# Patient Record
Sex: FEMALE | Race: WHITE | NOT HISPANIC OR LATINO
[De-identification: names, ages, dates, MRNs, and addresses within clinical notes are randomized per-mention and may not be internally consistent; named-entity substitution may affect disease eponyms.]

---

## 2020-10-29 ENCOUNTER — APPOINTMENT (OUTPATIENT)
Dept: GASTROENTEROLOGY | Facility: CLINIC | Age: 55
End: 2020-10-29

## 2021-03-09 ENCOUNTER — APPOINTMENT (OUTPATIENT)
Dept: GASTROENTEROLOGY | Facility: CLINIC | Age: 56
End: 2021-03-09

## 2023-02-09 ENCOUNTER — LABORATORY RESULT (OUTPATIENT)
Age: 58
End: 2023-02-09

## 2023-02-09 ENCOUNTER — APPOINTMENT (OUTPATIENT)
Dept: RHEUMATOLOGY | Facility: CLINIC | Age: 58
End: 2023-02-09
Payer: MEDICARE

## 2023-02-09 VITALS
HEIGHT: 61 IN | HEART RATE: 104 BPM | SYSTOLIC BLOOD PRESSURE: 138 MMHG | TEMPERATURE: 98 F | WEIGHT: 244 LBS | BODY MASS INDEX: 46.07 KG/M2 | OXYGEN SATURATION: 96 % | DIASTOLIC BLOOD PRESSURE: 79 MMHG

## 2023-02-09 DIAGNOSIS — Z00.00 ENCOUNTER FOR GENERAL ADULT MEDICAL EXAMINATION W/OUT ABNORMAL FINDINGS: ICD-10-CM

## 2023-02-09 PROCEDURE — 36415 COLL VENOUS BLD VENIPUNCTURE: CPT

## 2023-02-09 PROCEDURE — 99205 OFFICE O/P NEW HI 60 MIN: CPT | Mod: 25

## 2023-02-09 NOTE — ASSESSMENT
[FreeTextEntry1] : 57 year old female with longstanding hx of Afib, DM, CHF presents today for an initial evaluation for \par diffuse, generalized whole body pain\par \par ROS and PE otherwise unremarkable for underlying classic CTD/ systemic autoimmune disease\par \par - labs as below. will call pt with results. \par \par Discussed treatment plan with the patient. The patient was given the opportunity to ask questions and all questions were answered to their satisfaction.\par  \par

## 2023-02-09 NOTE — HISTORY OF PRESENT ILLNESS
[FreeTextEntry1] : 57 year old female with PMH as listed below presents today for an initial evaluation\par \par Has chronic, longstanding hx of Afib, DM, CHF. \par \par Presenting today with diffuse pain everywhere- joints, muscle. \par Has chronic pain > 10 years now that is getting progressively worse. \par She feels like "there is a needle piercing through her body". Her is pain is constant, sharp. No swelling to the joints. \par Unable to do ADLS because of pain. She lives alone. No recent falls. \par Taking Tylenol prn for pain with mild improvement in symptoms\par Unable to tolerate NSAIDS- on blood thinner, also with GERD\par \par Over the last few weeks reports to have increased pain and swelling to her legs. Does have hx of BL LE edema from CHF. \par She went to see her PCP who did labs that were unremarkable per pt and recommended rheumatology work up. \par Patient presenting today to rule out autoimmune disease?\par \par denies fever, chills, oral/nasal ulcers,chest pain, abdominal pain, new rashes, falls\par \par FH: denies FH of autoimmune disease

## 2023-02-09 NOTE — PHYSICAL EXAM
[General Appearance - Alert] : alert [General Appearance - In No Acute Distress] : in no acute distress [General Appearance - Well Nourished] : well nourished [General Appearance - Well Developed] : well developed [Sclera] : the sclera and conjunctiva were normal [Outer Ear] : the ears and nose were normal in appearance [Neck Appearance] : the appearance of the neck was normal [Musculoskeletal - Swelling] : no joint swelling seen [] : no rash [No Focal Deficits] : no focal deficits [Oriented To Time, Place, And Person] : oriented to person, place, and time [FreeTextEntry1] : +multiple tender points. + BL crepitus. + BL LE edema

## 2023-02-11 ENCOUNTER — INPATIENT (INPATIENT)
Facility: HOSPITAL | Age: 58
LOS: 5 days | Discharge: NURSING, REHAB OR LONG-TERM CARE | DRG: 603 | End: 2023-02-17
Attending: FAMILY MEDICINE | Admitting: INTERNAL MEDICINE
Payer: MEDICARE

## 2023-02-11 VITALS
SYSTOLIC BLOOD PRESSURE: 119 MMHG | RESPIRATION RATE: 18 BRPM | TEMPERATURE: 97 F | OXYGEN SATURATION: 96 % | WEIGHT: 244.05 LBS | HEIGHT: 61 IN | DIASTOLIC BLOOD PRESSURE: 82 MMHG | HEART RATE: 91 BPM

## 2023-02-11 DIAGNOSIS — I48.91 UNSPECIFIED ATRIAL FIBRILLATION: ICD-10-CM

## 2023-02-11 DIAGNOSIS — E11.621 TYPE 2 DIABETES MELLITUS WITH FOOT ULCER: ICD-10-CM

## 2023-02-11 DIAGNOSIS — I50.9 HEART FAILURE, UNSPECIFIED: ICD-10-CM

## 2023-02-11 DIAGNOSIS — L03.116 CELLULITIS OF LEFT LOWER LIMB: ICD-10-CM

## 2023-02-11 DIAGNOSIS — E66.01 MORBID (SEVERE) OBESITY DUE TO EXCESS CALORIES: ICD-10-CM

## 2023-02-11 DIAGNOSIS — Z98.890 OTHER SPECIFIED POSTPROCEDURAL STATES: Chronic | ICD-10-CM

## 2023-02-11 DIAGNOSIS — M54.9 DORSALGIA, UNSPECIFIED: ICD-10-CM

## 2023-02-11 DIAGNOSIS — Z90.89 ACQUIRED ABSENCE OF OTHER ORGANS: Chronic | ICD-10-CM

## 2023-02-11 DIAGNOSIS — Z90.49 ACQUIRED ABSENCE OF OTHER SPECIFIED PARTS OF DIGESTIVE TRACT: Chronic | ICD-10-CM

## 2023-02-11 DIAGNOSIS — Z29.9 ENCOUNTER FOR PROPHYLACTIC MEASURES, UNSPECIFIED: ICD-10-CM

## 2023-02-11 DIAGNOSIS — E11.9 TYPE 2 DIABETES MELLITUS WITHOUT COMPLICATIONS: ICD-10-CM

## 2023-02-11 DIAGNOSIS — R10.9 UNSPECIFIED ABDOMINAL PAIN: ICD-10-CM

## 2023-02-11 LAB
ALBUMIN SERPL ELPH-MCNC: 3.6 G/DL — SIGNIFICANT CHANGE UP (ref 3.3–5)
ALP SERPL-CCNC: 75 U/L — SIGNIFICANT CHANGE UP (ref 40–120)
ALT FLD-CCNC: 41 U/L — SIGNIFICANT CHANGE UP (ref 12–78)
ANION GAP SERPL CALC-SCNC: 10 MMOL/L — SIGNIFICANT CHANGE UP (ref 5–17)
ANION GAP SERPL CALC-SCNC: 8 MMOL/L — SIGNIFICANT CHANGE UP (ref 5–17)
APPEARANCE UR: CLEAR — SIGNIFICANT CHANGE UP
APTT BLD: 29.2 SEC — SIGNIFICANT CHANGE UP (ref 27.5–35.5)
APTT BLD: 31.6 SEC — SIGNIFICANT CHANGE UP (ref 27.5–35.5)
AST SERPL-CCNC: 25 U/L — SIGNIFICANT CHANGE UP (ref 15–37)
BASOPHILS # BLD AUTO: 0.03 K/UL — SIGNIFICANT CHANGE UP (ref 0–0.2)
BASOPHILS NFR BLD AUTO: 0.3 % — SIGNIFICANT CHANGE UP (ref 0–2)
BILIRUB SERPL-MCNC: 0.6 MG/DL — SIGNIFICANT CHANGE UP (ref 0.2–1.2)
BILIRUB UR-MCNC: NEGATIVE — SIGNIFICANT CHANGE UP
BUN SERPL-MCNC: 16 MG/DL — SIGNIFICANT CHANGE UP (ref 7–23)
BUN SERPL-MCNC: 20 MG/DL — SIGNIFICANT CHANGE UP (ref 7–23)
CALCIUM SERPL-MCNC: 8.6 MG/DL — SIGNIFICANT CHANGE UP (ref 8.5–10.1)
CALCIUM SERPL-MCNC: 9.6 MG/DL — SIGNIFICANT CHANGE UP (ref 8.5–10.1)
CHLORIDE SERPL-SCNC: 101 MMOL/L — SIGNIFICANT CHANGE UP (ref 96–108)
CHLORIDE SERPL-SCNC: 99 MMOL/L — SIGNIFICANT CHANGE UP (ref 96–108)
CO2 SERPL-SCNC: 27 MMOL/L — SIGNIFICANT CHANGE UP (ref 22–31)
CO2 SERPL-SCNC: 30 MMOL/L — SIGNIFICANT CHANGE UP (ref 22–31)
COLOR SPEC: SIGNIFICANT CHANGE UP
CREAT SERPL-MCNC: 0.77 MG/DL — SIGNIFICANT CHANGE UP (ref 0.5–1.3)
CREAT SERPL-MCNC: 0.78 MG/DL — SIGNIFICANT CHANGE UP (ref 0.5–1.3)
DIFF PNL FLD: NEGATIVE — SIGNIFICANT CHANGE UP
DIGOXIN SERPL-MCNC: 0.1 NG/ML — LOW (ref 0.8–2)
EGFR: 88 ML/MIN/1.73M2 — SIGNIFICANT CHANGE UP
EGFR: 89 ML/MIN/1.73M2 — SIGNIFICANT CHANGE UP
EOSINOPHIL # BLD AUTO: 0.11 K/UL — SIGNIFICANT CHANGE UP (ref 0–0.5)
EOSINOPHIL NFR BLD AUTO: 1.1 % — SIGNIFICANT CHANGE UP (ref 0–6)
FLUAV AG NPH QL: SIGNIFICANT CHANGE UP
FLUBV AG NPH QL: SIGNIFICANT CHANGE UP
GLUCOSE SERPL-MCNC: 299 MG/DL — HIGH (ref 70–99)
GLUCOSE SERPL-MCNC: 373 MG/DL — HIGH (ref 70–99)
GLUCOSE UR QL: 1000 MG/DL
HCT VFR BLD CALC: 42.8 % — SIGNIFICANT CHANGE UP (ref 34.5–45)
HGB BLD-MCNC: 14.7 G/DL — SIGNIFICANT CHANGE UP (ref 11.5–15.5)
IMM GRANULOCYTES NFR BLD AUTO: 0.5 % — SIGNIFICANT CHANGE UP (ref 0–0.9)
INR BLD: 1.06 RATIO — SIGNIFICANT CHANGE UP (ref 0.88–1.16)
INR BLD: 1.81 RATIO — HIGH (ref 0.88–1.16)
KETONES UR-MCNC: NEGATIVE — SIGNIFICANT CHANGE UP
LACTATE SERPL-SCNC: 2.9 MMOL/L — HIGH (ref 0.7–2)
LACTATE SERPL-SCNC: 3.6 MMOL/L — HIGH (ref 0.7–2)
LACTATE SERPL-SCNC: 3.9 MMOL/L — HIGH (ref 0.7–2)
LEUKOCYTE ESTERASE UR-ACNC: NEGATIVE — SIGNIFICANT CHANGE UP
LYMPHOCYTES # BLD AUTO: 1.53 K/UL — SIGNIFICANT CHANGE UP (ref 1–3.3)
LYMPHOCYTES # BLD AUTO: 15.2 % — SIGNIFICANT CHANGE UP (ref 13–44)
MAGNESIUM SERPL-MCNC: 1.6 MG/DL — SIGNIFICANT CHANGE UP (ref 1.6–2.6)
MCHC RBC-ENTMCNC: 30.6 PG — SIGNIFICANT CHANGE UP (ref 27–34)
MCHC RBC-ENTMCNC: 34.3 GM/DL — SIGNIFICANT CHANGE UP (ref 32–36)
MCV RBC AUTO: 89 FL — SIGNIFICANT CHANGE UP (ref 80–100)
MONOCYTES # BLD AUTO: 0.55 K/UL — SIGNIFICANT CHANGE UP (ref 0–0.9)
MONOCYTES NFR BLD AUTO: 5.5 % — SIGNIFICANT CHANGE UP (ref 2–14)
NEUTROPHILS # BLD AUTO: 7.8 K/UL — HIGH (ref 1.8–7.4)
NEUTROPHILS NFR BLD AUTO: 77.4 % — HIGH (ref 43–77)
NITRITE UR-MCNC: NEGATIVE — SIGNIFICANT CHANGE UP
NRBC # BLD: 0 /100 WBCS — SIGNIFICANT CHANGE UP (ref 0–0)
PH UR: 5 — SIGNIFICANT CHANGE UP (ref 5–8)
PLATELET # BLD AUTO: 230 K/UL — SIGNIFICANT CHANGE UP (ref 150–400)
POTASSIUM SERPL-MCNC: 3.6 MMOL/L — SIGNIFICANT CHANGE UP (ref 3.5–5.3)
POTASSIUM SERPL-MCNC: 3.9 MMOL/L — SIGNIFICANT CHANGE UP (ref 3.5–5.3)
POTASSIUM SERPL-SCNC: 3.6 MMOL/L — SIGNIFICANT CHANGE UP (ref 3.5–5.3)
POTASSIUM SERPL-SCNC: 3.9 MMOL/L — SIGNIFICANT CHANGE UP (ref 3.5–5.3)
PROT SERPL-MCNC: 7.7 G/DL — SIGNIFICANT CHANGE UP (ref 6–8.3)
PROT UR-MCNC: NEGATIVE — SIGNIFICANT CHANGE UP
PROTHROM AB SERPL-ACNC: 12.4 SEC — SIGNIFICANT CHANGE UP (ref 10.5–13.4)
PROTHROM AB SERPL-ACNC: 21.3 SEC — HIGH (ref 10.5–13.4)
RBC # BLD: 4.81 M/UL — SIGNIFICANT CHANGE UP (ref 3.8–5.2)
RBC # FLD: 12.8 % — SIGNIFICANT CHANGE UP (ref 10.3–14.5)
RSV RNA NPH QL NAA+NON-PROBE: SIGNIFICANT CHANGE UP
SARS-COV-2 RNA SPEC QL NAA+PROBE: SIGNIFICANT CHANGE UP
SODIUM SERPL-SCNC: 136 MMOL/L — SIGNIFICANT CHANGE UP (ref 135–145)
SODIUM SERPL-SCNC: 139 MMOL/L — SIGNIFICANT CHANGE UP (ref 135–145)
SP GR SPEC: 1.01 — SIGNIFICANT CHANGE UP (ref 1.01–1.02)
UROBILINOGEN FLD QL: NEGATIVE — SIGNIFICANT CHANGE UP
WBC # BLD: 10.07 K/UL — SIGNIFICANT CHANGE UP (ref 3.8–10.5)
WBC # FLD AUTO: 10.07 K/UL — SIGNIFICANT CHANGE UP (ref 3.8–10.5)

## 2023-02-11 PROCEDURE — 99285 EMERGENCY DEPT VISIT HI MDM: CPT

## 2023-02-11 PROCEDURE — 72100 X-RAY EXAM L-S SPINE 2/3 VWS: CPT | Mod: 26

## 2023-02-11 PROCEDURE — 99223 1ST HOSP IP/OBS HIGH 75: CPT | Mod: GC

## 2023-02-11 PROCEDURE — 93970 EXTREMITY STUDY: CPT | Mod: 26

## 2023-02-11 PROCEDURE — 75635 CT ANGIO ABDOMINAL ARTERIES: CPT | Mod: 26

## 2023-02-11 PROCEDURE — 73630 X-RAY EXAM OF FOOT: CPT | Mod: 26,50

## 2023-02-11 PROCEDURE — 71045 X-RAY EXAM CHEST 1 VIEW: CPT | Mod: 26

## 2023-02-11 PROCEDURE — 99222 1ST HOSP IP/OBS MODERATE 55: CPT

## 2023-02-11 RX ORDER — SODIUM CHLORIDE 9 MG/ML
1000 INJECTION, SOLUTION INTRAVENOUS
Refills: 0 | Status: DISCONTINUED | OUTPATIENT
Start: 2023-02-11 | End: 2023-02-17

## 2023-02-11 RX ORDER — INSULIN LISPRO 100/ML
4 VIAL (ML) SUBCUTANEOUS
Refills: 0 | Status: DISCONTINUED | OUTPATIENT
Start: 2023-02-11 | End: 2023-02-15

## 2023-02-11 RX ORDER — PIPERACILLIN AND TAZOBACTAM 4; .5 G/20ML; G/20ML
3.38 INJECTION, POWDER, LYOPHILIZED, FOR SOLUTION INTRAVENOUS EVERY 8 HOURS
Refills: 0 | Status: DISCONTINUED | OUTPATIENT
Start: 2023-02-12 | End: 2023-02-13

## 2023-02-11 RX ORDER — GLUCAGON INJECTION, SOLUTION 0.5 MG/.1ML
1 INJECTION, SOLUTION SUBCUTANEOUS ONCE
Refills: 0 | Status: DISCONTINUED | OUTPATIENT
Start: 2023-02-11 | End: 2023-02-17

## 2023-02-11 RX ORDER — HYDROMORPHONE HYDROCHLORIDE 2 MG/ML
0.5 INJECTION INTRAMUSCULAR; INTRAVENOUS; SUBCUTANEOUS ONCE
Refills: 0 | Status: DISCONTINUED | OUTPATIENT
Start: 2023-02-11 | End: 2023-02-11

## 2023-02-11 RX ORDER — INSULIN LISPRO 100/ML
23 VIAL (ML) SUBCUTANEOUS
Refills: 0 | Status: DISCONTINUED | OUTPATIENT
Start: 2023-02-11 | End: 2023-02-11

## 2023-02-11 RX ORDER — INSULIN LISPRO 100/ML
VIAL (ML) SUBCUTANEOUS
Refills: 0 | Status: DISCONTINUED | OUTPATIENT
Start: 2023-02-11 | End: 2023-02-13

## 2023-02-11 RX ORDER — HEPARIN SODIUM 5000 [USP'U]/ML
9000 INJECTION INTRAVENOUS; SUBCUTANEOUS EVERY 6 HOURS
Refills: 0 | Status: DISCONTINUED | OUTPATIENT
Start: 2023-02-12 | End: 2023-02-14

## 2023-02-11 RX ORDER — METOPROLOL TARTRATE 50 MG
100 TABLET ORAL DAILY
Refills: 0 | Status: DISCONTINUED | OUTPATIENT
Start: 2023-02-11 | End: 2023-02-17

## 2023-02-11 RX ORDER — CEFEPIME 1 G/1
2000 INJECTION, POWDER, FOR SOLUTION INTRAMUSCULAR; INTRAVENOUS EVERY 8 HOURS
Refills: 0 | Status: DISCONTINUED | OUTPATIENT
Start: 2023-02-11 | End: 2023-02-11

## 2023-02-11 RX ORDER — MORPHINE SULFATE 50 MG/1
4 CAPSULE, EXTENDED RELEASE ORAL ONCE
Refills: 0 | Status: DISCONTINUED | OUTPATIENT
Start: 2023-02-11 | End: 2023-02-11

## 2023-02-11 RX ORDER — INSULIN LISPRO 100/ML
34 VIAL (ML) SUBCUTANEOUS
Qty: 0 | Refills: 0 | DISCHARGE

## 2023-02-11 RX ORDER — TRAMADOL HYDROCHLORIDE 50 MG/1
50 TABLET ORAL EVERY 6 HOURS
Refills: 0 | Status: DISCONTINUED | OUTPATIENT
Start: 2023-02-11 | End: 2023-02-13

## 2023-02-11 RX ORDER — INSULIN GLARGINE 100 [IU]/ML
18 INJECTION, SOLUTION SUBCUTANEOUS
Qty: 0 | Refills: 0 | DISCHARGE

## 2023-02-11 RX ORDER — HEPARIN SODIUM 5000 [USP'U]/ML
INJECTION INTRAVENOUS; SUBCUTANEOUS
Qty: 25000 | Refills: 0 | Status: DISCONTINUED | OUTPATIENT
Start: 2023-02-12 | End: 2023-02-14

## 2023-02-11 RX ORDER — ACETAMINOPHEN 500 MG
650 TABLET ORAL EVERY 6 HOURS
Refills: 0 | Status: DISCONTINUED | OUTPATIENT
Start: 2023-02-11 | End: 2023-02-17

## 2023-02-11 RX ORDER — ONDANSETRON 8 MG/1
4 TABLET, FILM COATED ORAL ONCE
Refills: 0 | Status: COMPLETED | OUTPATIENT
Start: 2023-02-11 | End: 2023-02-11

## 2023-02-11 RX ORDER — DEXTROSE 50 % IN WATER 50 %
25 SYRINGE (ML) INTRAVENOUS ONCE
Refills: 0 | Status: DISCONTINUED | OUTPATIENT
Start: 2023-02-11 | End: 2023-02-17

## 2023-02-11 RX ORDER — CEFTRIAXONE 500 MG/1
1000 INJECTION, POWDER, FOR SOLUTION INTRAMUSCULAR; INTRAVENOUS ONCE
Refills: 0 | Status: COMPLETED | OUTPATIENT
Start: 2023-02-11 | End: 2023-02-11

## 2023-02-11 RX ORDER — OXYCODONE HYDROCHLORIDE 5 MG/1
5 TABLET ORAL EVERY 6 HOURS
Refills: 0 | Status: DISCONTINUED | OUTPATIENT
Start: 2023-02-11 | End: 2023-02-13

## 2023-02-11 RX ORDER — INSULIN GLARGINE 100 [IU]/ML
12 INJECTION, SOLUTION SUBCUTANEOUS AT BEDTIME
Refills: 0 | Status: DISCONTINUED | OUTPATIENT
Start: 2023-02-11 | End: 2023-02-13

## 2023-02-11 RX ORDER — VANCOMYCIN HCL 1 G
1000 VIAL (EA) INTRAVENOUS ONCE
Refills: 0 | Status: COMPLETED | OUTPATIENT
Start: 2023-02-11 | End: 2023-02-11

## 2023-02-11 RX ORDER — LIDOCAINE 4 G/100G
1 CREAM TOPICAL DAILY
Refills: 0 | Status: DISCONTINUED | OUTPATIENT
Start: 2023-02-11 | End: 2023-02-17

## 2023-02-11 RX ORDER — DEXTROSE 50 % IN WATER 50 %
12.5 SYRINGE (ML) INTRAVENOUS ONCE
Refills: 0 | Status: DISCONTINUED | OUTPATIENT
Start: 2023-02-11 | End: 2023-02-17

## 2023-02-11 RX ORDER — DEXTROSE 50 % IN WATER 50 %
15 SYRINGE (ML) INTRAVENOUS ONCE
Refills: 0 | Status: DISCONTINUED | OUTPATIENT
Start: 2023-02-11 | End: 2023-02-17

## 2023-02-11 RX ORDER — RIVAROXABAN 15 MG-20MG
20 KIT ORAL
Refills: 0 | Status: DISCONTINUED | OUTPATIENT
Start: 2023-02-11 | End: 2023-02-11

## 2023-02-11 RX ORDER — FUROSEMIDE 40 MG
40 TABLET ORAL DAILY
Refills: 0 | Status: DISCONTINUED | OUTPATIENT
Start: 2023-02-11 | End: 2023-02-17

## 2023-02-11 RX ORDER — SODIUM CHLORIDE 9 MG/ML
2000 INJECTION INTRAMUSCULAR; INTRAVENOUS; SUBCUTANEOUS ONCE
Refills: 0 | Status: COMPLETED | OUTPATIENT
Start: 2023-02-11 | End: 2023-02-11

## 2023-02-11 RX ORDER — INSULIN LISPRO 100/ML
VIAL (ML) SUBCUTANEOUS AT BEDTIME
Refills: 0 | Status: DISCONTINUED | OUTPATIENT
Start: 2023-02-11 | End: 2023-02-17

## 2023-02-11 RX ORDER — NYSTATIN CREAM 100000 [USP'U]/G
1 CREAM TOPICAL THREE TIMES A DAY
Refills: 0 | Status: DISCONTINUED | OUTPATIENT
Start: 2023-02-11 | End: 2023-02-17

## 2023-02-11 RX ORDER — ATORVASTATIN CALCIUM 80 MG/1
40 TABLET, FILM COATED ORAL AT BEDTIME
Refills: 0 | Status: DISCONTINUED | OUTPATIENT
Start: 2023-02-11 | End: 2023-02-17

## 2023-02-11 RX ORDER — HEPARIN SODIUM 5000 [USP'U]/ML
9000 INJECTION INTRAVENOUS; SUBCUTANEOUS ONCE
Refills: 0 | Status: DISCONTINUED | OUTPATIENT
Start: 2023-02-11 | End: 2023-02-11

## 2023-02-11 RX ORDER — LANOLIN ALCOHOL/MO/W.PET/CERES
3 CREAM (GRAM) TOPICAL AT BEDTIME
Refills: 0 | Status: DISCONTINUED | OUTPATIENT
Start: 2023-02-11 | End: 2023-02-17

## 2023-02-11 RX ORDER — HEPARIN SODIUM 5000 [USP'U]/ML
4500 INJECTION INTRAVENOUS; SUBCUTANEOUS EVERY 6 HOURS
Refills: 0 | Status: DISCONTINUED | OUTPATIENT
Start: 2023-02-12 | End: 2023-02-14

## 2023-02-11 RX ADMIN — Medication 3: at 16:35

## 2023-02-11 RX ADMIN — NYSTATIN CREAM 1 APPLICATION(S): 100000 CREAM TOPICAL at 21:55

## 2023-02-11 RX ADMIN — CEFTRIAXONE 1000 MILLIGRAM(S): 500 INJECTION, POWDER, FOR SOLUTION INTRAMUSCULAR; INTRAVENOUS at 13:55

## 2023-02-11 RX ADMIN — MORPHINE SULFATE 4 MILLIGRAM(S): 50 CAPSULE, EXTENDED RELEASE ORAL at 12:12

## 2023-02-11 RX ADMIN — HYDROMORPHONE HYDROCHLORIDE 0.5 MILLIGRAM(S): 2 INJECTION INTRAMUSCULAR; INTRAVENOUS; SUBCUTANEOUS at 20:22

## 2023-02-11 RX ADMIN — MORPHINE SULFATE 4 MILLIGRAM(S): 50 CAPSULE, EXTENDED RELEASE ORAL at 11:42

## 2023-02-11 RX ADMIN — ATORVASTATIN CALCIUM 40 MILLIGRAM(S): 80 TABLET, FILM COATED ORAL at 21:56

## 2023-02-11 RX ADMIN — Medication 1: at 21:55

## 2023-02-11 RX ADMIN — Medication 250 MILLIGRAM(S): at 13:56

## 2023-02-11 RX ADMIN — SODIUM CHLORIDE 2000 MILLILITER(S): 9 INJECTION INTRAMUSCULAR; INTRAVENOUS; SUBCUTANEOUS at 15:38

## 2023-02-11 RX ADMIN — CEFEPIME 100 MILLIGRAM(S): 1 INJECTION, POWDER, FOR SOLUTION INTRAMUSCULAR; INTRAVENOUS at 21:56

## 2023-02-11 RX ADMIN — OXYCODONE HYDROCHLORIDE 5 MILLIGRAM(S): 5 TABLET ORAL at 16:47

## 2023-02-11 RX ADMIN — Medication 4 UNIT(S): at 17:28

## 2023-02-11 RX ADMIN — RIVAROXABAN 20 MILLIGRAM(S): KIT at 17:33

## 2023-02-11 RX ADMIN — ONDANSETRON 4 MILLIGRAM(S): 8 TABLET, FILM COATED ORAL at 11:43

## 2023-02-11 RX ADMIN — CEFTRIAXONE 100 MILLIGRAM(S): 500 INJECTION, POWDER, FOR SOLUTION INTRAMUSCULAR; INTRAVENOUS at 13:55

## 2023-02-11 RX ADMIN — HYDROMORPHONE HYDROCHLORIDE 0.5 MILLIGRAM(S): 2 INJECTION INTRAMUSCULAR; INTRAVENOUS; SUBCUTANEOUS at 20:37

## 2023-02-11 RX ADMIN — SODIUM CHLORIDE 1000 MILLILITER(S): 9 INJECTION INTRAMUSCULAR; INTRAVENOUS; SUBCUTANEOUS at 13:56

## 2023-02-11 RX ADMIN — INSULIN GLARGINE 12 UNIT(S): 100 INJECTION, SOLUTION SUBCUTANEOUS at 21:56

## 2023-02-11 RX ADMIN — Medication 1000 MILLIGRAM(S): at 13:56

## 2023-02-11 NOTE — CONSULT NOTE ADULT - SUBJECTIVE AND OBJECTIVE BOX
HPI:  57y F PMH of Afib (on Xarelto), DM2, and CHF (unknown EF) Obesity who presented to the hospital with cc of painful left heel wound and bilateral lower extremity swelling and redness.  C/o severe pain. Has chronic back pain. In ED afebrile. WBC wnl. Noted to have unstagable infected painful left heel decub ulcer with foot cellulitis,    Infectious Disease consult was called to evaluate pt and for antibiotic management.        Past Medical & Surgical Hx:  PAST MEDICAL & SURGICAL HISTORY:  Atrial fibrillation  Type 2 diabetes mellitus  Congestive heart failure  Scalp psoriasis  S/P tonsillectomy  S/P appendectomy  H/O umbilical hernia repair        Social History--  EtOH: denies   Tobacco: denies   Drug Use: denies      FAMILY HISTORY:  No pertinent family history in first degree relatives      Allergies  No Known Allergies    Intolerances  NONE    Home Medications:  Admelog 100 units/mL injectable solution: 15 unit(s) injectable 3 times a day (11 Feb 2023 16:57)  Basaglar KwikPen 100 units/mL subcutaneous solution: 25 unit(s) subcutaneous once a day (at bedtime) (11 Feb 2023 16:57)  Lasix 40 mg oral tablet: 1 tab(s) orally once a day (11 Feb 2023 16:57)  metFORMIN 1000 mg oral tablet, extended release: 1 tab(s) orally 2 times a day (11 Feb 2023 16:57)  metoprolol succinate 100 mg oral tablet, extended release: 1 tab(s) orally once a day (11 Feb 2023 16:57)  Xarelto 20 mg oral tablet: 1 tab(s) orally once a day (in the evening) (11 Feb 2023 16:57)      Current Inpatient Medications :    ANTIBIOTICS:   cefepime   IVPB 2000 milliGRAM(s) IV Intermittent every 8 hours      OTHER RELEVANT MEDICATIONS :  acetaminophen     Tablet .. 650 milliGRAM(s) Oral every 6 hours PRN  atorvastatin 40 milliGRAM(s) Oral at bedtime  dextrose 5%. 1000 milliLiter(s) IV Continuous <Continuous>  dextrose 5%. 1000 milliLiter(s) IV Continuous <Continuous>  dextrose 50% Injectable 25 Gram(s) IV Push once  dextrose 50% Injectable 12.5 Gram(s) IV Push once  dextrose 50% Injectable 25 Gram(s) IV Push once  dextrose Oral Gel 15 Gram(s) Oral once PRN  furosemide    Tablet 40 milliGRAM(s) Oral daily  glucagon  Injectable 1 milliGRAM(s) IntraMuscular once  heparin   Injectable 9000 Unit(s) IV Push every 6 hours PRN  heparin   Injectable 4500 Unit(s) IV Push every 6 hours PRN  heparin  Infusion.  Unit(s)/Hr IV Continuous <Continuous>  insulin glargine Injectable (LANTUS) 12 Unit(s) SubCutaneous at bedtime  insulin lispro (ADMELOG) corrective regimen sliding scale   SubCutaneous three times a day before meals  insulin lispro (ADMELOG) corrective regimen sliding scale   SubCutaneous at bedtime  insulin lispro Injectable (ADMELOG) 4 Unit(s) SubCutaneous three times a day before meals  lidocaine   4% Patch 1 Patch Transdermal daily  melatonin 3 milliGRAM(s) Oral at bedtime PRN  metoprolol succinate  milliGRAM(s) Oral daily  nystatin Powder 1 Application(s) Topical three times a day  oxyCODONE    IR 5 milliGRAM(s) Oral every 6 hours PRN  traMADol 50 milliGRAM(s) Oral every 6 hours PRN        ROS:  CONSTITUTIONAL:  Negative fever or chills  EYES:  Negative  blurry vision or double vision  CARDIOVASCULAR:  Negative for chest pain or palpitations  RESPIRATORY:  Negative for cough, wheezing, or SOB   GASTROINTESTINAL:  Negative for nausea, vomiting, diarrhea, constipation, or abdominal pain  GENITOURINARY:  Negative frequency, urgency , dysuria or hematuria   NEUROLOGIC:  No headache, confusion, dizziness, lightheadedness  All other systems were reviewed and are negative        I&O's Detail    11 Feb 2023 07:01  -  11 Feb 2023 23:10  --------------------------------------------------------  IN:    Oral Fluid: 200 mL  Total IN: 200 mL    OUT:  Total OUT: 0 mL    Total NET: 200 mL          Physical Exam:  Vital Signs Last 24 Hrs  T(C): 37.4 (11 Feb 2023 20:28), Max: 37.4 (11 Feb 2023 20:28)  T(F): 99.3 (11 Feb 2023 20:28), Max: 99.3 (11 Feb 2023 20:28)  HR: 107 (11 Feb 2023 20:28) (62 - 107)  BP: 106/68 (11 Feb 2023 20:28) (100/62 - 119/82)  BP(mean): --  RR: 18 (11 Feb 2023 20:28) (16 - 18)  SpO2: 97% (11 Feb 2023 20:28) (95% - 100%)    Parameters below as of 11 Feb 2023 20:28  Patient On (Oxygen Delivery Method): room air      Height (cm): 154.9 (02-11 @ 10:40)  Weight (kg): 110.7 (02-11 @ 10:40)  BMI (kg/m2): 46.1 (02-11 @ 10:40)  BSA (m2): 2.06 (02-11 @ 10:40)    General: well developed well nourished, in no acute distress  Neck: supple, trachea midline  Lungs: clear, no wheeze/rhonchi  Cardiovascular: regular rate and rhythm, S1 S2  Abdomen: soft, nontender, ND, bowel sounds normal  Neurological:  alert and oriented x3  Skin: no rash  Extremities: Justin LE edema  painful left heel decub ulcer unstagable with footleg erythema        Labs:                         14.7   10.07 )-----------( 230      ( 11 Feb 2023 11:10 )             42.8   02-11    139  |  101  |  16  ----------------------------<  299<H>  3.6   |  30  |  0.78    Ca    8.6      11 Feb 2023 18:40  Mg     1.6     02-11    TPro  7.7  /  Alb  3.6  /  TBili  0.6  /  DBili  x   /  AST  25  /  ALT  41  /  AlkPhos  75  02-11      RECENT CULTURES:          RADIOLOGY & ADDITIONAL STUDIES:    ACC: [id number] EXAM:  US DPLX LWR EXT VEINS COMPL BI   ORDERED BY: PETER ANGEL     PROCEDURE DATE:  02/11/2023          INTERPRETATION:  CLINICAL INFORMATION: Body aches. Duplex swelling. Rule   out DVT.    COMPARISON: None available.    TECHNIQUE: Duplex sonography of the BILATERAL LOWER extremity veins with   color and spectral Doppler, with and without compression.    FINDINGS:    RIGHT:  Normal compressibility of the RIGHT common femoral, femoral and popliteal   veins.  Doppler examination shows normal spontaneous and phasic flow.  No RIGHT calf vein thrombosis is detected.    LEFT:  Normal compressibility of the LEFT common femoral, femoral and popliteal   veins.  Doppler examination shows normal spontaneous and phasic flow.  No LEFTcalf vein thrombosis is detected.    Subcutaneous edema bilaterally.    IMPRESSION:  No evidence of deep venous thrombosis in either lower extremity.    Assessment :   57y F PMH of Afib (on Xarelto), DM2, and CHF (unknown EF) Obesity admitted with unstagable infected painful left heel decub ulcer with foot/leg cellulitis,      Plan :   Change to Zosyn to include anaerobic coverage  Fu cultures  Trend temps and cbc  Will need MRI left foot r/o OM  Podiatry on case  Pain control  Pulm toileting      Continue with present regiment .  Approptiate use of antibiotics and adverse effects reviewed.        > 45 minutes spent in direct patient care reviewing  the notes, lab data/ imaging , discussion with multidisciplinary team. All questions were addressed and answered to the best of my capacity .    Thank you for allowing me to participate in the care of your patient .      Carole Mohr MD  Infectious Disease  [phone]

## 2023-02-11 NOTE — H&P ADULT - HISTORY OF PRESENT ILLNESS
**Charting in progress***  57y F PMH of Afib, DM2, and CHF (unknown EF) presenting with left heel wound and bilateral lower extremity swelling and redness. Patient relates moderate amount of back pain as well and is unable to lift her lower legs. Patient is a poor historian and is accompanied by her friend bedside who states that he takes care of the patient and has known her for the past 40 years. Patient states she has limited ambulation and is home bound due to her back pain and also due to  swelling and heaviness in the legs. Patient only ambulates to the restroom and back in the house. Patient states she noticed the wound to her left heel about a week to ago and had not noticed any bleeding until yesterday when her friend visited her who noticed blood on the floor and on the left foot.  Patient wears crocs at home and denies any trauma to the foot or the legs. Per patient's friend, patient is an uncontrolled diabetic. Denies any fever, chills, nausea, vomiting, chest pain, shortness of breath, or calf pain at this time.    ED course  Vitals: T 97.2F, HR 91, /82, RR 18, SpO2 96% on RA   Significant labs: Lactate 3.6, glucose 373, Dig level 0.1, UA with 1000 glucose  B/l LE doppler: No evidence of deep venous thrombosis in either lower extremity.  Patient received Rocephin 1g x 1, Vancomycin 1g, Morphine 4mg IVP, Zofran 4mg IVP, 2L NS bolus     57y F PMH of Afib (on Xarelto), DM2, and CHF (unknown EF) presenting with left heel wound and bilateral lower extremity swelling and redness. Patient relates moderate amount of back pain as well and is unable to lift her lower legs. Patient states that she has had chronic foot pain which acutely worsened 1 week ago without evidence of precipitating factors or trauma. Patient reports that she has had limited ambulation and has been homebound due to her back pain as well as increased swelling/heaviness of both her legs. Patient utilizes walker and ambulates to restroom; she is unable to go up stairs. Patient noticed bleeding yesterday when her friend visited her and found blood on the floor as well as her left foot. Patient normally wears Crocs at home and denies any trauma to her feet/legs. Patient states that she has been unable to fully lay down as she experiences diffuse pain all across her body including her legs and back, and has had to sleep with her head down on a desk to get some rest.    Of note, patient also reports "excruciating pain" when attempting to eat. She reports undergoing an EGD/colonoscopy back in August-September 2022 which was normal. Endorses weakness, fatigue, SOB, palpitations, abdominal pain, chronic paresthesias down all extremities, a "thick and syrupy urine", and a yellow mucus that appears at the end of her bowel movements.    ED course  Vitals: T 97.2F, HR 91, /82, RR 18, SpO2 96% on RA   Significant labs: Lactate 3.6, glucose 373, Dig level 0.1, UA with 1000 glucose  B/l LE doppler: No evidence of deep venous thrombosis in either lower extremity.  Patient received Rocephin 1g x 1, Vancomycin 1g, Morphine 4mg IVP, Zofran 4mg IVP, 2L NS bolus     57y F PMH of Afib (on Xarelto), DM2, and CHF (unknown EF) presenting with left heel wound and bilateral lower extremity swelling and redness. Patient relates moderate amount of back pain as well and is unable to lift her lower legs. Patient states that she has had chronic foot pain which acutely worsened 1 week ago without evidence of precipitating factors or trauma. Patient reports that she has had limited ambulation and has been homebound due to her back pain as well as increased swelling. Patient utilizes walker and ambulates to restroom; she is unable to go up stairs. Patient noticed bleeding yesterday when her friend visited her and found blood on the floor as well as her left foot. Patient normally wears Crocs at home and denies any trauma to her feet/legs. Patient states that she has been unable to fully lay down as she experiences diffuse pain all across her body including her legs and back, and has had to sleep with her head down on a desk to get some rest.    Of note, patient also reports "excruciating pain" when attempting to eat. She reports undergoing an EGD/colonoscopy back in August-September 2022 which was normal. Endorses weakness, fatigue, SOB, palpitations, abdominal pain, chronic paresthesias down all extremities, a "thick and syrupy urine", and a yellow mucus that appears at the end of her bowel movements.    ED course  Vitals: T 97.2F, HR 91, /82, RR 18, SpO2 96% on RA   Significant labs: Lactate 3.6, glucose 373, Dig level 0.1, UA with 1000 glucose  B/l LE doppler: No evidence of deep venous thrombosis in either lower extremity.  Patient received Rocephin 1g x 1, Vancomycin 1g, Morphine 4mg IVP, Zofran 4mg IVP, 2L NS bolus

## 2023-02-11 NOTE — H&P ADULT - PROBLEM SELECTOR PLAN 8
Continue xarelto Continue home Xarelto for DVT ppx Patient with BMI of 46  - f/u Lipid profile  - Counselling on nutrition and exercise on discharge

## 2023-02-11 NOTE — ED PROVIDER NOTE - MUSCULOSKELETAL, MLM
Strong peripheral pulses left foot heel with macerated open circular wound mild bleeding no surrounding redness warmth + ttp nvi left foot heel with macerated open circular wound mild bleeding + surrounding redness warmth to b/l legs + ttp nvi

## 2023-02-11 NOTE — H&P ADULT - PROBLEM SELECTOR PLAN 4
Insulin dependent DM2, blood glucose on admission 373  - Home regimen include: metformin 1000mg BID, basaglar 10U qhs, admelog 34U TID  - Will hold metformin while inpatient    - Start Lantus 7U qhs  - Start admelog 23U premeal   - Start Low dose insulin corrective scale  - Hypoglycemia protocol, fingerstick glucose qAc and qHs  - Consistent Carbohydrate diet  - F/u AM HbA1c Insulin dependent DM2, blood glucose on admission 373  - Home regimen include: metformin 1000mg BID, basaglar 18U qhs, admelog 34U TID  - Will hold metformin while inpatient    - Start Lantus 12U qhs  - Start Admelog 23U premeal   - Start Low dose insulin corrective scale  - Hypoglycemia protocol, fingerstick glucose qAc and qHs  - Consistent Carbohydrate diet  - F/u AM HbA1c Patient reports diffuse severe pain with meals  - Patient reports EGD/colonoscopy in August/September 2022: Normal  - f/u CT Angio abd/pelvis with IV cont - evaluate for ischemia  - GI, Dr. Marcelo consulted, f/u recs Patient reports diffuse severe pain with meals  - Patient reports EGD/colonoscopy in August/September 2022: Normal  - f/u CT Angio abd/pelvis with IV cont - evaluate for ischemia  - GI, Dr. Marcelo and vascular consulted, f/u recs  -f/u repeat lactic acid

## 2023-02-11 NOTE — H&P ADULT - ASSESSMENT
**Charting in progress**   57y F PMH of Afib, DM2, and CHF (unknown EF) presenting with left heel wound and bilateral lower extremity swelling and redness admitted for Bilateral LE cellulitis with diabetic foot ulcer.  57y F PMH of Afib, DM2, and CHF (unknown EF) presenting with left heel wound and bilateral lower extremity swelling and redness admitted for Bilateral LE cellulitis with diabetic foot ulcer.

## 2023-02-11 NOTE — CONSULT NOTE ADULT - SUBJECTIVE AND OBJECTIVE BOX
HPI:      57y year old Female seen at [facility] ED for left heel wound and bilateral lower extremity swelling and redness. Patient relates moderate amount of back pain as well and is unable to lift her lower legs. Patient is a poor historian and is accompanied by her friend bedside who states that he takes care of the patient and has known her for the past 40 years. Patient states she has limited ambulation and is home bound due to her back pain and also due to  swelling and heaviness in the legs. Patient only ambulates to the restroom and back in the house. Patient states she noticed the wound to her left heel about a week to ago and had not noticed any bleeding until yesterday when her friend visited her who noticed blood on the floor and on the left foot.  Patient wears crocs at home and denies any trauma to the foot or the legs. Per patient's friend, patient is an uncontrolled diabetic. Denies any fever, chills, nausea, vomiting, chest pain, shortness of breath, or calf pain at this time.    REVIEW OF SYSTEMS    PAST MEDICAL & SURGICAL HISTORY:      Allergies  No Known Allergies    Intolerances        MEDICATIONS  (STANDING):  cefTRIAXone   IVPB 1000 milliGRAM(s) IV Intermittent once  sodium chloride 0.9% Bolus 2000 milliLiter(s) IV Bolus once  vancomycin  IVPB. 1000 milliGRAM(s) IV Intermittent once    MEDICATIONS  (PRN):    Social History:    FAMILY HISTORY:      Vital Signs Last 24 Hrs  T(C): 36.2 (11 Feb 2023 10:40), Max: 36.2 (11 Feb 2023 10:40)  T(F): 97.2 (11 Feb 2023 10:40), Max: 97.2 (11 Feb 2023 10:40)  HR: 91 (11 Feb 2023 10:40) (91 - 91)  BP: 119/82 (11 Feb 2023 10:40) (119/82 - 119/82)  BP(mean): --  RR: 18 (11 Feb 2023 10:40) (18 - 18)  SpO2: 96% (11 Feb 2023 10:40) (96% - 96%)    Parameters below as of 11 Feb 2023 10:40  Patient On (Oxygen Delivery Method): room air        PHYSICAL EXAM:  Vascular: DP & PT non palpable bilaterally, Capillary refill delayed to the digits , Digital hair absent bilaterally. BLE non pitting edema with erythema extending from the foot to the lower legs about 4cm distal to the tibial tuberosity   Neurological: Light touch sensation diminished to the legs bilaterally  Musculoskeletal: 5/5 strength in all quadrants bilaterally, AJ & STJ ROM intact  Dermatological: 3.0cmx 4.0cm bulla noticed to the left heel with no probe to bone, (+)periwound erythema, no fluctuance, no malodor, no proximal streaking at this time. Tender to touch left heel     CBC Full  -  ( 11 Feb 2023 11:10 )  WBC Count : 10.07 K/uL  RBC Count : 4.81 M/uL  Hemoglobin : 14.7 g/dL  Hematocrit : 42.8 %  Platelet Count - Automated : 230 K/uL  Mean Cell Volume : 89.0 fl  Mean Cell Hemoglobin : 30.6 pg  Mean Cell Hemoglobin Concentration : 34.3 gm/dL  Auto Neutrophil # : 7.80 K/uL  Auto Lymphocyte # : 1.53 K/uL  Auto Monocyte # : 0.55 K/uL  Auto Eosinophil # : 0.11 K/uL  Auto Basophil # : 0.03 K/uL  Auto Neutrophil % : 77.4 %  Auto Lymphocyte % : 15.2 %  Auto Monocyte % : 5.5 %  Auto Eosinophil % : 1.1 %  Auto Basophil % : 0.3 %    02-11    136  |  99  |  20  ----------------------------<  373<H>  3.9   |  27  |  0.77    Ca    9.6      11 Feb 2023 11:10    TPro  7.7  /  Alb  3.6  /  TBili  0.6  /  DBili  x   /  AST  25  /  ALT  41  /  AlkPhos  75  02-11                          14.7   10.07 )-----------( 230      ( 11 Feb 2023 11:10 )             42.8       PT/INR - ( 11 Feb 2023 11:10 )   PT: 12.4 sec;   INR: 1.06 ratio         PTT - ( 11 Feb 2023 11:10 )  PTT:29.2 sec        Imaging: ----------  Pending

## 2023-02-11 NOTE — ED PROVIDER NOTE - WR ORDER STATUS 1
[facility], 1135 waiver authority and the [employer] and [employer] Act, this Virtual  Visit was conducted, with patient's (and/or legal guardian's) consent, to reduce the patient's risk of exposure to COVID-19 and provide necessary medical care. Services were provided through a video synchronous discussion virtually to substitute for in-person clinic visit. Patient and provider were located at their individual homes.         Jazlyn Land MD Performed

## 2023-02-11 NOTE — H&P ADULT - PROBLEM SELECTOR PLAN 5
Patient with Hx of afib  - EKG with afib at 96 bpm   - On Xarelto for AC, continue while inpatient , last dose last night  - On metoprolol succinate for rate control, continue with hold parameters while inpatient   - Took digoxin in the past. She does not take digoxin anymore Patient with Hx of afib  - EKG with afib at 96 bpm   - On Xarelto for AC, continue while inpatient , last dose last night (2/10)  - On Metoprolol succinate for rate control, continue with hold parameters   - Took digoxin in the past. She does not take digoxin anymore Insulin dependent DM2, blood glucose on admission 373  - Home regimen include: metformin 1000mg BID, basaglar 18U qhs, admelog 34U TID  - Will hold metformin while inpatient    - Start Lantus 12U qhs  - Start Admelog 23U premeal   - Start Low dose insulin corrective scale  - Hypoglycemia protocol, fingerstick glucose qAc and qHs  - Consistent Carbohydrate diet  - F/u AM HbA1c  - START Atorvastatin 40mg Insulin dependent DM2, blood glucose on admission 373  - Home regimen include: metformin 1000mg BID, basaglar 18U qhs, patient unsure of her admelog amount  - Will hold metformin while inpatient    - Start Lantus 12U qhs  - Start Admelog 4U premeal   - Start Low dose insulin corrective scale  - Hypoglycemia protocol, fingerstick glucose qAc and qHs  - Consistent Carbohydrate diet  - F/u AM HbA1c  - START Atorvastatin 40mg Insulin dependent DM2, blood glucose on admission 373  - Home regimen include: metformin 1000mg BID, basaglar 25U qhs, admelog 15u TID  - Will hold metformin while inpatient    - Start Lantus 12U qhs  - Start Admelog 4U premeal   - Start Low dose insulin corrective scale  - Hypoglycemia protocol, fingerstick glucose qAc and qHs  - Consistent Carbohydrate diet  - F/u AM HbA1c  - START Atorvastatin 40mg

## 2023-02-11 NOTE — H&P ADULT - PROBLEM SELECTOR PLAN 1
Patient presenting with b/l LE edema and erythema. VS stable and no leukocytosis   - S/p Vancomycin, Zosyn, and 2L NS in ED  - Lactate of 3.6 on admission, f/u repeat   - B/l LE doppler negative for DVT   - F/u x- ray of foot and will order MRI foot to r/o any osseous pathology vs osteomyelitis   - Continue IV antibiotics  - tylenol 650 mg PO q6h PRN pain and/or fever  - F/u Blood and urine cultures    - wound care  - Podiatry, Dr. Galarza following   - ID, Dr. Mohr consulted, f/u recs  - Vascular consulted, f/u recs Patient presenting with b/l LE edema and erythema. VS stable and no leukocytosis. S/p Vancomycin, Zosyn, and 2L NS in ED  - Lactate of 3.6 on admission, f/u repeat   - B/l LE doppler negative for DVT   - F/u x- ray of foot and will order MRI foot to r/o any osseous pathology vs osteomyelitis   - Continue IV antibiotics: Vanc and Cefepime?  - Pain regimen: Tylenol (mild), Tramadol 25mg q6h (moderate), Tramadol 50mg q6h (severe)?  - f/u blood, urine, wound cx  - Wound care  - Podiatry, Dr. Galarza following   - ID, Dr. Mohr consulted, f/u recs  - Vascular consulted, f/u recs Patient presenting with b/l LE edema and erythema. VS stable and no leukocytosis. S/p Vancomycin, Rocephin and 2L NS in ED  - Lactate of 3.6 on admission, f/u repeat   - B/l LE doppler negative for DVT   - F/u x- ray of foot and will order MRI foot to r/o any osseous pathology vs osteomyelitis   - START Cefepime 2g q8h  - Pain regimen: Tylenol (mild), Tramadol 50mg q6h (moderate), Oxycodone 5mg q6h (severe)  - f/u blood, urine, wound cx  - Wound care  - Podiatry, Dr. Galarza following   - ID, Dr. Mohr consulted, f/u recs  - Vascular, Dr. Perez consulted, f/u recs

## 2023-02-11 NOTE — H&P ADULT - PROBLEM SELECTOR PLAN 6
Unknown EF, with chronic LE swelling   - Takes lasix 40mg qd at home  - Continue inpatient  - ECHO?*** Unknown EF, with chronic LE swelling   - Continue home Lasix 40mg QD  - f/u TTE  - Strict I&Os, daily weights Patient with Hx of afib  - EKG with afib at 96 bpm   - On Xarelto for AC, continue while inpatient , last dose last night (2/10)  - On Metoprolol succinate for rate control, continue with hold parameters   - Took digoxin in the past. She does not take digoxin anymore

## 2023-02-11 NOTE — H&P ADULT - PROBLEM SELECTOR PLAN 3
Chronic   - F/u Xray of lumbar spine  - Start lidocaine patch for lumbar spine   - PRN Tylenol for pain Chronic   - F/u Xray of lumbar spine  - START Lidocaine patch for lumbar spine, apply to lower back  - Pain regimen: Tylenol (mild), Tramadol 25mg q6h (moderate), Tramadol 50mg q6h (severe)? Chronic   - F/u Xray of lumbar spine  - START Lidocaine patch for lumbar spine, apply to lower back  - Pain regimen: Tylenol (mild), Tramadol 50mg q6h (moderate), Oxycodone 5mg q6h (severe)

## 2023-02-11 NOTE — H&P ADULT - NSHPREVIEWOFSYSTEMS_GEN_ALL_CORE
REVIEW OF SYSTEMS:  CONSTITUTIONAL: + Weakness, fatigue. No fever/chills or appetite changes.  HENMT: No HA, lightheadedness/dizziness  RESPIRATORY: + Shortness of breath. No cough, wheezing, hemoptysis  CARDIOVASCULAR: + Palpitations. No chest pain  GASTROINTESTINAL: + Diffuse abdominal pain, "yellow mucus at the end of my bowel movements." No nausea or vomiting; No diarrhea or constipation.  GENITOURINARY: + "Thick, syrupy urine." No dysuria, changes in frequency, hematuria, or incontinence  NEUROLOGICAL: Baseline strength. Sensation intact bilaterally.  SKIN: + Lower abdominal pannus   MUSCULOSKELETAL: + Chronic back, B/L foot pain, B/L leg swelling. REVIEW OF SYSTEMS:  CONSTITUTIONAL: + Weakness, fatigue. No fever/chills or appetite changes.  HENMT: No HA, lightheadedness/dizziness  RESPIRATORY: + Shortness of breath. No cough, wheezing, hemoptysis  CARDIOVASCULAR: + Palpitations. No chest pain  GASTROINTESTINAL: + Diffuse abdominal pain, "yellow mucus at the end of my bowel movements." No nausea or vomiting; No diarrhea or constipation, melena, hematochezia.  GENITOURINARY: + "Thick, syrupy urine." No dysuria, changes in frequency, hematuria, or incontinence  NEUROLOGICAL: Baseline strength. Sensation intact bilaterally.  SKIN: + Lower abdominal pannus   MUSCULOSKELETAL: + Chronic back, B/L foot pain, B/L leg swelling.

## 2023-02-11 NOTE — H&P ADULT - PROBLEM SELECTOR PLAN 2
- F/u x- ray of foot and will order MRI foot to r/o any osseous pathology vs osteomyelitis   - Continue IV antibiotics  - Podiatry and vascular following - Plan as above - F/u x- ray of foot and will order MRI foot to r/o any osseous pathology vs osteomyelitis   - Continue IV antibiotics: Vanc and Cefepime?  - Podiatry and vascular following - Plan as above - F/u x- ray of foot and will order MRI foot to r/o any osseous pathology vs osteomyelitis   - START Cefepime 2g q8h  - Podiatry and vascular following - Plan as above

## 2023-02-11 NOTE — H&P ADULT - PROBLEM SELECTOR PLAN 7
Patient with BMI of 46  - Counselling on nutrition and exercise on discharge Patient with BMI of 46  - Patient reports diffuse severe pain with meals  - Patient reports EGD/colonoscopy in August/September 2022: Normal  - f/u Lipid profile  - GI consult?  - Counselling on nutrition and exercise on discharge Unknown EF, with chronic LE swelling   - Continue home Lasix 40mg QD  - f/u TTE  - Strict I&Os, daily weights

## 2023-02-11 NOTE — H&P ADULT - NSHPPHYSICALEXAM_GEN_ALL_CORE
T(C): 36.6 (02-11-23 @ 15:12), Max: 36.6 (02-11-23 @ 15:12)  HR: 62 (02-11-23 @ 15:12) (62 - 91)  BP: 100/62 (02-11-23 @ 15:12) (100/62 - 119/82)  RR: 16 (02-11-23 @ 15:12) (16 - 18)  SpO2: 100% (02-11-23 @ 15:12) (96% - 100%)    GENERAL: patient appears tearful, anxious, uncomfortable  EYES: anicteric sclerae, no exudates  ENMT: poor dentition. oropharynx clear without erythema, no exudates, moist mucous membranes  LUNGS: clear to auscultation but diminished at bases bilaterally, no rales or wheezing. distant breath sounds 2/2 body habitus  HEART: S1/S2, irregular rate and rhythm, no murmurs noted, bilateral lower extremity edema. distant heart sounds 2/2 body habitus  GASTROINTESTINAL: abdomen is soft, mild TTP, nondistended, normoactive bowel sounds, no palpable masses. Lower abdominal pannus visualized with erythema and satellite lesions in skin fold of groin  INTEGUMENT: good skin turgor, warm skin  MUSCULOSKELETAL: Warmth and redness traveling up bilateral feet to thighs. no clubbing or cyanosis, no obvious deformity. Left foot and heel with dried blood, s/p dressing currently c/d/i.  NEUROLOGIC: awake, alert, oriented x3, good muscle tone in 4 extremities, no obvious sensory deficits  HEME/LYMPH: no obvious ecchymosis or petechiae T(C): 36.6 (02-11-23 @ 15:12), Max: 36.6 (02-11-23 @ 15:12)  HR: 62 (02-11-23 @ 15:12) (62 - 91)  BP: 100/62 (02-11-23 @ 15:12) (100/62 - 119/82)  RR: 16 (02-11-23 @ 15:12) (16 - 18)  SpO2: 100% (02-11-23 @ 15:12) (96% - 100%)    GENERAL: patient appears tearful, anxious, uncomfortable  EYES: anicteric sclerae, no exudates  ENMT: poor dentition. oropharynx clear without erythema, no exudates, moist mucous membranes  LUNGS: clear to auscultation but diminished at bases bilaterally, no rales or wheezing. distant breath sounds 2/2 body habitus  HEART: S1/S2, irregular rate and rhythm, no murmurs noted, bilateral lower extremity edema. distant heart sounds 2/2 body habitus  GASTROINTESTINAL: abdomen is soft, mild TTP, nondistended, normoactive bowel sounds, no palpable masses. Lower abdominal pannus visualized with erythema and satellite lesions in skin fold of groin  INTEGUMENT: good skin turgor, warm skin  MUSCULOSKELETAL: Warmth and redness traveling up bilateral feet to thighs, tender to palpation. no clubbing or cyanosis, no obvious deformity. Left foot and heel with dried blood, s/p dressing currently c/d/i.  NEUROLOGIC: awake, alert, oriented x3, good muscle tone in 4 extremities, no obvious sensory deficits  HEME/LYMPH: no obvious ecchymosis or petechiae

## 2023-02-11 NOTE — H&P ADULT - NSICDXPASTMEDICALHX_GEN_ALL_CORE_FT
PAST MEDICAL HISTORY:  Atrial fibrillation     Congestive heart failure     Scalp psoriasis     Type 2 diabetes mellitus

## 2023-02-11 NOTE — ED PROVIDER NOTE - ATTENDING APP SHARED VISIT CONTRIBUTION OF CARE
Patient is a 57-year-old female who lives at home alone, brought in by family friend for evaluation after she was found laying on the floor.  She is not able to sleep well, laying flat in her bed she has to sleep sitting up at the edge of her bed because of pain in her legs.  She has not removed her shoe in the days.  And she has a wound on her left heel.  She cannot walk.  And the data is from the friend.  Is the patient is a poor historian.    On evaluation is a chronically ill female who lays in bed, in no respiratory distress.  HEENT reveals dry mucous membranes, poor dentition, no JVD, sclera anicteric neck is supple.  Cardiac exam is regular rate and rhythm without a gross murmur auscultated anteriorly, lung sounds are cleared by auscultation anteriorly.  Abdominal exam is soft nontender.  She is obese with a large pannus, and there is a fungal infection within the skin folds of her pannus.  Musculoskeletal exam patient has enormous pain to her both legs they are swollen, erythematous with cellulitic changes rising up beyond the knees, and a depressed wound that is draining on the left heel.  The skin is still overlying the wound so it is unstageable.  No visible eschar.  Neurologic patient is awake and alert with weak in her legs unable to sit up or lay back without assistance.  Unable to raise her legs.  She has no rest pain in her calves and ankles.    Plan of care includes pain management, rule out sepsis, rule out osteomyelitis, rule out DVT, rule out UTI, chest x-ray, laboratory studies including blood cultures COVID testing, antibiotics, admission to the hospital for the dehydration weakness sepsis osteomyelitis and definitive care.  This chart was made with dictation software and may contain typographical errors.

## 2023-02-11 NOTE — ED ADULT TRIAGE NOTE - CHIEF COMPLAINT QUOTE
Patient c/o b/l foot wounds that have worsened over the last 6 weeks. Patient states she's unable to ambulate now because of the pain. Hx diabetes

## 2023-02-11 NOTE — H&P ADULT - NSHPSOCIALHISTORY_GEN_ALL_CORE
Tobacco: Denies  EtOH: Denies   Recreational drug use: Denies  Lives with: Self at home  Ambulates: Walker  ADLs: Requires assistance with some ADLs

## 2023-02-11 NOTE — CONSULT NOTE ADULT - SUBJECTIVE AND OBJECTIVE BOX
Vascular Attending:  Dr. Perez       HPI:  57y F PMH of Afib (on Xarelto), DM2, and CHF (unknown EF) presenting with left heel wound and bilateral lower extremity swelling and redness. Patient relates moderate amount of back pain as well and is unable to lift her lower legs. Patient states that she has had chronic foot pain which acutely worsened 1 week ago without evidence of precipitating factors or trauma. Patient reports that she has had limited ambulation and has been homebound due to her back pain as well as increased swelling. Patient utilizes walker and ambulates to restroom; she is unable to go up stairs. Patient noticed bleeding yesterday when her friend visited her and found blood on the floor as well as her left foot. Patient normally wears Crocs at home and denies any trauma to her feet/legs. Patient states that she has been unable to fully lay down as she experiences diffuse pain all across her body including her legs and back, and has had to sleep with her head down on a desk to get some rest.    Of note, patient also reports "excruciating pain" when attempting to eat. She reports undergoing an EGD/colonoscopy back in August-2022 which was normal. Endorses weakness, fatigue, SOB, palpitations, abdominal pain, chronic paresthesias down all extremities, a "thick and syrupy urine", and a yellow mucus that appears at the end of her bowel movements.    Interval HPI:  Patient is a 57 year old with PMHx as listed above, presenting to [facility] ED with left heel wound and bilateral lower extremity swelling.  Patient states she noticed the wound 1 week ago.  Yesterday she noticed bleeding under foot while walking in her home which prompted her to go to ED.  Patient states she has "a problem with her feet" for over 10 years.  Patient follows with a Podiatrist on an outpatient basis.  Patient has been followed at a vein center in [place], however she doesn't remember the name of the provider.  Patient denies prior vascular surgical intervention.        ED course  Vitals: T 97.2F, HR 91, /82, RR 18, SpO2 96% on RA   Significant labs: Lactate 3.6, glucose 373, Dig level 0.1, UA with 1000 glucose  B/l LE doppler: No evidence of deep venous thrombosis in either lower extremity.  Patient received Rocephin 1g x 1, Vancomycin 1g, Morphine 4mg IVP, Zofran 4mg IVP, 2L NS bolus     (2023 15:09)      PAST MEDICAL & SURGICAL HISTORY:  Atrial fibrillation      Type 2 diabetes mellitus      Congestive heart failure      Scalp psoriasis      S/P tonsillectomy      S/P appendectomy      H/O umbilical hernia repair      REVIEW OF SYSTEMS:    CONSTITUTIONAL: No weakness, fevers or chills  EYES/ENT: No visual changes;  No vertigo or throat pain   NECK: No pain or stiffness  RESPIRATORY: No cough, wheezing, hemoptysis; No shortness of breath  CARDIOVASCULAR: No chest pain or palpitations  GASTROINTESTINAL: No abdominal or epigastric pain. No nausea, vomiting, or hematemesis; No diarrhea or constipation. No melena or hematochezia.  GENITOURINARY: No dysuria, frequency or hematuria  NEUROLOGICAL: No numbness or weakness  EXTREMITY: FOOT PAIN, LOWER EXTREMITY PAIN, SWELLING, ERYTHEMA.     All other review of systems is negative unless indicated above.      MEDICATIONS  (STANDING):  atorvastatin 40 milliGRAM(s) Oral at bedtime  cefepime   IVPB 2000 milliGRAM(s) IV Intermittent every 8 hours  dextrose 5%. 1000 milliLiter(s) (50 mL/Hr) IV Continuous <Continuous>  dextrose 5%. 1000 milliLiter(s) (100 mL/Hr) IV Continuous <Continuous>  dextrose 50% Injectable 25 Gram(s) IV Push once  dextrose 50% Injectable 12.5 Gram(s) IV Push once  dextrose 50% Injectable 25 Gram(s) IV Push once  furosemide    Tablet 40 milliGRAM(s) Oral daily  glucagon  Injectable 1 milliGRAM(s) IntraMuscular once  insulin glargine Injectable (LANTUS) 12 Unit(s) SubCutaneous at bedtime  insulin lispro (ADMELOG) corrective regimen sliding scale   SubCutaneous three times a day before meals  insulin lispro (ADMELOG) corrective regimen sliding scale   SubCutaneous at bedtime  insulin lispro Injectable (ADMELOG) 4 Unit(s) SubCutaneous three times a day before meals  lidocaine   4% Patch 1 Patch Transdermal daily  metoprolol succinate  milliGRAM(s) Oral daily  nystatin Powder 1 Application(s) Topical three times a day  rivaroxaban 20 milliGRAM(s) Oral with dinner    MEDICATIONS  (PRN):  acetaminophen     Tablet .. 650 milliGRAM(s) Oral every 6 hours PRN Temp greater or equal to 38C (100.4F), Mild Pain (1 - 3)  dextrose Oral Gel 15 Gram(s) Oral once PRN Blood Glucose LESS THAN 70 milliGRAM(s)/deciliter  melatonin 3 milliGRAM(s) Oral at bedtime PRN Insomnia  oxyCODONE    IR 5 milliGRAM(s) Oral every 6 hours PRN Severe Pain (7 - 10)  traMADol 50 milliGRAM(s) Oral every 6 hours PRN Moderate Pain (4 - 6)      Allergies    No Known Allergies    Intolerances        SOCIAL HISTORY:      Vital Signs Last 24 Hrs  T(C): 36.8 (2023 17:30), Max: 36.8 (2023 17:30)  T(F): 98.3 (2023 17:30), Max: 98.3 (2023 17:30)  HR: 82 (2023 17:30) (62 - 91)  BP: 105/68 (2023 17:30) (100/62 - 119/82)  BP(mean): --  RR: 16 (2023 17:30) (16 - 18)  SpO2: 99% (2023 17:30) (96% - 100%)    Parameters below as of 2023 17:30  Patient On (Oxygen Delivery Method): room air    PHYSICAL EXAM:  GENERAL: NAD, lying in bed comfortably  HEAD:  Atraumatic, Normocephalic  EYES: EOMI, PERRLA, conjunctiva and sclera clear  ENT: Moist mucous membranes  NECK: Supple, No JVD  CHEST/LUNG: Clear to auscultation bilaterally; No rales, rhonchi, wheezing, or rubs. Unlabored respirations  HEART: Regular rate and rhythm; No murmurs, rubs, or gallops  ABDOMEN: Bowel sounds present; Soft, Nontender, Nondistended. No hepatomegally  EXTREMITIES: Bilateral lower extremities warm, tender to touch, swelling and erythema noted.  Dried, atrophied skin, consistent with venous stasis changes.    3cm x 4cm wound noted on left heel with surrounding periwound erythema.    NERVOUS SYSTEM:  Alert & Oriented X3, speech clear. No deficits   MSK: Moves all 4 extremities, full and equal strength    Pulses:   Right:                                                                          Left:  FEM [ ]2+ [ ]1+ [ ]doppler                                             FEM [ ]2+ [ ]1+ [ ]doppler    POP [ ]2+ [ ]1+ [x]doppler                                             POP [ ]2+ [ ]1+ [x]doppler    DP [ ]2+ [ ]1+ [x]doppler                                                DP [ ]2+ [ ]1+ [x]doppler  PT[ ]2+ [ ]1+ [x]doppler                                                  PT [ ]2+ [ ]1+ [x]doppler      LABS:                        14.7   10.07 )-----------( 230      ( 2023 11:10 )             42.8         136  |  99  |  20  ----------------------------<  373<H>  3.9   |  27  |  0.77    Ca    9.6      2023 11:10    TPro  7.7  /  Alb  3.6  /  TBili  0.6  /  DBili  x   /  AST  25  /  ALT  41  /  AlkPhos  75      PT/INR - ( 2023 11:10 )   PT: 12.4 sec;   INR: 1.06 ratio         PTT - ( 2023 11:10 )  PTT:29.2 sec  Urinalysis Basic - ( 2023 11:24 )    Color: Pale Yellow / Appearance: Clear / S.010 / pH: x  Gluc: x / Ketone: Negative  / Bili: Negative / Urobili: Negative   Blood: x / Protein: Negative / Nitrite: Negative   Leuk Esterase: Negative / RBC: x / WBC x   Sq Epi: x / Non Sq Epi: x / Bacteria: x        RADIOLOGY & ADDITIONAL STUDIES      ACC: [id number] EXAM:  US DPLX LWR EXT VEINS COMPL BI   ORDERED BY: PETER ANGEL     PROCEDURE DATE:  2023      INTERPRETATION:  CLINICAL INFORMATION: Body aches. Duplex swelling. Rule   out DVT.    COMPARISON: None available.    TECHNIQUE: Duplex sonography of the BILATERAL LOWER extremity veins with   color and spectral Doppler, with and without compression.    FINDINGS:    RIGHT:  Normal compressibility of the RIGHT common femoral, femoral and popliteal   veins.  Doppler examination shows normal spontaneous and phasic flow.  No RIGHT calf vein thrombosis is detected.    LEFT:  Normal compressibility of the LEFT common femoral, femoral and popliteal   veins.  Doppler examination shows normal spontaneous and phasic flow.  No LEFTcalf vein thrombosis is detected.    Subcutaneous edema bilaterally.    IMPRESSION:  No evidence of deep venous thrombosis in either lower extremity      ISIDRO PINTO MD; Attending Radiologist  This documenthas been electronically signed. 2023  2:27PM      Impression   57 year old female p/w diabetic left foot heel ulcer and b/l lower extremity cellulitis.  Dopplerable, non-palpable pulses to B/L lower extremities noted.  Vascular surgery consulted for evaluation.      Plan:  - Recommend arterial lower extremity ultrasound to assess for PAD  - Recommend CTA Aorta with runoff to assess for ischemia   - Consider heparin gtt for possible surgical intervention  - continue care per primary team  - Discussed with Dr. Adorno

## 2023-02-11 NOTE — PATIENT PROFILE ADULT - FALL HARM RISK - HARM RISK INTERVENTIONS

## 2023-02-11 NOTE — ED PROVIDER NOTE - CARE PLAN
Principal Discharge DX:	Diabetic ulcer of left foot  Secondary Diagnosis:	Bilateral lower leg cellulitis   1

## 2023-02-11 NOTE — ED PROVIDER NOTE - CONSTITUTIONAL, MLM
Well appearing, awake, alert, oriented to person, place, time/situation and in no apparent distress obese female unkempt appearing normal... awake, alert, oriented to person, place, time/situation and in no apparent distress obese female unkempt appearing

## 2023-02-11 NOTE — H&P ADULT - ATTENDING COMMENTS
57y F PMH of Afib, DM2, and CHF (unknown EF) presenting with left heel wound and bilateral lower extremity swelling and redness admitted for Bilateral LE cellulitis with diabetic foot ulcer.     HPI as above.     Continue IV abx  -f/u repeat lactic acid  -monitor for fevers and trend WBC count  -will obtain CT abd/pel due to patients abdominal pain and eval for mesenteric ischemia  -ID, vascular, GI and podiatry consulted    remainder as above

## 2023-02-11 NOTE — ED ADULT NURSE NOTE - NSIMPLEMENTINTERV_GEN_ALL_ED
Implemented All Fall Risk Interventions:  [place] to call system. Call bell, personal items and telephone within reach. Instruct patient to call for assistance. Room bathroom lighting operational. Non-slip footwear when patient is off stretcher. Physically safe environment: no spills, clutter or unnecessary equipment. Stretcher in lowest position, wheels locked, appropriate side rails in place. Provide visual cue, wrist band, yellow gown, etc. Monitor gait and stability. Monitor for mental status changes and reorient to person, place, and time. Review medications for side effects contributing to fall risk. Reinforce activity limits and safety measures with patient and family.

## 2023-02-11 NOTE — ED PROVIDER NOTE - OBJECTIVE STATEMENT
Patient is a 57-year-old female with past medical history of A-fib on Xarelto and digoxin diabetes coming in for foot wound.  Patient states she noticed wound on left foot about a week ago and noticed worsening bleeding.  Patient denies any trauma.  Patient also complaining of diffuse body pain back pain and states having difficulty walking.  Patient denies any bowel or bladder incontinence no saddle anesthesia.  Patient brought in by friend who found patient at home and noted blood on foot.  Patient poor historian.    pcp Donna

## 2023-02-11 NOTE — ED ADULT NURSE REASSESSMENT NOTE - COMFORT CARE
repositioned/side rails up/wait time explained/warm blanket provided
meal provided/po fluids offered/repositioned/side rails up/wait time explained/warm blanket provided

## 2023-02-11 NOTE — ED PROVIDER NOTE - NS ED ATTENDING STATEMENT MOD
This was a shared visit with the JANIA. I reviewed and verified the documentation and independently performed the documented:

## 2023-02-12 LAB
A1C WITH ESTIMATED AVERAGE GLUCOSE RESULT: 12.8 % — HIGH (ref 4–5.6)
ANION GAP SERPL CALC-SCNC: 6 MMOL/L — SIGNIFICANT CHANGE UP (ref 5–17)
APTT BLD: 28.9 SEC — SIGNIFICANT CHANGE UP (ref 27.5–35.5)
BASOPHILS # BLD AUTO: 0.03 K/UL — SIGNIFICANT CHANGE UP (ref 0–0.2)
BASOPHILS NFR BLD AUTO: 0.4 % — SIGNIFICANT CHANGE UP (ref 0–2)
BUN SERPL-MCNC: 18 MG/DL — SIGNIFICANT CHANGE UP (ref 7–23)
CALCIUM SERPL-MCNC: 8.3 MG/DL — LOW (ref 8.5–10.1)
CHLORIDE SERPL-SCNC: 102 MMOL/L — SIGNIFICANT CHANGE UP (ref 96–108)
CHOLEST SERPL-MCNC: 176 MG/DL — SIGNIFICANT CHANGE UP
CO2 SERPL-SCNC: 30 MMOL/L — SIGNIFICANT CHANGE UP (ref 22–31)
CREAT SERPL-MCNC: 0.65 MG/DL — SIGNIFICANT CHANGE UP (ref 0.5–1.3)
CULTURE RESULTS: SIGNIFICANT CHANGE UP
EGFR: 102 ML/MIN/1.73M2 — SIGNIFICANT CHANGE UP
EOSINOPHIL # BLD AUTO: 0.09 K/UL — SIGNIFICANT CHANGE UP (ref 0–0.5)
EOSINOPHIL NFR BLD AUTO: 1.1 % — SIGNIFICANT CHANGE UP (ref 0–6)
ESTIMATED AVERAGE GLUCOSE: 321 MG/DL — HIGH (ref 68–114)
GLUCOSE SERPL-MCNC: 285 MG/DL — HIGH (ref 70–99)
HCT VFR BLD CALC: 36.8 % — SIGNIFICANT CHANGE UP (ref 34.5–45)
HCV AB S/CO SERPL IA: 0.06 S/CO — SIGNIFICANT CHANGE UP (ref 0–0.99)
HCV AB SERPL-IMP: SIGNIFICANT CHANGE UP
HDLC SERPL-MCNC: 36 MG/DL — LOW
HGB BLD-MCNC: 12.4 G/DL — SIGNIFICANT CHANGE UP (ref 11.5–15.5)
IMM GRANULOCYTES NFR BLD AUTO: 0.4 % — SIGNIFICANT CHANGE UP (ref 0–0.9)
LACTATE SERPL-SCNC: 2.2 MMOL/L — HIGH (ref 0.7–2)
LACTATE SERPL-SCNC: 2.4 MMOL/L — HIGH (ref 0.7–2)
LACTATE SERPL-SCNC: 2.5 MMOL/L — HIGH (ref 0.7–2)
LACTATE SERPL-SCNC: 2.8 MMOL/L — HIGH (ref 0.7–2)
LIPID PNL WITH DIRECT LDL SERPL: 99 MG/DL — SIGNIFICANT CHANGE UP
LYMPHOCYTES # BLD AUTO: 1.72 K/UL — SIGNIFICANT CHANGE UP (ref 1–3.3)
LYMPHOCYTES # BLD AUTO: 20.4 % — SIGNIFICANT CHANGE UP (ref 13–44)
MAGNESIUM SERPL-MCNC: 1.6 MG/DL — SIGNIFICANT CHANGE UP (ref 1.6–2.6)
MCHC RBC-ENTMCNC: 30.8 PG — SIGNIFICANT CHANGE UP (ref 27–34)
MCHC RBC-ENTMCNC: 33.7 GM/DL — SIGNIFICANT CHANGE UP (ref 32–36)
MCV RBC AUTO: 91.3 FL — SIGNIFICANT CHANGE UP (ref 80–100)
MONOCYTES # BLD AUTO: 0.55 K/UL — SIGNIFICANT CHANGE UP (ref 0–0.9)
MONOCYTES NFR BLD AUTO: 6.5 % — SIGNIFICANT CHANGE UP (ref 2–14)
MRSA PCR RESULT.: DETECTED
NEUTROPHILS # BLD AUTO: 6.01 K/UL — SIGNIFICANT CHANGE UP (ref 1.8–7.4)
NEUTROPHILS NFR BLD AUTO: 71.2 % — SIGNIFICANT CHANGE UP (ref 43–77)
NON HDL CHOLESTEROL: 140 MG/DL — HIGH
NRBC # BLD: 0 /100 WBCS — SIGNIFICANT CHANGE UP (ref 0–0)
PLATELET # BLD AUTO: 175 K/UL — SIGNIFICANT CHANGE UP (ref 150–400)
POTASSIUM SERPL-MCNC: 3.8 MMOL/L — SIGNIFICANT CHANGE UP (ref 3.5–5.3)
POTASSIUM SERPL-SCNC: 3.8 MMOL/L — SIGNIFICANT CHANGE UP (ref 3.5–5.3)
RBC # BLD: 4.03 M/UL — SIGNIFICANT CHANGE UP (ref 3.8–5.2)
RBC # FLD: 13 % — SIGNIFICANT CHANGE UP (ref 10.3–14.5)
S AUREUS DNA NOSE QL NAA+PROBE: DETECTED
SODIUM SERPL-SCNC: 138 MMOL/L — SIGNIFICANT CHANGE UP (ref 135–145)
SPECIMEN SOURCE: SIGNIFICANT CHANGE UP
TRIGL SERPL-MCNC: 208 MG/DL — HIGH
WBC # BLD: 8.43 K/UL — SIGNIFICANT CHANGE UP (ref 3.8–10.5)
WBC # FLD AUTO: 8.43 K/UL — SIGNIFICANT CHANGE UP (ref 3.8–10.5)

## 2023-02-12 PROCEDURE — 99233 SBSQ HOSP IP/OBS HIGH 50: CPT

## 2023-02-12 PROCEDURE — 99222 1ST HOSP IP/OBS MODERATE 55: CPT

## 2023-02-12 RX ORDER — SENNA PLUS 8.6 MG/1
2 TABLET ORAL AT BEDTIME
Refills: 0 | Status: DISCONTINUED | OUTPATIENT
Start: 2023-02-12 | End: 2023-02-17

## 2023-02-12 RX ORDER — POLYETHYLENE GLYCOL 3350 17 G/17G
17 POWDER, FOR SOLUTION ORAL DAILY
Refills: 0 | Status: DISCONTINUED | OUTPATIENT
Start: 2023-02-12 | End: 2023-02-15

## 2023-02-12 RX ORDER — SODIUM CHLORIDE 9 MG/ML
500 INJECTION INTRAMUSCULAR; INTRAVENOUS; SUBCUTANEOUS ONCE
Refills: 0 | Status: COMPLETED | OUTPATIENT
Start: 2023-02-12 | End: 2023-02-12

## 2023-02-12 RX ORDER — OXYCODONE HYDROCHLORIDE 5 MG/1
5 TABLET ORAL ONCE
Refills: 0 | Status: DISCONTINUED | OUTPATIENT
Start: 2023-02-12 | End: 2023-02-15

## 2023-02-12 RX ORDER — ASCORBIC ACID 60 MG
500 TABLET,CHEWABLE ORAL
Refills: 0 | Status: DISCONTINUED | OUTPATIENT
Start: 2023-02-12 | End: 2023-02-17

## 2023-02-12 RX ADMIN — OXYCODONE HYDROCHLORIDE 5 MILLIGRAM(S): 5 TABLET ORAL at 00:52

## 2023-02-12 RX ADMIN — INSULIN GLARGINE 12 UNIT(S): 100 INJECTION, SOLUTION SUBCUTANEOUS at 21:17

## 2023-02-12 RX ADMIN — Medication 4: at 11:50

## 2023-02-12 RX ADMIN — Medication 40 MILLIGRAM(S): at 05:31

## 2023-02-12 RX ADMIN — NYSTATIN CREAM 1 APPLICATION(S): 100000 CREAM TOPICAL at 05:32

## 2023-02-12 RX ADMIN — Medication 650 MILLIGRAM(S): at 06:31

## 2023-02-12 RX ADMIN — OXYCODONE HYDROCHLORIDE 5 MILLIGRAM(S): 5 TABLET ORAL at 12:50

## 2023-02-12 RX ADMIN — Medication 4 UNIT(S): at 07:53

## 2023-02-12 RX ADMIN — PIPERACILLIN AND TAZOBACTAM 25 GRAM(S): 4; .5 INJECTION, POWDER, LYOPHILIZED, FOR SOLUTION INTRAVENOUS at 05:31

## 2023-02-12 RX ADMIN — Medication 4 UNIT(S): at 11:50

## 2023-02-12 RX ADMIN — OXYCODONE HYDROCHLORIDE 5 MILLIGRAM(S): 5 TABLET ORAL at 01:52

## 2023-02-12 RX ADMIN — OXYCODONE HYDROCHLORIDE 5 MILLIGRAM(S): 5 TABLET ORAL at 18:44

## 2023-02-12 RX ADMIN — NYSTATIN CREAM 1 APPLICATION(S): 100000 CREAM TOPICAL at 13:38

## 2023-02-12 RX ADMIN — Medication 3 MILLIGRAM(S): at 00:53

## 2023-02-12 RX ADMIN — HEPARIN SODIUM 2000 UNIT(S)/HR: 5000 INJECTION INTRAVENOUS; SUBCUTANEOUS at 18:10

## 2023-02-12 RX ADMIN — NYSTATIN CREAM 1 APPLICATION(S): 100000 CREAM TOPICAL at 21:21

## 2023-02-12 RX ADMIN — Medication 3: at 07:53

## 2023-02-12 RX ADMIN — OXYCODONE HYDROCHLORIDE 5 MILLIGRAM(S): 5 TABLET ORAL at 17:47

## 2023-02-12 RX ADMIN — OXYCODONE HYDROCHLORIDE 5 MILLIGRAM(S): 5 TABLET ORAL at 11:50

## 2023-02-12 RX ADMIN — Medication 4 UNIT(S): at 16:50

## 2023-02-12 RX ADMIN — Medication 500 MILLIGRAM(S): at 17:48

## 2023-02-12 RX ADMIN — Medication 100 MILLIGRAM(S): at 05:31

## 2023-02-12 RX ADMIN — ATORVASTATIN CALCIUM 40 MILLIGRAM(S): 80 TABLET, FILM COATED ORAL at 21:17

## 2023-02-12 RX ADMIN — Medication 4: at 16:50

## 2023-02-12 RX ADMIN — LIDOCAINE 1 PATCH: 4 CREAM TOPICAL at 19:15

## 2023-02-12 RX ADMIN — LIDOCAINE 1 PATCH: 4 CREAM TOPICAL at 23:22

## 2023-02-12 RX ADMIN — Medication 650 MILLIGRAM(S): at 05:31

## 2023-02-12 RX ADMIN — HEPARIN SODIUM 2000 UNIT(S)/HR: 5000 INJECTION INTRAVENOUS; SUBCUTANEOUS at 19:09

## 2023-02-12 RX ADMIN — LIDOCAINE 1 PATCH: 4 CREAM TOPICAL at 11:51

## 2023-02-12 RX ADMIN — PIPERACILLIN AND TAZOBACTAM 25 GRAM(S): 4; .5 INJECTION, POWDER, LYOPHILIZED, FOR SOLUTION INTRAVENOUS at 21:20

## 2023-02-12 RX ADMIN — Medication 2: at 21:18

## 2023-02-12 RX ADMIN — SENNA PLUS 2 TABLET(S): 8.6 TABLET ORAL at 21:21

## 2023-02-12 RX ADMIN — Medication 1 TABLET(S): at 13:37

## 2023-02-12 RX ADMIN — PIPERACILLIN AND TAZOBACTAM 25 GRAM(S): 4; .5 INJECTION, POWDER, LYOPHILIZED, FOR SOLUTION INTRAVENOUS at 13:38

## 2023-02-12 RX ADMIN — SODIUM CHLORIDE 500 MILLILITER(S): 9 INJECTION INTRAMUSCULAR; INTRAVENOUS; SUBCUTANEOUS at 16:16

## 2023-02-12 NOTE — PROGRESS NOTE ADULT - SUBJECTIVE AND OBJECTIVE BOX
CHIEF COMPLAINT/INTERVAL HISTORY:  Pt. seen and evaluated for bilateral LE cellulitis and left diabetic foot ulcer.  Pt. reports having pain in the legs.  Encouraged to request for pain medications as needed.  Tolerating IV antibiotics.  tolerating heparin gtt with no gross bleeding.  Had fever 100.5 this morning.     REVIEW OF SYSTEMS:  +fever.  Pt. denies CP, SOB, or abdominal pain    Vital Signs Last 24 Hrs  T(C): 38.1 (2023 05:26), Max: 38.1 (2023 05:)  T(F): 100.5 (2023 05:), Max: 100.5 (2023 05:26)  HR: 98 (2023 05:) (62 - 107)  BP: 104/62 (2023 05:) (100/62 - 119/82)  BP(mean): --  RR: 18 (2023 05:) (16 - 18)  SpO2: 93% (:) (92% - 100%)    Parameters below as of 2023 05:26  Patient On (Oxygen Delivery Method): room air        PHYSICAL EXAM:  GENERAL: NAD  HEENT: EOMI, hearing normal, conjunctiva and sclera clear, poor dentition  Chest: diminished BS at bases, no wheezing  CV: S1S2, RRR,   GI: soft, +BS, NT/ND  Musculoskeletal: erythema, warmth, and edema of bilateral LE, dressing over left heel  Psychiatric: affect nL, mood nL  Skin: erythema and warmth of bilateral LE    LABS:                        12.4   8.43  )-----------( 175      ( 2023 05:51 )             36.8     02-12    138  |  102  |  18  ----------------------------<  285<H>  3.8   |  30  |  0.65    Ca    8.3<L>      2023 05:51  Mg     1.6         TPro  7.7  /  Alb  3.6  /  TBili  0.6  /  DBili  x   /  AST  25  /  ALT  41  /  AlkPhos  75  02-11    PT/INR - ( 2023 20:21 )   PT: 21.3 sec;   INR: 1.81 ratio         PTT - ( 2023 20:21 )  PTT:31.6 sec  Urinalysis Basic - ( 2023 11:24 )    Color: Pale Yellow / Appearance: Clear / S.010 / pH: x  Gluc: x / Ketone: Negative  / Bili: Negative / Urobili: Negative   Blood: x / Protein: Negative / Nitrite: Negative   Leuk Esterase: Negative / RBC: x / WBC x   Sq Epi: x / Non Sq Epi: x / Bacteria: x

## 2023-02-12 NOTE — CONSULT NOTE ADULT - SUBJECTIVE AND OBJECTIVE BOX
DOCUMENTATION IN PROGRESS    CHIEF COMPLAINT: Patient is a 57y old  Female who presents with a chief complaint of Type 2 diabetes mellitus with foot ulcer     (12 Feb 2023 11:40)      HPI:  57y F PMH of Afib (on Xarelto), DM2, and CHF (unknown EF) presenting with left heel wound and bilateral lower extremity swelling and redness. Patient relates moderate amount of back pain as well and is unable to lift her lower legs. Patient states that she has had chronic foot pain which acutely worsened 1 week ago without evidence of precipitating factors or trauma. Patient reports that she has had limited ambulation and has been homebound due to her back pain as well as increased swelling. Patient utilizes walker and ambulates to restroom; she is unable to go up stairs. Patient noticed bleeding yesterday when her friend visited her and found blood on the floor as well as her left foot. Patient normally wears Crocs at home and denies any trauma to her feet/legs. Patient states that she has been unable to fully lay down as she experiences diffuse pain all across her body including her legs and back, and has had to sleep with her head down on a desk to get some rest.    Of note, patient also reports "excruciating pain" when attempting to eat. She reports undergoing an EGD/colonoscopy back in August-September 2022 which was normal. Endorses weakness, fatigue, SOB, palpitations, abdominal pain, chronic paresthesias down all extremities, a "thick and syrupy urine", and a yellow mucus that appears at the end of her bowel movements.    ED course  Vitals: T 97.2F, HR 91, /82, RR 18, SpO2 96% on RA   Significant labs: Lactate 3.6, glucose 373, Dig level 0.1, UA with 1000 glucose  B/l LE doppler: No evidence of deep venous thrombosis in either lower extremity.  Patient received Rocephin 1g x 1, Vancomycin 1g, Morphine 4mg IVP, Zofran 4mg IVP, 2L NS bolus     (11 Feb 2023 15:09)      EKG:Afib 96     REVIEW OF SYSTEMS:   All other review of systems are negative unless indicated above    PAST MEDICAL & SURGICAL HISTORY:  Atrial fibrillation      Type 2 diabetes mellitus      Congestive heart failure      Scalp psoriasis      S/P tonsillectomy      S/P appendectomy      H/O umbilical hernia repair          SOCIAL HISTORY:  No tobacco, ethanol, or drug abuse.    FAMILY HISTORY:  No pertinent family history in first degree relatives      No family history of acute MI or sudden cardiac death.    MEDICATIONS  (STANDING):  ascorbic acid 500 milliGRAM(s) Oral two times a day  atorvastatin 40 milliGRAM(s) Oral at bedtime  dextrose 5%. 1000 milliLiter(s) (50 mL/Hr) IV Continuous <Continuous>  dextrose 5%. 1000 milliLiter(s) (100 mL/Hr) IV Continuous <Continuous>  dextrose 50% Injectable 25 Gram(s) IV Push once  dextrose 50% Injectable 12.5 Gram(s) IV Push once  dextrose 50% Injectable 25 Gram(s) IV Push once  furosemide    Tablet 40 milliGRAM(s) Oral daily  glucagon  Injectable 1 milliGRAM(s) IntraMuscular once  heparin  Infusion.  Unit(s)/Hr (20 mL/Hr) IV Continuous <Continuous>  insulin glargine Injectable (LANTUS) 12 Unit(s) SubCutaneous at bedtime  insulin lispro (ADMELOG) corrective regimen sliding scale   SubCutaneous three times a day before meals  insulin lispro (ADMELOG) corrective regimen sliding scale   SubCutaneous at bedtime  insulin lispro Injectable (ADMELOG) 4 Unit(s) SubCutaneous three times a day before meals  lidocaine   4% Patch 1 Patch Transdermal daily  metoprolol succinate  milliGRAM(s) Oral daily  multivitamin 1 Tablet(s) Oral daily  nystatin Powder 1 Application(s) Topical three times a day  piperacillin/tazobactam IVPB.. 3.375 Gram(s) IV Intermittent every 8 hours    MEDICATIONS  (PRN):  acetaminophen     Tablet .. 650 milliGRAM(s) Oral every 6 hours PRN Temp greater or equal to 38C (100.4F), Mild Pain (1 - 3)  dextrose Oral Gel 15 Gram(s) Oral once PRN Blood Glucose LESS THAN 70 milliGRAM(s)/deciliter  heparin   Injectable 9000 Unit(s) IV Push every 6 hours PRN For aPTT less than 40  heparin   Injectable 4500 Unit(s) IV Push every 6 hours PRN For aPTT between 40 - 57  melatonin 3 milliGRAM(s) Oral at bedtime PRN Insomnia  oxyCODONE    IR 5 milliGRAM(s) Oral every 6 hours PRN Severe Pain (7 - 10)  traMADol 50 milliGRAM(s) Oral every 6 hours PRN Moderate Pain (4 - 6)      Allergies    No Known Allergies    Intolerances        Home meds:  Home Medications:  Admelog 100 units/mL injectable solution: 15 unit(s) injectable 3 times a day (11 Feb 2023 16:57)  Basaglar KwikPen 100 units/mL subcutaneous solution: 25 unit(s) subcutaneous once a day (at bedtime) (11 Feb 2023 16:57)  Lasix 40 mg oral tablet: 1 tab(s) orally once a day (11 Feb 2023 16:57)  metFORMIN 1000 mg oral tablet, extended release: 1 tab(s) orally 2 times a day (11 Feb 2023 16:57)  metoprolol succinate 100 mg oral tablet, extended release: 1 tab(s) orally once a day (11 Feb 2023 16:57)  Xarelto 20 mg oral tablet: 1 tab(s) orally once a day (in the evening) (11 Feb 2023 16:57)        VITAL SIGNS:   Vital Signs Last 24 Hrs  T(C): 36.8 (12 Feb 2023 12:01), Max: 38.1 (12 Feb 2023 05:26)  T(F): 98.3 (12 Feb 2023 12:01), Max: 100.5 (12 Feb 2023 05:26)  HR: 96 (12 Feb 2023 12:01) (62 - 107)  BP: 130/91 (12 Feb 2023 12:01) (100/62 - 130/91)  BP(mean): --  RR: 18 (12 Feb 2023 12:01) (16 - 18)  SpO2: 94% (12 Feb 2023 12:01) (92% - 100%)    Parameters below as of 12 Feb 2023 12:01  Patient On (Oxygen Delivery Method): room air        I&O's Summary    11 Feb 2023 07:01  -  12 Feb 2023 07:00  --------------------------------------------------------  IN: 250 mL / OUT: 0 mL / NET: 250 mL        On Exam:  TELE: Not on telemetry   Constitutional: NAD, awake and alert,   HEENT: Moist Mucous Membranes, Anicteric  Pulmonary: Non-labored, breath sounds are clear bilaterally, No wheezing, rales or rhonchi  Cardiovascular: IRRR, S1 and S2, No murmurs, rubs, gallops or clicks  Gastrointestinal: Bowel Sounds present, soft, nontender.   Lymph: No peripheral edema. No lymphadenopathy.  Skin: No visible rashes or ulcers.  Psych:  Mood & affect appropriate for situation    LABS: All Labs Reviewed:                        12.4   8.43  )-----------( 175      ( 12 Feb 2023 05:51 )             36.8                         14.7   10.07 )-----------( 230      ( 11 Feb 2023 11:10 )             42.8     12 Feb 2023 05:51    138    |  102    |  18     ----------------------------<  285    3.8     |  30     |  0.65   11 Feb 2023 18:40    139    |  101    |  16     ----------------------------<  299    3.6     |  30     |  0.78   11 Feb 2023 11:10    136    |  99     |  20     ----------------------------<  373    3.9     |  27     |  0.77     Ca    8.3        12 Feb 2023 05:51  Ca    8.6        11 Feb 2023 18:40  Ca    9.6        11 Feb 2023 11:10  Mg     1.6       12 Feb 2023 05:51  Mg     1.6       11 Feb 2023 18:40    TPro  7.7    /  Alb  3.6    /  TBili  0.6    /  DBili  x      /  AST  25     /  ALT  41     /  AlkPhos  75     11 Feb 2023 11:10    PT/INR - ( 11 Feb 2023 20:21 )   PT: 21.3 sec;   INR: 1.81 ratio         PTT - ( 11 Feb 2023 20:21 )  PTT:31.6 sec      Blood Culture:         RADIOLOGY:              CHIEF COMPLAINT: Patient is a 57y old  Female who presents with a chief complaint of Type 2 diabetes mellitus with foot ulcer     (12 Feb 2023 11:40)    HPI:  57y F PMH of Afib (on Xarelto), DM2, and CHF (unknown EF) presenting with left heel wound and bilateral lower extremity swelling and redness. Patient relates moderate amount of back pain as well and is unable to lift her lower legs. Patient states that she has had chronic foot pain which acutely worsened 1 week ago without evidence of precipitating factors or trauma. Patient reports that she has had limited ambulation and has been homebound due to her back pain as well as increased swelling. Patient utilizes walker and ambulates to restroom; she is unable to go up stairs. Patient noticed bleeding yesterday when her friend visited her and found blood on the floor as well as her left foot. Patient normally wears Crocs at home and denies any trauma to her feet/legs. Patient states that she has been unable to fully lay down as she experiences diffuse pain all across her body including her legs and back, and has had to sleep with her head down on a desk to get some rest.    Of note, patient also reports "excruciating pain" when attempting to eat. She reports undergoing an EGD/colonoscopy back in August-September 2022 which was normal. Endorses weakness, fatigue, SOB, palpitations, abdominal pain, chronic paresthesias down all extremities, a "thick and syrupy urine", and a yellow mucus that appears at the end of her bowel movements.    Cardiology consulted for LE edema, Afib,  Patient is seen and examined in room 206W, she c/o excruciating pain in her b/l LE and lower back , describes it like burning sensation when swallowing small amounts of liquid. She denies chest pain, palpitations, n/ v.  She is lying almost flat in bed, on room air, crying d/t pain.    ED course  Vitals: T 97.2F, HR 91, /82, RR 18, SpO2 96% on RA   Significant labs: Lactate 3.6, glucose 373, Dig level 0.1, UA with 1000 glucose  B/l LE doppler: No evidence of deep venous thrombosis in either lower extremity.  Patient received Rocephin 1g x 1, Vancomycin 1g, Morphine 4mg IVP, Zofran 4mg IVP, 2L NS bolus     (11 Feb 2023 15:09)      EKG: Afib 96     REVIEW OF SYSTEMS:   All other review of systems are negative unless indicated above    PAST MEDICAL & SURGICAL HISTORY:  Atrial fibrillation      Type 2 diabetes mellitus      Congestive heart failure      Scalp psoriasis      S/P tonsillectomy      S/P appendectomy      H/O umbilical hernia repair          SOCIAL HISTORY:  No tobacco, ethanol, or drug abuse.    FAMILY HISTORY:  No pertinent family history in first degree relatives      No family history of acute MI or sudden cardiac death.    MEDICATIONS  (STANDING):  ascorbic acid 500 milliGRAM(s) Oral two times a day  atorvastatin 40 milliGRAM(s) Oral at bedtime  dextrose 5%. 1000 milliLiter(s) (50 mL/Hr) IV Continuous <Continuous>  dextrose 5%. 1000 milliLiter(s) (100 mL/Hr) IV Continuous <Continuous>  dextrose 50% Injectable 25 Gram(s) IV Push once  dextrose 50% Injectable 12.5 Gram(s) IV Push once  dextrose 50% Injectable 25 Gram(s) IV Push once  furosemide    Tablet 40 milliGRAM(s) Oral daily  glucagon  Injectable 1 milliGRAM(s) IntraMuscular once  heparin  Infusion.  Unit(s)/Hr (20 mL/Hr) IV Continuous <Continuous>  insulin glargine Injectable (LANTUS) 12 Unit(s) SubCutaneous at bedtime  insulin lispro (ADMELOG) corrective regimen sliding scale   SubCutaneous three times a day before meals  insulin lispro (ADMELOG) corrective regimen sliding scale   SubCutaneous at bedtime  insulin lispro Injectable (ADMELOG) 4 Unit(s) SubCutaneous three times a day before meals  lidocaine   4% Patch 1 Patch Transdermal daily  metoprolol succinate  milliGRAM(s) Oral daily  multivitamin 1 Tablet(s) Oral daily  nystatin Powder 1 Application(s) Topical three times a day  piperacillin/tazobactam IVPB.. 3.375 Gram(s) IV Intermittent every 8 hours    MEDICATIONS  (PRN):  acetaminophen     Tablet .. 650 milliGRAM(s) Oral every 6 hours PRN Temp greater or equal to 38C (100.4F), Mild Pain (1 - 3)  dextrose Oral Gel 15 Gram(s) Oral once PRN Blood Glucose LESS THAN 70 milliGRAM(s)/deciliter  heparin   Injectable 9000 Unit(s) IV Push every 6 hours PRN For aPTT less than 40  heparin   Injectable 4500 Unit(s) IV Push every 6 hours PRN For aPTT between 40 - 57  melatonin 3 milliGRAM(s) Oral at bedtime PRN Insomnia  oxyCODONE    IR 5 milliGRAM(s) Oral every 6 hours PRN Severe Pain (7 - 10)  traMADol 50 milliGRAM(s) Oral every 6 hours PRN Moderate Pain (4 - 6)      Allergies    No Known Allergies    Intolerances        Home meds:  Home Medications:  Admelog 100 units/mL injectable solution: 15 unit(s) injectable 3 times a day (11 Feb 2023 16:57)  Basaglar KwikPen 100 units/mL subcutaneous solution: 25 unit(s) subcutaneous once a day (at bedtime) (11 Feb 2023 16:57)  Lasix 40 mg oral tablet: 1 tab(s) orally once a day (11 Feb 2023 16:57)  metFORMIN 1000 mg oral tablet, extended release: 1 tab(s) orally 2 times a day (11 Feb 2023 16:57)  metoprolol succinate 100 mg oral tablet, extended release: 1 tab(s) orally once a day (11 Feb 2023 16:57)  Xarelto 20 mg oral tablet: 1 tab(s) orally once a day (in the evening) (11 Feb 2023 16:57)        VITAL SIGNS:   Vital Signs Last 24 Hrs  T(C): 36.8 (12 Feb 2023 12:01), Max: 38.1 (12 Feb 2023 05:26)  T(F): 98.3 (12 Feb 2023 12:01), Max: 100.5 (12 Feb 2023 05:26)  HR: 96 (12 Feb 2023 12:01) (62 - 107)  BP: 130/91 (12 Feb 2023 12:01) (100/62 - 130/91)  BP(mean): --  RR: 18 (12 Feb 2023 12:01) (16 - 18)  SpO2: 94% (12 Feb 2023 12:01) (92% - 100%)    Parameters below as of 12 Feb 2023 12:01  Patient On (Oxygen Delivery Method): room air        I&O's Summary    11 Feb 2023 07:01  -  12 Feb 2023 07:00  --------------------------------------------------------  IN: 250 mL / OUT: 0 mL / NET: 250 mL        On Exam:  TELE: Not on telemetry   Constitutional: NAD, awake and alert, obese  HEENT: Moist Mucous Membranes, Anicteric  Pulmonary: Non-labored, breath sounds diminished bilaterally, No wheezing, rales or rhonchi  Cardiovascular: IRRR, S1 and S2, No murmurs, rubs, gallops or clicks  Gastrointestinal: Bowel Sounds present, soft, nontender.   Lymph: +2 b/l  peripheral edema. No lymphadenopathy.  Skin: + erythema, b/l LE, warm   Psych:  Mood & affect appropriate for situation    LABS: All Labs Reviewed:                        12.4   8.43  )-----------( 175      ( 12 Feb 2023 05:51 )             36.8                         14.7   10.07 )-----------( 230      ( 11 Feb 2023 11:10 )             42.8     12 Feb 2023 05:51    138    |  102    |  18     ----------------------------<  285    3.8     |  30     |  0.65   11 Feb 2023 18:40    139    |  101    |  16     ----------------------------<  299    3.6     |  30     |  0.78   11 Feb 2023 11:10    136    |  99     |  20     ----------------------------<  373    3.9     |  27     |  0.77     Ca    8.3        12 Feb 2023 05:51  Ca    8.6        11 Feb 2023 18:40  Ca    9.6        11 Feb 2023 11:10  Mg     1.6       12 Feb 2023 05:51  Mg     1.6       11 Feb 2023 18:40    TPro  7.7    /  Alb  3.6    /  TBili  0.6    /  DBili  x      /  AST  25     /  ALT  41     /  AlkPhos  75     11 Feb 2023 11:10    PT/INR - ( 11 Feb 2023 20:21 )   PT: 21.3 sec;   INR: 1.81 ratio         PTT - ( 11 Feb 2023 20:21 )  PTT:31.6 sec      Blood Culture:         RADIOLOGY:              CHIEF COMPLAINT: Patient is a 57y old  Female who presents with a chief complaint of Type 2 diabetes mellitus with foot ulcer     (12 Feb 2023 11:40)    HPI:  57y F PMH of Afib (on Xarelto), DM2, and CHF (unknown EF) presenting with left heel wound and bilateral lower extremity swelling and redness. Patient relates moderate amount of back pain as well and is unable to lift her lower legs. Patient states that she has had chronic foot pain which acutely worsened 1 week ago without evidence of precipitating factors or trauma. Patient reports that she has had limited ambulation and has been homebound due to her back pain as well as increased swelling. Patient utilizes walker and ambulates to restroom; she is unable to go up stairs. Patient noticed bleeding yesterday when her friend visited her and found blood on the floor as well as her left foot. Patient normally wears Crocs at home and denies any trauma to her feet/legs. Patient states that she has been unable to fully lay down as she experiences diffuse pain all across her body including her legs and back, and has had to sleep with her head down on a desk to get some rest.    Of note, patient also reports "excruciating pain" when attempting to eat. She reports undergoing an EGD/colonoscopy back in August-September 2022 which was normal. Endorses weakness, fatigue, SOB, palpitations, abdominal pain, chronic paresthesias down all extremities, a "thick and syrupy urine", and a yellow mucus that appears at the end of her bowel movements.    Cardiology consulted for LE edema, Afib,  Patient is seen and examined in room 206W, she c/o excruciating pain in her b/l LE and lower back , describes it like burning sensation when swallowing small amounts of liquid. She denies chest pain, palpitations, n/ v.  She is lying almost flat in bed, on room air, crying d/t pain.    ED course  Vitals: T 97.2F, HR 91, /82, RR 18, SpO2 96% on RA   Significant labs: Lactate 3.6, glucose 373, Dig level 0.1, UA with 1000 glucose  B/l LE doppler: No evidence of deep venous thrombosis in either lower extremity.  Patient received Rocephin 1g x 1, Vancomycin 1g, Morphine 4mg IVP, Zofran 4mg IVP, 2L NS bolus          EKG: Afib 96     REVIEW OF SYSTEMS:   All other review of systems are negative unless indicated above    PAST MEDICAL & SURGICAL HISTORY:  Atrial fibrillation      Type 2 diabetes mellitus      Congestive heart failure      Scalp psoriasis      S/P tonsillectomy      S/P appendectomy      H/O umbilical hernia repair          SOCIAL HISTORY:  No tobacco, ethanol, or drug abuse.    FAMILY HISTORY:  No family history of acute MI or sudden cardiac death.    MEDICATIONS  (STANDING):  ascorbic acid 500 milliGRAM(s) Oral two times a day  atorvastatin 40 milliGRAM(s) Oral at bedtime  dextrose 5%. 1000 milliLiter(s) (50 mL/Hr) IV Continuous <Continuous>  dextrose 5%. 1000 milliLiter(s) (100 mL/Hr) IV Continuous <Continuous>  dextrose 50% Injectable 25 Gram(s) IV Push once  dextrose 50% Injectable 12.5 Gram(s) IV Push once  dextrose 50% Injectable 25 Gram(s) IV Push once  furosemide    Tablet 40 milliGRAM(s) Oral daily  glucagon  Injectable 1 milliGRAM(s) IntraMuscular once  heparin  Infusion.  Unit(s)/Hr (20 mL/Hr) IV Continuous <Continuous>  insulin glargine Injectable (LANTUS) 12 Unit(s) SubCutaneous at bedtime  insulin lispro (ADMELOG) corrective regimen sliding scale   SubCutaneous three times a day before meals  insulin lispro (ADMELOG) corrective regimen sliding scale   SubCutaneous at bedtime  insulin lispro Injectable (ADMELOG) 4 Unit(s) SubCutaneous three times a day before meals  lidocaine   4% Patch 1 Patch Transdermal daily  metoprolol succinate  milliGRAM(s) Oral daily  multivitamin 1 Tablet(s) Oral daily  nystatin Powder 1 Application(s) Topical three times a day  piperacillin/tazobactam IVPB.. 3.375 Gram(s) IV Intermittent every 8 hours    MEDICATIONS  (PRN):  acetaminophen     Tablet .. 650 milliGRAM(s) Oral every 6 hours PRN Temp greater or equal to 38C (100.4F), Mild Pain (1 - 3)  dextrose Oral Gel 15 Gram(s) Oral once PRN Blood Glucose LESS THAN 70 milliGRAM(s)/deciliter  heparin   Injectable 9000 Unit(s) IV Push every 6 hours PRN For aPTT less than 40  heparin   Injectable 4500 Unit(s) IV Push every 6 hours PRN For aPTT between 40 - 57  melatonin 3 milliGRAM(s) Oral at bedtime PRN Insomnia  oxyCODONE    IR 5 milliGRAM(s) Oral every 6 hours PRN Severe Pain (7 - 10)  traMADol 50 milliGRAM(s) Oral every 6 hours PRN Moderate Pain (4 - 6)      Allergies    No Known Allergies    Intolerances        Home meds:  Home Medications:  Admelog 100 units/mL injectable solution: 15 unit(s) injectable 3 times a day (11 Feb 2023 16:57)  Basaglar KwikPen 100 units/mL subcutaneous solution: 25 unit(s) subcutaneous once a day (at bedtime) (11 Feb 2023 16:57)  Lasix 40 mg oral tablet: 1 tab(s) orally once a day (11 Feb 2023 16:57)  metFORMIN 1000 mg oral tablet, extended release: 1 tab(s) orally 2 times a day (11 Feb 2023 16:57)  metoprolol succinate 100 mg oral tablet, extended release: 1 tab(s) orally once a day (11 Feb 2023 16:57)  Xarelto 20 mg oral tablet: 1 tab(s) orally once a day (in the evening) (11 Feb 2023 16:57)        VITAL SIGNS:   Vital Signs Last 24 Hrs  T(C): 36.8 (12 Feb 2023 12:01), Max: 38.1 (12 Feb 2023 05:26)  T(F): 98.3 (12 Feb 2023 12:01), Max: 100.5 (12 Feb 2023 05:26)  HR: 96 (12 Feb 2023 12:01) (62 - 107)  BP: 130/91 (12 Feb 2023 12:01) (100/62 - 130/91)  BP(mean): --  RR: 18 (12 Feb 2023 12:01) (16 - 18)  SpO2: 94% (12 Feb 2023 12:01) (92% - 100%)    Parameters below as of 12 Feb 2023 12:01  Patient On (Oxygen Delivery Method): room air        I&O's Summary    11 Feb 2023 07:01  -  12 Feb 2023 07:00  --------------------------------------------------------  IN: 250 mL / OUT: 0 mL / NET: 250 mL        On Exam:  TELE: Not on telemetry   Constitutional: NAD, awake and alert, obese  HEENT: Moist Mucous Membranes, Anicteric  Pulmonary: Non-labored, breath sounds diminished bilaterally, No wheezing, rales or rhonchi  Cardiovascular: IRRR, S1 and S2, No murmurs, rubs, gallops or clicks  Gastrointestinal: Bowel Sounds present, soft, nontender.   Lymph: +2 b/l  peripheral edema. No lymphadenopathy.  Skin: + erythema, b/l LE, warm   Psych:  Mood & affect appropriate for situation    LABS: All Labs Reviewed:                        12.4   8.43  )-----------( 175      ( 12 Feb 2023 05:51 )             36.8                         14.7   10.07 )-----------( 230      ( 11 Feb 2023 11:10 )             42.8     12 Feb 2023 05:51    138    |  102    |  18     ----------------------------<  285    3.8     |  30     |  0.65   11 Feb 2023 18:40    139    |  101    |  16     ----------------------------<  299    3.6     |  30     |  0.78   11 Feb 2023 11:10    136    |  99     |  20     ----------------------------<  373    3.9     |  27     |  0.77     Ca    8.3        12 Feb 2023 05:51  Ca    8.6        11 Feb 2023 18:40  Ca    9.6        11 Feb 2023 11:10  Mg     1.6       12 Feb 2023 05:51  Mg     1.6       11 Feb 2023 18:40    TPro  7.7    /  Alb  3.6    /  TBili  0.6    /  DBili  x      /  AST  25     /  ALT  41     /  AlkPhos  75     11 Feb 2023 11:10    PT/INR - ( 11 Feb 2023 20:21 )   PT: 21.3 sec;   INR: 1.81 ratio         PTT - ( 11 Feb 2023 20:21 )  PTT:31.6 sec      Blood Culture:         RADIOLOGY:

## 2023-02-12 NOTE — PROGRESS NOTE ADULT - ASSESSMENT
57y F PMH of Afib, DM2, and CHF (unknown EF) presenting with left heel wound and bilateral lower extremity swelling and redness admitted for Bilateral LE cellulitis with diabetic foot ulcer.      Problem/Plan - 1:  ·  Problem: Bilateral lower leg cellulitis.   ·  Plan: Patient presenting with b/l LE edema and erythema. VS stable and no leukocytosis. S/p Vancomycin, Rocephin and 2L NS in ED  - Lactate of 3.6 on admission, repeat lactate 2.9   - B/l LE doppler negative for DVT   - F/u x- ray of foot and will order MRI foot to r/o any osseous pathology vs osteomyelitis   - Check arterial duplex.  On heparin gtt.   - Continue Zosyn  - Pain regimen: Tylenol (mild), Tramadol 50mg q6h (moderate), Oxycodone 5mg q6h (severe)  - f/u blood, urine, wound cx  - Wound care  - Podiatry, Dr. Galarza following   - ID, Dr. Mohr consulted, f/u recs  - Vascular, Dr. Perez consulted, f/u recs.     Problem/Plan - 2:  ·  Problem: Diabetic ulcer of left foot.   ·  Plan: - F/u x- ray of foot and will order MRI foot to r/o any osseous pathology vs osteomyelitis   - Continue Zosyn  - Podiatry and vascular following - Plan as above.     Problem/Plan - 3:  ·  Problem: Back pain.   ·  Plan: Chronic   - F/u Xray of lumbar spine  - START Lidocaine patch for lumbar spine, apply to lower back  - Pain regimen: Tylenol (mild), Tramadol 50mg q6h (moderate), Oxycodone 5mg q6h (severe).  - Pt. reports having difficulty moving her legs for multiple years.       Problem/Plan - 4:  ·  Problem: Abdominal pain.   ·  Plan: Patient reports diffuse severe pain with meals  - Patient reports EGD/colonoscopy in August/September 2022: Normal  - f/u CT Angio abd/pelvis with IV cont - evaluate for ischemia  - GI, Dr. Marcelo and vascular consulted, f/u recs       Problem/Plan - 5:  ·  Problem: DM (diabetes mellitus).   ·  Plan: Insulin dependent DM2, blood glucose on admission 373  - Home regimen include: metformin 1000mg BID, basaglar 25U qhs, admelog 15u TID  - Will hold metformin while inpatient    - Start Lantus 12U qhs  - Start Admelog 4U premeal   - Start Low dose insulin corrective scale  - Hypoglycemia protocol, fingerstick glucose qAc and qHs  - Consistent Carbohydrate diet  - F/u AM HbA1c       Problem/Plan - 6:  ·  Problem: Afib.   ·  Plan: Patient with Hx of afib  - EKG with afib at 96 bpm   - On Xarelto for AC at home.  Currently on heparin gtt.    - On Metoprolol succinate for rate control, continue with hold parameters   - Took digoxin in the past. She does not take digoxin anymore.     Problem/Plan - 7:  ·  Problem: CHF, chronic.   ·  Plan: Unknown EF, with chronic LE swelling   - Continue home Lasix 40mg QD  - f/u TTE  - Strict I&Os, daily weights.     Problem/Plan - 8:  ·  Problem: Severe obesity (BMI >= 40).   ·  Plan: Patient with BMI of 46  - f/u Lipid profile  - Counselling on nutrition and exercise on discharge.     Problem/Plan - 9:  ·  Problem: Need for prophylactic measure.   ·  Plan: Continue heparin gtt.

## 2023-02-12 NOTE — CONSULT NOTE ADULT - NS ATTEND AMEND GEN_ALL_CORE FT
pt with lower ext edema that is likely related to her worsening cellulitis. can challenge w lasix IV x 1 to see if provide some sx relief. Please continue to maintain strict I/Os, monitor daily weights, Cr, and K. check echo. AF controlled. holding AC. start either hep or lovenox. Further cardiac workup will depend on clinical course.
CTA of Ao reviewed no evidence of significant PVD. No vascular surgery intervention needed at this point.

## 2023-02-12 NOTE — PROGRESS NOTE ADULT - SUBJECTIVE AND OBJECTIVE BOX
MELITON LOZANO is a 57yFemale , patient examined and chart reviewed.       INTERVAL HPI/ OVERNIGHT EVENTS:   Still with foot pain. Afebrile.    PAST MEDICAL & SURGICAL HISTORY:  Atrial fibrillation  Type 2 diabetes mellitus  Congestive heart failure  Scalp psoriasis  S/P tonsillectomy  S/P appendectomy  H/O umbilical hernia repair        For details regarding the patient's social history, family history, and other miscellaneous elements, please refer the initial infectious diseases consultation and/or the admitting history and physical examination for this admission.    ROS:  CONSTITUTIONAL:  Negative fever or chills,  EYES:  Negative  blurry vision or double vision  CARDIOVASCULAR:  Negative for chest pain or palpitations  RESPIRATORY:  Negative for cough, wheezing, or SOB   GASTROINTESTINAL:  Negative for nausea, vomiting, diarrhea, constipation, or abdominal pain  GENITOURINARY:  Negative frequency, urgency or dysuria  NEUROLOGIC:  No headache, confusion, dizziness, lightheadedness  All other systems were reviewed and are negative         Current inpatient medications :    ANTIBIOTICS/RELEVANT:  piperacillin/tazobactam IVPB.. 3.375 Gram(s) IV Intermittent every 8 hours      acetaminophen     Tablet .. 650 milliGRAM(s) Oral every 6 hours PRN  ascorbic acid 500 milliGRAM(s) Oral two times a day  atorvastatin 40 milliGRAM(s) Oral at bedtime  dextrose 5%. 1000 milliLiter(s) IV Continuous <Continuous>  dextrose 5%. 1000 milliLiter(s) IV Continuous <Continuous>  dextrose 50% Injectable 25 Gram(s) IV Push once  dextrose 50% Injectable 12.5 Gram(s) IV Push once  dextrose 50% Injectable 25 Gram(s) IV Push once  dextrose Oral Gel 15 Gram(s) Oral once PRN  furosemide    Tablet 40 milliGRAM(s) Oral daily  glucagon  Injectable 1 milliGRAM(s) IntraMuscular once  heparin   Injectable 9000 Unit(s) IV Push every 6 hours PRN  heparin   Injectable 4500 Unit(s) IV Push every 6 hours PRN  heparin  Infusion.  Unit(s)/Hr IV Continuous <Continuous>  insulin glargine Injectable (LANTUS) 12 Unit(s) SubCutaneous at bedtime  insulin lispro (ADMELOG) corrective regimen sliding scale   SubCutaneous three times a day before meals  insulin lispro (ADMELOG) corrective regimen sliding scale   SubCutaneous at bedtime  insulin lispro Injectable (ADMELOG) 4 Unit(s) SubCutaneous three times a day before meals  lidocaine   4% Patch 1 Patch Transdermal daily  melatonin 3 milliGRAM(s) Oral at bedtime PRN  metoprolol succinate  milliGRAM(s) Oral daily  multivitamin 1 Tablet(s) Oral daily  nystatin Powder 1 Application(s) Topical three times a day  oxyCODONE    IR 5 milliGRAM(s) Oral every 6 hours PRN  oxyCODONE    IR 5 milliGRAM(s) Oral once  polyethylene glycol 3350 17 Gram(s) Oral daily PRN  senna 2 Tablet(s) Oral at bedtime  traMADol 50 milliGRAM(s) Oral every 6 hours PRN      Objective:     @ 07:01  -   @ 07:00  --------------------------------------------------------  IN: 250 mL / OUT: 0 mL / NET: 250 mL      T(C): 36.9 (23 @ 20:39), Max: 38.1 (23 @ 05:26)  HR: 106 (23 @ 20:39) (96 - 106)  BP: 117/74 (23 @ 20:39) (101/67 - 130/91)  RR: 18 (23 @ 20:39) (18 - 18)  SpO2: 92% (23 @ 20:39) (92% - 94%    Physical Exam:  General: c/o pain  Neck: supple, trachea midline  Lungs: clear, no wheeze/rhonchi  Cardiovascular: regular rate and rhythm, S1 S2  Abdomen: soft, nontender,  bowel sounds normal  Neurological: alert and oriented x3  Skin: no rash  Extremities: Left heel ulcer with foot swelling and redness slight improvement today  Justin LE chronic stasis with venous insufficiency      LABS:                        12.4   8.43  )-----------( 175      ( 2023 05:51 )             36.8           138  |  102  |  18  ----------------------------<  285<H>  3.8   |  30  |  0.65    Ca    8.3<L>      2023 05:51  Mg     1.6     -    TPro  7.7  /  Alb  3.6  /  TBili  0.6  /  DBili  x   /  AST  25  /  ALT  41  /  AlkPhos  75  -      PT/INR - ( 2023 20:21 )   PT: 21.3 sec;   INR: 1.81 ratio         PTT - ( 2023 17:30 )  PTT:28.9 sec  Urinalysis Basic - ( 2023 11:24 )    Color: Pale Yellow / Appearance: Clear / S.010 / pH: x  Gluc: x / Ketone: Negative  / Bili: Negative / Urobili: Negative   Blood: x / Protein: Negative / Nitrite: Negative   Leuk Esterase: Negative / RBC: x / WBC x   Sq Epi: x / Non Sq Epi: x / Bacteria: x      MICROBIOLOGY:    Culture - Blood (collected 2023 11:25)  Source: .Blood Blood-Peripheral  Preliminary Report (2023 17:01):    No growth to date.    Culture - Urine (collected 2023 11:24)  Source: Clean Catch Clean Catch (Midstream)  Final Report (2023 14:10):    <10,000 CFU/mL Normal Urogenital Angelique    Culture - Blood (collected 2023 11:10)  Source: .Blood Blood-Peripheral  Preliminary Report (2023 17:01):    No growth to date.      RADIOLOGY & ADDITIONAL STUDIES:  ACC: [id number] EXAM:  US DPLX LWR EXT VEINS COMPL BI   ORDERED BY: PETER ANGEL     PROCEDURE DATE:  2023          INTERPRETATION:  CLINICAL INFORMATION: Body aches. Duplex swelling. Rule   out DVT.    COMPARISON: None available.    TECHNIQUE: Duplex sonography of the BILATERAL LOWER extremity veins with   color and spectral Doppler, with and without compression.    FINDINGS:    RIGHT:  Normal compressibility of the RIGHT common femoral, femoral and popliteal   veins.  Doppler examination shows normal spontaneous and phasic flow.  No RIGHT calf vein thrombosis is detected.    LEFT:  Normal compressibility of the LEFT common femoral, femoral and popliteal   veins.  Doppler examination shows normal spontaneous and phasic flow.  No LEFTcalf vein thrombosis is detected.    Subcutaneous edema bilaterally.    IMPRESSION:  No evidence of deep venous thrombosis in either lower extremity.    Assessment :   57y F PMH of Afib (on Xarelto), DM2, and CHF (unknown EF) Obesity admitted with unstagable infected painful left heel decub ulcer with foot/leg cellulitis,      Plan :   Cont Zosyn  Fu cultures  Trend temps and cbc  MRI left foot r/o OM  Podiatry on case  Pain control  Pulm toileting  Increase activity    Continue with present regiment.  Appropriate use of antibiotics and adverse effects reviewed.      I have discussed the above plan of care with patient in detail.     > 35 minutes were spent in direct patient care reviewing notes, medications ,labs data/ imaging , discussion with multidisciplinary team.    Thank you for allowing me to participate in care of your patient .    Carole Mohr MD  Infectious Disease  [phone]

## 2023-02-12 NOTE — DIETITIAN INITIAL EVALUATION ADULT - ETIOLOGY
related to probable history of excessive energy intake  related to increased nutrient needs for wound healing.

## 2023-02-12 NOTE — DIETITIAN INITIAL EVALUATION ADULT - NSFNSPHYEXAMSKINFT_GEN_A_CORE
Pressure Injury 1: Left:,heel, Suspected deep tissue injury  Pressure Injury 2: none, none  Pressure Injury 3: none, none  Pressure Injury 4: none, none  Pressure Injury 5: none, none  Pressure Injury 6: none, none  Pressure Injury 7: none, none  Pressure Injury 8: none, none  Pressure Injury 9: none, none  Pressure Injury 10: none, none  Pressure Injury 11: none, none, Pressure Injury 1: Left:,heel, Suspected deep tissue injury  Pressure Injury 2: none, none  Pressure Injury 3: none, none  Pressure Injury 4: none, none  Pressure Injury 5: none, none  Pressure Injury 6: none, none  Pressure Injury 7: none, none  Pressure Injury 8: none, none  Pressure Injury 9: none, none  Pressure Injury 10: none, none  Pressure Injury 11: none, none, Pressure Injury 1: Left:,heel, Suspected deep tissue injury  Pressure Injury 2: none, none  Pressure Injury 3: none, none  Pressure Injury 4: none, none  Pressure Injury 5: none, none  Pressure Injury 6: none, none  Pressure Injury 7: none, none  Pressure Injury 8: none, none  Pressure Injury 9: none, none  Pressure Injury 10: none, none  Pressure Injury 11: none, none

## 2023-02-12 NOTE — DIETITIAN INITIAL EVALUATION ADULT - ADD RECOMMEND
will remain available for diet education once pt is more awake  A1c is pending   monitor pt's pain when eating (as of now, as per CNA, pt tolerated breakfast this am).  will remain available for diet education once pt is more awake  A1c is pending   monitor pt's pain when eating (as of now, as per CNA, pt tolerated breakfast this am).   MVI daily and vit C 500 mg BID rx for wound healing.

## 2023-02-12 NOTE — DIETITIAN INITIAL EVALUATION ADULT - OTHER INFO
58 y/o female adm with Type 2 DM with ulcer of left heel and B/L lower leg cellulitis. Pt visited at bedside this am. Pt sleeping soundly in bed. Attempted to speak with pt. Pt answering some questions, however did not open her eyes. Spoke with CNA. Pt ate quiche, milk and half a bagel this morning. ? if pain during eating. As per H&P, EGD/colon done in Aug/Sep 2022 both normal. A1c is pending. POCT glucose levels high 200's to high 300's.

## 2023-02-12 NOTE — DIETITIAN INITIAL EVALUATION ADULT - PERTINENT LABORATORY DATA
02-12    138  |  102  |  18  ----------------------------<  285<H>  3.8   |  30  |  0.65    Ca    8.3<L>      12 Feb 2023 05:51  Mg     1.6     02-12    TPro  7.7  /  Alb  3.6  /  TBili  0.6  /  DBili  x   /  AST  25  /  ALT  41  /  AlkPhos  75  02-11  POCT Blood Glucose.: 312 mg/dL (02-12-23 @ 11:12)

## 2023-02-12 NOTE — DIETITIAN INITIAL EVALUATION ADULT - PERTINENT MEDS FT
MEDICATIONS  (STANDING):  atorvastatin 40 milliGRAM(s) Oral at bedtime  dextrose 5%. 1000 milliLiter(s) (100 mL/Hr) IV Continuous <Continuous>  dextrose 5%. 1000 milliLiter(s) (50 mL/Hr) IV Continuous <Continuous>  dextrose 50% Injectable 25 Gram(s) IV Push once  dextrose 50% Injectable 12.5 Gram(s) IV Push once  dextrose 50% Injectable 25 Gram(s) IV Push once  furosemide    Tablet 40 milliGRAM(s) Oral daily  glucagon  Injectable 1 milliGRAM(s) IntraMuscular once  heparin  Infusion.  Unit(s)/Hr (20 mL/Hr) IV Continuous <Continuous>  insulin glargine Injectable (LANTUS) 12 Unit(s) SubCutaneous at bedtime  insulin lispro (ADMELOG) corrective regimen sliding scale   SubCutaneous three times a day before meals  insulin lispro (ADMELOG) corrective regimen sliding scale   SubCutaneous at bedtime  insulin lispro Injectable (ADMELOG) 4 Unit(s) SubCutaneous three times a day before meals  lidocaine   4% Patch 1 Patch Transdermal daily  metoprolol succinate  milliGRAM(s) Oral daily  nystatin Powder 1 Application(s) Topical three times a day  piperacillin/tazobactam IVPB.. 3.375 Gram(s) IV Intermittent every 8 hours    MEDICATIONS  (PRN):  acetaminophen     Tablet .. 650 milliGRAM(s) Oral every 6 hours PRN Temp greater or equal to 38C (100.4F), Mild Pain (1 - 3)  dextrose Oral Gel 15 Gram(s) Oral once PRN Blood Glucose LESS THAN 70 milliGRAM(s)/deciliter  heparin   Injectable 9000 Unit(s) IV Push every 6 hours PRN For aPTT less than 40  heparin   Injectable 4500 Unit(s) IV Push every 6 hours PRN For aPTT between 40 - 57  melatonin 3 milliGRAM(s) Oral at bedtime PRN Insomnia  oxyCODONE    IR 5 milliGRAM(s) Oral every 6 hours PRN Severe Pain (7 - 10)  traMADol 50 milliGRAM(s) Oral every 6 hours PRN Moderate Pain (4 - 6)

## 2023-02-13 LAB
ANION GAP SERPL CALC-SCNC: 7 MMOL/L — SIGNIFICANT CHANGE UP (ref 5–17)
APTT BLD: 120.3 SEC — CRITICAL HIGH (ref 27.5–35.5)
APTT BLD: 40.1 SEC — HIGH (ref 27.5–35.5)
APTT BLD: 78.7 SEC — HIGH (ref 27.5–35.5)
APTT BLD: 82.2 SEC — HIGH (ref 27.5–35.5)
BASOPHILS # BLD AUTO: 0.05 K/UL — SIGNIFICANT CHANGE UP (ref 0–0.2)
BASOPHILS NFR BLD AUTO: 0.5 % — SIGNIFICANT CHANGE UP (ref 0–2)
BUN SERPL-MCNC: 14 MG/DL — SIGNIFICANT CHANGE UP (ref 7–23)
CALCIUM SERPL-MCNC: 8.6 MG/DL — SIGNIFICANT CHANGE UP (ref 8.5–10.1)
CHLORIDE SERPL-SCNC: 101 MMOL/L — SIGNIFICANT CHANGE UP (ref 96–108)
CO2 SERPL-SCNC: 29 MMOL/L — SIGNIFICANT CHANGE UP (ref 22–31)
CREAT SERPL-MCNC: 0.65 MG/DL — SIGNIFICANT CHANGE UP (ref 0.5–1.3)
EGFR: 102 ML/MIN/1.73M2 — SIGNIFICANT CHANGE UP
EOSINOPHIL # BLD AUTO: 0.1 K/UL — SIGNIFICANT CHANGE UP (ref 0–0.5)
EOSINOPHIL NFR BLD AUTO: 0.9 % — SIGNIFICANT CHANGE UP (ref 0–6)
GLUCOSE SERPL-MCNC: 316 MG/DL — HIGH (ref 70–99)
HCT VFR BLD CALC: 37.4 % — SIGNIFICANT CHANGE UP (ref 34.5–45)
HCT VFR BLD CALC: 37.9 % — SIGNIFICANT CHANGE UP (ref 34.5–45)
HGB BLD-MCNC: 12.5 G/DL — SIGNIFICANT CHANGE UP (ref 11.5–15.5)
HGB BLD-MCNC: 12.8 G/DL — SIGNIFICANT CHANGE UP (ref 11.5–15.5)
IMM GRANULOCYTES NFR BLD AUTO: 0.6 % — SIGNIFICANT CHANGE UP (ref 0–0.9)
LACTATE SERPL-SCNC: 1.4 MMOL/L — SIGNIFICANT CHANGE UP (ref 0.7–2)
LYMPHOCYTES # BLD AUTO: 1.53 K/UL — SIGNIFICANT CHANGE UP (ref 1–3.3)
LYMPHOCYTES # BLD AUTO: 14.3 % — SIGNIFICANT CHANGE UP (ref 13–44)
MAGNESIUM SERPL-MCNC: 1.7 MG/DL — SIGNIFICANT CHANGE UP (ref 1.6–2.6)
MCHC RBC-ENTMCNC: 30.6 PG — SIGNIFICANT CHANGE UP (ref 27–34)
MCHC RBC-ENTMCNC: 31.1 PG — SIGNIFICANT CHANGE UP (ref 27–34)
MCHC RBC-ENTMCNC: 33 GM/DL — SIGNIFICANT CHANGE UP (ref 32–36)
MCHC RBC-ENTMCNC: 34.2 GM/DL — SIGNIFICANT CHANGE UP (ref 32–36)
MCV RBC AUTO: 91 FL — SIGNIFICANT CHANGE UP (ref 80–100)
MCV RBC AUTO: 92.7 FL — SIGNIFICANT CHANGE UP (ref 80–100)
MONOCYTES # BLD AUTO: 0.71 K/UL — SIGNIFICANT CHANGE UP (ref 0–0.9)
MONOCYTES NFR BLD AUTO: 6.6 % — SIGNIFICANT CHANGE UP (ref 2–14)
NEUTROPHILS # BLD AUTO: 8.23 K/UL — HIGH (ref 1.8–7.4)
NEUTROPHILS NFR BLD AUTO: 77.1 % — HIGH (ref 43–77)
NRBC # BLD: 0 /100 WBCS — SIGNIFICANT CHANGE UP (ref 0–0)
NRBC # BLD: 0 /100 WBCS — SIGNIFICANT CHANGE UP (ref 0–0)
PLATELET # BLD AUTO: 160 K/UL — SIGNIFICANT CHANGE UP (ref 150–400)
PLATELET # BLD AUTO: 167 K/UL — SIGNIFICANT CHANGE UP (ref 150–400)
POTASSIUM SERPL-MCNC: 3.6 MMOL/L — SIGNIFICANT CHANGE UP (ref 3.5–5.3)
POTASSIUM SERPL-SCNC: 3.6 MMOL/L — SIGNIFICANT CHANGE UP (ref 3.5–5.3)
RBC # BLD: 4.09 M/UL — SIGNIFICANT CHANGE UP (ref 3.8–5.2)
RBC # BLD: 4.11 M/UL — SIGNIFICANT CHANGE UP (ref 3.8–5.2)
RBC # FLD: 12.9 % — SIGNIFICANT CHANGE UP (ref 10.3–14.5)
RBC # FLD: 13 % — SIGNIFICANT CHANGE UP (ref 10.3–14.5)
SODIUM SERPL-SCNC: 137 MMOL/L — SIGNIFICANT CHANGE UP (ref 135–145)
WBC # BLD: 10.68 K/UL — HIGH (ref 3.8–10.5)
WBC # BLD: 11.07 K/UL — HIGH (ref 3.8–10.5)
WBC # FLD AUTO: 10.68 K/UL — HIGH (ref 3.8–10.5)
WBC # FLD AUTO: 11.07 K/UL — HIGH (ref 3.8–10.5)

## 2023-02-13 PROCEDURE — 99233 SBSQ HOSP IP/OBS HIGH 50: CPT

## 2023-02-13 PROCEDURE — 99221 1ST HOSP IP/OBS SF/LOW 40: CPT

## 2023-02-13 PROCEDURE — 73718 MRI LOWER EXTREMITY W/O DYE: CPT | Mod: 26,LT

## 2023-02-13 PROCEDURE — 99232 SBSQ HOSP IP/OBS MODERATE 35: CPT

## 2023-02-13 RX ORDER — MUPIROCIN 20 MG/G
1 OINTMENT TOPICAL
Refills: 0 | Status: DISCONTINUED | OUTPATIENT
Start: 2023-02-13 | End: 2023-02-17

## 2023-02-13 RX ORDER — LINEZOLID 600 MG/300ML
600 INJECTION, SOLUTION INTRAVENOUS ONCE
Refills: 0 | Status: COMPLETED | OUTPATIENT
Start: 2023-02-13 | End: 2023-02-13

## 2023-02-13 RX ORDER — OXYCODONE HYDROCHLORIDE 5 MG/1
5 TABLET ORAL EVERY 6 HOURS
Refills: 0 | Status: DISCONTINUED | OUTPATIENT
Start: 2023-02-13 | End: 2023-02-16

## 2023-02-13 RX ORDER — OXYCODONE HYDROCHLORIDE 5 MG/1
10 TABLET ORAL EVERY 6 HOURS
Refills: 0 | Status: DISCONTINUED | OUTPATIENT
Start: 2023-02-13 | End: 2023-02-16

## 2023-02-13 RX ORDER — LINEZOLID 600 MG/300ML
600 INJECTION, SOLUTION INTRAVENOUS EVERY 12 HOURS
Refills: 0 | Status: COMPLETED | OUTPATIENT
Start: 2023-02-14 | End: 2023-02-15

## 2023-02-13 RX ORDER — INSULIN GLARGINE 100 [IU]/ML
20 INJECTION, SOLUTION SUBCUTANEOUS AT BEDTIME
Refills: 0 | Status: DISCONTINUED | OUTPATIENT
Start: 2023-02-13 | End: 2023-02-15

## 2023-02-13 RX ORDER — INSULIN LISPRO 100/ML
VIAL (ML) SUBCUTANEOUS
Refills: 0 | Status: DISCONTINUED | OUTPATIENT
Start: 2023-02-13 | End: 2023-02-17

## 2023-02-13 RX ORDER — LINEZOLID 600 MG/300ML
INJECTION, SOLUTION INTRAVENOUS
Refills: 0 | Status: COMPLETED | OUTPATIENT
Start: 2023-02-13 | End: 2023-02-15

## 2023-02-13 RX ADMIN — ATORVASTATIN CALCIUM 40 MILLIGRAM(S): 80 TABLET, FILM COATED ORAL at 21:54

## 2023-02-13 RX ADMIN — HEPARIN SODIUM 2000 UNIT(S)/HR: 5000 INJECTION INTRAVENOUS; SUBCUTANEOUS at 08:34

## 2023-02-13 RX ADMIN — HEPARIN SODIUM 4500 UNIT(S): 5000 INJECTION INTRAVENOUS; SUBCUTANEOUS at 01:01

## 2023-02-13 RX ADMIN — HEPARIN SODIUM 2000 UNIT(S)/HR: 5000 INJECTION INTRAVENOUS; SUBCUTANEOUS at 22:56

## 2023-02-13 RX ADMIN — OXYCODONE HYDROCHLORIDE 5 MILLIGRAM(S): 5 TABLET ORAL at 05:44

## 2023-02-13 RX ADMIN — Medication 500 MILLIGRAM(S): at 05:44

## 2023-02-13 RX ADMIN — HEPARIN SODIUM 2200 UNIT(S)/HR: 5000 INJECTION INTRAVENOUS; SUBCUTANEOUS at 04:54

## 2023-02-13 RX ADMIN — SENNA PLUS 2 TABLET(S): 8.6 TABLET ORAL at 21:54

## 2023-02-13 RX ADMIN — NYSTATIN CREAM 1 APPLICATION(S): 100000 CREAM TOPICAL at 05:44

## 2023-02-13 RX ADMIN — NYSTATIN CREAM 1 APPLICATION(S): 100000 CREAM TOPICAL at 21:56

## 2023-02-13 RX ADMIN — Medication 6: at 12:22

## 2023-02-13 RX ADMIN — Medication 3: at 07:20

## 2023-02-13 RX ADMIN — HEPARIN SODIUM 2000 UNIT(S)/HR: 5000 INJECTION INTRAVENOUS; SUBCUTANEOUS at 18:16

## 2023-02-13 RX ADMIN — PIPERACILLIN AND TAZOBACTAM 25 GRAM(S): 4; .5 INJECTION, POWDER, LYOPHILIZED, FOR SOLUTION INTRAVENOUS at 13:54

## 2023-02-13 RX ADMIN — HEPARIN SODIUM 2000 UNIT(S)/HR: 5000 INJECTION INTRAVENOUS; SUBCUTANEOUS at 19:31

## 2023-02-13 RX ADMIN — OXYCODONE HYDROCHLORIDE 10 MILLIGRAM(S): 5 TABLET ORAL at 12:45

## 2023-02-13 RX ADMIN — Medication 2: at 21:55

## 2023-02-13 RX ADMIN — HEPARIN SODIUM 2200 UNIT(S)/HR: 5000 INJECTION INTRAVENOUS; SUBCUTANEOUS at 07:18

## 2023-02-13 RX ADMIN — OXYCODONE HYDROCHLORIDE 10 MILLIGRAM(S): 5 TABLET ORAL at 21:54

## 2023-02-13 RX ADMIN — HEPARIN SODIUM 2000 UNIT(S)/HR: 5000 INJECTION INTRAVENOUS; SUBCUTANEOUS at 15:43

## 2023-02-13 RX ADMIN — Medication 40 MILLIGRAM(S): at 05:44

## 2023-02-13 RX ADMIN — Medication 1 TABLET(S): at 12:22

## 2023-02-13 RX ADMIN — MUPIROCIN 1 APPLICATION(S): 20 OINTMENT TOPICAL at 18:01

## 2023-02-13 RX ADMIN — LINEZOLID 300 MILLIGRAM(S): 600 INJECTION, SOLUTION INTRAVENOUS at 19:35

## 2023-02-13 RX ADMIN — Medication 4 UNIT(S): at 07:21

## 2023-02-13 RX ADMIN — HEPARIN SODIUM 2200 UNIT(S)/HR: 5000 INJECTION INTRAVENOUS; SUBCUTANEOUS at 00:57

## 2023-02-13 RX ADMIN — Medication 500 MILLIGRAM(S): at 18:00

## 2023-02-13 RX ADMIN — OXYCODONE HYDROCHLORIDE 5 MILLIGRAM(S): 5 TABLET ORAL at 06:44

## 2023-02-13 RX ADMIN — NYSTATIN CREAM 1 APPLICATION(S): 100000 CREAM TOPICAL at 13:55

## 2023-02-13 RX ADMIN — Medication 4 UNIT(S): at 12:21

## 2023-02-13 RX ADMIN — PIPERACILLIN AND TAZOBACTAM 25 GRAM(S): 4; .5 INJECTION, POWDER, LYOPHILIZED, FOR SOLUTION INTRAVENOUS at 05:45

## 2023-02-13 RX ADMIN — OXYCODONE HYDROCHLORIDE 10 MILLIGRAM(S): 5 TABLET ORAL at 22:54

## 2023-02-13 RX ADMIN — Medication 100 MILLIGRAM(S): at 05:44

## 2023-02-13 RX ADMIN — INSULIN GLARGINE 20 UNIT(S): 100 INJECTION, SOLUTION SUBCUTANEOUS at 21:56

## 2023-02-13 RX ADMIN — Medication 4 UNIT(S): at 16:57

## 2023-02-13 RX ADMIN — Medication 6: at 16:57

## 2023-02-13 RX ADMIN — OXYCODONE HYDROCHLORIDE 10 MILLIGRAM(S): 5 TABLET ORAL at 13:45

## 2023-02-13 NOTE — PROGRESS NOTE ADULT - ASSESSMENT
57y F PMH of Afib (on Xarelto), DM2, and CHF (unknown EF) presenting with left heel wound and bilateral lower extremity swelling and redness    CHF, Afib   - p/w b/l LE swelling and redness, with c/o back pain   - during exam she is crying 2/2 to excruciating back pain and B/L LE pain   - per patient she used to follow a cardiologist but have not seen one in while since her insurance change, but from what she recalls all her cardiac workup are all normal    - Has baseline LE edema which is unchanged and per pt progressively worsening, appears cellulitic in nature   - c/o SOB during ambulation PTA  - continue home dose Lasix   - CXR: no acute pathology   - please obtain TTE  - LE US neg for DVT    - monitor strict I/O's    - EKG: Afib, non ischemic. No anginal complaints   - known hx of Afib on Xarelto now on hold  - continue hep gtt for now, for possible intervention  - continue BB with hold parameters  - abx per id, podiatry following     - c/o dysphagia, though states that she tolerates taking her home meds  - she had EGD/ Colonoscopy in 10/2022, all normal  - rec GI consult    - Monitor and replete Lytes. Keep K > 4 and Mg > 2  - Will continue to follow.    Maru Barth, [facility]  Nurse Practitioner - Cardiology   Spectra #[phone]/ [phone] Tissue Cultured Epidermal Autograft Text: The defect edges were debeveled with a #15 scalpel blade.  Given the location of the defect, shape of the defect and the proximity to free margins a tissue cultured epidermal autograft was deemed most appropriate.  The graft was then trimmed to fit the size of the defect.  The graft was then placed in the primary defect and oriented appropriately.

## 2023-02-13 NOTE — CARE COORDINATION ASSESSMENT. - NSPASTMEDSURGHISTORY_GEN_ALL_CORE_FT
PAST MEDICAL & SURGICAL HISTORY:  Scalp psoriasis      Congestive heart failure      Type 2 diabetes mellitus      Atrial fibrillation      H/O umbilical hernia repair      S/P appendectomy      S/P tonsillectomy

## 2023-02-13 NOTE — PROGRESS NOTE ADULT - SUBJECTIVE AND OBJECTIVE BOX
MELITON LOZANO is a 57yFemale , patient examined and chart reviewed.       INTERVAL HPI/ OVERNIGHT EVENTS:   Afebrile.  No events.    PAST MEDICAL & SURGICAL HISTORY:  Atrial fibrillation  Type 2 diabetes mellitus  Congestive heart failure  Scalp psoriasis  S/P tonsillectomy  S/P appendectomy  H/O umbilical hernia repair        For details regarding the patient's social history, family history, and other miscellaneous elements, please refer the initial infectious diseases consultation and/or the admitting history and physical examination for this admission.    ROS:  CONSTITUTIONAL:  Negative fever or chills,  EYES:  Negative  blurry vision or double vision  CARDIOVASCULAR:  Negative for chest pain or palpitations  RESPIRATORY:  Negative for cough, wheezing, or SOB   GASTROINTESTINAL:  Negative for nausea, vomiting, diarrhea, constipation, or abdominal pain  GENITOURINARY:  Negative frequency, urgency or dysuria  NEUROLOGIC:  No headache, confusion, dizziness, lightheadedness  All other systems were reviewed and are negative         Current inpatient medications :    ANTIBIOTICS/RELEVANT:  piperacillin/tazobactam IVPB.. 3.375 Gram(s) IV Intermittent every 8 hours    MEDICATIONS  (STANDING):  ascorbic acid 500 milliGRAM(s) Oral two times a day  atorvastatin 40 milliGRAM(s) Oral at bedtime  dextrose 5%. 1000 milliLiter(s) (50 mL/Hr) IV Continuous <Continuous>  dextrose 5%. 1000 milliLiter(s) (100 mL/Hr) IV Continuous <Continuous>  dextrose 50% Injectable 25 Gram(s) IV Push once  dextrose 50% Injectable 12.5 Gram(s) IV Push once  dextrose 50% Injectable 25 Gram(s) IV Push once  furosemide    Tablet 40 milliGRAM(s) Oral daily  glucagon  Injectable 1 milliGRAM(s) IntraMuscular once  heparin  Infusion.  Unit(s)/Hr (20 mL/Hr) IV Continuous <Continuous>  insulin glargine Injectable (LANTUS) 20 Unit(s) SubCutaneous at bedtime  insulin lispro (ADMELOG) corrective regimen sliding scale   SubCutaneous at bedtime  insulin lispro (ADMELOG) corrective regimen sliding scale   SubCutaneous three times a day before meals  insulin lispro Injectable (ADMELOG) 4 Unit(s) SubCutaneous three times a day before meals  lidocaine   4% Patch 1 Patch Transdermal daily  metoprolol succinate  milliGRAM(s) Oral daily  multivitamin 1 Tablet(s) Oral daily  mupirocin 2% Nasal 1 Application(s) Both Nostrils two times a day  nystatin Powder 1 Application(s) Topical three times a day  oxyCODONE    IR 5 milliGRAM(s) Oral once  senna 2 Tablet(s) Oral at bedtime    MEDICATIONS  (PRN):  acetaminophen     Tablet .. 650 milliGRAM(s) Oral every 6 hours PRN Temp greater or equal to 38C (100.4F), Mild Pain (1 - 3)  dextrose Oral Gel 15 Gram(s) Oral once PRN Blood Glucose LESS THAN 70 milliGRAM(s)/deciliter  heparin   Injectable 9000 Unit(s) IV Push every 6 hours PRN For aPTT less than 40  heparin   Injectable 4500 Unit(s) IV Push every 6 hours PRN For aPTT between 40 - 57  melatonin 3 milliGRAM(s) Oral at bedtime PRN Insomnia  oxyCODONE    IR 5 milliGRAM(s) Oral every 6 hours PRN Moderate Pain (4 - 6)  oxyCODONE    IR 10 milliGRAM(s) Oral every 6 hours PRN Severe Pain (7 - 10)  polyethylene glycol 3350 17 Gram(s) Oral daily PRN Constipation      Objective:  Vital Signs Last 24 Hrs  T(C): 37.1 (2023 11:50), Max: 37.5 (2023 05:40)  T(F): 98.8 (2023 11:50), Max: 99.5 (2023 05:40)  HR: 93 (2023 11:50) (93 - 106)  BP: 94/66 (2023 11:50) (94/66 - 126/70)  RR: 18 (2023 11:50) (18 - 18)  SpO2: 92% (2023 11:50) (92% - 92%)    Parameters below as of 2023 11:50  Patient On (Oxygen Delivery Method): room air      Physical Exam:  General: c/o pain  Neck: supple, trachea midline  Lungs: clear, no wheeze/rhonchi  Cardiovascular: regular rate and rhythm, S1 S2  Abdomen: soft, nontender,  bowel sounds normal  Neurological: alert and oriented x3  Skin: no rash  Extremities: Left heel ulcer with foot swelling and redness slight improvement today  Justin LE chronic stasis with venous insufficiency      LABS:                        12.8   10.68 )-----------( 160      ( 2023 06:20 )             37.4   02-13    137  |  101  |  14  ----------------------------<  316<H>  3.6   |  29  |  0.65    Ca    8.6      2023 06:20  Mg     1.7     02-13      Urinalysis Basic - ( 2023 11:24 )    Color: Pale Yellow / Appearance: Clear / S.010 / pH: x  Gluc: x / Ketone: Negative  / Bili: Negative / Urobili: Negative   Blood: x / Protein: Negative / Nitrite: Negative   Leuk Esterase: Negative / RBC: x / WBC x   Sq Epi: x / Non Sq Epi: x / Bacteria: x      MICROBIOLOGY:    Culture - Other (collected 2023 17:00)  Source: Wound Wound  Preliminary Report (2023 17:00):    Numerous Staphylococcus aureus    Culture - Blood (collected 2023 11:25)  Source: .Blood Blood-Peripheral  Preliminary Report (2023 17:01):    No growth to date.    Culture - Urine (collected 2023 11:24)  Source: Clean Catch Clean Catch (Midstream)  Final Report (2023 14:10):    <10,000 CFU/mL Normal Urogenital Angelique    Culture - Blood (collected 2023 11:10)  Source: .Blood Blood-Peripheral  Preliminary Report (2023 17:01):    No growth to date.    RADIOLOGY & ADDITIONAL STUDIES:  ACC: [id number] EXAM:  US DPLX LWR EXT VEINS COMPL BI   ORDERED BY: PETER ANGEL     PROCEDURE DATE:  2023          INTERPRETATION:  CLINICAL INFORMATION: Body aches. Duplex swelling. Rule   out DVT.    COMPARISON: None available.    TECHNIQUE: Duplex sonography of the BILATERAL LOWER extremity veins with   color and spectral Doppler, with and without compression.    FINDINGS:    RIGHT:  Normal compressibility of the RIGHT common femoral, femoral and popliteal   veins.  Doppler examination shows normal spontaneous and phasic flow.  No RIGHT calf vein thrombosis is detected.    LEFT:  Normal compressibility of the LEFT common femoral, femoral and popliteal   veins.  Doppler examination shows normal spontaneous and phasic flow.  No LEFTcalf vein thrombosis is detected.    Subcutaneous edema bilaterally.    IMPRESSION:  No evidence of deep venous thrombosis in either lower extremity.      ACC: [id number] EXAM:  MR FOOT LT   ORDERED BY: ALVINO DIAMOND     PROCEDURE DATE:  2023          INTERPRETATION:  LEFT FOOT MRI    CLINICAL INFORMATION: Diabetic patient with left heel wound. Evaluate for   osteomyelitis.  TECHNIQUE: Multiplanar, multisequence MRI was obtained of the LEFT foot   focusing on the hindfoot without the use of intravenous or   intra-articular contrast.  COMPARISON: Bilateral foot radiographs 2023.    FINDINGS:    PERIPHERAL SOFT TISSUES: The patient's reported site of ulceration is not   well identified on MRI. There is diffuse nonspecific edema in the   subcutaneous fat. No focal fluid collection to suggest abscess.  BONE MARROW: No STIR signal or loss of T1 hyperintense signal to suggest   acute osteomyelitis. No fracture or osteonecrosis.    MUSCLES AND TENDONS: Tendons are intact. Mild nonspecific edema of the   intrinsic musculature of the foot.  LIGAMENTS AND CAPSULAR STRUCTURES: Partial-thickness tearing at the   attachment of the plantarfascia on the calcaneus plantar calcaneal heel   spur. Attenuated appearance of the ATFL consistent with chronic sprain.   Ankle ligaments are otherwise intact.  CARTILAGE AND SUBCHONDRAL BONE: No full-thickness chondral loss.  SYNOVIUM/JOINT FLUID: No large joint effusion.      IMPRESSION:  1.  No MR evidence of acute osteomyelitis.  2.  Plantar fasciitis with partial-thickness tearing at the attachment of   the plantar fascia on the calcaneus.        Assessment :   57y F PMH of Afib (on Xarelto), DM2, and CHF (unknown EF) Obesity admitted with unstagable infected painful left heel decub ulcer with foot/leg cellulitis,  MRI neg for OM  Wound culture with Numerous Staphylococcus aureus  MRSA nares +    Plan :   Change antibiotic to Zyvox  Fu cultures  Trend temps and cbc  Podiatry on case  Pain control  Pulm toileting  Increase activity    Continue with present regiment.  Appropriate use of antibiotics and adverse effects reviewed.    > 35 minutes were spent in direct patient care reviewing notes, medications ,labs data/ imaging , discussion with multidisciplinary team.    Thank you for allowing me to participate in care of your patient .    Carole Mohr MD  Infectious Disease  [phone]

## 2023-02-13 NOTE — CARE COORDINATION ASSESSMENT. - NSCAREPROVIDERS_GEN_ALL_CORE_FT
CARE PROVIDERS:  Administration: Vida Lindsay  Administration: Maribel Ruiz  Admitting: Garrick Spring  Attending: Garrick Spring  Consultant: Carolyn Hernandez  Consultant: Amy Galarza  Consultant: Weil, Patricia  Consultant: Jewell Taylor  Consultant: Maru Barth  Consultant: Yanni Uribe  Consultant: Perlman, Craig  Consultant: Raysa Doshi  Consultant: Carole Mohr  Consultant: Zhen Herman  Covering Team: Estefanía Chavez  ED ACP: Amilcar Muñoz  ED Attending: Rodger Dunn  ED Nurse: Tyrell Ferrera  Nurse: Zink, Corinne  Nurse: Pacheco La  Nurse: May Valencia  Ordered: ServiceAccount, SCMMLM  Ordered: ServiceAccount, SCMMLM  Ordered: ServiceAccount, SCMMLM  Ordered: Leon, Jeffara  Ordered: Doctor, Unknown  Ordered: ADM, User  Override: Orville Muñoz  Override: Yisel Fregoso  Override: Lauren Wilkerson  PCA/Nursing Assistant: Sara Zamora  Primary Team: Cj Bae  Primary Team: Sunitha Rowley  Primary Team: Wendy Muñoz  Primary Team: Vaibhav Louis  Primary Team: Garrick Spring  Registered Dietitian: Sonia Lr  Team: [facility], Team  UR// Supp. Assoc.: Mary Doty  
none

## 2023-02-13 NOTE — PROGRESS NOTE ADULT - ASSESSMENT
57y F PMH of Afib, DM2, and CHF (unknown EF) presenting with left heel wound and bilateral lower extremity swelling and redness admitted for Bilateral LE cellulitis with diabetic foot ulcer.      Problem/Plan - 1:  ·  Problem: Bilateral lower leg cellulitis.   ·  Plan: Patient presenting with b/l LE edema and erythema. VS stable and no leukocytosis. S/p Vancomycin, Rocephin and 2L NS in ED  - Lactate of 3.6 on admission, repeat lactate 2.9--->trended to normal   - B/l LE doppler negative for DVT   - F/u x- ray of bilateral feet No radiographic evidence of osteomyelitis within either foot.  Soft tissue swelling within both feet. No evidence of acute fracture.  - MRI foot to r/o any osseous pathology vs osteomyelitis   - CTA abd aorta with run off No significant arterial stenosis up to the popliteal arteries. Mild trifurcation artery disease as described above, otherwise with three-vessel contrast opacification to the ankles.  On heparin gtt.   - Continue Zosyn  - Pain regimen: Tylenol (mild), Oxycodone 5mg q6h PRN (moderate), Oxycodone 10mg PO Q6h PRN (severe)  - f/u blood cx NGTD, urine negative  - Podiatry, Dr. Galarza following   - ID, Dr. Mohr consulted, f/u recs  - Vascular, Dr. Perez consulted, f/u recs.     Problem/Plan - 2:  ·  Problem: Diabetic ulcer of left foot.   ·  Plan: - MRI foot to r/o any osseous pathology vs osteomyelitis   - Continue Zosyn  - Podiatry and vascular following - Plan as above.     Problem/Plan - 3:  ·  Problem: Back pain.   ·  Plan: Chronic   - Xray of lumbar spine No evidence of fracture  - continue Lidocaine patch for lumbar spine, apply to lower back  - Pain regimen: Tylenol and oxycodone PRN  - Pt. reports having difficulty moving her legs for multiple years.       Problem/Plan - 4:  ·  Problem: Abdominal pain.   ·  Plan: Patient reports diffuse severe pain with meals  - Patient reports EGD/colonoscopy in August/September 2022: Normal  - CT Angio abd/pelvis with IV cont No significant arterial stenosis up to the popliteal arteries. Mild trifurcation artery disease as described above, otherwise with three-vessel contrast opacification to the ankles.   - GI, Dr. Marcelo and vascular consulted, f/u recs       Problem/Plan - 5:  ·  Problem: DM (diabetes mellitus).   ·  Plan: Insulin dependent DM2, blood glucose on admission 373  - Home regimen include: metformin 1000mg BID, basaglar 25U qhs, admelog 15u TID  - Will hold metformin while inpatient    - Increase Lantus to 20U qhs  - Start Admelog 4U premeal   - Start moderate dose insulin corrective scale  - Hypoglycemia protocol, fingerstick glucose qAc and qHs  - Consistent Carbohydrate diet  - HbA1c 12.8       Problem/Plan - 6:  ·  Problem: Afib.   ·  Plan: Patient with Hx of afib  - EKG with afib at 96 bpm   - On Xarelto for AC at home.  Currently on heparin gtt.    - On Metoprolol succinate for rate control, continue with hold parameters   - Took digoxin in the past. She does not take digoxin anymore.     Problem/Plan - 7:  ·  Problem: CHF, chronic.   ·  Plan: Unknown EF, with chronic LE swelling   - Continue home Lasix 40mg QD  - f/u TTE  - Strict I&Os, daily weights.     Problem/Plan - 8:  ·  Problem: Severe obesity (BMI >= 40).   ·  Plan: Patient with BMI of 46  - f/u Lipid profile  - Counselling on nutrition and exercise on discharge.     Problem/Plan - 9:  ·  Problem: Need for prophylactic measure.   ·  Plan: Continue heparin gtt.

## 2023-02-13 NOTE — PROGRESS NOTE ADULT - ASSESSMENT
57 year old female p/w diabetic left foot heel ulcer and b/l lower extremity cellulitis.  Dopplerable, non-palpable pulses to B/L lower extremities noted.  Vascular surgery consulted for evaluation.      CTA without significant PAD  Continue medical management for cellulitis  Elevate BLE; if possible use ACE wraps for gentle compression  DM management  No vascular surgical intervention indicated  Podiatry may proceed with intervention if indicated  Local wound care as [er podiatry  Will sign off  Discussed with Dr Perez

## 2023-02-13 NOTE — PROGRESS NOTE ADULT - SUBJECTIVE AND OBJECTIVE BOX
Patient is a 57y old  Female who presents with a chief complaint of L foot wound (13 Feb 2023 08:50)  Pt c/o significant generalized "body pain" with BLE pain and abdominal pain 8/10  She is tearful and also endorses she has been nauseated and can't eat    MEDICATIONS  (STANDING):  ascorbic acid 500 milliGRAM(s) Oral two times a day  atorvastatin 40 milliGRAM(s) Oral at bedtime  dextrose 5%. 1000 milliLiter(s) (100 mL/Hr) IV Continuous <Continuous>  dextrose 5%. 1000 milliLiter(s) (50 mL/Hr) IV Continuous <Continuous>  dextrose 50% Injectable 25 Gram(s) IV Push once  dextrose 50% Injectable 12.5 Gram(s) IV Push once  dextrose 50% Injectable 25 Gram(s) IV Push once  furosemide    Tablet 40 milliGRAM(s) Oral daily  glucagon  Injectable 1 milliGRAM(s) IntraMuscular once  heparin  Infusion.  Unit(s)/Hr (20 mL/Hr) IV Continuous <Continuous>  insulin glargine Injectable (LANTUS) 20 Unit(s) SubCutaneous at bedtime  insulin lispro (ADMELOG) corrective regimen sliding scale   SubCutaneous at bedtime  insulin lispro (ADMELOG) corrective regimen sliding scale   SubCutaneous three times a day before meals  insulin lispro Injectable (ADMELOG) 4 Unit(s) SubCutaneous three times a day before meals  lidocaine   4% Patch 1 Patch Transdermal daily  metoprolol succinate  milliGRAM(s) Oral daily  multivitamin 1 Tablet(s) Oral daily  nystatin Powder 1 Application(s) Topical three times a day  oxyCODONE    IR 5 milliGRAM(s) Oral once  piperacillin/tazobactam IVPB.. 3.375 Gram(s) IV Intermittent every 8 hours  senna 2 Tablet(s) Oral at bedtime    MEDICATIONS  (PRN):  acetaminophen     Tablet .. 650 milliGRAM(s) Oral every 6 hours PRN Temp greater or equal to 38C (100.4F), Mild Pain (1 - 3)  dextrose Oral Gel 15 Gram(s) Oral once PRN Blood Glucose LESS THAN 70 milliGRAM(s)/deciliter  heparin   Injectable 9000 Unit(s) IV Push every 6 hours PRN For aPTT less than 40  heparin   Injectable 4500 Unit(s) IV Push every 6 hours PRN For aPTT between 40 - 57  melatonin 3 milliGRAM(s) Oral at bedtime PRN Insomnia  oxyCODONE    IR 10 milliGRAM(s) Oral every 6 hours PRN Severe Pain (7 - 10)  oxyCODONE    IR 5 milliGRAM(s) Oral every 6 hours PRN Moderate Pain (4 - 6)  polyethylene glycol 3350 17 Gram(s) Oral daily PRN Constipation    Allergies  No Known Allergies      Vital Signs Last 24 Hrs  T(C): 37.5 (13 Feb 2023 05:40), Max: 37.6 (12 Feb 2023 09:52)  T(F): 99.5 (13 Feb 2023 05:40), Max: 99.7 (12 Feb 2023 09:52)  HR: 102 (13 Feb 2023 05:40) (96 - 106)  BP: 126/70 (13 Feb 2023 05:40) (117/74 - 130/91)  BP(mean): --  RR: 18 (13 Feb 2023 05:40) (18 - 18)  SpO2: 92% (13 Feb 2023 05:40) (92% - 94%)    Parameters below as of 13 Feb 2023 05:40  Patient On (Oxygen Delivery Method): room air      I&O's Detail    12 Feb 2023 07:01  -  13 Feb 2023 07:00  --------------------------------------------------------  IN:    Heparin Infusion: 256 mL    Oral Fluid: 300 mL  Total IN: 556 mL    OUT:    Voided (mL): 500 mL  Total OUT: 500 mL    Total NET: 56 mL      Physical Exam:  General: Tearful  Pulmonary: Nonlabored breathing, no respiratory distress  Cardiovascular: Normal S1, S2  Abdominal: softly distended  Extremities: BLE with 2-3+ edema and erythema to knee. L heel dressing in place-dry  Pulses:   Right:                                                                          Left:  FEM [ ]2+ [ ]1+ [ ]doppler                                             FEM [ ]2+ [ ]1+ [ ]doppler    POP [ ]2+ [ ]1+ [ ]doppler                                             POP [ ]2+ [ ]1+ [ ]doppler    DP [ ]2+ [ ]1+ [x ]doppler                                                DP [ ]2+ [ ]1+ [ x]doppler  PT[ ]2+ [ ]1+ [ x]doppler                                                  PT [ ]2+ [ ]1+ [x]doppler      LABS:                        12.8   10.68 )-----------( 160      ( 13 Feb 2023 06:20 )             37.4     02-13    137  |  101  |  14  ----------------------------<  316<H>  3.6   |  29  |  0.65    Ca    8.6      13 Feb 2023 06:20  Mg     1.7     02-13    TPro  7.7  /  Alb  3.6  /  TBili  0.6  /  DBili  x   /  AST  25  /  ALT  41  /  AlkPhos  75  02-11    PT/INR - ( 11 Feb 2023 20:21 )   PT: 21.3 sec;   INR: 1.81 ratio    PTT - ( 13 Feb 2023 06:20 )  PTT:120.3 sec    CAPILLARY BLOOD GLUCOSE  POCT Blood Glucose.: 300 mg/dL (13 Feb 2023 07:15)  POCT Blood Glucose.: 331 mg/dL (12 Feb 2023 21:14)  POCT Blood Glucose.: 338 mg/dL (12 Feb 2023 16:21)  POCT Blood Glucose.: 312 mg/dL (12 Feb 2023 11:12)    Radiology and Additional Studies:  < from: CT Angio Abd Aorta w/run-off w/ IV Cont (02.11.23 @ 20:13) >  ACC: [id number] EXAM:  CT ANGIO ABD AOR W RUN(W)AW IC   ORDERED BY: CRISTINA ZENDEJAS     PROCEDURE DATE:  02/11/2023          INTERPRETATION:  CLINICAL INFORMATION: Bilateral lower extremity swelling.    COMPARISON: CT abdomen pelvis 11/19/2007    CONTRAST/COMPLICATIONS:  IV Contrast: Omnipaque 350  125 cc administered   25 cc discarded  Oral Contrast: NONE  Complications: None reported at time of study completion    CT ANGIOGRAM ABDOMEN, PELVIS, AND LOWER EXTREMITIES:    PROCEDURE:  Initially, nonenhanced CT was obtained through the calves. Then,   following the rapid administration of intravenous contrast, CT   angiography was performed through the abdomen, pelvis, and lower   extremities down to the toes.  Delayed images through the calves were   also obtained. Sagittal and coronal reformats as well as 3D   reconstructions were performed.    FINDINGS:    CENTRAL ARTERIAL SYSTEM:    Abdominal aorta, iliac arteries, and visceral arteries are patent with   mild atheromatous changes. Noaortoiliac aneurysm. Central vein patency   is not assessed on this study due to timing of contrast.    RIGHT LOWER EXTREMITY:    Right common femoral, deep femoral, superficial femoral, and popliteal   arteries are patent. Mild atheromatous changes at the right tibioperoneal   trunk and peroneal artery. Otherwise, three-vessel contrast opacification   of the right ankle. Dorsalis pedis and plantar arteries are opacified at   the right foot.    Significant right lower extremity soft tissue edema with prominent   superficial venous varicosities of the thigh and lower leg. Correlate for   venous reflux. Prominent right lower extremity soft tissue skin   thickening as well. Correlate clinically for any areas of soft tissue   ulceration and/or infection.    LEFT LOWER EXTREMITY:    Left common femoral, deep femoral, superficial femoral, and popliteal   arteries are patent. Mild atheromatous changes at the left tibioperoneal   trunk, posterior tibial, and peroneal arteries. Otherwise, three-vessel   contrast opacification to the left ankle. Dorsalis pedis and plantar   arteries are opacified at the left foot.    Significant left lower extremity soft tissue edema with prominent   superficial venous varicosities of the thigh and lower leg. Correlate for   venous reflux. Prominent left lower extremity soft tissue skin thickening   as well. Correlate clinically for any areas of soft tissue ulceration   and/or infection.    ADDITIONAL FINDINGS:    No visualized pleural effusion. Dependent lung parenchymal scarring. Left   atrium appears enlarged, though partially imaged.    Enlarged liver measuring 23 cm in craniocaudal dimension, with probable   steatosis. No biliary distention. Contracted gallbladder. Spleen is   enlarged in anterior posterior dimension, 15 cm. Pancreas and adrenals   are unremarkable. No hydronephrosis of the kidneys. Urinary bladder is   mildly distended. Left-sided uterine fibroid has decreased in size since   2007. Uterus and adnexa are otherwise suboptimal characterize on CT.    Stomach is minimally distended. No small bowel distention. Prior   appendectomy. Mild stool burden of the colon limits evaluation of the   colonic mucosa. Colonic diverticulosis, without diverticulitis. No   ascites. Small volume nodes of the abdomen/pelvis. Superficial venous   varicosities along the abdominal wall. Small to moderate complex ventral   fat-containing hernia. Mild additional scattered abdominal wall soft   tissue stranding. Multilevel degenerative changes of the bones. Lower   extremity findings as described above.    IMPRESSION:    No significant arterial stenosis up to the popliteal arteries. Mild   trifurcation artery disease as described above, otherwise with   three-vessel contrast opacification to theankles.    Significant bilateral lower extremity soft tissue edema with prominent   superficial venous varicosities of the thighs and lower legs. Correlate   for venous reflux. Prominent lower extremity soft tissue skin thickening   as well. Correlate clinically for any areas of soft tissue ulceration   and/or infection.    < end of copied text >          Atrial fibrillation    Type 2 diabetes mellitus    Congestive heart failure    Scalp psoriasis    S/P tonsillectomy    S/P appendectomy    H/O umbilical hernia repair

## 2023-02-13 NOTE — CONSULT NOTE ADULT - SUBJECTIVE AND OBJECTIVE BOX
Patient is a 57y old  Female who presents with a chief complaint of L foot wound (13 Feb 2023 09:14)      Reason For Consult: dm2 uncontrolled    HPI:  57y F PMH of Afib (on Xarelto), DM2, and CHF (unknown EF) presenting with left heel wound and bilateral lower extremity swelling and redness. Patient relates moderate amount of back pain as well and is unable to lift her lower legs. Patient states that she has had chronic foot pain which acutely worsened 1 week ago without evidence of precipitating factors or trauma. Patient reports that she has had limited ambulation and has been homebound due to her back pain as well as increased swelling. Patient utilizes walker and ambulates to restroom; she is unable to go up stairs. Patient noticed bleeding yesterday when her friend visited her and found blood on the floor as well as her left foot. Patient normally wears Crocs at home and denies any trauma to her feet/legs. Patient states that she has been unable to fully lay down as she experiences diffuse pain all across her body including her legs and back, and has had to sleep with her head down on a desk to get some rest.    Of note, patient also reports "excruciating pain" when attempting to eat. She reports undergoing an EGD/colonoscopy back in August-September 2022 which was normal. Endorses weakness, fatigue, SOB, palpitations, abdominal pain, chronic paresthesias down all extremities, a "thick and syrupy urine", and a yellow mucus that appears at the end of her bowel movements.    ED course  Vitals: T 97.2F, HR 91, /82, RR 18, SpO2 96% on RA   Significant labs: Lactate 3.6, glucose 373, Dig level 0.1, UA with 1000 glucose  B/l LE doppler: No evidence of deep venous thrombosis in either lower extremity.  Patient received Rocephin 1g x 1, Vancomycin 1g, Morphine 4mg IVP, Zofran 4mg IVP, 2L NS bolus     (11 Feb 2023 15:09)      PAST MEDICAL & SURGICAL HISTORY:  Atrial fibrillation      Type 2 diabetes mellitus      Congestive heart failure      Scalp psoriasis      S/P tonsillectomy      S/P appendectomy      H/O umbilical hernia repair          FAMILY HISTORY:  No pertinent family history in first degree relatives          Social History:    MEDICATIONS  (STANDING):  ascorbic acid 500 milliGRAM(s) Oral two times a day  atorvastatin 40 milliGRAM(s) Oral at bedtime  dextrose 5%. 1000 milliLiter(s) (100 mL/Hr) IV Continuous <Continuous>  dextrose 5%. 1000 milliLiter(s) (50 mL/Hr) IV Continuous <Continuous>  dextrose 50% Injectable 25 Gram(s) IV Push once  dextrose 50% Injectable 12.5 Gram(s) IV Push once  dextrose 50% Injectable 25 Gram(s) IV Push once  furosemide    Tablet 40 milliGRAM(s) Oral daily  glucagon  Injectable 1 milliGRAM(s) IntraMuscular once  heparin  Infusion.  Unit(s)/Hr (20 mL/Hr) IV Continuous <Continuous>  insulin glargine Injectable (LANTUS) 20 Unit(s) SubCutaneous at bedtime  insulin lispro (ADMELOG) corrective regimen sliding scale   SubCutaneous at bedtime  insulin lispro (ADMELOG) corrective regimen sliding scale   SubCutaneous three times a day before meals  insulin lispro Injectable (ADMELOG) 4 Unit(s) SubCutaneous three times a day before meals  lidocaine   4% Patch 1 Patch Transdermal daily  metoprolol succinate  milliGRAM(s) Oral daily  multivitamin 1 Tablet(s) Oral daily  nystatin Powder 1 Application(s) Topical three times a day  oxyCODONE    IR 5 milliGRAM(s) Oral once  piperacillin/tazobactam IVPB.. 3.375 Gram(s) IV Intermittent every 8 hours  senna 2 Tablet(s) Oral at bedtime    MEDICATIONS  (PRN):  acetaminophen     Tablet .. 650 milliGRAM(s) Oral every 6 hours PRN Temp greater or equal to 38C (100.4F), Mild Pain (1 - 3)  dextrose Oral Gel 15 Gram(s) Oral once PRN Blood Glucose LESS THAN 70 milliGRAM(s)/deciliter  heparin   Injectable 9000 Unit(s) IV Push every 6 hours PRN For aPTT less than 40  heparin   Injectable 4500 Unit(s) IV Push every 6 hours PRN For aPTT between 40 - 57  melatonin 3 milliGRAM(s) Oral at bedtime PRN Insomnia  oxyCODONE    IR 10 milliGRAM(s) Oral every 6 hours PRN Severe Pain (7 - 10)  oxyCODONE    IR 5 milliGRAM(s) Oral every 6 hours PRN Moderate Pain (4 - 6)  polyethylene glycol 3350 17 Gram(s) Oral daily PRN Constipation        T(C): 37.5 (02-13-23 @ 05:40), Max: 37.6 (02-12-23 @ 09:52)  HR: 102 (02-13-23 @ 05:40) (96 - 106)  BP: 126/70 (02-13-23 @ 05:40) (117/74 - 130/91)  RR: 18 (02-13-23 @ 05:40) (18 - 18)  SpO2: 92% (02-13-23 @ 05:40) (92% - 94%)  Wt(kg): --    PHYSICAL EXAM:  CHEST/LUNG: Clear to percussion bilaterally; No rales, rhonchi, wheezing, or rubs  HEART: Regular rate and rhythm; No murmurs, rubs, or gallops  ABDOMEN: Soft, Nontender, Nondistended; Bowel sounds present  EXTREMITIES:  left foot dsg intact    CAPILLARY BLOOD GLUCOSE      POCT Blood Glucose.: 300 mg/dL (13 Feb 2023 07:15)  POCT Blood Glucose.: 331 mg/dL (12 Feb 2023 21:14)  POCT Blood Glucose.: 338 mg/dL (12 Feb 2023 16:21)  POCT Blood Glucose.: 312 mg/dL (12 Feb 2023 11:12)                            12.8   10.68 )-----------( 160      ( 13 Feb 2023 06:20 )             37.4       CMP:  02-13 @ 06:20  SGPT --  Albumin --   Alk Phos --   Anion Gap 7   SGOT --   Total Bili --   BUN 14   Calcium Total 8.6   CO2 29   Chloride 101   Creatinine 0.65   eGFR if AA --   eGFR if non AA --   Glucose 316   Potassium 3.6   Protein --   Sodium 137      Thyroid Function Tests:      Diabetes Tests:       Radiology:

## 2023-02-13 NOTE — CONSULT NOTE ADULT - SUBJECTIVE AND OBJECTIVE BOX
Patient is a 57y old  Female who presents with a chief complaint of L foot wound (13 Feb 2023 10:27)    Patient found to be tearful and in pain when i reached the bedside.  Type:2 DX >10 years. known complications: DFU.  Endocrine-none, PCP unknown name- new insurance plan. appt March 8th.  Rx home: basaglar and admelog but ran out of meds due to insurance issues. unable to obtain much history- due to pain level. diabetes education provided verbally and handouts- told patient will return tomorrow to see her when more comfortable.       HPI:  57y F PMH of Afib (on Xarelto), DM2, and CHF (unknown EF) presenting with left heel wound and bilateral lower extremity swelling and redness. Patient relates moderate amount of back pain as well and is unable to lift her lower legs. Patient states that she has had chronic foot pain which acutely worsened 1 week ago without evidence of precipitating factors or trauma. Patient reports that she has had limited ambulation and has been homebound due to her back pain as well as increased swelling. Patient utilizes walker and ambulates to restroom; she is unable to go up stairs. Patient noticed bleeding yesterday when her friend visited her and found blood on the floor as well as her left foot. Patient normally wears Crocs at home and denies any trauma to her feet/legs. Patient states that she has been unable to fully lay down as she experiences diffuse pain all across her body including her legs and back, and has had to sleep with her head down on a desk to get some rest.    Of note, patient also reports "excruciating pain" when attempting to eat. She reports undergoing an EGD/colonoscopy back in August-September 2022 which was normal. Endorses weakness, fatigue, SOB, palpitations, abdominal pain, chronic paresthesias down all extremities, a "thick and syrupy urine", and a yellow mucus that appears at the end of her bowel movements.    ED course  Vitals: T 97.2F, HR 91, /82, RR 18, SpO2 96% on RA   Significant labs: Lactate 3.6, glucose 373, Dig level 0.1, UA with 1000 glucose  B/l LE doppler: No evidence of deep venous thrombosis in either lower extremity.  Patient received Rocephin 1g x 1, Vancomycin 1g, Morphine 4mg IVP, Zofran 4mg IVP, 2L NS bolus     (11 Feb 2023 15:09)      PAST MEDICAL & SURGICAL HISTORY:  Atrial fibrillation      Type 2 diabetes mellitus      Congestive heart failure      Scalp psoriasis      S/P tonsillectomy      S/P appendectomy      H/O umbilical hernia repair          Allergies    No Known Allergies    Intolerances        MEDICATIONS  (STANDING):  ascorbic acid 500 milliGRAM(s) Oral two times a day  atorvastatin 40 milliGRAM(s) Oral at bedtime  dextrose 5%. 1000 milliLiter(s) (100 mL/Hr) IV Continuous <Continuous>  dextrose 5%. 1000 milliLiter(s) (50 mL/Hr) IV Continuous <Continuous>  dextrose 50% Injectable 25 Gram(s) IV Push once  dextrose 50% Injectable 12.5 Gram(s) IV Push once  dextrose 50% Injectable 25 Gram(s) IV Push once  furosemide    Tablet 40 milliGRAM(s) Oral daily  glucagon  Injectable 1 milliGRAM(s) IntraMuscular once  heparin  Infusion.  Unit(s)/Hr (20 mL/Hr) IV Continuous <Continuous>  insulin glargine Injectable (LANTUS) 20 Unit(s) SubCutaneous at bedtime  insulin lispro (ADMELOG) corrective regimen sliding scale   SubCutaneous three times a day before meals  insulin lispro (ADMELOG) corrective regimen sliding scale   SubCutaneous at bedtime  insulin lispro Injectable (ADMELOG) 4 Unit(s) SubCutaneous three times a day before meals  lidocaine   4% Patch 1 Patch Transdermal daily  metoprolol succinate  milliGRAM(s) Oral daily  multivitamin 1 Tablet(s) Oral daily  mupirocin 2% Nasal 1 Application(s) Both Nostrils two times a day  nystatin Powder 1 Application(s) Topical three times a day  oxyCODONE    IR 5 milliGRAM(s) Oral once  piperacillin/tazobactam IVPB.. 3.375 Gram(s) IV Intermittent every 8 hours  senna 2 Tablet(s) Oral at bedtime

## 2023-02-13 NOTE — CONSULT NOTE ADULT - ASSESSMENT
Physical Exam:   Vital Signs Last 24 Hrs  T(C): 37.1 (13 Feb 2023 11:50), Max: 37.5 (13 Feb 2023 05:40)  T(F): 98.8 (13 Feb 2023 11:50), Max: 99.5 (13 Feb 2023 05:40)  HR: 93 (13 Feb 2023 11:50) (93 - 106)  BP: 94/66 (13 Feb 2023 11:50) (94/66 - 126/70)  BP(mean): --  RR: 18 (13 Feb 2023 11:50) (18 - 18)  SpO2: 92% (13 Feb 2023 11:50) (92% - 92%)    Parameters below as of 13 Feb 2023 11:50  Patient On (Oxygen Delivery Method): room air             CAPILLARY BLOOD GLUCOSE      POCT Blood Glucose.: 289 mg/dL (13 Feb 2023 12:19)  POCT Blood Glucose.: 322 mg/dL (13 Feb 2023 11:13)  POCT Blood Glucose.: 300 mg/dL (13 Feb 2023 07:15)  POCT Blood Glucose.: 331 mg/dL (12 Feb 2023 21:14)  POCT Blood Glucose.: 338 mg/dL (12 Feb 2023 16:21)      Cholesterol, Serum: 113 mg/dL (05.19.21 @ 08:36)     HDL Cholesterol, Serum: 22 mg/dL (05.19.21 @ 08:36)     LDL Cholesterol Calculated: 66 mg/dL (05.19.21 @ 08:36)     DIET: CC  >50%        
57y F PMH of Afib (on Xarelto), DM2, and CHF (unknown EF) presenting with left heel wound and bilateral lower extremity swelling and redness    CHF, Afib   - p/w b/l LE swelling and redness, with c/o back pain   - during exam she is crying 2/2 to excruciating back pain and B/L LE pain   - per patient she used to follow a cardiologist but have not seen one in while since her insurance change, but from what she recalls all her cardiac workup are all normal    - Has baseline LE edema which is unchanged and per pt progressively worsening, appears cellulitic in nature   - c/o SOB during ambulation PTA  - on Lasix at home, would challenged with Lasix 40 mg IVP today   - CXR: no acute pathology   - please obtain TTE  - f/u LE US   - monitor strict I/O's    - EKG: Afib, non ischemic. No anginal complaints   - known hx of Afib on Xarelto now on hold  - would start hep gtt for now, for possible intervention, per vascular following   - continue BB with hold parameters  - abx per id, podiatry following     - c/o dysphagia, though states that she tolerates taking her home meds  - she had EGD/ Colonoscopy in 10/2022, all normal  - rec GI consult    - Monitor and replete Lytes. Keep K > 4 and Mg > 2  - Will continue to follow.    Maru Barth, MARIONCNP  Nurse Practitioner - Cardiology   Spectra #[id number]/ [phone]

## 2023-02-13 NOTE — CARE COORDINATION ASSESSMENT. - OTHER PERTINENT REFERRAL INFORMATION
Patient admitted for BL cellulitis. Patient lives alone will have a friend for pick-up upon DC. Ambulates with RW has a RW. May need HC will continue to monitor. Case management role explained and DC planning discussed.

## 2023-02-13 NOTE — CONSULT NOTE ADULT - PROBLEM SELECTOR RECOMMENDATION 9
Patient was seen and evaluated   Applied silver alginate and sterile gauze, abd pad and kerlex  to the left heel   Pending x- rays and will order MRI to r/o any osseous pathology vs osteomyelitis   Will request vascular consult   Recommend IV antibiotics per Infectious disease   Will plan further treatment pending imaging and vascular recommendations
lantus increased 20 units qhs  cont mod dose admelog corrective scale coverage qac/qhs  admelog increased 4 units 3x/day before meals  cont cons cho diet  goal bg 100-180 in hosp setting
Type 2 A1c 12.8% adm: DFU  Recommend endocrine-Perlman on consult  FU appt: TBA  DSC recommendations: return to home regimen and glucose monitoring  diabetes education provided  Diabetes support info and cell # [phone] given   Goal 100-180 mg/dL; 140-180 mg/dL in critical care areas

## 2023-02-13 NOTE — PROGRESS NOTE ADULT - SUBJECTIVE AND OBJECTIVE BOX
[facility] -- Corbin Abel,  Angela, Tico Simmons Savella, Goodger  Office # [phone]    Follow Up:  LE edema, SOB, afib     Subjective/Observations: seen and examined, crying with c/o b/l LE pain, denies chest pain, dyspnea, palpitations or dizziness, orthopnea and PND. Tolerating room air. hep gtt infusing     REVIEW OF SYSTEMS: All other review of systems is negative unless indicated above  PAST MEDICAL & SURGICAL HISTORY:  Atrial fibrillation      Type 2 diabetes mellitus      Congestive heart failure      Scalp psoriasis      S/P tonsillectomy      S/P appendectomy      H/O umbilical hernia repair        MEDICATIONS  (STANDING):  ascorbic acid 500 milliGRAM(s) Oral two times a day  atorvastatin 40 milliGRAM(s) Oral at bedtime  dextrose 5%. 1000 milliLiter(s) (100 mL/Hr) IV Continuous <Continuous>  dextrose 5%. 1000 milliLiter(s) (50 mL/Hr) IV Continuous <Continuous>  dextrose 50% Injectable 25 Gram(s) IV Push once  dextrose 50% Injectable 12.5 Gram(s) IV Push once  dextrose 50% Injectable 25 Gram(s) IV Push once  furosemide    Tablet 40 milliGRAM(s) Oral daily  glucagon  Injectable 1 milliGRAM(s) IntraMuscular once  heparin  Infusion.  Unit(s)/Hr (20 mL/Hr) IV Continuous <Continuous>  insulin glargine Injectable (LANTUS) 20 Unit(s) SubCutaneous at bedtime  insulin lispro (ADMELOG) corrective regimen sliding scale   SubCutaneous at bedtime  insulin lispro (ADMELOG) corrective regimen sliding scale   SubCutaneous three times a day before meals  insulin lispro Injectable (ADMELOG) 4 Unit(s) SubCutaneous three times a day before meals  lidocaine   4% Patch 1 Patch Transdermal daily  metoprolol succinate  milliGRAM(s) Oral daily  multivitamin 1 Tablet(s) Oral daily  nystatin Powder 1 Application(s) Topical three times a day  oxyCODONE    IR 5 milliGRAM(s) Oral once  piperacillin/tazobactam IVPB.. 3.375 Gram(s) IV Intermittent every 8 hours  senna 2 Tablet(s) Oral at bedtime    MEDICATIONS  (PRN):  acetaminophen     Tablet .. 650 milliGRAM(s) Oral every 6 hours PRN Temp greater or equal to 38C (100.4F), Mild Pain (1 - 3)  dextrose Oral Gel 15 Gram(s) Oral once PRN Blood Glucose LESS THAN 70 milliGRAM(s)/deciliter  heparin   Injectable 9000 Unit(s) IV Push every 6 hours PRN For aPTT less than 40  heparin   Injectable 4500 Unit(s) IV Push every 6 hours PRN For aPTT between 40 - 57  melatonin 3 milliGRAM(s) Oral at bedtime PRN Insomnia  oxyCODONE    IR 10 milliGRAM(s) Oral every 6 hours PRN Severe Pain (7 - 10)  oxyCODONE    IR 5 milliGRAM(s) Oral every 6 hours PRN Moderate Pain (4 - 6)  polyethylene glycol 3350 17 Gram(s) Oral daily PRN Constipation    Allergies    No Known Allergies    Intolerances      Vital Signs Last 24 Hrs  T(C): 37.5 (13 Feb 2023 05:40), Max: 37.5 (13 Feb 2023 05:40)  T(F): 99.5 (13 Feb 2023 05:40), Max: 99.5 (13 Feb 2023 05:40)  HR: 102 (13 Feb 2023 05:40) (96 - 106)  BP: 126/70 (13 Feb 2023 05:40) (117/74 - 130/91)  BP(mean): --  RR: 18 (13 Feb 2023 05:40) (18 - 18)  SpO2: 92% (13 Feb 2023 05:40) (92% - 94%)    Parameters below as of 13 Feb 2023 05:40  Patient On (Oxygen Delivery Method): room air      I&O's Summary    12 Feb 2023 07:01  -  13 Feb 2023 07:00  --------------------------------------------------------  IN: 556 mL / OUT: 500 mL / NET: 56 mL        TELE: Not on telemetry   PHYSICAL EXAM:  Constitutional: NAD, awake and alert , obese   HEENT: Moist Mucous Membranes, Anicteric  Pulmonary: Non-labored, breath sounds are clear bilaterally, No wheezing, rales or rhonchi  Cardiovascular: IRRR,  S1 and S2, No murmurs, rubs, gallops or clicks  Gastrointestinal: Bowel Sounds present, soft, nontender.   Lymph: +2 b/l  peripheral edema. No lymphadenopathy.  Skin: + erythema, b/l LE, warm   Psych:  Mood & affect appropriate  LABS: All Labs Reviewed:                        12.8   10.68 )-----------( 160      ( 13 Feb 2023 06:20 )             37.4                         12.5   11.07 )-----------( 167      ( 13 Feb 2023 00:04 )             37.9                         12.4   8.43  )-----------( 175      ( 12 Feb 2023 05:51 )             36.8     13 Feb 2023 06:20    137    |  101    |  14     ----------------------------<  316    3.6     |  29     |  0.65   12 Feb 2023 05:51    138    |  102    |  18     ----------------------------<  285    3.8     |  30     |  0.65   11 Feb 2023 18:40    139    |  101    |  16     ----------------------------<  299    3.6     |  30     |  0.78     Ca    8.6        13 Feb 2023 06:20  Ca    8.3        12 Feb 2023 05:51  Ca    8.6        11 Feb 2023 18:40  Mg     1.7       13 Feb 2023 06:20  Mg     1.6       12 Feb 2023 05:51  Mg     1.6       11 Feb 2023 18:40    TPro  7.7    /  Alb  3.6    /  TBili  0.6    /  DBili  x      /  AST  25     /  ALT  41     /  AlkPhos  75     11 Feb 2023 11:10    PT/INR - ( 11 Feb 2023 20:21 )   PT: 21.3 sec;   INR: 1.81 ratio         PTT - ( 13 Feb 2023 06:20 )  PTT:120.3 sec      12 Lead ECG:   Ventricular Rate 96 BPM    QRS Duration 78 ms    Q-T Interval 358 ms    QTC Calculation(Bazett) 452 ms    R Axis 30 degrees    T Axis -12 degrees    Diagnosis Line Atrial fibrillation  Abnormal ECG  No previous ECGs available  Confirmed by JAS PRIETO ([id number]) on 2/11/2023 2:13:40 PM (02-11-23 @ 11:23)

## 2023-02-13 NOTE — PROGRESS NOTE ADULT - SUBJECTIVE AND OBJECTIVE BOX
CHIEF COMPLAINT/INTERVAL HISTORY:  Pt. seen and evaluated for bilateral LE cellulitis and left diabetic foot ulcer.  Pt. reports having significant pain in bilateral LE.  Tolerating IV antibiotics.  Current pain medications only eases the pain a little.      REVIEW OF SYSTEMS:  No fever, CP, SOB, or abdominal pain    Vital Signs Last 24 Hrs  T(C): 37.5 (2023 05:40), Max: 37.6 (2023 09:52)  T(F): 99.5 (2023 05:40), Max: 99.7 (2023 09:52)  HR: 102 (2023 05:40) (96 - 106)  BP: 126/70 (2023 05:40) (117/74 - 130/91)  BP(mean): --  RR: 18 (2023 05:40) (18 - 18)  SpO2: 92% (2023 05:40) (92% - 94%)    Parameters below as of 2023 05:40  Patient On (Oxygen Delivery Method): room air        PHYSICAL EXAM:  GENERAL: NAD  HEENT: EOMI, hearing normal, conjunctiva and sclera clear  Chest: diminished BS at bases, no wheezing  CV: S1S2, RRR,   GI: soft, +BS, NT/ND  Musculoskeletal: erythema, warmth, and edema of bilateral LE, dressing over left foot  Psychiatric: affect nL, mood nL  Skin: warm and dry, erythema and warmth of bilateral LE    LABS:                        12.8   10.68 )-----------( 160      ( 2023 06:20 )             37.4     02-13    137  |  101  |  14  ----------------------------<  316<H>  3.6   |  29  |  0.65    Ca    8.6      2023 06:20  Mg     1.7     02-13    TPro  7.7  /  Alb  3.6  /  TBili  0.6  /  DBili  x   /  AST  25  /  ALT  41  /  AlkPhos  75  02-11    PT/INR - ( 2023 20:21 )   PT: 21.3 sec;   INR: 1.81 ratio         PTT - ( 2023 06:20 )  PTT:120.3 sec  Urinalysis Basic - ( 2023 11:24 )    Color: Pale Yellow / Appearance: Clear / S.010 / pH: x  Gluc: x / Ketone: Negative  / Bili: Negative / Urobili: Negative   Blood: x / Protein: Negative / Nitrite: Negative   Leuk Esterase: Negative / RBC: x / WBC x   Sq Epi: x / Non Sq Epi: x / Bacteria: x

## 2023-02-14 LAB
-  AMPICILLIN/SULBACTAM: SIGNIFICANT CHANGE UP
-  CEFAZOLIN: SIGNIFICANT CHANGE UP
-  CLINDAMYCIN: SIGNIFICANT CHANGE UP
-  DAPTOMYCIN: SIGNIFICANT CHANGE UP
-  ERYTHROMYCIN: SIGNIFICANT CHANGE UP
-  GENTAMICIN: SIGNIFICANT CHANGE UP
-  LINEZOLID: SIGNIFICANT CHANGE UP
-  OXACILLIN: SIGNIFICANT CHANGE UP
-  PENICILLIN: SIGNIFICANT CHANGE UP
-  RIFAMPIN: SIGNIFICANT CHANGE UP
-  TETRACYCLINE: SIGNIFICANT CHANGE UP
-  TRIMETHOPRIM/SULFAMETHOXAZOLE: SIGNIFICANT CHANGE UP
-  VANCOMYCIN: SIGNIFICANT CHANGE UP
ANION GAP SERPL CALC-SCNC: 6 MMOL/L — SIGNIFICANT CHANGE UP (ref 5–17)
APTT BLD: 96.3 SEC — HIGH (ref 27.5–35.5)
BASOPHILS # BLD AUTO: 0.03 K/UL — SIGNIFICANT CHANGE UP (ref 0–0.2)
BASOPHILS NFR BLD AUTO: 0.3 % — SIGNIFICANT CHANGE UP (ref 0–2)
BUN SERPL-MCNC: 11 MG/DL — SIGNIFICANT CHANGE UP (ref 7–23)
CALCIUM SERPL-MCNC: 8.5 MG/DL — SIGNIFICANT CHANGE UP (ref 8.5–10.1)
CHLORIDE SERPL-SCNC: 99 MMOL/L — SIGNIFICANT CHANGE UP (ref 96–108)
CO2 SERPL-SCNC: 32 MMOL/L — HIGH (ref 22–31)
CREAT SERPL-MCNC: 0.58 MG/DL — SIGNIFICANT CHANGE UP (ref 0.5–1.3)
CULTURE RESULTS: SIGNIFICANT CHANGE UP
EGFR: 105 ML/MIN/1.73M2 — SIGNIFICANT CHANGE UP
EOSINOPHIL # BLD AUTO: 0.11 K/UL — SIGNIFICANT CHANGE UP (ref 0–0.5)
EOSINOPHIL NFR BLD AUTO: 1.3 % — SIGNIFICANT CHANGE UP (ref 0–6)
GLUCOSE SERPL-MCNC: 288 MG/DL — HIGH (ref 70–99)
HCT VFR BLD CALC: 36.8 % — SIGNIFICANT CHANGE UP (ref 34.5–45)
HGB BLD-MCNC: 12.2 G/DL — SIGNIFICANT CHANGE UP (ref 11.5–15.5)
IMM GRANULOCYTES NFR BLD AUTO: 0.6 % — SIGNIFICANT CHANGE UP (ref 0–0.9)
LYMPHOCYTES # BLD AUTO: 1.78 K/UL — SIGNIFICANT CHANGE UP (ref 1–3.3)
LYMPHOCYTES # BLD AUTO: 20.4 % — SIGNIFICANT CHANGE UP (ref 13–44)
MAGNESIUM SERPL-MCNC: 2 MG/DL — SIGNIFICANT CHANGE UP (ref 1.6–2.6)
MCHC RBC-ENTMCNC: 30.4 PG — SIGNIFICANT CHANGE UP (ref 27–34)
MCHC RBC-ENTMCNC: 33.2 GM/DL — SIGNIFICANT CHANGE UP (ref 32–36)
MCV RBC AUTO: 91.8 FL — SIGNIFICANT CHANGE UP (ref 80–100)
METHOD TYPE: SIGNIFICANT CHANGE UP
MONOCYTES # BLD AUTO: 0.59 K/UL — SIGNIFICANT CHANGE UP (ref 0–0.9)
MONOCYTES NFR BLD AUTO: 6.8 % — SIGNIFICANT CHANGE UP (ref 2–14)
NEUTROPHILS # BLD AUTO: 6.15 K/UL — SIGNIFICANT CHANGE UP (ref 1.8–7.4)
NEUTROPHILS NFR BLD AUTO: 70.6 % — SIGNIFICANT CHANGE UP (ref 43–77)
NRBC # BLD: 0 /100 WBCS — SIGNIFICANT CHANGE UP (ref 0–0)
ORGANISM # SPEC MICROSCOPIC CNT: SIGNIFICANT CHANGE UP
ORGANISM # SPEC MICROSCOPIC CNT: SIGNIFICANT CHANGE UP
PLATELET # BLD AUTO: 178 K/UL — SIGNIFICANT CHANGE UP (ref 150–400)
POTASSIUM SERPL-MCNC: 3.7 MMOL/L — SIGNIFICANT CHANGE UP (ref 3.5–5.3)
POTASSIUM SERPL-SCNC: 3.7 MMOL/L — SIGNIFICANT CHANGE UP (ref 3.5–5.3)
RBC # BLD: 4.01 M/UL — SIGNIFICANT CHANGE UP (ref 3.8–5.2)
RBC # FLD: 13.2 % — SIGNIFICANT CHANGE UP (ref 10.3–14.5)
SODIUM SERPL-SCNC: 137 MMOL/L — SIGNIFICANT CHANGE UP (ref 135–145)
SPECIMEN SOURCE: SIGNIFICANT CHANGE UP
WBC # BLD: 8.71 K/UL — SIGNIFICANT CHANGE UP (ref 3.8–10.5)
WBC # FLD AUTO: 8.71 K/UL — SIGNIFICANT CHANGE UP (ref 3.8–10.5)

## 2023-02-14 PROCEDURE — 99232 SBSQ HOSP IP/OBS MODERATE 35: CPT

## 2023-02-14 PROCEDURE — 99233 SBSQ HOSP IP/OBS HIGH 50: CPT

## 2023-02-14 RX ORDER — MORPHINE SULFATE 50 MG/1
2 CAPSULE, EXTENDED RELEASE ORAL ONCE
Refills: 0 | Status: DISCONTINUED | OUTPATIENT
Start: 2023-02-14 | End: 2023-02-14

## 2023-02-14 RX ORDER — RIVAROXABAN 15 MG-20MG
20 KIT ORAL
Refills: 0 | Status: DISCONTINUED | OUTPATIENT
Start: 2023-02-14 | End: 2023-02-17

## 2023-02-14 RX ADMIN — MUPIROCIN 1 APPLICATION(S): 20 OINTMENT TOPICAL at 18:37

## 2023-02-14 RX ADMIN — SENNA PLUS 2 TABLET(S): 8.6 TABLET ORAL at 21:40

## 2023-02-14 RX ADMIN — Medication 1: at 21:51

## 2023-02-14 RX ADMIN — Medication 4 UNIT(S): at 08:04

## 2023-02-14 RX ADMIN — Medication 40 MILLIGRAM(S): at 05:31

## 2023-02-14 RX ADMIN — MORPHINE SULFATE 2 MILLIGRAM(S): 50 CAPSULE, EXTENDED RELEASE ORAL at 08:30

## 2023-02-14 RX ADMIN — HEPARIN SODIUM 2000 UNIT(S)/HR: 5000 INJECTION INTRAVENOUS; SUBCUTANEOUS at 07:15

## 2023-02-14 RX ADMIN — Medication 10: at 11:47

## 2023-02-14 RX ADMIN — MORPHINE SULFATE 2 MILLIGRAM(S): 50 CAPSULE, EXTENDED RELEASE ORAL at 08:45

## 2023-02-14 RX ADMIN — NYSTATIN CREAM 1 APPLICATION(S): 100000 CREAM TOPICAL at 13:26

## 2023-02-14 RX ADMIN — OXYCODONE HYDROCHLORIDE 10 MILLIGRAM(S): 5 TABLET ORAL at 17:30

## 2023-02-14 RX ADMIN — Medication 100 MILLIGRAM(S): at 05:30

## 2023-02-14 RX ADMIN — Medication 8: at 08:05

## 2023-02-14 RX ADMIN — Medication 4 UNIT(S): at 11:47

## 2023-02-14 RX ADMIN — INSULIN GLARGINE 20 UNIT(S): 100 INJECTION, SOLUTION SUBCUTANEOUS at 21:51

## 2023-02-14 RX ADMIN — LINEZOLID 300 MILLIGRAM(S): 600 INJECTION, SOLUTION INTRAVENOUS at 18:37

## 2023-02-14 RX ADMIN — MUPIROCIN 1 APPLICATION(S): 20 OINTMENT TOPICAL at 05:31

## 2023-02-14 RX ADMIN — Medication 500 MILLIGRAM(S): at 18:37

## 2023-02-14 RX ADMIN — Medication 500 MILLIGRAM(S): at 05:31

## 2023-02-14 RX ADMIN — NYSTATIN CREAM 1 APPLICATION(S): 100000 CREAM TOPICAL at 21:45

## 2023-02-14 RX ADMIN — Medication 1 TABLET(S): at 11:48

## 2023-02-14 RX ADMIN — HEPARIN SODIUM 2000 UNIT(S)/HR: 5000 INJECTION INTRAVENOUS; SUBCUTANEOUS at 07:13

## 2023-02-14 RX ADMIN — OXYCODONE HYDROCHLORIDE 10 MILLIGRAM(S): 5 TABLET ORAL at 05:45

## 2023-02-14 RX ADMIN — Medication 3 MILLIGRAM(S): at 21:58

## 2023-02-14 RX ADMIN — OXYCODONE HYDROCHLORIDE 10 MILLIGRAM(S): 5 TABLET ORAL at 06:45

## 2023-02-14 RX ADMIN — ATORVASTATIN CALCIUM 40 MILLIGRAM(S): 80 TABLET, FILM COATED ORAL at 21:41

## 2023-02-14 RX ADMIN — NYSTATIN CREAM 1 APPLICATION(S): 100000 CREAM TOPICAL at 05:31

## 2023-02-14 RX ADMIN — HEPARIN SODIUM 2000 UNIT(S)/HR: 5000 INJECTION INTRAVENOUS; SUBCUTANEOUS at 07:10

## 2023-02-14 RX ADMIN — OXYCODONE HYDROCHLORIDE 10 MILLIGRAM(S): 5 TABLET ORAL at 16:30

## 2023-02-14 RX ADMIN — RIVAROXABAN 20 MILLIGRAM(S): KIT at 18:37

## 2023-02-14 RX ADMIN — Medication 6: at 17:00

## 2023-02-14 RX ADMIN — Medication 4 UNIT(S): at 16:59

## 2023-02-14 RX ADMIN — LINEZOLID 300 MILLIGRAM(S): 600 INJECTION, SOLUTION INTRAVENOUS at 05:46

## 2023-02-14 NOTE — PROGRESS NOTE ADULT - SUBJECTIVE AND OBJECTIVE BOX
MELITON LOZANO is a 57yFemale , patient examined and chart reviewed.       INTERVAL HPI/ OVERNIGHT EVENTS:   Afebrile.   No events.    PAST MEDICAL & SURGICAL HISTORY:  Atrial fibrillation  Type 2 diabetes mellitus  Congestive heart failure  Scalp psoriasis  S/P tonsillectomy  S/P appendectomy  H/O umbilical hernia repair        For details regarding the patient's social history, family history, and other miscellaneous elements, please refer the initial infectious diseases consultation and/or the admitting history and physical examination for this admission.    ROS:  CONSTITUTIONAL:  Negative fever or chills,  EYES:  Negative  blurry vision or double vision  CARDIOVASCULAR:  Negative for chest pain or palpitations  RESPIRATORY:  Negative for cough, wheezing, or SOB   GASTROINTESTINAL:  Negative for nausea, vomiting, diarrhea, constipation, or abdominal pain  GENITOURINARY:  Negative frequency, urgency or dysuria  NEUROLOGIC:  No headache, confusion, dizziness, lightheadedness  All other systems were reviewed and are negative         Current inpatient medications :    ANTIBIOTICS/RELEVANT:  linezolid  IVPB 600 milliGRAM(s) IV Intermittent every 12 hours    MEDICATIONS  (STANDING):  ascorbic acid 500 milliGRAM(s) Oral two times a day  atorvastatin 40 milliGRAM(s) Oral at bedtime  dextrose 5%. 1000 milliLiter(s) (50 mL/Hr) IV Continuous <Continuous>  dextrose 5%. 1000 milliLiter(s) (100 mL/Hr) IV Continuous <Continuous>  dextrose 50% Injectable 25 Gram(s) IV Push once  dextrose 50% Injectable 12.5 Gram(s) IV Push once  dextrose 50% Injectable 25 Gram(s) IV Push once  furosemide    Tablet 40 milliGRAM(s) Oral daily  glucagon  Injectable 1 milliGRAM(s) IntraMuscular once  insulin glargine Injectable (LANTUS) 20 Unit(s) SubCutaneous at bedtime  insulin lispro (ADMELOG) corrective regimen sliding scale   SubCutaneous at bedtime  insulin lispro (ADMELOG) corrective regimen sliding scale   SubCutaneous three times a day before meals  insulin lispro Injectable (ADMELOG) 4 Unit(s) SubCutaneous three times a day before meals  lidocaine   4% Patch 1 Patch Transdermal daily  metoprolol succinate  milliGRAM(s) Oral daily  multivitamin 1 Tablet(s) Oral daily  mupirocin 2% Nasal 1 Application(s) Both Nostrils two times a day  nystatin Powder 1 Application(s) Topical three times a day  oxyCODONE    IR 5 milliGRAM(s) Oral once  rivaroxaban 20 milliGRAM(s) Oral with dinner  senna 2 Tablet(s) Oral at bedtime    MEDICATIONS  (PRN):  acetaminophen     Tablet .. 650 milliGRAM(s) Oral every 6 hours PRN Temp greater or equal to 38C (100.4F), Mild Pain (1 - 3)  dextrose Oral Gel 15 Gram(s) Oral once PRN Blood Glucose LESS THAN 70 milliGRAM(s)/deciliter  melatonin 3 milliGRAM(s) Oral at bedtime PRN Insomnia  oxyCODONE    IR 5 milliGRAM(s) Oral every 6 hours PRN Moderate Pain (4 - 6)  oxyCODONE    IR 10 milliGRAM(s) Oral every 6 hours PRN Severe Pain (7 - 10)  polyethylene glycol 3350 17 Gram(s) Oral daily PRN Constipation        Objective:  Vital Signs Last 24 Hrs  T(C): 36.6 (2023 12:07), Max: 36.6 (2023 20:04)  T(F): 97.9 (2023 12:07), Max: 97.9 (2023 20:04)  HR: 85 (2023 12:07) (85 - 89)  BP: 110/74 (2023 12:07) (110/74 - 113/74)  RR: 18 (2023 12:07) (18 - 18)  SpO2: 94% (2023 12:07) (94% - 94%)    Parameters below as of 2023 12:07  Patient On (Oxygen Delivery Method): room air      Physical Exam:  General: c/o pain  Neck: supple, trachea midline  Lungs: clear, no wheeze/rhonchi  Cardiovascular: regular rate and rhythm, S1 S2  Abdomen: soft, nontender,  bowel sounds normal  Neurological: alert and oriented x3  Skin: no rash  Extremities: Left heel ulcer with foot swelling and redness  improving   Justin LE chronic stasis with venous insufficiency      LABS:                        12.2   8.71  )-----------( 178      ( 2023 06:02 )             36.8   02-14    137  |  99  |  11  ----------------------------<  288<H>  3.7   |  32<H>  |  0.58    Ca    8.5      2023 06:02  Mg     2.0     14        Urinalysis Basic - ( 2023 11:24 )    Color: Pale Yellow / Appearance: Clear / S.010 / pH: x  Gluc: x / Ketone: Negative  / Bili: Negative / Urobili: Negative   Blood: x / Protein: Negative / Nitrite: Negative   Leuk Esterase: Negative / RBC: x / WBC x   Sq Epi: x / Non Sq Epi: x / Bacteria: x      MICROBIOLOGY:    Culture - Other (23 @ 17:00)    -  Ampicillin/Sulbactam: R <=8/4    -  Cefazolin: R <=4    -  Clindamycin: S <=0.25    -  Daptomycin: S 0.5    -  Erythromycin: S <=0.25    -  Gentamicin: S <=1 Should not be used as monotherapy    -  Linezolid: S 2    -  Oxacillin: R >2    -  Penicillin: R >8    -  Rifampin: S <=1 Should not be used as monotherapy    -  Tetracycline: S <=1    -  Trimethoprim/Sulfamethoxazole: S <=0.5/9.5    -  Vancomycin: S 2    Specimen Source: Wound Wound    Culture Results:   Numerous Methicillin Resistant Staphylococcus aureus    Organism Identification: Methicillin resistant Staphylococcus aureus    Organism: Methicillin resistant Staphylococcus aureus    Method Type: SASHA      Culture - Blood (collected 2023 11:25)  Source: .Blood Blood-Peripheral  Preliminary Report (2023 17:01):    No growth to date.    Culture - Urine (collected 2023 11:24)  Source: Clean Catch Clean Catch (Midstream)  Final Report (2023 14:10):    <10,000 CFU/mL Normal Urogenital Angelique    Culture - Blood (collected 2023 11:10)  Source: .Blood Blood-Peripheral  Preliminary Report (2023 17:01):    No growth to date.    RADIOLOGY & ADDITIONAL STUDIES:  ACC: [id number] EXAM:  US DPLX LWR EXT VEINS COMPL BI   ORDERED BY: PETER ANGEL     PROCEDURE DATE:  2023          INTERPRETATION:  CLINICAL INFORMATION: Body aches. Duplex swelling. Rule   out DVT.    COMPARISON: None available.    TECHNIQUE: Duplex sonography of the BILATERAL LOWER extremity veins with   color and spectral Doppler, with and without compression.    FINDINGS:    RIGHT:  Normal compressibility of the RIGHT common femoral, femoral and popliteal   veins.  Doppler examination shows normal spontaneous and phasic flow.  No RIGHT calf vein thrombosis is detected.    LEFT:  Normal compressibility of the LEFT common femoral, femoral and popliteal   veins.  Doppler examination shows normal spontaneous and phasic flow.  No LEFTcalf vein thrombosis is detected.    Subcutaneous edema bilaterally.    IMPRESSION:  No evidence of deep venous thrombosis in either lower extremity.      ACC: [id number] EXAM:  MR FOOT LT   ORDERED BY: ALVINO DIAMOND     PROCEDURE DATE:  2023          INTERPRETATION:  LEFT FOOT MRI    CLINICAL INFORMATION: Diabetic patient with left heel wound. Evaluate for   osteomyelitis.  TECHNIQUE: Multiplanar, multisequence MRI was obtained of the LEFT foot   focusing on the hindfoot without the use of intravenous or   intra-articular contrast.  COMPARISON: Bilateral foot radiographs 2023.    FINDINGS:    PERIPHERAL SOFT TISSUES: The patient's reported site of ulceration is not   well identified on MRI. There is diffuse nonspecific edema in the   subcutaneous fat. No focal fluid collection to suggest abscess.  BONE MARROW: No STIR signal or loss of T1 hyperintense signal to suggest   acute osteomyelitis. No fracture or osteonecrosis.    MUSCLES AND TENDONS: Tendons are intact. Mild nonspecific edema of the   intrinsic musculature of the foot.  LIGAMENTS AND CAPSULAR STRUCTURES: Partial-thickness tearing at the   attachment of the plantarfascia on the calcaneus plantar calcaneal heel   spur. Attenuated appearance of the ATFL consistent with chronic sprain.   Ankle ligaments are otherwise intact.  CARTILAGE AND SUBCHONDRAL BONE: No full-thickness chondral loss.  SYNOVIUM/JOINT FLUID: No large joint effusion.      IMPRESSION:  1.  No MR evidence of acute osteomyelitis.  2.  Plantar fasciitis with partial-thickness tearing at the attachment of   the plantar fascia on the calcaneus.        Assessment :   57y F PMH of Afib (on Xarelto), DM2, and CHF (unknown EF) Obesity admitted with unstagable infected painful left heel decub ulcer with foot/leg cellulitis,  MRI neg for OM  Wound culture with Numerous MRSA    Plan :   Cont Zyvox  Trend temps and cbc  Podiatry on case  Pain control  Pulm toileting  Increase activity    Continue with present regiment.  Appropriate use of antibiotics and adverse effects reviewed.    > 35 minutes were spent in direct patient care reviewing notes, medications ,labs data/ imaging , discussion with multidisciplinary team.    Thank you for allowing me to participate in care of your patient .    Carole Mohr MD  Infectious Disease  [phone]

## 2023-02-14 NOTE — PROGRESS NOTE ADULT - SUBJECTIVE AND OBJECTIVE BOX
CHIEF COMPLAINT/INTERVAL HISTORY:  Pt. seen and evaluated for bilateral LE cellulitis and left diabetic foot ulcer.  Pt. reports having pain in bilateral LE and groin.  Tolerating IV antibiotics.      REVIEW OF SYSTEMS:  No fever, CP, SOB, or abdominal pain    Vital Signs Last 24 Hrs  T(C): 36.6 (14 Feb 2023 05:27), Max: 37.1 (13 Feb 2023 11:50)  T(F): 97.8 (14 Feb 2023 05:27), Max: 98.8 (13 Feb 2023 11:50)  HR: 89 (14 Feb 2023 05:27) (86 - 93)  BP: 113/74 (14 Feb 2023 05:27) (94/66 - 113/74)  BP(mean): --  RR: 18 (14 Feb 2023 05:27) (18 - 18)  SpO2: 94% (14 Feb 2023 05:27) (92% - 94%)    Parameters below as of 14 Feb 2023 05:27  Patient On (Oxygen Delivery Method): room air        PHYSICAL EXAM:  GENERAL: NAD  HEENT: EOMI, hearing normal, conjunctiva and sclera clear  Chest: diminished BS at bases, no wheezing  CV: S1S2, RRR,   GI: soft, +BS, NT/ND  Musculoskeletal: decreasing erythema and warmth of bilateral LE.  Clean dressing over left foot.   Psychiatric: affect nL, mood nL  Skin: warm and dry    LABS:                        12.2   8.71  )-----------( 178      ( 14 Feb 2023 06:02 )             36.8     02-14    137  |  99  |  11  ----------------------------<  288<H>  3.7   |  32<H>  |  0.58    Ca    8.5      14 Feb 2023 06:02  Mg     2.0     02-14      PTT - ( 14 Feb 2023 06:02 )  PTT:96.3 sec

## 2023-02-14 NOTE — PROGRESS NOTE ADULT - SUBJECTIVE AND OBJECTIVE BOX
NOTE IN PROGRESS SUBJETIVE: Patient seen for follow-up for left heel wound and bilateral lower extremity swelling and redness.  Patient continues to complain of being unable to lift her legs at this time. Denies any fever, chills, nausea, vomiting, chest pain, shortness of breath, or calf pain at this time.    Allergies    No Known Allergies    Intolerances        MEDICATIONS  (STANDING):  ascorbic acid 500 milliGRAM(s) Oral two times a day  atorvastatin 40 milliGRAM(s) Oral at bedtime  dextrose 5%. 1000 milliLiter(s) (50 mL/Hr) IV Continuous <Continuous>  dextrose 5%. 1000 milliLiter(s) (100 mL/Hr) IV Continuous <Continuous>  dextrose 50% Injectable 25 Gram(s) IV Push once  dextrose 50% Injectable 12.5 Gram(s) IV Push once  dextrose 50% Injectable 25 Gram(s) IV Push once  furosemide    Tablet 40 milliGRAM(s) Oral daily  glucagon  Injectable 1 milliGRAM(s) IntraMuscular once  insulin glargine Injectable (LANTUS) 20 Unit(s) SubCutaneous at bedtime  insulin lispro (ADMELOG) corrective regimen sliding scale   SubCutaneous three times a day before meals  insulin lispro (ADMELOG) corrective regimen sliding scale   SubCutaneous at bedtime  insulin lispro Injectable (ADMELOG) 4 Unit(s) SubCutaneous three times a day before meals  lidocaine   4% Patch 1 Patch Transdermal daily  linezolid  IVPB 600 milliGRAM(s) IV Intermittent every 12 hours  linezolid  IVPB      metoprolol succinate  milliGRAM(s) Oral daily  multivitamin 1 Tablet(s) Oral daily  mupirocin 2% Nasal 1 Application(s) Both Nostrils two times a day  nystatin Powder 1 Application(s) Topical three times a day  oxyCODONE    IR 5 milliGRAM(s) Oral once  rivaroxaban 20 milliGRAM(s) Oral with dinner  senna 2 Tablet(s) Oral at bedtime    MEDICATIONS  (PRN):  acetaminophen     Tablet .. 650 milliGRAM(s) Oral every 6 hours PRN Temp greater or equal to 38C (100.4F), Mild Pain (1 - 3)  dextrose Oral Gel 15 Gram(s) Oral once PRN Blood Glucose LESS THAN 70 milliGRAM(s)/deciliter  melatonin 3 milliGRAM(s) Oral at bedtime PRN Insomnia  oxyCODONE    IR 5 milliGRAM(s) Oral every 6 hours PRN Moderate Pain (4 - 6)  oxyCODONE    IR 10 milliGRAM(s) Oral every 6 hours PRN Severe Pain (7 - 10)  polyethylene glycol 3350 17 Gram(s) Oral daily PRN Constipation      Vital Signs Last 24 Hrs  T(C): 36.7 (14 Feb 2023 20:36), Max: 36.7 (14 Feb 2023 20:36)  T(F): 98.1 (14 Feb 2023 20:36), Max: 98.1 (14 Feb 2023 20:36)  HR: 93 (14 Feb 2023 20:36) (85 - 93)  BP: 116/77 (14 Feb 2023 20:36) (110/74 - 116/77)  BP(mean): --  RR: 18 (14 Feb 2023 20:36) (18 - 18)  SpO2: 96% (14 Feb 2023 20:36) (94% - 96%)    Parameters below as of 14 Feb 2023 20:36  Patient On (Oxygen Delivery Method): room air        PHYSICAL EXAM:  Vascular: DP & PT non palpable bilaterally, Capillary refill delayed to the digits , Digital hair absent bilaterally. BLE non pitting edema with erythema extending from the foot to the lower legs about 4cm distal to the tibial tuberosity   Neurological: Light touch sensation diminished to the legs bilaterally  Musculoskeletal: 5/5 strength in all quadrants bilaterally, AJ & STJ ROM intact  Dermatological: 3.0cmx 4.0cm bulla noticed to the left heel with no probe to bone, (+)periwound erythema, no fluctuance, no malodor, no proximal streaking at this time. Tender to touch left heel                         12.2   8.71  )-----------( 178      ( 14 Feb 2023 06:02 )             36.8       02-14    137  |  99  |  11  ----------------------------<  288<H>  3.7   |  32<H>  |  0.58    Ca    8.5      14 Feb 2023 06:02  Mg     2.0     02-14        PTT - ( 14 Feb 2023 06:02 )  PTT:96.3 sec      Culture - Other (collected 12 Feb 2023 17:00)  Source: Wound Wound  Final Report (14 Feb 2023 15:00):    Numerous Methicillin Resistant Staphylococcus aureus  Organism: Methicillin resistant Staphylococcus aureus (14 Feb 2023 15:00)  Organism: Methicillin resistant Staphylococcus aureus (14 Feb 2023 15:00)        Imaging:   LEFT FOOT MRI    CLINICAL INFORMATION: Diabetic patient with left heel wound. Evaluate for   osteomyelitis.  TECHNIQUE: Multiplanar, multisequence MRI was obtained of the LEFT foot   focusing on the hindfoot without the use of intravenous or   intra-articular contrast.  COMPARISON: Bilateral foot radiographs 2/11/2023.    FINDINGS:    PERIPHERAL SOFT TISSUES: The patient's reported site of ulceration is not   well identified on MRI. There is diffuse nonspecific edema in the   subcutaneous fat. No focal fluid collection to suggest abscess.  BONE MARROW: No STIR signal or loss of T1 hyperintense signal to suggest   acute osteomyelitis. No fracture or osteonecrosis.    MUSCLES AND TENDONS: Tendons are intact. Mild nonspecific edema of the   intrinsic musculature of the foot.  LIGAMENTS AND CAPSULAR STRUCTURES: Partial-thickness tearing at the   attachment of the plantar fascia on the calcaneus plantar calcaneal heel   spur. Attenuated appearance of the ATFL consistent with chronic sprain.   Ankle ligaments are otherwise intact.  CARTILAGE AND SUBCHONDRAL BONE: No full-thickness chondral loss.  SYNOVIUM/JOINT FLUID: No large joint effusion.      IMPRESSION:  1.  No MR evidence of acute osteomyelitis.  2.  Plantar fasciitis with partial-thickness tearing at the attachment of   the plantar fascia on the calcaneus.

## 2023-02-14 NOTE — PROGRESS NOTE ADULT - ASSESSMENT
57y F PMH of Afib (on Xarelto), DM2, and CHF (unknown EF) presenting with left heel wound and bilateral lower extremity swelling and redness    CHF, Afib   - p/w b/l LE swelling and redness, with compliant of generalized body pain  -  per patient she used to follow a cardiologist but have not seen one in while since her insurance change, but from what she recalls all her cardiac workup are all normal    - Has baseline LE edema which is unchanged and per pt progressively worsening, appears cellulitic in nature   - continue home dose Lasix   - CXR: no acute pathology   - please obtain TTE  - LE US neg for DVT    - monitor strict I/O's    - EKG: Afib, non ischemic. No anginal complaints   - known hx of Afib on Xarelto now on hold  - continue hep gtt for now, for possible intervention  - continue BB with hold parameters    - Monitor and replete lytes, keep K>4, Mg>2.  - Will continue to follow.    Vidal Zepeda MS, ANP, [facility]  Nurse Practitioner- Cardiology   Spectra #[phone]  - abx per id, podiatry following     - c/o dysphagia, though states that she tolerates taking her home meds  - she had EGD/ Colonoscopy in 10/2022, all normal  - rec GI consult      - Monitor and replete lytes, keep K>4, Mg>2.  - Will continue to follow.    Vidal Zepeda MS, ANP, [facility]  Nurse Practitioner- Cardiology   Spectra #[phone]

## 2023-02-14 NOTE — PROGRESS NOTE ADULT - SUBJECTIVE AND OBJECTIVE BOX
[facility] -- Angela Alaniz Pannella, Patel, Savella, Goodger: Office # [phone]    Follow Up:  LE edema, SOB, afib     Subjective/Observations: seen and examined, crying with c/o b/l LE pain and chronic  generalized body pain, denies chest pain, dyspnea, palpitations or dizziness, orthopnea and PND. Tolerating room air. hep gtt infusing     REVIEW OF SYSTEMS: All other review of systems are negative unless indicated above    PAST MEDICAL & SURGICAL HISTORY:  Atrial fibrillation      Type 2 diabetes mellitus      Congestive heart failure      Scalp psoriasis      S/P tonsillectomy      S/P appendectomy      H/O umbilical hernia repair          MEDICATIONS  (STANDING):  ascorbic acid 500 milliGRAM(s) Oral two times a day  atorvastatin 40 milliGRAM(s) Oral at bedtime  dextrose 5%. 1000 milliLiter(s) (50 mL/Hr) IV Continuous <Continuous>  dextrose 5%. 1000 milliLiter(s) (100 mL/Hr) IV Continuous <Continuous>  dextrose 50% Injectable 25 Gram(s) IV Push once  dextrose 50% Injectable 12.5 Gram(s) IV Push once  dextrose 50% Injectable 25 Gram(s) IV Push once  furosemide    Tablet 40 milliGRAM(s) Oral daily  glucagon  Injectable 1 milliGRAM(s) IntraMuscular once  heparin  Infusion.  Unit(s)/Hr (20 mL/Hr) IV Continuous <Continuous>  insulin glargine Injectable (LANTUS) 20 Unit(s) SubCutaneous at bedtime  insulin lispro (ADMELOG) corrective regimen sliding scale   SubCutaneous three times a day before meals  insulin lispro (ADMELOG) corrective regimen sliding scale   SubCutaneous at bedtime  insulin lispro Injectable (ADMELOG) 4 Unit(s) SubCutaneous three times a day before meals  lidocaine   4% Patch 1 Patch Transdermal daily  linezolid  IVPB      linezolid  IVPB 600 milliGRAM(s) IV Intermittent every 12 hours  metoprolol succinate  milliGRAM(s) Oral daily  multivitamin 1 Tablet(s) Oral daily  mupirocin 2% Nasal 1 Application(s) Both Nostrils two times a day  nystatin Powder 1 Application(s) Topical three times a day  oxyCODONE    IR 5 milliGRAM(s) Oral once  senna 2 Tablet(s) Oral at bedtime    MEDICATIONS  (PRN):  acetaminophen     Tablet .. 650 milliGRAM(s) Oral every 6 hours PRN Temp greater or equal to 38C (100.4F), Mild Pain (1 - 3)  dextrose Oral Gel 15 Gram(s) Oral once PRN Blood Glucose LESS THAN 70 milliGRAM(s)/deciliter  heparin   Injectable 9000 Unit(s) IV Push every 6 hours PRN For aPTT less than 40  heparin   Injectable 4500 Unit(s) IV Push every 6 hours PRN For aPTT between 40 - 57  melatonin 3 milliGRAM(s) Oral at bedtime PRN Insomnia  oxyCODONE    IR 5 milliGRAM(s) Oral every 6 hours PRN Moderate Pain (4 - 6)  oxyCODONE    IR 10 milliGRAM(s) Oral every 6 hours PRN Severe Pain (7 - 10)  polyethylene glycol 3350 17 Gram(s) Oral daily PRN Constipation    Allergies    No Known Allergies    Intolerances      Vital Signs Last 24 Hrs  T(C): 36.6 (14 Feb 2023 05:27), Max: 37.1 (13 Feb 2023 11:50)  T(F): 97.8 (14 Feb 2023 05:27), Max: 98.8 (13 Feb 2023 11:50)  HR: 89 (14 Feb 2023 05:27) (86 - 93)  BP: 113/74 (14 Feb 2023 05:27) (94/66 - 113/74)  BP(mean): --  RR: 18 (14 Feb 2023 05:27) (18 - 18)  SpO2: 94% (14 Feb 2023 05:27) (92% - 94%)    Parameters below as of 14 Feb 2023 05:27  Patient On (Oxygen Delivery Method): room air      I&O's Summary    13 Feb 2023 07:01  -  14 Feb 2023 07:00  --------------------------------------------------------  IN: 0 mL / OUT: 700 mL / NET: -700 mL          TELE:   PHYSICAL EXAM:  Constitutional: NAD, awake and alert, appear in pain   HEENT: Moist Mucous Membranes, Anicteric  Pulmonary: Non-labored, breath sounds are clear bilaterally, No wheezing, rales or rhonchi  Cardiovascular: Regular, S1 and S2, No murmurs, No rubs, gallops or clicks  Gastrointestinal:  soft, nontender, nondistended   Lymph: non pitting  peripheral edema on leg leg. No lymphadenopathy.   Skin: + Leg ankle dsg intact with erythema   Psych:  Mood & affect appropriate      LABS: All Labs Reviewed:                        12.2   8.71  )-----------( 178      ( 14 Feb 2023 06:02 )             36.8                         12.8   10.68 )-----------( 160      ( 13 Feb 2023 06:20 )             37.4                         12.5   11.07 )-----------( 167      ( 13 Feb 2023 00:04 )             37.9     14 Feb 2023 06:02    137    |  99     |  11     ----------------------------<  288    3.7     |  32     |  0.58   13 Feb 2023 06:20    137    |  101    |  14     ----------------------------<  316    3.6     |  29     |  0.65   12 Feb 2023 05:51    138    |  102    |  18     ----------------------------<  285    3.8     |  30     |  0.65     Ca    8.5        14 Feb 2023 06:02  Ca    8.6        13 Feb 2023 06:20  Ca    8.3        12 Feb 2023 05:51  Mg     2.0       14 Feb 2023 06:02  Mg     1.7       13 Feb 2023 06:20  Mg     1.6       12 Feb 2023 05:51    TPro  7.7    /  Alb  3.6    /  TBili  0.6    /  DBili  x      /  AST  25     /  ALT  41     /  AlkPhos  75     11 Feb 2023 11:10     PTT - ( 14 Feb 2023 06:02 )  PTT:96.3 secCholesterol, Serum: 176 mg/dL (02-12-23 @ 05:51)  HDL Cholesterol, Serum: 36 mg/dL (02-12-23 @ 05:51)  Triglycerides, Serum: 208 mg/dL (02-12-23 @ 05:51)  Lactate, Blood: 1.4 mmol/L (02-13-23 @ 01:45)  Lactate, Blood: 2.2 mmol/L (02-12-23 @ 22:40)  Lactate, Blood: 2.5 mmol/L (02-12-23 @ 19:13)    12 Lead ECG:   Ventricular Rate 96 BPM    QRS Duration 78 ms    Q-T Interval 358 ms    QTC Calculation(Bazett) 452 ms    R Axis 30 degrees    T Axis -12 degrees    Diagnosis Line Atrial fibrillation  Abnormal ECG  No previous ECGs available  Confirmed by JAS PRIETO ([id number]) on 2/11/2023 2:13:40 PM (02-11-23 @ 11:23)

## 2023-02-14 NOTE — PROGRESS NOTE ADULT - ASSESSMENT
57y F PMH of Afib, DM2, and CHF (unknown EF) presenting with left heel wound and bilateral lower extremity swelling and redness admitted for Bilateral LE cellulitis with diabetic foot ulcer.      Problem/Plan - 1:  ·  Problem: Bilateral lower leg cellulitis.   ·  Plan: Patient presenting with b/l LE edema and erythema. VS stable and no leukocytosis. S/p Vancomycin, Rocephin and 2L NS in ED  - Lactate of 3.6 on admission, repeat lactate 2.9--->trended to normal   - B/l LE doppler negative for DVT   - F/u x- ray of bilateral feet No radiographic evidence of osteomyelitis within either foot.  Soft tissue swelling within both feet. No evidence of acute fracture.  - MRI foot: 1.  No MR evidence of acute osteomyelitis.  2.  Plantar fasciitis with partial-thickness tearing at the attachment of   the plantar fascia on the calcaneus.  - CTA abd aorta with run off No significant arterial stenosis up to the popliteal arteries. Mild trifurcation artery disease as described above, otherwise with three-vessel contrast opacification to the ankles. No vascular surgery intervention required.   - Continue IV Zyvox  - Pain regimen: Tylenol (mild), Oxycodone 5mg q6h PRN (moderate), Oxycodone 10mg PO Q6h PRN (severe)  - blood cx NGTD, urine negative, wound cx +staph aureus  - Podiatry, Dr. Galarza following   - ID, Dr. Mohr consulted, f/u recs  - Vascular, Dr. Perez consulted, f/u recs.     Problem/Plan - 2:  ·  Problem: Diabetic ulcer of left foot.   ·  Plan: - MRI foot:  1.  No MR evidence of acute osteomyelitis.  2.  Plantar fasciitis with partial-thickness tearing at the attachment of   the plantar fascia on the calcaneus.  - Continue Zyvox  - Podiatry and vascular f/u - Plan as above.     Problem/Plan - 3:  ·  Problem: Back pain.   ·  Plan: Chronic   - Xray of lumbar spine No evidence of fracture  - continue Lidocaine patch for lumbar spine, apply to lower back  - Pain regimen: Tylenol and oxycodone PRN  - Pt. reports having difficulty moving her legs for multiple years.       Problem/Plan - 4:  ·  Problem: Abdominal pain.   ·  Plan: Patient reports diffuse severe pain with meals  - Patient reports EGD/colonoscopy in August/September 2022: Normal  - CT Angio abd/pelvis with IV cont No significant arterial stenosis up to the popliteal arteries. Mild trifurcation artery disease as described above, otherwise with three-vessel contrast opacification to the ankles.   - GI, Dr. Marcelo and vascular consulted, f/u recs       Problem/Plan - 5:  ·  Problem: DM (diabetes mellitus).   ·  Plan: Insulin dependent DM2, blood glucose on admission 373  - Home regimen include: metformin 1000mg BID, basaglar 25U qhs, admelog 15u TID  - Will hold metformin while inpatient    - Increased Lantus to 20U qhs  - Start Admelog 4U premeal   - Start moderate dose insulin corrective scale  - Hypoglycemia protocol, fingerstick glucose qAc and qHs  - Consistent Carbohydrate diet  - HbA1c 12.8  - Endocrine f/u       Problem/Plan - 6:  ·  Problem: Afib.   ·  Plan: Patient with Hx of afib  - EKG with afib at 96 bpm   - On Xarelto for AC at home.  Currently on heparin gtt.    - On Metoprolol succinate for rate control, continue with hold parameters   - Took digoxin in the past. She does not take digoxin anymore.     Problem/Plan - 7:  ·  Problem: CHF, chronic.   ·  Plan: Unknown EF, with chronic LE swelling   - Continue home Lasix 40mg QD  - f/u TTE  - Strict I&Os, daily weights.     Problem/Plan - 8:  ·  Problem: Severe obesity (BMI >= 40).   ·  Plan: Patient with BMI of 46  - LDL 99  - Counselling on nutrition and exercise on discharge.     Problem/Plan - 9:  ·  Problem: Need for prophylactic measure.   ·  Plan: Continue heparin gtt.      57y F PMH of Afib, DM2, and CHF (unknown EF) presenting with left heel wound and bilateral lower extremity swelling and redness admitted for Bilateral LE cellulitis with diabetic foot ulcer.      Problem/Plan - 1:  ·  Problem: Bilateral lower leg cellulitis.   ·  Plan: Patient presenting with b/l LE edema and erythema. VS stable and no leukocytosis. S/p Vancomycin, Rocephin and 2L NS in ED  - Lactate of 3.6 on admission, repeat lactate 2.9--->trended to normal   - B/l LE doppler negative for DVT   - F/u x- ray of bilateral feet No radiographic evidence of osteomyelitis within either foot.  Soft tissue swelling within both feet. No evidence of acute fracture.  - MRI foot: 1.  No MR evidence of acute osteomyelitis.  2.  Plantar fasciitis with partial-thickness tearing at the attachment of   the plantar fascia on the calcaneus.  - CTA abd aorta with run off No significant arterial stenosis up to the popliteal arteries. Mild trifurcation artery disease as described above, otherwise with three-vessel contrast opacification to the ankles. No vascular surgery intervention required.   - Continue IV Zyvox  - Pain regimen: Tylenol (mild), Oxycodone 5mg q6h PRN (moderate), Oxycodone 10mg PO Q6h PRN (severe)  - blood cx NGTD, urine negative, wound cx +staph aureus  - Podiatry, Dr. Galarza following   - ID, Dr. Mohr consulted, f/u recs  - Vascular, Dr. Perez consulted, f/u recs.     Problem/Plan - 2:  ·  Problem: Diabetic ulcer of left foot.   ·  Plan: - MRI foot:  1.  No MR evidence of acute osteomyelitis.  2.  Plantar fasciitis with partial-thickness tearing at the attachment of   the plantar fascia on the calcaneus.  - Continue Zyvox  - Podiatry and vascular f/u - Plan as above.     Problem/Plan - 3:  ·  Problem: Back pain.   ·  Plan: Chronic   - Xray of lumbar spine No evidence of fracture  - continue Lidocaine patch for lumbar spine, apply to lower back  - Pain regimen: Tylenol and oxycodone PRN  - Pt. reports having difficulty moving her legs for multiple years.       Problem/Plan - 4:  ·  Problem: Abdominal pain.   ·  Plan: Patient reports diffuse severe pain with meals  - Patient reports EGD/colonoscopy in August/September 2022: Normal  - CT Angio abd/pelvis with IV cont No significant arterial stenosis up to the popliteal arteries. Mild trifurcation artery disease as described above, otherwise with three-vessel contrast opacification to the ankles.   - GI, Dr. Marcelo and vascular consulted, f/u recs       Problem/Plan - 5:  ·  Problem: DM (diabetes mellitus).   ·  Plan: Insulin dependent DM2, blood glucose on admission 373  - Home regimen include: metformin 1000mg BID, basaglar 25U qhs, admelog 15u TID  - Will hold metformin while inpatient    - Increased Lantus to 20U qhs  - Start Admelog 4U premeal   - Start moderate dose insulin corrective scale  - Hypoglycemia protocol, fingerstick glucose qAc and qHs  - Consistent Carbohydrate diet  - HbA1c 12.8  - Endocrine f/u       Problem/Plan - 6:  ·  Problem: Afib.   ·  Plan: Patient with Hx of afib  - EKG with afib at 96 bpm   - On Xarelto for AC at home.  No surgical intervention planned for diabetic foot ulcer.  Will resume Xarelto.    - On Metoprolol succinate for rate control, continue with hold parameters   - Took digoxin in the past. She does not take digoxin anymore.     Problem/Plan - 7:  ·  Problem: CHF, chronic.   ·  Plan: Unknown EF, with chronic LE swelling   - Continue home Lasix 40mg QD  - f/u TTE  - Strict I&Os, daily weights.     Problem/Plan - 8:  ·  Problem: Severe obesity (BMI >= 40).   ·  Plan: Patient with BMI of 46  - LDL 99  - Counselling on nutrition and exercise on discharge.     Problem/Plan - 9:  ·  Problem: Need for prophylactic measure.   ·  Plan: resume on xarelto

## 2023-02-14 NOTE — PROGRESS NOTE ADULT - NS ATTEND AMEND GEN_ALL_CORE FT
severe pain of uncertain etiol  pain control per primary team  no acute ischemia or meaningful vol ol

## 2023-02-15 LAB
ANION GAP SERPL CALC-SCNC: 8 MMOL/L — SIGNIFICANT CHANGE UP (ref 5–17)
APTT BLD: 31.2 SEC — SIGNIFICANT CHANGE UP (ref 27.5–35.5)
BASOPHILS # BLD AUTO: 0.02 K/UL — SIGNIFICANT CHANGE UP (ref 0–0.2)
BASOPHILS NFR BLD AUTO: 0.3 % — SIGNIFICANT CHANGE UP (ref 0–2)
BUN SERPL-MCNC: 13 MG/DL — SIGNIFICANT CHANGE UP (ref 7–23)
CALCIUM SERPL-MCNC: 8.5 MG/DL — SIGNIFICANT CHANGE UP (ref 8.5–10.1)
CHLORIDE SERPL-SCNC: 101 MMOL/L — SIGNIFICANT CHANGE UP (ref 96–108)
CO2 SERPL-SCNC: 28 MMOL/L — SIGNIFICANT CHANGE UP (ref 22–31)
CREAT SERPL-MCNC: 0.52 MG/DL — SIGNIFICANT CHANGE UP (ref 0.5–1.3)
EGFR: 108 ML/MIN/1.73M2 — SIGNIFICANT CHANGE UP
EOSINOPHIL # BLD AUTO: 0.12 K/UL — SIGNIFICANT CHANGE UP (ref 0–0.5)
EOSINOPHIL NFR BLD AUTO: 1.7 % — SIGNIFICANT CHANGE UP (ref 0–6)
GLUCOSE SERPL-MCNC: 252 MG/DL — HIGH (ref 70–99)
HCT VFR BLD CALC: 36.8 % — SIGNIFICANT CHANGE UP (ref 34.5–45)
HGB BLD-MCNC: 12.3 G/DL — SIGNIFICANT CHANGE UP (ref 11.5–15.5)
IMM GRANULOCYTES NFR BLD AUTO: 0.7 % — SIGNIFICANT CHANGE UP (ref 0–0.9)
LYMPHOCYTES # BLD AUTO: 1.36 K/UL — SIGNIFICANT CHANGE UP (ref 1–3.3)
LYMPHOCYTES # BLD AUTO: 19 % — SIGNIFICANT CHANGE UP (ref 13–44)
MAGNESIUM SERPL-MCNC: 2 MG/DL — SIGNIFICANT CHANGE UP (ref 1.6–2.6)
MCHC RBC-ENTMCNC: 30.8 PG — SIGNIFICANT CHANGE UP (ref 27–34)
MCHC RBC-ENTMCNC: 33.4 GM/DL — SIGNIFICANT CHANGE UP (ref 32–36)
MCV RBC AUTO: 92 FL — SIGNIFICANT CHANGE UP (ref 80–100)
MONOCYTES # BLD AUTO: 0.43 K/UL — SIGNIFICANT CHANGE UP (ref 0–0.9)
MONOCYTES NFR BLD AUTO: 6 % — SIGNIFICANT CHANGE UP (ref 2–14)
NEUTROPHILS # BLD AUTO: 5.19 K/UL — SIGNIFICANT CHANGE UP (ref 1.8–7.4)
NEUTROPHILS NFR BLD AUTO: 72.3 % — SIGNIFICANT CHANGE UP (ref 43–77)
NRBC # BLD: 0 /100 WBCS — SIGNIFICANT CHANGE UP (ref 0–0)
PLATELET # BLD AUTO: 180 K/UL — SIGNIFICANT CHANGE UP (ref 150–400)
POTASSIUM SERPL-MCNC: 3.9 MMOL/L — SIGNIFICANT CHANGE UP (ref 3.5–5.3)
POTASSIUM SERPL-SCNC: 3.9 MMOL/L — SIGNIFICANT CHANGE UP (ref 3.5–5.3)
RBC # BLD: 4 M/UL — SIGNIFICANT CHANGE UP (ref 3.8–5.2)
RBC # FLD: 13 % — SIGNIFICANT CHANGE UP (ref 10.3–14.5)
SODIUM SERPL-SCNC: 137 MMOL/L — SIGNIFICANT CHANGE UP (ref 135–145)
WBC # BLD: 7.17 K/UL — SIGNIFICANT CHANGE UP (ref 3.8–10.5)
WBC # FLD AUTO: 7.17 K/UL — SIGNIFICANT CHANGE UP (ref 3.8–10.5)

## 2023-02-15 PROCEDURE — 72149 MRI LUMBAR SPINE W/DYE: CPT | Mod: 26

## 2023-02-15 PROCEDURE — 99233 SBSQ HOSP IP/OBS HIGH 50: CPT

## 2023-02-15 PROCEDURE — 99232 SBSQ HOSP IP/OBS MODERATE 35: CPT

## 2023-02-15 RX ORDER — METHOCARBAMOL 500 MG/1
500 TABLET, FILM COATED ORAL
Refills: 0 | Status: DISCONTINUED | OUTPATIENT
Start: 2023-02-15 | End: 2023-02-17

## 2023-02-15 RX ORDER — INSULIN GLARGINE 100 [IU]/ML
22 INJECTION, SOLUTION SUBCUTANEOUS AT BEDTIME
Refills: 0 | Status: DISCONTINUED | OUTPATIENT
Start: 2023-02-15 | End: 2023-02-15

## 2023-02-15 RX ORDER — INSULIN LISPRO 100/ML
6 VIAL (ML) SUBCUTANEOUS
Refills: 0 | Status: DISCONTINUED | OUTPATIENT
Start: 2023-02-15 | End: 2023-02-15

## 2023-02-15 RX ORDER — INSULIN LISPRO 100/ML
8 VIAL (ML) SUBCUTANEOUS
Refills: 0 | Status: DISCONTINUED | OUTPATIENT
Start: 2023-02-15 | End: 2023-02-17

## 2023-02-15 RX ORDER — POLYETHYLENE GLYCOL 3350 17 G/17G
17 POWDER, FOR SOLUTION ORAL DAILY
Refills: 0 | Status: DISCONTINUED | OUTPATIENT
Start: 2023-02-15 | End: 2023-02-17

## 2023-02-15 RX ORDER — INSULIN GLARGINE 100 [IU]/ML
24 INJECTION, SOLUTION SUBCUTANEOUS AT BEDTIME
Refills: 0 | Status: DISCONTINUED | OUTPATIENT
Start: 2023-02-15 | End: 2023-02-17

## 2023-02-15 RX ORDER — POLYETHYLENE GLYCOL 3350 17 G/17G
17 POWDER, FOR SOLUTION ORAL ONCE
Refills: 0 | Status: COMPLETED | OUTPATIENT
Start: 2023-02-15 | End: 2023-02-15

## 2023-02-15 RX ADMIN — Medication 10 MILLIGRAM(S): at 22:23

## 2023-02-15 RX ADMIN — Medication 6: at 11:43

## 2023-02-15 RX ADMIN — Medication 1 TABLET(S): at 11:43

## 2023-02-15 RX ADMIN — SENNA PLUS 2 TABLET(S): 8.6 TABLET ORAL at 22:17

## 2023-02-15 RX ADMIN — Medication 40 MILLIGRAM(S): at 05:23

## 2023-02-15 RX ADMIN — Medication 6 UNIT(S): at 17:04

## 2023-02-15 RX ADMIN — Medication 500 MILLIGRAM(S): at 18:15

## 2023-02-15 RX ADMIN — MUPIROCIN 1 APPLICATION(S): 20 OINTMENT TOPICAL at 18:15

## 2023-02-15 RX ADMIN — Medication 4: at 17:03

## 2023-02-15 RX ADMIN — OXYCODONE HYDROCHLORIDE 10 MILLIGRAM(S): 5 TABLET ORAL at 09:24

## 2023-02-15 RX ADMIN — Medication 6: at 07:53

## 2023-02-15 RX ADMIN — MUPIROCIN 1 APPLICATION(S): 20 OINTMENT TOPICAL at 05:23

## 2023-02-15 RX ADMIN — Medication 500 MILLIGRAM(S): at 05:23

## 2023-02-15 RX ADMIN — Medication 6 UNIT(S): at 11:44

## 2023-02-15 RX ADMIN — NYSTATIN CREAM 1 APPLICATION(S): 100000 CREAM TOPICAL at 05:23

## 2023-02-15 RX ADMIN — NYSTATIN CREAM 1 APPLICATION(S): 100000 CREAM TOPICAL at 12:42

## 2023-02-15 RX ADMIN — Medication 100 MILLIGRAM(S): at 05:23

## 2023-02-15 RX ADMIN — METHOCARBAMOL 500 MILLIGRAM(S): 500 TABLET, FILM COATED ORAL at 20:04

## 2023-02-15 RX ADMIN — POLYETHYLENE GLYCOL 3350 17 GRAM(S): 17 POWDER, FOR SOLUTION ORAL at 18:14

## 2023-02-15 RX ADMIN — LINEZOLID 300 MILLIGRAM(S): 600 INJECTION, SOLUTION INTRAVENOUS at 05:22

## 2023-02-15 RX ADMIN — INSULIN GLARGINE 24 UNIT(S): 100 INJECTION, SOLUTION SUBCUTANEOUS at 22:17

## 2023-02-15 RX ADMIN — LINEZOLID 300 MILLIGRAM(S): 600 INJECTION, SOLUTION INTRAVENOUS at 18:14

## 2023-02-15 RX ADMIN — OXYCODONE HYDROCHLORIDE 10 MILLIGRAM(S): 5 TABLET ORAL at 00:57

## 2023-02-15 RX ADMIN — NYSTATIN CREAM 1 APPLICATION(S): 100000 CREAM TOPICAL at 22:23

## 2023-02-15 RX ADMIN — RIVAROXABAN 20 MILLIGRAM(S): KIT at 18:15

## 2023-02-15 RX ADMIN — OXYCODONE HYDROCHLORIDE 10 MILLIGRAM(S): 5 TABLET ORAL at 01:27

## 2023-02-15 RX ADMIN — Medication 25 MILLIGRAM(S): at 22:16

## 2023-02-15 RX ADMIN — ATORVASTATIN CALCIUM 40 MILLIGRAM(S): 80 TABLET, FILM COATED ORAL at 22:23

## 2023-02-15 RX ADMIN — Medication 6 UNIT(S): at 07:59

## 2023-02-15 RX ADMIN — OXYCODONE HYDROCHLORIDE 10 MILLIGRAM(S): 5 TABLET ORAL at 10:24

## 2023-02-15 NOTE — PROGRESS NOTE ADULT - PROBLEM SELECTOR PLAN 1
increase lantus 24 units qhs  increase admelog 8 units 3x/day before meals  cont mod dose admelog corrective scale coverage qac/qhs  cont cons cho diet  goal bg 100-180 in hosp setting
Patient examined and evaluated at this time.  MRI reviewed and no surgical intervention planned from podiatry at this time.  Continue conservative treatment and offloading at this time.    [facility] [place] with Dr. Galarza, Dr. Meek, or Dr. Watson within 1 to 2 weeks upon discharge.

## 2023-02-15 NOTE — PROGRESS NOTE ADULT - SUBJECTIVE AND OBJECTIVE BOX
MELITON LOZANO is a 57yFemale , patient examined and chart reviewed.       INTERVAL HPI/ OVERNIGHT EVENTS:   Afebrile. Feeling better.  C/o back pain.  No events.    PAST MEDICAL & SURGICAL HISTORY:  Atrial fibrillation  Type 2 diabetes mellitus  Congestive heart failure  Scalp psoriasis  S/P tonsillectomy  S/P appendectomy  H/O umbilical hernia repair        For details regarding the patient's social history, family history, and other miscellaneous elements, please refer the initial infectious diseases consultation and/or the admitting history and physical examination for this admission.    ROS:  CONSTITUTIONAL:  Negative fever or chills,  EYES:  Negative  blurry vision or double vision  CARDIOVASCULAR:  Negative for chest pain or palpitations  RESPIRATORY:  Negative for cough, wheezing, or SOB   GASTROINTESTINAL:  Negative for nausea, vomiting, diarrhea, constipation, or abdominal pain  GENITOURINARY:  Negative frequency, urgency or dysuria  NEUROLOGIC:  No headache, confusion, dizziness, lightheadedness  All other systems were reviewed and are negative         Current inpatient medications :    ANTIBIOTICS/RELEVANT:  linezolid  IVPB 600 milliGRAM(s) IV Intermittent every 12 hours    MEDICATIONS  (STANDING):  ascorbic acid 500 milliGRAM(s) Oral two times a day  atorvastatin 40 milliGRAM(s) Oral at bedtime  dextrose 5%. 1000 milliLiter(s) (50 mL/Hr) IV Continuous <Continuous>  dextrose 5%. 1000 milliLiter(s) (100 mL/Hr) IV Continuous <Continuous>  dextrose 50% Injectable 25 Gram(s) IV Push once  dextrose 50% Injectable 25 Gram(s) IV Push once  dextrose 50% Injectable 12.5 Gram(s) IV Push once  furosemide    Tablet 40 milliGRAM(s) Oral daily  glucagon  Injectable 1 milliGRAM(s) IntraMuscular once  insulin glargine Injectable (LANTUS) 22 Unit(s) SubCutaneous at bedtime  insulin lispro (ADMELOG) corrective regimen sliding scale   SubCutaneous three times a day before meals  insulin lispro (ADMELOG) corrective regimen sliding scale   SubCutaneous at bedtime  insulin lispro Injectable (ADMELOG) 6 Unit(s) SubCutaneous three times a day before meals  lidocaine   4% Patch 1 Patch Transdermal daily  metoprolol succinate  milliGRAM(s) Oral daily  multivitamin 1 Tablet(s) Oral daily  mupirocin 2% Nasal 1 Application(s) Both Nostrils two times a day  nystatin Powder 1 Application(s) Topical three times a day  pregabalin 25 milliGRAM(s) Oral at bedtime  rivaroxaban 20 milliGRAM(s) Oral with dinner  senna 2 Tablet(s) Oral at bedtime    MEDICATIONS  (PRN):  acetaminophen     Tablet .. 650 milliGRAM(s) Oral every 6 hours PRN Temp greater or equal to 38C (100.4F), Mild Pain (1 - 3)  dextrose Oral Gel 15 Gram(s) Oral once PRN Blood Glucose LESS THAN 70 milliGRAM(s)/deciliter  melatonin 3 milliGRAM(s) Oral at bedtime PRN Insomnia  oxyCODONE    IR 5 milliGRAM(s) Oral every 6 hours PRN Moderate Pain (4 - 6)  oxyCODONE    IR 10 milliGRAM(s) Oral every 6 hours PRN Severe Pain (7 - 10)  polyethylene glycol 3350 17 Gram(s) Oral daily PRN Constipation      Objective:  Vital Signs Last 24 Hrs  T(C): 36.2 (15 Feb 2023 15:45), Max: 36.9 (15 Feb 2023 11:29)  T(F): 97.2 (15 Feb 2023 15:45), Max: 98.5 (15 Feb 2023 11:29)  HR: 94 (15 Feb 2023 15:45) (74 - 94)  BP: 106/72 (15 Feb 2023 15:45) (106/72 - 134/75)  RR: 18 (15 Feb 2023 15:45) (18 - 20)  SpO2: 95% (15 Feb 2023 15:45) (94% - 96%)    Parameters below as of 15 Feb 2023 15:45  Patient On (Oxygen Delivery Method): room air      Physical Exam:  General: c/o pain  Neck: supple, trachea midline  Lungs: clear, no wheeze/rhonchi  Cardiovascular: regular rate and rhythm, S1 S2  Abdomen: soft, nontender,  bowel sounds normal  Neurological: alert and oriented x3  Skin: no rash  Extremities: Left heel ulcer with foot swelling and redness improving   Justin LE chronic stasis with venous insufficiency      LABS:                        12.3   7.17  )-----------( 180      ( 15 Feb 2023 06:46 )             36.8   02-15    137  |  101  |  13  ----------------------------<  252<H>  3.9   |  28  |  0.52    Ca    8.5      15 Feb 2023 07:44  Mg     2.0     02-15      Urinalysis Basic - ( 2023 11:24 )    Color: Pale Yellow / Appearance: Clear / S.010 / pH: x  Gluc: x / Ketone: Negative  / Bili: Negative / Urobili: Negative   Blood: x / Protein: Negative / Nitrite: Negative   Leuk Esterase: Negative / RBC: x / WBC x   Sq Epi: x / Non Sq Epi: x / Bacteria: x      MICROBIOLOGY:    Culture - Other (23 @ 17:00)    -  Ampicillin/Sulbactam: R <=8/4    -  Cefazolin: R <=4    -  Clindamycin: S <=0.25    -  Daptomycin: S 0.5    -  Erythromycin: S <=0.25    -  Gentamicin: S <=1 Should not be used as monotherapy    -  Linezolid: S 2    -  Oxacillin: R >2    -  Penicillin: R >8    -  Rifampin: S <=1 Should not be used as monotherapy    -  Tetracycline: S <=1    -  Trimethoprim/Sulfamethoxazole: S <=0.5/9.5    -  Vancomycin: S 2    Specimen Source: Wound Wound    Culture Results:   Numerous Methicillin Resistant Staphylococcus aureus    Organism Identification: Methicillin resistant Staphylococcus aureus    Organism: Methicillin resistant Staphylococcus aureus    Method Type: SASHA      Culture - Blood (collected 2023 11:25)  Source: .Blood Blood-Peripheral  Preliminary Report (2023 17:01):    No growth to date.    Culture - Urine (collected 2023 11:24)  Source: Clean Catch Clean Catch (Midstream)  Final Report (2023 14:10):    <10,000 CFU/mL Normal Urogenital Angelique    Culture - Blood (collected 2023 11:10)  Source: .Blood Blood-Peripheral  Preliminary Report (2023 17:01):    No growth to date.    RADIOLOGY & ADDITIONAL STUDIES:  ACC: [id number] EXAM:  US DPLX LWR EXT VEINS COMPL BI   ORDERED BY: PETER ANGEL     PROCEDURE DATE:  2023          INTERPRETATION:  CLINICAL INFORMATION: Body aches. Duplex swelling. Rule   out DVT.    COMPARISON: None available.    TECHNIQUE: Duplex sonography of the BILATERAL LOWER extremity veins with   color and spectral Doppler, with and without compression.    FINDINGS:    RIGHT:  Normal compressibility of the RIGHT common femoral, femoral and popliteal   veins.  Doppler examination shows normal spontaneous and phasic flow.  No RIGHT calf vein thrombosis is detected.    LEFT:  Normal compressibility of the LEFT common femoral, femoral and popliteal   veins.  Doppler examination shows normal spontaneous and phasic flow.  No LEFTcalf vein thrombosis is detected.    Subcutaneous edema bilaterally.    IMPRESSION:  No evidence of deep venous thrombosis in either lower extremity.      ACC: [id number] EXAM:  MR FOOT LT   ORDERED BY: ALVINO DIAMOND     PROCEDURE DATE:  2023          INTERPRETATION:  LEFT FOOT MRI    CLINICAL INFORMATION: Diabetic patient with left heel wound. Evaluate for   osteomyelitis.  TECHNIQUE: Multiplanar, multisequence MRI was obtained of the LEFT foot   focusing on the hindfoot without the use of intravenous or   intra-articular contrast.  COMPARISON: Bilateral foot radiographs 2023.    FINDINGS:    PERIPHERAL SOFT TISSUES: The patient's reported site of ulceration is not   well identified on MRI. There is diffuse nonspecific edema in the   subcutaneous fat. No focal fluid collection to suggest abscess.  BONE MARROW: No STIR signal or loss of T1 hyperintense signal to suggest   acute osteomyelitis. No fracture or osteonecrosis.    MUSCLES AND TENDONS: Tendons are intact. Mild nonspecific edema of the   intrinsic musculature of the foot.  LIGAMENTS AND CAPSULAR STRUCTURES: Partial-thickness tearing at the   attachment of the plantarfascia on the calcaneus plantar calcaneal heel   spur. Attenuated appearance of the ATFL consistent with chronic sprain.   Ankle ligaments are otherwise intact.  CARTILAGE AND SUBCHONDRAL BONE: No full-thickness chondral loss.  SYNOVIUM/JOINT FLUID: No large joint effusion.      IMPRESSION:  1.  No MR evidence of acute osteomyelitis.  2.  Plantar fasciitis with partial-thickness tearing at the attachment of   the plantar fascia on the calcaneus.      ACC: [id number] EXAM:  MR SPINE LUMBAR IC   ORDERED BY:  MARYCRUZ CASPER     PROCEDURE DATE:  02/15/2023          INTERPRETATION:  CLINICAL INFORMATION: History of back pain with open   left foot ulcer. Rule out discitis.  ADDITIONAL CLINICAL INFORMATION:Osteomyelitis vertebra M46.26    TECHNIQUE: Multi planar pre and postcontrast T1 weighted MRI sequences of   the lumbar spine was performed. Contrast dose: 10 cc Gadavist IV contrast.    COMPARISON: Precontrast MRI lumbar spine 2/15/2023. CT abdomen and pelvis   2023.    FINDINGS:    ALIGNMENT: Lumbarization of S1 vertebral body is noted with a rudimentary   disc at S1-S2.    VERTEBRAE: The vertebral bodies are normal in height. There is no   fracture or aggressive osseous lesion. Type II Modic endplate   degenerative changes noted at L4-L5 and L5-S1. No destructive changes or   enhancement identified.    Please see preceding precontrast MRI lumbar spine 2/15/2023 for level by   level degenerative findings.    DISCS: Multilevel minimal disc dessication and loss of height. No   destructive changes or enhancement identified.    CONUS MEDULLARIS AND CAUDA EQUINA: The conus medullaris terminates at   T12-L1. No abnormal enhancement of the cauda equina.    PARAVERTEBRAL SOFT TISSUES AND VISUALIZED RETROPERITONEUM: Paraspinal   musculature is unremarkable. Included abdominal pelvic contents are   unremarkable.    IMPRESSION:    No abnormal enhancement to suggest discitis/osteomyelitis.      Assessment :   57y F PMH of Afib (on Xarelto), DM2, and CHF (unknown EF) Obesity admitted with unstagable infected painful left heel decub ulcer with foot/leg cellulitis,  MRI neg for OM  Wound culture with Numerous MRSA  Back pain- degenerative changes noted at L4-L5 and L5-S1- seen by ortho conservative management    Plan :   On Zyvox  Will change to po Doxycycline 100mg po bid x 10 days   Trend temps and cbc  Podiatry on case  Pain control  Pulm toileting  Increase activity    Continue with present regiment.  Appropriate use of antibiotics and adverse effects reviewed.    > 35 minutes were spent in direct patient care reviewing notes, medications ,labs data/ imaging , discussion with multidisciplinary team.    Thank you for allowing me to participate in care of your patient .    Carole Mohr MD  Infectious Disease  [phone]

## 2023-02-15 NOTE — SOCIAL WORK PROGRESS NOTE - NSSWPROGRESSNOTE_GEN_ALL_CORE
PT recommending ROYAL. sw met with pt at bedside. pt tearful but agreeable to ROYAL option for dc. list provided and reviewed. [facility] 1st choice, TESHA requested. list left for pt to review for additional ROYAL options. sw following.

## 2023-02-15 NOTE — SWALLOW BEDSIDE ASSESSMENT ADULT - COMMENTS
57y F PMH of Afib, DM2, and CHF (unknown EF) presenting with left heel wound and bilateral lower extremity swelling and redness admitted for Bilateral LE cellulitis with diabetic foot ulcer.     CXR: free of consolidation,effusion, or pneumothorax.    Order received and chart reviewed. Pt is received in bed, appears emotional, easily upset by situation/overall status.  Alert and follows commands. She reports she does not have trouble swallowing but leg pain worsens significantly as soon as she starts to eat/drink.

## 2023-02-15 NOTE — CONSULT NOTE ADULT - CONSULT REQUESTED DATE/TIME
15-Feb-2023 13:15
12-Feb-2023 12:27
13-Feb-2023 09:22
11-Feb-2023 18:10
11-Feb-2023 18:22
11-Feb-2023 12:49
13-Feb-2023 15:37

## 2023-02-15 NOTE — PROGRESS NOTE ADULT - ASSESSMENT
57y F PMH of Afib (on Xarelto), DM2, and CHF (unknown EF) presenting with left heel wound and bilateral lower extremity swelling and redness    CHF, Afib   - p/w b/l LE swelling and redness, with compliant of generalized body pain  -  per patient she used to follow a cardiologist but have not seen one in while since her insurance change, but from what she recalls all her cardiac workup are all normal    - Has baseline LE edema which is unchanged and per pt progressively worsening, appears cellulitic in nature  - continue home dose Lasix   - CXR: no acute pathology   - f/u  TTE  - LE US neg for DVT    - monitor strict I/O's    - EKG: Afib, non ischemic. No anginal complaints   - known hx of Afib  - BP , HR stable and controlled per flow sheet  - continue AC, BB  - Monitor and replete lytes, keep K>4, Mg>2.    - ABX per ID, podiatry following   - she remains w/ c/o dysphagia, though states that she tolerates taking her home meds, though seems to be tolerating breakfast   - she had EGD/ Colonoscopy in 10/2022, all normal    - Monitor and replete Lytes. Keep K > 4 and Mg > 2  - Will continue to follow.    Maru Barth, [facility]  Nurse Practitioner - Cardiology   Spectra #[id number]/ [phone]

## 2023-02-15 NOTE — PROGRESS NOTE ADULT - SUBJECTIVE AND OBJECTIVE BOX
[facility] -- Corbin Abel,  Angela, Tico Simmons Savella, [place]ger  Office # [phone]    Follow Up:  LE edema, SOB, afib     Subjective/Observations: seen and examined, crying with c/o b/l LE pain and chronic  generalized body pain, difficulty swallowing, denies chest pain, dyspnea, palpitations or dizziness, orthopnea and PND. Tolerating room air.     REVIEW OF SYSTEMS: All other review of systems is negative unless indicated above  PAST MEDICAL & SURGICAL HISTORY:  Atrial fibrillation      Type 2 diabetes mellitus      Congestive heart failure      Scalp psoriasis      S/P tonsillectomy      S/P appendectomy      H/O umbilical hernia repair        MEDICATIONS  (STANDING):  ascorbic acid 500 milliGRAM(s) Oral two times a day  atorvastatin 40 milliGRAM(s) Oral at bedtime  dextrose 5%. 1000 milliLiter(s) (50 mL/Hr) IV Continuous <Continuous>  dextrose 5%. 1000 milliLiter(s) (100 mL/Hr) IV Continuous <Continuous>  dextrose 50% Injectable 25 Gram(s) IV Push once  dextrose 50% Injectable 25 Gram(s) IV Push once  dextrose 50% Injectable 12.5 Gram(s) IV Push once  furosemide    Tablet 40 milliGRAM(s) Oral daily  glucagon  Injectable 1 milliGRAM(s) IntraMuscular once  insulin glargine Injectable (LANTUS) 22 Unit(s) SubCutaneous at bedtime  insulin lispro (ADMELOG) corrective regimen sliding scale   SubCutaneous three times a day before meals  insulin lispro (ADMELOG) corrective regimen sliding scale   SubCutaneous at bedtime  insulin lispro Injectable (ADMELOG) 6 Unit(s) SubCutaneous three times a day before meals  lidocaine   4% Patch 1 Patch Transdermal daily  linezolid  IVPB      linezolid  IVPB 600 milliGRAM(s) IV Intermittent every 12 hours  metoprolol succinate  milliGRAM(s) Oral daily  multivitamin 1 Tablet(s) Oral daily  mupirocin 2% Nasal 1 Application(s) Both Nostrils two times a day  nystatin Powder 1 Application(s) Topical three times a day  pregabalin 25 milliGRAM(s) Oral at bedtime  rivaroxaban 20 milliGRAM(s) Oral with dinner  senna 2 Tablet(s) Oral at bedtime    MEDICATIONS  (PRN):  acetaminophen     Tablet .. 650 milliGRAM(s) Oral every 6 hours PRN Temp greater or equal to 38C (100.4F), Mild Pain (1 - 3)  dextrose Oral Gel 15 Gram(s) Oral once PRN Blood Glucose LESS THAN 70 milliGRAM(s)/deciliter  melatonin 3 milliGRAM(s) Oral at bedtime PRN Insomnia  oxyCODONE    IR 5 milliGRAM(s) Oral every 6 hours PRN Moderate Pain (4 - 6)  oxyCODONE    IR 10 milliGRAM(s) Oral every 6 hours PRN Severe Pain (7 - 10)  polyethylene glycol 3350 17 Gram(s) Oral daily PRN Constipation    Allergies    No Known Allergies    Intolerances      Vital Signs Last 24 Hrs  T(C): 36.5 (15 Feb 2023 05:09), Max: 36.7 (14 Feb 2023 20:36)  T(F): 97.7 (15 Feb 2023 05:09), Max: 98.1 (14 Feb 2023 20:36)  HR: 74 (15 Feb 2023 05:09) (74 - 93)  BP: 134/75 (15 Feb 2023 05:09) (110/74 - 134/75)  BP(mean): --  RR: 18 (15 Feb 2023 05:09) (18 - 18)  SpO2: 96% (15 Feb 2023 05:09) (94% - 96%)    Parameters below as of 15 Feb 2023 05:09  Patient On (Oxygen Delivery Method): room air      I&O's Summary    14 Feb 2023 07:01  -  15 Feb 2023 07:00  --------------------------------------------------------  IN: 0 mL / OUT: 1200 mL / NET: -1200 mL        TELE: Not on telemetry   PHYSICAL EXAM:  Constitutional: NAD, awake and alert , obese   HEENT: Moist Mucous Membranes, Anicteric  Pulmonary: Non-labored, breath sounds are clear bilaterally, No wheezing, rales or rhonchi  Cardiovascular: IRRR,  S1 and S2, No murmurs, rubs, gallops or clicks  Gastrointestinal: Bowel Sounds present, soft, nontender.   Lymph: +2 b/l  peripheral edema. No lymphadenopathy.  Skin: + erythema, b/l LE, warm   Psych:  Mood & affect appropriate      LABS: All Labs Reviewed:                        12.3   7.17  )-----------( 180      ( 15 Feb 2023 06:46 )             36.8                         12.2   8.71  )-----------( 178      ( 14 Feb 2023 06:02 )             36.8                         12.8   10.68 )-----------( 160      ( 13 Feb 2023 06:20 )             37.4     15 Feb 2023 07:44    137    |  101    |  13     ----------------------------<  252    3.9     |  28     |  0.52   14 Feb 2023 06:02    137    |  99     |  11     ----------------------------<  288    3.7     |  32     |  0.58   13 Feb 2023 06:20    137    |  101    |  14     ----------------------------<  316    3.6     |  29     |  0.65     Ca    8.5        15 Feb 2023 07:44  Ca    8.5        14 Feb 2023 06:02  Ca    8.6        13 Feb 2023 06:20  Mg     2.0       15 Feb 2023 07:44  Mg     2.0       14 Feb 2023 06:02  Mg     1.7       13 Feb 2023 06:20      PTT - ( 15 Feb 2023 06:46 )  PTT:31.2 sec      12 Lead ECG:   Ventricular Rate 96 BPM    QRS Duration 78 ms    Q-T Interval 358 ms    QTC Calculation(Bazett) 452 ms    R Axis 30 degrees    T Axis -12 degrees    Diagnosis Line Atrial fibrillation  Abnormal ECG  No previous ECGs available  Confirmed by JAS PRIETO ([id number]) on 2/11/2023 2:13:40 PM (02-11-23 @ 11:23)

## 2023-02-15 NOTE — PHYSICAL THERAPY INITIAL EVALUATION ADULT - PERTINENT HX OF CURRENT PROBLEM, REHAB EVAL
57y F PMH of Afib, DM2, and CHF presenting with left heel wound and bilateral lower extremity swelling and redness admitted for Bilateral LE cellulitis with diabetic foot ulcer. x- ray of bilateral feet No evidence of osteomyelitis foot. Soft tissue swelling within both feet. No evidence of acute fracture. MRI foot: No evidence of acute osteomyelitis. Plantar fasciitis with partial-thickness tearing at the attachment of the plantar fascia on the calcaneus. MRI L-spine: Clumped appearance to the descending lumbosacral nerve roots, could represent the presence of arachnoiditis. L4-L5 moderate thecal sac compression and mass effect upon the descending right L5 nerve root right lateral recess.

## 2023-02-15 NOTE — SWALLOW BEDSIDE ASSESSMENT ADULT - SLP PATIENT PROFILE REVIEW
Subjective:      Chief Complaint: Discuss recent abdominal x-ray and CT scan    HPI:  Karina Kulkarni is a 77 y.o. female who presents today for the below complaint    Midthoracic pain: Patient has about 2 weeks history of midthoracic pain and chest x-ray shows compression fracture of T6 vertebra. Chest x-ray was also shows mass in the right paratracheal stripe region. Patient continues to have mild mid thoracic pain corresponding T6 region. Pain is continuous mild and aching in character. X-ray chest 12/27/2019:  FINDINGS:   There is prominence to the right paratracheal stripe region on the AP   radiograph. Sandria Passy is a questionable mass seen in this region on the lateral   radiograph I recommend CT scan of the chest be obtained to better evaluate. There DISH in appears to be a compression fracture of approximately the T6   vertebral body with loss of approximately 20% vertebral body height.       The lungs are clear there is no alveolar infiltrate effusion or pneumothorax. The cardiac silhouette is normal.  The remainder the osseous structures are   normal.     DEXA scan 9/13/2018:    FINDINGS:   LUMBAR SPINE:       The bone mineral density in the lumbar spine including the L1-L2 levels is   measured at 1.217 g/cm2, which corresponds to a T-score of 0.4 and a Z-score   of 1.6.  This is within the normal range by WHO criteria.       LEFT HIP:       The bone mineral density in the total hip is measured at 0.852 g/cm2   corresponding to a T-score of -1.2 and a Z-score of 0.2.  This is within the   osteopenia range by [facility] criteria.       The bone mineral density of the femoral neck is measured at 0.793 g/cm2   corresponding to a T-score of -1.8 and a Z-score of -0.1.  This is within the   osteopenia range by WHO criteria.          Patient Active Problem List   Diagnosis    Lumbar spinal stenosis    HTN (hypertension)    Fatty liver    Intention tremor    GERD (gastroesophageal reflux disease)    Diverticulosis of colon    Osteopenia    Hyperlipidemia    Elevated liver enzymes    Vitamin D deficiency    Controlled substance agreement signed       Past Medical History:   Diagnosis Date    DDD (degenerative disc disease), lumbar     Diverticulosis of colon     left colon    DJD (degenerative joint disease) of lumbar spine     DJD (degenerative joint disease), lumbosacral     Family history of type 2 diabetes mellitus     father    Fatty liver     Gastroesophageal reflux disease with hiatal hernia     Hypertension     Intention tremor     Left knee DJD 12-    mild per Xray    Lumbar spinal stenosis     Osteopenia     Screening colonoscopy     diverticulosis,small internal hemorrhoids, otherwise normal-10year follow up recommended by Dr Vincent Garcia History     Tobacco Use    Smoking status: Never Smoker    Smokeless tobacco: Never Used   Substance Use Topics    Alcohol use: No     Comment: avoids caffeine        Review of Systems   Constitutional: Negative for appetite change, chills, fatigue, fever and unexpected weight change. HENT: Negative for congestion, ear pain, sinus pain, sore throat and trouble swallowing. Eyes: Negative for redness and itching. Respiratory: Negative for cough, chest tightness, shortness of breath and wheezing. Cardiovascular: Negative for chest pain and palpitations. Gastrointestinal: Negative for abdominal distention, abdominal pain, constipation, diarrhea, nausea and vomiting. Endocrine: Negative for polyuria. Genitourinary: Negative for difficulty urinating, dysuria, frequency and urgency. Musculoskeletal: Positive for arthralgias and back pain. Negative for myalgias. Skin: Negative for rash. Neurological: Negative for dizziness and headaches. Psychiatric/Behavioral: Negative for behavioral problems, confusion and suicidal ideas.            Objective:      /74   Pulse 84   SpO2 98%      Physical recommended follow-up CT scan of chest.  Patient get anxiety when she goes into a donut of CT scan and requested antianxiety medication prior to the procedure. - ALPRAZolam (XANAX) 0.5 MG tablet; Take 1 tablet by mouth 3 times daily as needed for Sleep or Anxiety for up to 1 day. Dispense: 3 tablet; Refill: 0    3. Gastroesophageal reflux disease without esophagitis  -Patient is currently taking omeprazole for years. Her symptoms are well controlled on omeprazole. However, due to long-term side effect of omeprazole is osteoporosis I am discontinuing medication starting a trial dose of famotidine twice daily. - famotidine (PEPCID) 20 MG tablet; Take 1 tablet by mouth 2 times daily  Dispense: 60 tablet; Refill: 0          Note:   Patient understand the assessment and agreed to the plan. Medication side effects was discussed during the encounter. Before discharging the patient, all questions and concern were addressed. After visit summary was provided. Advice to call clinic or me for any further questions or concern.        Romie Delacruz,  yes

## 2023-02-15 NOTE — PROGRESS NOTE ADULT - SUBJECTIVE AND OBJECTIVE BOX
CAPILLARY BLOOD GLUCOSE      POCT Blood Glucose.: 207 mg/dL (15 Feb 2023 16:40)  POCT Blood Glucose.: 251 mg/dL (15 Feb 2023 11:13)  POCT Blood Glucose.: 263 mg/dL (15 Feb 2023 07:30)  POCT Blood Glucose.: 271 mg/dL (14 Feb 2023 21:46)      Vital Signs Last 24 Hrs  T(C): 36.2 (15 Feb 2023 15:45), Max: 36.9 (15 Feb 2023 11:29)  T(F): 97.2 (15 Feb 2023 15:45), Max: 98.5 (15 Feb 2023 11:29)  HR: 94 (15 Feb 2023 15:45) (74 - 94)  BP: 106/72 (15 Feb 2023 15:45) (106/72 - 134/75)  BP(mean): --  RR: 18 (15 Feb 2023 15:45) (18 - 20)  SpO2: 95% (15 Feb 2023 15:45) (94% - 96%)    Parameters below as of 15 Feb 2023 15:45  Patient On (Oxygen Delivery Method): room air      Respiratory: CTA B/L  CV: RRR, S1S2, no murmurs, rubs or gallops  Abdominal: Soft, NT, ND +BS, Last BM  Extremities: le foot dsg intact     02-15    137  |  101  |  13  ----------------------------<  252<H>  3.9   |  28  |  0.52    Ca    8.5      15 Feb 2023 07:44  Mg     2.0     02-15        atorvastatin 40 milliGRAM(s) Oral at bedtime  dextrose 50% Injectable 25 Gram(s) IV Push once  dextrose 50% Injectable 25 Gram(s) IV Push once  dextrose 50% Injectable 12.5 Gram(s) IV Push once  dextrose Oral Gel 15 Gram(s) Oral once PRN  glucagon  Injectable 1 milliGRAM(s) IntraMuscular once  insulin glargine Injectable (LANTUS) 22 Unit(s) SubCutaneous at bedtime  insulin lispro (ADMELOG) corrective regimen sliding scale   SubCutaneous three times a day before meals  insulin lispro (ADMELOG) corrective regimen sliding scale   SubCutaneous at bedtime  insulin lispro Injectable (ADMELOG) 6 Unit(s) SubCutaneous three times a day before meals

## 2023-02-15 NOTE — PROGRESS NOTE ADULT - SUBJECTIVE AND OBJECTIVE BOX
Patient is a 57y old  Female who presents with a chief complaint of L foot wound (15 Feb 2023 09:52)      Subjective:  INTERVAL HPI/OVERNIGHT EVENTS: Patient seen and examined at bedside.  patient c/o severe B/L LE and lower back  pain throbbing/burning, not be able to ambulate as before, not able to lift her legs due to severe pain lower back and B/L legs   MEDICATIONS  (STANDING):  ascorbic acid 500 milliGRAM(s) Oral two times a day  atorvastatin 40 milliGRAM(s) Oral at bedtime  dextrose 5%. 1000 milliLiter(s) (50 mL/Hr) IV Continuous <Continuous>  dextrose 5%. 1000 milliLiter(s) (100 mL/Hr) IV Continuous <Continuous>  dextrose 50% Injectable 25 Gram(s) IV Push once  dextrose 50% Injectable 25 Gram(s) IV Push once  dextrose 50% Injectable 12.5 Gram(s) IV Push once  furosemide    Tablet 40 milliGRAM(s) Oral daily  glucagon  Injectable 1 milliGRAM(s) IntraMuscular once  insulin glargine Injectable (LANTUS) 22 Unit(s) SubCutaneous at bedtime  insulin lispro (ADMELOG) corrective regimen sliding scale   SubCutaneous three times a day before meals  insulin lispro (ADMELOG) corrective regimen sliding scale   SubCutaneous at bedtime  insulin lispro Injectable (ADMELOG) 6 Unit(s) SubCutaneous three times a day before meals  lidocaine   4% Patch 1 Patch Transdermal daily  linezolid  IVPB      linezolid  IVPB 600 milliGRAM(s) IV Intermittent every 12 hours  metoprolol succinate  milliGRAM(s) Oral daily  multivitamin 1 Tablet(s) Oral daily  mupirocin 2% Nasal 1 Application(s) Both Nostrils two times a day  nystatin Powder 1 Application(s) Topical three times a day  pregabalin 25 milliGRAM(s) Oral at bedtime  rivaroxaban 20 milliGRAM(s) Oral with dinner  senna 2 Tablet(s) Oral at bedtime    MEDICATIONS  (PRN):  acetaminophen     Tablet .. 650 milliGRAM(s) Oral every 6 hours PRN Temp greater or equal to 38C (100.4F), Mild Pain (1 - 3)  dextrose Oral Gel 15 Gram(s) Oral once PRN Blood Glucose LESS THAN 70 milliGRAM(s)/deciliter  melatonin 3 milliGRAM(s) Oral at bedtime PRN Insomnia  oxyCODONE    IR 5 milliGRAM(s) Oral every 6 hours PRN Moderate Pain (4 - 6)  oxyCODONE    IR 10 milliGRAM(s) Oral every 6 hours PRN Severe Pain (7 - 10)  polyethylene glycol 3350 17 Gram(s) Oral daily PRN Constipation      Allergies    No Known Allergies    Intolerances        REVIEW OF SYSTEMS:  CONSTITUTIONAL: No fever or chills  HEENT:  No headache, no sore throat  RESPIRATORY: No cough or shortness of breath  CARDIOVASCULAR: No chest pain or palpitations  GASTROINTESTINAL: No abd pain, nausea, vomiting, or diarrhea      Objective:  Vital Signs Last 24 Hrs  T(C): 36.5 (15 Feb 2023 05:09), Max: 36.7 (14 Feb 2023 20:36)  T(F): 97.7 (15 Feb 2023 05:09), Max: 98.1 (14 Feb 2023 20:36)  HR: 74 (15 Feb 2023 05:09) (74 - 93)  BP: 134/75 (15 Feb 2023 05:09) (110/74 - 134/75)  BP(mean): --  RR: 18 (15 Feb 2023 05:09) (18 - 18)  SpO2: 96% (15 Feb 2023 05:09) (94% - 96%)    Parameters below as of 15 Feb 2023 05:09  Patient On (Oxygen Delivery Method): room air        GENERAL: NAD, lying in bed  HEAD:  Normocephalic  EYES:  conjunctiva and sclera clear  ENT: Moist mucous membranes  NECK: Supple  CHEST/LUNG: Clear to auscultation bilaterally; No rales or rhonchi; no wheezing. Unlabored respirations  HEART: Regular rate and rhythm; S1S2+  ABDOMEN: Bowel sounds present; Soft, Nontender, Nondistended.   EXTREMITIES:  Left foot in clean dressing, +varicose vein , pain upon light tough, briefly lift both legs against gravity, +straight leg test   NERVOUS SYSTEM:  Alert & Oriented X3;  No gross focal deficits   MSK: moves all extremities  SKIN: No rashes     LABS:                        12.3   7.17  )-----------( 180      ( 15 Feb 2023 06:46 )             36.8     15 Feb 2023 07:44    137    |  101    |  13     ----------------------------<  252    3.9     |  28     |  0.52     Ca    8.5        15 Feb 2023 07:44  Mg     2.0       15 Feb 2023 07:44      PTT - ( 15 Feb 2023 06:46 )  PTT:31.2 sec    CAPILLARY BLOOD GLUCOSE      POCT Blood Glucose.: 263 mg/dL (15 Feb 2023 07:30)  POCT Blood Glucose.: 271 mg/dL (14 Feb 2023 21:46)  POCT Blood Glucose.: 264 mg/dL (14 Feb 2023 16:41)  POCT Blood Glucose.: 354 mg/dL (14 Feb 2023 11:28)        Culture - Other (collected 02-12-23 @ 17:00)  Source: Wound Wound  Final Report (02-14-23 @ 15:00):    Numerous Methicillin Resistant Staphylococcus aureus  Organism: Methicillin resistant Staphylococcus aureus (02-14-23 @ 15:00)  Organism: Methicillin resistant Staphylococcus aureus (02-14-23 @ 15:00)      -  Ampicillin/Sulbactam: R <=8/4      -  Cefazolin: R <=4      -  Clindamycin: S <=0.25      -  Daptomycin: S 0.5      -  Erythromycin: S <=0.25      -  Gentamicin: S <=1 Should not be used as monotherapy      -  Linezolid: S 2      -  Oxacillin: R >2      -  Penicillin: R >8      -  Rifampin: S <=1 Should not be used as monotherapy      -  Tetracycline: S <=1      -  Trimethoprim/Sulfamethoxazole: S <=0.5/9.5      -  Vancomycin: S 2      Method Type: SASHA    Culture - Blood (collected 02-11-23 @ 11:25)  Source: .Blood Blood-Peripheral  Preliminary Report (02-12-23 @ 17:01):    No growth to date.    Culture - Urine (collected 02-11-23 @ 11:24)  Source: Clean Catch Clean Catch (Midstream)  Final Report (02-12-23 @ 14:10):    <10,000 CFU/mL Normal Urogenital Angelique    Culture - Blood (collected 02-11-23 @ 11:10)  Source: .Blood Blood-Peripheral  Preliminary Report (02-12-23 @ 17:01):    No growth to date.        RADIOLOGY & ADDITIONAL TESTS:    Personally reviewed.     Consultant(s) Notes Reviewed:  [x] YES  [ ] NO    Plan of care discussed with patient ; all questions answered

## 2023-02-15 NOTE — PROGRESS NOTE ADULT - ASSESSMENT
57y F PMH of Afib, DM2, and CHF (unknown EF) presenting with left heel wound and bilateral lower extremity swelling and redness admitted for Bilateral LE cellulitis with diabetic foot ulcer.      Problem/Plan - 1:  ·  Problem: Bilateral lower leg cellulitis.   ·  Plan: Patient presenting with b/l LE edema and erythema. VS stable and no leukocytosis. S/p Vancomycin, Rocephin and 2L NS in ED  - Lactate of 3.6 on admission, repeat lactate 2.9--->trended to normal   - B/l LE doppler negative for DVT   - F/u x- ray of bilateral feet No radiographic evidence of osteomyelitis within either foot.  Soft tissue swelling within both feet. No evidence of acute fracture.  - MRI foot: 1.  No MR evidence of acute osteomyelitis.  2.  Plantar fasciitis with partial-thickness tearing at the attachment of the plantar fascia on the calcaneus.  - Pain regimen: Tylenol (mild), Oxycodone 5mg q6h PRN (moderate), Oxycodone 10mg PO Q6h PRN (severe)  - blood cx NGTD, urine negative, wound cx +staph aureus  - Podiatry: no surgical intervention, Patient may follow-up at the [facility] with Dr. Galarza, Dr. Meek, or Dr. Watson within 1 to 2 weeks upon discharge.  - ID, Dr. Mohr consulted: - Continue IV Zyvox  - Vascular:  Dr. Perez consulted: CTA without significant PAD,Elevate BLE; if possible use ACE wraps for gentle compression, No vascular surgical intervention indicated,     Problem/Plan - 2:  ·  Problem: Diabetic ulcer of left foot.   ·  Plan: - MRI foot:  1.  No MR evidence of acute osteomyelitis.  2.  Plantar fasciitis with partial-thickness tearing at the attachment of   the plantar fascia on the calcaneus.  - Continue Zyvox  - Podiatry and vascular f/u - Plan as above.     Problem/Plan - 3:  ·  Problem: Back pain.   ·  Plan: Chronic , however, worsen as per patient with B/L Leg pain   - Xray of lumbar spine No evidence of fracture  - continue Lidocaine patch for lumbar spine, apply to lower back  - Pain regimen: Tylenol and oxycodone PRN  -add lyrica for neuropathy   MRI lumbar spine: < from: MR Lumbar Spine No Cont (02.15.23 @ 10:12) >  Clumped appearance to the descending lumbosacral nerve roots. This could   represent the presence of arachnoiditis.  L4-L5 moderate thecal sac compression and mass effect upon the descending   right L5 nerve root right lateral recess.  Other milder degenerative changes.  spine ortho consult called           Problem/Plan - 4:  ·  Problem: Abdominal pain.   ·  Plan: Patient reports diffuse severe pain with meals  - Patient reports EGD/colonoscopy in August/September 2022: Normal  - CT Angio abd/pelvis with IV cont No significant arterial stenosis up to the popliteal arteries. Mild trifurcation artery disease as described above, otherwise with three-vessel contrast opacification to the ankles.    patient reports no BM for 4 days , will add bowel regiment  S/S assess for swallow function         Problem/Plan - 5:  ·  Problem: DM (diabetes mellitus).   ·  Plan: Insulin dependent DM2, blood glucose on admission 373  - Home regimen include: metformin 1000mg BID, basaglar 25U qhs, admelog 15u TID  - Will hold metformin while inpatient    - Increased Lantus to 22U qhs  - increase Admelog 6U premeal   - Start moderate dose insulin corrective scale  - Hypoglycemia protocol, fingerstick glucose qAc and qHs  - Consistent Carbohydrate diet  - HbA1c 12.8  - Endocrine f/u       Problem/Plan - 6:  ·  Problem: Afib.   ·  Plan: Patient with Hx of afib  - EKG with afib at 96 bpm   - On Xarelto for AC at home.  No surgical intervention planned for diabetic foot ulcer.  Will resume Xarelto.    - On Metoprolol succinate for rate control, continue with hold parameters   - Took digoxin in the past. She does not take digoxin anymore.     Problem/Plan - 7:  ·  Problem: CHF, chronic.   ·  Plan: Unknown EF, with chronic LE swelling   - Continue home Lasix 40mg QD  - f/u TTE  - Strict I&Os, daily weights.     Problem/Plan - 8:  ·  Problem: Severe obesity (BMI >= 40).   ·  Plan: Patient with BMI of 46  - LDL 99  - Counselling on nutrition and exercise on discharge.     Problem/Plan - 9:  ·  Problem: Need for prophylactic measure.   ·  Plan: resume on xarelto

## 2023-02-15 NOTE — CONSULT NOTE ADULT - SUBJECTIVE AND OBJECTIVE BOX
Patient is a 57y Female who presents c/o lumbar back pain of several week duration. Pt denies any trauma or falls. Patient admitted for uncontrolled diabetes and Left foot chronic ulcer that is being managed by wound care and podiatry. Denies pain/injury elsewhere. Patient has diabetic neuropathy in BL LE with baseline sensory changes to BL legs and feet. Denies new numbness/tingling/paresthesias/weakness. Denies bowel/bladder incontinence. Denies fevers/chills. No other complaints at this time.    HEALTH ISSUES - PROBLEM Dx:  Diabetic ulcer of left foot    Bilateral lower leg cellulitis    Diabetic ulcer of left foot    Diabetic ulcer of left foot    DM (diabetes mellitus)    Afib    CHF, chronic    Need for prophylactic measure    Severe obesity (BMI &gt;= 40)    Back pain    Abdominal pain            MEDICATIONS  (STANDING):  ascorbic acid 500 milliGRAM(s) Oral two times a day  atorvastatin 40 milliGRAM(s) Oral at bedtime  dextrose 5%. 1000 milliLiter(s) IV Continuous <Continuous>  dextrose 5%. 1000 milliLiter(s) IV Continuous <Continuous>  dextrose 50% Injectable 25 Gram(s) IV Push once  dextrose 50% Injectable 25 Gram(s) IV Push once  dextrose 50% Injectable 12.5 Gram(s) IV Push once  furosemide    Tablet 40 milliGRAM(s) Oral daily  glucagon  Injectable 1 milliGRAM(s) IntraMuscular once  insulin glargine Injectable (LANTUS) 22 Unit(s) SubCutaneous at bedtime  insulin lispro (ADMELOG) corrective regimen sliding scale   SubCutaneous three times a day before meals  insulin lispro (ADMELOG) corrective regimen sliding scale   SubCutaneous at bedtime  insulin lispro Injectable (ADMELOG) 6 Unit(s) SubCutaneous three times a day before meals  lidocaine   4% Patch 1 Patch Transdermal daily  linezolid  IVPB      linezolid  IVPB 600 milliGRAM(s) IV Intermittent every 12 hours  metoprolol succinate  milliGRAM(s) Oral daily  multivitamin 1 Tablet(s) Oral daily  mupirocin 2% Nasal 1 Application(s) Both Nostrils two times a day  nystatin Powder 1 Application(s) Topical three times a day  pregabalin 25 milliGRAM(s) Oral at bedtime  rivaroxaban 20 milliGRAM(s) Oral with dinner  senna 2 Tablet(s) Oral at bedtime      Allergies    No Known Allergies    Intolerances        PAST MEDICAL & SURGICAL HISTORY:  Atrial fibrillation    Type 2 diabetes mellitus    Congestive heart failure    Scalp psoriasis    S/P tonsillectomy    S/P appendectomy    H/O umbilical hernia repair                              12.3   7.17  )-----------( 180      ( 15 Feb 2023 06:46 )             36.8       15 Feb 2023 07:44    137    |  101    |  13     ----------------------------<  252    3.9     |  28     |  0.52     Ca    8.5        15 Feb 2023 07:44  Mg     2.0       15 Feb 2023 07:44        PTT - ( 15 Feb 2023 06:46 )  PTT:31.2 sec        Vital Signs Last 24 Hrs  T(C): 36.9 (02-15-23 @ 11:29), Max: 36.9 (02-15-23 @ 11:29)  T(F): 98.5 (02-15-23 @ 11:29), Max: 98.5 (02-15-23 @ 11:29)  HR: 88 (02-15-23 @ 11:29) (74 - 93)  BP: 116/75 (02-15-23 @ 11:29) (116/75 - 134/75)  BP(mean): --  RR: 20 (02-15-23 @ 11:29) (18 - 20)  SpO2: 94% (02-15-23 @ 11:29) (94% - 96%)    Gen: NAD    Spine PE:  Skin intact  No gross deformity  No midnline TTP C/T/S spine  Mild Midline TTP L spine  No bony step offs  + paraspinal muscle ttp/hypertonicity at lumbar spine   Negative clonus  Negative babinski  Negative stone  + rectal tone, full active and passive, full perianal sensation    No saddle anesthesia             Deltoid        Bicep        Tricep          Wrist Ext    Wrist Flex    Finger Flex          Finger Abd  R            5/5          5/5           5/5              5/5                5/5	           5/5                         5/5  L             5/5           5/5          5/5              5/5                5/5                   5/5                        5/5                Hip Flex       Quad     Ankle DF        Ankle PF       Toe Ext            R            5/5              5/5          5/5                 */5                 4/5	                  L            5/5              5/5           5/5                 */5                 4/5                  * difficulty assessing S1 2/2 patient BLLE edema, and pain from chronic left foot wound     Sensory:            C5         C6         C7      C8       T1        (0=absent, 1=impaired, 2=normal, NT=not testable)  R         2            2           2        2         2  L          2            2           2        2         2               L2          L3         L4      L5       S1         (0=absent, 1=impaired, 2=normal, NT=not testable)  R         1            1            1        1        1  L          1            1           1        1         1      Imaging: MR Lspine without contrast demonstrating L4-5 Stenosis    A/P: 57y Female with L4-5 stenosis   Pain control PRN  Muscle Relaxors PRN  WBAT with assistive devices as needed  FU Labs/imaging   MR lumbar spine with contrast   DVT ppx per primary team   Medical management per primary team   No acute orthopedic intervention at this time  Not operative candidate at this time given active MRSA infection left foot and A1c 12.8, once infection cleared and A1c under control, plan to discuss possible role for surgery.   Follow up in office with Dr. Holloway in office in 3 months once A1c more controlled  D/w Dr. Holloway who agrees with above plan

## 2023-02-15 NOTE — PHYSICAL THERAPY INITIAL EVALUATION ADULT - ADDITIONAL COMMENTS
Pt lives in house w/ stairs/rail.  Pt report is primarily a household ambulator.  Pt report has friends that can occasionally assist her at home

## 2023-02-15 NOTE — CONSULT NOTE ADULT - CONSULT REASON
Lumbar Back Pain
Left heel unstagable infected decub ulcer with foot cellulitis
LE edema, afib
Left heel wound with cellulitis to the BLE
dm2 uncontrolled
left heel ulcer, bilateral leg swelling
57y A1C with Estimated Average Glucose Result: 12.8 % (02-12-23 @ 05:51)   diabetes mellitus uncontrolled type 2

## 2023-02-15 NOTE — PHYSICAL THERAPY INITIAL EVALUATION ADULT - IMPAIRMENTS CONTRIBUTING IMPAIRED BED MOBILITY, REHAB EVAL
· WBC 18 on admission; with nausea/vomiting/diarrhea but without other signs of infection  · Observe off antibiotics for now, trend WBC and fever curve impaired balance/decreased flexibility/pain/decreased strength

## 2023-02-15 NOTE — SWALLOW BEDSIDE ASSESSMENT ADULT - SWALLOW EVAL: DIAGNOSIS
Patient presents with functional oropharyngeal swallow for all consistencies presented. Patient's reduced intake/complaints of pain when eating appear unrelated to dysphagia.

## 2023-02-15 NOTE — CONSULT NOTE ADULT - REASON FOR ADMISSION
L foot wound
cellulitis and diabetic heel ulcer
L foot wound
L foot wound

## 2023-02-16 ENCOUNTER — TRANSCRIPTION ENCOUNTER (OUTPATIENT)
Age: 58
End: 2023-02-16

## 2023-02-16 LAB
ANION GAP SERPL CALC-SCNC: 7 MMOL/L — SIGNIFICANT CHANGE UP (ref 5–17)
BUN SERPL-MCNC: 10 MG/DL — SIGNIFICANT CHANGE UP (ref 7–23)
CALCIUM SERPL-MCNC: 8.6 MG/DL — SIGNIFICANT CHANGE UP (ref 8.5–10.1)
CHLORIDE SERPL-SCNC: 102 MMOL/L — SIGNIFICANT CHANGE UP (ref 96–108)
CO2 SERPL-SCNC: 28 MMOL/L — SIGNIFICANT CHANGE UP (ref 22–31)
CREAT SERPL-MCNC: 0.5 MG/DL — SIGNIFICANT CHANGE UP (ref 0.5–1.3)
CULTURE RESULTS: SIGNIFICANT CHANGE UP
CULTURE RESULTS: SIGNIFICANT CHANGE UP
EGFR: 109 ML/MIN/1.73M2 — SIGNIFICANT CHANGE UP
GLUCOSE SERPL-MCNC: 213 MG/DL — HIGH (ref 70–99)
HCT VFR BLD CALC: 36.7 % — SIGNIFICANT CHANGE UP (ref 34.5–45)
HGB BLD-MCNC: 12.2 G/DL — SIGNIFICANT CHANGE UP (ref 11.5–15.5)
MAGNESIUM SERPL-MCNC: 2.1 MG/DL — SIGNIFICANT CHANGE UP (ref 1.6–2.6)
MCHC RBC-ENTMCNC: 30.9 PG — SIGNIFICANT CHANGE UP (ref 27–34)
MCHC RBC-ENTMCNC: 33.2 GM/DL — SIGNIFICANT CHANGE UP (ref 32–36)
MCV RBC AUTO: 92.9 FL — SIGNIFICANT CHANGE UP (ref 80–100)
NRBC # BLD: 0 /100 WBCS — SIGNIFICANT CHANGE UP (ref 0–0)
PLATELET # BLD AUTO: 202 K/UL — SIGNIFICANT CHANGE UP (ref 150–400)
POTASSIUM SERPL-MCNC: 4.1 MMOL/L — SIGNIFICANT CHANGE UP (ref 3.5–5.3)
POTASSIUM SERPL-SCNC: 4.1 MMOL/L — SIGNIFICANT CHANGE UP (ref 3.5–5.3)
RBC # BLD: 3.95 M/UL — SIGNIFICANT CHANGE UP (ref 3.8–5.2)
RBC # FLD: 13 % — SIGNIFICANT CHANGE UP (ref 10.3–14.5)
SARS-COV-2 RNA SPEC QL NAA+PROBE: SIGNIFICANT CHANGE UP
SODIUM SERPL-SCNC: 137 MMOL/L — SIGNIFICANT CHANGE UP (ref 135–145)
SPECIMEN SOURCE: SIGNIFICANT CHANGE UP
SPECIMEN SOURCE: SIGNIFICANT CHANGE UP
WBC # BLD: 7.76 K/UL — SIGNIFICANT CHANGE UP (ref 3.8–10.5)
WBC # FLD AUTO: 7.76 K/UL — SIGNIFICANT CHANGE UP (ref 3.8–10.5)

## 2023-02-16 PROCEDURE — 99232 SBSQ HOSP IP/OBS MODERATE 35: CPT

## 2023-02-16 PROCEDURE — 99233 SBSQ HOSP IP/OBS HIGH 50: CPT

## 2023-02-16 RX ORDER — PANTOPRAZOLE SODIUM 20 MG/1
40 TABLET, DELAYED RELEASE ORAL ONCE
Refills: 0 | Status: COMPLETED | OUTPATIENT
Start: 2023-02-16 | End: 2023-02-16

## 2023-02-16 RX ORDER — PANTOPRAZOLE SODIUM 20 MG/1
40 TABLET, DELAYED RELEASE ORAL
Refills: 0 | Status: DISCONTINUED | OUTPATIENT
Start: 2023-02-17 | End: 2023-02-17

## 2023-02-16 RX ORDER — OXYCODONE HYDROCHLORIDE 5 MG/1
2.5 TABLET ORAL EVERY 6 HOURS
Refills: 0 | Status: DISCONTINUED | OUTPATIENT
Start: 2023-02-16 | End: 2023-02-17

## 2023-02-16 RX ORDER — ACETAMINOPHEN WITH CODEINE 300MG-30MG
0.5 TABLET ORAL EVERY 8 HOURS
Refills: 0 | Status: DISCONTINUED | OUTPATIENT
Start: 2023-02-16 | End: 2023-02-17

## 2023-02-16 RX ORDER — OXYCODONE HYDROCHLORIDE 5 MG/1
5 TABLET ORAL EVERY 6 HOURS
Refills: 0 | Status: DISCONTINUED | OUTPATIENT
Start: 2023-02-16 | End: 2023-02-17

## 2023-02-16 RX ADMIN — Medication 8 UNIT(S): at 19:07

## 2023-02-16 RX ADMIN — Medication 0.5 TABLET(S): at 22:10

## 2023-02-16 RX ADMIN — LIDOCAINE 1 PATCH: 4 CREAM TOPICAL at 15:17

## 2023-02-16 RX ADMIN — Medication 25 MILLIGRAM(S): at 21:50

## 2023-02-16 RX ADMIN — INSULIN GLARGINE 24 UNIT(S): 100 INJECTION, SOLUTION SUBCUTANEOUS at 21:51

## 2023-02-16 RX ADMIN — Medication 500 MILLIGRAM(S): at 05:45

## 2023-02-16 RX ADMIN — OXYCODONE HYDROCHLORIDE 5 MILLIGRAM(S): 5 TABLET ORAL at 10:58

## 2023-02-16 RX ADMIN — NYSTATIN CREAM 1 APPLICATION(S): 100000 CREAM TOPICAL at 21:55

## 2023-02-16 RX ADMIN — ATORVASTATIN CALCIUM 40 MILLIGRAM(S): 80 TABLET, FILM COATED ORAL at 21:51

## 2023-02-16 RX ADMIN — PANTOPRAZOLE SODIUM 40 MILLIGRAM(S): 20 TABLET, DELAYED RELEASE ORAL at 12:43

## 2023-02-16 RX ADMIN — Medication 4: at 19:07

## 2023-02-16 RX ADMIN — Medication 2: at 12:43

## 2023-02-16 RX ADMIN — NYSTATIN CREAM 1 APPLICATION(S): 100000 CREAM TOPICAL at 15:38

## 2023-02-16 RX ADMIN — Medication 500 MILLIGRAM(S): at 19:08

## 2023-02-16 RX ADMIN — MUPIROCIN 1 APPLICATION(S): 20 OINTMENT TOPICAL at 19:08

## 2023-02-16 RX ADMIN — SENNA PLUS 2 TABLET(S): 8.6 TABLET ORAL at 21:51

## 2023-02-16 RX ADMIN — Medication 100 MILLIGRAM(S): at 05:44

## 2023-02-16 RX ADMIN — Medication 4: at 08:18

## 2023-02-16 RX ADMIN — Medication 0.5 TABLET(S): at 21:51

## 2023-02-16 RX ADMIN — Medication 100 MILLIGRAM(S): at 19:07

## 2023-02-16 RX ADMIN — OXYCODONE HYDROCHLORIDE 10 MILLIGRAM(S): 5 TABLET ORAL at 02:31

## 2023-02-16 RX ADMIN — Medication 0.5 TABLET(S): at 15:24

## 2023-02-16 RX ADMIN — NYSTATIN CREAM 1 APPLICATION(S): 100000 CREAM TOPICAL at 05:45

## 2023-02-16 RX ADMIN — Medication 0.5 TABLET(S): at 16:25

## 2023-02-16 RX ADMIN — Medication 8 UNIT(S): at 08:19

## 2023-02-16 RX ADMIN — OXYCODONE HYDROCHLORIDE 5 MILLIGRAM(S): 5 TABLET ORAL at 12:00

## 2023-02-16 RX ADMIN — Medication 40 MILLIGRAM(S): at 05:45

## 2023-02-16 RX ADMIN — METHOCARBAMOL 500 MILLIGRAM(S): 500 TABLET, FILM COATED ORAL at 19:08

## 2023-02-16 RX ADMIN — Medication 8 UNIT(S): at 12:43

## 2023-02-16 RX ADMIN — Medication 1 TABLET(S): at 12:43

## 2023-02-16 RX ADMIN — MUPIROCIN 1 APPLICATION(S): 20 OINTMENT TOPICAL at 05:45

## 2023-02-16 RX ADMIN — RIVAROXABAN 20 MILLIGRAM(S): KIT at 19:08

## 2023-02-16 NOTE — PROGRESS NOTE ADULT - SUBJECTIVE AND OBJECTIVE BOX
[facility] -- Corbin Abel,  Angela, Tico Simmons Savella, Goodger  Office # [phone]    Follow Up:  LE edema, Afib     Subjective/Observations: seen and examined, asleep, easily awaken c/o b/l LE pain and chronic  generalized body pain, lying flat in bed, denies chest pain, dyspnea, palpitations or dizziness, orthopnea and PND. Tolerating room air.     REVIEW OF SYSTEMS: All other review of systems is negative unless indicated above  PAST MEDICAL & SURGICAL HISTORY:  Atrial fibrillation      Type 2 diabetes mellitus      Congestive heart failure      Scalp psoriasis      S/P tonsillectomy      S/P appendectomy      H/O umbilical hernia repair        MEDICATIONS  (STANDING):  acetaminophen  300 mG/codeine 30 mG 0.5 Tablet(s) Oral every 8 hours  ascorbic acid 500 milliGRAM(s) Oral two times a day  atorvastatin 40 milliGRAM(s) Oral at bedtime  dextrose 5%. 1000 milliLiter(s) (50 mL/Hr) IV Continuous <Continuous>  dextrose 5%. 1000 milliLiter(s) (100 mL/Hr) IV Continuous <Continuous>  dextrose 50% Injectable 25 Gram(s) IV Push once  dextrose 50% Injectable 12.5 Gram(s) IV Push once  dextrose 50% Injectable 25 Gram(s) IV Push once  doxycycline monohydrate Capsule 100 milliGRAM(s) Oral every 12 hours  furosemide    Tablet 40 milliGRAM(s) Oral daily  glucagon  Injectable 1 milliGRAM(s) IntraMuscular once  insulin glargine Injectable (LANTUS) 24 Unit(s) SubCutaneous at bedtime  insulin lispro (ADMELOG) corrective regimen sliding scale   SubCutaneous at bedtime  insulin lispro (ADMELOG) corrective regimen sliding scale   SubCutaneous three times a day before meals  insulin lispro Injectable (ADMELOG) 8 Unit(s) SubCutaneous three times a day before meals  lidocaine   4% Patch 1 Patch Transdermal daily  metoprolol succinate  milliGRAM(s) Oral daily  multivitamin 1 Tablet(s) Oral daily  mupirocin 2% Nasal 1 Application(s) Both Nostrils two times a day  nystatin Powder 1 Application(s) Topical three times a day  pantoprazole    Tablet 40 milliGRAM(s) Oral once  pantoprazole    Tablet 40 milliGRAM(s) Oral before breakfast  polyethylene glycol 3350 17 Gram(s) Oral daily  pregabalin 25 milliGRAM(s) Oral at bedtime  rivaroxaban 20 milliGRAM(s) Oral with dinner  saline laxative (FLEET) Rectal Enema 1 Enema Rectal once  senna 2 Tablet(s) Oral at bedtime    MEDICATIONS  (PRN):  acetaminophen     Tablet .. 650 milliGRAM(s) Oral every 6 hours PRN Temp greater or equal to 38C (100.4F), Mild Pain (1 - 3)  bisacodyl Suppository 10 milliGRAM(s) Rectal daily PRN Constipation  dextrose Oral Gel 15 Gram(s) Oral once PRN Blood Glucose LESS THAN 70 milliGRAM(s)/deciliter  melatonin 3 milliGRAM(s) Oral at bedtime PRN Insomnia  methocarbamol 500 milliGRAM(s) Oral two times a day PRN Muscle Spasm  oxyCODONE    IR 5 milliGRAM(s) Oral every 6 hours PRN Severe Pain (7 - 10)  oxyCODONE    IR 2.5 milliGRAM(s) Oral every 6 hours PRN Moderate Pain (4 - 6)    Allergies    No Known Allergies    Intolerances      Vital Signs Last 24 Hrs  T(C): 37.3 (16 Feb 2023 05:09), Max: 37.3 (16 Feb 2023 05:09)  T(F): 99.1 (16 Feb 2023 05:09), Max: 99.1 (16 Feb 2023 05:09)  HR: 98 (16 Feb 2023 05:09) (88 - 109)  BP: 106/71 (16 Feb 2023 05:09) (106/71 - 123/67)  BP(mean): --  RR: 18 (16 Feb 2023 05:09) (18 - 20)  SpO2: 93% (16 Feb 2023 05:09) (93% - 95%)    Parameters below as of 16 Feb 2023 05:09  Patient On (Oxygen Delivery Method): room air      I&O's Summary    15 Feb 2023 07:01  -  16 Feb 2023 07:00  --------------------------------------------------------  IN: 0 mL / OUT: 2 mL / NET: -2 mL        TELE: Not on telemetry   PHYSICAL EXAM:  Constitutional: NAD, awake and alert, obese  HEENT: Moist Mucous Membranes, Anicteric  Pulmonary: Non-labored, breath sounds are clear bilaterally, No wheezing, rales or rhonchi  Cardiovascular: IRRR, S1 and S2, No murmurs, rubs, gallops or clicks  Gastrointestinal: Bowel Sounds present, soft, nontender.   Lymph: +2 b/l  peripheral edema. No lymphadenopathy.  Skin: + erythema, b/l LE, warm   Psych:  Mood & affect appropriate  LABS: All Labs Reviewed:                        12.2   7.76  )-----------( 202      ( 16 Feb 2023 06:31 )             36.7                         12.3   7.17  )-----------( 180      ( 15 Feb 2023 06:46 )             36.8                         12.2   8.71  )-----------( 178      ( 14 Feb 2023 06:02 )             36.8     16 Feb 2023 06:31    137    |  102    |  10     ----------------------------<  213    4.1     |  28     |  0.50   15 Feb 2023 07:44    137    |  101    |  13     ----------------------------<  252    3.9     |  28     |  0.52   14 Feb 2023 06:02    137    |  99     |  11     ----------------------------<  288    3.7     |  32     |  0.58     Ca    8.6        16 Feb 2023 06:31  Ca    8.5        15 Feb 2023 07:44  Ca    8.5        14 Feb 2023 06:02  Mg     2.1       16 Feb 2023 06:31  Mg     2.0       15 Feb 2023 07:44  Mg     2.0       14 Feb 2023 06:02      PTT - ( 15 Feb 2023 06:46 )  PTT:31.2 sec      12 Lead ECG:   Ventricular Rate 96 BPM    QRS Duration 78 ms    Q-T Interval 358 ms    QTC Calculation(Bazett) 452 ms    R Axis 30 degrees    T Axis -12 degrees    Diagnosis Line Atrial fibrillation  Abnormal ECG  No previous ECGs available  Confirmed by JAS PRIETO ([id number]) on 2/11/2023 2:13:40 PM (02-11-23 @ 11:23)

## 2023-02-16 NOTE — PROGRESS NOTE ADULT - SUBJECTIVE AND OBJECTIVE BOX
Patient is a 57y old  Female who presents with a chief complaint of L foot wound (16 Feb 2023 10:18)      Subjective:  INTERVAL HPI/OVERNIGHT EVENTS: Patient seen and examined at bedside.  Patient is crying, states she has pain all over her body   MEDICATIONS  (STANDING):  acetaminophen  300 mG/codeine 30 mG 0.5 Tablet(s) Oral every 8 hours  ascorbic acid 500 milliGRAM(s) Oral two times a day  atorvastatin 40 milliGRAM(s) Oral at bedtime  dextrose 5%. 1000 milliLiter(s) (50 mL/Hr) IV Continuous <Continuous>  dextrose 5%. 1000 milliLiter(s) (100 mL/Hr) IV Continuous <Continuous>  dextrose 50% Injectable 25 Gram(s) IV Push once  dextrose 50% Injectable 12.5 Gram(s) IV Push once  dextrose 50% Injectable 25 Gram(s) IV Push once  doxycycline monohydrate Capsule 100 milliGRAM(s) Oral every 12 hours  furosemide    Tablet 40 milliGRAM(s) Oral daily  glucagon  Injectable 1 milliGRAM(s) IntraMuscular once  insulin glargine Injectable (LANTUS) 24 Unit(s) SubCutaneous at bedtime  insulin lispro (ADMELOG) corrective regimen sliding scale   SubCutaneous three times a day before meals  insulin lispro (ADMELOG) corrective regimen sliding scale   SubCutaneous at bedtime  insulin lispro Injectable (ADMELOG) 8 Unit(s) SubCutaneous three times a day before meals  lidocaine   4% Patch 1 Patch Transdermal daily  metoprolol succinate  milliGRAM(s) Oral daily  multivitamin 1 Tablet(s) Oral daily  mupirocin 2% Nasal 1 Application(s) Both Nostrils two times a day  nystatin Powder 1 Application(s) Topical three times a day  pantoprazole    Tablet 40 milliGRAM(s) Oral before breakfast  polyethylene glycol 3350 17 Gram(s) Oral daily  pregabalin 25 milliGRAM(s) Oral at bedtime  rivaroxaban 20 milliGRAM(s) Oral with dinner  saline laxative (FLEET) Rectal Enema 1 Enema Rectal once  senna 2 Tablet(s) Oral at bedtime    MEDICATIONS  (PRN):  acetaminophen     Tablet .. 650 milliGRAM(s) Oral every 6 hours PRN Temp greater or equal to 38C (100.4F), Mild Pain (1 - 3)  bisacodyl Suppository 10 milliGRAM(s) Rectal daily PRN Constipation  dextrose Oral Gel 15 Gram(s) Oral once PRN Blood Glucose LESS THAN 70 milliGRAM(s)/deciliter  melatonin 3 milliGRAM(s) Oral at bedtime PRN Insomnia  methocarbamol 500 milliGRAM(s) Oral two times a day PRN Muscle Spasm  oxyCODONE    IR 5 milliGRAM(s) Oral every 6 hours PRN Severe Pain (7 - 10)  oxyCODONE    IR 2.5 milliGRAM(s) Oral every 6 hours PRN Moderate Pain (4 - 6)      Allergies    No Known Allergies    Intolerances        REVIEW OF SYSTEMS:  CONSTITUTIONAL: No fever or chills  HEENT:  No headache, no sore throat  RESPIRATORY: No cough or shortness of breath  CARDIOVASCULAR: No chest pain or palpitations  GASTROINTESTINAL: No abd pain, nausea, vomiting, or diarrhea      Objective:  Vital Signs Last 24 Hrs  T(C): 36.9 (16 Feb 2023 12:26), Max: 37.3 (16 Feb 2023 05:09)  T(F): 98.4 (16 Feb 2023 12:26), Max: 99.1 (16 Feb 2023 05:09)  HR: 88 (16 Feb 2023 12:26) (88 - 109)  BP: 139/86 (16 Feb 2023 12:26) (106/71 - 139/86)  BP(mean): --  RR: 20 (16 Feb 2023 12:26) (18 - 20)  SpO2: 95% (16 Feb 2023 12:26) (93% - 95%)    Parameters below as of 16 Feb 2023 12:26  Patient On (Oxygen Delivery Method): room air        GENERAL: NAD, lying in bed, crying   HEAD:  Normocephalic  EYES:  conjunctiva and sclera clear  ENT: Moist mucous membranes  NECK: Supple  CHEST/LUNG: Clear to auscultation bilaterally; No rales or rhonchi; no wheezing. Unlabored respirations  HEART: Regular rate and rhythm; S1S2+  ABDOMEN: Bowel sounds present; Soft, Nontender, Nondistended.   EXTREMITIES:  + distal Peripheral Pulses; Left foot in clean dressing, +varicose vein , pain upon light tough, no apparent redness    NERVOUS SYSTEM:  Alert & Oriented X3;  No gross focal deficits   MSK: moves all extremities  SKIN: No rashes   psy: crying episodes, as per her HCP, concern for underlying psychological condition, patient denies any depression and anxiety     LABS:                        12.2   7.76  )-----------( 202      ( 16 Feb 2023 06:31 )             36.7     16 Feb 2023 06:31    137    |  102    |  10     ----------------------------<  213    4.1     |  28     |  0.50     Ca    8.6        16 Feb 2023 06:31  Mg     2.1       16 Feb 2023 06:31      PTT - ( 15 Feb 2023 06:46 )  PTT:31.2 sec    CAPILLARY BLOOD GLUCOSE      POCT Blood Glucose.: 189 mg/dL (16 Feb 2023 12:00)  POCT Blood Glucose.: 224 mg/dL (16 Feb 2023 07:38)  POCT Blood Glucose.: 236 mg/dL (15 Feb 2023 21:53)  POCT Blood Glucose.: 207 mg/dL (15 Feb 2023 16:40)        Culture - Other (collected 02-12-23 @ 17:00)  Source: Wound Wound  Final Report (02-14-23 @ 15:00):    Numerous Methicillin Resistant Staphylococcus aureus  Organism: Methicillin resistant Staphylococcus aureus (02-14-23 @ 15:00)  Organism: Methicillin resistant Staphylococcus aureus (02-14-23 @ 15:00)      -  Ampicillin/Sulbactam: R <=8/4      -  Cefazolin: R <=4      -  Clindamycin: S <=0.25      -  Daptomycin: S 0.5      -  Erythromycin: S <=0.25      -  Gentamicin: S <=1 Should not be used as monotherapy      -  Linezolid: S 2      -  Oxacillin: R >2      -  Penicillin: R >8      -  Rifampin: S <=1 Should not be used as monotherapy      -  Tetracycline: S <=1      -  Trimethoprim/Sulfamethoxazole: S <=0.5/9.5      -  Vancomycin: S 2      Method Type: SASHA    Culture - Blood (collected 02-11-23 @ 11:25)  Source: .Blood Blood-Peripheral  Preliminary Report (02-12-23 @ 17:01):    No growth to date.    Culture - Urine (collected 02-11-23 @ 11:24)  Source: Clean Catch Clean Catch (Midstream)  Final Report (02-12-23 @ 14:10):    <10,000 CFU/mL Normal Urogenital Angelique    Culture - Blood (collected 02-11-23 @ 11:10)  Source: .Blood Blood-Peripheral  Preliminary Report (02-12-23 @ 17:01):    No growth to date.        RADIOLOGY & ADDITIONAL TESTS:    Personally reviewed.     Consultant(s) Notes Reviewed:  [x] YES  [ ] NO    Plan of care discussed with patient /family; all questions answered

## 2023-02-16 NOTE — PROGRESS NOTE ADULT - TIME BILLING
direct patient care including but not limited to reviewing chart, medications ,laboratory data, imaging reports, discussion of plan of care with consultants on the case, coordination of care with multidisciplinary team involved in the case and discussion of plan with patient.  Patient and family agreeable to plan of care and verbalized understanding the anticipated hospital course and treatment plan.
direct patient care including but not limited to reviewing chart, medications ,laboratory data, imaging reports, discussion of plan of care with consultants on the case, coordination of care with multidisciplinary team involved in the case and discussion of plan with patient.  Patient and family agreeable to plan of care and verbalized understanding the anticipated hospital course and treatment plan.

## 2023-02-16 NOTE — PROGRESS NOTE ADULT - NUTRITIONAL ASSESSMENT
This patient has been assessed with a concern for Malnutrition and has been determined to have a diagnosis/diagnoses of Morbid obesity (BMI > 40).    This patient is being managed with:   Diet Consistent Carbohydrate w/Evening Snack-  Entered: Feb 11 2023  3:56PM    

## 2023-02-16 NOTE — PROGRESS NOTE ADULT - SUBJECTIVE AND OBJECTIVE BOX
MELITON LOZANO is a 57yFemale , patient examined and chart reviewed.       INTERVAL HPI/ OVERNIGHT EVENTS:   Afebrile. C/o pain.  No events.    PAST MEDICAL & SURGICAL HISTORY:  Atrial fibrillation  Type 2 diabetes mellitus  Congestive heart failure  Scalp psoriasis  S/P tonsillectomy  S/P appendectomy  H/O umbilical hernia repair        For details regarding the patient's social history, family history, and other miscellaneous elements, please refer the initial infectious diseases consultation and/or the admitting history and physical examination for this admission.    ROS:  CONSTITUTIONAL:  Negative fever or chills,  EYES:  Negative  blurry vision or double vision  CARDIOVASCULAR:  Negative for chest pain or palpitations  RESPIRATORY:  Negative for cough, wheezing, or SOB   GASTROINTESTINAL:  Negative for nausea, vomiting, diarrhea, constipation, or abdominal pain  GENITOURINARY:  Negative frequency, urgency or dysuria  NEUROLOGIC:  No headache, confusion, dizziness, lightheadedness  All other systems were reviewed and are negative         Current inpatient medications :    ANTIBIOTICS/RELEVANT:  doxycycline monohydrate Capsule 100 milliGRAM(s) Oral every 12 hours    MEDICATIONS  (STANDING):  acetaminophen  300 mG/codeine 30 mG 0.5 Tablet(s) Oral every 8 hours  ascorbic acid 500 milliGRAM(s) Oral two times a day  atorvastatin 40 milliGRAM(s) Oral at bedtime  dextrose 5%. 1000 milliLiter(s) (50 mL/Hr) IV Continuous <Continuous>  dextrose 5%. 1000 milliLiter(s) (100 mL/Hr) IV Continuous <Continuous>  dextrose 50% Injectable 25 Gram(s) IV Push once  dextrose 50% Injectable 12.5 Gram(s) IV Push once  dextrose 50% Injectable 25 Gram(s) IV Push once  furosemide    Tablet 40 milliGRAM(s) Oral daily  glucagon  Injectable 1 milliGRAM(s) IntraMuscular once  insulin glargine Injectable (LANTUS) 24 Unit(s) SubCutaneous at bedtime  insulin lispro (ADMELOG) corrective regimen sliding scale   SubCutaneous three times a day before meals  insulin lispro (ADMELOG) corrective regimen sliding scale   SubCutaneous at bedtime  insulin lispro Injectable (ADMELOG) 8 Unit(s) SubCutaneous three times a day before meals  lidocaine   4% Patch 1 Patch Transdermal daily  metoprolol succinate  milliGRAM(s) Oral daily  multivitamin 1 Tablet(s) Oral daily  mupirocin 2% Nasal 1 Application(s) Both Nostrils two times a day  nystatin Powder 1 Application(s) Topical three times a day  pantoprazole    Tablet 40 milliGRAM(s) Oral before breakfast  polyethylene glycol 3350 17 Gram(s) Oral daily  pregabalin 25 milliGRAM(s) Oral at bedtime  rivaroxaban 20 milliGRAM(s) Oral with dinner  saline laxative (FLEET) Rectal Enema 1 Enema Rectal once  senna 2 Tablet(s) Oral at bedtime    MEDICATIONS  (PRN):  acetaminophen     Tablet .. 650 milliGRAM(s) Oral every 6 hours PRN Temp greater or equal to 38C (100.4F), Mild Pain (1 - 3)  bisacodyl Suppository 10 milliGRAM(s) Rectal daily PRN Constipation  dextrose Oral Gel 15 Gram(s) Oral once PRN Blood Glucose LESS THAN 70 milliGRAM(s)/deciliter  melatonin 3 milliGRAM(s) Oral at bedtime PRN Insomnia  methocarbamol 500 milliGRAM(s) Oral two times a day PRN Muscle Spasm  oxyCODONE    IR 5 milliGRAM(s) Oral every 6 hours PRN Severe Pain (7 - 10)  oxyCODONE    IR 2.5 milliGRAM(s) Oral every 6 hours PRN Moderate Pain (4 - 6)        Objective:  Vital Signs Last 24 Hrs  T(C): 36.9 (2023 12:26), Max: 37.3 (2023 05:09)  T(F): 98.4 (2023 12:26), Max: 99.1 (2023 05:09)  HR: 88 (2023 12:) (88 - 109)  BP: 139/86 (2023 12:) (106/71 - 139/86)  RR: 20 (2023 12:) (18 - 20)  SpO2: 95% (2023 12:) (93% - 95%)    Parameters below as of 2023 12:26  Patient On (Oxygen Delivery Method): room air      Physical Exam:  General: c/o pain  Neck: supple, trachea midline  Lungs: clear, no wheeze/rhonchi  Cardiovascular: regular rate and rhythm, S1 S2  Abdomen: soft, nontender,  bowel sounds normal  Neurological: alert and oriented x3  Skin: no rash  Extremities: Left heel ulcer with foot swelling and redness improving   Justin LE chronic stasis with venous insufficiency      LABS:                        12.2   7.76  )-----------( 202      ( 2023 06:31 )             36.7   02-16    137  |  102  |  10  ----------------------------<  213<H>  4.1   |  28  |  0.50    Ca    8.6      2023 06:31  Mg     2.1     02-16        Urinalysis Basic - ( 2023 11:24 )    Color: Pale Yellow / Appearance: Clear / S.010 / pH: x  Gluc: x / Ketone: Negative  / Bili: Negative / Urobili: Negative   Blood: x / Protein: Negative / Nitrite: Negative   Leuk Esterase: Negative / RBC: x / WBC x   Sq Epi: x / Non Sq Epi: x / Bacteria: x      MICROBIOLOGY:  Culture - Other (23 @ 17:00)    -  Ampicillin/Sulbactam: R <=8/4    -  Cefazolin: R <=4    -  Clindamycin: S <=0.25    -  Daptomycin: S 0.5    -  Erythromycin: S <=0.25    -  Gentamicin: S <=1 Should not be used as monotherapy    -  Linezolid: S 2    -  Oxacillin: R >2    -  Penicillin: R >8    -  Rifampin: S <=1 Should not be used as monotherapy    -  Tetracycline: S <=1    -  Trimethoprim/Sulfamethoxazole: S <=0.5/9.5    -  Vancomycin: S 2    Specimen Source: Wound Wound    Culture Results:   Numerous Methicillin Resistant Staphylococcus aureus    Organism Identification: Methicillin resistant Staphylococcus aureus    Organism: Methicillin resistant Staphylococcus aureus    Method Type: SASHA      Culture - Blood (collected 2023 11:25)  Source: .Blood Blood-Peripheral  Preliminary Report (2023 17:01):    No growth to date.    Culture - Urine (collected 2023 11:24)  Source: Clean Catch Clean Catch (Midstream)  Final Report (2023 14:10):    <10,000 CFU/mL Normal Urogenital Angelique    Culture - Blood (collected 2023 11:10)  Source: .Blood Blood-Peripheral  Preliminary Report (2023 17:01):    No growth to date.    RADIOLOGY & ADDITIONAL STUDIES:  ACC: [id number] EXAM:  US DPLX LWR EXT VEINS COMPL BI   ORDERED BY: PETRE ANGEL     PROCEDURE DATE:  2023          INTERPRETATION:  CLINICAL INFORMATION: Body aches. Duplex swelling. Rule   out DVT.    COMPARISON: None available.    TECHNIQUE: Duplex sonography of the BILATERAL LOWER extremity veins with   color and spectral Doppler, with and without compression.    FINDINGS:    RIGHT:  Normal compressibility of the RIGHT common femoral, femoral and popliteal   veins.  Doppler examination shows normal spontaneous and phasic flow.  No RIGHT calf vein thrombosis is detected.    LEFT:  Normal compressibility of the LEFT common femoral, femoral and popliteal   veins.  Doppler examination shows normal spontaneous and phasic flow.  No LEFTcalf vein thrombosis is detected.    Subcutaneous edema bilaterally.    IMPRESSION:  No evidence of deep venous thrombosis in either lower extremity.      ACC: [id number] EXAM:  MR FOOT LT   ORDERED BY: ALVINO DIAMOND     PROCEDURE DATE:  2023          INTERPRETATION:  LEFT FOOT MRI    CLINICAL INFORMATION: Diabetic patient with left heel wound. Evaluate for   osteomyelitis.  TECHNIQUE: Multiplanar, multisequence MRI was obtained of the LEFT foot   focusing on the hindfoot without the use of intravenous or   intra-articular contrast.  COMPARISON: Bilateral foot radiographs 2023.    FINDINGS:    PERIPHERAL SOFT TISSUES: The patient's reported site of ulceration is not   well identified on MRI. There is diffuse nonspecific edema in the   subcutaneous fat. No focal fluid collection to suggest abscess.  BONE MARROW: No STIR signal or loss of T1 hyperintense signal to suggest   acute osteomyelitis. No fracture or osteonecrosis.    MUSCLES AND TENDONS: Tendons are intact. Mild nonspecific edema of the   intrinsic musculature of the foot.  LIGAMENTS AND CAPSULAR STRUCTURES: Partial-thickness tearing at the   attachment of the plantarfascia on the calcaneus plantar calcaneal heel   spur. Attenuated appearance of the ATFL consistent with chronic sprain.   Ankle ligaments are otherwise intact.  CARTILAGE AND SUBCHONDRAL BONE: No full-thickness chondral loss.  SYNOVIUM/JOINT FLUID: No large joint effusion.      IMPRESSION:  1.  No MR evidence of acute osteomyelitis.  2.  Plantar fasciitis with partial-thickness tearing at the attachment of   the plantar fascia on the calcaneus.      ACC: [id number] EXAM:  MR SPINE LUMBAR IC   ORDERED BY:  MARYCRUZ CASPER     PROCEDURE DATE:  02/15/2023          INTERPRETATION:  CLINICAL INFORMATION: History of back pain with open   left foot ulcer. Rule out discitis.  ADDITIONAL CLINICAL INFORMATION:Osteomyelitis vertebra M46.26    TECHNIQUE: Multi planar pre and postcontrast T1 weighted MRI sequences of   the lumbar spine was performed. Contrast dose: 10 cc Gadavist IV contrast.    COMPARISON: Precontrast MRI lumbar spine 2/15/2023. CT abdomen and pelvis   2023.    FINDINGS:    ALIGNMENT: Lumbarization of S1 vertebral body is noted with a rudimentary   disc at S1-S2.    VERTEBRAE: The vertebral bodies are normal in height. There is no   fracture or aggressive osseous lesion. Type II Modic endplate   degenerative changes noted at L4-L5 and L5-S1. No destructive changes or   enhancement identified.    Please see preceding precontrast MRI lumbar spine 2/15/2023 for level by   level degenerative findings.    DISCS: Multilevel minimal disc dessication and loss of height. No   destructive changes or enhancement identified.    CONUS MEDULLARIS AND CAUDA EQUINA: The conus medullaris terminates at   T12-L1. No abnormal enhancement of the cauda equina.    PARAVERTEBRAL SOFT TISSUES AND VISUALIZED RETROPERITONEUM: Paraspinal   musculature is unremarkable. Included abdominal pelvic contents are   unremarkable.    IMPRESSION:    No abnormal enhancement to suggest discitis/osteomyelitis.      Assessment :   57y F PMH of Afib (on Xarelto), DM2, and CHF (unknown EF) Obesity admitted with unstagable infected painful left heel decub ulcer with foot/leg cellulitis,  MRI neg for OM  Wound culture with Numerous MRSA  Back pain- degenerative changes noted at L4-L5 and L5-S1- seen by ortho conservative management      Plan :   Off Zyvox  Po Doxycycline 100mg po bid x 10 days   Trend temps and cbc  Podiatry on case  Pain control  Pulm toileting  Increase activity  Stable from ID standpoint  Dc planning to rehab    Continue with present regiment.  Appropriate use of antibiotics and adverse effects reviewed.    > 35 minutes were spent in direct patient care reviewing notes, medications ,labs data/ imaging , discussion with multidisciplinary team.    Thank you for allowing me to participate in care of your patient .    Carole Mohr MD  Infectious Disease  [phone]

## 2023-02-16 NOTE — PROGRESS NOTE ADULT - NS ATTEND AMEND GEN_ALL_CORE FT
cont lasix for edema. Appears compensated from HF POV. cont other meds. Further cardiac workup will depend on clinical course.

## 2023-02-16 NOTE — DISCHARGE NOTE NURSING/CASE MANAGEMENT/SOCIAL WORK - NSDCPEFALRISK_GEN_ALL_CORE
For information on Fall & Injury Prevention, visit: [email or web address] OR  [email or web address] OR  https://www.cdc.gov/steadi/patient.html

## 2023-02-16 NOTE — PROGRESS NOTE ADULT - ASSESSMENT
57y F PMH of Afib, DM2, and CHF (unknown EF) presenting with left heel wound and bilateral lower extremity swelling and redness admitted for Bilateral LE cellulitis with diabetic foot ulcer.      Problem/Plan - 1:  ·  Problem: Bilateral lower leg cellulitis.   ·  Plan: Patient presenting with b/l LE edema and erythema. VS stable and no leukocytosis. S/p Vancomycin, Rocephin and 2L NS in ED  - Lactate of 3.6 on admission, repeat lactate 2.9--->trended to normal   - B/l LE doppler negative for DVT   - F/u x- ray of bilateral feet No radiographic evidence of osteomyelitis within either foot.  Soft tissue swelling within both feet. No evidence of acute fracture.  - MRI foot: 1.  No MR evidence of acute osteomyelitis.  2.  Plantar fasciitis with partial-thickness tearing at the attachment of the plantar fascia on the calcaneus.  - Pain regimen: Tylenol (mild), Oxycodone 5mg q6h PRN (moderate), Oxycodone 10mg PO Q6h PRN (severe)  - blood cx NGTD, urine negative, wound cx +staph aureus  - Podiatry: no surgical intervention, Patient may follow-up at the [facility] with Dr. Galarza, Dr. Meek, or Dr. Watson within 1 to 2 weeks upon discharge.  - ID, Dr. Mohr consulted: - IV Zyvox->Doxy  - Vascular:  Dr. Perez consulted: CTA without significant PAD,Elevate BLE; if possible use ACE wraps for gentle compression, No vascular surgical intervention indicated,  D/C planning to [facility]      Problem/Plan - 2:  ·  Problem: Diabetic ulcer of left foot.   ·  Plan: - MRI foot:  1.  No MR evidence of acute osteomyelitis.  2.  Plantar fasciitis with partial-thickness tearing at the attachment of   the plantar fascia on the calcaneus.  - Continue doxy  - Podiatry and vascular f/u - Plan as above.     Problem/Plan - 3:  ·  Problem: Back pain.   ·  Plan: Chronic , however, worsen as per patient with B/L Leg pain   - Xray of lumbar spine No evidence of fracture  - continue Lidocaine patch for lumbar spine, apply to lower back  - Pain regimen: Tylenol and oxycodone PRN  -add lyrica for neuropathy   MRI lumbar spine: < from: MR Lumbar Spine No Cont (02.15.23 @ 10:12) >  Clumped appearance to the descending lumbosacral nerve roots. This could   represent the presence of arachnoiditis.  L4-L5 moderate thecal sac compression and mass effect upon the descending   right L5 nerve root right lateral recess.  Other milder degenerative changes.  spine ortho consult appreciated: no acute ortho intervention. out patient follow up    pain management: tylenol #3 ATC WITH Oxycodone prn           Problem/Plan - 4:  ·  Problem: Abdominal pain.   ·  Plan: Patient reports diffuse severe pain with meals  - Patient reports EGD/colonoscopy in August/September 2022: Normal  - CT Angio abd/pelvis with IV cont No significant arterial stenosis up to the popliteal arteries. Mild trifurcation artery disease as described above, otherwise with three-vessel contrast opacification to the ankles.    patient reports no BM for 4 days , will add bowel regiment  S/S assess for swallow function : regular diet   PPI added for GI prophylaxis        Problem/Plan - 5:  ·  Problem: DM (diabetes mellitus).   ·  Plan: Insulin dependent DM2, blood glucose on admission 373  - Home regimen include: metformin 1000mg BID, basaglar 25U qhs, admelog 15u TID  - Will hold metformin while inpatient    - Increased Lantus to 24U qhs  - increase Admelog 8U premeal   - Start moderate dose insulin corrective scale  - Hypoglycemia protocol, fingerstick glucose qAc and qHs  - Consistent Carbohydrate diet  - HbA1c 12.8  - Endocrine f/u       Problem/Plan - 6:  ·  Problem: Afib.   ·  Plan: Patient with Hx of afib  - EKG with afib at 96 bpm   - On Xarelto for AC at home.  No surgical intervention planned for diabetic foot ulcer.  Will resume Xarelto.    - On Metoprolol succinate for rate control, continue with hold parameters   - Took digoxin in the past. She does not take digoxin anymore.     Problem/Plan - 7:  ·  Problem: CHF, chronic.   ·  Plan: Unknown EF, with chronic LE swelling   - Continue home Lasix 40mg QD  - f/u TTE  - Strict I&Os, daily weights.     Problem/Plan - 8:  ·  Problem: Severe obesity (BMI >= 40).   ·  Plan: Patient with BMI of 46  - LDL 99  - Counselling on nutrition and exercise on discharge.     Problem/Plan - 9:  ·  Problem: Need for prophylactic measure.   ·  Plan: resume on xarelto      depressed mood: will have psy eval

## 2023-02-16 NOTE — DISCHARGE NOTE NURSING/CASE MANAGEMENT/SOCIAL WORK - PATIENT PORTAL LINK FT
You can access the FollowMyHealth Patient Portal offered by [facility] by registering at the following website: [email or web address]. By joining [employer]s FollowMyHealth portal, you will also be able to view your health information using other applications (apps) compatible with our system.

## 2023-02-16 NOTE — PROGRESS NOTE ADULT - ASSESSMENT
57y F PMH of Afib (on Xarelto), DM2, and CHF (unknown EF) presenting with left heel wound and bilateral lower extremity swelling and redness    CHF, Afib   - p/w b/l LE swelling and redness, with compliant of generalized body pain  - per patient she used to follow a cardiologist but have not seen one in while since her insurance change, but from what she recalls all her cardiac workup are all normal    - Has baseline LE edema which is unchanged and per pt progressively worsening, appears cellulitic in nature  - continue home dose Lasix   - CXR: no acute pathology   - LE US neg for DVT    - f/u  TTE  - monitor strict I/O's    - EKG: Afib. No anginal complaints   - No signs of acute ischemia     - known hx of Afib  - BP , HR stable and controlled per flow sheet  - continue AC, BB  - Monitor and replete lytes, keep K>4, Mg>2.    - ABX per ID, podiatry following   - she remains w/ c/o dysphagia, though states that she tolerates taking her home meds, though seems to be tolerating breakfast   - she had EGD/ Colonoscopy in 10/2022, all normal    - Will continue to follow.    Maru Barth, [facility]  Nurse Practitioner - Cardiology   Spectra #[id number]/ [phone]

## 2023-02-17 ENCOUNTER — TRANSCRIPTION ENCOUNTER (OUTPATIENT)
Age: 58
End: 2023-02-17

## 2023-02-17 VITALS
OXYGEN SATURATION: 92 % | DIASTOLIC BLOOD PRESSURE: 71 MMHG | HEART RATE: 82 BPM | SYSTOLIC BLOOD PRESSURE: 108 MMHG | TEMPERATURE: 99 F | RESPIRATION RATE: 18 BRPM

## 2023-02-17 LAB
ANION GAP SERPL CALC-SCNC: 7 MMOL/L — SIGNIFICANT CHANGE UP (ref 5–17)
BUN SERPL-MCNC: 11 MG/DL — SIGNIFICANT CHANGE UP (ref 7–23)
CALCIUM SERPL-MCNC: 8.8 MG/DL — SIGNIFICANT CHANGE UP (ref 8.5–10.1)
CHLORIDE SERPL-SCNC: 103 MMOL/L — SIGNIFICANT CHANGE UP (ref 96–108)
CO2 SERPL-SCNC: 28 MMOL/L — SIGNIFICANT CHANGE UP (ref 22–31)
CREAT SERPL-MCNC: 0.51 MG/DL — SIGNIFICANT CHANGE UP (ref 0.5–1.3)
EGFR: 108 ML/MIN/1.73M2 — SIGNIFICANT CHANGE UP
GLUCOSE SERPL-MCNC: 219 MG/DL — HIGH (ref 70–99)
HCT VFR BLD CALC: 38 % — SIGNIFICANT CHANGE UP (ref 34.5–45)
HGB BLD-MCNC: 12.7 G/DL — SIGNIFICANT CHANGE UP (ref 11.5–15.5)
MAGNESIUM SERPL-MCNC: 2.1 MG/DL — SIGNIFICANT CHANGE UP (ref 1.6–2.6)
MCHC RBC-ENTMCNC: 31 PG — SIGNIFICANT CHANGE UP (ref 27–34)
MCHC RBC-ENTMCNC: 33.4 GM/DL — SIGNIFICANT CHANGE UP (ref 32–36)
MCV RBC AUTO: 92.7 FL — SIGNIFICANT CHANGE UP (ref 80–100)
NRBC # BLD: 0 /100 WBCS — SIGNIFICANT CHANGE UP (ref 0–0)
PLATELET # BLD AUTO: 202 K/UL — SIGNIFICANT CHANGE UP (ref 150–400)
POTASSIUM SERPL-MCNC: 4.2 MMOL/L — SIGNIFICANT CHANGE UP (ref 3.5–5.3)
POTASSIUM SERPL-SCNC: 4.2 MMOL/L — SIGNIFICANT CHANGE UP (ref 3.5–5.3)
RBC # BLD: 4.1 M/UL — SIGNIFICANT CHANGE UP (ref 3.8–5.2)
RBC # FLD: 13.3 % — SIGNIFICANT CHANGE UP (ref 10.3–14.5)
SODIUM SERPL-SCNC: 138 MMOL/L — SIGNIFICANT CHANGE UP (ref 135–145)
WBC # BLD: 8.07 K/UL — SIGNIFICANT CHANGE UP (ref 3.8–10.5)
WBC # FLD AUTO: 8.07 K/UL — SIGNIFICANT CHANGE UP (ref 3.8–10.5)

## 2023-02-17 PROCEDURE — 85610 PROTHROMBIN TIME: CPT

## 2023-02-17 PROCEDURE — 87640 STAPH A DNA AMP PROBE: CPT

## 2023-02-17 PROCEDURE — 87637 SARSCOV2&INF A&B&RSV AMP PRB: CPT

## 2023-02-17 PROCEDURE — 99232 SBSQ HOSP IP/OBS MODERATE 35: CPT

## 2023-02-17 PROCEDURE — 75635 CT ANGIO ABDOMINAL ARTERIES: CPT

## 2023-02-17 PROCEDURE — 72100 X-RAY EXAM L-S SPINE 2/3 VWS: CPT

## 2023-02-17 PROCEDURE — 96361 HYDRATE IV INFUSION ADD-ON: CPT

## 2023-02-17 PROCEDURE — A9579: CPT

## 2023-02-17 PROCEDURE — U0005: CPT

## 2023-02-17 PROCEDURE — 87070 CULTURE OTHR SPECIMN AEROBIC: CPT

## 2023-02-17 PROCEDURE — 97162 PT EVAL MOD COMPLEX 30 MIN: CPT

## 2023-02-17 PROCEDURE — 36415 COLL VENOUS BLD VENIPUNCTURE: CPT

## 2023-02-17 PROCEDURE — 72148 MRI LUMBAR SPINE W/O DYE: CPT

## 2023-02-17 PROCEDURE — 86803 HEPATITIS C AB TEST: CPT

## 2023-02-17 PROCEDURE — 81003 URINALYSIS AUTO W/O SCOPE: CPT

## 2023-02-17 PROCEDURE — G1004: CPT

## 2023-02-17 PROCEDURE — 80053 COMPREHEN METABOLIC PANEL: CPT

## 2023-02-17 PROCEDURE — 99285 EMERGENCY DEPT VISIT HI MDM: CPT

## 2023-02-17 PROCEDURE — 87086 URINE CULTURE/COLONY COUNT: CPT

## 2023-02-17 PROCEDURE — 72149 MRI LUMBAR SPINE W/DYE: CPT

## 2023-02-17 PROCEDURE — 99239 HOSP IP/OBS DSCHRG MGMT >30: CPT

## 2023-02-17 PROCEDURE — 96375 TX/PRO/DX INJ NEW DRUG ADDON: CPT

## 2023-02-17 PROCEDURE — 85025 COMPLETE CBC W/AUTO DIFF WBC: CPT

## 2023-02-17 PROCEDURE — U0003: CPT

## 2023-02-17 PROCEDURE — 97530 THERAPEUTIC ACTIVITIES: CPT

## 2023-02-17 PROCEDURE — 83036 HEMOGLOBIN GLYCOSYLATED A1C: CPT

## 2023-02-17 PROCEDURE — 96374 THER/PROPH/DIAG INJ IV PUSH: CPT

## 2023-02-17 PROCEDURE — 73718 MRI LOWER EXTREMITY W/O DYE: CPT | Mod: MG

## 2023-02-17 PROCEDURE — 80061 LIPID PANEL: CPT

## 2023-02-17 PROCEDURE — 97110 THERAPEUTIC EXERCISES: CPT

## 2023-02-17 PROCEDURE — 85730 THROMBOPLASTIN TIME PARTIAL: CPT

## 2023-02-17 PROCEDURE — 80048 BASIC METABOLIC PNL TOTAL CA: CPT

## 2023-02-17 PROCEDURE — 83605 ASSAY OF LACTIC ACID: CPT

## 2023-02-17 PROCEDURE — 87186 SC STD MICRODIL/AGAR DIL: CPT

## 2023-02-17 PROCEDURE — 73630 X-RAY EXAM OF FOOT: CPT

## 2023-02-17 PROCEDURE — 71045 X-RAY EXAM CHEST 1 VIEW: CPT

## 2023-02-17 PROCEDURE — 87040 BLOOD CULTURE FOR BACTERIA: CPT

## 2023-02-17 PROCEDURE — 83735 ASSAY OF MAGNESIUM: CPT

## 2023-02-17 PROCEDURE — 93970 EXTREMITY STUDY: CPT

## 2023-02-17 PROCEDURE — 92610 EVALUATE SWALLOWING FUNCTION: CPT

## 2023-02-17 PROCEDURE — 85027 COMPLETE CBC AUTOMATED: CPT

## 2023-02-17 PROCEDURE — 80162 ASSAY OF DIGOXIN TOTAL: CPT

## 2023-02-17 PROCEDURE — 93005 ELECTROCARDIOGRAM TRACING: CPT

## 2023-02-17 PROCEDURE — 82962 GLUCOSE BLOOD TEST: CPT

## 2023-02-17 PROCEDURE — 87641 MR-STAPH DNA AMP PROBE: CPT

## 2023-02-17 RX ORDER — INSULIN LISPRO 100/ML
15 VIAL (ML) SUBCUTANEOUS
Qty: 0 | Refills: 0 | DISCHARGE

## 2023-02-17 RX ORDER — LIDOCAINE 4 G/100G
1 CREAM TOPICAL
Qty: 1 | Refills: 0
Start: 2023-02-17

## 2023-02-17 RX ORDER — ASCORBIC ACID 60 MG
1 TABLET,CHEWABLE ORAL
Qty: 0 | Refills: 0 | DISCHARGE
Start: 2023-02-17

## 2023-02-17 RX ORDER — METHOCARBAMOL 500 MG/1
1 TABLET, FILM COATED ORAL
Qty: 0 | Refills: 0 | DISCHARGE
Start: 2023-02-17

## 2023-02-17 RX ORDER — ATORVASTATIN CALCIUM 80 MG/1
1 TABLET, FILM COATED ORAL
Qty: 0 | Refills: 0 | DISCHARGE
Start: 2023-02-17

## 2023-02-17 RX ORDER — OXYCODONE HYDROCHLORIDE 5 MG/1
1 TABLET ORAL
Qty: 0 | Refills: 0 | DISCHARGE
Start: 2023-02-17

## 2023-02-17 RX ORDER — SENNA PLUS 8.6 MG/1
2 TABLET ORAL
Qty: 0 | Refills: 0 | DISCHARGE
Start: 2023-02-17

## 2023-02-17 RX ORDER — NYSTATIN CREAM 100000 [USP'U]/G
1 CREAM TOPICAL
Qty: 0 | Refills: 0 | DISCHARGE
Start: 2023-02-17

## 2023-02-17 RX ORDER — ACETAMINOPHEN 500 MG
2 TABLET ORAL
Qty: 0 | Refills: 0 | DISCHARGE
Start: 2023-02-17

## 2023-02-17 RX ORDER — METFORMIN HYDROCHLORIDE 850 MG/1
1 TABLET ORAL
Qty: 0 | Refills: 0 | DISCHARGE

## 2023-02-17 RX ORDER — OXYCODONE HYDROCHLORIDE 5 MG/1
2.5 TABLET ORAL
Qty: 0 | Refills: 0 | DISCHARGE
Start: 2023-02-17

## 2023-02-17 RX ORDER — POLYETHYLENE GLYCOL 3350 17 G/17G
17 POWDER, FOR SOLUTION ORAL
Qty: 0 | Refills: 0 | DISCHARGE
Start: 2023-02-17

## 2023-02-17 RX ORDER — INSULIN LISPRO 100/ML
8 VIAL (ML) SUBCUTANEOUS
Qty: 100 | Refills: 0
Start: 2023-02-17

## 2023-02-17 RX ORDER — LANOLIN ALCOHOL/MO/W.PET/CERES
1 CREAM (GRAM) TOPICAL
Qty: 0 | Refills: 0 | DISCHARGE
Start: 2023-02-17

## 2023-02-17 RX ORDER — ACETAMINOPHEN WITH CODEINE 300MG-30MG
0.5 TABLET ORAL
Qty: 0 | Refills: 0 | DISCHARGE
Start: 2023-02-17

## 2023-02-17 RX ORDER — PANTOPRAZOLE SODIUM 20 MG/1
1 TABLET, DELAYED RELEASE ORAL
Qty: 0 | Refills: 0 | DISCHARGE
Start: 2023-02-17

## 2023-02-17 RX ADMIN — MUPIROCIN 1 APPLICATION(S): 20 OINTMENT TOPICAL at 17:13

## 2023-02-17 RX ADMIN — OXYCODONE HYDROCHLORIDE 5 MILLIGRAM(S): 5 TABLET ORAL at 12:21

## 2023-02-17 RX ADMIN — Medication 0.5 TABLET(S): at 05:45

## 2023-02-17 RX ADMIN — Medication 500 MILLIGRAM(S): at 05:17

## 2023-02-17 RX ADMIN — MUPIROCIN 1 APPLICATION(S): 20 OINTMENT TOPICAL at 05:18

## 2023-02-17 RX ADMIN — Medication 0.5 TABLET(S): at 05:16

## 2023-02-17 RX ADMIN — Medication 4: at 16:50

## 2023-02-17 RX ADMIN — Medication 1 TABLET(S): at 12:14

## 2023-02-17 RX ADMIN — RIVAROXABAN 20 MILLIGRAM(S): KIT at 17:13

## 2023-02-17 RX ADMIN — Medication 4: at 12:12

## 2023-02-17 RX ADMIN — POLYETHYLENE GLYCOL 3350 17 GRAM(S): 17 POWDER, FOR SOLUTION ORAL at 12:14

## 2023-02-17 RX ADMIN — Medication 8 UNIT(S): at 07:59

## 2023-02-17 RX ADMIN — PANTOPRAZOLE SODIUM 40 MILLIGRAM(S): 20 TABLET, DELAYED RELEASE ORAL at 05:17

## 2023-02-17 RX ADMIN — Medication 8 UNIT(S): at 12:12

## 2023-02-17 RX ADMIN — Medication 0.5 TABLET(S): at 14:09

## 2023-02-17 RX ADMIN — Medication 100 MILLIGRAM(S): at 17:13

## 2023-02-17 RX ADMIN — OXYCODONE HYDROCHLORIDE 5 MILLIGRAM(S): 5 TABLET ORAL at 13:21

## 2023-02-17 RX ADMIN — Medication 500 MILLIGRAM(S): at 17:13

## 2023-02-17 RX ADMIN — Medication 100 MILLIGRAM(S): at 05:17

## 2023-02-17 RX ADMIN — Medication 8 UNIT(S): at 16:51

## 2023-02-17 RX ADMIN — Medication 4: at 07:58

## 2023-02-17 RX ADMIN — Medication 0.5 TABLET(S): at 13:59

## 2023-02-17 RX ADMIN — Medication 40 MILLIGRAM(S): at 05:17

## 2023-02-17 RX ADMIN — NYSTATIN CREAM 1 APPLICATION(S): 100000 CREAM TOPICAL at 13:59

## 2023-02-17 RX ADMIN — NYSTATIN CREAM 1 APPLICATION(S): 100000 CREAM TOPICAL at 05:16

## 2023-02-17 NOTE — DISCHARGE NOTE PROVIDER - ATTENDING DISCHARGE PHYSICAL EXAMINATION:
GENERAL: NAD, lying in bed, crying   HEAD:  Normocephalic  EYES:  conjunctiva and sclera clear  ENT: Moist mucous membranes  NECK: Supple  CHEST/LUNG: Clear to auscultation bilaterally; No rales or rhonchi; no wheezing. Unlabored respirations  HEART: Regular rate and rhythm; S1S2+  ABDOMEN: Bowel sounds present; Soft, Nontender, Nondistended.   EXTREMITIES:  + distal Peripheral Pulses; Left foot in clean dressing, +varicose vein , pain upon light tough, no apparent redness    NERVOUS SYSTEM:  Alert & Oriented X3;  No gross focal deficits   MSK: moves all extremities  SKIN: No rashes

## 2023-02-17 NOTE — PROGRESS NOTE ADULT - SUBJECTIVE AND OBJECTIVE BOX
[facility] -- Angela Alaniz Pannella, Patel, Savella, Goodger: Office # [phone]    Follow Up:  LE edema, Afib     Subjective/Observations: seen and examined, asleep, easily awaken c/o b/l LE pain and chronic back pain, lying flat in bed, denies chest pain, dyspnea, palpitations or dizziness, orthopnea and PND. Tolerating room. on contact isolation for+ MRSA    REVIEW OF SYSTEMS: All other review of systems are negative unless indicated above    PAST MEDICAL & SURGICAL HISTORY:  Atrial fibrillation      Type 2 diabetes mellitus      Congestive heart failure      Scalp psoriasis      S/P tonsillectomy      S/P appendectomy      H/O umbilical hernia repair          MEDICATIONS  (STANDING):  acetaminophen  300 mG/codeine 30 mG 0.5 Tablet(s) Oral every 8 hours  ascorbic acid 500 milliGRAM(s) Oral two times a day  atorvastatin 40 milliGRAM(s) Oral at bedtime  dextrose 5%. 1000 milliLiter(s) (50 mL/Hr) IV Continuous <Continuous>  dextrose 5%. 1000 milliLiter(s) (100 mL/Hr) IV Continuous <Continuous>  dextrose 50% Injectable 25 Gram(s) IV Push once  dextrose 50% Injectable 12.5 Gram(s) IV Push once  dextrose 50% Injectable 25 Gram(s) IV Push once  doxycycline monohydrate Capsule 100 milliGRAM(s) Oral every 12 hours  furosemide    Tablet 40 milliGRAM(s) Oral daily  glucagon  Injectable 1 milliGRAM(s) IntraMuscular once  insulin glargine Injectable (LANTUS) 24 Unit(s) SubCutaneous at bedtime  insulin lispro (ADMELOG) corrective regimen sliding scale   SubCutaneous at bedtime  insulin lispro (ADMELOG) corrective regimen sliding scale   SubCutaneous three times a day before meals  insulin lispro Injectable (ADMELOG) 8 Unit(s) SubCutaneous three times a day before meals  lidocaine   4% Patch 1 Patch Transdermal daily  metoprolol succinate  milliGRAM(s) Oral daily  multivitamin 1 Tablet(s) Oral daily  mupirocin 2% Nasal 1 Application(s) Both Nostrils two times a day  nystatin Powder 1 Application(s) Topical three times a day  pantoprazole    Tablet 40 milliGRAM(s) Oral before breakfast  polyethylene glycol 3350 17 Gram(s) Oral daily  pregabalin 25 milliGRAM(s) Oral at bedtime  rivaroxaban 20 milliGRAM(s) Oral with dinner  senna 2 Tablet(s) Oral at bedtime    MEDICATIONS  (PRN):  acetaminophen     Tablet .. 650 milliGRAM(s) Oral every 6 hours PRN Temp greater or equal to 38C (100.4F), Mild Pain (1 - 3)  bisacodyl Suppository 10 milliGRAM(s) Rectal daily PRN Constipation  dextrose Oral Gel 15 Gram(s) Oral once PRN Blood Glucose LESS THAN 70 milliGRAM(s)/deciliter  melatonin 3 milliGRAM(s) Oral at bedtime PRN Insomnia  methocarbamol 500 milliGRAM(s) Oral two times a day PRN Muscle Spasm  oxyCODONE    IR 5 milliGRAM(s) Oral every 6 hours PRN Severe Pain (7 - 10)  oxyCODONE    IR 2.5 milliGRAM(s) Oral every 6 hours PRN Moderate Pain (4 - 6)    Allergies    No Known Allergies    Intolerances      Vital Signs Last 24 Hrs  T(C): 36.8 (17 Feb 2023 05:03), Max: 36.9 (16 Feb 2023 12:26)  T(F): 98.2 (17 Feb 2023 05:03), Max: 98.5 (16 Feb 2023 20:56)  HR: 86 (17 Feb 2023 05:03) (86 - 98)  BP: 115/89 (17 Feb 2023 05:03) (115/89 - 139/86)  BP(mean): --  RR: 18 (17 Feb 2023 05:03) (18 - 20)  SpO2: 93% (17 Feb 2023 05:03) (93% - 95%)    Parameters below as of 17 Feb 2023 05:03  Patient On (Oxygen Delivery Method): room air      I&O's Summary           PHYSICAL EXAM:  Constitutional: NAD, awake and alert,   HEENT: Moist Mucous Membranes, Anicteric  Pulmonary: Non-labored, breath sounds are clear bilaterally, No wheezing, rales or rhonchi  Cardiovascular: Regular, S1 and S2, No murmurs, No rubs, gallops or clicks  Gastrointestinal:  soft, nontender, nondistended   Lymph: No peripheral edema. No lymphadenopathy.   Skin: b/l lower abdomen and groin rashes, left foot dsg intact  Psych:  Mood & affect appropriate      LABS: All Labs Reviewed:                        12.7   8.07  )-----------( 202      ( 17 Feb 2023 06:40 )             38.0                         12.2   7.76  )-----------( 202      ( 16 Feb 2023 06:31 )             36.7                         12.3   7.17  )-----------( 180      ( 15 Feb 2023 06:46 )             36.8     17 Feb 2023 06:40    138    |  103    |  11     ----------------------------<  219    4.2     |  28     |  0.51   16 Feb 2023 06:31    137    |  102    |  10     ----------------------------<  213    4.1     |  28     |  0.50   15 Feb 2023 07:44    137    |  101    |  13     ----------------------------<  252    3.9     |  28     |  0.52     Ca    8.8        17 Feb 2023 06:40  Ca    8.6        16 Feb 2023 06:31  Ca    8.5        15 Feb 2023 07:44  Mg     2.1       17 Feb 2023 06:40  Mg     2.1       16 Feb 2023 06:31  Mg     2.0       15 Feb 2023 07:44       Cholesterol, Serum: 176 mg/dL (02-12-23 @ 05:51)  HDL Cholesterol, Serum: 36 mg/dL (02-12-23 @ 05:51)  Triglycerides, Serum: 208 mg/dL (02-12-23 @ 05:51)    12 Lead ECG:   Ventricular Rate 96 BPM    QRS Duration 78 ms    Q-T Interval 358 ms    QTC Calculation(Bazett) 452 ms    R Axis 30 degrees    T Axis -12 degrees    Diagnosis Line Atrial fibrillation  Abnormal ECG  No previous ECGs available  Confirmed by JAS PRIETO ([id number]) on 2/11/2023 2:13:40 PM (02-11-23 @ 11:23)

## 2023-02-17 NOTE — DISCHARGE NOTE PROVIDER - NSDCQMAMI_CARD_ALL_CORE
HPI: Joseph June Smoking status: Never Smoker   • Smokeless tobacco: Never Used   Vaping Use   • Vaping Use: never used   Substance Use Topics   • Alcohol use: Yes     Alcohol/week: 8.0 standard drinks     Types: 8 Standard drinks or equivalent per week     Comment: one drink most days   • Drug use: No      Review of Systems:  Reports ear ringing and back pain with numbness in his left leg and weakness on the left leg.  Otherwise full review of systems is negative.    Physical Examination:  /73 (BP Location: LUE - Left upper extremity, Patient Position: Sitting, Cuff Size: Regular)   Pulse 75   Temp 97.7 °F (36.5 °C)   Ht 5' 8.75\" (1.746 m)   Wt 104.9 kg   BMI 34.41 kg/m²   BSA 2.19 m²    GENERAL APPEARANCE: In no acute distress, pleasant, alert and oriented, healthy appearing  EYES: Conjunctiva clear. No discharge.   HEENT: Head: atraumatic. TMs normal bilaterally. Nasal mucosa normal. Mouth: No lesions. Pharynx without erythema or exudate. No tonsillar hypertrophy. Mallampati Score II  NECK/THYROID: No lymphadenopathy, supple. Thyroid is not enlarged and no masses.   CARDIOVASCULAR: Regular rate and rhythm, normal S1S2, no murmurs.   RESPIRATORY: Clear to auscultation bilaterally, no wheezes, rhonchi, rales.   GASTROINTESTINAL: BS present. Soft, non-tender, non-distended. No masses or organomegaly. Diastasis recti present.  Scar to left of his umbilicus from previous back surgery  SKIN: Normal, no rash or suspicious skin lesions.   EXTREMITIES: No edema.   PERIPHERAL PULSES: dorsalis pedis/posterior tibial 2+ bilaterally.   PSYCH:  Appropriate mood and affect. Not displaying suicidal thoughts or actions.    Recent Results (from the past 168 hour(s))   CBC WITH AUTOMATED DIFFERENTIAL (PERFORMABLE ONLY)    Collection Time: 06/22/21  8:11 AM   Result Value Ref Range    WBC 5.1 4.2 - 11.0 K/mcL    RBC 4.75 4.50 - 5.90 mil/mcL    HGB 14.6 13.0 - 17.0 g/dL    HCT 43.6 39.0 - 51.0 %    MCV 91.8 78.0 - 100.0 fl    MCH 30.7  26.0 - 34.0 pg    MCHC 33.5 32.0 - 36.5 g/dL    RDW-CV 13.7 11.0 - 15.0 %    RDW-SD 45.5 39.0 - 50.0 fL     140 - 450 K/mcL       Assessment & Plan:  1. Annual physical exam    2. Preop examination    3. Failed back syndrome    4. Hyperlipidemia, unspecified hyperlipidemia type    5. Impaired fasting glucose    6. Essential hypertension    7. Screening for prostate cancer      He is of satisfactory risk profile for planned procedure.  Labs are pending.  EKG is stable from previous.  Chest x-ray pending.  MRSA swabs are pending as well.  He did previously have positive staph in his nares that required treatment with mupirocin b.i.d. for 5 days.  He will be contacted with his fasting labs when available.  His work forms can be filled out at that time as well.  His blood pressure is well controlled with his current lisinopril-hydrochlorothiazide.  Refills are provided of his allopurinol, simvastatin and lisinopril-hydrochlorothiazide.  He will then follow-up with me in 6 months for a recheck of his blood pressure.    Salome Chen PA-C    Supervising Physician:  Dr. Joselo Pack   No

## 2023-02-17 NOTE — DISCHARGE NOTE PROVIDER - PROVIDER TOKENS
PROVIDER:[TOKEN:[[id number]],FOLLOWUP:[2 weeks]],PROVIDER:[TOKEN:[[id number]],FOLLOWUP:[2 weeks]],PROVIDER:[TOKEN:[[id number]],FOLLOWUP:[Routine]],FREE:[LAST:[your primary care doctor],PHONE:[(   )    -],FAX:[(   )    -],FOLLOWUP:[1 week]]

## 2023-02-17 NOTE — PROGRESS NOTE ADULT - SUBJECTIVE AND OBJECTIVE BOX
MELITON LOZANO is a 57yFemale , patient examined and chart reviewed.       INTERVAL HPI/ OVERNIGHT EVENTS:   Afebrile.  No events.    PAST MEDICAL & SURGICAL HISTORY:  Atrial fibrillation  Type 2 diabetes mellitus  Congestive heart failure  Scalp psoriasis  S/P tonsillectomy  S/P appendectomy  H/O umbilical hernia repair        For details regarding the patient's social history, family history, and other miscellaneous elements, please refer the initial infectious diseases consultation and/or the admitting history and physical examination for this admission.    ROS:  CONSTITUTIONAL:  Negative fever or chills,  EYES:  Negative  blurry vision or double vision  CARDIOVASCULAR:  Negative for chest pain or palpitations  RESPIRATORY:  Negative for cough, wheezing, or SOB   GASTROINTESTINAL:  Negative for nausea, vomiting, diarrhea, constipation, or abdominal pain  GENITOURINARY:  Negative frequency, urgency or dysuria  NEUROLOGIC:  No headache, confusion, dizziness, lightheadedness  All other systems were reviewed and are negative         Current inpatient medications :    ANTIBIOTICS/RELEVANT:  doxycycline monohydrate Capsule 100 milliGRAM(s) Oral every 12 hours    MEDICATIONS  (STANDING):  acetaminophen  300 mG/codeine 30 mG 0.5 Tablet(s) Oral every 8 hours  ascorbic acid 500 milliGRAM(s) Oral two times a day  atorvastatin 40 milliGRAM(s) Oral at bedtime  dextrose 5%. 1000 milliLiter(s) (50 mL/Hr) IV Continuous <Continuous>  dextrose 5%. 1000 milliLiter(s) (100 mL/Hr) IV Continuous <Continuous>  dextrose 50% Injectable 25 Gram(s) IV Push once  dextrose 50% Injectable 12.5 Gram(s) IV Push once  dextrose 50% Injectable 25 Gram(s) IV Push once  furosemide    Tablet 40 milliGRAM(s) Oral daily  glucagon  Injectable 1 milliGRAM(s) IntraMuscular once  insulin glargine Injectable (LANTUS) 24 Unit(s) SubCutaneous at bedtime  insulin lispro (ADMELOG) corrective regimen sliding scale   SubCutaneous at bedtime  insulin lispro (ADMELOG) corrective regimen sliding scale   SubCutaneous three times a day before meals  insulin lispro Injectable (ADMELOG) 8 Unit(s) SubCutaneous three times a day before meals  lidocaine   4% Patch 1 Patch Transdermal daily  metoprolol succinate  milliGRAM(s) Oral daily  multivitamin 1 Tablet(s) Oral daily  mupirocin 2% Nasal 1 Application(s) Both Nostrils two times a day  nystatin Powder 1 Application(s) Topical three times a day  pantoprazole    Tablet 40 milliGRAM(s) Oral before breakfast  polyethylene glycol 3350 17 Gram(s) Oral daily  pregabalin 25 milliGRAM(s) Oral at bedtime  rivaroxaban 20 milliGRAM(s) Oral with dinner  senna 2 Tablet(s) Oral at bedtime    MEDICATIONS  (PRN):  acetaminophen     Tablet .. 650 milliGRAM(s) Oral every 6 hours PRN Temp greater or equal to 38C (100.4F), Mild Pain (1 - 3)  bisacodyl Suppository 10 milliGRAM(s) Rectal daily PRN Constipation  dextrose Oral Gel 15 Gram(s) Oral once PRN Blood Glucose LESS THAN 70 milliGRAM(s)/deciliter  melatonin 3 milliGRAM(s) Oral at bedtime PRN Insomnia  methocarbamol 500 milliGRAM(s) Oral two times a day PRN Muscle Spasm  oxyCODONE    IR 5 milliGRAM(s) Oral every 6 hours PRN Severe Pain (7 - 10)  oxyCODONE    IR 2.5 milliGRAM(s) Oral every 6 hours PRN Moderate Pain (4 - 6)          Objective:  Vital Signs Last 24 Hrs  T(C): 37.2 (2023 12:50), Max: 37.2 (2023 12:50)  T(F): 98.9 (2023 12:50), Max: 98.9 (2023 12:50)  HR: 82 (2023 12:50) (82 - 98)  BP: 108/71 (2023 12:50) (108/71 - 121/79)  RR: 18 (2023 12:50) (18 - 18)  SpO2: 92% (2023 12:50) (92% - 93%)    Parameters below as of 2023 12:50  Patient On (Oxygen Delivery Method): room air      Physical Exam:  General: c/o pain  Neck: supple, trachea midline  Lungs: clear, no wheeze/rhonchi  Cardiovascular: regular rate and rhythm, S1 S2  Abdomen: soft, nontender,  bowel sounds normal  Neurological: alert and oriented x3  Skin: no rash  Extremities: Left heel ulcer with foot swelling and redness improving   Justin LE chronic stasis with venous insufficiency      LABS:                        12.2   7.76  )-----------( 202      ( 2023 06:31 )             36.7   02-16    137  |  102  |  10  ----------------------------<  213<H>  4.1   |  28  |  0.50    Ca    8.6      2023 06:31  Mg     2.1     02-16    Urinalysis Basic - ( 2023 11:24 )    Color: Pale Yellow / Appearance: Clear / S.010 / pH: x  Gluc: x / Ketone: Negative  / Bili: Negative / Urobili: Negative   Blood: x / Protein: Negative / Nitrite: Negative   Leuk Esterase: Negative / RBC: x / WBC x   Sq Epi: x / Non Sq Epi: x / Bacteria: x      MICROBIOLOGY:  Culture - Other (23 @ 17:00)    -  Ampicillin/Sulbactam: R <=8/4    -  Cefazolin: R <=4    -  Clindamycin: S <=0.25    -  Daptomycin: S 0.5    -  Erythromycin: S <=0.25    -  Gentamicin: S <=1 Should not be used as monotherapy    -  Linezolid: S 2    -  Oxacillin: R >2    -  Penicillin: R >8    -  Rifampin: S <=1 Should not be used as monotherapy    -  Tetracycline: S <=1    -  Trimethoprim/Sulfamethoxazole: S <=0.5/9.5    -  Vancomycin: S 2    Specimen Source: Wound Wound    Culture Results:   Numerous Methicillin Resistant Staphylococcus aureus    Organism Identification: Methicillin resistant Staphylococcus aureus    Organism: Methicillin resistant Staphylococcus aureus    Method Type: SASHA      Culture - Blood (collected 2023 11:25)  Source: .Blood Blood-Peripheral  Preliminary Report (2023 17:01):    No growth to date.    Culture - Urine (collected 2023 11:24)  Source: Clean Catch Clean Catch (Midstream)  Final Report (2023 14:10):    <10,000 CFU/mL Normal Urogenital Angelique    Culture - Blood (collected 2023 11:10)  Source: .Blood Blood-Peripheral  Preliminary Report (2023 17:01):    No growth to date.    RADIOLOGY & ADDITIONAL STUDIES:  ACC: [id number] EXAM:  US DPLX LWR EXT VEINS COMPL BI   ORDERED BY: PETER ANGEL     PROCEDURE DATE:  2023          INTERPRETATION:  CLINICAL INFORMATION: Body aches. Duplex swelling. Rule   out DVT.    COMPARISON: None available.    TECHNIQUE: Duplex sonography of the BILATERAL LOWER extremity veins with   color and spectral Doppler, with and without compression.    FINDINGS:    RIGHT:  Normal compressibility of the RIGHT common femoral, femoral and popliteal   veins.  Doppler examination shows normal spontaneous and phasic flow.  No RIGHT calf vein thrombosis is detected.    LEFT:  Normal compressibility of the LEFT common femoral, femoral and popliteal   veins.  Doppler examination shows normal spontaneous and phasic flow.  No LEFTcalf vein thrombosis is detected.    Subcutaneous edema bilaterally.    IMPRESSION:  No evidence of deep venous thrombosis in either lower extremity.      ACC: [id number] EXAM:  MR FOOT LT   ORDERED BY: ALVINO DIAMOND     PROCEDURE DATE:  2023          INTERPRETATION:  LEFT FOOT MRI    CLINICAL INFORMATION: Diabetic patient with left heel wound. Evaluate for   osteomyelitis.  TECHNIQUE: Multiplanar, multisequence MRI was obtained of the LEFT foot   focusing on the hindfoot without the use of intravenous or   intra-articular contrast.  COMPARISON: Bilateral foot radiographs 2023.    FINDINGS:    PERIPHERAL SOFT TISSUES: The patient's reported site of ulceration is not   well identified on MRI. There is diffuse nonspecific edema in the   subcutaneous fat. No focal fluid collection to suggest abscess.  BONE MARROW: No STIR signal or loss of T1 hyperintense signal to suggest   acute osteomyelitis. No fracture or osteonecrosis.    MUSCLES AND TENDONS: Tendons are intact. Mild nonspecific edema of the   intrinsic musculature of the foot.  LIGAMENTS AND CAPSULAR STRUCTURES: Partial-thickness tearing at the   attachment of the plantarfascia on the calcaneus plantar calcaneal heel   spur. Attenuated appearance of the ATFL consistent with chronic sprain.   Ankle ligaments are otherwise intact.  CARTILAGE AND SUBCHONDRAL BONE: No full-thickness chondral loss.  SYNOVIUM/JOINT FLUID: No large joint effusion.      IMPRESSION:  1.  No MR evidence of acute osteomyelitis.  2.  Plantar fasciitis with partial-thickness tearing at the attachment of   the plantar fascia on the calcaneus.      ACC: [id number] EXAM:  MR SPINE LUMBAR IC   ORDERED BY:  MARYCRUZ CASPER     PROCEDURE DATE:  02/15/2023          INTERPRETATION:  CLINICAL INFORMATION: History of back pain with open   left foot ulcer. Rule out discitis.  ADDITIONAL CLINICAL INFORMATION:Osteomyelitis vertebra M46.26    TECHNIQUE: Multi planar pre and postcontrast T1 weighted MRI sequences of   the lumbar spine was performed. Contrast dose: 10 cc Gadavist IV contrast.    COMPARISON: Precontrast MRI lumbar spine 2/15/2023. CT abdomen and pelvis   2023.    FINDINGS:    ALIGNMENT: Lumbarization of S1 vertebral body is noted with a rudimentary   disc at S1-S2.    VERTEBRAE: The vertebral bodies are normal in height. There is no   fracture or aggressive osseous lesion. Type II Modic endplate   degenerative changes noted at L4-L5 and L5-S1. No destructive changes or   enhancement identified.    Please see preceding precontrast MRI lumbar spine 2/15/2023 for level by   level degenerative findings.    DISCS: Multilevel minimal disc dessication and loss of height. No   destructive changes or enhancement identified.    CONUS MEDULLARIS AND CAUDA EQUINA: The conus medullaris terminates at   T12-L1. No abnormal enhancement of the cauda equina.    PARAVERTEBRAL SOFT TISSUES AND VISUALIZED RETROPERITONEUM: Paraspinal   musculature is unremarkable. Included abdominal pelvic contents are   unremarkable.    IMPRESSION:    No abnormal enhancement to suggest discitis/osteomyelitis.      Assessment :   57y F PMH of Afib (on Xarelto), DM2, and CHF (unknown EF) Obesity admitted with unstagable infected painful left heel decub ulcer with foot/leg cellulitis,  Cellulitis improving  MRI neg for OM  Wound culture with Numerous MRSA  Back pain- degenerative changes noted at L4-L5 and L5-S1- seen by ortho conservative management      Plan :   Off Zyvox  Po Doxycycline 100mg po bid x 10 days   Trend temps and cbc  Podiatry on case  Pain control  Pulm toileting  Increase activity  Stable from ID standpoint  Dc planning to rehab today    Continue with present regiment.  Appropriate use of antibiotics and adverse effects reviewed.    > 35 minutes were spent in direct patient care reviewing notes, medications ,labs data/ imaging , discussion with multidisciplinary team.    Thank you for allowing me to participate in care of your patient .    Carole Mohr MD  Infectious Disease  [phone]

## 2023-02-17 NOTE — DISCHARGE NOTE PROVIDER - NSDCMRMEDTOKEN_GEN_ALL_CORE_FT
acetaminophen 325 mg oral tablet: 2 tab(s) orally every 6 hours, As needed, Temp greater or equal to 38C (100.4F), Mild Pain (1 - 3)  Admelog 100 units/mL injectable solution: 8 unit(s) injectable 3 times a day   ascorbic acid 500 mg oral tablet: 1 tab(s) orally 2 times a day  atorvastatin 40 mg oral tablet: 1 tab(s) orally once a day (at bedtime)  Basaglar KwikPen 100 units/mL subcutaneous solution: 25 unit(s) subcutaneous once a day (at bedtime)  bisacodyl 10 mg rectal suppository: 1 suppository(ies) rectal once a day, As needed, Constipation  codeine-acetaminophen 30 mg-300 mg oral tablet: 0.5 tab(s) orally every 8 hours  doxycycline monohydrate 50 mg oral capsule: 2 cap(s) orally every 12 hours  Lasix 40 mg oral tablet: 1 tab(s) orally once a day  lidocaine 4% topical film: Apply topically to affected area once a day   melatonin 3 mg oral tablet: 1 tab(s) orally once a day (at bedtime), As needed, Insomnia  methocarbamol 500 mg oral tablet: 1 tab(s) orally 2 times a day, As needed, Muscle Spasm  metoprolol succinate 100 mg oral tablet, extended release: 1 tab(s) orally once a day  Multiple Vitamins oral tablet: 1 tab(s) orally once a day  nystatin 100,000 units/g topical powder: 1 application topically 3 times a day  oxyCODONE: 2.5 milligram(s) orally every 6 hours, As Needed for moderate pain   oxyCODONE 5 mg oral tablet: 1 tab(s) orally every 6 hours, As needed, Severe Pain (7 - 10)  pantoprazole 40 mg oral delayed release tablet: 1 tab(s) orally once a day (before a meal)  polyethylene glycol 3350 oral powder for reconstitution: 17 gram(s) orally once a day  pregabalin 25 mg oral capsule: 1 cap(s) orally once a day (at bedtime)  senna leaf extract oral tablet: 2 tab(s) orally once a day (at bedtime)  Xarelto 20 mg oral tablet: 1 tab(s) orally once a day (in the evening)

## 2023-02-17 NOTE — DISCHARGE NOTE PROVIDER - DETAILS OF MALNUTRITION DIAGNOSIS/DIAGNOSES
This patient has been assessed with a concern for Malnutrition and was treated during this hospitalization for the following Nutrition diagnosis/diagnoses:     -  02/12/2023: Morbid obesity (BMI > 40)

## 2023-02-17 NOTE — PROGRESS NOTE ADULT - PROVIDER SPECIALTY LIST ADULT
Cardiology
Cardiology
Infectious Disease
Cardiology
Hospitalist
Infectious Disease
Podiatry
Cardiology
Cardiology
Vascular Surgery
Endocrinology
Hospitalist

## 2023-02-17 NOTE — PROGRESS NOTE ADULT - ASSESSMENT
57y F PMH of Afib (on Xarelto), DM2, and CHF (unknown EF) presenting with left heel wound and bilateral lower extremity swelling and redness    CHF, Afib   - p/w b/l LE swelling and redness, with compliant of generalized body pain  - per patient she used to follow a cardiologist but have not seen one in while since her insurance change, but from what she recalls all her cardiac workup are all normal    - Has baseline LE edema which is unchanged and per pt progressively worsening, appears cellulitic in nature  - continue home dose Lasix   - S creat stable   - CXR: no acute pathology   - LE US neg for DVT    - f/u  TTE  - monitor strict I/O's      - known hx of Afib  - EKG: Afib. No anginal complaints   - No signs of acute ischemia   - HR 80-90s   - continue Xarelto  - continue with BB   - Monitor and replete lytes, keep K>4, Mg>2.  - Will continue to follow.    Vidal Zepeda, MS, ANP, AGACNP  Nurse Practitioner- Cardiology   Spectra #[phone] 57y F PMH of Afib (on Xarelto), DM2, and CHF (unknown EF) presenting with left heel wound and bilateral lower extremity swelling and redness    CHF, Afib   - p/w b/l LE swelling and redness, with compliant of generalized body pain  - per patient she used to follow a cardiologist but have not seen one in while since her insurance change, but from what she recalls all her cardiac workup are all normal    - Has baseline LE edema which is unchanged and per pt progressively worsening, appears cellulitic in nature  - continue home dose Lasix   - S creat stable   - CXR: no acute pathology   - LE US neg for DVT    - f/u  TTE  - monitor strict I/O's      - known hx of Afib  - EKG: Afib. No anginal complaints   - No signs of acute ischemia   - HR 80-90s   - continue Xarelto  - continue with BB   - Monitor and replete lytes, keep K>4, Mg>2.  - d/c planning as per primary team    Vidal Zepeda, MS, ANP, AGACNP  Nurse Practitioner- Cardiology   Spectra #[phone]

## 2023-02-17 NOTE — PROGRESS NOTE ADULT - NS ATTEND AMEND GEN_ALL_CORE FT
cont lasix for edema. Appears compensated from HF POV. cont other meds. refusing echocardiogram. Further cardiac workup will depend on clinical course.

## 2023-02-17 NOTE — PROGRESS NOTE ADULT - REASON FOR ADMISSION
L foot wound

## 2023-02-17 NOTE — SOCIAL WORK PROGRESS NOTE - NSSWPROGRESSNOTE_GEN_ALL_CORE
Patient to be discharged at 3:00 to [facility] via Ambulance ( contact isolation   ) Ambulunze  copy of chart to follow. all in agreement

## 2023-02-17 NOTE — DISCHARGE NOTE PROVIDER - HOSPITAL COURSE
57y F PMH of Afib, DM2, and CHF (unknown EF) presenting with left heel wound and bilateral lower extremity swelling and redness admitted for Bilateral LE cellulitis with diabetic foot ulcer.      Problem/Plan - 1:  ·  Problem: Bilateral lower leg cellulitis.   ·  Plan: Patient presenting with b/l LE edema and erythema.   - B/l LE doppler negative for DVT   - F/u x- ray of bilateral feet No radiographic evidence of osteomyelitis within either foot.  Soft tissue swelling within both feet. No evidence of acute fracture.  - MRI foot: 1.  No MR evidence of acute osteomyelitis.  2.  Plantar fasciitis with partial-thickness tearing at the attachment of the plantar fascia on the calcaneus.  - blood cx NGTD, urine negative, wound cx +staph aureus  - Podiatry: no surgical intervention, Patient may follow-up at the [facility] with Dr. Galarza, Dr. Meek, or Dr. Watson within 1 to 2 weeks upon discharge.  - ID, Dr. Mohr consulted: - IV Zyvox->Doxy for total 10days  - Vascular:  Dr. Perez consulted: CTA without significant PAD,Elevate BLE; if possible use ACE wraps for gentle compression, No vascular surgical intervention indicated,  PT->[facility]      Problem/Plan - 2:  ·  Problem: Diabetic ulcer of left foot.   ·  Plan: - MRI foot:  1.  No MR evidence of acute osteomyelitis.  2.  Plantar fasciitis with partial-thickness tearing at the attachment of   the plantar fascia on the calcaneus.  - Continue doxy, wound care as instructed   - Podiatry and vascular f/u - Plan as above.     Problem/Plan - 3:  ·  Problem: Back pain.   ·  Plan: Chronic , however, worsen as per patient with B/L Leg pain   - Xray of lumbar spine No evidence of fracture  - continue Lidocaine patch for lumbar spine, apply to lower back  - Pain regimen: Tylenol and oxycodone PRN  -add lyrica for neuropathy   MRI lumbar spine: < from: MR Lumbar Spine No Cont (02.15.23 @ 10:12) >  Clumped appearance to the descending lumbosacral nerve roots. This could   represent the presence of arachnoiditis.  L4-L5 moderate thecal sac compression and mass effect upon the descending   right L5 nerve root right lateral recess.  Other milder degenerative changes.  spine ortho consult appreciated: no acute ortho intervention. out patient follow up    pain management: tylenol #3 ATC WITH Oxycodone prn           Problem/Plan - 4:  ·  Problem: Abdominal pain.   ·  Plan: Patient reports diffuse severe pain with meals  - Patient reports EGD/colonoscopy in August/September 2022: Normal  - CT Angio abd/pelvis with IV cont No significant arterial stenosis up to the popliteal arteries. Mild trifurcation artery disease as described above, otherwise with three-vessel contrast opacification to the ankles.   S/S assess for swallow function : regular diet   PPI added for GI prophylaxis        Problem/Plan - 5:  ·  Problem: DM (diabetes mellitus).   ·  Plan: Insulin dependent DM2, blood glucose on admission 373  - Home regimen include: metformin 1000mg BID, basaglar 25U qhs, admelog 15u TID  - Will hold metformin while inpatient    - Increased Lantus to 24U qhs  - increase Admelog 8U premeal   - Start moderate dose insulin corrective scale  - Hypoglycemia protocol, fingerstick glucose qAc and qHs  - Consistent Carbohydrate diet  - HbA1c 12.8  - Endocrine f/u       Problem/Plan - 6:  ·  Problem: Afib.   ·  Plan: Patient with Hx of afib  - EKG with afib at 96 bpm   - On Xarelto for AC at home    - On Metoprolol succinate for rate control, continue with hold parameters        Problem/Plan - 7:  ·  Problem: CHF, chronic.   ·  Plan: Unknown EF, with chronic LE swelling   - Continue home Lasix 40mg QD

## 2023-02-17 NOTE — DISCHARGE NOTE PROVIDER - CARE PROVIDER_API CALL
Scottie Mohr)  Infectious Disease; Internal Medicine  [address], [address]  [place] [address]  Phone: [phone]  Fax: [phone]  Follow Up Time: 2 weeks    Suleman Meek (DPM)  Foot Surgery; Podiatric Medicine; [facility]  [address] [place] [address]  Phone: [phone]  Fax: [phone]  Follow Up Time: 2 weeks    Perlman, Craig D (DO)  Internal Medicine  [address] [place] [address], [facility] [address]  [place] [address]  Phone: [phone]  Fax: [phone]  Follow Up Time: Routine    your primary care doctor,   Phone: (   )    -  Fax: (   )    -  Follow Up Time: 1 week

## 2023-03-02 PROBLEM — I50.9 HEART FAILURE, UNSPECIFIED: Chronic | Status: ACTIVE | Noted: 2023-02-11

## 2023-03-02 PROBLEM — I48.91 UNSPECIFIED ATRIAL FIBRILLATION: Chronic | Status: ACTIVE | Noted: 2023-02-11

## 2023-03-02 PROBLEM — L40.9 PSORIASIS, UNSPECIFIED: Chronic | Status: ACTIVE | Noted: 2023-02-11

## 2023-03-02 PROBLEM — E11.9 TYPE 2 DIABETES MELLITUS WITHOUT COMPLICATIONS: Chronic | Status: ACTIVE | Noted: 2023-02-11

## 2023-03-06 ENCOUNTER — NON-APPOINTMENT (OUTPATIENT)
Age: 58
End: 2023-03-06

## 2023-03-08 ENCOUNTER — APPOINTMENT (OUTPATIENT)
Dept: INTERNAL MEDICINE | Facility: CLINIC | Age: 58
End: 2023-03-08

## 2023-03-10 LAB
25(OH)D3 SERPL-MCNC: 21.9 NG/ML
ALBUMIN SERPL ELPH-MCNC: 4.4 G/DL
ALP BLD-CCNC: 66 U/L
ALT SERPL-CCNC: 31 U/L
ANA SER IF-ACNC: NEGATIVE
ANION GAP SERPL CALC-SCNC: 17 MMOL/L
AST SERPL-CCNC: 23 U/L
BASOPHILS # BLD AUTO: 0.02 K/UL
BASOPHILS NFR BLD AUTO: 0.3 %
BILIRUB SERPL-MCNC: 0.4 MG/DL
BUN SERPL-MCNC: 22 MG/DL
C3 SERPL-MCNC: 166 MG/DL
C4 SERPL-MCNC: 36 MG/DL
CALCIUM SERPL-MCNC: 9.8 MG/DL
CCP AB SER IA-ACNC: <8 UNITS
CENTROMERE IGG SER-ACNC: <0.2 CD:130001892
CHLORIDE SERPL-SCNC: 97 MMOL/L
CHROMATIN AB SERPL-ACNC: <0.2 AL
CK SERPL-CCNC: 89 U/L
CO2 SERPL-SCNC: 24 MMOL/L
CREAT SERPL-MCNC: 0.53 MG/DL
CREAT SPEC-SCNC: 63 MG/DL
CREAT/PROT UR: 0.3 RATIO
CRP SERPL-MCNC: 10 MG/L
DSDNA AB SER-ACNC: <12 IU/ML
EGFR: 107 ML/MIN/1.73M2
ENA RNP AB SER IA-ACNC: <0.2 AL
ENA RNP AB SER IA-ACNC: <0.2 AL
ENA SCL70 IGG SER IA-ACNC: <0.2 AL
ENA SM AB SER IA-ACNC: <0.2 AL
ENA SS-A AB SER IA-ACNC: <0.2 AL
ENA SS-B AB SER IA-ACNC: <0.2 AL
EOSINOPHIL # BLD AUTO: 0.11 K/UL
EOSINOPHIL NFR BLD AUTO: 1.6 %
ERYTHROCYTE [SEDIMENTATION RATE] IN BLOOD BY WESTERGREN METHOD: 19 MM/HR
GLUCOSE SERPL-MCNC: 409 MG/DL
HCT VFR BLD CALC: 43.8 %
HGB BLD-MCNC: 14.6 G/DL
HISTONE AB SER QL: 0.5 UNITS
IMM GRANULOCYTES NFR BLD AUTO: 0.4 %
LYMPHOCYTES # BLD AUTO: 1.87 K/UL
LYMPHOCYTES NFR BLD AUTO: 26.9 %
MAN DIFF?: NORMAL
MCHC RBC-ENTMCNC: 30.9 PG
MCHC RBC-ENTMCNC: 33.3 GM/DL
MCV RBC AUTO: 92.8 FL
MONOCYTES # BLD AUTO: 0.44 K/UL
MONOCYTES NFR BLD AUTO: 6.3 %
NEUTROPHILS # BLD AUTO: 4.47 K/UL
NEUTROPHILS NFR BLD AUTO: 64.5 %
PLATELET # BLD AUTO: 215 K/UL
POTASSIUM SERPL-SCNC: 4 MMOL/L
PROT SERPL-MCNC: 7.2 G/DL
PROT UR-MCNC: 19 MG/DL
RBC # BLD: 4.72 M/UL
RBC # FLD: 12.9 %
RF+CCP IGG SER-IMP: NEGATIVE
RHEUMATOID FACT SER QL: <10 IU/ML
RNA POLYMERASE III IGG: 6 UNITS
SODIUM SERPL-SCNC: 138 MMOL/L
THYROGLOB AB SERPL-ACNC: <20 IU/ML
THYROPEROXIDASE AB SERPL IA-ACNC: <10 IU/ML
TSH SERPL-ACNC: 3.62 UIU/ML
WBC # FLD AUTO: 6.94 K/UL

## 2023-03-17 ENCOUNTER — APPOINTMENT (OUTPATIENT)
Dept: INTERNAL MEDICINE | Facility: CLINIC | Age: 58
End: 2023-03-17
Payer: MEDICARE

## 2023-03-17 ENCOUNTER — NON-APPOINTMENT (OUTPATIENT)
Age: 58
End: 2023-03-17

## 2023-03-17 VITALS
BODY MASS INDEX: 46.44 KG/M2 | WEIGHT: 246 LBS | TEMPERATURE: 97.8 F | HEART RATE: 88 BPM | RESPIRATION RATE: 17 BRPM | HEIGHT: 61 IN | OXYGEN SATURATION: 95 %

## 2023-03-17 DIAGNOSIS — Z78.9 OTHER SPECIFIED HEALTH STATUS: ICD-10-CM

## 2023-03-17 DIAGNOSIS — Z29.9 ENCOUNTER FOR PROPHYLACTIC MEASURES, UNSPECIFIED: ICD-10-CM

## 2023-03-17 DIAGNOSIS — M54.9 DORSALGIA, UNSPECIFIED: ICD-10-CM

## 2023-03-17 DIAGNOSIS — Z12.31 ENCOUNTER FOR SCREENING MAMMOGRAM FOR MALIGNANT NEOPLASM OF BREAST: ICD-10-CM

## 2023-03-17 DIAGNOSIS — Z12.11 ENCOUNTER FOR SCREENING FOR MALIGNANT NEOPLASM OF COLON: ICD-10-CM

## 2023-03-17 DIAGNOSIS — Z02.82 ENCOUNTER FOR ADOPTION SERVICES: ICD-10-CM

## 2023-03-17 DIAGNOSIS — Z00.00 ENCOUNTER FOR GENERAL ADULT MEDICAL EXAMINATION W/OUT ABNORMAL FINDINGS: ICD-10-CM

## 2023-03-17 PROCEDURE — 99386 PREV VISIT NEW AGE 40-64: CPT | Mod: 25

## 2023-03-17 PROCEDURE — 93000 ELECTROCARDIOGRAM COMPLETE: CPT | Mod: 59

## 2023-03-17 PROCEDURE — 99214 OFFICE O/P EST MOD 30 MIN: CPT | Mod: 25

## 2023-03-17 PROCEDURE — G0447 BEHAVIOR COUNSEL OBESITY 15M: CPT | Mod: NC,59

## 2023-03-17 PROCEDURE — 99396 PREV VISIT EST AGE 40-64: CPT | Mod: 25

## 2023-03-17 PROCEDURE — G0444 DEPRESSION SCREEN ANNUAL: CPT | Mod: 59

## 2023-03-17 PROCEDURE — 99204 OFFICE O/P NEW MOD 45 MIN: CPT | Mod: 25

## 2023-03-17 RX ORDER — INSULIN GLARGINE 100 [IU]/ML
100 INJECTION, SOLUTION SUBCUTANEOUS AT BEDTIME
Refills: 0 | Status: ACTIVE | COMMUNITY

## 2023-03-17 RX ORDER — INSULIN LISPRO 100 U/ML
100 INJECTION, SOLUTION INTRAVENOUS; SUBCUTANEOUS
Refills: 0 | Status: ACTIVE | COMMUNITY

## 2023-03-17 NOTE — HISTORY OF PRESENT ILLNESS
[de-identified] : 57 year here for Complete Physical Exam accompanied by health care prox Mr Cisneros.\par 57y F PMH of Afib on anticoagulation and rate control medication, DM (last a1c 12.8) on insulin and CHF (unknown EF) with hx of left heel wound d/c from [facility] on 2/17/203 at outpatient [facility] with hx of bilateral lower extremity swelling  Bilateral LE\par cellulitis with diabetic foot ulcer currently at [facility]. d/c on outpatient oral doxy for 10 days.\par following podiatry and wound care.\par \par Pt is daily exercising?  No\par Vaccinations:\par covid  x4 pfizer\par flu - denies\par tdap - reports got in past 10 years \par shingles - denies \par pcv - got with old pcp Dr. Thompson\par \par Screening:\par last colonoscopy:August/September 2022: @ [facility]: EGD and Colonoscopy - negative  as per pt.\par obgyn: Needs OBGYN\par LMP: 10--15 years ago\par last pap: 4 years ago - negative no hpv or abnormal cells as per pt.\par last mammo: 4 years ago - due for mammogram\par \par \par Past Medical History: \par AFIB\par TypeIIDM\par \par Allergies: NKDA\par \par Surgical Hx:\par T&A\par appendex \par Umbilical Hernia\par ERCP.\par cataract surgery\par \par Family Hx:\par adopted \par pt denies any family hx of breast, colon, cervical, endometrial, uterine, ovarian,  lung, prostate  or testicular cancer due to being adopted\par \par Social\par ETOH - denies \par Smoker - denies \par Illicit Drug use - denies\par \par Occupation: disability \par \par Pt has no acute complaints\par \par Adelso lincoln DPM - podiatry \par Address: [place] [address]\par Phone: [phone]\par \par Wound care \par Dr. Watson \par Phone: [phone]

## 2023-03-17 NOTE — ASSESSMENT
[FreeTextEntry1] : #CPE\par f/u blood and urine\par ekg -afib rate controlled\par immunizations - deferred\par I spent 15 minutes with the patient conducting a screen using approved screening tool (PHQ9) and discussing results of said screen with patient during this encounter.\par The patient was counseled to get regular exercise and sleep, wear sunblock and wear a safety belt in the car.\par Check CBC, CMP Hgba1c , TSH and UA\par Pap Smear\par Mammo\par Colonoscopy and EGD - reports requested - f/u with gastro for chronic abdominal pain\par Ophtho\par Derm\par \par #Obesity\par Diet cannot exercise due to foot ulcer\par Setting realistic weight loss goals, such as a one- to two-pound weight loss per week.\par Eating fewer calories by cutting down on portion sizes. \par Aiming for at least five small handfuls of fruits and vegetables per day.\par Choosing foods high in fiber, such as whole grain breads, cereals, pasta, rice, fruits and vegetables. These foods will give you more chewing satisfaction, while the higher fiber content may make you feel gutierrez on fewer calories.\par Keep Food Journal\par 15 min spent on obesity discussion\par \par #B/L leg swelling\par reviewed B/l LE doppler negative for DVT during recent hospital stay\par x- ray of bilateral feet No radiographic evidence of osteomyelitis within\par either foot.  Soft tissue swelling within both feet. No evidence of acute\par fracture.\par - MRI foot:  No MR evidence of acute osteomyelitis.  \par Plantar fasciitis with partial-thickness tearing at the attachment of the plantar fascia on the\par calcaneus.\par - blood cx NGTD, urine negative, wound cx +staph aureus\par follow-up at the [facility] Dr. Watson\par pt was d/c with Doxy for total 10days\par - Vascular: No vascular surgical intervention f/u vascular\par \par #Diabetic ulcer of left foot.\par MR evidence of acute osteomyelitis.\par Plantar fasciitis with partial-thickness tearing at the attachment of the plantar fascia on the calcaneus.\par Continue doxy, wound care as instructed\par Podiatry and vascular f/u\par  \par #chronic Back pain.\par -Plan: Chronic , however, worsen as per patient with B/L Leg pain\par Xray of lumbar spine No evidence of fracture\par  Pain regimen: f/u pain management \par  lyrica for neuropathy\par MRI lumbar spine:02.15.23 reviewed\par Clumped appearance to the descending lumbosacral nerve roots. This could\par represent the presence of arachnoiditis.\par L4-L5 moderate thecal sac compression and mass effect upon the descending\par right L5 nerve root right lateral recess.\par Other milder degenerative changes.\par follow up pain management and ortho\par  \par # chronic Abdominal pain.\par Patient reports diffuse severe pain with meals\par Patient reports EGD/colonoscopy in August/September 2022: Normal\par  CT Angio abd/pelvis with IV cont No significant arterial stenosis up to the\par popliteal arteries.\par f/u gastro and d/c on PPI\par  \par # uncontrolled DM (diabetes mellitus).\par - Home regimen include: metformin 1000mg BID, basaglar 25U qhs, admelog 15u TID\par - Consistent Carbohydrate diet\par - HbA1c 12.8\par - Endocrine f/u\par \par \par pt agrees and understands plan via teach back method. all questions answered.\par \par Time dedicated to this patient visit today including preparing to see patient, obtaining and/or reviewing separately obtained history, performing a medically appropriate examination and/or evaluation, counseling and educating the patient/family, ordering medications, tests, or procedures, communicating with other provider(s), documenting clinical information in the health records, independently interpreting results and communicating results to the patient/family - 60 minutes.  Total\par

## 2023-03-17 NOTE — HEALTH RISK ASSESSMENT
[No] : No [No falls in past year] : Patient reported no falls in the past year [Not at All (0)] : 9.) Thoughts that you would be off dead or of hurting yourself in some way? Not at all [PHQ-9 Negative - No further assessment needed] : PHQ-9 Negative - No further assessment needed [HIV test declined] : HIV test declined [Hepatitis C test offered] : Hepatitis C test offered [None] : None [With Family] : lives with family [Employed] : employed [] :  [Feels Safe at Home] : Feels safe at home [Fully functional (bathing, dressing, toileting, transferring, walking, feeding)] : Fully functional (bathing, dressing, toileting, transferring, walking, feeding) [Fully functional (using the telephone, shopping, preparing meals, housekeeping, doing laundry, using] : Fully functional and needs no help or supervision to perform IADLs (using the telephone, shopping, preparing meals, housekeeping, doing laundry, using transportation, managing medications and managing finances) [Reports normal functional visual acuity (ie: able to read med bottle)] : Reports normal functional visual acuity [Smoke Detector] : smoke detector [Carbon Monoxide Detector] : carbon monoxide detector [Safety elements used in home] : safety elements used in home [Seat Belt] :  uses seat belt [Sunscreen] : uses sunscreen [Never] : Never [On disability] : on disability [With Patient/Caregiver] : , with patient/caregiver [Designated Healthcare Proxy] : Designated healthcare proxy [Name: ___] : Health Care Proxy's Name: [unfilled]  [Relationship: ___] : Relationship: [unfilled] [I will adhere to the patient's wishes.] : I will adhere to the patient's wishes. [Time Spent: ___ minutes] : Time Spent: [unfilled] minutes [de-identified] : socially  [[id number]] : 0 [Change in mental status noted] : No change in mental status noted [Language] : denies difficulty with language [Behavior] : denies difficulty with behavior [Learning/Retaining New Information] : denies difficulty learning/retaining new information [Handling Complex Tasks] : denies difficulty handling complex tasks [Reasoning] : denies difficulty with reasoning [Spatial Ability and Orientation] : denies difficulty with spatial ability and orientation [Sexually Active] : not sexually active [High Risk Behavior] : no high risk behavior [Reports changes in hearing] : Reports no changes in hearing [Reports changes in vision] : Reports no changes in vision [Reports changes in dental health] : Reports no changes in dental health [Guns at Home] : no guns at home [Travel to Developing Areas] : does not  travel to developing areas [TB Exposure] : is not being exposed to tuberculosis [Caregiver Concerns] : does not have caregiver concerns [AdvancecareDate] : 3/17/2023 [FreeTextEntry4] : [phone]\par Full Code

## 2023-03-17 NOTE — PHYSICAL EXAM
[Declined Breast Exam] : declined breast exam  [Declined Rectal Exam] : declined rectal exam [Normal Posterior Cervical Nodes] : no posterior cervical lymphadenopathy [Normal Anterior Cervical Nodes] : no anterior cervical lymphadenopathy [Normal] : affect was normal and insight and judgment were intact [No Acute Distress] : no acute distress [Soft] : abdomen soft [Non Tender] : non-tender [Non-distended] : non-distended [Normal Bowel Sounds] : normal bowel sounds [No CVA Tenderness] : no CVA  tenderness [Coordination Grossly Intact] : coordination grossly intact [No Focal Deficits] : no focal deficits [Deep Tendon Reflexes (DTR)] : deep tendon reflexes were 2+ and symmetric [de-identified] : obese female [de-identified] : RRR [de-identified] : b/l lower extremity swelling and left diabetic foot ulcer (covered dressing) [de-identified] : declined [de-identified] : b/l leg swelling [de-identified] : using walker to ambulate [de-identified] : mood stable denies si/hi

## 2023-03-17 NOTE — REVIEW OF SYSTEMS
[Negative] : Psychiatric [Fatigue] : fatigue [Abdominal Pain] : abdominal pain [Fever] : no fever [Chills] : no chills [Hot Flashes] : no hot flashes [Night Sweats] : no night sweats [Recent Change In Weight] : ~T no recent weight change [Nausea] : no nausea [Constipation] : no constipation [Diarrhea] : diarrhea [Vomiting] : no vomiting [Heartburn] : no heartburn [Melena] : no melena [FreeTextEntry7] : chronic abdominal pain [FreeTextEntry9] : chronic back pain, muscle pain, diabetic ulcer on left foot [de-identified] : using walker to ambulate [de-identified] : mood stable denies si/hi

## 2023-03-19 ENCOUNTER — NON-APPOINTMENT (OUTPATIENT)
Age: 58
End: 2023-03-19

## 2023-03-19 LAB
25(OH)D3 SERPL-MCNC: 26.6 NG/ML
ALBUMIN SERPL ELPH-MCNC: 4.2 G/DL
ALP BLD-CCNC: 80 U/L
ALT SERPL-CCNC: 15 U/L
ANION GAP SERPL CALC-SCNC: 14 MMOL/L
APPEARANCE: CLEAR
AST SERPL-CCNC: 17 U/L
BACTERIA: ABNORMAL
BASOPHILS # BLD AUTO: 0.05 K/UL
BASOPHILS NFR BLD AUTO: 0.5 %
BILIRUB SERPL-MCNC: 0.5 MG/DL
BILIRUBIN URINE: NEGATIVE
BLOOD URINE: NEGATIVE
BUN SERPL-MCNC: 16 MG/DL
CALCIUM SERPL-MCNC: 10.2 MG/DL
CHLORIDE SERPL-SCNC: 99 MMOL/L
CHOLEST SERPL-MCNC: 133 MG/DL
CO2 SERPL-SCNC: 24 MMOL/L
COLOR: YELLOW
CREAT SERPL-MCNC: 0.53 MG/DL
EGFR: 107 ML/MIN/1.73M2
EOSINOPHIL # BLD AUTO: 0.16 K/UL
EOSINOPHIL NFR BLD AUTO: 1.8 %
ESTIMATED AVERAGE GLUCOSE: 278 MG/DL
FOLATE SERPL-MCNC: 10.7 NG/ML
GLUCOSE QUALITATIVE U: ABNORMAL
GLUCOSE SERPL-MCNC: 253 MG/DL
HBA1C MFR BLD HPLC: 11.3 %
HCT VFR BLD CALC: 39.8 %
HDLC SERPL-MCNC: 37 MG/DL
HGB BLD-MCNC: 13.5 G/DL
HYALINE CASTS: 0 /LPF
IMM GRANULOCYTES NFR BLD AUTO: 0.3 %
KETONES URINE: NORMAL
LDLC SERPL CALC-MCNC: 54 MG/DL
LEUKOCYTE ESTERASE URINE: ABNORMAL
LYMPHOCYTES # BLD AUTO: 2.05 K/UL
LYMPHOCYTES NFR BLD AUTO: 22.5 %
MAN DIFF?: NORMAL
MCHC RBC-ENTMCNC: 30.9 PG
MCHC RBC-ENTMCNC: 33.9 GM/DL
MCV RBC AUTO: 91.1 FL
MICROSCOPIC-UA: NORMAL
MONOCYTES # BLD AUTO: 0.45 K/UL
MONOCYTES NFR BLD AUTO: 4.9 %
NEUTROPHILS # BLD AUTO: 6.36 K/UL
NEUTROPHILS NFR BLD AUTO: 70 %
NITRITE URINE: NEGATIVE
NONHDLC SERPL-MCNC: 96 MG/DL
PH URINE: 5.5
PLATELET # BLD AUTO: 225 K/UL
POTASSIUM SERPL-SCNC: 4.1 MMOL/L
PROT SERPL-MCNC: 7.3 G/DL
PROTEIN URINE: NORMAL
RBC # BLD: 4.37 M/UL
RBC # FLD: 12.9 %
RED BLOOD CELLS URINE: 1 /HPF
SODIUM SERPL-SCNC: 137 MMOL/L
SPECIFIC GRAVITY URINE: 1.03
SQUAMOUS EPITHELIAL CELLS: 6 /HPF
TRIGL SERPL-MCNC: 211 MG/DL
TSH SERPL-ACNC: 3.45 UIU/ML
UROBILINOGEN URINE: NORMAL
VIT B12 SERPL-MCNC: 680 PG/ML
WBC # FLD AUTO: 9.1 K/UL
WHITE BLOOD CELLS URINE: 17 /HPF

## 2023-03-21 ENCOUNTER — APPOINTMENT (OUTPATIENT)
Dept: PAIN MANAGEMENT | Facility: CLINIC | Age: 58
End: 2023-03-21
Payer: MEDICARE

## 2023-03-21 DIAGNOSIS — Z86.39 PERSONAL HISTORY OF OTHER ENDOCRINE, NUTRITIONAL AND METABOLIC DISEASE: ICD-10-CM

## 2023-03-21 DIAGNOSIS — M54.50 LOW BACK PAIN, UNSPECIFIED: ICD-10-CM

## 2023-03-21 PROCEDURE — 99203 OFFICE O/P NEW LOW 30 MIN: CPT

## 2023-03-21 NOTE — HISTORY OF PRESENT ILLNESS
[10] : 10 [Burning] : burning [Radiating] : radiating [Sharp] : sharp [Stabbing] : stabbing [Tingling] : tingling [] : yes [Constant] : constant [Leisure] : leisure [Sleep] : sleep [Nothing helps with pain getting better] : Nothing helps with pain getting better [Sitting] : sitting [Standing] : standing [Walking] : walking [Lying in bed] : lying in bed [FreeTextEntry1] : Initial HPI 03/21/2023:\par Pain and swollen left leg from the knee down described as burning/sharp pain with numbness. Was sent to pain mgt for neuropathy. \par \par MRI Lumbar Spine none \par Conservative Care: none \par Pain Medications: Oxycodone PRN; unsure if she is on gabapentin. \par Past Injections: none\par Spine surgery: none \par Blood thinners: Xarelto\par  [FreeTextEntry6] : numbness [FreeTextEntry7] : left leg to the foot

## 2023-03-21 NOTE — PHYSICAL EXAM
[de-identified] : Constitutional; Appears well, no apparent distress\par Ability to communicate: Normal \par Respiratory: non-labored breathing\par Skin: No rash noted\par Head: Normocephalic, atraumatic\par Neck: no visible thyroid enlargement\par Eyes: Extraocular movements intact\par Neurologic: Alert and oriented x3\par Psychiatric: normal mood, affect and behavior \par \par

## 2023-03-21 NOTE — DISCUSSION/SUMMARY
[de-identified] : After discussing various treatment options with the patient including but not limited to oral medications, physical therapy, exercise modalities as well as interventional spinal injections, we have decided with the following plan:\par \par - Continue home exercises, stretching, activity modification, physical therapy, and conservative care.\par - Follow-up as needed.\par - Recommend Gabapentin 300mg TID. Recommend to titrate up gabapentin slowly - first taking one pill at night for 3 days, then increasing to twice a day for another 3 days, and then increasing to 3 times a day. Pt instructed not to drive or operate heavy machinery while on medications.\par - Will provide pain list.

## 2023-03-26 ENCOUNTER — NON-APPOINTMENT (OUTPATIENT)
Age: 58
End: 2023-03-26

## 2023-03-27 ENCOUNTER — OUTPATIENT (OUTPATIENT)
Dept: OUTPATIENT SERVICES | Facility: HOSPITAL | Age: 58
LOS: 1 days | Discharge: HOME | End: 2023-03-27
Payer: MEDICARE

## 2023-03-27 ENCOUNTER — APPOINTMENT (OUTPATIENT)
Dept: WOUND CARE | Facility: HOSPITAL | Age: 58
End: 2023-03-27
Payer: MEDICARE

## 2023-03-27 ENCOUNTER — RESULT REVIEW (OUTPATIENT)
Age: 58
End: 2023-03-27

## 2023-03-27 VITALS
SYSTOLIC BLOOD PRESSURE: 107 MMHG | OXYGEN SATURATION: 97 % | DIASTOLIC BLOOD PRESSURE: 70 MMHG | TEMPERATURE: 97.9 F | HEART RATE: 74 BPM | WEIGHT: 246 LBS | BODY MASS INDEX: 46.44 KG/M2 | HEIGHT: 61 IN | RESPIRATION RATE: 18 BRPM

## 2023-03-27 DIAGNOSIS — Z86.69 PERSONAL HISTORY OF OTHER DISEASES OF THE NERVOUS SYSTEM AND SENSE ORGANS: ICD-10-CM

## 2023-03-27 DIAGNOSIS — S91.309A UNSPECIFIED OPEN WOUND, UNSPECIFIED FOOT, INITIAL ENCOUNTER: ICD-10-CM

## 2023-03-27 DIAGNOSIS — M51.26 OTHER INTERVERTEBRAL DISC DISPLACEMENT, LUMBAR REGION: ICD-10-CM

## 2023-03-27 DIAGNOSIS — Z86.718 PERSONAL HISTORY OF OTHER VENOUS THROMBOSIS AND EMBOLISM: ICD-10-CM

## 2023-03-27 DIAGNOSIS — Z98.890 OTHER SPECIFIED POSTPROCEDURAL STATES: Chronic | ICD-10-CM

## 2023-03-27 DIAGNOSIS — Z86.79 PERSONAL HISTORY OF OTHER DISEASES OF THE CIRCULATORY SYSTEM: ICD-10-CM

## 2023-03-27 DIAGNOSIS — Z90.89 ACQUIRED ABSENCE OF OTHER ORGANS: Chronic | ICD-10-CM

## 2023-03-27 DIAGNOSIS — Z90.49 ACQUIRED ABSENCE OF OTHER SPECIFIED PARTS OF DIGESTIVE TRACT: Chronic | ICD-10-CM

## 2023-03-27 PROCEDURE — 71046 X-RAY EXAM CHEST 2 VIEWS: CPT

## 2023-03-27 PROCEDURE — 87186 SC STD MICRODIL/AGAR DIL: CPT

## 2023-03-27 PROCEDURE — 71046 X-RAY EXAM CHEST 2 VIEWS: CPT | Mod: 26

## 2023-03-27 PROCEDURE — 88304 TISSUE EXAM BY PATHOLOGIST: CPT

## 2023-03-27 PROCEDURE — 87070 CULTURE OTHR SPECIMN AEROBIC: CPT

## 2023-03-27 PROCEDURE — G0463: CPT | Mod: 25

## 2023-03-27 PROCEDURE — 88304 TISSUE EXAM BY PATHOLOGIST: CPT | Mod: 26

## 2023-03-27 PROCEDURE — 99213 OFFICE O/P EST LOW 20 MIN: CPT | Mod: 25

## 2023-03-27 PROCEDURE — 87075 CULTR BACTERIA EXCEPT BLOOD: CPT

## 2023-03-27 PROCEDURE — 11043 DBRDMT MUSC&/FSCA 1ST 20/<: CPT

## 2023-03-27 RX ORDER — METOPROLOL SUCCINATE 100 MG/1
100 TABLET, EXTENDED RELEASE ORAL DAILY
Refills: 0 | Status: ACTIVE | COMMUNITY

## 2023-03-27 RX ORDER — PANTOPRAZOLE 40 MG/1
40 TABLET, DELAYED RELEASE ORAL DAILY
Refills: 0 | Status: ACTIVE | COMMUNITY

## 2023-03-27 RX ORDER — RIVAROXABAN 20 MG/1
20 TABLET, FILM COATED ORAL DAILY
Refills: 0 | Status: ACTIVE | COMMUNITY

## 2023-03-27 RX ORDER — ATORVASTATIN CALCIUM 40 MG/1
40 TABLET, FILM COATED ORAL DAILY
Refills: 0 | Status: ACTIVE | COMMUNITY

## 2023-03-28 DIAGNOSIS — I11.0 HYPERTENSIVE HEART DISEASE WITH HEART FAILURE: ICD-10-CM

## 2023-03-28 DIAGNOSIS — A49.01 METHICILLIN SUSCEPTIBLE STAPHYLOCOCCUS AUREUS INFECTION, UNSPECIFIED SITE: ICD-10-CM

## 2023-03-28 DIAGNOSIS — E11.621 TYPE 2 DIABETES MELLITUS WITH FOOT ULCER: ICD-10-CM

## 2023-03-28 DIAGNOSIS — E66.01 MORBID (SEVERE) OBESITY DUE TO EXCESS CALORIES: ICD-10-CM

## 2023-03-28 DIAGNOSIS — I50.9 HEART FAILURE, UNSPECIFIED: ICD-10-CM

## 2023-03-28 DIAGNOSIS — I48.91 UNSPECIFIED ATRIAL FIBRILLATION: ICD-10-CM

## 2023-03-28 DIAGNOSIS — E11.40 TYPE 2 DIABETES MELLITUS WITH DIABETIC NEUROPATHY, UNSPECIFIED: ICD-10-CM

## 2023-03-28 DIAGNOSIS — Z98.49 CATARACT EXTRACTION STATUS, UNSPECIFIED EYE: ICD-10-CM

## 2023-03-28 DIAGNOSIS — Z86.718 PERSONAL HISTORY OF OTHER VENOUS THROMBOSIS AND EMBOLISM: ICD-10-CM

## 2023-03-28 DIAGNOSIS — Z79.01 LONG TERM (CURRENT) USE OF ANTICOAGULANTS: ICD-10-CM

## 2023-03-28 DIAGNOSIS — L97.423 NON-PRESSURE CHRONIC ULCER OF LEFT HEEL AND MIDFOOT WITH NECROSIS OF MUSCLE: ICD-10-CM

## 2023-03-28 DIAGNOSIS — Z79.84 LONG TERM (CURRENT) USE OF ORAL HYPOGLYCEMIC DRUGS: ICD-10-CM

## 2023-03-28 DIAGNOSIS — Z90.49 ACQUIRED ABSENCE OF OTHER SPECIFIED PARTS OF DIGESTIVE TRACT: ICD-10-CM

## 2023-03-28 DIAGNOSIS — E55.9 VITAMIN D DEFICIENCY, UNSPECIFIED: ICD-10-CM

## 2023-03-28 DIAGNOSIS — Z79.899 OTHER LONG TERM (CURRENT) DRUG THERAPY: ICD-10-CM

## 2023-03-28 DIAGNOSIS — Z79.4 LONG TERM (CURRENT) USE OF INSULIN: ICD-10-CM

## 2023-03-28 DIAGNOSIS — M25.50 PAIN IN UNSPECIFIED JOINT: ICD-10-CM

## 2023-03-28 LAB
GRAM STN FLD: SIGNIFICANT CHANGE UP
SPECIMEN SOURCE: SIGNIFICANT CHANGE UP

## 2023-03-28 NOTE — PROCEDURE
[Saline] : saline [Other: ___] : [unfilled] [Fibrotic] : fibrotic [Slough] : slough [Necrotic] : necrotic [Scalpel] : scalpel [Sharp curette] : sharp curette [Skin] : skin [Fat] : fat [Fascia] : fascia [Subcutaneous tissue] : subcutaneous tissue [Sent to Lab] : which was entirely removed and was sent to the laboratory for [Culture and Sensitivity] : culture and sensitivity [Pathologic Exam] : pathologic exam [Clean] : clean [Pressure] : pressure [FreeTextEntry2] : DFU 3 posterior left heel  [FreeTextEntry1] : silver alginate  [] : No [FreeTextEntry9] : 4387 [de-identified] : DFU 3 posterior left heel  [de-identified] : Shirley [FreeTextEntry6] : DFU 3 left heel  [FreeTextEntry7] : same [de-identified] : 10 cc marcaine [de-identified] : 5cc [de-identified] : necrotic skin , subcutaneous and fat

## 2023-03-28 NOTE — PROCEDURE
[Saline] : saline [Other: ___] : [unfilled] [Fibrotic] : fibrotic [Slough] : slough [Necrotic] : necrotic [Scalpel] : scalpel [Sharp curette] : sharp curette [Skin] : skin [Fat] : fat [Fascia] : fascia [Subcutaneous tissue] : subcutaneous tissue [Sent to Lab] : which was entirely removed and was sent to the laboratory for [Culture and Sensitivity] : culture and sensitivity [Pathologic Exam] : pathologic exam [Clean] : clean [Pressure] : pressure [FreeTextEntry2] : DFU 3 posterior left heel  [FreeTextEntry1] : silver alginate  [] : No [FreeTextEntry9] : 6810 [de-identified] : DFU 3 posterior left heel  [de-identified] : Shirley [FreeTextEntry6] : DFU 3 left heel  [FreeTextEntry7] : same [de-identified] : 10 cc marcaine [de-identified] : 5cc [de-identified] : necrotic skin , subcutaneous and fat

## 2023-03-29 ENCOUNTER — APPOINTMENT (OUTPATIENT)
Dept: WOUND CARE | Facility: HOSPITAL | Age: 58
End: 2023-03-29
Payer: MEDICARE

## 2023-03-29 ENCOUNTER — OUTPATIENT (OUTPATIENT)
Dept: OUTPATIENT SERVICES | Facility: HOSPITAL | Age: 58
LOS: 1 days | Discharge: HOME | End: 2023-03-29
Payer: MEDICARE

## 2023-03-29 VITALS
OXYGEN SATURATION: 98 % | DIASTOLIC BLOOD PRESSURE: 77 MMHG | RESPIRATION RATE: 18 BRPM | BODY MASS INDEX: 46.44 KG/M2 | SYSTOLIC BLOOD PRESSURE: 115 MMHG | TEMPERATURE: 97.5 F | HEART RATE: 88 BPM | HEIGHT: 61 IN | WEIGHT: 246 LBS

## 2023-03-29 DIAGNOSIS — S91.309A UNSPECIFIED OPEN WOUND, UNSPECIFIED FOOT, INITIAL ENCOUNTER: ICD-10-CM

## 2023-03-29 DIAGNOSIS — Z90.89 ACQUIRED ABSENCE OF OTHER ORGANS: Chronic | ICD-10-CM

## 2023-03-29 LAB
-  AMPICILLIN/SULBACTAM: SIGNIFICANT CHANGE UP
-  CEFAZOLIN: SIGNIFICANT CHANGE UP
-  CLINDAMYCIN: SIGNIFICANT CHANGE UP
-  ERYTHROMYCIN: SIGNIFICANT CHANGE UP
-  GENTAMICIN: SIGNIFICANT CHANGE UP
-  OXACILLIN: SIGNIFICANT CHANGE UP
-  PENICILLIN: SIGNIFICANT CHANGE UP
-  RIFAMPIN: SIGNIFICANT CHANGE UP
-  TETRACYCLINE: SIGNIFICANT CHANGE UP
-  TRIMETHOPRIM/SULFAMETHOXAZOLE: SIGNIFICANT CHANGE UP
-  VANCOMYCIN: SIGNIFICANT CHANGE UP
METHOD TYPE: SIGNIFICANT CHANGE UP
SURGICAL PATHOLOGY STUDY: SIGNIFICANT CHANGE UP

## 2023-03-29 PROCEDURE — ZZZZZ: CPT

## 2023-03-29 PROCEDURE — G0463: CPT

## 2023-03-31 ENCOUNTER — OUTPATIENT (OUTPATIENT)
Dept: OUTPATIENT SERVICES | Facility: HOSPITAL | Age: 58
LOS: 1 days | Discharge: HOME | End: 2023-03-31
Payer: MEDICARE

## 2023-03-31 ENCOUNTER — APPOINTMENT (OUTPATIENT)
Dept: WOUND CARE | Facility: HOSPITAL | Age: 58
End: 2023-03-31
Payer: MEDICARE

## 2023-03-31 VITALS
WEIGHT: 246 LBS | HEART RATE: 94 BPM | DIASTOLIC BLOOD PRESSURE: 87 MMHG | TEMPERATURE: 98 F | SYSTOLIC BLOOD PRESSURE: 145 MMHG | OXYGEN SATURATION: 96 % | HEIGHT: 61 IN | RESPIRATION RATE: 18 BRPM | BODY MASS INDEX: 46.44 KG/M2

## 2023-03-31 DIAGNOSIS — Z90.49 ACQUIRED ABSENCE OF OTHER SPECIFIED PARTS OF DIGESTIVE TRACT: Chronic | ICD-10-CM

## 2023-03-31 DIAGNOSIS — Z90.89 ACQUIRED ABSENCE OF OTHER ORGANS: Chronic | ICD-10-CM

## 2023-03-31 DIAGNOSIS — S91.309D UNSPECIFIED OPEN WOUND, UNSPECIFIED FOOT, SUBSEQUENT ENCOUNTER: ICD-10-CM

## 2023-03-31 DIAGNOSIS — Z98.890 OTHER SPECIFIED POSTPROCEDURAL STATES: Chronic | ICD-10-CM

## 2023-03-31 PROCEDURE — 99213 OFFICE O/P EST LOW 20 MIN: CPT

## 2023-03-31 PROCEDURE — G0463: CPT

## 2023-03-31 NOTE — PHYSICAL EXAM
[0] : left 0 [1+] : left 1+ [Ankle Swelling (On Exam)] : present [Ankle Swelling On The Left] : moderate [Ankle Swelling Bilaterally] : bilaterally  [Skin Ulcer] : ulcer [Alert] : alert [Oriented to Person] : oriented to person [Oriented to Place] : oriented to place [Calm] : calm [Varicose Veins Of Lower Extremities] : not present [] : not present [Purpura] : no purpura  [Petechiae] : no petechiae [Skin Induration] : no induration [de-identified] : calm [de-identified] : HTN, HLD , A- fib , hx CHF [de-identified] : DFU 3 posterior left heel  [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  [FreeTextEntry1] : Left Heel [FreeTextEntry2] : 3.5 [FreeTextEntry3] : 3.9 [FreeTextEntry4] : 0.2 [de-identified] : Small Serosanguineous [de-identified] : Intact/ callus [de-identified] : Bupivacaine 0.5% injection [FreeTextEntry5] : Bedside Sharps Debridement performed by Dr Watson-  Post Debridement measurements L 3.6 x W 4.0 x D 0.3cm [de-identified] : Silver Alginate/ Dry Dressing & Kerlix [de-identified] : Mechanically cleansed with Sterile Gauze & Normal saline\par  [de-identified] : CIRCULATION\par Dorsalis Pedis: R palpable  L palpable\par Posterior Tibialis: R Doppler L Doppler\par Extremity Color: Pigmented\par Extremity Temperature: Warm\par Capillary Refill: < 3 seconds bilaterally\par Vascular studies & Vascular Consult not ordered by Dr Watson- Vascular studies & Vascular Consult performed during recent hospitalization\par \par \par  [de-identified] : Mild [de-identified] : 100% [de-identified] : None [de-identified] : No

## 2023-03-31 NOTE — HISTORY OF PRESENT ILLNESS
[FreeTextEntry1] : The wound is located on Left Heel- pt state she wound developed about 2 months ago- pt states she has back problems & "threw back out"  & was much less moblie & was mostly sitting in bed & would use her Heel to push herself up in bed & developed wound- pt developed Cellulitis & was hospitalized [facility] 02/11/23-02/17/23 & then transferred to Rehab ([place]) & is presently still in Rehab & was instructed to follow up to [facility] . DFU 3 posterior left heel , ssff after debridement , no soi

## 2023-03-31 NOTE — ASSESSMENT
[Verbal] : Verbal [Demo] : Demo [Patient] : Patient [Other: ___] : [unfilled] [Good - alert, interested, motivated] : Good - alert, interested, motivated [Verbalizes knowledge/Understanding] : Verbalizes knowledge/understanding [Dressing changes] : dressing changes [Foot Care] : foot care [Skin Care] : skin care [Pressure relief] : pressure relief [Signs and symptoms of infection] : sign and symptoms of infection [How and When to Call] : how and when to call [Labs and Tests] : labs and tests [Hyperbaric Therapy] : hyperbaric therapy [Off-loading] : off-loading [Home Health] : home health [Patient responsibility to plan of care] : patient responsibility to plan of care [Glycemic Control] : glycemic control [] : Yes [Stable] : stable [Home] : Home [Walker] : Walker [FreeTextEntry2] : Alteration in skin integrity- promote optimal skin integrity\par  [FreeTextEntry4] : Dr Watson/ Photo taken\par Bedside Sharps Debridement performed by Dr Watson today\par Specimens sent for C&S & Pathology\par HBOT ordered by Dr Watson - HBO preauth sheet submitted ([id number])\par HBO orientation provided & HBO orientation video viewed/ all consents signed\par Pt had bloodwork while hospitalized- no additional bloodwork ordered by Dr Watson today\par CXR ordered by Dr Watson- pt to [facility] today for CXR\par Pt states she is being discharged from Rehab to home on Friday, 03/31/23- Dr Watson aware\par F/U to [facility] in 1 week pending HBO authorization\par \par

## 2023-03-31 NOTE — REVIEW OF SYSTEMS
[Joint Stiffness] : joint stiffness [Skin Wound] : skin wound [Negative] : Heme/Lymph [FreeTextEntry1] : 1500 [Fever] : no fever [Chills] : no chills [Eye Pain] : no eye pain [Loss Of Hearing] : no hearing loss [Shortness Of Breath] : no shortness of breath [Wheezing] : no wheezing [Abdominal Pain] : no abdominal pain [Anxiety] : no anxiety [FreeTextEntry5] : HTN, HLD , A- fib , CHF [FreeTextEntry7] : Morbid obesity  [de-identified] : DFU 3 posterior left heel , ssff  [de-identified] : IDDM with neuropathy  [de-identified] : JADEN

## 2023-03-31 NOTE — HISTORY OF PRESENT ILLNESS
[FreeTextEntry1] : The wound is located on Left Heel- pt state she wound developed about 2 months ago- pt states she has back problems & "threw back out"  & was much less moblie & was mostly sitting in bed & would use her Heel to push herself up in bed & developed wound- pt developed Cellulitis & was hospitalized [facility] 02/11/23-02/17/23 & then transferred to Rehab ([place]) & is presently still in Rehab & was instructed to follow up to [facility] . DFU 3 posterior left heel , ssff after debridement , no soi

## 2023-03-31 NOTE — PLAN
[FreeTextEntry1] : continue off loading and wound care , patient would benefit from HBOT for diabetic foot ulcer ( DFU 3 ) , PTR 1 week , refer to debridement report Spent 30 minutes for patient care and medical decision making.\par

## 2023-03-31 NOTE — VITALS
[de-identified] : Pt denies c/o any pains or discomforts at present- pt reports tenderness to touch Left Heel wound- Dr Watson aware

## 2023-03-31 NOTE — REASON FOR VISIT
[Consultation] : a consultation visit [Friend] : friend [FreeTextEntry4] : DFU 3 posterior left heel , necrotic  [FreeTextEntry3] : Spent 30 minutes for patient care and medical decision making.\par  [FreeTextEntry1] : New Evaluation

## 2023-03-31 NOTE — REVIEW OF SYSTEMS
[Joint Stiffness] : joint stiffness [Skin Wound] : skin wound [Negative] : Heme/Lymph [FreeTextEntry1] : 1500 [Fever] : no fever [Chills] : no chills [Eye Pain] : no eye pain [Loss Of Hearing] : no hearing loss [Shortness Of Breath] : no shortness of breath [Wheezing] : no wheezing [Abdominal Pain] : no abdominal pain [Anxiety] : no anxiety [FreeTextEntry5] : HTN, HLD , A- fib , CHF [FreeTextEntry7] : Morbid obesity  [de-identified] : DFU 3 posterior left heel , ssff  [de-identified] : IDDM with neuropathy  [de-identified] : JADEN

## 2023-03-31 NOTE — VITALS
[de-identified] : Pt denies c/o any pains or discomforts at present- pt reports tenderness to touch Left Heel wound- Dr Watson aware

## 2023-03-31 NOTE — PHYSICAL EXAM
[0] : left 0 [1+] : left 1+ [Ankle Swelling (On Exam)] : present [Ankle Swelling Bilaterally] : bilaterally  [Ankle Swelling On The Left] : moderate [Skin Ulcer] : ulcer [Alert] : alert [Oriented to Person] : oriented to person [Oriented to Place] : oriented to place [Calm] : calm [Varicose Veins Of Lower Extremities] : not present [] : not present [Purpura] : no purpura  [Petechiae] : no petechiae [Skin Induration] : no induration [de-identified] : calm [de-identified] : HTN, HLD , A- fib , hx CHF [de-identified] : DFU 3 posterior left heel  [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  [FreeTextEntry1] : Left Heel [FreeTextEntry2] : 3.5 [FreeTextEntry3] : 3.9 [FreeTextEntry4] : 0.2 [de-identified] : Small Serosanguineous [de-identified] : Intact/ callus [de-identified] : Bupivacaine 0.5% injection [FreeTextEntry5] : Bedside Sharps Debridement performed by Dr Watson-  Post Debridement measurements L 3.6 x W 4.0 x D 0.3cm [de-identified] : Silver Alginate/ Dry Dressing & Kerlix [de-identified] : Mechanically cleansed with Sterile Gauze & Normal saline\par  [de-identified] : CIRCULATION\par Dorsalis Pedis: R palpable  L palpable\par Posterior Tibialis: R Doppler L Doppler\par Extremity Color: Pigmented\par Extremity Temperature: Warm\par Capillary Refill: < 3 seconds bilaterally\par Vascular studies & Vascular Consult not ordered by Dr Watson- Vascular studies & Vascular Consult performed during recent hospitalization\par \par \par  [de-identified] : Mild [de-identified] : 100% [de-identified] : None [de-identified] : No

## 2023-03-31 NOTE — ASSESSMENT
[Verbal] : Verbal [Demo] : Demo [Patient] : Patient [Other: ___] : [unfilled] [Good - alert, interested, motivated] : Good - alert, interested, motivated [Verbalizes knowledge/Understanding] : Verbalizes knowledge/understanding [Dressing changes] : dressing changes [Foot Care] : foot care [Skin Care] : skin care [Pressure relief] : pressure relief [Signs and symptoms of infection] : sign and symptoms of infection [How and When to Call] : how and when to call [Labs and Tests] : labs and tests [Hyperbaric Therapy] : hyperbaric therapy [Off-loading] : off-loading [Home Health] : home health [Patient responsibility to plan of care] : patient responsibility to plan of care [Glycemic Control] : glycemic control [] : Yes [Stable] : stable [Home] : Home [Walker] : Walker [FreeTextEntry2] : Alteration in skin integrity- promote optimal skin integrity\par  [FreeTextEntry4] : Dr Watson/ Photo taken\par Bedside Sharps Debridement performed by Dr Watson today\par Specimens sent for C&S & Pathology\par HBOT ordered by Dr Watson - HBO preauth sheet submitted ([id number])\par HBO orientation provided & HBO orientation video viewed/ all consents signed\par Pt had bloodwork while hospitalized- no additional bloodwork ordered by Dr Watson today\par CXR ordered by Dr Watson- pt to [facility] today for CXR\par Pt states she is being discharged from Rehab to home on Friday, 03/31/23- Dr Watson aware\par F/U to [facility] in 1 week pending HBO authorization\par \par

## 2023-04-01 DIAGNOSIS — L97.423 NON-PRESSURE CHRONIC ULCER OF LEFT HEEL AND MIDFOOT WITH NECROSIS OF MUSCLE: ICD-10-CM

## 2023-04-01 DIAGNOSIS — E11.621 TYPE 2 DIABETES MELLITUS WITH FOOT ULCER: ICD-10-CM

## 2023-04-01 LAB
CULTURE RESULTS: SIGNIFICANT CHANGE UP
ORGANISM # SPEC MICROSCOPIC CNT: SIGNIFICANT CHANGE UP
ORGANISM # SPEC MICROSCOPIC CNT: SIGNIFICANT CHANGE UP
SPECIMEN SOURCE: SIGNIFICANT CHANGE UP

## 2023-04-03 ENCOUNTER — OUTPATIENT (OUTPATIENT)
Dept: OUTPATIENT SERVICES | Facility: HOSPITAL | Age: 58
LOS: 1 days | Discharge: HOME | End: 2023-04-03
Payer: MEDICARE

## 2023-04-03 ENCOUNTER — APPOINTMENT (OUTPATIENT)
Dept: WOUND CARE | Facility: HOSPITAL | Age: 58
End: 2023-04-03
Payer: MEDICARE

## 2023-04-03 DIAGNOSIS — I11.0 HYPERTENSIVE HEART DISEASE WITH HEART FAILURE: ICD-10-CM

## 2023-04-03 DIAGNOSIS — Z79.899 OTHER LONG TERM (CURRENT) DRUG THERAPY: ICD-10-CM

## 2023-04-03 DIAGNOSIS — Z79.01 LONG TERM (CURRENT) USE OF ANTICOAGULANTS: ICD-10-CM

## 2023-04-03 DIAGNOSIS — M25.50 PAIN IN UNSPECIFIED JOINT: ICD-10-CM

## 2023-04-03 DIAGNOSIS — E11.621 TYPE 2 DIABETES MELLITUS WITH FOOT ULCER: ICD-10-CM

## 2023-04-03 DIAGNOSIS — Z98.890 OTHER SPECIFIED POSTPROCEDURAL STATES: Chronic | ICD-10-CM

## 2023-04-03 DIAGNOSIS — Z79.4 LONG TERM (CURRENT) USE OF INSULIN: ICD-10-CM

## 2023-04-03 DIAGNOSIS — I50.9 HEART FAILURE, UNSPECIFIED: ICD-10-CM

## 2023-04-03 DIAGNOSIS — Z90.49 ACQUIRED ABSENCE OF OTHER SPECIFIED PARTS OF DIGESTIVE TRACT: ICD-10-CM

## 2023-04-03 DIAGNOSIS — I48.91 UNSPECIFIED ATRIAL FIBRILLATION: ICD-10-CM

## 2023-04-03 DIAGNOSIS — Z79.84 LONG TERM (CURRENT) USE OF ORAL HYPOGLYCEMIC DRUGS: ICD-10-CM

## 2023-04-03 DIAGNOSIS — S91.309A UNSPECIFIED OPEN WOUND, UNSPECIFIED FOOT, INITIAL ENCOUNTER: ICD-10-CM

## 2023-04-03 DIAGNOSIS — L97.423 NON-PRESSURE CHRONIC ULCER OF LEFT HEEL AND MIDFOOT WITH NECROSIS OF MUSCLE: ICD-10-CM

## 2023-04-03 DIAGNOSIS — Z86.718 PERSONAL HISTORY OF OTHER VENOUS THROMBOSIS AND EMBOLISM: ICD-10-CM

## 2023-04-03 DIAGNOSIS — Z90.89 ACQUIRED ABSENCE OF OTHER ORGANS: Chronic | ICD-10-CM

## 2023-04-03 DIAGNOSIS — Z98.49 CATARACT EXTRACTION STATUS, UNSPECIFIED EYE: ICD-10-CM

## 2023-04-03 DIAGNOSIS — E11.40 TYPE 2 DIABETES MELLITUS WITH DIABETIC NEUROPATHY, UNSPECIFIED: ICD-10-CM

## 2023-04-03 DIAGNOSIS — Z90.49 ACQUIRED ABSENCE OF OTHER SPECIFIED PARTS OF DIGESTIVE TRACT: Chronic | ICD-10-CM

## 2023-04-03 PROCEDURE — G0463: CPT

## 2023-04-03 PROCEDURE — 99213 OFFICE O/P EST LOW 20 MIN: CPT

## 2023-04-03 NOTE — PLAN
[FreeTextEntry1] : Patient sent for vascular studies and repeat MRI , patient to start HBOT asap . Continue wound care and off loading  Spent 20 minutes for patient care and medical decision making.\par Patient was told if there are signs of infection ( fever, chills, nausea, vomiting ) or changes in the condition of the wound ( pain , cellulitis , purulent drainage or odor ) they should proceed to the ER as soon as possible .\par

## 2023-04-03 NOTE — ASSESSMENT
[Verbal] : Verbal [Written] : Written [Demo] : Demo [Patient] : Patient [Good - alert, interested, motivated] : Good - alert, interested, motivated [Verbalizes knowledge/Understanding] : Verbalizes knowledge/understanding [Foot Care] : foot care [Skin Care] : skin care [Pressure relief] : pressure relief [Signs and symptoms of infection] : sign and symptoms of infection [Nutrition] : nutrition [How and When to Call] : how and when to call [Pain Management] : pain management [Glycemic Control] : glycemic control [Stable] : stable [Home] : Home [Ambulatory] : Ambulatory [] : No [FreeTextEntry2] : 1. Maintain skin integrity.\par 2. Promote proper nutrition.\par 3. Maintain glycemic control.\par 4. Prevent infection. [FreeTextEntry3] : Wound appears the same as last visit.  [FreeTextEntry4] : Pt. will follow up in wound care office in 1 week. Waiting to hear when hyperbaric treatment will start. Dr. Watson ordered MRI and vascular studies. Requistions submitted  for approval. Will continue with dressing changes 3 x weekly. No signs or symptoms of infection noted.  Cyclophosphamide Counseling:  I discussed with the patient the risks of cyclophosphamide including but not limited to hair loss, hormonal abnormalities, decreased fertility, abdominal pain, diarrhea, nausea and vomiting, bone marrow suppression and infection. The patient understands that monitoring is required while taking this medication.

## 2023-04-03 NOTE — REVIEW OF SYSTEMS
[Fever] : no fever [Chills] : no chills [Eye Pain] : no eye pain [Loss Of Hearing] : no hearing loss [Shortness Of Breath] : no shortness of breath [Wheezing] : no wheezing [Abdominal Pain] : no abdominal pain [Joint Stiffness] : joint stiffness [Skin Wound] : skin wound [Anxiety] : no anxiety [Negative] : Heme/Lymph [FreeTextEntry7] : Morbid obesity  [FreeTextEntry5] : HTN, HLD , A- fib , CHF [de-identified] : DFU 3 posterior left heel , ssff  [de-identified] : IDDM with neuropathy  [de-identified] : JADEN

## 2023-04-03 NOTE — PHYSICAL EXAM
[4 x 4] : 4 x 4  [0] : left 0 [1+] : left 1+ [Ankle Swelling (On Exam)] : present [Ankle Swelling Bilaterally] : bilaterally  [Ankle Swelling On The Left] : moderate [Varicose Veins Of Lower Extremities] : not present [] : not present [Purpura] : no purpura  [Petechiae] : no petechiae [Skin Ulcer] : ulcer [Skin Induration] : no induration [Alert] : alert [Oriented to Person] : oriented to person [Oriented to Place] : oriented to place [Calm] : calm [de-identified] : calm [de-identified] : HTN, HLD , A- fib , hx CHF [de-identified] : DFU 3 posterior left heel  [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  [FreeTextEntry1] : Left heel [FreeTextEntry2] : 2.8 [FreeTextEntry3] : 3.40.2 [de-identified] : serosanguinous [de-identified] : callous [de-identified] : 1-25% [de-identified] : Silver alginate [de-identified] : Mechanically cleansed with NS and dressed with abdominal pad, kerlix, and stockinette.  [TWNoteComboBox4] : Moderate [TWNoteComboBox5] : No [TWNoteComboBox6] : Diabetic [de-identified] : No [de-identified] : Normal [de-identified] : None [de-identified] : None [de-identified] : >75% [de-identified] : Yes [de-identified] : 3x Weekly

## 2023-04-03 NOTE — VITALS
[Pain related to present condition?] : The patient's  pain is related to present condition. [] : No [de-identified] : 8/10 [FreeTextEntry3] : Left heel [FreeTextEntry4] : Oxycodone(sometimes gets relief, othertimes not)

## 2023-04-04 ENCOUNTER — NON-APPOINTMENT (OUTPATIENT)
Age: 58
End: 2023-04-04

## 2023-04-05 ENCOUNTER — APPOINTMENT (OUTPATIENT)
Dept: WOUND CARE | Facility: HOSPITAL | Age: 58
End: 2023-04-05
Payer: MEDICARE

## 2023-04-05 ENCOUNTER — NON-APPOINTMENT (OUTPATIENT)
Age: 58
End: 2023-04-05

## 2023-04-05 ENCOUNTER — OUTPATIENT (OUTPATIENT)
Dept: OUTPATIENT SERVICES | Facility: HOSPITAL | Age: 58
LOS: 1 days | Discharge: HOME | End: 2023-04-05
Payer: MEDICARE

## 2023-04-05 ENCOUNTER — APPOINTMENT (OUTPATIENT)
Dept: HYPERBARIC MEDICINE | Facility: HOSPITAL | Age: 58
End: 2023-04-05

## 2023-04-05 VITALS
RESPIRATION RATE: 16 BRPM | TEMPERATURE: 97.6 F | DIASTOLIC BLOOD PRESSURE: 83 MMHG | OXYGEN SATURATION: 97 % | SYSTOLIC BLOOD PRESSURE: 129 MMHG | HEART RATE: 81 BPM

## 2023-04-05 VITALS
HEART RATE: 73 BPM | RESPIRATION RATE: 16 BRPM | DIASTOLIC BLOOD PRESSURE: 79 MMHG | SYSTOLIC BLOOD PRESSURE: 121 MMHG | OXYGEN SATURATION: 96 % | TEMPERATURE: 98.1 F

## 2023-04-05 DIAGNOSIS — Z90.89 ACQUIRED ABSENCE OF OTHER ORGANS: Chronic | ICD-10-CM

## 2023-04-05 DIAGNOSIS — S91.309D UNSPECIFIED OPEN WOUND, UNSPECIFIED FOOT, SUBSEQUENT ENCOUNTER: ICD-10-CM

## 2023-04-05 DIAGNOSIS — Z90.49 ACQUIRED ABSENCE OF OTHER SPECIFIED PARTS OF DIGESTIVE TRACT: Chronic | ICD-10-CM

## 2023-04-05 DIAGNOSIS — Z98.890 OTHER SPECIFIED POSTPROCEDURAL STATES: Chronic | ICD-10-CM

## 2023-04-05 PROCEDURE — G0277: CPT

## 2023-04-05 PROCEDURE — 99183 HYPERBARIC OXYGEN THERAPY: CPT

## 2023-04-05 PROCEDURE — 82962 GLUCOSE BLOOD TEST: CPT

## 2023-04-06 ENCOUNTER — APPOINTMENT (OUTPATIENT)
Dept: HYPERBARIC MEDICINE | Facility: HOSPITAL | Age: 58
End: 2023-04-06

## 2023-04-07 ENCOUNTER — RESULT REVIEW (OUTPATIENT)
Age: 58
End: 2023-04-07

## 2023-04-07 ENCOUNTER — APPOINTMENT (OUTPATIENT)
Dept: HYPERBARIC MEDICINE | Facility: HOSPITAL | Age: 58
End: 2023-04-07
Payer: MEDICARE

## 2023-04-07 ENCOUNTER — OUTPATIENT (OUTPATIENT)
Dept: OUTPATIENT SERVICES | Facility: HOSPITAL | Age: 58
LOS: 1 days | Discharge: HOME | End: 2023-04-07
Payer: MEDICARE

## 2023-04-07 ENCOUNTER — APPOINTMENT (OUTPATIENT)
Dept: WOUND CARE | Facility: HOSPITAL | Age: 58
End: 2023-04-07
Payer: MEDICARE

## 2023-04-07 VITALS
TEMPERATURE: 98.8 F | HEART RATE: 85 BPM | SYSTOLIC BLOOD PRESSURE: 127 MMHG | RESPIRATION RATE: 20 BRPM | DIASTOLIC BLOOD PRESSURE: 58 MMHG | OXYGEN SATURATION: 97 %

## 2023-04-07 VITALS
DIASTOLIC BLOOD PRESSURE: 78 MMHG | HEART RATE: 79 BPM | OXYGEN SATURATION: 97 % | SYSTOLIC BLOOD PRESSURE: 133 MMHG | RESPIRATION RATE: 20 BRPM | TEMPERATURE: 97.7 F

## 2023-04-07 DIAGNOSIS — E11.621 TYPE 2 DIABETES MELLITUS WITH FOOT ULCER: ICD-10-CM

## 2023-04-07 DIAGNOSIS — L97.423 NON-PRESSURE CHRONIC ULCER OF LEFT HEEL AND MIDFOOT WITH NECROSIS OF MUSCLE: ICD-10-CM

## 2023-04-07 DIAGNOSIS — Z98.890 OTHER SPECIFIED POSTPROCEDURAL STATES: Chronic | ICD-10-CM

## 2023-04-07 DIAGNOSIS — Z79.4 LONG TERM (CURRENT) USE OF INSULIN: ICD-10-CM

## 2023-04-07 DIAGNOSIS — S91.309D UNSPECIFIED OPEN WOUND, UNSPECIFIED FOOT, SUBSEQUENT ENCOUNTER: ICD-10-CM

## 2023-04-07 DIAGNOSIS — E11.40 TYPE 2 DIABETES MELLITUS WITH DIABETIC NEUROPATHY, UNSPECIFIED: ICD-10-CM

## 2023-04-07 PROCEDURE — 73720 MRI LWR EXTREMITY W/O&W/DYE: CPT | Mod: 26,LT

## 2023-04-07 PROCEDURE — 82962 GLUCOSE BLOOD TEST: CPT

## 2023-04-07 PROCEDURE — 99183 HYPERBARIC OXYGEN THERAPY: CPT

## 2023-04-07 PROCEDURE — G0277: CPT

## 2023-04-07 PROCEDURE — 73720 MRI LWR EXTREMITY W/O&W/DYE: CPT

## 2023-04-07 PROCEDURE — 93923 UPR/LXTR ART STDY 3+ LVLS: CPT

## 2023-04-07 PROCEDURE — A9579: CPT

## 2023-04-07 PROCEDURE — 93923 UPR/LXTR ART STDY 3+ LVLS: CPT | Mod: 26

## 2023-04-07 NOTE — PROCEDURE
[Outpatient] : Outpatient [Ambulatory] : Patient is ambulatory. [Wheelchair] : wheelchair [THIS CHAMBER HAS BEEN CLEANED / DISINFECTED] : This chamber has been cleaned / disinfected according to local and hospital policy and procedure prior to this treatment. [Patient demonstrated and verbalized proper technique for using air break mask] : Patient demonstrated and verbalized proper technique for using air break mask [Patient educated on the risks of SMOKING prior to HBOT with understanding] : Patient educated on the risks of SMOKING prior to HBOT with understanding [Patient educated on the risks of CONSUMING ALCOHOL prior to HBOT with understanding] : Patient educated on the risks of CONSUMING ALCOHOL prior to HBOT with understanding [100% Cotton] : 100% cotton [Empty all pockets] : empty all pockets [No hair oils, wigs, hairpieces, pins] : no hair oils, wigs, hairpieces, pins  [Pre tx medications] : pre tx medications  [No make-up, creams] : no make-up, creams  [No jewelry] : no jewelry  [No matches, cigarettes, lighters] : no matches, cigarettes, lighters  [Hearing aid removed] : hearing aid removed [Dentures removed] : dentures removed [Ground bracelet on pt's wrist] : ground bracelet on pt's wrist  [Contacts removed] : contacts removed  [Remove nail polish] : remove nail polish  [No reading material] : no reading material  [Bra, undergarments removed] : bra, undergarments removed  [No contraindicated dressings] : no contraindicated dressings [Ground Wire - VISUAL Verification - Intact/Free of Obstruction] : Ground Wire - VISUAL Verification - Intact/Free of Obstruction  [Ground Continuity - Verified < 1 ohm w/ Wrist Strap Vanita] : Ground Continuity - Verified < 1 ohm w/ Wrist Strap Vanita [Number: ___] : Number: [unfilled] [Diagnosis: ___] : Diagnosis: [unfilled] [____] : Post-Dive: Time - [unfilled] [___] : Post-Dive: Value - [unfilled] mg/dL [Clear all fields] : clear all fields [] : No [FreeTextEntry3] : 90 [FreeTextEntry5] : 1015 [FreeTextEntry7] : 1029 [[facility]] : 0082 [de-identified] : 1203 [de-identified] : 108

## 2023-04-07 NOTE — ADDENDUM
[FreeTextEntry1] : PT A&OX3 AND AMBULATING UNASSISTED INTO HYPERBARIC SUITE \par PT ORDER VERIFIED PRIOR TO TREATMENT\par PT CONTRAINDICATION LIST AND PRE CHECK LIST VERIFIED AND SIGNED BY TECH PRIOR TO TREATMENT\par PT VITALS WERE WITHIN PARAMETERS FOR HBOT\par PT WOUND DRESSING IS DRY AND INTACT\par PT DESCENDED TO 2.0 MARISA AT 1.75 PSI/MIN WITHOUT INCIDENT IN CHAMBER #3-6599\par PT RESTING AT TXP DEPTH WITH OBSERVED CHEST RISE BY HT SEATED CHAMBERSIDE\par PT ASCENDED FROM TX DEPTH OF 2.0 MARISA @ 1.75 PSI/MIN IN CHAMBER #3\par PT TOLERATED TREATMENT\par WOUND CARE AND DRESSING CHANGE WAS DONE AFTER TREATMENT\par PT EXITED HYPERBARIC SUITE SAFELY ACCOMPANIED BY HT\par \par

## 2023-04-08 ENCOUNTER — OUTPATIENT (OUTPATIENT)
Dept: OUTPATIENT SERVICES | Facility: HOSPITAL | Age: 58
LOS: 1 days | Discharge: HOME | End: 2023-04-08
Payer: MEDICARE

## 2023-04-08 ENCOUNTER — APPOINTMENT (OUTPATIENT)
Dept: HYPERBARIC MEDICINE | Facility: HOSPITAL | Age: 58
End: 2023-04-08
Payer: MEDICARE

## 2023-04-08 VITALS
SYSTOLIC BLOOD PRESSURE: 150 MMHG | OXYGEN SATURATION: 98 % | RESPIRATION RATE: 20 BRPM | DIASTOLIC BLOOD PRESSURE: 103 MMHG | TEMPERATURE: 98.4 F | HEART RATE: 87 BPM

## 2023-04-08 VITALS — HEART RATE: 89 BPM | DIASTOLIC BLOOD PRESSURE: 87 MMHG | SYSTOLIC BLOOD PRESSURE: 146 MMHG

## 2023-04-08 VITALS
RESPIRATION RATE: 20 BRPM | HEART RATE: 79 BPM | TEMPERATURE: 98.2 F | SYSTOLIC BLOOD PRESSURE: 117 MMHG | DIASTOLIC BLOOD PRESSURE: 72 MMHG | OXYGEN SATURATION: 99 %

## 2023-04-08 DIAGNOSIS — S91.309D UNSPECIFIED OPEN WOUND, UNSPECIFIED FOOT, SUBSEQUENT ENCOUNTER: ICD-10-CM

## 2023-04-08 DIAGNOSIS — Z90.89 ACQUIRED ABSENCE OF OTHER ORGANS: Chronic | ICD-10-CM

## 2023-04-08 DIAGNOSIS — Z90.49 ACQUIRED ABSENCE OF OTHER SPECIFIED PARTS OF DIGESTIVE TRACT: Chronic | ICD-10-CM

## 2023-04-08 DIAGNOSIS — Z98.890 OTHER SPECIFIED POSTPROCEDURAL STATES: Chronic | ICD-10-CM

## 2023-04-08 PROCEDURE — G0277: CPT

## 2023-04-08 PROCEDURE — 99183 HYPERBARIC OXYGEN THERAPY: CPT

## 2023-04-08 PROCEDURE — 82962 GLUCOSE BLOOD TEST: CPT

## 2023-04-09 DIAGNOSIS — E11.621 TYPE 2 DIABETES MELLITUS WITH FOOT ULCER: ICD-10-CM

## 2023-04-09 DIAGNOSIS — L97.423 NON-PRESSURE CHRONIC ULCER OF LEFT HEEL AND MIDFOOT WITH NECROSIS OF MUSCLE: ICD-10-CM

## 2023-04-09 DIAGNOSIS — Z79.4 LONG TERM (CURRENT) USE OF INSULIN: ICD-10-CM

## 2023-04-10 ENCOUNTER — APPOINTMENT (OUTPATIENT)
Dept: WOUND CARE | Facility: HOSPITAL | Age: 58
End: 2023-04-10
Payer: MEDICARE

## 2023-04-10 ENCOUNTER — OUTPATIENT (OUTPATIENT)
Dept: OUTPATIENT SERVICES | Facility: HOSPITAL | Age: 58
LOS: 1 days | Discharge: ANOTHER HOSPITAL | End: 2023-04-10
Payer: MEDICARE

## 2023-04-10 ENCOUNTER — APPOINTMENT (OUTPATIENT)
Dept: HYPERBARIC MEDICINE | Facility: HOSPITAL | Age: 58
End: 2023-04-10
Payer: MEDICARE

## 2023-04-10 ENCOUNTER — EMERGENCY (EMERGENCY)
Facility: HOSPITAL | Age: 58
LOS: 1 days | Discharge: HOME | End: 2023-04-10
Attending: EMERGENCY MEDICINE | Admitting: EMERGENCY MEDICINE
Payer: MEDICARE

## 2023-04-10 VITALS
RESPIRATION RATE: 18 BRPM | HEIGHT: 61 IN | TEMPERATURE: 98 F | DIASTOLIC BLOOD PRESSURE: 92 MMHG | HEART RATE: 90 BPM | SYSTOLIC BLOOD PRESSURE: 142 MMHG | OXYGEN SATURATION: 97 %

## 2023-04-10 VITALS
OXYGEN SATURATION: 97 % | RESPIRATION RATE: 18 BRPM | SYSTOLIC BLOOD PRESSURE: 139 MMHG | HEART RATE: 88 BPM | TEMPERATURE: 98 F | DIASTOLIC BLOOD PRESSURE: 90 MMHG

## 2023-04-10 DIAGNOSIS — Z90.49 ACQUIRED ABSENCE OF OTHER SPECIFIED PARTS OF DIGESTIVE TRACT: Chronic | ICD-10-CM

## 2023-04-10 DIAGNOSIS — M25.50 PAIN IN UNSPECIFIED JOINT: ICD-10-CM

## 2023-04-10 DIAGNOSIS — Z79.4 LONG TERM (CURRENT) USE OF INSULIN: ICD-10-CM

## 2023-04-10 DIAGNOSIS — I50.9 HEART FAILURE, UNSPECIFIED: ICD-10-CM

## 2023-04-10 DIAGNOSIS — Z79.01 LONG TERM (CURRENT) USE OF ANTICOAGULANTS: ICD-10-CM

## 2023-04-10 DIAGNOSIS — S91.309D UNSPECIFIED OPEN WOUND, UNSPECIFIED FOOT, SUBSEQUENT ENCOUNTER: ICD-10-CM

## 2023-04-10 DIAGNOSIS — Z98.49 CATARACT EXTRACTION STATUS, UNSPECIFIED EYE: ICD-10-CM

## 2023-04-10 DIAGNOSIS — L97.423 NON-PRESSURE CHRONIC ULCER OF LEFT HEEL AND MIDFOOT WITH NECROSIS OF MUSCLE: ICD-10-CM

## 2023-04-10 DIAGNOSIS — E11.621 TYPE 2 DIABETES MELLITUS WITH FOOT ULCER: ICD-10-CM

## 2023-04-10 DIAGNOSIS — Z90.49 ACQUIRED ABSENCE OF OTHER SPECIFIED PARTS OF DIGESTIVE TRACT: ICD-10-CM

## 2023-04-10 DIAGNOSIS — E11.40 TYPE 2 DIABETES MELLITUS WITH DIABETIC NEUROPATHY, UNSPECIFIED: ICD-10-CM

## 2023-04-10 DIAGNOSIS — I48.91 UNSPECIFIED ATRIAL FIBRILLATION: ICD-10-CM

## 2023-04-10 DIAGNOSIS — Z79.84 LONG TERM (CURRENT) USE OF ORAL HYPOGLYCEMIC DRUGS: ICD-10-CM

## 2023-04-10 DIAGNOSIS — I11.0 HYPERTENSIVE HEART DISEASE WITH HEART FAILURE: ICD-10-CM

## 2023-04-10 DIAGNOSIS — Z86.718 PERSONAL HISTORY OF OTHER VENOUS THROMBOSIS AND EMBOLISM: ICD-10-CM

## 2023-04-10 DIAGNOSIS — L84 CORNS AND CALLOSITIES: ICD-10-CM

## 2023-04-10 DIAGNOSIS — Z90.89 ACQUIRED ABSENCE OF OTHER ORGANS: Chronic | ICD-10-CM

## 2023-04-10 DIAGNOSIS — Z79.899 OTHER LONG TERM (CURRENT) DRUG THERAPY: ICD-10-CM

## 2023-04-10 DIAGNOSIS — E66.01 MORBID (SEVERE) OBESITY DUE TO EXCESS CALORIES: ICD-10-CM

## 2023-04-10 PROCEDURE — 99183 HYPERBARIC OXYGEN THERAPY: CPT

## 2023-04-10 PROCEDURE — G0463: CPT

## 2023-04-10 PROCEDURE — 99283 EMERGENCY DEPT VISIT LOW MDM: CPT

## 2023-04-10 PROCEDURE — 82962 GLUCOSE BLOOD TEST: CPT

## 2023-04-10 PROCEDURE — 99284 EMERGENCY DEPT VISIT MOD MDM: CPT

## 2023-04-10 RX ORDER — NYSTATIN CREAM 100000 [USP'U]/G
1 CREAM TOPICAL
Qty: 1 | Refills: 0
Start: 2023-04-10 | End: 2023-04-23

## 2023-04-10 RX ORDER — NYSTATIN 500MM UNIT
4 POWDER (EA) MISCELLANEOUS
Qty: 224 | Refills: 0
Start: 2023-04-10 | End: 2023-04-23

## 2023-04-10 NOTE — PHYSICAL EXAM
[4 x 4] : 4 x 4  [0] : left 0 [1+] : left 1+ [Ankle Swelling (On Exam)] : present [Ankle Swelling Bilaterally] : bilaterally  [Ankle Swelling On The Left] : moderate [Skin Ulcer] : ulcer [Alert] : alert [Oriented to Person] : oriented to person [Oriented to Place] : oriented to place [Calm] : calm [Varicose Veins Of Lower Extremities] : not present [] : not present [Purpura] : no purpura  [Petechiae] : no petechiae [Skin Induration] : no induration [de-identified] : HTN, HLD , A- fib , hx CHF [de-identified] : calm [de-identified] : 4/5 muscle strength for all muscles and tendons crossing the foot and ankle joints, ankle joint and STJ ROM intact b/l. No pain with calf compression b/l. [de-identified] : DFU 3 posterior left heel  [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  [FreeTextEntry1] : Left Heel [FreeTextEntry2] : 2.8 [FreeTextEntry3] : 3.4 [de-identified] : moderate serosanguineous [FreeTextEntry4] : 0.2 [de-identified] : intact, callus [de-identified] : 1- 25% [de-identified] : Silver Alginate [de-identified] : Mechanically cleansed with 0.9% normal saline and sterile gauze applied.\par dry dressing [TWNoteComboBox5] : No [TWNoteComboBox6] : Diabetic [de-identified] : No [de-identified] : other [de-identified] : None [de-identified] : None [de-identified] : >75% [de-identified] : Yes [de-identified] : 3x Weekly [de-identified] : Primary Dressing

## 2023-04-10 NOTE — ED PROVIDER NOTE - PATIENT PORTAL LINK FT
You can access the FollowMyHealth Patient Portal offered by [facility] by registering at the following website: [email or web address]. By joining [employer]’s FollowMyHealth portal, you will also be able to view your health information using other applications (apps) compatible with our system.

## 2023-04-10 NOTE — ASSESSMENT
[Verbal] : Verbal [Spouse] : Spouse [Good - alert, interested, motivated] : Good - alert, interested, motivated [Demonstrates independently] : demonstrates independently [Dressing changes] : dressing changes [Foot Care] : foot care [Skin Care] : skin care [Pressure relief] : pressure relief

## 2023-04-10 NOTE — ED ADULT TRIAGE NOTE - CHIEF COMPLAINT QUOTE
Sent from wound care center for generalized pain, swollen eyes, facial rash and bleeding from abdominal folds.  No rash noted to face or swelling to eye in triage.

## 2023-04-10 NOTE — ED PROVIDER NOTE - CARE PROVIDER_API CALL
Perlman, Craig D ()  Internal Medicine  [address] [place] [address], [address]  [place] [address]  Phone: [phone]  Fax: [phone]  Follow Up Time:

## 2023-04-10 NOTE — ADDENDUM
[FreeTextEntry1] : PT A&OX3 AND AMBULATING WITH WALKER INTO HYPERBARIC SUITE \par PT ORDER VERIFIED PRIOR TO TREATMENT\par PT CONTRAINDICATION LIST AND PRE CHECK LIST VERIFIED AND SIGNED BY TECH PRIOR TO TREATMENT\par PT USED B/L LEG WEDGE FOR HEEL OFFLOADING WITH EXPRESSED COMFORT\par PT VITALS WERE WITHIN PARAMETERS FOR HBOT\par PT WOUND DRESSING IS DRY AND INTACT\par PT DESCENDED TO 2.0 MARISA AT 1.75 PSI/MIN WITHOUT INCIDENT IN CHAMBER #1-883\par PT RESTING AT TXP DEPTH WITH OBSERVED CHEST RISE BY HT SEATED CHAMBERSIDE\par PT ASCENDED FROM TX DEPTH OF 2.0 MARISA @ 1.75 PSI/MIN IN CHAMBER #1\par PT TOLERATED TREATMENT\par WOUND CARE AND DRESSING CHANGE WAS DONE AFTER TREATMENT\par PT EXITED HYPERBARIC SUITE SAFELY ACCOMPANIED BY HT\par \par \par  No

## 2023-04-10 NOTE — REVIEW OF SYSTEMS
[Joint Stiffness] : joint stiffness [Skin Wound] : skin wound [Negative] : Heme/Lymph [Fever] : no fever [Chills] : no chills [Eye Pain] : no eye pain [Loss Of Hearing] : no hearing loss [Shortness Of Breath] : no shortness of breath [Wheezing] : no wheezing [Abdominal Pain] : no abdominal pain [Anxiety] : no anxiety [FreeTextEntry5] : HTN, HLD , A- fib , CHF [FreeTextEntry7] : Morbid obesity  [de-identified] : DFU 3 posterior left heel  [de-identified] : IDDM with neuropathy  [de-identified] : JADEN

## 2023-04-10 NOTE — PLAN
[FreeTextEntry1] : .Patient seen and evaluated. Discussed etiology and treatment plan. Patient verbalized understanding. \par Patient will benefit from HBOT , authorization placed. Patient to continue local wound care and offloading. \par Spent 20 minutes for patient care and medical decision making.\par

## 2023-04-10 NOTE — ED PROVIDER NOTE - NSFOLLOWUPINSTRUCTIONS_ED_ALL_ED_FT
Follow up with endocrinologist  return to er for any worsening symptoms  apply over the counter allergic eye drops    Skin Yeast Infection  Outline of a male's lower body with a close-up view of a skin yeast infection.   A skin yeast infection is a condition in which there is an overgrowth of yeast (Candida) that normally lives on the skin. This condition usually occurs in areas of the skin that are constantly warm and moist, such as the skin under the breasts or armpits, or in the groin and other body folds.    What are the causes?  This condition is caused by a change in the normal balance of the yeast that live on the skin.    What increases the risk?  You are more likely to develop this condition if you:  Are obese.  Are pregnant.  Are 65 years of age or older.  Wear tight clothing.  Have any of the following conditions:  Diabetes.  Malnutrition.  A weak body defense system (immune system).  Take medicines such as:  Birth control pills.  Antibiotics.  Steroid medicines.  What are the signs or symptoms?  The most common symptom of this condition is itchiness in the affected area. Other symptoms include:  A red, swollen area of the skin.  Bumps on the skin.  How is this diagnosed?  This condition is diagnosed with a medical history and physical exam. Your health care provider may check for yeast by taking scrapings of the skin to be viewed under a microscope.    How is this treated?  This condition is treated with medicine. Medicines may be prescribed or available over the counter. The medicines may be:  Taken by mouth (orally).  Applied as a cream or powder to your skin.  Follow these instructions at home:  A tube of ointment.  Take or apply over-the-counter and prescription medicines only as told by your health care provider.  Maintain a healthy weight. If you need help losing weight, talk with your health care provider.  Keep your skin clean and dry.  Wear loose-fitting clothing.  If you have diabetes, keep your blood sugar under control.  Keep all follow-up visits. This is important.  Contact a health care provider if:  Your symptoms go away and then come back.  Your symptoms do not get better with treatment.  Your symptoms get worse.  Your rash spreads.  You have a fever or chills.  You have new symptoms.  You have new warmth or redness of your skin.  Your rash is painful or bleeding.  Summary  A skin yeast infection is a condition in which there is an overgrowth of yeast (Candida) that normally lives on the skin.  Take or apply over-the-counter and prescription medicines only as told by your health care provider.  Keep your skin clean and dry.  Contact a health care provider if your symptoms do not get better with treatment.  This information is not intended to replace advice given to you by your health care provider. Make sure you discuss any questions you have with your health care provider.

## 2023-04-10 NOTE — ED PROVIDER NOTE - DIFFERENTIAL DIAGNOSIS
DDX included but not limited to cellulitis, allergic reaction, candidal infection Differential Diagnosis

## 2023-04-10 NOTE — HISTORY OF PRESENT ILLNESS
[FreeTextEntry1] : Patient is a 57 year old female presenting for follow up of left heel wound down to fascia. Patient relates mild tenderness and pain to the left heel. Patient is agreeable to start HBOT.

## 2023-04-10 NOTE — ED PROVIDER NOTE - OBJECTIVE STATEMENT
Patient is a 57-year-old female with past medical history of A-fib diabetes CHF chronic wounds and cellulitis which she goes to wound clinic for hyperbaric treatment coming in for itchy swollen eyes for the last 2 days.  Patient states she also has rash in abdominal folds and was advised to come to emergency room by wound clinic.  Patient states she has had this for a while intermittently has been applying powder and cream which helps but symptoms return.  Patient states she is also having generalized body pain which she was prescribed oxycodone twice a day but is not helping.  Patient states she did not take any today.

## 2023-04-10 NOTE — PROCEDURE
[] : No [FreeTextEntry3] : 90 [FreeTextEntry5] : 7340 [FreeTextEntry7] : 1121 [FreeTextEntry9] : 7168 [de-identified] : 0467 [de-identified] : 108

## 2023-04-10 NOTE — ED PROVIDER NOTE - MUSCULOSKELETAL, MLM
Spine appears normal, range of motion is not limited, no muscle or joint tenderness lower extremity chronic skin changes and left foot in dressing from wound clinic

## 2023-04-10 NOTE — ED PROVIDER NOTE - ATTENDING APP SHARED VISIT CONTRIBUTION OF CARE
56yo female with chronic fungal infection, also diabetic with uncontrolled sugars  exam: morbidly obese, diffuse abd wall candida with excoriations  plan: advised to follow up with endo for better sugar control, d/c with nystatin oral and powder  agree with assesment and plan of PA

## 2023-04-11 ENCOUNTER — NON-APPOINTMENT (OUTPATIENT)
Age: 58
End: 2023-04-11

## 2023-04-11 ENCOUNTER — APPOINTMENT (OUTPATIENT)
Dept: INTERNAL MEDICINE | Facility: CLINIC | Age: 58
End: 2023-04-11
Payer: MEDICARE

## 2023-04-11 ENCOUNTER — APPOINTMENT (OUTPATIENT)
Dept: HYPERBARIC MEDICINE | Facility: HOSPITAL | Age: 58
End: 2023-04-11

## 2023-04-11 ENCOUNTER — APPOINTMENT (OUTPATIENT)
Dept: CARDIOLOGY | Facility: CLINIC | Age: 58
End: 2023-04-11
Payer: MEDICARE

## 2023-04-11 VITALS
HEART RATE: 115 BPM | SYSTOLIC BLOOD PRESSURE: 127 MMHG | HEIGHT: 61 IN | BODY MASS INDEX: 43.99 KG/M2 | OXYGEN SATURATION: 95 % | DIASTOLIC BLOOD PRESSURE: 64 MMHG | TEMPERATURE: 97.8 F | WEIGHT: 233 LBS

## 2023-04-11 DIAGNOSIS — Z86.19 PERSONAL HISTORY OF OTHER INFECTIOUS AND PARASITIC DISEASES: ICD-10-CM

## 2023-04-11 DIAGNOSIS — M25.50 PAIN IN UNSPECIFIED JOINT: ICD-10-CM

## 2023-04-11 DIAGNOSIS — M79.10 MYALGIA, UNSPECIFIED SITE: ICD-10-CM

## 2023-04-11 DIAGNOSIS — B35.3 TINEA PEDIS: ICD-10-CM

## 2023-04-11 DIAGNOSIS — E11.8 TYPE 2 DIABETES MELLITUS WITH UNSPECIFIED COMPLICATIONS: ICD-10-CM

## 2023-04-11 PROCEDURE — 99214 OFFICE O/P EST MOD 30 MIN: CPT | Mod: 25

## 2023-04-11 PROCEDURE — 99214 OFFICE O/P EST MOD 30 MIN: CPT

## 2023-04-11 PROCEDURE — 93000 ELECTROCARDIOGRAM COMPLETE: CPT

## 2023-04-11 RX ORDER — DIGOXIN 125 UG/1
125 TABLET ORAL DAILY
Refills: 0 | Status: DISCONTINUED | COMMUNITY
End: 2023-04-11

## 2023-04-11 NOTE — HISTORY OF PRESENT ILLNESS
[de-identified] : 57 year F accompanied by health care prox Mr Cisneros.\par 57y F PMH of Afib on anticoagulation and rate control medication,  uncontrolled DM (last a1c 11.3) on insulin and CHF (unknown EF) with hx of left heel wound folowing wound and podiatry  hx of bilateral lower extremity swelling Bilateral LE here ofr f/u s/p ed visit for candida skin irritation d/c on  nystatin oral and power. Pt saw cardiology today concern for possible diastolic dysfunction, increased lasix to 80mg x 7 days and reasume 40mg po daily  and recently added lyrica 75mg for neuropathy. \par Pt stopped lyrica previously for pain and was given rx of oxycodone - reports no relief.\par

## 2023-04-11 NOTE — PHYSICAL EXAM
[Normal] : affect was normal and insight and judgment were intact [de-identified] : face mask  [de-identified] : NATALIE [de-identified] : RRR [de-identified] : hx of b/l leg edema,  [de-identified] : dressing on left foot, tinea pedis

## 2023-04-11 NOTE — HEALTH RISK ASSESSMENT
[No] : No [No falls in past year] : Patient reported no falls in the past year [0] : 2) Feeling down, depressed, or hopeless: Not at all (0) [PHQ-2 Negative - No further assessment needed] : PHQ-2 Negative - No further assessment needed [Never] : Never [PRK3Gvoms] : 0

## 2023-04-11 NOTE — REVIEW OF SYSTEMS
[Skin Rash] : skin rash [Negative] : Psychiatric [Itching] : no itching [Mole Changes] : no mole changes [Nail Changes] : no nail changes [Hair Changes] : no hair changes [FreeTextEntry9] : chronic neuropathy pain [de-identified] : + tinea pedis

## 2023-04-11 NOTE — ASSESSMENT
[FreeTextEntry1] : #AFIB\par cont metoprolol and Xarelto\par following carrdio\par #CHF\par f/u cardio\par lasix increased as per cardiology recommendations\par #UNCONTROLLED DM\par - f/u endo continue current regmin.\par Continue monitoring FSG and or CGM. Encouraged to count carbohydrates, exercise daily and examine feet daily. If FSG/CGM are consistently greater than 300 or less than 70, patient should call MD. The patient was instructed to eat regularly without skipping meals.  \par See ophtho/podiatry\par Fasting blood glucose < 130\par Postprandial blood glucose < 160\par \par #DIABETIC ULCER OF L FOOT\par control sugars\par f/u podiatry and wound care\par \par #TINEA PEDIS/ATHLETES FOOT\par - on clotrimazole\par \par #MYALGIA/POLYARTHRALGIA\par #DIABETIC NEUROPATHY\par - restarted on lyrica\par - f/u rheum\par - f/u endo\par - better sugar control\par - discussed will not give controlled substances due to being on lyrica\par - pt was given oxycodone before and no relief.\par - recommend f/u pain management\par \par pt and proxy agrees and understands plan via teach back method. all questions answered.\par \par Time dedicated to this patient visit today including preparing to see patient, obtaining and/or reviewing separately obtained history, performing a medically appropriate examination and/or evaluation, counseling and educating the patient/family, ordering medications, tests, or procedures, communicating with other provider(s), documenting clinical information in the health records, independently interpreting results and communicating results to the patient/family - 35 minutes.  Total\par

## 2023-04-11 NOTE — PHYSICAL EXAM
[Well Developed] : well developed [Well Nourished] : well nourished [Obese] : obese [Normal Conjunctiva] : normal conjunctiva [Elevated JVD ____cm] : elevated JVD ~Vcm [Normal S1, S2] : normal S1, S2 [No Murmur] : no murmur [No Rub] : no rub [No Gallop] : no gallop [Normal] : clear lung fields, good air entry, no respiratory distress [Soft] : abdomen soft [Non Tender] : non-tender [Abnormal Gait] : abnormal gait [Edema ___] : edema [unfilled] [Venous stasis] : venous stasis [Rash] : rash [No Focal Deficits] : no focal deficits [Alert and Oriented] : alert and oriented [Normal memory] : normal memory [de-identified] : +tinea pedis

## 2023-04-11 NOTE — REVIEW OF SYSTEMS
[Feeling Fatigued] : feeling fatigued [Lower Ext Edema] : lower extremity edema [Orthopnea] : orthopnea [Change in Appetite] : change in appetite [Joint Pain] : joint pain [Muscle Cramps] : muscle cramps [Myalgia] : myalgia [Rash] : rash [Change In Color Of Skin] : change in skin color [Skin Lesions] : skin lesion(s): [Tingling (Paresthesia)] : tingling [Under Stress] : under stress [Negative] : Heme/Lymph [Fever] : no fever [Chills] : no chills [Chest Discomfort] : no chest discomfort [PND] : no PND

## 2023-04-11 NOTE — DISCUSSION/SUMMARY
[FreeTextEntry1] : JVP mildly elevated; hx of HFpEF, TTE with some suggestion of diastolic dysfunction, progressive LE edema\par -increase furosemide to 80mg x 7 days then resume 40mg PO daily\par -will consider spironolactone\par -not a good candidate for SGLT2i due to increased risk of infection\par -eventual ARB\par \par LE pain - mix of neuropathy and swelling and tinea pedis\par -resume lyrica 75mg daily\par -clotrimazole\par -treat swelling\par -glycemic control\par \par Afib - cont metoprolol and Xarelto\par mild HR elevation in setting of pain [EKG obtained to assist in diagnosis and management of assessed problem(s)] : EKG obtained to assist in diagnosis and management of assessed problem(s)

## 2023-04-11 NOTE — HISTORY OF PRESENT ILLNESS
[FreeTextEntry1] : 57F DM, Afib on Xarelto, HFpEF, nonhealing L heel wound \par \par presents for cardiac evaluation\par noted to have increase in LE edema\par \par MARICEL/PVR overall preserved, though calcified arteries were noted\par glucose not well controlled\par \par reports severe leg pains, feels like burning/beestings/razor blades\par \par 3/17/23: \par Chol 133//HDL 37/LDL 54 \par \par

## 2023-04-12 ENCOUNTER — APPOINTMENT (OUTPATIENT)
Dept: HYPERBARIC MEDICINE | Facility: HOSPITAL | Age: 58
End: 2023-04-12
Payer: MEDICARE

## 2023-04-12 ENCOUNTER — APPOINTMENT (OUTPATIENT)
Dept: WOUND CARE | Facility: HOSPITAL | Age: 58
End: 2023-04-12
Payer: MEDICARE

## 2023-04-12 ENCOUNTER — OUTPATIENT (OUTPATIENT)
Dept: OUTPATIENT SERVICES | Facility: HOSPITAL | Age: 58
LOS: 1 days | Discharge: HOME | End: 2023-04-12
Payer: MEDICARE

## 2023-04-12 VITALS
HEART RATE: 94 BPM | RESPIRATION RATE: 20 BRPM | WEIGHT: 233 LBS | TEMPERATURE: 98.1 F | SYSTOLIC BLOOD PRESSURE: 116 MMHG | DIASTOLIC BLOOD PRESSURE: 63 MMHG | BODY MASS INDEX: 43.99 KG/M2 | OXYGEN SATURATION: 95 % | HEIGHT: 61 IN

## 2023-04-12 DIAGNOSIS — Z98.890 OTHER SPECIFIED POSTPROCEDURAL STATES: Chronic | ICD-10-CM

## 2023-04-12 DIAGNOSIS — Z90.89 ACQUIRED ABSENCE OF OTHER ORGANS: Chronic | ICD-10-CM

## 2023-04-12 DIAGNOSIS — S91.309D UNSPECIFIED OPEN WOUND, UNSPECIFIED FOOT, SUBSEQUENT ENCOUNTER: ICD-10-CM

## 2023-04-12 DIAGNOSIS — Z90.49 ACQUIRED ABSENCE OF OTHER SPECIFIED PARTS OF DIGESTIVE TRACT: Chronic | ICD-10-CM

## 2023-04-12 PROCEDURE — G0463: CPT

## 2023-04-12 PROCEDURE — 99213 OFFICE O/P EST LOW 20 MIN: CPT

## 2023-04-12 NOTE — PLAN
[FreeTextEntry1] : continue wound care and off loading , PTR 1 week Spent 20 minutes for patient care and medical decision making.\par

## 2023-04-12 NOTE — REVIEW OF SYSTEMS
[Fever] : no fever [Chills] : no chills [Eye Pain] : no eye pain [Loss Of Hearing] : no hearing loss [Shortness Of Breath] : no shortness of breath [Wheezing] : no wheezing [Abdominal Pain] : no abdominal pain [Joint Stiffness] : joint stiffness [Skin Wound] : skin wound [Anxiety] : no anxiety [Negative] : Heme/Lymph [FreeTextEntry5] : HTN, HLD , A- fib , CHF [FreeTextEntry7] : Morbid obesity  [de-identified] : DFU 3 posterior left heel , ssff , no soi  [de-identified] : IDDM with neuropathy  [de-identified] : JADEN

## 2023-04-12 NOTE — PHYSICAL EXAM
[4 x 4] : 4 x 4  [Abdominal Pad] : Abdominal Pad [0] : left 0 [1+] : left 1+ [Ankle Swelling (On Exam)] : present [Ankle Swelling Bilaterally] : bilaterally  [Ankle Swelling On The Left] : moderate [Varicose Veins Of Lower Extremities] : not present [] : not present [Purpura] : no purpura  [Petechiae] : no petechiae [Skin Ulcer] : ulcer [Skin Induration] : no induration [Alert] : alert [Oriented to Person] : oriented to person [Oriented to Place] : oriented to place [Calm] : calm [de-identified] : calm [de-identified] : HTN, HLD , A- fib , hx CHF [de-identified] : morbid obesity  [de-identified] : DFU 3 posterior left heel , clean , no soi  [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  [FreeTextEntry1] : left heel  [FreeTextEntry2] : 1.5 [FreeTextEntry3] : 1.4 [FreeTextEntry4] : 0.1 [de-identified] : Serous/sanguinous [de-identified] : callus  [de-identified] : 10% [de-identified] : Silver alginate [de-identified] : Mechanically cleansed with sterile gauze and normal saline.\par Kerlix  [TWNoteComboBox4] : Small [de-identified] : other [de-identified] : None [de-identified] : None [de-identified] : >75% [de-identified] : Yes [de-identified] : 3x Weekly [de-identified] : Primary Dressing

## 2023-04-12 NOTE — VITALS
[Pain related to present condition?] : The patient's  pain is related to present condition. [Burning] : burning [Occasional] : occasional [] : No [de-identified] : 6/10 [FreeTextEntry3] : left heel and legs  [FreeTextEntry1] : Lyrica  [FreeTextEntry2] : Legs in elevated position.   [FreeTextEntry4] : Told patient to have legs in dependent possition during pain and to continue taking Lyrica as prescribed. Information to pain management provided for patient.

## 2023-04-12 NOTE — ASSESSMENT
[Verbal] : Verbal [Written] : Written [Demo] : Demo [Patient] : Patient [Good - alert, interested, motivated] : Good - alert, interested, motivated [Needs reinforcement] : needs reinforcement [Dressing changes] : dressing changes [Skin Care] : skin care [Signs and symptoms of infection] : sign and symptoms of infection [Nutrition] : nutrition [Arterial Disease] : arterial disease [How and When to Call] : how and when to call [Labs and Tests] : labs and tests [Pain Management] : pain management [Patient responsibility to plan of care] : patient responsibility to plan of care [Glycemic Control] : glycemic control [] : Yes [Stable] : stable [Home] : Home [Walker] : Walker [Not Applicable - Long Term Care/Home Health Agency] : Long Term Care/Home Health Agency: Not Applicable [FreeTextEntry2] : Weight loss\par Infection prevention\par Localized wound care \par Goal remaining pain free regarding wounds\par Acceptable pain tolerance levels deemed to be 3/10.\par  [FreeTextEntry4] : Information for [facility] department of neurosurgery and neurology provided for patient for pain management. \par Vascular consult submitted \par MRI results pending. A call was made to department to expedite results. \par  patients PCP patients HBOT to be on hold (PER PATIENT)\par dressing changes 3 times a week \par x - rays reviewed by DPM\par Assessment in 1 week

## 2023-04-13 ENCOUNTER — APPOINTMENT (OUTPATIENT)
Dept: HYPERBARIC MEDICINE | Facility: HOSPITAL | Age: 58
End: 2023-04-13

## 2023-04-13 DIAGNOSIS — I11.0 HYPERTENSIVE HEART DISEASE WITH HEART FAILURE: ICD-10-CM

## 2023-04-13 DIAGNOSIS — Z79.899 OTHER LONG TERM (CURRENT) DRUG THERAPY: ICD-10-CM

## 2023-04-13 DIAGNOSIS — Z79.4 LONG TERM (CURRENT) USE OF INSULIN: ICD-10-CM

## 2023-04-13 DIAGNOSIS — E11.621 TYPE 2 DIABETES MELLITUS WITH FOOT ULCER: ICD-10-CM

## 2023-04-13 DIAGNOSIS — L97.423 NON-PRESSURE CHRONIC ULCER OF LEFT HEEL AND MIDFOOT WITH NECROSIS OF MUSCLE: ICD-10-CM

## 2023-04-13 DIAGNOSIS — L84 CORNS AND CALLOSITIES: ICD-10-CM

## 2023-04-13 DIAGNOSIS — E11.40 TYPE 2 DIABETES MELLITUS WITH DIABETIC NEUROPATHY, UNSPECIFIED: ICD-10-CM

## 2023-04-13 DIAGNOSIS — Z90.49 ACQUIRED ABSENCE OF OTHER SPECIFIED PARTS OF DIGESTIVE TRACT: ICD-10-CM

## 2023-04-13 DIAGNOSIS — Z79.84 LONG TERM (CURRENT) USE OF ORAL HYPOGLYCEMIC DRUGS: ICD-10-CM

## 2023-04-13 DIAGNOSIS — Z86.718 PERSONAL HISTORY OF OTHER VENOUS THROMBOSIS AND EMBOLISM: ICD-10-CM

## 2023-04-13 DIAGNOSIS — Z98.49 CATARACT EXTRACTION STATUS, UNSPECIFIED EYE: ICD-10-CM

## 2023-04-13 DIAGNOSIS — I50.9 HEART FAILURE, UNSPECIFIED: ICD-10-CM

## 2023-04-13 DIAGNOSIS — I48.91 UNSPECIFIED ATRIAL FIBRILLATION: ICD-10-CM

## 2023-04-13 DIAGNOSIS — Z79.01 LONG TERM (CURRENT) USE OF ANTICOAGULANTS: ICD-10-CM

## 2023-04-13 DIAGNOSIS — M25.50 PAIN IN UNSPECIFIED JOINT: ICD-10-CM

## 2023-04-14 ENCOUNTER — APPOINTMENT (OUTPATIENT)
Dept: WOUND CARE | Facility: HOSPITAL | Age: 58
End: 2023-04-14

## 2023-04-14 ENCOUNTER — APPOINTMENT (OUTPATIENT)
Dept: WOUND CARE | Facility: HOSPITAL | Age: 58
End: 2023-04-14
Payer: MEDICARE

## 2023-04-14 ENCOUNTER — APPOINTMENT (OUTPATIENT)
Dept: HYPERBARIC MEDICINE | Facility: HOSPITAL | Age: 58
End: 2023-04-14

## 2023-04-14 VITALS
OXYGEN SATURATION: 95 % | SYSTOLIC BLOOD PRESSURE: 131 MMHG | HEART RATE: 95 BPM | DIASTOLIC BLOOD PRESSURE: 71 MMHG | HEIGHT: 61 IN | BODY MASS INDEX: 43.99 KG/M2 | TEMPERATURE: 97.8 F | RESPIRATION RATE: 20 BRPM | WEIGHT: 233 LBS

## 2023-04-14 PROCEDURE — ZZZZZ: CPT

## 2023-04-16 NOTE — ADDENDUM
[FreeTextEntry1] : Patients Order Verified Prior to Treatment. \par Patients Legs Elevated via Double Leg Wedge to Offload Patients Wound.\par \par Patient descended to 2.0 MARISA @ 1.75 PSI/MIN without incident in chamber #3.\par Patient resting comfortably @ depth, with equal chest rise observed throughout treatment.\par \par Patients Care Transferred and SBAR discussed (Situation Background Assessment Recommendation).\par \par With 56 minutes into tx, pt advised CHT that she was in severe pain within her legs and wanted to come out of the chamber immediately. MD and Nurse were advised. Pt was ascended from chamber at 1.75 psi/min from 2.0 MARISA. Pt was evaluated post TX by MD. \par \par Pt was cleared to leave WC center with assistance from her . \par Pts post B/P  was high upon exiting of the chamber MD advised. \par

## 2023-04-16 NOTE — PROCEDURE
[Outpatient] : Outpatient [Ambulatory] : Patient is ambulatory. [THIS CHAMBER HAS BEEN CLEANED / DISINFECTED] : This chamber has been cleaned / disinfected according to local and hospital policy and procedure prior to this treatment. [Patient demonstrated and verbalized proper technique for using air break mask] : Patient demonstrated and verbalized proper technique for using air break mask [Patient educated on the risks of SMOKING prior to HBOT with understanding] : Patient educated on the risks of SMOKING prior to HBOT with understanding [Patient educated on the risks of CONSUMING ALCOHOL prior to HBOT with understanding] : Patient educated on the risks of CONSUMING ALCOHOL prior to HBOT with understanding [100% Cotton] : 100% cotton [Empty all pockets] : empty all pockets [No hair oils, wigs, hairpieces, pins] : no hair oils, wigs, hairpieces, pins  [Pre tx medications] : pre tx medications  [No make-up, creams] : no make-up, creams  [No jewelry] : no jewelry  [No matches, cigarettes, lighters] : no matches, cigarettes, lighters  [Hearing aid removed] : hearing aid removed [Dentures removed] : dentures removed [Ground bracelet on pt's wrist] : ground bracelet on pt's wrist  [Contacts removed] : contacts removed  [Remove nail polish] : remove nail polish  [No reading material] : no reading material  [Bra, undergarments removed] : bra, undergarments removed  [No contraindicated dressings] : no contraindicated dressings [Ground Wire - VISUAL Verification - Intact/Free of Obstruction] : Ground Wire - VISUAL Verification - Intact/Free of Obstruction  [Ground Continuity - Verified < 1 ohm w/ Wrist Strap Vanita] : Ground Continuity - Verified < 1 ohm w/ Wrist Strap Vanita [Diagnosis: ___] : Diagnosis: [unfilled] [____] : Post-Dive: Time - [unfilled] [___] : Post-Dive: Value - [unfilled] mg/dL [Clear all fields] : clear all fields [Walker] : walker [Deferred Treatment] : Deferred Treatment [Number: ___] : Number: [unfilled] [] : No [de-identified] : Pain in both legs during tx.  [FreeTextEntry3] : 53 MIN [FreeTextEntry5] : 5772 [FreeTextEntry7] : 1924 [FreeTextEntry9] : 10:50 [de-identified] : 10:59 [de-identified] : 71 MIN

## 2023-04-16 NOTE — ASSESSMENT
[No change from previous assessment] : No change from previous assessment [Patient prepared for dive] : Patient prepared for dive [Patient descended without problem for 9 minutes] : Patient descended without problem for 9 minutes [No dizziness or thirst] :  No dizziness or thirst [No ear problems] : No ear problems [Vital signs stable] : Vital signs stable [Tolerating dive well] : Tolerating dive well [Respiratory Rate Stable] : Respiratory Rate Stable [No Chest Pain, shortness of breath] : No Chest Pain, shortness of breath [No chest pain, shortness of breath, or ear pain] :  No chest pain, shortness of breath, or ear pain  [Tolerated Ascent well] : Tolerated Ascent well [Vital Signs stable] : Vital Signs stable [A physician was present throughout the entire HBOT] : A physician was present throughout the entire HBOT [No] : No [Continue Treatment Plan] : Continue treatment plan [Clinically Stable] : Clinically stable [9] : 9 out of 10 [FreeTextEntry2] : Patient complained of severe leg pain. Patient was nearly out of the oxycodone prescribed from the rehab and was advised to call the [facility] pain management for pain management maintenance medication.

## 2023-04-17 ENCOUNTER — APPOINTMENT (OUTPATIENT)
Dept: HYPERBARIC MEDICINE | Facility: HOSPITAL | Age: 58
End: 2023-04-17
Payer: MEDICARE

## 2023-04-17 ENCOUNTER — OUTPATIENT (OUTPATIENT)
Dept: OUTPATIENT SERVICES | Facility: HOSPITAL | Age: 58
LOS: 1 days | Discharge: HOME | End: 2023-04-17
Payer: MEDICARE

## 2023-04-17 ENCOUNTER — APPOINTMENT (OUTPATIENT)
Dept: WOUND CARE | Facility: HOSPITAL | Age: 58
End: 2023-04-17
Payer: MEDICARE

## 2023-04-17 VITALS
BODY MASS INDEX: 43.99 KG/M2 | OXYGEN SATURATION: 95 % | HEIGHT: 61 IN | WEIGHT: 233 LBS | RESPIRATION RATE: 20 BRPM | HEART RATE: 94 BPM | DIASTOLIC BLOOD PRESSURE: 45 MMHG | TEMPERATURE: 97.7 F | SYSTOLIC BLOOD PRESSURE: 129 MMHG

## 2023-04-17 DIAGNOSIS — Z90.89 ACQUIRED ABSENCE OF OTHER ORGANS: Chronic | ICD-10-CM

## 2023-04-17 DIAGNOSIS — S91.309D UNSPECIFIED OPEN WOUND, UNSPECIFIED FOOT, SUBSEQUENT ENCOUNTER: ICD-10-CM

## 2023-04-17 DIAGNOSIS — Z90.49 ACQUIRED ABSENCE OF OTHER SPECIFIED PARTS OF DIGESTIVE TRACT: Chronic | ICD-10-CM

## 2023-04-17 DIAGNOSIS — Z98.890 OTHER SPECIFIED POSTPROCEDURAL STATES: Chronic | ICD-10-CM

## 2023-04-17 PROCEDURE — G0463: CPT

## 2023-04-17 PROCEDURE — 99213 OFFICE O/P EST LOW 20 MIN: CPT

## 2023-04-17 NOTE — HISTORY OF PRESENT ILLNESS
[FreeTextEntry1] : 56 yo F with L heel pressure ulcer. Developed during hospitalization now recently discharged from Rehab. She has uncontrolled DM and has severe neuropathy affecting her legs. She also states she has increasing discharge from her nose and eyes as well as burning feeling around her mouth. Last A1C was 11.

## 2023-04-17 NOTE — ASSESSMENT
[FreeTextEntry1] : No evidence of PVD. Uncontrolled DM likely to be causing severe neuropathy. No clear etiology for facial symptoms. Potential sinusitis was discussed.

## 2023-04-17 NOTE — PHYSICAL EXAM
[2+] : left 2+ [Ankle Swelling (On Exam)] : present [Ankle Swelling Bilaterally] : severe [Varicose Veins Of Lower Extremities] : not present [] : not present [FreeTextEntry1] : palpable [de-identified] : L heel flat pink wound 3 cm ,no erythema

## 2023-04-18 ENCOUNTER — APPOINTMENT (OUTPATIENT)
Dept: HYPERBARIC MEDICINE | Facility: HOSPITAL | Age: 58
End: 2023-04-18

## 2023-04-18 DIAGNOSIS — Z86.718 PERSONAL HISTORY OF OTHER VENOUS THROMBOSIS AND EMBOLISM: ICD-10-CM

## 2023-04-18 DIAGNOSIS — L84 CORNS AND CALLOSITIES: ICD-10-CM

## 2023-04-18 DIAGNOSIS — E11.621 TYPE 2 DIABETES MELLITUS WITH FOOT ULCER: ICD-10-CM

## 2023-04-18 DIAGNOSIS — E11.40 TYPE 2 DIABETES MELLITUS WITH DIABETIC NEUROPATHY, UNSPECIFIED: ICD-10-CM

## 2023-04-18 DIAGNOSIS — I10 ESSENTIAL (PRIMARY) HYPERTENSION: ICD-10-CM

## 2023-04-18 DIAGNOSIS — Z98.890 OTHER SPECIFIED POSTPROCEDURAL STATES: ICD-10-CM

## 2023-04-18 DIAGNOSIS — L97.423 NON-PRESSURE CHRONIC ULCER OF LEFT HEEL AND MIDFOOT WITH NECROSIS OF MUSCLE: ICD-10-CM

## 2023-04-18 DIAGNOSIS — Z90.49 ACQUIRED ABSENCE OF OTHER SPECIFIED PARTS OF DIGESTIVE TRACT: ICD-10-CM

## 2023-04-18 DIAGNOSIS — Z79.4 LONG TERM (CURRENT) USE OF INSULIN: ICD-10-CM

## 2023-04-18 DIAGNOSIS — Z79.899 OTHER LONG TERM (CURRENT) DRUG THERAPY: ICD-10-CM

## 2023-04-18 DIAGNOSIS — Z98.49 CATARACT EXTRACTION STATUS, UNSPECIFIED EYE: ICD-10-CM

## 2023-04-18 DIAGNOSIS — Z79.84 LONG TERM (CURRENT) USE OF ORAL HYPOGLYCEMIC DRUGS: ICD-10-CM

## 2023-04-18 DIAGNOSIS — M51.26 OTHER INTERVERTEBRAL DISC DISPLACEMENT, LUMBAR REGION: ICD-10-CM

## 2023-04-19 ENCOUNTER — NON-APPOINTMENT (OUTPATIENT)
Age: 58
End: 2023-04-19

## 2023-04-19 ENCOUNTER — APPOINTMENT (OUTPATIENT)
Dept: OTOLARYNGOLOGY | Facility: CLINIC | Age: 58
End: 2023-04-19
Payer: MEDICARE

## 2023-04-19 ENCOUNTER — APPOINTMENT (OUTPATIENT)
Dept: HYPERBARIC MEDICINE | Facility: HOSPITAL | Age: 58
End: 2023-04-19
Payer: MEDICARE

## 2023-04-19 ENCOUNTER — OUTPATIENT (OUTPATIENT)
Dept: OUTPATIENT SERVICES | Facility: HOSPITAL | Age: 58
LOS: 1 days | Discharge: HOME | End: 2023-04-19
Payer: MEDICARE

## 2023-04-19 VITALS
WEIGHT: 233 LBS | BODY MASS INDEX: 43.99 KG/M2 | DIASTOLIC BLOOD PRESSURE: 75 MMHG | HEART RATE: 98 BPM | SYSTOLIC BLOOD PRESSURE: 118 MMHG | TEMPERATURE: 98.2 F | HEIGHT: 61 IN

## 2023-04-19 DIAGNOSIS — J34.89 OTHER SPECIFIED DISORDERS OF NOSE AND NASAL SINUSES: ICD-10-CM

## 2023-04-19 DIAGNOSIS — R09.81 NASAL CONGESTION: ICD-10-CM

## 2023-04-19 DIAGNOSIS — S91.309D UNSPECIFIED OPEN WOUND, UNSPECIFIED FOOT, SUBSEQUENT ENCOUNTER: ICD-10-CM

## 2023-04-19 DIAGNOSIS — Z98.890 OTHER SPECIFIED POSTPROCEDURAL STATES: Chronic | ICD-10-CM

## 2023-04-19 DIAGNOSIS — H90.6 MIXED CONDUCTIVE AND SENSORINEURAL HEARING LOSS, BILATERAL: ICD-10-CM

## 2023-04-19 DIAGNOSIS — Z90.49 ACQUIRED ABSENCE OF OTHER SPECIFIED PARTS OF DIGESTIVE TRACT: Chronic | ICD-10-CM

## 2023-04-19 DIAGNOSIS — H93.293 OTHER ABNORMAL AUDITORY PERCEPTIONS, BILATERAL: ICD-10-CM

## 2023-04-19 DIAGNOSIS — Z90.89 ACQUIRED ABSENCE OF OTHER ORGANS: Chronic | ICD-10-CM

## 2023-04-19 PROCEDURE — 99204 OFFICE O/P NEW MOD 45 MIN: CPT | Mod: 25

## 2023-04-19 PROCEDURE — XXXXX: CPT | Mod: 1L

## 2023-04-19 PROCEDURE — G0463: CPT

## 2023-04-19 PROCEDURE — 31231 NASAL ENDOSCOPY DX: CPT

## 2023-04-19 PROCEDURE — 92557 COMPREHENSIVE HEARING TEST: CPT

## 2023-04-19 PROCEDURE — 92567 TYMPANOMETRY: CPT

## 2023-04-19 RX ORDER — TRAMADOL HYDROCHLORIDE 50 MG/1
50 TABLET, COATED ORAL
Qty: 10 | Refills: 0 | Status: COMPLETED | COMMUNITY
Start: 2023-04-19 | End: 2023-04-24

## 2023-04-19 NOTE — REASON FOR VISIT
[Initial Evaluation] : an initial evaluation for [FreeTextEntry2] : clogged ears and bilateral nostril sores

## 2023-04-19 NOTE — DATA REVIEWED
[de-identified] : An audiogram was ordered and performed including pure tones, tympanometry and speech testing for the patients complaint of hearing loss\par I have independently reviewed the patient's audiogram from today and my findings include \par AU Mild to moderate -8k hz. AD Tymp A. AS Tymp C

## 2023-04-19 NOTE — HISTORY OF PRESENT ILLNESS
[de-identified] : 57 year old female presents for initial evaluation for clogged ears and bilateral nostril nose L>R. \par Hx of known hearing loss in the Right ear since she was a child\par Reports Right ear feels more clogged than Left\par Does hyperbaric treatments for foot\par Denies otorrhea, recent fevers or ear infections, dizziness, vertigo, headaches related to hearing. \par \par Reports bilateral nostril sores R>L, sinus pain due to dryness and nasal congestion\par States yellow thick-sticky nasal discharge.\par Denies nasal congestion, post nasal drip, nasal discharge, epistaxis, recent sinus infections. \par \par Reports the roof of her mouth keeps "burning and peeling." States lips are also very dry and cracked. \par Patient denies dysphagia, odynophagia, dyspnea, dysphonia.

## 2023-04-19 NOTE — PHYSICAL EXAM
[Nasal Endoscopy Performed] : nasal endoscopy was performed, see procedure section for findings [] : septum deviated bilaterally [Normal] : mucosa is normal [Midline] : trachea located in midline position [de-identified] : Superior tinea flaccida retraction. visible floor of retraction

## 2023-04-19 NOTE — ASSESSMENT
[FreeTextEntry1] : 57 year F present with bilateral mixed hearing loss, dry nares, and nasal congestion. Patient states had work up with rheumatology was negative\par \par Physical exam shows bilateral  ears normal EAC/TM\par \par Personally reviewed Audiogram shows AU Mild to moderate -8k hz. AD Tymp A. AS Tymp C\par \par Nasal endoscopy shows S-Shaped DNS, Moderate Inferior Turbinates Hypertrophy, No polyps, purulence or masses, Posterior Nasal pharynx Cobblestone/Clear Drainage, Moderate mucus production coating nasal cavity\par \par Nasal Congestion\par -Discussed the importance of rinsing the sinus before using nasal spray so that excess mucus lining the nasal cavity can be wash away so medication can penetration the nose.\par -Send to daily Sinus Rinse\par -If normal saline sinus rinse + nasal spray is not effective can discuss medicated nasal rinses and imaging for additional evaluation\par \par Dry Nare\par - Discussed need to increased moisture therapy with saline spray and gel, along with the application technique and what to do if epistaxis. \par - Start Nasal Saline Spray/Gel ( (e.g. Ocean Spray Nasal Saline, AYR Nasal Gel) to both anterior nares. Spray/Gel should be use at least 3-4X daily. You can't overuse saltwater spray.\par - Most nosebleeds start from the front of the nose on the septum (the part of the nose that divides it into a right and left side).  The skin on the septum can dry out and crack, exposing blood vessels which then bleed. \par -Ordered Bacitracin ointment to be place in the bilateral nares for 1 month\par \par Mixed Hearing Loss\par -Discussed Benefit of Hearings Aids and their value of helping keep brain stimulated by helping hear conversation which keeps a person active and socially connected. Stressed also the association with a lower risk of incident dementia and slower cognitive decline.\par -Discussed a conductive hearing loss happens when sounds cannot get through the outer and middle ear. It may be hard to hear soft sounds. Louder sounds may be muffled.\par -Patient states hearing is functional not interested in any hearing aids at this time.\par -Trial of Sinus Rinse\par \par -Return to clinic in 2 months or sooner if new/worsen symptoms present\par \par

## 2023-04-20 ENCOUNTER — APPOINTMENT (OUTPATIENT)
Dept: HYPERBARIC MEDICINE | Facility: HOSPITAL | Age: 58
End: 2023-04-20

## 2023-04-20 ENCOUNTER — NON-APPOINTMENT (OUTPATIENT)
Age: 58
End: 2023-04-20

## 2023-04-20 DIAGNOSIS — E11.621 TYPE 2 DIABETES MELLITUS WITH FOOT ULCER: ICD-10-CM

## 2023-04-20 DIAGNOSIS — L97.423 NON-PRESSURE CHRONIC ULCER OF LEFT HEEL AND MIDFOOT WITH NECROSIS OF MUSCLE: ICD-10-CM

## 2023-04-21 ENCOUNTER — APPOINTMENT (OUTPATIENT)
Dept: HYPERBARIC MEDICINE | Facility: HOSPITAL | Age: 58
End: 2023-04-21

## 2023-04-24 ENCOUNTER — APPOINTMENT (OUTPATIENT)
Dept: WOUND CARE | Facility: HOSPITAL | Age: 58
End: 2023-04-24

## 2023-04-24 ENCOUNTER — APPOINTMENT (OUTPATIENT)
Dept: HYPERBARIC MEDICINE | Facility: HOSPITAL | Age: 58
End: 2023-04-24

## 2023-04-25 ENCOUNTER — APPOINTMENT (OUTPATIENT)
Dept: HYPERBARIC MEDICINE | Facility: HOSPITAL | Age: 58
End: 2023-04-25

## 2023-04-25 ENCOUNTER — APPOINTMENT (OUTPATIENT)
Dept: INTERNAL MEDICINE | Facility: CLINIC | Age: 58
End: 2023-04-25

## 2023-04-26 ENCOUNTER — APPOINTMENT (OUTPATIENT)
Dept: HYPERBARIC MEDICINE | Facility: HOSPITAL | Age: 58
End: 2023-04-26

## 2023-04-27 ENCOUNTER — APPOINTMENT (OUTPATIENT)
Dept: HYPERBARIC MEDICINE | Facility: HOSPITAL | Age: 58
End: 2023-04-27

## 2023-04-27 ENCOUNTER — OUTPATIENT (OUTPATIENT)
Dept: OUTPATIENT SERVICES | Facility: HOSPITAL | Age: 58
LOS: 1 days | Discharge: HOME | End: 2023-04-27
Payer: MEDICARE

## 2023-04-27 ENCOUNTER — APPOINTMENT (OUTPATIENT)
Dept: WOUND CARE | Facility: HOSPITAL | Age: 58
End: 2023-04-27
Payer: MEDICARE

## 2023-04-27 ENCOUNTER — APPOINTMENT (OUTPATIENT)
Dept: WOUND CARE | Facility: HOSPITAL | Age: 58
End: 2023-04-27

## 2023-04-27 VITALS
TEMPERATURE: 98.6 F | HEART RATE: 100 BPM | OXYGEN SATURATION: 95 % | DIASTOLIC BLOOD PRESSURE: 85 MMHG | RESPIRATION RATE: 20 BRPM | SYSTOLIC BLOOD PRESSURE: 120 MMHG

## 2023-04-27 DIAGNOSIS — Z98.890 OTHER SPECIFIED POSTPROCEDURAL STATES: Chronic | ICD-10-CM

## 2023-04-27 DIAGNOSIS — E11.621 TYPE 2 DIABETES MELLITUS WITH FOOT ULCER: ICD-10-CM

## 2023-04-27 DIAGNOSIS — Z90.89 ACQUIRED ABSENCE OF OTHER ORGANS: Chronic | ICD-10-CM

## 2023-04-27 PROCEDURE — G0463: CPT

## 2023-04-27 PROCEDURE — 99213 OFFICE O/P EST LOW 20 MIN: CPT

## 2023-04-28 ENCOUNTER — APPOINTMENT (OUTPATIENT)
Dept: HYPERBARIC MEDICINE | Facility: HOSPITAL | Age: 58
End: 2023-04-28

## 2023-04-30 DIAGNOSIS — I48.91 UNSPECIFIED ATRIAL FIBRILLATION: ICD-10-CM

## 2023-04-30 DIAGNOSIS — Z86.718 PERSONAL HISTORY OF OTHER VENOUS THROMBOSIS AND EMBOLISM: ICD-10-CM

## 2023-04-30 DIAGNOSIS — L97.423 NON-PRESSURE CHRONIC ULCER OF LEFT HEEL AND MIDFOOT WITH NECROSIS OF MUSCLE: ICD-10-CM

## 2023-04-30 DIAGNOSIS — M25.50 PAIN IN UNSPECIFIED JOINT: ICD-10-CM

## 2023-04-30 DIAGNOSIS — Z79.4 LONG TERM (CURRENT) USE OF INSULIN: ICD-10-CM

## 2023-04-30 DIAGNOSIS — E11.621 TYPE 2 DIABETES MELLITUS WITH FOOT ULCER: ICD-10-CM

## 2023-04-30 DIAGNOSIS — E11.40 TYPE 2 DIABETES MELLITUS WITH DIABETIC NEUROPATHY, UNSPECIFIED: ICD-10-CM

## 2023-04-30 DIAGNOSIS — I11.0 HYPERTENSIVE HEART DISEASE WITH HEART FAILURE: ICD-10-CM

## 2023-04-30 DIAGNOSIS — Z90.49 ACQUIRED ABSENCE OF OTHER SPECIFIED PARTS OF DIGESTIVE TRACT: ICD-10-CM

## 2023-04-30 DIAGNOSIS — Z79.84 LONG TERM (CURRENT) USE OF ORAL HYPOGLYCEMIC DRUGS: ICD-10-CM

## 2023-04-30 DIAGNOSIS — Z79.899 OTHER LONG TERM (CURRENT) DRUG THERAPY: ICD-10-CM

## 2023-04-30 DIAGNOSIS — I50.9 HEART FAILURE, UNSPECIFIED: ICD-10-CM

## 2023-04-30 DIAGNOSIS — Z98.49 CATARACT EXTRACTION STATUS, UNSPECIFIED EYE: ICD-10-CM

## 2023-04-30 DIAGNOSIS — Z79.01 LONG TERM (CURRENT) USE OF ANTICOAGULANTS: ICD-10-CM

## 2023-04-30 NOTE — REVIEW OF SYSTEMS
[Fever] : no fever [Chills] : no chills [Eye Pain] : no eye pain [Loss Of Hearing] : no hearing loss [Shortness Of Breath] : no shortness of breath [Wheezing] : no wheezing [Abdominal Pain] : no abdominal pain [Joint Stiffness] : joint stiffness [Skin Wound] : skin wound [Anxiety] : no anxiety [Negative] : Heme/Lymph [FreeTextEntry5] : HTN, HLD , A- fib , CHF [FreeTextEntry7] : Morbid obesity  [de-identified] : DFU 3 posterior left heel  down to fat  [de-identified] : IDDM with neuropathy  [de-identified] : JADEN

## 2023-04-30 NOTE — ASSESSMENT
[Verbal] : Verbal [Good - alert, interested, motivated] : Good - alert, interested, motivated [Verbalizes knowledge/Understanding] : Verbalizes knowledge/understanding [Dressing changes] : dressing changes [Skin Care] : skin care [Signs and symptoms of infection] : sign and symptoms of infection [Nutrition] : nutrition [How and When to Call] : how and when to call [Pain Management] : pain management [Hyperbaric Therapy] : hyperbaric therapy [Off-loading] : off-loading [] : Yes [Stable] : stable [Home] : Home [Walker] : Walker [Not Applicable - Long Term Care/Home Health Agency] : Long Term Care/Home Health Agency: Not Applicable [FreeTextEntry2] : Infection prevention\par Localized wound care \par Goal remaining pain free regarding wounds\par Offloading\par Hyperbaric oxygen therapy [FreeTextEntry4] : Patient again told to use Physician Access line to find pain management doctor\par Benefits of Hyperbaric discussed with patient.\par F/U on Monday 05/01/23

## 2023-04-30 NOTE — PLAN
[FreeTextEntry1] : .Patient seen and evaluated. Discussed etiology and treatment plan. Patient verbalized understanding.\par PAtient has been inconsistent with HBOT. Discussed at length with patient the importance of HBOT. Also advised patient to call the physician access line for pain management. Patient to maintain strict glycemic control. Patient  Spent 20 minutes for patient care and medical decision making. Patient is high risk for limb loss. RTC on  Monday to resume HBOT. \par

## 2023-04-30 NOTE — VITALS
[Pain related to present condition?] : The patient's  pain is related to present condition. [Sharp] : sharp [Throbbing] : throbbing [Shooting] : shooting [Occasional] : occasional [de-identified] : 7/10 [] : No [FreeTextEntry3] : LEFT HEEL AND LOWER LEGS [FreeTextEntry1] : TRAMADOL [FreeTextEntry2] : PAIN COMES AND GOES RANDOMLY [FreeTextEntry4] : TRAMADOL  E SCRIBE

## 2023-04-30 NOTE — HISTORY OF PRESENT ILLNESS
[FreeTextEntry1] : Patient is a 56 yo Female with left heel pressure ulcer down to fat. Developed during hospitalization now recently discharged from Rehab. She has uncontrolled DM and has severe neuropathy affecting her legs. She also states she has increasing discharge from her nose and eyes as well as burning feeling around her mouth. Last A1C was 11. Patient was seen by her dermatologist. Patient has not been consistent with HBOT and also has not scheduled appointment for pain management. \par

## 2023-04-30 NOTE — PHYSICAL EXAM
[4 x 4] : 4 x 4  [Abdominal Pad] : Abdominal Pad [0] : left 0 [1+] : left 1+ [Ankle Swelling (On Exam)] : present [Ankle Swelling Bilaterally] : bilaterally  [Ankle Swelling On The Left] : moderate [Varicose Veins Of Lower Extremities] : not present [] : not present [Purpura] : no purpura  [Petechiae] : no petechiae [Skin Ulcer] : ulcer [Skin Induration] : no induration [Alert] : alert [Oriented to Person] : oriented to person [Oriented to Place] : oriented to place [Calm] : calm [de-identified] : HTN, HLD , A- fib , hx CHF [de-identified] : calm [de-identified] : morbid obesity  [de-identified] : Edema to bilateral lower extremity  [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  [de-identified] : DFU 3 posterior left heel down to fat with no signs of infection  [FreeTextEntry1] : LEFT HEEL [FreeTextEntry2] : 1.1 [FreeTextEntry3] : 1.4 [FreeTextEntry4] : 0.1 [de-identified] : SILVER ALGINATE [de-identified] : Serous/sanguinous [de-identified] : Mechanically cleansed with sterile gauze and normal saline.\par KERLIX [TWNoteComboBox4] : Small [TWNoteComboBox5] : No [TWNoteComboBox6] : Pressure [de-identified] : No [de-identified] : Normal [de-identified] : None [de-identified] : None [de-identified] : No [de-identified] : 100% [de-identified] : 3x Weekly [de-identified] : Primary Dressing

## 2023-05-02 ENCOUNTER — OUTPATIENT (OUTPATIENT)
Dept: OUTPATIENT SERVICES | Facility: HOSPITAL | Age: 58
LOS: 1 days | Discharge: HOME | End: 2023-05-02
Payer: MEDICARE

## 2023-05-02 ENCOUNTER — APPOINTMENT (OUTPATIENT)
Dept: WOUND CARE | Facility: HOSPITAL | Age: 58
End: 2023-05-02

## 2023-05-02 ENCOUNTER — APPOINTMENT (OUTPATIENT)
Dept: WOUND CARE | Facility: HOSPITAL | Age: 58
End: 2023-05-02
Payer: MEDICARE

## 2023-05-02 VITALS
SYSTOLIC BLOOD PRESSURE: 118 MMHG | HEART RATE: 78 BPM | TEMPERATURE: 97.7 F | BODY MASS INDEX: 43.99 KG/M2 | HEIGHT: 61 IN | WEIGHT: 233 LBS | OXYGEN SATURATION: 96 % | RESPIRATION RATE: 18 BRPM | DIASTOLIC BLOOD PRESSURE: 67 MMHG

## 2023-05-02 DIAGNOSIS — E11.621 TYPE 2 DIABETES MELLITUS WITH FOOT ULCER: ICD-10-CM

## 2023-05-02 DIAGNOSIS — Z90.49 ACQUIRED ABSENCE OF OTHER SPECIFIED PARTS OF DIGESTIVE TRACT: Chronic | ICD-10-CM

## 2023-05-02 DIAGNOSIS — Z90.89 ACQUIRED ABSENCE OF OTHER ORGANS: Chronic | ICD-10-CM

## 2023-05-02 DIAGNOSIS — Z98.890 OTHER SPECIFIED POSTPROCEDURAL STATES: Chronic | ICD-10-CM

## 2023-05-02 PROCEDURE — ZZZZZ: CPT

## 2023-05-02 PROCEDURE — G0463: CPT

## 2023-05-04 ENCOUNTER — OUTPATIENT (OUTPATIENT)
Dept: OUTPATIENT SERVICES | Facility: HOSPITAL | Age: 58
LOS: 1 days | Discharge: HOME | End: 2023-05-04
Payer: MEDICARE

## 2023-05-04 ENCOUNTER — APPOINTMENT (OUTPATIENT)
Dept: WOUND CARE | Facility: HOSPITAL | Age: 58
End: 2023-05-04
Payer: MEDICARE

## 2023-05-04 VITALS
WEIGHT: 233 LBS | HEIGHT: 61 IN | OXYGEN SATURATION: 97 % | SYSTOLIC BLOOD PRESSURE: 138 MMHG | HEART RATE: 98 BPM | TEMPERATURE: 97.8 F | RESPIRATION RATE: 18 BRPM | DIASTOLIC BLOOD PRESSURE: 85 MMHG | BODY MASS INDEX: 43.99 KG/M2

## 2023-05-04 DIAGNOSIS — Z90.49 ACQUIRED ABSENCE OF OTHER SPECIFIED PARTS OF DIGESTIVE TRACT: Chronic | ICD-10-CM

## 2023-05-04 DIAGNOSIS — Z98.890 OTHER SPECIFIED POSTPROCEDURAL STATES: Chronic | ICD-10-CM

## 2023-05-04 DIAGNOSIS — E11.621 TYPE 2 DIABETES MELLITUS WITH FOOT ULCER: ICD-10-CM

## 2023-05-04 DIAGNOSIS — Z90.89 ACQUIRED ABSENCE OF OTHER ORGANS: Chronic | ICD-10-CM

## 2023-05-04 DIAGNOSIS — L97.423 NON-PRESSURE CHRONIC ULCER OF LEFT HEEL AND MIDFOOT WITH NECROSIS OF MUSCLE: ICD-10-CM

## 2023-05-04 PROCEDURE — 99213 OFFICE O/P EST LOW 20 MIN: CPT

## 2023-05-04 PROCEDURE — G0463: CPT

## 2023-05-05 NOTE — HISTORY OF PRESENT ILLNESS
[FreeTextEntry1] : Patient is a 58 yo Female with left heel pressure ulcer down to fat. Developed during hospitalization now recently discharged from Rehab. She has uncontrolled DM and has severe neuropathy affecting her legs. She also states she has increasing discharge from her nose and eyes as well as burning feeling around her mouth. Last A1C was 11. Patient was seen by her dermatologist. Patient has not been consistent with HBOT and also has not scheduled appointment for pain management. \par \par 5/4/23 patient seen for follow-up of left heel pressure ulceration down to skin, subcutaneous tissue, fat.  Patient relates that the heel ulcer feels better but is still painful at this time.

## 2023-05-05 NOTE — PHYSICAL EXAM
[4 x 4] : 4 x 4  [Abdominal Pad] : Abdominal Pad [0] : left 0 [1+] : left 1+ [Ankle Swelling (On Exam)] : present [Ankle Swelling Bilaterally] : bilaterally  [Ankle Swelling On The Left] : moderate [Varicose Veins Of Lower Extremities] : not present [] : not present [Purpura] : no purpura  [Petechiae] : no petechiae [Skin Ulcer] : ulcer [Skin Induration] : no induration [Alert] : alert [Oriented to Person] : oriented to person [Oriented to Place] : oriented to place [Calm] : calm [de-identified] : calm [de-identified] : HTN, HLD , A- fib , hx CHF [de-identified] : morbid obesity  [de-identified] : Edema to bilateral lower extremity  [de-identified] : DFU 3 posterior left heel down to fat with no signs of infection  [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  [FreeTextEntry1] : Heel [FreeTextEntry2] : 1.0 [FreeTextEntry3] : 1.2 [FreeTextEntry4] : 0.1 [de-identified] : serosanguineous [de-identified] : none [de-identified] : 100% [de-identified] : Silver Alginate [de-identified] : Mechanically cleansed with 0.9% normal saline and sterile gauze [TWNoteComboBox1] : Left [TWNoteComboBox4] : Small [TWNoteComboBox5] : No [TWNoteComboBox6] : Pressure [de-identified] : No [de-identified] : Normal [de-identified] : None [de-identified] : 3x Weekly [de-identified] : Primary Dressing

## 2023-05-05 NOTE — REVIEW OF SYSTEMS
[Fever] : no fever [Chills] : no chills [Eye Pain] : no eye pain [Loss Of Hearing] : no hearing loss [Shortness Of Breath] : no shortness of breath [Wheezing] : no wheezing [Abdominal Pain] : no abdominal pain [Joint Stiffness] : joint stiffness [Skin Wound] : skin wound [Anxiety] : no anxiety [Negative] : Heme/Lymph [FreeTextEntry5] : HTN, HLD , A- fib , CHF [FreeTextEntry7] : Morbid obesity  [de-identified] : DFU 3 posterior left heel  down to fat  [de-identified] : IDDM with neuropathy  [de-identified] : JADEN

## 2023-05-05 NOTE — ASSESSMENT
[Verbal] : Verbal [Demo] : Demo [Patient] : Patient [Good - alert, interested, motivated] : Good - alert, interested, motivated [Verbalizes knowledge/Understanding] : Verbalizes knowledge/understanding [Dressing changes] : dressing changes [Foot Care] : foot care [Skin Care] : skin care [Signs and symptoms of infection] : sign and symptoms of infection [Nutrition] : nutrition [How and When to Call] : how and when to call [Off-loading] : off-loading [Stable] : stable [Walker] : Walker [Home] : Home [] : No [FreeTextEntry2] : Infection prevention\par Nutrition and wound healing\par Dressing changes ( per MD order) [FreeTextEntry4] : Follow up 1 week

## 2023-05-06 ENCOUNTER — APPOINTMENT (OUTPATIENT)
Dept: WOUND CARE | Facility: HOSPITAL | Age: 58
End: 2023-05-06

## 2023-05-08 DIAGNOSIS — Z79.82 LONG TERM (CURRENT) USE OF ASPIRIN: ICD-10-CM

## 2023-05-08 DIAGNOSIS — Z90.49 ACQUIRED ABSENCE OF OTHER SPECIFIED PARTS OF DIGESTIVE TRACT: ICD-10-CM

## 2023-05-08 DIAGNOSIS — Z79.01 LONG TERM (CURRENT) USE OF ANTICOAGULANTS: ICD-10-CM

## 2023-05-08 DIAGNOSIS — Z98.49 CATARACT EXTRACTION STATUS, UNSPECIFIED EYE: ICD-10-CM

## 2023-05-08 DIAGNOSIS — Z86.718 PERSONAL HISTORY OF OTHER VENOUS THROMBOSIS AND EMBOLISM: ICD-10-CM

## 2023-05-08 DIAGNOSIS — L84 CORNS AND CALLOSITIES: ICD-10-CM

## 2023-05-08 DIAGNOSIS — I50.9 HEART FAILURE, UNSPECIFIED: ICD-10-CM

## 2023-05-08 DIAGNOSIS — Z79.84 LONG TERM (CURRENT) USE OF ORAL HYPOGLYCEMIC DRUGS: ICD-10-CM

## 2023-05-08 DIAGNOSIS — I48.91 UNSPECIFIED ATRIAL FIBRILLATION: ICD-10-CM

## 2023-05-08 DIAGNOSIS — M25.50 PAIN IN UNSPECIFIED JOINT: ICD-10-CM

## 2023-05-08 DIAGNOSIS — Z79.899 OTHER LONG TERM (CURRENT) DRUG THERAPY: ICD-10-CM

## 2023-05-08 DIAGNOSIS — L97.423 NON-PRESSURE CHRONIC ULCER OF LEFT HEEL AND MIDFOOT WITH NECROSIS OF MUSCLE: ICD-10-CM

## 2023-05-08 DIAGNOSIS — I11.0 HYPERTENSIVE HEART DISEASE WITH HEART FAILURE: ICD-10-CM

## 2023-05-08 DIAGNOSIS — E66.01 MORBID (SEVERE) OBESITY DUE TO EXCESS CALORIES: ICD-10-CM

## 2023-05-08 DIAGNOSIS — Z79.4 LONG TERM (CURRENT) USE OF INSULIN: ICD-10-CM

## 2023-05-08 DIAGNOSIS — E11.40 TYPE 2 DIABETES MELLITUS WITH DIABETIC NEUROPATHY, UNSPECIFIED: ICD-10-CM

## 2023-05-08 DIAGNOSIS — E11.621 TYPE 2 DIABETES MELLITUS WITH FOOT ULCER: ICD-10-CM

## 2023-05-18 ENCOUNTER — OUTPATIENT (OUTPATIENT)
Dept: OUTPATIENT SERVICES | Facility: HOSPITAL | Age: 58
LOS: 1 days | Discharge: HOME | End: 2023-05-18
Payer: MEDICARE

## 2023-05-18 ENCOUNTER — APPOINTMENT (OUTPATIENT)
Dept: WOUND CARE | Facility: HOSPITAL | Age: 58
End: 2023-05-18
Payer: MEDICARE

## 2023-05-18 VITALS
WEIGHT: 233 LBS | BODY MASS INDEX: 43.99 KG/M2 | HEIGHT: 61 IN | HEART RATE: 83 BPM | TEMPERATURE: 97.7 F | OXYGEN SATURATION: 98 % | DIASTOLIC BLOOD PRESSURE: 76 MMHG | RESPIRATION RATE: 18 BRPM | SYSTOLIC BLOOD PRESSURE: 124 MMHG

## 2023-05-18 DIAGNOSIS — Z90.89 ACQUIRED ABSENCE OF OTHER ORGANS: Chronic | ICD-10-CM

## 2023-05-18 DIAGNOSIS — Z98.890 OTHER SPECIFIED POSTPROCEDURAL STATES: Chronic | ICD-10-CM

## 2023-05-18 DIAGNOSIS — Z90.49 ACQUIRED ABSENCE OF OTHER SPECIFIED PARTS OF DIGESTIVE TRACT: Chronic | ICD-10-CM

## 2023-05-18 DIAGNOSIS — E11.621 TYPE 2 DIABETES MELLITUS WITH FOOT ULCER: ICD-10-CM

## 2023-05-18 PROCEDURE — G0463: CPT

## 2023-05-18 PROCEDURE — 99213 OFFICE O/P EST LOW 20 MIN: CPT

## 2023-05-24 ENCOUNTER — OUTPATIENT (OUTPATIENT)
Dept: OUTPATIENT SERVICES | Facility: HOSPITAL | Age: 58
LOS: 1 days | Discharge: HOME | End: 2023-05-24
Payer: MEDICARE

## 2023-05-24 ENCOUNTER — APPOINTMENT (OUTPATIENT)
Dept: WOUND CARE | Facility: HOSPITAL | Age: 58
End: 2023-05-24
Payer: MEDICARE

## 2023-05-24 VITALS
HEART RATE: 92 BPM | SYSTOLIC BLOOD PRESSURE: 145 MMHG | RESPIRATION RATE: 18 BRPM | TEMPERATURE: 97.5 F | OXYGEN SATURATION: 95 % | DIASTOLIC BLOOD PRESSURE: 84 MMHG

## 2023-05-24 DIAGNOSIS — E11.621 TYPE 2 DIABETES MELLITUS WITH FOOT ULCER: ICD-10-CM

## 2023-05-24 DIAGNOSIS — Z90.49 ACQUIRED ABSENCE OF OTHER SPECIFIED PARTS OF DIGESTIVE TRACT: Chronic | ICD-10-CM

## 2023-05-24 DIAGNOSIS — Z98.890 OTHER SPECIFIED POSTPROCEDURAL STATES: Chronic | ICD-10-CM

## 2023-05-24 DIAGNOSIS — Z90.89 ACQUIRED ABSENCE OF OTHER ORGANS: Chronic | ICD-10-CM

## 2023-05-24 PROCEDURE — G0463: CPT

## 2023-05-24 PROCEDURE — 99213 OFFICE O/P EST LOW 20 MIN: CPT

## 2023-05-30 DIAGNOSIS — E66.01 MORBID (SEVERE) OBESITY DUE TO EXCESS CALORIES: ICD-10-CM

## 2023-05-30 DIAGNOSIS — Z79.4 LONG TERM (CURRENT) USE OF INSULIN: ICD-10-CM

## 2023-05-30 DIAGNOSIS — Z79.899 OTHER LONG TERM (CURRENT) DRUG THERAPY: ICD-10-CM

## 2023-05-30 DIAGNOSIS — I11.0 HYPERTENSIVE HEART DISEASE WITH HEART FAILURE: ICD-10-CM

## 2023-05-30 DIAGNOSIS — Z79.01 LONG TERM (CURRENT) USE OF ANTICOAGULANTS: ICD-10-CM

## 2023-05-30 DIAGNOSIS — Z79.84 LONG TERM (CURRENT) USE OF ORAL HYPOGLYCEMIC DRUGS: ICD-10-CM

## 2023-05-30 DIAGNOSIS — Z98.49 CATARACT EXTRACTION STATUS, UNSPECIFIED EYE: ICD-10-CM

## 2023-05-30 DIAGNOSIS — I50.9 HEART FAILURE, UNSPECIFIED: ICD-10-CM

## 2023-05-30 DIAGNOSIS — Z90.49 ACQUIRED ABSENCE OF OTHER SPECIFIED PARTS OF DIGESTIVE TRACT: ICD-10-CM

## 2023-05-30 DIAGNOSIS — L97.423 NON-PRESSURE CHRONIC ULCER OF LEFT HEEL AND MIDFOOT WITH NECROSIS OF MUSCLE: ICD-10-CM

## 2023-05-30 DIAGNOSIS — M25.50 PAIN IN UNSPECIFIED JOINT: ICD-10-CM

## 2023-05-30 DIAGNOSIS — Z86.718 PERSONAL HISTORY OF OTHER VENOUS THROMBOSIS AND EMBOLISM: ICD-10-CM

## 2023-05-30 DIAGNOSIS — E11.40 TYPE 2 DIABETES MELLITUS WITH DIABETIC NEUROPATHY, UNSPECIFIED: ICD-10-CM

## 2023-05-30 DIAGNOSIS — L84 CORNS AND CALLOSITIES: ICD-10-CM

## 2023-05-30 DIAGNOSIS — I48.91 UNSPECIFIED ATRIAL FIBRILLATION: ICD-10-CM

## 2023-05-30 DIAGNOSIS — E11.621 TYPE 2 DIABETES MELLITUS WITH FOOT ULCER: ICD-10-CM

## 2023-05-30 NOTE — ASSESSMENT
[Verbal] : Verbal [Written] : Written [Demo] : Demo [Patient] : Patient [Fair - mild discomfort, physical impairment, low acceptance] : Fair - mild discomfort, physical impairment, low acceptance [Needs reinforcement] : needs reinforcement [Dressing changes] : dressing changes [Skin Care] : skin care [Signs and symptoms of infection] : sign and symptoms of infection [Nutrition] : nutrition [How and When to Call] : how and when to call [Pain Management] : pain management [Off-loading] : off-loading [Patient responsibility to plan of care] : patient responsibility to plan of care [Glycemic Control] : glycemic control [] : Yes [Stable] : stable [Home] : Home [Walker] : Walker [Faxed - Long Term Care/Home Health Agency] : Long Term Care/Home Health Agency: Faxed [FreeTextEntry2] : Infection prevention\par Localized wound care \par Goal remaining pain free regarding wounds\par Glycemic control \par Offloading  [FreeTextEntry4] : Follow up in 1 week  [FreeTextEntry1] : [facility]

## 2023-05-30 NOTE — HISTORY OF PRESENT ILLNESS
[FreeTextEntry1] : Pressure ulcer posterior left heel associated with diabetic , no soi , smaller and granular , DFU 3 involving skin , subcutaneous and fat tissue

## 2023-05-30 NOTE — HISTORY OF PRESENT ILLNESS
[FreeTextEntry1] : Patient is 58 year old female presenting for pressure ulcer to the left heel. Patient states she is unable to tolerate HBOT and also relates has pain to her lower legs but has not reached out for pain management as advised. Patient denies any pain to the left heel.

## 2023-05-30 NOTE — PLAN
[FreeTextEntry1] : continue wound care and off loading , patient progressing toward closure , happy with the progress  PTR 1 week  Spent 20 minutes for patient care and medical decision making.  Patient was told if there are signs of infection ( fever, chills, nausea, vomiting ) or changes in the condition of the wound ( pain , cellulitis , purulent drainage or odor ) they should proceed to the ER as soon as possible\par \par

## 2023-05-30 NOTE — PHYSICAL EXAM
[4 x 4] : 4 x 4  [Abdominal Pad] : Abdominal Pad [0] : left 0 [1+] : left 1+ [Ankle Swelling (On Exam)] : present [Ankle Swelling Bilaterally] : bilaterally  [Ankle Swelling On The Left] : moderate [Varicose Veins Of Lower Extremities] : not present [] : not present [Purpura] : no purpura  [Petechiae] : no petechiae [Skin Ulcer] : ulcer [Skin Induration] : no induration [Alert] : alert [Oriented to Person] : oriented to person [Oriented to Place] : oriented to place [Calm] : calm [de-identified] : calm [de-identified] : HTN, HLD , A- fib , hx CHF [de-identified] : morbid obesity  [de-identified] : Edema to bilateral lower extremity  [de-identified] : DFU 3 posterior left heel down to fat with no signs of infection  - healing well  [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  [FreeTextEntry1] : left heel  [FreeTextEntry2] : 0.3 [FreeTextEntry3] : 0.3 [FreeTextEntry4] : 0.1 [de-identified] : Serous/sanguinous [de-identified] : callus  [de-identified] : Silver alginate [de-identified] : Mechanically cleansed with sterile gauze and normal saline.\par Kerlix \par \par * callus shaved by DPM  [TWNoteComboBox4] : Small [de-identified] : other [de-identified] : None [de-identified] : None [de-identified] : 100% [de-identified] : No [de-identified] : 3x Weekly [de-identified] : Primary Dressing

## 2023-05-30 NOTE — PLAN
[FreeTextEntry1] : Patient seen and evaluated. Discussed etiology and treatment plan. Patient verbalized understanding. \par Wound showing progress of healing. Also advised patient to call the physician access line for pain management. Patient to maintain strict glycemic control. Patient to continue with local wound care and offloading.  Spent 20 minutes for patient care and medical decision making.

## 2023-05-30 NOTE — REVIEW OF SYSTEMS
[Fever] : no fever [Chills] : no chills [Eye Pain] : no eye pain [Loss Of Hearing] : no hearing loss [Shortness Of Breath] : no shortness of breath [Wheezing] : no wheezing [Abdominal Pain] : no abdominal pain [Joint Stiffness] : joint stiffness [Skin Wound] : skin wound [Anxiety] : no anxiety [Negative] : Heme/Lymph [FreeTextEntry5] : HTN, HLD , A- fib , CHF [FreeTextEntry7] : Morbid obesity  [de-identified] : DFU 3 posterior left heel  down to fat - healing well [de-identified] : IDDM with neuropathy  [de-identified] : JADEN

## 2023-05-30 NOTE — REVIEW OF SYSTEMS
[Joint Stiffness] : joint stiffness [Skin Wound] : skin wound [Negative] : Heme/Lymph [Fever] : no fever [Chills] : no chills [Eye Pain] : no eye pain [Loss Of Hearing] : no hearing loss [Shortness Of Breath] : no shortness of breath [Wheezing] : no wheezing [Abdominal Pain] : no abdominal pain [Anxiety] : no anxiety [FreeTextEntry5] : HTN, HLD , A- fib , CHF [FreeTextEntry7] : Morbid obesity  [de-identified] : DFU 3 posterior left heel  down to fat , smaller and clean with granular tissue  [de-identified] : IDDM with neuropathy  [de-identified] : JADEN

## 2023-05-30 NOTE — ASSESSMENT
[Verbal] : Verbal [Written] : Written [Demo] : Demo [Patient] : Patient [Fair - mild discomfort, physical impairment, low acceptance] : Fair - mild discomfort, physical impairment, low acceptance [Needs reinforcement] : needs reinforcement [Dressing changes] : dressing changes [Skin Care] : skin care [Signs and symptoms of infection] : sign and symptoms of infection [Nutrition] : nutrition [How and When to Call] : how and when to call [Pain Management] : pain management [Off-loading] : off-loading [Patient responsibility to plan of care] : patient responsibility to plan of care [Glycemic Control] : glycemic control [Stable] : stable [Home] : Home [Walker] : Walker [Faxed - Long Term Care/Home Health Agency] : Long Term Care/Home Health Agency: Faxed [] : No [FreeTextEntry2] : Infection prevention\par Localized wound care \par Goal remaining pain free regarding wounds\par Glycemic control \par Offloading  [FreeTextEntry4] : Pt to seek outside Infectious Disease MD for chronic body pain & weakness.\par Discussed importance of glycemic control. Pt states her sugars are "bad" (> 350 mg/dL). Pt seen by Endocrinologist yesterday, who Pt states made no change in insulin. \par F/U 2 Weeks [FreeTextEntry1] : [facility]

## 2023-05-30 NOTE — PHYSICAL EXAM
[4 x 4] : 4 x 4  [Abdominal Pad] : Abdominal Pad [0] : left 0 [1+] : left 1+ [Ankle Swelling (On Exam)] : present [Ankle Swelling Bilaterally] : bilaterally  [Ankle Swelling On The Left] : moderate [Skin Ulcer] : ulcer [Alert] : alert [Oriented to Person] : oriented to person [Oriented to Place] : oriented to place [Calm] : calm [Varicose Veins Of Lower Extremities] : not present [] : not present [Purpura] : no purpura  [Petechiae] : no petechiae [Skin Induration] : no induration [de-identified] : calm [de-identified] : HTN, HLD , A- fib , hx CHF [de-identified] : morbid obesity  [de-identified] : Edema to bilateral lower extremity  [de-identified] : DFU 3 posterior left heel down to fat with no signs of infection , smaller and clean  [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  [FreeTextEntry1] : Left Heel [FreeTextEntry2] : 0.2 [FreeTextEntry3] : 0.2 [FreeTextEntry4] : 0.1 [de-identified] : serosanguineous [de-identified] : callus  [de-identified] : Silver alginate [de-identified] : Mechanically cleansed with sterile gauze and normal saline.\par Kerlix \par \par * callus shaved by DPM  [TWNoteComboBox4] : Small [de-identified] : other [de-identified] : None [de-identified] : None [de-identified] : 100% [de-identified] : No [de-identified] : 3x Weekly [de-identified] : Primary Dressing

## 2023-06-06 ENCOUNTER — NON-APPOINTMENT (OUTPATIENT)
Age: 58
End: 2023-06-06

## 2023-06-07 ENCOUNTER — APPOINTMENT (OUTPATIENT)
Dept: WOUND CARE | Facility: HOSPITAL | Age: 58
End: 2023-06-07
Payer: MEDICARE

## 2023-06-07 ENCOUNTER — OUTPATIENT (OUTPATIENT)
Dept: OUTPATIENT SERVICES | Facility: HOSPITAL | Age: 58
LOS: 1 days | Discharge: HOME | End: 2023-06-07
Payer: MEDICARE

## 2023-06-07 VITALS
RESPIRATION RATE: 20 BRPM | OXYGEN SATURATION: 97 % | TEMPERATURE: 98 F | HEIGHT: 61 IN | HEART RATE: 98 BPM | WEIGHT: 233 LBS | BODY MASS INDEX: 43.99 KG/M2

## 2023-06-07 VITALS — DIASTOLIC BLOOD PRESSURE: 69 MMHG | SYSTOLIC BLOOD PRESSURE: 143 MMHG

## 2023-06-07 DIAGNOSIS — E11.621 TYPE 2 DIABETES MELLITUS WITH FOOT ULCER: ICD-10-CM

## 2023-06-07 DIAGNOSIS — Z98.890 OTHER SPECIFIED POSTPROCEDURAL STATES: Chronic | ICD-10-CM

## 2023-06-07 DIAGNOSIS — Z90.89 ACQUIRED ABSENCE OF OTHER ORGANS: Chronic | ICD-10-CM

## 2023-06-07 DIAGNOSIS — Z90.49 ACQUIRED ABSENCE OF OTHER SPECIFIED PARTS OF DIGESTIVE TRACT: Chronic | ICD-10-CM

## 2023-06-07 PROCEDURE — G0463: CPT

## 2023-06-07 PROCEDURE — 99213 OFFICE O/P EST LOW 20 MIN: CPT

## 2023-06-07 RX ORDER — TRAMADOL HYDROCHLORIDE 50 MG/1
50 TABLET, COATED ORAL
Qty: 20 | Refills: 0 | Status: COMPLETED | COMMUNITY
Start: 2023-06-07 | End: 2023-06-17

## 2023-06-08 ENCOUNTER — APPOINTMENT (OUTPATIENT)
Dept: ENDOCRINOLOGY | Facility: CLINIC | Age: 58
End: 2023-06-08

## 2023-06-13 ENCOUNTER — APPOINTMENT (OUTPATIENT)
Dept: INTERNAL MEDICINE | Facility: CLINIC | Age: 58
End: 2023-06-13

## 2023-06-14 ENCOUNTER — APPOINTMENT (OUTPATIENT)
Dept: WOUND CARE | Facility: HOSPITAL | Age: 58
End: 2023-06-14
Payer: MEDICARE

## 2023-06-14 ENCOUNTER — OUTPATIENT (OUTPATIENT)
Dept: OUTPATIENT SERVICES | Facility: HOSPITAL | Age: 58
LOS: 1 days | Discharge: HOME | End: 2023-06-14
Payer: MEDICARE

## 2023-06-14 VITALS
OXYGEN SATURATION: 98 % | TEMPERATURE: 98 F | BODY MASS INDEX: 43.99 KG/M2 | SYSTOLIC BLOOD PRESSURE: 122 MMHG | RESPIRATION RATE: 18 BRPM | HEIGHT: 61 IN | HEART RATE: 104 BPM | DIASTOLIC BLOOD PRESSURE: 68 MMHG | WEIGHT: 233 LBS

## 2023-06-14 DIAGNOSIS — Z98.890 OTHER SPECIFIED POSTPROCEDURAL STATES: Chronic | ICD-10-CM

## 2023-06-14 DIAGNOSIS — E11.621 TYPE 2 DIABETES MELLITUS WITH FOOT ULCER: ICD-10-CM

## 2023-06-14 DIAGNOSIS — Z90.89 ACQUIRED ABSENCE OF OTHER ORGANS: Chronic | ICD-10-CM

## 2023-06-14 PROCEDURE — G0463: CPT

## 2023-06-14 PROCEDURE — 99213 OFFICE O/P EST LOW 20 MIN: CPT

## 2023-06-20 DIAGNOSIS — L97.423 NON-PRESSURE CHRONIC ULCER OF LEFT HEEL AND MIDFOOT WITH NECROSIS OF MUSCLE: ICD-10-CM

## 2023-06-20 DIAGNOSIS — Z86.718 PERSONAL HISTORY OF OTHER VENOUS THROMBOSIS AND EMBOLISM: ICD-10-CM

## 2023-06-20 DIAGNOSIS — M25.50 PAIN IN UNSPECIFIED JOINT: ICD-10-CM

## 2023-06-20 DIAGNOSIS — E11.40 TYPE 2 DIABETES MELLITUS WITH DIABETIC NEUROPATHY, UNSPECIFIED: ICD-10-CM

## 2023-06-20 DIAGNOSIS — Z79.84 LONG TERM (CURRENT) USE OF ORAL HYPOGLYCEMIC DRUGS: ICD-10-CM

## 2023-06-20 DIAGNOSIS — Z98.49 CATARACT EXTRACTION STATUS, UNSPECIFIED EYE: ICD-10-CM

## 2023-06-20 DIAGNOSIS — L97.422 NON-PRESSURE CHRONIC ULCER OF LEFT HEEL AND MIDFOOT WITH FAT LAYER EXPOSED: ICD-10-CM

## 2023-06-20 DIAGNOSIS — I11.0 HYPERTENSIVE HEART DISEASE WITH HEART FAILURE: ICD-10-CM

## 2023-06-20 DIAGNOSIS — Z90.49 ACQUIRED ABSENCE OF OTHER SPECIFIED PARTS OF DIGESTIVE TRACT: ICD-10-CM

## 2023-06-20 DIAGNOSIS — Z79.01 LONG TERM (CURRENT) USE OF ANTICOAGULANTS: ICD-10-CM

## 2023-06-20 DIAGNOSIS — L84 CORNS AND CALLOSITIES: ICD-10-CM

## 2023-06-20 DIAGNOSIS — I48.91 UNSPECIFIED ATRIAL FIBRILLATION: ICD-10-CM

## 2023-06-20 DIAGNOSIS — Z79.4 LONG TERM (CURRENT) USE OF INSULIN: ICD-10-CM

## 2023-06-20 DIAGNOSIS — E11.621 TYPE 2 DIABETES MELLITUS WITH FOOT ULCER: ICD-10-CM

## 2023-06-20 DIAGNOSIS — E66.01 MORBID (SEVERE) OBESITY DUE TO EXCESS CALORIES: ICD-10-CM

## 2023-06-20 DIAGNOSIS — I50.9 HEART FAILURE, UNSPECIFIED: ICD-10-CM

## 2023-06-20 DIAGNOSIS — Z79.899 OTHER LONG TERM (CURRENT) DRUG THERAPY: ICD-10-CM

## 2023-06-20 NOTE — PLAN
[FreeTextEntry1] : .Patient seen and evaluated. Discussed etiology and treatment plan. Patient verbalized understanding.\par PAtient to continue wound care and off loading, advised patient to keep offloaded when sitting and elevating legs. . Advised patient to follow up with pain management, PTR 1 week   Patient was told if there are signs of infection ( fever, chills, nausea, vomiting ) or changes in the condition of the wound ( pain , cellulitis , purulent drainage or odor ) they should proceed to the ER as soon as possible\par Spent 20 minutes for patient care and medical decision making. \par

## 2023-06-20 NOTE — ASSESSMENT
[Verbal] : Verbal [Written] : Written [Demo] : Demo [Patient] : Patient [Good - alert, interested, motivated] : Good - alert, interested, motivated [Verbalizes knowledge/Understanding] : Verbalizes knowledge/understanding [Dressing changes] : dressing changes [Foot Care] : foot care [Skin Care] : skin care [Pressure relief] : pressure relief [Signs and symptoms of infection] : sign and symptoms of infection [Nutrition] : nutrition [How and When to Call] : how and when to call [Pain Management] : pain management [Off-loading] : off-loading [Home Health] : home health [Patient responsibility to plan of care] : patient responsibility to plan of care [Glycemic Control] : glycemic control [Stable] : stable [Home] : Home [Walker] : Walker [] : No [FreeTextEntry2] : infection prevention\par promote skin integrity\par elevation and low sodium diet for edema control\par encourage glycemic control\par pressure reduction/offloading\par pain management consult\par routine foot care [FreeTextEntry4] : Pt reports picking up Tramadol and took the medication as prescribed\par F/U 1 Week

## 2023-06-20 NOTE — PLAN
[FreeTextEntry1] : .Patient seen and evaluated. Discussed etiology and treatment plan. Patient verbalized understanding.\par PAtient to continue wound care and off loading . Advised patient to follow up with pain management, PTR 1 week   Patient was told if there are signs of infection ( fever, chills, nausea, vomiting ) or changes in the condition of the wound ( pain , cellulitis , purulent drainage or odor ) they should proceed to the ER as soon as possible\par Spent 20 minutes for patient care and medical decision making. \par

## 2023-06-20 NOTE — HISTORY OF PRESENT ILLNESS
[FreeTextEntry1] : Patient is 58 year old female presenting for left heel ulcer down to fat - had healed and again has hematogenous blister in the same area of the previously healed wound. Patient has chronic lower extremity pain and has still not contacted pain management as advised.

## 2023-06-20 NOTE — REVIEW OF SYSTEMS
[Fever] : no fever [Chills] : no chills [Eye Pain] : no eye pain [Loss Of Hearing] : no hearing loss [Shortness Of Breath] : no shortness of breath [Wheezing] : no wheezing [Abdominal Pain] : no abdominal pain [Joint Stiffness] : joint stiffness [Skin Wound] : skin wound [Anxiety] : no anxiety [Negative] : Heme/Lymph [FreeTextEntry5] : HTN, HLD , A- fib , CHF [FreeTextEntry7] : Morbid obesity  [de-identified] : DFU 3 posterior left heel  down to fat , smaller and clean with granular tissue  [de-identified] : IDDM with neuropathy  [de-identified] : JADEN

## 2023-06-20 NOTE — ASSESSMENT
[Verbal] : Verbal [Written] : Written [Demo] : Demo [Patient] : Patient [Good - alert, interested, motivated] : Good - alert, interested, motivated [Verbalizes knowledge/Understanding] : Verbalizes knowledge/understanding [Dressing changes] : dressing changes [Foot Care] : foot care [Skin Care] : skin care [Pressure relief] : pressure relief [Signs and symptoms of infection] : sign and symptoms of infection [Nutrition] : nutrition [How and When to Call] : how and when to call [Pain Management] : pain management [Off-loading] : off-loading [Home Health] : home health [Patient responsibility to plan of care] : patient responsibility to plan of care [Glycemic Control] : glycemic control [Stable] : stable [Home] : Home [Walker] : Walker [Faxed - Long Term Care/Home Health Agency] : Long Term Care/Home Health Agency: Faxed [] : No [FreeTextEntry2] : infection prevention\par promote skin integrity\par elevation and low sodium diet for edema control\par encourage glycemic control\par pressure reduction/offloading\par pain management consult\par routine foot care [FreeTextEntry3] : blood blister developed  [FreeTextEntry4] : F/U 1 week for assessment\par Pt advised to follow up with pain management regarding chronic pain, pt verbalized understanding\par Rx for Tramadol sent to pt pharmacy\par  [FreeTextEntry1] : [facility]- pt provided with copy of instructions.

## 2023-06-20 NOTE — PHYSICAL EXAM
[4 x 4] : 4 x 4  [0] : left 0 [1+] : left 1+ [Ankle Swelling (On Exam)] : present [Ankle Swelling Bilaterally] : bilaterally  [Ankle Swelling On The Left] : moderate [Varicose Veins Of Lower Extremities] : not present [] : not present [Purpura] : no purpura  [Petechiae] : no petechiae [Skin Ulcer] : ulcer [Skin Induration] : no induration [Alert] : alert [Oriented to Person] : oriented to person [Oriented to Place] : oriented to place [Calm] : calm [de-identified] : calm [de-identified] : HTN, HLD , A- fib , hx CHF [de-identified] : morbid obesity  [de-identified] : Edema to bilateral lower extremity  [de-identified] : DFU 3 posterior left heel down to fat after derooffment of the blister with no signs of infection , smaller and clean  [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  [FreeTextEntry1] : left heel- blood blister [FreeTextEntry2] : 0.7 [FreeTextEntry3] : 0.7 [FreeTextEntry4] : 0.1 [de-identified] : sanguineous [de-identified] : dry skin [de-identified] : silver alginate [de-identified] : Mechanically cleansed with Sterile gauze and 0.9% Normal Saline\par \par blister drained by DPM\par \par AgNo3 utilized for cauterization\par \par  [TWNoteComboBox4] : Small [TWNoteComboBox5] : No [de-identified] : No [de-identified] : other [de-identified] : None [de-identified] : None [de-identified] : 100% [de-identified] : No [de-identified] : 3x Weekly [de-identified] : Primary Dressing

## 2023-06-20 NOTE — PHYSICAL EXAM
[4 x 4] : 4 x 4  [0] : left 0 [1+] : left 1+ [Ankle Swelling (On Exam)] : present [Ankle Swelling Bilaterally] : bilaterally  [Ankle Swelling On The Left] : moderate [Varicose Veins Of Lower Extremities] : not present [] : not present [Purpura] : no purpura  [Petechiae] : no petechiae [Skin Ulcer] : ulcer [Skin Induration] : no induration [Alert] : alert [Oriented to Person] : oriented to person [Oriented to Place] : oriented to place [Calm] : calm [de-identified] : calm [de-identified] : HTN, HLD , A- fib , hx CHF [de-identified] : morbid obesity  [de-identified] : Edema to bilateral lower extremity  [de-identified] : DFU 3 posterior left heel down to fat with no signs of infection, granular wound base  [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  [FreeTextEntry1] : Left Heel [FreeTextEntry2] : 0.9 [FreeTextEntry3] : 0.7 [FreeTextEntry4] : 0.1 [de-identified] : serosanguineous [de-identified] : dry skin [de-identified] : silver alginate [de-identified] : \par  [TWNoteComboBox4] : Small [TWNoteComboBox5] : No [de-identified] : No [de-identified] : other [de-identified] : None [de-identified] : None [de-identified] : 100% [de-identified] : No [de-identified] : 3x Weekly [de-identified] : Primary Dressing

## 2023-06-20 NOTE — REVIEW OF SYSTEMS
[Fever] : no fever [Chills] : no chills [Eye Pain] : no eye pain [Loss Of Hearing] : no hearing loss [Shortness Of Breath] : no shortness of breath [Wheezing] : no wheezing [Abdominal Pain] : no abdominal pain [Joint Stiffness] : joint stiffness [Skin Wound] : skin wound [Anxiety] : no anxiety [Negative] : Heme/Lymph [FreeTextEntry5] : HTN, HLD , A- fib , CHF [FreeTextEntry7] : Morbid obesity  [de-identified] : DFU 3 posterior left heel  down to fat , smaller and clean with granular tissue  [de-identified] : IDDM with neuropathy  [de-identified] : JADEN

## 2023-06-20 NOTE — HISTORY OF PRESENT ILLNESS
[FreeTextEntry1] : Patient is 58 year old female presenting for left heel ulcer down to fat . Patient has chronic lower extremity pain and has still not contacted pain management as advised. PAtient is unable to tolerate HBOT. Patient states has been keeping her legs elevated.

## 2023-06-21 ENCOUNTER — APPOINTMENT (OUTPATIENT)
Dept: OTOLARYNGOLOGY | Facility: CLINIC | Age: 58
End: 2023-06-21

## 2023-06-23 ENCOUNTER — APPOINTMENT (OUTPATIENT)
Dept: WOUND CARE | Facility: HOSPITAL | Age: 58
End: 2023-06-23
Payer: MEDICARE

## 2023-06-23 ENCOUNTER — OUTPATIENT (OUTPATIENT)
Dept: OUTPATIENT SERVICES | Facility: HOSPITAL | Age: 58
LOS: 1 days | Discharge: HOME | End: 2023-06-23
Payer: MEDICARE

## 2023-06-23 ENCOUNTER — NON-APPOINTMENT (OUTPATIENT)
Age: 58
End: 2023-06-23

## 2023-06-23 VITALS
SYSTOLIC BLOOD PRESSURE: 122 MMHG | OXYGEN SATURATION: 93 % | HEART RATE: 92 BPM | TEMPERATURE: 98.2 F | RESPIRATION RATE: 18 BRPM | DIASTOLIC BLOOD PRESSURE: 84 MMHG

## 2023-06-23 DIAGNOSIS — L97.423 NON-PRESSURE CHRONIC ULCER OF LEFT HEEL AND MIDFOOT WITH NECROSIS OF MUSCLE: ICD-10-CM

## 2023-06-23 DIAGNOSIS — Z90.49 ACQUIRED ABSENCE OF OTHER SPECIFIED PARTS OF DIGESTIVE TRACT: Chronic | ICD-10-CM

## 2023-06-23 DIAGNOSIS — Z98.890 OTHER SPECIFIED POSTPROCEDURAL STATES: Chronic | ICD-10-CM

## 2023-06-23 DIAGNOSIS — Z90.89 ACQUIRED ABSENCE OF OTHER ORGANS: Chronic | ICD-10-CM

## 2023-06-23 DIAGNOSIS — E11.621 TYPE 2 DIABETES MELLITUS WITH FOOT ULCER: ICD-10-CM

## 2023-06-23 PROCEDURE — G0463: CPT

## 2023-06-23 PROCEDURE — 99213 OFFICE O/P EST LOW 20 MIN: CPT

## 2023-06-24 DIAGNOSIS — Z90.49 ACQUIRED ABSENCE OF OTHER SPECIFIED PARTS OF DIGESTIVE TRACT: ICD-10-CM

## 2023-06-24 DIAGNOSIS — Z98.49 CATARACT EXTRACTION STATUS, UNSPECIFIED EYE: ICD-10-CM

## 2023-06-24 DIAGNOSIS — E11.40 TYPE 2 DIABETES MELLITUS WITH DIABETIC NEUROPATHY, UNSPECIFIED: ICD-10-CM

## 2023-06-24 DIAGNOSIS — E11.621 TYPE 2 DIABETES MELLITUS WITH FOOT ULCER: ICD-10-CM

## 2023-06-24 DIAGNOSIS — Z79.899 OTHER LONG TERM (CURRENT) DRUG THERAPY: ICD-10-CM

## 2023-06-24 DIAGNOSIS — L84 CORNS AND CALLOSITIES: ICD-10-CM

## 2023-06-24 DIAGNOSIS — I11.0 HYPERTENSIVE HEART DISEASE WITH HEART FAILURE: ICD-10-CM

## 2023-06-24 DIAGNOSIS — Z79.4 LONG TERM (CURRENT) USE OF INSULIN: ICD-10-CM

## 2023-06-24 DIAGNOSIS — Z79.01 LONG TERM (CURRENT) USE OF ANTICOAGULANTS: ICD-10-CM

## 2023-06-24 DIAGNOSIS — I48.91 UNSPECIFIED ATRIAL FIBRILLATION: ICD-10-CM

## 2023-06-24 DIAGNOSIS — Z86.718 PERSONAL HISTORY OF OTHER VENOUS THROMBOSIS AND EMBOLISM: ICD-10-CM

## 2023-06-24 DIAGNOSIS — Z79.84 LONG TERM (CURRENT) USE OF ORAL HYPOGLYCEMIC DRUGS: ICD-10-CM

## 2023-06-24 DIAGNOSIS — L97.422 NON-PRESSURE CHRONIC ULCER OF LEFT HEEL AND MIDFOOT WITH FAT LAYER EXPOSED: ICD-10-CM

## 2023-06-24 DIAGNOSIS — I50.9 HEART FAILURE, UNSPECIFIED: ICD-10-CM

## 2023-06-24 DIAGNOSIS — M25.50 PAIN IN UNSPECIFIED JOINT: ICD-10-CM

## 2023-06-26 NOTE — PLAN
[FreeTextEntry1] : .Patient seen and evaluated. Discussed etiology and treatment plan. Patient verbalized understanding.\par PAtient to continue wound care and off loading, advised patient to continue to keep offloaded when sitting and elevating legs. . Advised patient to follow up with pain management, PTR 1 week   Patient was told if there are signs of infection ( fever, chills, nausea, vomiting ) or changes in the condition of the wound ( pain , cellulitis , purulent drainage or odor ) they should proceed to the ER as soon as possible\par Spent 20 minutes for patient care and medical decision making. \par

## 2023-06-26 NOTE — PHYSICAL EXAM
[4 x 4] : 4 x 4  [Abdominal Pad] : Abdominal Pad [0] : left 0 [1+] : left 1+ [Ankle Swelling (On Exam)] : present [Ankle Swelling Bilaterally] : bilaterally  [Ankle Swelling On The Left] : moderate [Skin Ulcer] : ulcer [Alert] : alert [Oriented to Person] : oriented to person [Oriented to Place] : oriented to place [Calm] : calm [Varicose Veins Of Lower Extremities] : not present [] : not present [Purpura] : no purpura  [Petechiae] : no petechiae [Skin Induration] : no induration [de-identified] : calm [de-identified] : HTN, HLD , A- fib , hx CHF [de-identified] : morbid obesity  [de-identified] : Edema to bilateral lower extremity  [de-identified] : DFU 3 posterior left heel down to fat with no signs of infection, granular wound base  [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  [FreeTextEntry1] : left heel [FreeTextEntry2] : 0.3 [FreeTextEntry3] : 0.3 [FreeTextEntry4] : 0.1 [de-identified] : serosanguineous  [de-identified] : silver alginate [de-identified] : Mechanically cleansed with Sterile gauze and 0.9% Normal Saline\par  [TWNoteComboBox4] : Small [TWNoteComboBox5] : No [de-identified] : No [de-identified] : Normal [de-identified] : None [de-identified] : None [de-identified] : 100% [de-identified] : No [de-identified] : 3x Weekly [de-identified] : Primary Dressing

## 2023-06-26 NOTE — ASSESSMENT
[Dressing changes] : dressing changes [Foot Care] : foot care [Skin Care] : skin care [Pressure relief] : pressure relief [Signs and symptoms of infection] : sign and symptoms of infection [Nutrition] : nutrition [How and When to Call] : how and when to call [Off-loading] : off-loading [Patient responsibility to plan of care] : patient responsibility to plan of care [Glycemic Control] : glycemic control [] : Yes [Stable] : stable [Home] : Home [Walker] : Walker [Faxed - Long Term Care/Home Health Agency] : Long Term Care/Home Health Agency: Faxed [Verbal] : Verbal [Written] : Written [Demo] : Demo [Patient] : Patient [Fair - mild discomfort, physical impairment, low acceptance] : Fair - mild discomfort, physical impairment, low acceptance [Needs reinforcement] : needs reinforcement [FreeTextEntry2] : infection prevention\par promote skin integrity\par encourage glycemic control\par nutrition and wound healing\par pressure reduction/offloading [FreeTextEntry4] : F/U 1 week\par Pt advised to follow up with endocrinologist as pt verbalized inability to control blood sugar adequately, DPM aware.  [FreeTextEntry1] : [facility]

## 2023-06-26 NOTE — REVIEW OF SYSTEMS
[Joint Stiffness] : joint stiffness [Skin Wound] : skin wound [Negative] : Heme/Lymph [Fever] : no fever [Chills] : no chills [Eye Pain] : no eye pain [Loss Of Hearing] : no hearing loss [Shortness Of Breath] : no shortness of breath [Wheezing] : no wheezing [Abdominal Pain] : no abdominal pain [Anxiety] : no anxiety [FreeTextEntry5] : HTN, HLD , A- fib , CHF [FreeTextEntry7] : Morbid obesity  [de-identified] : DFU 3 posterior left heel  down to fat , smaller and clean with granular tissue  [de-identified] : IDDM with neuropathy  [de-identified] : JADEN

## 2023-06-26 NOTE — HISTORY OF PRESENT ILLNESS
[FreeTextEntry1] : Patient is 58 year old female presenting for left heel ulcer down to fat . Patient has chronic lower extremity pain that has decreased from before . PAtient is unable to tolerate HBOT. Patient states has been keeping her legs elevated. and states has been trying to keep the left heel offloaded. Denies any other complaints at this time. \par  \par

## 2023-06-28 ENCOUNTER — APPOINTMENT (OUTPATIENT)
Dept: WOUND CARE | Facility: HOSPITAL | Age: 58
End: 2023-06-28
Payer: MEDICARE

## 2023-06-28 ENCOUNTER — OUTPATIENT (OUTPATIENT)
Dept: OUTPATIENT SERVICES | Facility: HOSPITAL | Age: 58
LOS: 1 days | Discharge: HOME | End: 2023-06-28
Payer: MEDICARE

## 2023-06-28 VITALS
TEMPERATURE: 99 F | HEIGHT: 61 IN | RESPIRATION RATE: 18 BRPM | HEART RATE: 100 BPM | OXYGEN SATURATION: 94 % | WEIGHT: 233 LBS | SYSTOLIC BLOOD PRESSURE: 138 MMHG | BODY MASS INDEX: 43.99 KG/M2 | DIASTOLIC BLOOD PRESSURE: 72 MMHG

## 2023-06-28 DIAGNOSIS — Z90.89 ACQUIRED ABSENCE OF OTHER ORGANS: Chronic | ICD-10-CM

## 2023-06-28 DIAGNOSIS — E11.621 TYPE 2 DIABETES MELLITUS WITH FOOT ULCER: ICD-10-CM

## 2023-06-28 DIAGNOSIS — Z98.890 OTHER SPECIFIED POSTPROCEDURAL STATES: Chronic | ICD-10-CM

## 2023-06-28 DIAGNOSIS — Z90.49 ACQUIRED ABSENCE OF OTHER SPECIFIED PARTS OF DIGESTIVE TRACT: Chronic | ICD-10-CM

## 2023-06-28 PROCEDURE — 99213 OFFICE O/P EST LOW 20 MIN: CPT

## 2023-06-28 PROCEDURE — G0463: CPT

## 2023-07-05 ENCOUNTER — OUTPATIENT (OUTPATIENT)
Dept: OUTPATIENT SERVICES | Facility: HOSPITAL | Age: 58
LOS: 1 days | Discharge: HOME | End: 2023-07-05
Payer: MEDICARE

## 2023-07-05 ENCOUNTER — APPOINTMENT (OUTPATIENT)
Dept: WOUND CARE | Facility: HOSPITAL | Age: 58
End: 2023-07-05
Payer: MEDICARE

## 2023-07-05 VITALS
SYSTOLIC BLOOD PRESSURE: 134 MMHG | RESPIRATION RATE: 18 BRPM | BODY MASS INDEX: 43.99 KG/M2 | DIASTOLIC BLOOD PRESSURE: 64 MMHG | TEMPERATURE: 97.9 F | HEIGHT: 61 IN | HEART RATE: 110 BPM | OXYGEN SATURATION: 96 % | WEIGHT: 233 LBS

## 2023-07-05 DIAGNOSIS — Z90.49 ACQUIRED ABSENCE OF OTHER SPECIFIED PARTS OF DIGESTIVE TRACT: Chronic | ICD-10-CM

## 2023-07-05 DIAGNOSIS — Z98.890 OTHER SPECIFIED POSTPROCEDURAL STATES: Chronic | ICD-10-CM

## 2023-07-05 DIAGNOSIS — E11.621 TYPE 2 DIABETES MELLITUS WITH FOOT ULCER: ICD-10-CM

## 2023-07-05 DIAGNOSIS — Z90.89 ACQUIRED ABSENCE OF OTHER ORGANS: Chronic | ICD-10-CM

## 2023-07-05 PROCEDURE — G0463: CPT

## 2023-07-05 PROCEDURE — 99213 OFFICE O/P EST LOW 20 MIN: CPT

## 2023-07-10 DIAGNOSIS — E11.40 TYPE 2 DIABETES MELLITUS WITH DIABETIC NEUROPATHY, UNSPECIFIED: ICD-10-CM

## 2023-07-10 DIAGNOSIS — Z79.899 OTHER LONG TERM (CURRENT) DRUG THERAPY: ICD-10-CM

## 2023-07-10 DIAGNOSIS — L97.422 NON-PRESSURE CHRONIC ULCER OF LEFT HEEL AND MIDFOOT WITH FAT LAYER EXPOSED: ICD-10-CM

## 2023-07-10 DIAGNOSIS — Z79.01 LONG TERM (CURRENT) USE OF ANTICOAGULANTS: ICD-10-CM

## 2023-07-10 DIAGNOSIS — I11.0 HYPERTENSIVE HEART DISEASE WITH HEART FAILURE: ICD-10-CM

## 2023-07-10 DIAGNOSIS — E66.01 MORBID (SEVERE) OBESITY DUE TO EXCESS CALORIES: ICD-10-CM

## 2023-07-10 DIAGNOSIS — E11.621 TYPE 2 DIABETES MELLITUS WITH FOOT ULCER: ICD-10-CM

## 2023-07-10 DIAGNOSIS — Z79.84 LONG TERM (CURRENT) USE OF ORAL HYPOGLYCEMIC DRUGS: ICD-10-CM

## 2023-07-10 DIAGNOSIS — I48.91 UNSPECIFIED ATRIAL FIBRILLATION: ICD-10-CM

## 2023-07-10 DIAGNOSIS — L84 CORNS AND CALLOSITIES: ICD-10-CM

## 2023-07-10 DIAGNOSIS — I50.9 HEART FAILURE, UNSPECIFIED: ICD-10-CM

## 2023-07-10 DIAGNOSIS — Z86.718 PERSONAL HISTORY OF OTHER VENOUS THROMBOSIS AND EMBOLISM: ICD-10-CM

## 2023-07-10 DIAGNOSIS — Z90.49 ACQUIRED ABSENCE OF OTHER SPECIFIED PARTS OF DIGESTIVE TRACT: ICD-10-CM

## 2023-07-10 DIAGNOSIS — Z98.49 CATARACT EXTRACTION STATUS, UNSPECIFIED EYE: ICD-10-CM

## 2023-07-10 DIAGNOSIS — Z79.4 LONG TERM (CURRENT) USE OF INSULIN: ICD-10-CM

## 2023-07-10 DIAGNOSIS — M25.50 PAIN IN UNSPECIFIED JOINT: ICD-10-CM

## 2023-07-10 NOTE — PHYSICAL EXAM
[0] : left 0 [1+] : left 1+ [Ankle Swelling (On Exam)] : present [Ankle Swelling Bilaterally] : bilaterally  [Ankle Swelling On The Left] : moderate [Varicose Veins Of Lower Extremities] : not present [] : not present [Purpura] : no purpura  [Petechiae] : no petechiae [Skin Ulcer] : ulcer [Skin Induration] : no induration [Alert] : alert [Oriented to Person] : oriented to person [Oriented to Place] : oriented to place [Calm] : calm [de-identified] : calm [de-identified] : HTN, HLD , A- fib , hx CHF [de-identified] : morbid obesity  [de-identified] : Edema to bilateral lower extremity  [de-identified] : DFU 3 posterior left heel down to fat with no signs of infection, granular wound base  [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  [FreeTextEntry1] : Heel - Scab [FreeTextEntry2] : 0.9 [FreeTextEntry3] : 0.6 [FreeTextEntry4] : 0.1 [de-identified] : / Pressure. [de-identified] : Intact [de-identified] : none [TWNoteComboBox1] : Left [TWNoteComboBox4] : None [TWNoteComboBox5] : No [TWNoteComboBox6] : Diabetic [de-identified] : No [de-identified] : None [de-identified] : 3x Weekly

## 2023-07-10 NOTE — ASSESSMENT
[Verbal] : Verbal [Written] : Written [Patient] : Patient [Good - alert, interested, motivated] : Good - alert, interested, motivated [Verbalizes knowledge/Understanding] : Verbalizes knowledge/understanding [Dressing changes] : dressing changes [Foot Care] : foot care [Skin Care] : skin care [Pressure relief] : pressure relief [Signs and symptoms of infection] : sign and symptoms of infection [Nutrition] : nutrition [How and When to Call] : how and when to call [Pain Management] : pain management [Off-loading] : off-loading [Patient responsibility to plan of care] : patient responsibility to plan of care [Glycemic Control] : glycemic control [] : Yes [Stable] : stable [Home] : Home [Walker] : Walker [Faxed - Long Term Care/Home Health Agency] : Long Term Care/Home Health Agency: Faxed [FreeTextEntry2] : Infection prevention \par Wound care (dressing changes)\par Maintain optimal skin integrity to high pressure areas\par Compression therapy\par Nutrition and wound healing\par Elevation and low sodium compliance.\par Pressure relief/ Pressure redistribution\par Offloading the stress on skin structures and decreasing potential pathologic biomechanical influences.\par Weight reduction strategies.  [FreeTextEntry4] : OK'd to shower and get the foot wet per DPM.\par Pt registered with [facility].\par Pt to f/u in 2 weeks. [FreeTextEntry1] : [facility]

## 2023-07-10 NOTE — REVIEW OF SYSTEMS
[Fever] : no fever [Chills] : no chills [Eye Pain] : no eye pain [Loss Of Hearing] : no hearing loss [Shortness Of Breath] : no shortness of breath [Wheezing] : no wheezing [Abdominal Pain] : no abdominal pain [Joint Stiffness] : joint stiffness [Skin Wound] : skin wound [Anxiety] : no anxiety [Negative] : Heme/Lymph [FreeTextEntry5] : HTN, HLD , A- fib , CHF [FreeTextEntry7] : Morbid obesity  [de-identified] : DFU 3 posterior left heel  down to fat , smaller and clean with granular tissue  [de-identified] : IDDM with neuropathy  [de-identified] : JADEN

## 2023-07-11 DIAGNOSIS — Z79.899 OTHER LONG TERM (CURRENT) DRUG THERAPY: ICD-10-CM

## 2023-07-11 DIAGNOSIS — E11.621 TYPE 2 DIABETES MELLITUS WITH FOOT ULCER: ICD-10-CM

## 2023-07-11 DIAGNOSIS — Z79.01 LONG TERM (CURRENT) USE OF ANTICOAGULANTS: ICD-10-CM

## 2023-07-11 DIAGNOSIS — L97.422 NON-PRESSURE CHRONIC ULCER OF LEFT HEEL AND MIDFOOT WITH FAT LAYER EXPOSED: ICD-10-CM

## 2023-07-11 DIAGNOSIS — Z79.84 LONG TERM (CURRENT) USE OF ORAL HYPOGLYCEMIC DRUGS: ICD-10-CM

## 2023-07-11 DIAGNOSIS — I48.91 UNSPECIFIED ATRIAL FIBRILLATION: ICD-10-CM

## 2023-07-11 DIAGNOSIS — I11.0 HYPERTENSIVE HEART DISEASE WITH HEART FAILURE: ICD-10-CM

## 2023-07-11 DIAGNOSIS — M25.50 PAIN IN UNSPECIFIED JOINT: ICD-10-CM

## 2023-07-11 DIAGNOSIS — E11.40 TYPE 2 DIABETES MELLITUS WITH DIABETIC NEUROPATHY, UNSPECIFIED: ICD-10-CM

## 2023-07-11 DIAGNOSIS — Z79.4 LONG TERM (CURRENT) USE OF INSULIN: ICD-10-CM

## 2023-07-11 DIAGNOSIS — Z98.49 CATARACT EXTRACTION STATUS, UNSPECIFIED EYE: ICD-10-CM

## 2023-07-11 DIAGNOSIS — Z90.49 ACQUIRED ABSENCE OF OTHER SPECIFIED PARTS OF DIGESTIVE TRACT: ICD-10-CM

## 2023-07-11 DIAGNOSIS — L84 CORNS AND CALLOSITIES: ICD-10-CM

## 2023-07-11 DIAGNOSIS — I50.9 HEART FAILURE, UNSPECIFIED: ICD-10-CM

## 2023-07-11 DIAGNOSIS — Z86.718 PERSONAL HISTORY OF OTHER VENOUS THROMBOSIS AND EMBOLISM: ICD-10-CM

## 2023-07-11 NOTE — ASSESSMENT
[] : No [FreeTextEntry2] : Infection prevention\par Localized wound care\par Promote optimal skin integrity\par Collagen matrix\par Adherence to diabetic diet\par Glycemic control \par Monitor feet daily\par Maintain acceptable level of pain with use of pharmacological and nonpharmacological interventions\par  [FreeTextEntry4] : F/U for an assessment in one week  [FreeTextEntry1] : [facility]

## 2023-07-11 NOTE — REVIEW OF SYSTEMS
[Fever] : no fever [Chills] : no chills [Eye Pain] : no eye pain [Loss Of Hearing] : no hearing loss [Shortness Of Breath] : no shortness of breath [Wheezing] : no wheezing [Abdominal Pain] : no abdominal pain [Joint Stiffness] : joint stiffness [Skin Wound] : skin wound [Anxiety] : no anxiety [Negative] : Heme/Lymph [FreeTextEntry5] : HTN, HLD , A- fib , CHF [FreeTextEntry7] : Morbid obesity  [de-identified] : DFU 3 posterior left heel  down to fat , smaller and clean with granular tissue  [de-identified] : JADEN [de-identified] : IDDM with neuropathy

## 2023-07-11 NOTE — PLAN
[FreeTextEntry1] : continue wound care and off loading Spent 20 minutes for patient care and medical decision making.  Patient was told if there are signs of infection ( fever, chills, nausea, vomiting ) or changes in the condition of the wound ( pain , cellulitis , purulent drainage or odor ) they should proceed to the ER as soon as possible\par \par

## 2023-07-19 ENCOUNTER — OUTPATIENT (OUTPATIENT)
Dept: OUTPATIENT SERVICES | Facility: HOSPITAL | Age: 58
LOS: 1 days | Discharge: HOME | End: 2023-07-19
Payer: MEDICARE

## 2023-07-19 ENCOUNTER — APPOINTMENT (OUTPATIENT)
Dept: WOUND CARE | Facility: HOSPITAL | Age: 58
End: 2023-07-19
Payer: MEDICARE

## 2023-07-19 VITALS
HEIGHT: 61 IN | HEART RATE: 100 BPM | OXYGEN SATURATION: 95 % | WEIGHT: 233 LBS | SYSTOLIC BLOOD PRESSURE: 108 MMHG | TEMPERATURE: 98.1 F | BODY MASS INDEX: 43.99 KG/M2 | DIASTOLIC BLOOD PRESSURE: 74 MMHG | RESPIRATION RATE: 18 BRPM

## 2023-07-19 DIAGNOSIS — Z98.890 OTHER SPECIFIED POSTPROCEDURAL STATES: Chronic | ICD-10-CM

## 2023-07-19 DIAGNOSIS — Z90.49 ACQUIRED ABSENCE OF OTHER SPECIFIED PARTS OF DIGESTIVE TRACT: Chronic | ICD-10-CM

## 2023-07-19 DIAGNOSIS — E11.621 TYPE 2 DIABETES MELLITUS WITH FOOT ULCER: ICD-10-CM

## 2023-07-19 DIAGNOSIS — Z90.89 ACQUIRED ABSENCE OF OTHER ORGANS: Chronic | ICD-10-CM

## 2023-07-19 PROCEDURE — G0463: CPT

## 2023-07-19 PROCEDURE — 99213 OFFICE O/P EST LOW 20 MIN: CPT

## 2023-07-19 NOTE — REVIEW OF SYSTEMS
[Joint Stiffness] : joint stiffness [Skin Wound] : skin wound [Negative] : Heme/Lymph [Fever] : no fever [Chills] : no chills [Eye Pain] : no eye pain [Loss Of Hearing] : no hearing loss [Shortness Of Breath] : no shortness of breath [Wheezing] : no wheezing [Abdominal Pain] : no abdominal pain [Anxiety] : no anxiety [FreeTextEntry5] : HTN, HLD , A- fib , CHF [FreeTextEntry7] : Morbid obesity  [de-identified] : DFU 3 posterior left heel  down to fat , smaller and clean with granular tissue  [de-identified] : IDDM with neuropathy  [de-identified] : JADEN

## 2023-07-19 NOTE — PLAN
[FreeTextEntry1] : Continue observation , off loading and protective dressing   PTR 1 month  Spent 20 minutes for patient care and medical decision making.\par

## 2023-07-19 NOTE — ASSESSMENT
[Verbal] : Verbal [Patient] : Patient [Good - alert, interested, motivated] : Good - alert, interested, motivated [Verbalizes knowledge/Understanding] : Verbalizes knowledge/understanding [Foot Care] : foot care [Pressure relief] : pressure relief [Signs and symptoms of infection] : sign and symptoms of infection [Nutrition] : nutrition [How and When to Call] : how and when to call [Pain Management] : pain management [Patient responsibility to plan of care] : patient responsibility to plan of care [Glycemic Control] : glycemic control [Stable] : stable [Not Applicable - Long Term Care/Home Health Agency] : Long Term Care/Home Health Agency: Not Applicable [] : No [FreeTextEntry2] : infection prevention\par nutrition and wound healing\par weight reduction strategies\par maintain optimal skin integrity [FreeTextEntry4] : pt to follow up clinic in a month

## 2023-07-19 NOTE — PHYSICAL EXAM
[0] : left 0 [1+] : left 1+ [Ankle Swelling (On Exam)] : present [Ankle Swelling Bilaterally] : bilaterally  [Ankle Swelling On The Left] : moderate [Skin Ulcer] : ulcer [Alert] : alert [Oriented to Person] : oriented to person [Oriented to Place] : oriented to place [Calm] : calm [Varicose Veins Of Lower Extremities] : not present [] : not present [Purpura] : no purpura  [Petechiae] : no petechiae [Skin Induration] : no induration [de-identified] : calm [de-identified] : HTN, HLD , A- fib , hx CHF [de-identified] : morbid obesity  [de-identified] : Edema to bilateral lower extremity  [de-identified] : DFU 3 posterior left heel down to fat with no signs of infection, healed   [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  [FreeTextEntry1] : Heel sCAB - Closed [de-identified] : None [TWNoteComboBox1] : Left [TWNoteComboBox6] : Diabetic [de-identified] : No [de-identified] : Normal [de-identified] : None

## 2023-07-19 NOTE — VITALS
[Pain related to present condition?] : The patient's  pain is not related to present condition. [] : No [de-identified] : 0/10

## 2023-07-20 DIAGNOSIS — E11.621 TYPE 2 DIABETES MELLITUS WITH FOOT ULCER: ICD-10-CM

## 2023-07-20 DIAGNOSIS — E66.01 MORBID (SEVERE) OBESITY DUE TO EXCESS CALORIES: ICD-10-CM

## 2023-07-20 DIAGNOSIS — Z79.01 LONG TERM (CURRENT) USE OF ANTICOAGULANTS: ICD-10-CM

## 2023-07-20 DIAGNOSIS — L84 CORNS AND CALLOSITIES: ICD-10-CM

## 2023-07-20 DIAGNOSIS — M25.50 PAIN IN UNSPECIFIED JOINT: ICD-10-CM

## 2023-07-20 DIAGNOSIS — I50.9 HEART FAILURE, UNSPECIFIED: ICD-10-CM

## 2023-07-20 DIAGNOSIS — I48.91 UNSPECIFIED ATRIAL FIBRILLATION: ICD-10-CM

## 2023-07-20 DIAGNOSIS — Z86.718 PERSONAL HISTORY OF OTHER VENOUS THROMBOSIS AND EMBOLISM: ICD-10-CM

## 2023-07-20 DIAGNOSIS — Z79.899 OTHER LONG TERM (CURRENT) DRUG THERAPY: ICD-10-CM

## 2023-07-20 DIAGNOSIS — Z79.84 LONG TERM (CURRENT) USE OF ORAL HYPOGLYCEMIC DRUGS: ICD-10-CM

## 2023-07-20 DIAGNOSIS — Z98.49 CATARACT EXTRACTION STATUS, UNSPECIFIED EYE: ICD-10-CM

## 2023-07-20 DIAGNOSIS — E11.40 TYPE 2 DIABETES MELLITUS WITH DIABETIC NEUROPATHY, UNSPECIFIED: ICD-10-CM

## 2023-07-20 DIAGNOSIS — Z90.49 ACQUIRED ABSENCE OF OTHER SPECIFIED PARTS OF DIGESTIVE TRACT: ICD-10-CM

## 2023-07-20 DIAGNOSIS — Z79.4 LONG TERM (CURRENT) USE OF INSULIN: ICD-10-CM

## 2023-07-20 DIAGNOSIS — L97.422 NON-PRESSURE CHRONIC ULCER OF LEFT HEEL AND MIDFOOT WITH FAT LAYER EXPOSED: ICD-10-CM

## 2023-07-20 DIAGNOSIS — I11.0 HYPERTENSIVE HEART DISEASE WITH HEART FAILURE: ICD-10-CM

## 2023-08-11 ENCOUNTER — APPOINTMENT (OUTPATIENT)
Dept: INTERNAL MEDICINE | Facility: CLINIC | Age: 58
End: 2023-08-11

## 2023-08-22 ENCOUNTER — APPOINTMENT (OUTPATIENT)
Dept: WOUND CARE | Facility: HOSPITAL | Age: 58
End: 2023-08-22
Payer: MEDICARE

## 2023-08-22 ENCOUNTER — OUTPATIENT (OUTPATIENT)
Dept: OUTPATIENT SERVICES | Facility: HOSPITAL | Age: 58
LOS: 1 days | Discharge: HOME | End: 2023-08-22
Payer: MEDICARE

## 2023-08-22 VITALS
WEIGHT: 233 LBS | HEIGHT: 61 IN | DIASTOLIC BLOOD PRESSURE: 62 MMHG | SYSTOLIC BLOOD PRESSURE: 111 MMHG | TEMPERATURE: 98.3 F | RESPIRATION RATE: 18 BRPM | HEART RATE: 88 BPM | BODY MASS INDEX: 43.99 KG/M2 | OXYGEN SATURATION: 94 %

## 2023-08-22 DIAGNOSIS — Z98.890 OTHER SPECIFIED POSTPROCEDURAL STATES: Chronic | ICD-10-CM

## 2023-08-22 DIAGNOSIS — Z90.89 ACQUIRED ABSENCE OF OTHER ORGANS: Chronic | ICD-10-CM

## 2023-08-22 DIAGNOSIS — Z90.49 ACQUIRED ABSENCE OF OTHER SPECIFIED PARTS OF DIGESTIVE TRACT: Chronic | ICD-10-CM

## 2023-08-22 DIAGNOSIS — E11.621 TYPE 2 DIABETES MELLITUS WITH FOOT ULCER: ICD-10-CM

## 2023-08-22 PROCEDURE — G0463: CPT

## 2023-08-22 PROCEDURE — 99213 OFFICE O/P EST LOW 20 MIN: CPT

## 2023-08-22 NOTE — PHYSICAL EXAM
[0] : left 0 [1+] : left 1+ [Ankle Swelling (On Exam)] : present [Ankle Swelling Bilaterally] : bilaterally  [Ankle Swelling On The Left] : moderate [Varicose Veins Of Lower Extremities] : not present [] : not present [Purpura] : no purpura  [Petechiae] : no petechiae [Skin Ulcer] : ulcer [Skin Induration] : no induration [Alert] : alert [Oriented to Person] : oriented to person [Oriented to Place] : oriented to place [Calm] : calm [de-identified] : calm [de-identified] : HTN, HLD , A- fib , hx CHF [de-identified] : morbid obesity  [de-identified] : Edema to bilateral lower extremity  [de-identified] : DFU 3 posterior left heel down to fat with no signs of infection, healed   [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  [FreeTextEntry1] : Left Heel - CLOSED

## 2023-08-22 NOTE — REVIEW OF SYSTEMS
[Fever] : no fever [Chills] : no chills [Eye Pain] : no eye pain [Loss Of Hearing] : no hearing loss [Shortness Of Breath] : no shortness of breath [Wheezing] : no wheezing [Abdominal Pain] : no abdominal pain [Joint Stiffness] : joint stiffness [Skin Wound] : skin wound [Anxiety] : no anxiety [Negative] : Heme/Lymph [FreeTextEntry5] : HTN, HLD , A- fib , CHF [FreeTextEntry7] : Morbid obesity  [de-identified] : DFU 3 posterior left heel  down to fat , smaller and clean with granular tissue  [de-identified] : IDDM with neuropathy  [de-identified] : JADEN

## 2023-08-22 NOTE — PLAN
[FreeTextEntry1] : Patient wound is closed ,  Patient happy with the results , patient discharged with full instructions and patient to follow up with all outside physicians

## 2023-08-22 NOTE — ASSESSMENT
[Verbal] : Verbal [Demo] : Demo [Patient] : Patient [Good - alert, interested, motivated] : Good - alert, interested, motivated [Verbalizes knowledge/Understanding] : Verbalizes knowledge/understanding [Signs and symptoms of infection] : sign and symptoms of infection [How and When to Call] : how and when to call [Patient responsibility to plan of care] : patient responsibility to plan of care [Glycemic Control] : glycemic control [] : Yes [Stable] : stable [Home] : Home [Walker] : Walker [FreeTextEntry2] : Maintain Skin Integrity Glycemic Control  Foot Care [FreeTextEntry3] : all wounds closed.  [FreeTextEntry4] : DPM encouraged patient to apply bagbalm to the heels to prevent further callus formation. Patient states she will try to apply to her heels.  DPM cleared patient for discharge. Discharge instructions given.

## 2023-08-24 DIAGNOSIS — L84 CORNS AND CALLOSITIES: ICD-10-CM

## 2023-08-24 DIAGNOSIS — Z86.718 PERSONAL HISTORY OF OTHER VENOUS THROMBOSIS AND EMBOLISM: ICD-10-CM

## 2023-08-24 DIAGNOSIS — Z79.84 LONG TERM (CURRENT) USE OF ORAL HYPOGLYCEMIC DRUGS: ICD-10-CM

## 2023-08-24 DIAGNOSIS — Z79.01 LONG TERM (CURRENT) USE OF ANTICOAGULANTS: ICD-10-CM

## 2023-08-24 DIAGNOSIS — I11.0 HYPERTENSIVE HEART DISEASE WITH HEART FAILURE: ICD-10-CM

## 2023-08-24 DIAGNOSIS — Z79.4 LONG TERM (CURRENT) USE OF INSULIN: ICD-10-CM

## 2023-08-24 DIAGNOSIS — I50.9 HEART FAILURE, UNSPECIFIED: ICD-10-CM

## 2023-08-24 DIAGNOSIS — L97.422 NON-PRESSURE CHRONIC ULCER OF LEFT HEEL AND MIDFOOT WITH FAT LAYER EXPOSED: ICD-10-CM

## 2023-08-24 DIAGNOSIS — M25.50 PAIN IN UNSPECIFIED JOINT: ICD-10-CM

## 2023-08-24 DIAGNOSIS — Z90.49 ACQUIRED ABSENCE OF OTHER SPECIFIED PARTS OF DIGESTIVE TRACT: ICD-10-CM

## 2023-08-24 DIAGNOSIS — E66.01 MORBID (SEVERE) OBESITY DUE TO EXCESS CALORIES: ICD-10-CM

## 2023-08-24 DIAGNOSIS — Z79.899 OTHER LONG TERM (CURRENT) DRUG THERAPY: ICD-10-CM

## 2023-08-24 DIAGNOSIS — Z98.49 CATARACT EXTRACTION STATUS, UNSPECIFIED EYE: ICD-10-CM

## 2023-08-24 DIAGNOSIS — E11.40 TYPE 2 DIABETES MELLITUS WITH DIABETIC NEUROPATHY, UNSPECIFIED: ICD-10-CM

## 2023-08-24 DIAGNOSIS — I48.91 UNSPECIFIED ATRIAL FIBRILLATION: ICD-10-CM

## 2023-08-24 DIAGNOSIS — E11.621 TYPE 2 DIABETES MELLITUS WITH FOOT ULCER: ICD-10-CM

## 2023-09-29 NOTE — ED ADULT TRIAGE NOTE - HAVE YOU RECEIVED AT LEAST TWO PFIZER AND/OR MODERNA VACCINATIONS (IN ANY COMBINATION) AND/OR ONE JOHNSON & JOHNSON VACCINATION?
Detail Level: Generalized Detail Level: Zone Patient Specific Counseling (Will Not Stick From Patient To Patient): Discussed sauna may cause rosacea flare. Yes

## 2023-10-09 ENCOUNTER — INPATIENT (INPATIENT)
Facility: HOSPITAL | Age: 58
LOS: 9 days | Discharge: HOME | DRG: 637 | End: 2023-10-19
Attending: FAMILY MEDICINE | Admitting: INTERNAL MEDICINE
Payer: MEDICARE

## 2023-10-09 VITALS
TEMPERATURE: 98 F | SYSTOLIC BLOOD PRESSURE: 130 MMHG | RESPIRATION RATE: 18 BRPM | HEART RATE: 119 BPM | OXYGEN SATURATION: 95 % | DIASTOLIC BLOOD PRESSURE: 67 MMHG | WEIGHT: 250 LBS | HEIGHT: 60 IN

## 2023-10-09 DIAGNOSIS — I50.9 HEART FAILURE, UNSPECIFIED: ICD-10-CM

## 2023-10-09 DIAGNOSIS — Z91.89 OTHER SPECIFIED PERSONAL RISK FACTORS, NOT ELSEWHERE CLASSIFIED: ICD-10-CM

## 2023-10-09 DIAGNOSIS — B99.9 UNSPECIFIED INFECTIOUS DISEASE: ICD-10-CM

## 2023-10-09 DIAGNOSIS — L03.116 CELLULITIS OF LEFT LOWER LIMB: ICD-10-CM

## 2023-10-09 DIAGNOSIS — L03.119 CELLULITIS OF UNSPECIFIED PART OF LIMB: ICD-10-CM

## 2023-10-09 DIAGNOSIS — Z90.89 ACQUIRED ABSENCE OF OTHER ORGANS: Chronic | ICD-10-CM

## 2023-10-09 DIAGNOSIS — Z29.9 ENCOUNTER FOR PROPHYLACTIC MEASURES, UNSPECIFIED: ICD-10-CM

## 2023-10-09 DIAGNOSIS — S81.801A UNSPECIFIED OPEN WOUND, RIGHT LOWER LEG, INITIAL ENCOUNTER: ICD-10-CM

## 2023-10-09 DIAGNOSIS — R10.9 UNSPECIFIED ABDOMINAL PAIN: ICD-10-CM

## 2023-10-09 DIAGNOSIS — E11.9 TYPE 2 DIABETES MELLITUS WITHOUT COMPLICATIONS: ICD-10-CM

## 2023-10-09 DIAGNOSIS — Z90.49 ACQUIRED ABSENCE OF OTHER SPECIFIED PARTS OF DIGESTIVE TRACT: Chronic | ICD-10-CM

## 2023-10-09 DIAGNOSIS — Z98.890 OTHER SPECIFIED POSTPROCEDURAL STATES: Chronic | ICD-10-CM

## 2023-10-09 DIAGNOSIS — I48.91 UNSPECIFIED ATRIAL FIBRILLATION: ICD-10-CM

## 2023-10-09 LAB
ALBUMIN SERPL ELPH-MCNC: 3.1 G/DL — LOW (ref 3.3–5)
ALP SERPL-CCNC: 86 U/L — SIGNIFICANT CHANGE UP (ref 40–120)
ALT FLD-CCNC: 30 U/L — SIGNIFICANT CHANGE UP (ref 12–78)
ANION GAP SERPL CALC-SCNC: 11 MMOL/L — SIGNIFICANT CHANGE UP (ref 5–17)
APPEARANCE UR: CLEAR — SIGNIFICANT CHANGE UP
APTT BLD: 30.4 SEC — SIGNIFICANT CHANGE UP (ref 24.5–35.6)
AST SERPL-CCNC: 19 U/L — SIGNIFICANT CHANGE UP (ref 15–37)
BACTERIA # UR AUTO: ABNORMAL /HPF
BASOPHILS # BLD AUTO: 0.03 K/UL — SIGNIFICANT CHANGE UP (ref 0–0.2)
BASOPHILS NFR BLD AUTO: 0.5 % — SIGNIFICANT CHANGE UP (ref 0–2)
BILIRUB SERPL-MCNC: 1 MG/DL — SIGNIFICANT CHANGE UP (ref 0.2–1.2)
BILIRUB UR-MCNC: NEGATIVE — SIGNIFICANT CHANGE UP
BUN SERPL-MCNC: 11 MG/DL — SIGNIFICANT CHANGE UP (ref 7–23)
CALCIUM SERPL-MCNC: 8.7 MG/DL — SIGNIFICANT CHANGE UP (ref 8.5–10.1)
CHLORIDE SERPL-SCNC: 98 MMOL/L — SIGNIFICANT CHANGE UP (ref 96–108)
CO2 SERPL-SCNC: 27 MMOL/L — SIGNIFICANT CHANGE UP (ref 22–31)
COLOR SPEC: YELLOW — SIGNIFICANT CHANGE UP
CREAT SERPL-MCNC: 0.71 MG/DL — SIGNIFICANT CHANGE UP (ref 0.5–1.3)
CRP SERPL-MCNC: 91 MG/L — HIGH
DIFF PNL FLD: NEGATIVE — SIGNIFICANT CHANGE UP
EGFR: 98 ML/MIN/1.73M2 — SIGNIFICANT CHANGE UP
EOSINOPHIL # BLD AUTO: 0.06 K/UL — SIGNIFICANT CHANGE UP (ref 0–0.5)
EOSINOPHIL NFR BLD AUTO: 0.9 % — SIGNIFICANT CHANGE UP (ref 0–6)
EPI CELLS # UR: PRESENT
ERYTHROCYTE [SEDIMENTATION RATE] IN BLOOD: 29 MM/HR — HIGH (ref 0–20)
GLUCOSE SERPL-MCNC: 369 MG/DL — HIGH (ref 70–99)
GLUCOSE UR QL: >=1000 MG/DL
HCT VFR BLD CALC: 39.5 % — SIGNIFICANT CHANGE UP (ref 34.5–45)
HGB BLD-MCNC: 13.8 G/DL — SIGNIFICANT CHANGE UP (ref 11.5–15.5)
IMM GRANULOCYTES NFR BLD AUTO: 0.5 % — SIGNIFICANT CHANGE UP (ref 0–0.9)
INR BLD: 1.42 RATIO — HIGH (ref 0.85–1.18)
KETONES UR-MCNC: 15 MG/DL
LACTATE SERPL-SCNC: 1.2 MMOL/L — SIGNIFICANT CHANGE UP (ref 0.7–2)
LEUKOCYTE ESTERASE UR-ACNC: ABNORMAL
LYMPHOCYTES # BLD AUTO: 1.08 K/UL — SIGNIFICANT CHANGE UP (ref 1–3.3)
LYMPHOCYTES # BLD AUTO: 16.2 % — SIGNIFICANT CHANGE UP (ref 13–44)
MCHC RBC-ENTMCNC: 32.5 PG — SIGNIFICANT CHANGE UP (ref 27–34)
MCHC RBC-ENTMCNC: 34.9 GM/DL — SIGNIFICANT CHANGE UP (ref 32–36)
MCV RBC AUTO: 92.9 FL — SIGNIFICANT CHANGE UP (ref 80–100)
MONOCYTES # BLD AUTO: 0.47 K/UL — SIGNIFICANT CHANGE UP (ref 0–0.9)
MONOCYTES NFR BLD AUTO: 7.1 % — SIGNIFICANT CHANGE UP (ref 2–14)
NEUTROPHILS # BLD AUTO: 4.98 K/UL — SIGNIFICANT CHANGE UP (ref 1.8–7.4)
NEUTROPHILS NFR BLD AUTO: 74.8 % — SIGNIFICANT CHANGE UP (ref 43–77)
NITRITE UR-MCNC: NEGATIVE — SIGNIFICANT CHANGE UP
NRBC # BLD: 0 /100 WBCS — SIGNIFICANT CHANGE UP (ref 0–0)
NT-PROBNP SERPL-SCNC: 276 PG/ML — HIGH (ref 0–125)
PH UR: 5.5 — SIGNIFICANT CHANGE UP (ref 5–8)
PLATELET # BLD AUTO: 203 K/UL — SIGNIFICANT CHANGE UP (ref 150–400)
POTASSIUM SERPL-MCNC: 3.5 MMOL/L — SIGNIFICANT CHANGE UP (ref 3.5–5.3)
POTASSIUM SERPL-SCNC: 3.5 MMOL/L — SIGNIFICANT CHANGE UP (ref 3.5–5.3)
PROT SERPL-MCNC: 7.4 G/DL — SIGNIFICANT CHANGE UP (ref 6–8.3)
PROT UR-MCNC: NEGATIVE MG/DL — SIGNIFICANT CHANGE UP
PROTHROM AB SERPL-ACNC: 16.5 SEC — HIGH (ref 9.5–13)
RBC # BLD: 4.25 M/UL — SIGNIFICANT CHANGE UP (ref 3.8–5.2)
RBC # FLD: 12.9 % — SIGNIFICANT CHANGE UP (ref 10.3–14.5)
RBC CASTS # UR COMP ASSIST: 0 /HPF — SIGNIFICANT CHANGE UP (ref 0–4)
SARS-COV-2 RNA SPEC QL NAA+PROBE: SIGNIFICANT CHANGE UP
SODIUM SERPL-SCNC: 136 MMOL/L — SIGNIFICANT CHANGE UP (ref 135–145)
SP GR SPEC: 1.04 — HIGH (ref 1–1.03)
UROBILINOGEN FLD QL: 0.2 MG/DL — SIGNIFICANT CHANGE UP (ref 0.2–1)
WBC # BLD: 6.65 K/UL — SIGNIFICANT CHANGE UP (ref 3.8–10.5)
WBC # FLD AUTO: 6.65 K/UL — SIGNIFICANT CHANGE UP (ref 3.8–10.5)
WBC UR QL: 30 /HPF — HIGH (ref 0–5)

## 2023-10-09 PROCEDURE — 73630 X-RAY EXAM OF FOOT: CPT | Mod: 26,50

## 2023-10-09 PROCEDURE — 99223 1ST HOSP IP/OBS HIGH 75: CPT

## 2023-10-09 PROCEDURE — 74174 CTA ABD&PLVS W/CONTRAST: CPT | Mod: 26

## 2023-10-09 PROCEDURE — 73590 X-RAY EXAM OF LOWER LEG: CPT | Mod: 26,50

## 2023-10-09 PROCEDURE — 99223 1ST HOSP IP/OBS HIGH 75: CPT | Mod: GC

## 2023-10-09 PROCEDURE — 99285 EMERGENCY DEPT VISIT HI MDM: CPT

## 2023-10-09 PROCEDURE — 99221 1ST HOSP IP/OBS SF/LOW 40: CPT

## 2023-10-09 PROCEDURE — 71045 X-RAY EXAM CHEST 1 VIEW: CPT | Mod: 26

## 2023-10-09 RX ORDER — NALOXONE HYDROCHLORIDE 4 MG/.1ML
0.4 SPRAY NASAL ONCE
Refills: 0 | Status: DISCONTINUED | OUTPATIENT
Start: 2023-10-09 | End: 2023-10-19

## 2023-10-09 RX ORDER — DEXTROSE 50 % IN WATER 50 %
25 SYRINGE (ML) INTRAVENOUS ONCE
Refills: 0 | Status: DISCONTINUED | OUTPATIENT
Start: 2023-10-09 | End: 2023-10-19

## 2023-10-09 RX ORDER — OXYCODONE HYDROCHLORIDE 5 MG/1
5 TABLET ORAL EVERY 4 HOURS
Refills: 0 | Status: DISCONTINUED | OUTPATIENT
Start: 2023-10-09 | End: 2023-10-16

## 2023-10-09 RX ORDER — VANCOMYCIN HCL 1 G
1000 VIAL (EA) INTRAVENOUS ONCE
Refills: 0 | Status: COMPLETED | OUTPATIENT
Start: 2023-10-09 | End: 2023-10-09

## 2023-10-09 RX ORDER — GABAPENTIN 400 MG/1
300 CAPSULE ORAL EVERY 8 HOURS
Refills: 0 | Status: DISCONTINUED | OUTPATIENT
Start: 2023-10-09 | End: 2023-10-19

## 2023-10-09 RX ORDER — SODIUM CHLORIDE 9 MG/ML
1000 INJECTION INTRAMUSCULAR; INTRAVENOUS; SUBCUTANEOUS ONCE
Refills: 0 | Status: COMPLETED | OUTPATIENT
Start: 2023-10-09 | End: 2023-10-09

## 2023-10-09 RX ORDER — INSULIN GLARGINE 100 [IU]/ML
25 INJECTION, SOLUTION SUBCUTANEOUS
Qty: 0 | Refills: 0 | DISCHARGE

## 2023-10-09 RX ORDER — LANOLIN ALCOHOL/MO/W.PET/CERES
3 CREAM (GRAM) TOPICAL AT BEDTIME
Refills: 0 | Status: DISCONTINUED | OUTPATIENT
Start: 2023-10-09 | End: 2023-10-19

## 2023-10-09 RX ORDER — OXYCODONE HYDROCHLORIDE 5 MG/1
2.5 TABLET ORAL EVERY 4 HOURS
Refills: 0 | Status: DISCONTINUED | OUTPATIENT
Start: 2023-10-09 | End: 2023-10-16

## 2023-10-09 RX ORDER — INSULIN GLARGINE 100 [IU]/ML
18 INJECTION, SOLUTION SUBCUTANEOUS AT BEDTIME
Refills: 0 | Status: DISCONTINUED | OUTPATIENT
Start: 2023-10-09 | End: 2023-10-10

## 2023-10-09 RX ORDER — FUROSEMIDE 40 MG
40 TABLET ORAL DAILY
Refills: 0 | Status: DISCONTINUED | OUTPATIENT
Start: 2023-10-09 | End: 2023-10-12

## 2023-10-09 RX ORDER — DEXTROSE 50 % IN WATER 50 %
15 SYRINGE (ML) INTRAVENOUS ONCE
Refills: 0 | Status: DISCONTINUED | OUTPATIENT
Start: 2023-10-09 | End: 2023-10-19

## 2023-10-09 RX ORDER — POTASSIUM CHLORIDE 20 MEQ
40 PACKET (EA) ORAL ONCE
Refills: 0 | Status: DISCONTINUED | OUTPATIENT
Start: 2023-10-09 | End: 2023-10-09

## 2023-10-09 RX ORDER — INSULIN LISPRO 100/ML
6 VIAL (ML) SUBCUTANEOUS
Refills: 0 | Status: DISCONTINUED | OUTPATIENT
Start: 2023-10-09 | End: 2023-10-10

## 2023-10-09 RX ORDER — POTASSIUM CHLORIDE 20 MEQ
40 PACKET (EA) ORAL ONCE
Refills: 0 | Status: COMPLETED | OUTPATIENT
Start: 2023-10-09 | End: 2023-10-09

## 2023-10-09 RX ORDER — VANCOMYCIN HCL 1 G
1250 VIAL (EA) INTRAVENOUS EVERY 12 HOURS
Refills: 0 | Status: DISCONTINUED | OUTPATIENT
Start: 2023-10-10 | End: 2023-10-14

## 2023-10-09 RX ORDER — SODIUM CHLORIDE 9 MG/ML
1000 INJECTION, SOLUTION INTRAVENOUS
Refills: 0 | Status: DISCONTINUED | OUTPATIENT
Start: 2023-10-09 | End: 2023-10-19

## 2023-10-09 RX ORDER — METOPROLOL TARTRATE 50 MG
100 TABLET ORAL DAILY
Refills: 0 | Status: DISCONTINUED | OUTPATIENT
Start: 2023-10-09 | End: 2023-10-19

## 2023-10-09 RX ORDER — NYSTATIN CREAM 100000 [USP'U]/G
1 CREAM TOPICAL THREE TIMES A DAY
Refills: 0 | Status: DISCONTINUED | OUTPATIENT
Start: 2023-10-09 | End: 2023-10-19

## 2023-10-09 RX ORDER — INSULIN LISPRO 100/ML
VIAL (ML) SUBCUTANEOUS
Refills: 0 | Status: DISCONTINUED | OUTPATIENT
Start: 2023-10-09 | End: 2023-10-19

## 2023-10-09 RX ORDER — GLUCAGON INJECTION, SOLUTION 0.5 MG/.1ML
1 INJECTION, SOLUTION SUBCUTANEOUS ONCE
Refills: 0 | Status: DISCONTINUED | OUTPATIENT
Start: 2023-10-09 | End: 2023-10-19

## 2023-10-09 RX ORDER — ERGOCALCIFEROL 1.25 MG/1
50000 CAPSULE ORAL DAILY
Refills: 0 | Status: DISCONTINUED | OUTPATIENT
Start: 2023-10-09 | End: 2023-10-09

## 2023-10-09 RX ORDER — DEXTROSE 50 % IN WATER 50 %
12.5 SYRINGE (ML) INTRAVENOUS ONCE
Refills: 0 | Status: DISCONTINUED | OUTPATIENT
Start: 2023-10-09 | End: 2023-10-19

## 2023-10-09 RX ORDER — PIPERACILLIN AND TAZOBACTAM 4; .5 G/20ML; G/20ML
3.38 INJECTION, POWDER, LYOPHILIZED, FOR SOLUTION INTRAVENOUS ONCE
Refills: 0 | Status: COMPLETED | OUTPATIENT
Start: 2023-10-09 | End: 2023-10-09

## 2023-10-09 RX ORDER — RIVAROXABAN 15 MG-20MG
20 KIT ORAL
Refills: 0 | Status: DISCONTINUED | OUTPATIENT
Start: 2023-10-09 | End: 2023-10-19

## 2023-10-09 RX ORDER — SENNA PLUS 8.6 MG/1
2 TABLET ORAL AT BEDTIME
Refills: 0 | Status: DISCONTINUED | OUTPATIENT
Start: 2023-10-09 | End: 2023-10-19

## 2023-10-09 RX ORDER — FUROSEMIDE 40 MG
40 TABLET ORAL
Refills: 0 | Status: DISCONTINUED | OUTPATIENT
Start: 2023-10-09 | End: 2023-10-09

## 2023-10-09 RX ORDER — ACETAMINOPHEN 500 MG
650 TABLET ORAL EVERY 6 HOURS
Refills: 0 | Status: DISCONTINUED | OUTPATIENT
Start: 2023-10-09 | End: 2023-10-19

## 2023-10-09 RX ORDER — FUROSEMIDE 40 MG
1 TABLET ORAL
Qty: 0 | Refills: 0 | DISCHARGE

## 2023-10-09 RX ORDER — INSULIN GLARGINE 100 [IU]/ML
12 INJECTION, SOLUTION SUBCUTANEOUS AT BEDTIME
Refills: 0 | Status: DISCONTINUED | OUTPATIENT
Start: 2023-10-09 | End: 2023-10-09

## 2023-10-09 RX ORDER — POLYETHYLENE GLYCOL 3350 17 G/17G
17 POWDER, FOR SOLUTION ORAL DAILY
Refills: 0 | Status: DISCONTINUED | OUTPATIENT
Start: 2023-10-09 | End: 2023-10-19

## 2023-10-09 RX ORDER — MORPHINE SULFATE 50 MG/1
4 CAPSULE, EXTENDED RELEASE ORAL ONCE
Refills: 0 | Status: DISCONTINUED | OUTPATIENT
Start: 2023-10-09 | End: 2023-10-09

## 2023-10-09 RX ORDER — ONDANSETRON 8 MG/1
4 TABLET, FILM COATED ORAL EVERY 8 HOURS
Refills: 0 | Status: DISCONTINUED | OUTPATIENT
Start: 2023-10-09 | End: 2023-10-19

## 2023-10-09 RX ORDER — ONDANSETRON 8 MG/1
4 TABLET, FILM COATED ORAL ONCE
Refills: 0 | Status: COMPLETED | OUTPATIENT
Start: 2023-10-09 | End: 2023-10-09

## 2023-10-09 RX ORDER — INSULIN LISPRO 100/ML
VIAL (ML) SUBCUTANEOUS AT BEDTIME
Refills: 0 | Status: DISCONTINUED | OUTPATIENT
Start: 2023-10-09 | End: 2023-10-19

## 2023-10-09 RX ORDER — CEFEPIME 1 G/1
2000 INJECTION, POWDER, FOR SOLUTION INTRAMUSCULAR; INTRAVENOUS EVERY 8 HOURS
Refills: 0 | Status: DISCONTINUED | OUTPATIENT
Start: 2023-10-09 | End: 2023-10-10

## 2023-10-09 RX ADMIN — Medication 2: at 21:55

## 2023-10-09 RX ADMIN — CEFEPIME 100 MILLIGRAM(S): 1 INJECTION, POWDER, FOR SOLUTION INTRAMUSCULAR; INTRAVENOUS at 21:55

## 2023-10-09 RX ADMIN — SODIUM CHLORIDE 1000 MILLILITER(S): 9 INJECTION INTRAMUSCULAR; INTRAVENOUS; SUBCUTANEOUS at 11:49

## 2023-10-09 RX ADMIN — GABAPENTIN 300 MILLIGRAM(S): 400 CAPSULE ORAL at 21:57

## 2023-10-09 RX ADMIN — Medication 6 UNIT(S): at 17:27

## 2023-10-09 RX ADMIN — MORPHINE SULFATE 4 MILLIGRAM(S): 50 CAPSULE, EXTENDED RELEASE ORAL at 16:20

## 2023-10-09 RX ADMIN — MORPHINE SULFATE 4 MILLIGRAM(S): 50 CAPSULE, EXTENDED RELEASE ORAL at 12:30

## 2023-10-09 RX ADMIN — RIVAROXABAN 20 MILLIGRAM(S): KIT at 19:44

## 2023-10-09 RX ADMIN — NYSTATIN CREAM 1 APPLICATION(S): 100000 CREAM TOPICAL at 21:56

## 2023-10-09 RX ADMIN — NYSTATIN CREAM 1 APPLICATION(S): 100000 CREAM TOPICAL at 21:57

## 2023-10-09 RX ADMIN — INSULIN GLARGINE 18 UNIT(S): 100 INJECTION, SOLUTION SUBCUTANEOUS at 21:56

## 2023-10-09 RX ADMIN — Medication 8: at 17:27

## 2023-10-09 RX ADMIN — PIPERACILLIN AND TAZOBACTAM 200 GRAM(S): 4; .5 INJECTION, POWDER, LYOPHILIZED, FOR SOLUTION INTRAVENOUS at 11:50

## 2023-10-09 RX ADMIN — Medication 250 MILLIGRAM(S): at 13:43

## 2023-10-09 RX ADMIN — OXYCODONE HYDROCHLORIDE 5 MILLIGRAM(S): 5 TABLET ORAL at 17:54

## 2023-10-09 RX ADMIN — MORPHINE SULFATE 4 MILLIGRAM(S): 50 CAPSULE, EXTENDED RELEASE ORAL at 15:22

## 2023-10-09 RX ADMIN — Medication 40 MILLIEQUIVALENT(S): at 15:22

## 2023-10-09 RX ADMIN — ONDANSETRON 4 MILLIGRAM(S): 8 TABLET, FILM COATED ORAL at 11:50

## 2023-10-09 RX ADMIN — SENNA PLUS 2 TABLET(S): 8.6 TABLET ORAL at 21:57

## 2023-10-09 RX ADMIN — OXYCODONE HYDROCHLORIDE 5 MILLIGRAM(S): 5 TABLET ORAL at 18:30

## 2023-10-09 RX ADMIN — MORPHINE SULFATE 4 MILLIGRAM(S): 50 CAPSULE, EXTENDED RELEASE ORAL at 11:50

## 2023-10-09 NOTE — CONSULT NOTE ADULT - SUBJECTIVE AND OBJECTIVE BOX
[facility]  Jarett Stein PA-C  [address] [place] [address] [id number] [phone]      Chief Complaint:  Patient is a 58y old  Female who presents with a chief complaint of Right Left Wound (09 Oct 2023 15:12)      59 yo F with PMHx of T2DM, Afib on Xarelto, and CHF with unknown EF presents to the ED with 2 week history of worsening lower extremity edema, erythema, and pain. Pt states that the symptoms began two weeks ago with new symptom onset of right LE pus drainage from the anterior shin. 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 During the discussion, pt becomes distressed and begins to cry, stating that when she eats she has excruciating burning pain in her legs with associated discharge from beneath her abdominal panus. Pt has undergone GI workup in past without significant results. Pt states that she did not come to the ER earlier as her care giver was on vacation. Pt endorses headaches, confusion, chest pain, and shortness of breath when she eats as it exacerbates her pain in her legs. Endorses changes in urine, with notable sweet smelling, cloudy, sometimes frothy urine. Endorses chronic weakness, numbness, and tingling in bilateral lower extremities.     Allergies:  No Known Allergies      Medications:  acetaminophen     Tablet .. 650 milliGRAM(s) Oral every 6 hours PRN  aluminum hydroxide/magnesium hydroxide/simethicone Suspension 30 milliLiter(s) Oral every 4 hours PRN  bisacodyl 5 milliGRAM(s) Oral daily PRN  cefepime   IVPB 2000 milliGRAM(s) IV Intermittent every 8 hours  dextrose 5%. 1000 milliLiter(s) IV Continuous <Continuous>  dextrose 5%. 1000 milliLiter(s) IV Continuous <Continuous>  dextrose 50% Injectable 25 Gram(s) IV Push once  dextrose 50% Injectable 25 Gram(s) IV Push once  dextrose 50% Injectable 12.5 Gram(s) IV Push once  dextrose Oral Gel 15 Gram(s) Oral once PRN  furosemide   Injectable 40 milliGRAM(s) IV Push daily  gabapentin 300 milliGRAM(s) Oral every 8 hours  glucagon  Injectable 1 milliGRAM(s) IntraMuscular once  insulin glargine Injectable (LANTUS) 18 Unit(s) SubCutaneous at bedtime  insulin lispro (ADMELOG) corrective regimen sliding scale   SubCutaneous three times a day before meals  insulin lispro (ADMELOG) corrective regimen sliding scale   SubCutaneous at bedtime  insulin lispro Injectable (ADMELOG) 6 Unit(s) SubCutaneous three times a day before meals  melatonin 3 milliGRAM(s) Oral at bedtime PRN  metoprolol succinate  milliGRAM(s) Oral daily  naloxone Injectable 0.4 milliGRAM(s) IV Push once  nystatin Powder 1 Application(s) Topical three times a day  ondansetron Injectable 4 milliGRAM(s) IV Push every 8 hours PRN  oxyCODONE    IR 2.5 milliGRAM(s) Oral every 4 hours PRN  oxyCODONE    IR 5 milliGRAM(s) Oral every 4 hours PRN  polyethylene glycol 3350 17 Gram(s) Oral daily  rivaroxaban 20 milliGRAM(s) Oral with dinner  senna 2 Tablet(s) Oral at bedtime  vancomycin  IVPB 1250 milliGRAM(s) IV Intermittent every 12 hours      PMHX/PSHX:  Atrial fibrillation    Type 2 diabetes mellitus    Congestive heart failure    Scalp psoriasis    S/P tonsillectomy    S/P appendectomy    H/O umbilical hernia repair        Family history:  No pertinent family history in first degree relatives    No pertinent family history in first degree relatives        Social History:     ROS:     General:  no fevers, chills, night sweats, fatigue,   Eyes:  Good vision, no reported pain  ENT:  No sore throat, pain, runny nose, dysphagia  CV:  No pain, palpitations, hypo/hypertension  Resp:  No dyspnea, cough, tachypnea, wheezing  GI:  No pain, No nausea, No vomiting, No diarrhea, No constipation, No weight loss, No fever, No pruritis, No rectal bleeding, No tarry stools, No dysphagia,  :  No pain, bleeding, incontinence, nocturia  Muscle:  No pain, weakness  Neuro:  No weakness, tingling, memory problems  Psych:  No fatigue, insomnia, mood problems, depression  Endocrine:  No polyuria, polydipsia, cold/heat intolerance  Heme:  No petechiae, ecchymosis, easy bruisability  Skin:  No rash, tattoos, scars, edema      PHYSICAL EXAM:   Vital Signs:  Vital Signs Last 24 Hrs  T(C): 36.7 (09 Oct 2023 14:06), Max: 36.8 (09 Oct 2023 10:40)  T(F): 98.1 (09 Oct 2023 14:06), Max: 98.3 (09 Oct 2023 10:40)  HR: 103 (09 Oct 2023 14:06) (103 - 119)  BP: 102/67 (09 Oct 2023 14:06) (102/67 - 130/67)  BP(mean): --  RR: 22 (09 Oct 2023 14:06) (18 - 22)  SpO2: 96% (09 Oct 2023 14:06) (95% - 96%)    Parameters below as of 09 Oct 2023 14:06  Patient On (Oxygen Delivery Method): room air      Daily Height in cm: 152.4 (09 Oct 2023 10:40)    Daily     GENERAL:  Appears stated age,   HEENT:  NC/AT,    CHEST:  Full & symmetric excursion,   HEART:  Regular rhythm  ABDOMEN:  Soft, non-tender, non-distended,   EXTEREMITIES:  no cyanosis,clubbing or edema  SKIN:  No rash  NEURO:  Alert,    LABS:                        13.8   6.65  )-----------( 203      ( 09 Oct 2023 11:55 )             39.5     10-    136  |  98  |  11  ----------------------------<  369<H>  3.5   |  27  |  0.71    Ca    8.7      09 Oct 2023 11:55    TPro  7.4  /  Alb  3.1<L>  /  TBili  1.0  /  DBili  x   /  AST  19  /  ALT  30  /  AlkPhos  86  10-09    LIVER FUNCTIONS - ( 09 Oct 2023 11:55 )  Alb: 3.1 g/dL / Pro: 7.4 g/dL / ALK PHOS: 86 U/L / ALT: 30 U/L / AST: 19 U/L / GGT: x           PT/INR - ( 09 Oct 2023 11:55 )   PT: 16.5 sec;   INR: 1.42 ratio         PTT - ( 09 Oct 2023 11:55 )  PTT:30.4 sec  Urinalysis Basic - ( 09 Oct 2023 15:27 )    Color: Yellow / Appearance: Clear / S.036 / pH: x  Gluc: x / Ketone: 15 mg/dL  / Bili: Negative / Urobili: 0.2 mg/dL   Blood: x / Protein: Negative mg/dL / Nitrite: Negative   Leuk Esterase: Small / RBC: 0 /HPF / WBC 30 /HPF   Sq Epi: x / Non Sq Epi: x / Bacteria: Moderate /HPF          Imaging:

## 2023-10-09 NOTE — H&P ADULT - NSHPPHYSICALEXAM_GEN_ALL_CORE
T(C): 36.7 (10-09-23 @ 14:06), Max: 36.8 (10-09-23 @ 10:40)  HR: 103 (10-09-23 @ 14:06) (103 - 119)  BP: 102/67 (10-09-23 @ 14:06) (102/67 - 130/67)  RR: 22 (10-09-23 @ 14:06) (18 - 22)  SpO2: 96% (10-09-23 @ 14:06) (95% - 96%)    GENERAL: patient appears ill, obese, uncomfortable, intermittently crying   EYES: sclera clear, no exudates  ENMT: oropharynx clear without erythema, no exudates, moist mucous membranes, poor dentition   LUNGS: decreased respiratory effort, shallow breathing, assessment limited by body habitus and poor patient participation, no wheezing or rhonchi appreciated  HEART: afib, regular rate, bilateral lower extremity edema  GASTROINTESTINAL: abdomen is soft, nontender, nondistended, normoactive bowel sounds, extensive intertriginous rash under abdominal panus with gold/brown drainage collecting on gauze  INTEGUMENT: bilateral lower extremity erythema, edema, pain 10+/10 bilaterally, right leg wound with pustular discharge, skin breakdown, chronic skin changes on bilateral lower extremities, onychomycosis on every toe   MUSCULOSKELETAL: no clubbing or cyanosis, no obvious deformity  NEUROLOGIC: awake, alert, oriented x3

## 2023-10-09 NOTE — H&P ADULT - HISTORY OF PRESENT ILLNESS
59 yo F with PMHx of T2DM, Afib on Xeralto, and CHF with unknown EF presents to the ED with 2 week history of worsening lower extremity edema, erythema, and pain. Pt states that the symptoms began two weeks ago with new symptom onset of right LE pus drainage from the anterior shin. 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 During the discussion, pt becomes distressed and begins to cry, stating that when she eats she has excruciating burning pain in her legs with associated discharge from beneath her abdominal panus. Pt has undergone GI workup in past without significant results. Pt states that she did not come to the ER earlier as her care giver was on vacation. Pt endorses headaches, confusion, chest pain, and shortness of breath when she eats as it exacerbates her pain in her legs. Endorses changes in urine, with notable sweet smelling, cloudy, sometimes frothy urine. Endorses chronic weakness, numbness, and tingling in bilateral lower extremities.     ED Course:   Vitals: BP: 130/67, HR: 119, Temp: 98.3, RR: 18, SpO2: 95% on RA   Labs:  ESR 29, WBC 6.65, PT 16.5, INR 1.42, Serum Glucose 369,   EKG: Atrial Fibrillation 118 bpm with nonspecific ST and T wave abnormalities   Received in the ED: Morphine 4mg IVP x1, Zofran 4 mg IVP x1, Zosyn IVPB, Vancomycin IVPB, 1L NS bolus    57 yo F with PMHx of T2DM, Afib on Xeralto, and CHF with unknown EF presents to the ED with 2 week history of worsening lower extremity edema, erythema, and pain. Pt states that the symptoms began two weeks ago with new symptom onset of right LE pus drainage from the anterior shin. 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 During the discussion, pt becomes distressed and begins to cry, stating that when she eats she has excruciating burning pain in her legs with associated discharge from beneath her abdominal panus. Pt has undergone GI workup in past without significant results. Pt states that she did not come to the ER earlier as her care giver was on vacation. Pt endorses headaches, confusion, chest pain, and shortness of breath when she eats as it exacerbates her pain in her legs. Endorses changes in urine, with notable sweet smelling, cloudy, sometimes frothy urine. Endorses chronic weakness, numbness, and tingling in bilateral lower extremities.     ED Course:   Vitals: BP: 130/67, HR: 119, Temp: 98.3, RR: 18, SpO2: 95% on RA   Labs:  ESR 29, WBC 6.65, PT 16.5, INR 1.42, Serum Glucose 369,   CXR: Clear lung fields as per personal read, official report pending   XR b/L lower extremities and R foot: generalized soft issue edema without evidence of bony involvement as per personal read, official report pending   EKG: Atrial Fibrillation 118 bpm with nonspecific ST and T wave abnormalities   Received in the ED: Morphine 4mg IVP x1, Zofran 4 mg IVP x1, Zosyn IVPB, Vancomycin IVPB, 1L NS bolus    59 yo F with PMHx of T2DM, Afib on Xarelto, and CHF with unknown EF presents to the ED with 2 week history of worsening lower extremity edema, erythema, and pain. Pt states that the symptoms began two weeks ago with new symptom onset of right LE pus drainage from the anterior shin. 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 During the discussion, pt becomes distressed and begins to cry, stating that when she eats she has excruciating burning pain in her legs with associated discharge from beneath her abdominal panus. Pt has undergone GI workup in past without significant results. Pt states that she did not come to the ER earlier as her care giver was on vacation. Pt endorses headaches, confusion, chest pain, and shortness of breath when she eats as it exacerbates her pain in her legs. Endorses changes in urine, with notable sweet smelling, cloudy, sometimes frothy urine. Endorses chronic weakness, numbness, and tingling in bilateral lower extremities.     ED Course:   Vitals: BP: 130/67, HR: 119, Temp: 98.3, RR: 18, SpO2: 95% on RA   Labs:  ESR 29, WBC 6.65, PT 16.5, INR 1.42, Serum Glucose 369,   CXR: Clear lung fields as per personal read, official report pending   XR b/L lower extremities and R foot: generalized soft issue edema without evidence of bony involvement as per personal read, official report pending   EKG: Atrial Fibrillation 118 bpm with nonspecific ST and T wave abnormalities   Received in the ED: Morphine 4mg IVP x1, Zofran 4 mg IVP x1, Zosyn IVPB, Vancomycin IVPB, 1L NS bolus

## 2023-10-09 NOTE — H&P ADULT - PROBLEM SELECTOR PLAN 3
Patient reports diffuse severe pain in her abdomen and legs with PO intake   - Pt received a inpatient and outpatient workup including on prior admissions   - EGD/colonoscopy 2022, negative   - f/u CT Angio abd/pelvis with IV cont - evaluate for ischemia  - GI, Dr. Marcelo and vascular consulted, f/u recs  -f/u repeat lactic acid. Patient reports diffuse severe pain in her abdomen and legs with PO intake   - Pt received a inpatient and outpatient workup including on prior admissions   - EGD/colonoscopy 2022, negative   - CTA Abdomen to evaluate for mesenteric ischemia, follow up results   - Consider GI and Vascular pending results Patient reports diffuse severe pain in her abdomen and legs with PO intake   - Pt received a inpatient and outpatient workup including on prior admissions   - EGD/colonoscopy 2022, negative   - CTA Abdomen to evaluate for mesenteric ischemia, follow up results   - GI consulted Dr. Matthew

## 2023-10-09 NOTE — CONSULT NOTE ADULT - SUBJECTIVE AND OBJECTIVE BOX
CHARTING IN PROGRESS      [facility]         Corbin Abel, Maxine, Angela, Iris, Leeroy Doshi        [phone] (office)    Reason for Consult: CHF      HPI:  59 yo F with PMHx of T2DM, Afib on Xarelto, CHF (EF 60-65% in 2/2023), chronic b/l LE wounds admitted for cellulitis. Denies chest pain, shortness of breath, palpitations, orthopnea. Of note, pt was hospitalized at [facility] [place] in 2/2023 for b/l LE cellulitis. Pt established care with Cardio Dr. Ganesh Baldwin in 4/2023.       PAST MEDICAL & SURGICAL HISTORY:  Atrial fibrillation  Type 2 diabetes mellitus  Congestive heart failure  Scalp psoriasis  S/P tonsillectomy  S/P appendectomy  H/O umbilical hernia repair      SOCIAL HISTORY: No active tobacco, alcohol or illicit drug use    FAMILY HISTORY:  No pertinent family history in first degree relatives        Home Medications:  Basaglar KwikPen 100 units/mL subcutaneous solution: 18 unit(s) subcutaneous once a day (at bedtime) (09 Oct 2023 14:04)  ergocalciferol 1.25 mg (50,000 intl units) oral tablet: 1 tab(s) orally once a day (09 Oct 2023 14:04)  Lasix 40 mg oral tablet: 1 tab(s) orally 2 times a day (09 Oct 2023 14:04)  MetFORMIN (Eqv-Fortamet) 1000 mg oral tablet, extended release: 1 tab(s) orally 2 times a day (09 Oct 2023 14:04)  metoprolol succinate 100 mg oral tablet, extended release: 1 tab(s) orally once a day (09 Oct 2023 14:00)  Xarelto 20 mg oral tablet: 1 tab(s) orally once a day (in the evening) (09 Oct 2023 14:00)      MEDICATIONS  (STANDING):  cefepime   IVPB 2000 milliGRAM(s) IV Intermittent every 8 hours  dextrose 5%. 1000 milliLiter(s) (50 mL/Hr) IV Continuous <Continuous>  dextrose 5%. 1000 milliLiter(s) (100 mL/Hr) IV Continuous <Continuous>  dextrose 50% Injectable 25 Gram(s) IV Push once  dextrose 50% Injectable 25 Gram(s) IV Push once  dextrose 50% Injectable 12.5 Gram(s) IV Push once  ergocalciferol 50915 Unit(s) Oral daily  furosemide   Injectable 40 milliGRAM(s) IV Push daily  gabapentin 300 milliGRAM(s) Oral every 8 hours  glucagon  Injectable 1 milliGRAM(s) IntraMuscular once  insulin glargine Injectable (LANTUS) 18 Unit(s) SubCutaneous at bedtime  insulin lispro (ADMELOG) corrective regimen sliding scale   SubCutaneous three times a day before meals  insulin lispro (ADMELOG) corrective regimen sliding scale   SubCutaneous at bedtime  insulin lispro Injectable (ADMELOG) 6 Unit(s) SubCutaneous three times a day before meals  metoprolol succinate  milliGRAM(s) Oral daily  morphine  - Injectable 4 milliGRAM(s) IV Push once  naloxone Injectable 0.4 milliGRAM(s) IV Push once  nystatin Powder 1 Application(s) Topical three times a day  polyethylene glycol 3350 17 Gram(s) Oral daily  potassium chloride   Powder 40 milliEquivalent(s) Oral once  rivaroxaban 20 milliGRAM(s) Oral with dinner  senna 2 Tablet(s) Oral at bedtime  vancomycin  IVPB 1250 milliGRAM(s) IV Intermittent every 12 hours    MEDICATIONS  (PRN):  acetaminophen     Tablet .. 650 milliGRAM(s) Oral every 6 hours PRN Temp greater or equal to 38C (100.4F), Mild Pain (1 - 3)  aluminum hydroxide/magnesium hydroxide/simethicone Suspension 30 milliLiter(s) Oral every 4 hours PRN Dyspepsia  bisacodyl 5 milliGRAM(s) Oral daily PRN Constipation  dextrose Oral Gel 15 Gram(s) Oral once PRN Blood Glucose LESS THAN 70 milliGRAM(s)/deciliter  melatonin 3 milliGRAM(s) Oral at bedtime PRN Insomnia  ondansetron Injectable 4 milliGRAM(s) IV Push every 8 hours PRN Nausea and/or Vomiting  oxyCODONE    IR 2.5 milliGRAM(s) Oral every 4 hours PRN Moderate Pain (4 - 6)  oxyCODONE    IR 5 milliGRAM(s) Oral every 4 hours PRN Severe Pain (7 - 10)      Allergies  No Known Allergies    REVIEW OF SYSTEMS: Negative except as per HPI.    VITAL SIGNS:   Vital Signs Last 24 Hrs  T(C): 36.7 (09 Oct 2023 14:06), Max: 36.8 (09 Oct 2023 10:40)  T(F): 98.1 (09 Oct 2023 14:06), Max: 98.3 (09 Oct 2023 10:40)  HR: 103 (09 Oct 2023 14:06) (103 - 119)  BP: 102/67 (09 Oct 2023 14:06) (102/67 - 130/67)  BP(mean): --  RR: 22 (09 Oct 2023 14:06) (18 - 22)  SpO2: 96% (09 Oct 2023 14:06) (95% - 96%)    Parameters below as of 09 Oct 2023 14:06  Patient On (Oxygen Delivery Method): room air    I&O's Summary    PHYSICAL EXAM:  Constitutional: NAD, well-developed  HEENT NC/AT, moist mucous membranes  Pulmonary: Non-labored, breath sounds are clear bilaterally, no wheezing, rales or rhonchi  Cardiovascular: +S1, S2, RRR, no murmur  Gastrointestinal: Soft, nontender, nondistended, normoactive bowel sounds  Extremities: No peripheral edema   Neurological: Alert, strength and sensitivity are grossly intact  Skin: No obvious lesions/rashes  Psych: Mood & affect appropriate    LABS: All Labs Reviewed:                        13.8   6.65  )-----------( 203      ( 09 Oct 2023 11:55 )             39.5     09 Oct 2023 11:55    136    |  98     |  11     ----------------------------<  369    3.5     |  27     |  0.71     Ca    8.7        09 Oct 2023 11:55    TPro  7.4    /  Alb  3.1    /  TBili  1.0    /  DBili  x      /  AST  19     /  ALT  30     /  AlkPhos  86     09 Oct 2023 11:55    PT/INR - ( 09 Oct 2023 11:55 )   PT: 16.5 sec;   INR: 1.42 ratio         PTT - ( 09 Oct 2023 11:55 )  PTT:30.4 sec      Blood Culture:         EKG:    RADIOLOGY:    CXR:   [facility]         Corbin Abel, Maxine, Angela, Iris, Tico, Leeroy        [phone] (office)    Reason for Consult: CHF    HPI:  57 yo F with PMHx of T2DM, Afib on Xarelto, CHF (EF 60-65% in 2/2023), neuropathy, chronic b/l LE wounds admitted for cellulitis. Pt states that she has had b/l LE pain x 1 week. The pain is rated 10/10 and is constant. She is unsure whether her legs are more swollen than usual. She denies chest pain, shortness of breath, palpitations, orthopnea. Of note, pt was hospitalized at [facility] in 2/2023 for b/l LE cellulitis. Pt established care with Cardio Dr. Ganesh Baldwin in 4/2023.       PAST MEDICAL & SURGICAL HISTORY:  Atrial fibrillation  Type 2 diabetes mellitus  Congestive heart failure  Scalp psoriasis  S/P tonsillectomy  S/P appendectomy  H/O umbilical hernia repair      SOCIAL HISTORY: No active tobacco, alcohol or illicit drug use    FAMILY HISTORY:  No pertinent family history in first degree relatives        Home Medications:  Basaglar KwikPen 100 units/mL subcutaneous solution: 18 unit(s) subcutaneous once a day (at bedtime) (09 Oct 2023 14:04)  ergocalciferol 1.25 mg (50,000 intl units) oral tablet: 1 tab(s) orally once a day (09 Oct 2023 14:04)  Lasix 40 mg oral tablet: 1 tab(s) orally 2 times a day (09 Oct 2023 14:04)  MetFORMIN (Eqv-Fortamet) 1000 mg oral tablet, extended release: 1 tab(s) orally 2 times a day (09 Oct 2023 14:04)  metoprolol succinate 100 mg oral tablet, extended release: 1 tab(s) orally once a day (09 Oct 2023 14:00)  Xarelto 20 mg oral tablet: 1 tab(s) orally once a day (in the evening) (09 Oct 2023 14:00)      MEDICATIONS  (STANDING):  cefepime   IVPB 2000 milliGRAM(s) IV Intermittent every 8 hours  dextrose 5%. 1000 milliLiter(s) (50 mL/Hr) IV Continuous <Continuous>  dextrose 5%. 1000 milliLiter(s) (100 mL/Hr) IV Continuous <Continuous>  dextrose 50% Injectable 25 Gram(s) IV Push once  dextrose 50% Injectable 25 Gram(s) IV Push once  dextrose 50% Injectable 12.5 Gram(s) IV Push once  ergocalciferol 72797 Unit(s) Oral daily  furosemide   Injectable 40 milliGRAM(s) IV Push daily  gabapentin 300 milliGRAM(s) Oral every 8 hours  glucagon  Injectable 1 milliGRAM(s) IntraMuscular once  insulin glargine Injectable (LANTUS) 18 Unit(s) SubCutaneous at bedtime  insulin lispro (ADMELOG) corrective regimen sliding scale   SubCutaneous three times a day before meals  insulin lispro (ADMELOG) corrective regimen sliding scale   SubCutaneous at bedtime  insulin lispro Injectable (ADMELOG) 6 Unit(s) SubCutaneous three times a day before meals  metoprolol succinate  milliGRAM(s) Oral daily  morphine  - Injectable 4 milliGRAM(s) IV Push once  naloxone Injectable 0.4 milliGRAM(s) IV Push once  nystatin Powder 1 Application(s) Topical three times a day  polyethylene glycol 3350 17 Gram(s) Oral daily  potassium chloride   Powder 40 milliEquivalent(s) Oral once  rivaroxaban 20 milliGRAM(s) Oral with dinner  senna 2 Tablet(s) Oral at bedtime  vancomycin  IVPB 1250 milliGRAM(s) IV Intermittent every 12 hours    MEDICATIONS  (PRN):  acetaminophen     Tablet .. 650 milliGRAM(s) Oral every 6 hours PRN Temp greater or equal to 38C (100.4F), Mild Pain (1 - 3)  aluminum hydroxide/magnesium hydroxide/simethicone Suspension 30 milliLiter(s) Oral every 4 hours PRN Dyspepsia  bisacodyl 5 milliGRAM(s) Oral daily PRN Constipation  dextrose Oral Gel 15 Gram(s) Oral once PRN Blood Glucose LESS THAN 70 milliGRAM(s)/deciliter  melatonin 3 milliGRAM(s) Oral at bedtime PRN Insomnia  ondansetron Injectable 4 milliGRAM(s) IV Push every 8 hours PRN Nausea and/or Vomiting  oxyCODONE    IR 2.5 milliGRAM(s) Oral every 4 hours PRN Moderate Pain (4 - 6)  oxyCODONE    IR 5 milliGRAM(s) Oral every 4 hours PRN Severe Pain (7 - 10)      Allergies  No Known Allergies    REVIEW OF SYSTEMS: Negative except as per HPI.    VITAL SIGNS:   Vital Signs Last 24 Hrs  T(C): 36.7 (09 Oct 2023 14:06), Max: 36.8 (09 Oct 2023 10:40)  T(F): 98.1 (09 Oct 2023 14:06), Max: 98.3 (09 Oct 2023 10:40)  HR: 103 (09 Oct 2023 14:06) (103 - 119)  BP: 102/67 (09 Oct 2023 14:06) (102/67 - 130/67)  BP(mean): --  RR: 22 (09 Oct 2023 14:06) (18 - 22)  SpO2: 96% (09 Oct 2023 14:06) (95% - 96%)    Parameters below as of 09 Oct 2023 14:06  Patient On (Oxygen Delivery Method): room air    I&O's Summary    PHYSICAL EXAM:  Constitutional: tearful, well-developed  HEENT: NC/AT, moist mucous membranes  Pulmonary: exam limited 2/2 body habitus, poor inspiratory effort, no wheezes/rales/rhonchi  Cardiovascular: +S1, S2, RRR, no murmur  Gastrointestinal: Soft, nontender, nondistended  Extremities: b/l LE edema and erythema, RLE weeping wound, tenderness to palpation of LE b/l   Neurological: AAOx3, appropriately responsive to questions and commands    LABS: All Labs Reviewed:                        13.8   6.65  )-----------( 203      ( 09 Oct 2023 11:55 )             39.5     09 Oct 2023 11:55    136    |  98     |  11     ----------------------------<  369    3.5     |  27     |  0.71     Ca    8.7        09 Oct 2023 11:55    TPro  7.4    /  Alb  3.1    /  TBili  1.0    /  DBili  x      /  AST  19     /  ALT  30     /  AlkPhos  86     09 Oct 2023 11:55    PT/INR - ( 09 Oct 2023 11:55 )   PT: 16.5 sec;   INR: 1.42 ratio         PTT - ( 09 Oct 2023 11:55 )  PTT:30.4 sec      Blood Culture:      [facility]         Corbin Abel, Maxine, Angela, Iris, Tico, Leeroy        [phone] (office)    Reason for Consult: CHF    HPI:  57 yo F with PMHx of T2DM, Afib on Xarelto, CHF (EF 60-65% in 2/2023), neuropathy, chronic b/l LE wounds admitted for RLE cellulitis. Pt states that she has had b/l LE pain x 1 week. The pain is rated 10/10 and is constant. She is unsure whether her legs are more swollen than usual. She denies chest pain, shortness of breath, palpitations, orthopnea. Of note, pt was hospitalized at [facility] in 2/2023 for b/l LE cellulitis. Pt established care with Cardio Dr. Ganesh Baldwin in 4/2023 after which her insurance changed and she is no longer able to follow.       PAST MEDICAL & SURGICAL HISTORY:  Atrial fibrillation  Type 2 diabetes mellitus  Congestive heart failure  Scalp psoriasis  S/P tonsillectomy  S/P appendectomy  H/O umbilical hernia repair      SOCIAL HISTORY: No active tobacco, alcohol or illicit drug use    FAMILY HISTORY:  No pertinent family history in first degree relatives        Home Medications:  Basaglar KwikPen 100 units/mL subcutaneous solution: 18 unit(s) subcutaneous once a day (at bedtime) (09 Oct 2023 14:04)  ergocalciferol 1.25 mg (50,000 intl units) oral tablet: 1 tab(s) orally once a day (09 Oct 2023 14:04)  Lasix 40 mg oral tablet: 1 tab(s) orally 2 times a day (09 Oct 2023 14:04)  MetFORMIN (Eqv-Fortamet) 1000 mg oral tablet, extended release: 1 tab(s) orally 2 times a day (09 Oct 2023 14:04)  metoprolol succinate 100 mg oral tablet, extended release: 1 tab(s) orally once a day (09 Oct 2023 14:00)  Xarelto 20 mg oral tablet: 1 tab(s) orally once a day (in the evening) (09 Oct 2023 14:00)      MEDICATIONS  (STANDING):  cefepime   IVPB 2000 milliGRAM(s) IV Intermittent every 8 hours  dextrose 5%. 1000 milliLiter(s) (50 mL/Hr) IV Continuous <Continuous>  dextrose 5%. 1000 milliLiter(s) (100 mL/Hr) IV Continuous <Continuous>  dextrose 50% Injectable 25 Gram(s) IV Push once  dextrose 50% Injectable 25 Gram(s) IV Push once  dextrose 50% Injectable 12.5 Gram(s) IV Push once  ergocalciferol 74936 Unit(s) Oral daily  furosemide   Injectable 40 milliGRAM(s) IV Push daily  gabapentin 300 milliGRAM(s) Oral every 8 hours  glucagon  Injectable 1 milliGRAM(s) IntraMuscular once  insulin glargine Injectable (LANTUS) 18 Unit(s) SubCutaneous at bedtime  insulin lispro (ADMELOG) corrective regimen sliding scale   SubCutaneous three times a day before meals  insulin lispro (ADMELOG) corrective regimen sliding scale   SubCutaneous at bedtime  insulin lispro Injectable (ADMELOG) 6 Unit(s) SubCutaneous three times a day before meals  metoprolol succinate  milliGRAM(s) Oral daily  morphine  - Injectable 4 milliGRAM(s) IV Push once  naloxone Injectable 0.4 milliGRAM(s) IV Push once  nystatin Powder 1 Application(s) Topical three times a day  polyethylene glycol 3350 17 Gram(s) Oral daily  potassium chloride   Powder 40 milliEquivalent(s) Oral once  rivaroxaban 20 milliGRAM(s) Oral with dinner  senna 2 Tablet(s) Oral at bedtime  vancomycin  IVPB 1250 milliGRAM(s) IV Intermittent every 12 hours    MEDICATIONS  (PRN):  acetaminophen     Tablet .. 650 milliGRAM(s) Oral every 6 hours PRN Temp greater or equal to 38C (100.4F), Mild Pain (1 - 3)  aluminum hydroxide/magnesium hydroxide/simethicone Suspension 30 milliLiter(s) Oral every 4 hours PRN Dyspepsia  bisacodyl 5 milliGRAM(s) Oral daily PRN Constipation  dextrose Oral Gel 15 Gram(s) Oral once PRN Blood Glucose LESS THAN 70 milliGRAM(s)/deciliter  melatonin 3 milliGRAM(s) Oral at bedtime PRN Insomnia  ondansetron Injectable 4 milliGRAM(s) IV Push every 8 hours PRN Nausea and/or Vomiting  oxyCODONE    IR 2.5 milliGRAM(s) Oral every 4 hours PRN Moderate Pain (4 - 6)  oxyCODONE    IR 5 milliGRAM(s) Oral every 4 hours PRN Severe Pain (7 - 10)      Allergies  No Known Allergies    REVIEW OF SYSTEMS: Negative except as per HPI.    VITAL SIGNS:   Vital Signs Last 24 Hrs  T(C): 36.7 (09 Oct 2023 14:06), Max: 36.8 (09 Oct 2023 10:40)  T(F): 98.1 (09 Oct 2023 14:06), Max: 98.3 (09 Oct 2023 10:40)  HR: 103 (09 Oct 2023 14:06) (103 - 119)  BP: 102/67 (09 Oct 2023 14:06) (102/67 - 130/67)  BP(mean): --  RR: 22 (09 Oct 2023 14:06) (18 - 22)  SpO2: 96% (09 Oct 2023 14:06) (95% - 96%)    Parameters below as of 09 Oct 2023 14:06  Patient On (Oxygen Delivery Method): room air    I&O's Summary    PHYSICAL EXAM:  Constitutional: tearful, well-developed  HEENT: NC/AT, moist mucous membranes  Pulmonary: exam limited 2/2 body habitus, poor inspiratory effort, no wheezes/rales/rhonchi  Cardiovascular: +S1, S2, RRR, no murmur  Gastrointestinal: Soft, nontender, nondistended  Extremities: b/l LE edema and erythema, RLE weeping wound, tenderness to palpation of LE b/l   Neurological: AAOx3, appropriately responsive to questions and commands    LABS: All Labs Reviewed:                        13.8   6.65  )-----------( 203      ( 09 Oct 2023 11:55 )             39.5     09 Oct 2023 11:55    136    |  98     |  11     ----------------------------<  369    3.5     |  27     |  0.71     Ca    8.7        09 Oct 2023 11:55    TPro  7.4    /  Alb  3.1    /  TBili  1.0    /  DBili  x      /  AST  19     /  ALT  30     /  AlkPhos  86     09 Oct 2023 11:55    PT/INR - ( 09 Oct 2023 11:55 )   PT: 16.5 sec;   INR: 1.42 ratio         PTT - ( 09 Oct 2023 11:55 )  PTT:30.4 sec      Blood Culture:

## 2023-10-09 NOTE — H&P ADULT - ASSESSMENT
59 yo F with PMHx of T2DM, Afib on Xeralto, and CHF with unknown EF presents to the ED with 2 week history of worsening lower extremity edema, erythema, and pain, admitted for wound care and management    59 yo F with PMHx of T2DM, Afib on Xarelto, and CHF with unknown EF presents to the ED with 2 week history of worsening lower extremity edema, erythema, and pain, admitted for wound care and management

## 2023-10-09 NOTE — ED ADULT NURSE NOTE - OBJECTIVE STATEMENT
Patient is 57yo F presents with c/o right lower leg wound, getting worse. Patient with chronic wound to B/L legs, R > L, started becoming painful x1 week, patient reports chills, has not checked temperature. Patient with yellow drainage coming from right lower leg wound, foul smelling. B/L lower legs with redness, swelling, hot to touch, right leg with yellow crust and drainage, redness noted up right leg and into groin area.

## 2023-10-09 NOTE — CONSULT NOTE ADULT - ASSESSMENT
Full consult to follow    Dr. Mohr to resume care 10/10    Infectious Diseases will continue to follow. Please call with any questions.   Viviana Spring M.D.  [facility] Division of Infectious Diseases [phone]  For after 5 P.M. and weekends, please call [phone]   59 yo F with PMHx of T2DM, Afib on Xarelto, and CHF with unknown EF presents to the ED with 2 week history of worsening lower extremity edema, erythema, and pain, admitted for wound care and management     RLE   - Worsening Right LE draining lesion on anterior shin and generalized worsening bilateral lower extremity edema and erythema   - XR b/L lower extremities and R foot performed, generalized soft issue edema without evidence of bony involvement   Recommendations:   - Continue Vancomycin 1250mg q12hr  - Continue Cefepime 2g q8hr   - f/u pending cx data  - f/u DVT study  - trend temps/WBC  Additional care per primary team    Dr. Mohr to resume care 10/10  Infectious Diseases will continue to follow. Please call with any questions.   Viviana Spring M.D.  [facility]ious [facility] [phone]  For after 5 P.M. and weekends, please call [phone]

## 2023-10-09 NOTE — ED PROVIDER NOTE - OBJECTIVE STATEMENT
58-year-old female with history of A-fib on Xarelto, HLD, CHF, DM 2, on insulin, presents to the emergency department complaining of wounds to bilateral legs right worse than left, states that her legs have gotten more swollen and red and now become painful for the past week.  Patient states that she feels nauseous is not sure if she is having fevers.

## 2023-10-09 NOTE — PATIENT PROFILE ADULT - FALL HARM RISK - FACTORS
Frequent toileting needed/Impaired gait/IV and/or equipment tethered to patient/Other medical problems/Weakness

## 2023-10-09 NOTE — CONSULT NOTE ADULT - ATTENDING COMMENTS
58 year old female with T2DM, Afib on Xarelto, CHF (EF 60-65% in 2/2023), neuropathy, chronic b/l LE wounds admitted for cellulitis and LE pain.    - does have sig peripheral edema, though doubt of primary cardiac origin  - recent echo in 2/23 with normal LV function, diastolic dysfunction  - add on BNP  - agree with Lasix 40 iv daily for now  - trend creatinine and electrolytes. Keep K>4, mg>2  - in rate controlled af, and would cont ac and bb  - will follow with you

## 2023-10-09 NOTE — ED ADULT NURSE NOTE - NSFALLFACTORS_ED_ALL_ED
[facility]    PATIENT'S NAME: Beatriz Massey   ATTENDING PHYSICIAN: Juan Barrett M.D. OPERATING PHYSICIAN: Juan Barrett M.D. PATIENT ACCOUNT#:   [de-identified]    LOCATION:  [place] [address]  MEDICAL RECORD #:   [id number]       YOB: 2004  ADMISSION DATE:       08/09/2023      OPERATION DATE:  08/09/2023    OPERATIVE REPORT      PREOPERATIVE DIAGNOSIS:  Chronic nasal obstruction, nasal septal deviation, and turbinate hypertrophy. POSTOPERATIVE DIAGNOSIS:    PROCEDURE:  Bilateral inferior turbinate reduction. ANESTHETIC:  General.    COMPLICATIONS:  None. DISPOSITION:  To recovery room, in stable condition. ESTIMATED BLOOD LOSS:  Approximately 5 mL. INDICATIONS:  This is a 69-year-old who has failed medical therapy. Due to persistent nasal obstruction, recommended to undergo the above operation. Understanding the risks and benefits including but not limited to bleeding, infection, possibility for persistent breathing issues, need for additional surgery in the future, need for extended septoplasty, patient signed the consent form. OPERATIVE TECHNIQUE:  Patient brought to the operating room, placed in a supine position, given a general anesthetic via mask inhalation. Patient intubated with an LMA. Topical Afrin was placed intranasally. The inferior turbinates underwent outfracture. The small septal spur was taken down with a Winston elevator on the right side posteriorly. This was very minimal and was not considered septoplasty due to its minimal nature. The inferior turbinates underwent submucosal reduction in the anterior, posterior, and middle portion of the inferior turbinates. This was done bilaterally with the Coblation unit. There was no specimen sent. The patient was awoken from anesthetic after adequate hemostasis and brought to the recovery room in stable condition.     Dictated By Juan Barrett M.D.  d: 08/09/2023 11:31:04  t: 08/09/2023 14:31:04  [facility] [id number]  [facility]/ Impaired gait

## 2023-10-09 NOTE — CONSULT NOTE ADULT - SUBJECTIVE AND OBJECTIVE BOX
[facility], Division of Infectious Diseases  CLIVE Zavala S. Shah, Y. Patel, G. [facility]  [phone]    MELITON LOZANO  58y, Female  [id number]    HPI--  HPI:  59 yo F with PMHx of T2DM, Afib on Xeralto, and CHF with unknown EF presents to the ED with 2 week history of worsening lower extremity edema, erythema, and pain. Pt states that the symptoms began two weeks ago with new symptom onset of right LE pus drainage from the anterior shin. 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 During the discussion, pt becomes distressed and begins to cry, stating that when she eats she has excruciating burning pain in her legs with associated discharge from beneath her abdominal panus. Pt has undergone GI workup in past without significant results. Pt states that she did not come to the ER earlier as her care giver was on vacation. Pt endorses headaches, confusion, chest pain, and shortness of breath when she eats as it exacerbates her pain in her legs. Endorses changes in urine, with notable sweet smelling, cloudy, sometimes frothy urine. Endorses chronic weakness, numbness, and tingling in bilateral lower extremities.     ED Course:   Vitals: BP: 130/67, HR: 119, Temp: 98.3, RR: 18, SpO2: 95% on RA   Labs:  ESR 29, WBC 6.65, PT 16.5, INR 1.42, Serum Glucose 369,   CXR: Clear lung fields as per personal read, official report pending   XR b/L lower extremities and R foot: generalized soft issue edema without evidence of bony involvement as per personal read, official report pending   EKG: Atrial Fibrillation 118 bpm with nonspecific ST and T wave abnormalities   Received in the ED: Morphine 4mg IVP x1, Zofran 4 mg IVP x1, Zosyn IVPB, Vancomycin IVPB, 1L NS bolus    (09 Oct 2023 13:42)        Active Medications--  acetaminophen     Tablet .. 650 milliGRAM(s) Oral every 6 hours PRN  aluminum hydroxide/magnesium hydroxide/simethicone Suspension 30 milliLiter(s) Oral every 4 hours PRN  bisacodyl 5 milliGRAM(s) Oral daily PRN  cefepime   IVPB 2000 milliGRAM(s) IV Intermittent every 8 hours  dextrose 5%. 1000 milliLiter(s) IV Continuous <Continuous>  dextrose 5%. 1000 milliLiter(s) IV Continuous <Continuous>  dextrose 50% Injectable 25 Gram(s) IV Push once  dextrose 50% Injectable 12.5 Gram(s) IV Push once  dextrose 50% Injectable 25 Gram(s) IV Push once  dextrose Oral Gel 15 Gram(s) Oral once PRN  ergocalciferol 79436 Unit(s) Oral daily  furosemide   Injectable 40 milliGRAM(s) IV Push two times a day  gabapentin 300 milliGRAM(s) Oral every 8 hours  glucagon  Injectable 1 milliGRAM(s) IntraMuscular once  insulin glargine Injectable (LANTUS) 12 Unit(s) SubCutaneous at bedtime  insulin lispro (ADMELOG) corrective regimen sliding scale   SubCutaneous three times a day before meals  insulin lispro (ADMELOG) corrective regimen sliding scale   SubCutaneous at bedtime  insulin lispro Injectable (ADMELOG) 6 Unit(s) SubCutaneous three times a day before meals  melatonin 3 milliGRAM(s) Oral at bedtime PRN  metoprolol succinate  milliGRAM(s) Oral daily  morphine  - Injectable 4 milliGRAM(s) IV Push once  naloxone Injectable 0.4 milliGRAM(s) IV Push once  nystatin Powder 1 Application(s) Topical three times a day  ondansetron Injectable 4 milliGRAM(s) IV Push every 8 hours PRN  oxyCODONE    IR 2.5 milliGRAM(s) Oral every 4 hours PRN  oxyCODONE    IR 5 milliGRAM(s) Oral every 4 hours PRN  polyethylene glycol 3350 17 Gram(s) Oral daily  rivaroxaban 20 milliGRAM(s) Oral with dinner  senna 2 Tablet(s) Oral at bedtime  vancomycin  IVPB 1250 milliGRAM(s) IV Intermittent every 12 hours    Antimicrobials:   cefepime   IVPB 2000 milliGRAM(s) IV Intermittent every 8 hours  vancomycin  IVPB 1250 milliGRAM(s) IV Intermittent every 12 hours    Immunologic:     ROS:  CONSTITUTIONAL: No fevers or chills. No weakness or headache. No weight changes.  EYES/ENT: No visual or hearing changes. No sore throat or throat pain .  NECK: No pain or stiffness  RESPIRATORY: No cough, wheezing, or hemoptysis. No shortness of breath  CARDIOVASCULAR: No chest pain or palpitations  GASTROINTESTINAL: No abdominal pain. No nausea or vomiting. No diarrhea or constipation.  GENITOURINARY: No dysuria, frequency or hematuria  NEUROLOGICAL: No numbness or weakness  SKIN: No itching or rashes  PSYCHIATRIC: Pleasant. Appropriate affect    Allergies: No Known Allergies    PMH -- Atrial fibrillation    Type 2 diabetes mellitus    Congestive heart failure    Scalp psoriasis      PSH -- S/P tonsillectomy    S/P appendectomy    H/O umbilical hernia repair      FH -- No pertinent family history in first degree relatives      Social History --  EtOH: denies   Tobacco: denies   Drug Use: denies     Travel/Environmental/Occupational History:    Physical Exam--  Vital Signs Last 24 Hrs  T(F): 98.1 (09 Oct 2023 14:06), Max: 98.3 (09 Oct 2023 10:40)  HR: 103 (09 Oct 2023 14:06) (103 - 119)  BP: 102/67 (09 Oct 2023 14:06) (102/67 - 130/67)  RR: 22 (09 Oct 2023 14:06) (18 - 22)  SpO2: 96% (09 Oct 2023 14:06) (95% - 96%)  General: nontoxic-appearing, no acute distress  HEENT: NC/AT, EOMI, anicteric  Lungs: Clear bilaterally without rales, wheezing or rhonchi  Heart: Regular rate and rhythm. No murmur, rub or gallop.  Abdomen: Soft. Nondistended. Nontender.   Extremities: No cyanosis or clubbing. No edema.   Skin: Warm. Dry. Good turgor.     Laboratory & Imaging Data:  CBC:                       13.8   6.65  )-----------( 203      ( 09 Oct 2023 11:55 )             39.5     CMP: 10-09    136  |  98  |  11  ----------------------------<  369<H>  3.5   |  27  |  0.71    Ca    8.7      09 Oct 2023 11:55    TPro  7.4  /  Alb  3.1<L>  /  TBili  1.0  /  DBili  x   /  AST  19  /  ALT  30  /  AlkPhos  86  10-09    LIVER FUNCTIONS - ( 09 Oct 2023 11:55 )  Alb: 3.1 g/dL / Pro: 7.4 g/dL / ALK PHOS: 86 U/L / ALT: 30 U/L / AST: 19 U/L / GGT: x           Urinalysis Basic - ( 09 Oct 2023 11:55 )    Color: x / Appearance: x / SG: x / pH: x  Gluc: 369 mg/dL / Ketone: x  / Bili: x / Urobili: x   Blood: x / Protein: x / Nitrite: x   Leuk Esterase: x / RBC: x / WBC x   Sq Epi: x / Non Sq Epi: x / Bacteria: x        Microbiology: reviewed        Radiology: reviewed     [facility], Division of Infectious Diseases  CLIVE Zavala S. Shah, Y. Patel, G. [facility]  [phone]    MELITON LOZANO  58y, Female  [id number]    HPI--  HPI:  59 yo F with PMHx of T2DM, Afib on Xeralto, and CHF with unknown EF presents to the ED with 2 week history of worsening lower extremity edema, erythema, and pain. Pt states that the symptoms began two weeks ago with new symptom onset of right LE pus drainage from the anterior shin. 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 During the discussion, pt becomes distressed and begins to cry, stating that when she eats she has excruciating burning pain in her legs with associated discharge from beneath her abdominal panus. Pt has undergone GI workup in past without significant results. Pt states that she did not come to the ER earlier as her care giver was on vacation. Pt endorses headaches, confusion, chest pain, and shortness of breath when she eats as it exacerbates her pain in her legs. Endorses changes in urine, with notable sweet smelling, cloudy, sometimes frothy urine. Endorses chronic weakness, numbness, and tingling in bilateral lower extremities.     ED Course:   Vitals: BP: 130/67, HR: 119, Temp: 98.3, RR: 18, SpO2: 95% on RA   Labs:  ESR 29, WBC 6.65, PT 16.5, INR 1.42, Serum Glucose 369,   CXR: Clear lung fields as per personal read, official report pending   XR b/L lower extremities and R foot: generalized soft issue edema without evidence of bony involvement as per personal read, official report pending   EKG: Atrial Fibrillation 118 bpm with nonspecific ST and T wave abnormalities   Received in the ED: Morphine 4mg IVP x1, Zofran 4 mg IVP x1, Zosyn IVPB, Vancomycin IVPB, 1L NS bolus    (09 Oct 2023 13:42)      Active Medications--  acetaminophen     Tablet .. 650 milliGRAM(s) Oral every 6 hours PRN  aluminum hydroxide/magnesium hydroxide/simethicone Suspension 30 milliLiter(s) Oral every 4 hours PRN  bisacodyl 5 milliGRAM(s) Oral daily PRN  cefepime   IVPB 2000 milliGRAM(s) IV Intermittent every 8 hours  dextrose 5%. 1000 milliLiter(s) IV Continuous <Continuous>  dextrose 5%. 1000 milliLiter(s) IV Continuous <Continuous>  dextrose 50% Injectable 25 Gram(s) IV Push once  dextrose 50% Injectable 12.5 Gram(s) IV Push once  dextrose 50% Injectable 25 Gram(s) IV Push once  dextrose Oral Gel 15 Gram(s) Oral once PRN  ergocalciferol 22225 Unit(s) Oral daily  furosemide   Injectable 40 milliGRAM(s) IV Push two times a day  gabapentin 300 milliGRAM(s) Oral every 8 hours  glucagon  Injectable 1 milliGRAM(s) IntraMuscular once  insulin glargine Injectable (LANTUS) 12 Unit(s) SubCutaneous at bedtime  insulin lispro (ADMELOG) corrective regimen sliding scale   SubCutaneous three times a day before meals  insulin lispro (ADMELOG) corrective regimen sliding scale   SubCutaneous at bedtime  insulin lispro Injectable (ADMELOG) 6 Unit(s) SubCutaneous three times a day before meals  melatonin 3 milliGRAM(s) Oral at bedtime PRN  metoprolol succinate  milliGRAM(s) Oral daily  morphine  - Injectable 4 milliGRAM(s) IV Push once  naloxone Injectable 0.4 milliGRAM(s) IV Push once  nystatin Powder 1 Application(s) Topical three times a day  ondansetron Injectable 4 milliGRAM(s) IV Push every 8 hours PRN  oxyCODONE    IR 2.5 milliGRAM(s) Oral every 4 hours PRN  oxyCODONE    IR 5 milliGRAM(s) Oral every 4 hours PRN  polyethylene glycol 3350 17 Gram(s) Oral daily  rivaroxaban 20 milliGRAM(s) Oral with dinner  senna 2 Tablet(s) Oral at bedtime  vancomycin  IVPB 1250 milliGRAM(s) IV Intermittent every 12 hours    Antimicrobials:   cefepime   IVPB 2000 milliGRAM(s) IV Intermittent every 8 hours  vancomycin  IVPB 1250 milliGRAM(s) IV Intermittent every 12 hours    Immunologic:     ROS:  10 pt ROS obtained as above    Allergies: No Known Allergies    PMH -- Atrial fibrillation    Type 2 diabetes mellitus    Congestive heart failure    Scalp psoriasis      PSH -- S/P tonsillectomy    S/P appendectomy    H/O umbilical hernia repair      FH -- No pertinent family history in first degree relatives      Social History --  EtOH: denies   Tobacco: denies   Drug Use: denies     Travel/Environmental/Occupational History:    Physical Exam--  Vital Signs Last 24 Hrs  T(F): 98.1 (09 Oct 2023 14:06), Max: 98.3 (09 Oct 2023 10:40)  HR: 103 (09 Oct 2023 14:06) (103 - 119)  BP: 102/67 (09 Oct 2023 14:06) (102/67 - 130/67)  RR: 22 (09 Oct 2023 14:06) (18 - 22)  SpO2: 96% (09 Oct 2023 14:06) (95% - 96%)  General: nontoxic-appearing, no acute distress  HEENT: NC/AT, EOMI, anicteric  Lungs: Clear bilaterally without rales, wheezing or rhonchi  Heart: Regular rate and rhythm. No murmur, rub or gallop.  Abdomen: Soft. Nondistended. Nontender.   Extremities: b/l le edema    Skin: Warm. Dry. Good turgor.     Laboratory & Imaging Data:  CBC:                       13.8   6.65  )-----------( 203      ( 09 Oct 2023 11:55 )             39.5     CMP: 10-09    136  |  98  |  11  ----------------------------<  369<H>  3.5   |  27  |  0.71    Ca    8.7      09 Oct 2023 11:55    TPro  7.4  /  Alb  3.1<L>  /  TBili  1.0  /  DBili  x   /  AST  19  /  ALT  30  /  AlkPhos  86  10-09    LIVER FUNCTIONS - ( 09 Oct 2023 11:55 )  Alb: 3.1 g/dL / Pro: 7.4 g/dL / ALK PHOS: 86 U/L / ALT: 30 U/L / AST: 19 U/L / GGT: x           Urinalysis Basic - ( 09 Oct 2023 11:55 )    Color: x / Appearance: x / SG: x / pH: x  Gluc: 369 mg/dL / Ketone: x  / Bili: x / Urobili: x   Blood: x / Protein: x / Nitrite: x   Leuk Esterase: x / RBC: x / WBC x   Sq Epi: x / Non Sq Epi: x / Bacteria: x        Microbiology: reviewed        Radiology: reviewed

## 2023-10-09 NOTE — H&P ADULT - NSHPSOCIALHISTORY_GEN_ALL_CORE
Lives: with HCP and their partner   ADLs: needs assistance, walks with walker   Alcohol Use: Denies  Tobacco Use: Denies   Recreational Drug Use: Denies

## 2023-10-09 NOTE — H&P ADULT - PROBLEM SELECTOR PLAN 2
- Continue home medications of Metoprolol and Xarelto   - Pt states that she feels her palpitations when she eats and pain is exacerbated  - EKG: Atrial Fibrillation 118 bpm with nonspecific ST and T wave abnormalities - Continue home medications of Metoprolol and Xarelto   - Pt states that she feels her palpitations when she eats and pain is exacerbated  - EKG: Atrial Fibrillation 118 bpm with nonspecific ST and T wave abnormalities  -Dr Umaña consulted - Continue home medications of Metoprolol and Xarelto   - Pt states that she feels her palpitations when she eats and pain is exacerbated  - EKG: Atrial Fibrillation 118 bpm with nonspecific ST and T wave abnormalities  - Cardiology consulted, Dr Umaña, follow up recommendations

## 2023-10-09 NOTE — ED ADULT NURSE NOTE - NSFALLRISKINTERV_ED_ALL_ED
Assistance OOB with selected safe patient handling equipment if applicable/Assistance with ambulation/Communicate fall risk and risk factors to all staff, patient, and family/Monitor gait and stability/Provide visual cue: yellow wristband, yellow gown, etc/Reinforce activity limits and safety measures with patient and family/Call bell, personal items and telephone in reach/Instruct patient to call for assistance before getting out of bed/chair/stretcher/Non-slip footwear applied when patient is off stretcher/[place] to call system/Physically safe environment - no spills, clutter or unnecessary equipment/Purposeful Proactive Rounding/Room/bathroom lighting operational, light cord in reach

## 2023-10-09 NOTE — ED PROVIDER NOTE - CLINICAL SUMMARY MEDICAL DECISION MAKING FREE TEXT BOX
58-year-old female diabetes, bilateral lower extremity cellulitis, start IV antibiotics, send CBC, CMP, ESR, CRP, blood cultures, obtain x-rays, admit.

## 2023-10-09 NOTE — CONSULT NOTE ADULT - ASSESSMENT
abdominal pain    suspect chronic mesenteric ischemia  await CT  seems to have had extensive GI workup  she does not know who her GI is; she is a poor historian  do not anticipate repeating her endoscopic eval

## 2023-10-09 NOTE — CONSULT NOTE ADULT - SUBJECTIVE AND OBJECTIVE BOX
Chief Complaint: Recurrent cellulitis of lower extremities    HPI: Few days history of swelling and redness of lower extremities.     PAST MEDICAL & SURGICAL HISTORY:  Atrial fibrillation      Type 2 diabetes mellitus      Congestive heart failure      Scalp psoriasis      S/P tonsillectomy      S/P appendectomy      H/O umbilical hernia repair          Allergies    No Known Allergies    Intolerances        MEDICATIONS  (STANDING):  cefepime   IVPB 2000 milliGRAM(s) IV Intermittent every 8 hours  dextrose 5%. 1000 milliLiter(s) (50 mL/Hr) IV Continuous <Continuous>  dextrose 5%. 1000 milliLiter(s) (100 mL/Hr) IV Continuous <Continuous>  dextrose 50% Injectable 25 Gram(s) IV Push once  dextrose 50% Injectable 25 Gram(s) IV Push once  dextrose 50% Injectable 12.5 Gram(s) IV Push once  furosemide   Injectable 40 milliGRAM(s) IV Push daily  gabapentin 300 milliGRAM(s) Oral every 8 hours  glucagon  Injectable 1 milliGRAM(s) IntraMuscular once  insulin glargine Injectable (LANTUS) 18 Unit(s) SubCutaneous at bedtime  insulin lispro (ADMELOG) corrective regimen sliding scale   SubCutaneous three times a day before meals  insulin lispro (ADMELOG) corrective regimen sliding scale   SubCutaneous at bedtime  insulin lispro Injectable (ADMELOG) 6 Unit(s) SubCutaneous three times a day before meals  metoprolol succinate  milliGRAM(s) Oral daily  naloxone Injectable 0.4 milliGRAM(s) IV Push once  nystatin Powder 1 Application(s) Topical three times a day  polyethylene glycol 3350 17 Gram(s) Oral daily  rivaroxaban 20 milliGRAM(s) Oral with dinner  senna 2 Tablet(s) Oral at bedtime  vancomycin  IVPB 1250 milliGRAM(s) IV Intermittent every 12 hours    MEDICATIONS  (PRN):  acetaminophen     Tablet .. 650 milliGRAM(s) Oral every 6 hours PRN Temp greater or equal to 38C (100.4F), Mild Pain (1 - 3)  aluminum hydroxide/magnesium hydroxide/simethicone Suspension 30 milliLiter(s) Oral every 4 hours PRN Dyspepsia  bisacodyl 5 milliGRAM(s) Oral daily PRN Constipation  dextrose Oral Gel 15 Gram(s) Oral once PRN Blood Glucose LESS THAN 70 milliGRAM(s)/deciliter  melatonin 3 milliGRAM(s) Oral at bedtime PRN Insomnia  ondansetron Injectable 4 milliGRAM(s) IV Push every 8 hours PRN Nausea and/or Vomiting  oxyCODONE    IR 2.5 milliGRAM(s) Oral every 4 hours PRN Moderate Pain (4 - 6)  oxyCODONE    IR 5 milliGRAM(s) Oral every 4 hours PRN Severe Pain (7 - 10)      FAMILY HISTORY:  No pertinent family history in first degree relatives            ROS:  CONSTITUTIONAL: No fever, weight loss, or fatigue  EYES: No eye pain, visual disturbances, or discharge  ENMT:  No difficulty hearing, tinnitus, vertigo; No sinus or throat pain  NECK: No pain or stiffness  BREASTS: No pain, masses, or nipple discharge  RESPIRATORY: No cough, wheezing, chills or hemoptysis; No shortness of breath  CARDIOVASCULAR: No chest pain, palpitations, dizziness, or leg swelling  GASTROINTESTINAL: No abdominal or epigastric pain. No nausea, vomiting, or hematemesis; No diarrhea or constipation. No melena or hematochezia.  GENITOURINARY: No dysuria, frequency, hematuria, or incontinence  NEUROLOGICAL: No headaches, memory loss, loss of strength, numbness, or tremors  SKIN: No itching, burning, rashes, or lesions   LYMPH NODES: No enlarged glands  ENDOCRINE: No heat or cold intolerance; No hair loss  MUSCULOSKELETAL: No joint pain or swelling; No muscle, back, or extremity pain  PSYCHIATRIC: No depression, anxiety, mood swings, or difficulty sleeping  HEME/LYMPH: No easy bruising, or bleeding gums  ALLERGY AND IMMUNOLOGIC: No hives or eczema    PHYSICAL EXAM-    Height (cm): 152.4 (10-09 @ 10:40)  Weight (kg): 113.4 (10-09 @ 10:40)  BMI (kg/m2): 48.8 (10-09 @ 10:40)  Vital Signs Last 24 Hrs  T(C): 36.6 (09 Oct 2023 16:23), Max: 36.8 (09 Oct 2023 10:40)  T(F): 97.8 (09 Oct 2023 16:23), Max: 98.3 (09 Oct 2023 10:40)  HR: 100 (09 Oct 2023 16:23) (100 - 119)  BP: 116/76 (09 Oct 2023 16:23) (102/67 - 130/67)  BP(mean): --  RR: 18 (09 Oct 2023 16:23) (18 - 22)  SpO2: 95% (09 Oct 2023 16:23) (95% - 96%)    Parameters below as of 09 Oct 2023 16:23  Patient On (Oxygen Delivery Method): room air        Constitutional: well developed, well nourished, no apparent distress, alert, oriented x 3.  Neck: Supple   Pulmonary: no respiratory distress, normal respiratory rhythm and effort, lungs are clear to auscultation/percussion. No CVA tenderness.  Cardiovascular: heart rate normal, normal sinus rhythm; no murmurs, gallops, rubs, heaves or thrills   Abdomen: soft, non-tender, +BS, no guarding/rebound/rigidity.  Vascular: Lower extremities are well perfused.   Skin: bilateral legs with chronic lymphedema, skin thickening, red and hot consistent with cellulitis,                             13.8   6.65  )-----------( 203      ( 09 Oct 2023 11:55 )             39.5     10-09    136  |  98  |  11  ----------------------------<  369<H>  3.5   |  27  |  0.71    Ca    8.7      09 Oct 2023 11:55    TPro  7.4  /  Alb  3.1<L>  /  TBili  1.0  /  DBili  x   /  AST  19  /  ALT  30  /  AlkPhos  86  10-09      Radiology:     Chief Complaint: Recurrent cellulitis of lower extremities    HPI: Few days history of swelling and redness of lower extremities.     PAST MEDICAL & SURGICAL HISTORY:  Atrial fibrillation      Type 2 diabetes mellitus      Congestive heart failure      Scalp psoriasis      S/P tonsillectomy      S/P appendectomy      H/O umbilical hernia repair          Allergies    No Known Allergies    Intolerances        MEDICATIONS  (STANDING):  cefepime   IVPB 2000 milliGRAM(s) IV Intermittent every 8 hours  dextrose 5%. 1000 milliLiter(s) (50 mL/Hr) IV Continuous <Continuous>  dextrose 5%. 1000 milliLiter(s) (100 mL/Hr) IV Continuous <Continuous>  dextrose 50% Injectable 25 Gram(s) IV Push once  dextrose 50% Injectable 25 Gram(s) IV Push once  dextrose 50% Injectable 12.5 Gram(s) IV Push once  furosemide   Injectable 40 milliGRAM(s) IV Push daily  gabapentin 300 milliGRAM(s) Oral every 8 hours  glucagon  Injectable 1 milliGRAM(s) IntraMuscular once  insulin glargine Injectable (LANTUS) 18 Unit(s) SubCutaneous at bedtime  insulin lispro (ADMELOG) corrective regimen sliding scale   SubCutaneous three times a day before meals  insulin lispro (ADMELOG) corrective regimen sliding scale   SubCutaneous at bedtime  insulin lispro Injectable (ADMELOG) 6 Unit(s) SubCutaneous three times a day before meals  metoprolol succinate  milliGRAM(s) Oral daily  naloxone Injectable 0.4 milliGRAM(s) IV Push once  nystatin Powder 1 Application(s) Topical three times a day  polyethylene glycol 3350 17 Gram(s) Oral daily  rivaroxaban 20 milliGRAM(s) Oral with dinner  senna 2 Tablet(s) Oral at bedtime  vancomycin  IVPB 1250 milliGRAM(s) IV Intermittent every 12 hours    MEDICATIONS  (PRN):  acetaminophen     Tablet .. 650 milliGRAM(s) Oral every 6 hours PRN Temp greater or equal to 38C (100.4F), Mild Pain (1 - 3)  aluminum hydroxide/magnesium hydroxide/simethicone Suspension 30 milliLiter(s) Oral every 4 hours PRN Dyspepsia  bisacodyl 5 milliGRAM(s) Oral daily PRN Constipation  dextrose Oral Gel 15 Gram(s) Oral once PRN Blood Glucose LESS THAN 70 milliGRAM(s)/deciliter  melatonin 3 milliGRAM(s) Oral at bedtime PRN Insomnia  ondansetron Injectable 4 milliGRAM(s) IV Push every 8 hours PRN Nausea and/or Vomiting  oxyCODONE    IR 2.5 milliGRAM(s) Oral every 4 hours PRN Moderate Pain (4 - 6)  oxyCODONE    IR 5 milliGRAM(s) Oral every 4 hours PRN Severe Pain (7 - 10)      FAMILY HISTORY:  No pertinent family history in first degree relatives            ROS:  CONSTITUTIONAL: No fever, weight loss, or fatigue  EYES: No eye pain, visual disturbances, or discharge  ENMT:  No difficulty hearing, tinnitus, vertigo; No sinus or throat pain  NECK: No pain or stiffness  BREASTS: No pain, masses, or nipple discharge  RESPIRATORY: No cough, wheezing, chills or hemoptysis; No shortness of breath  CARDIOVASCULAR: No chest pain, palpitations, dizziness, or leg swelling  GASTROINTESTINAL: No abdominal or epigastric pain. No nausea, vomiting, or hematemesis; No diarrhea or constipation. No melena or hematochezia.  GENITOURINARY: No dysuria, frequency, hematuria, or incontinence  NEUROLOGICAL: No headaches, memory loss, loss of strength, numbness, or tremors  SKIN: No itching, burning, rashes, or lesions   LYMPH NODES: No enlarged glands  ENDOCRINE: No heat or cold intolerance; No hair loss  MUSCULOSKELETAL: No joint pain or swelling; No muscle, back, or extremity pain  PSYCHIATRIC: No depression, anxiety, mood swings, or difficulty sleeping  HEME/LYMPH: No easy bruising, or bleeding gums  ALLERGY AND IMMUNOLOGIC: No hives or eczema    PHYSICAL EXAM-    Height (cm): 152.4 (10-09 @ 10:40)  Weight (kg): 113.4 (10-09 @ 10:40)  BMI (kg/m2): 48.8 (10-09 @ 10:40)  Vital Signs Last 24 Hrs  T(C): 36.6 (09 Oct 2023 16:23), Max: 36.8 (09 Oct 2023 10:40)  T(F): 97.8 (09 Oct 2023 16:23), Max: 98.3 (09 Oct 2023 10:40)  HR: 100 (09 Oct 2023 16:23) (100 - 119)  BP: 116/76 (09 Oct 2023 16:23) (102/67 - 130/67)  BP(mean): --  RR: 18 (09 Oct 2023 16:23) (18 - 22)  SpO2: 95% (09 Oct 2023 16:23) (95% - 96%)    Parameters below as of 09 Oct 2023 16:23  Patient On (Oxygen Delivery Method): room air        Constitutional: well developed, well nourished, no apparent distress, alert, oriented x 3.  Neck: Supple   Pulmonary: no respiratory distress, normal respiratory rhythm and effort, lungs are clear to auscultation/percussion. No CVA tenderness.  Cardiovascular: heart rate normal, normal sinus rhythm; no murmurs, gallops, rubs, heaves or thrills   Abdomen: soft, non-tender, +BS, no guarding/rebound/rigidity.  Vascular: Lower extremities are well perfused.   Skin: bilateral legs with chronic lymphedema, skin thickening, red and hot consistent with cellulitis, multiple small chronic ulcers right anterior leg.                             13.8   6.65  )-----------( 203      ( 09 Oct 2023 11:55 )             39.5     10-09    136  |  98  |  11  ----------------------------<  369<H>  3.5   |  27  |  0.71    Ca    8.7      09 Oct 2023 11:55    TPro  7.4  /  Alb  3.1<L>  /  TBili  1.0  /  DBili  x   /  AST  19  /  ALT  30  /  AlkPhos  86  10-09      Radiology:

## 2023-10-09 NOTE — PATIENT PROFILE ADULT - NSPROHMDIABETMGMTSTRAT_GEN_A_NUR
"I don't check the blood sugar because it doesn't change anything"/blood glucose testing/medication therapy

## 2023-10-09 NOTE — H&P ADULT - ATTENDING COMMENTS
59 yo F with PMHx of T2DM, Afib on Xarelto, and CHF with unknown EF presents to the ED with 2 week history of worsening lower extremity edema, erythema, and pain, admitted for wound care and management     HPI as above.     T(C): 36.7 (10-09-23 @ 14:06), Max: 36.8 (10-09-23 @ 10:40)  HR: 103 (10-09-23 @ 14:06) (103 - 119)  BP: 102/67 (10-09-23 @ 14:06) (102/67 - 130/67)  RR: 22 (10-09-23 @ 14:06) (18 - 22)  SpO2: 96% (10-09-23 @ 14:06) (95% - 96%)  Wt(kg): --    Physical Exam:   GENERAL: well-groomed, , NAD  HEENT: head NC/AT;  conjunctiva & sclera clear; hearing grossly intact, moist mucous membranes  NECK: supple, no JVD  RESPIRATORY: CTA B/L, no wheezing, rales, rhonchi or rubs  CARDIOVASCULAR: S1&S2, RRR  ABDOMEN: soft, non-tender, non-distended, + Bowel sounds x4 quadrants, no guarding, rebound or rigidity  MUSCULOSKELETAL:  b/l LE edema and erythema  LYMPH: no cervical lymphadenopathy  VASCULAR: Radial pulses 2+ bilaterally, no varicose veins   NEUROLOGIC: AA&O X3, CN2-12 intact w/ no focal deficits, no sensory loss, MAEx4  Psych: Normal mood and affect, normal behavior    Plan:  LE cellulitis: continue IV abx  -f/u culture results  -trend WBC count and monitor for fevers  -podiatry consulted Dr Meek    DM2 with hyperglycemia: insulin as ordered    Abdominal pain: check CTA of abdomen    CHF: continue IV lasix  -monitor electrolytes  -check TTE  -cardio consulted  DVT ppx: on Xarelto 59 yo F with PMHx of T2DM, Afib on Xarelto, and CHF with unknown EF presents to the ED with 2 week history of worsening lower extremity edema, erythema, and pain, admitted for wound care and management     HPI as above.     T(C): 36.7 (10-09-23 @ 14:06), Max: 36.8 (10-09-23 @ 10:40)  HR: 103 (10-09-23 @ 14:06) (103 - 119)  BP: 102/67 (10-09-23 @ 14:06) (102/67 - 130/67)  RR: 22 (10-09-23 @ 14:06) (18 - 22)  SpO2: 96% (10-09-23 @ 14:06) (95% - 96%)  Wt(kg): --    Physical Exam:   GENERAL: well-groomed, , NAD  HEENT: head NC/AT;  conjunctiva & sclera clear; hearing grossly intact, moist mucous membranes  NECK: supple, no JVD  RESPIRATORY: CTA B/L, no wheezing, rales, rhonchi or rubs  CARDIOVASCULAR: S1&S2, RRR  ABDOMEN: soft, non-tender, non-distended, + Bowel sounds x4 quadrants, no guarding, rebound or rigidity  MUSCULOSKELETAL:  b/l LE edema and erythema  LYMPH: no cervical lymphadenopathy  VASCULAR: Radial pulses 2+ bilaterally, no varicose veins   NEUROLOGIC: AA&O X3, CN2-12 intact w/ no focal deficits, no sensory loss, MAEx4  Psych: Normal mood and affect, normal behavior    Plan:  LE cellulitis: continue IV abx  -f/u culture results  -trend WBC count and monitor for fevers  -podiatry consulted Dr Meek    DM2 with hyperglycemia: insulin as ordered  -consult Dr Perlman    Abdominal pain: check CTA of abdomen    CHF: continue IV lasix  -monitor electrolytes  -check TTE  -cardio consulted    DVT ppx: on Xarelto

## 2023-10-09 NOTE — CONSULT NOTE ADULT - PROBLEM SELECTOR RECOMMENDATION 9
IV antibiotic, legs elevation, no dressing needed at this time. IV antibiotic, legs elevation, Xeroform, dry dressing to right leg.

## 2023-10-09 NOTE — CONSULT NOTE ADULT - ASSESSMENT
57 yo F with PMHx of T2DM, Afib on Xarelto, CHF (EF 60-65% in 2/2023), neuropathy, chronic b/l LE wounds admitted for cellulitis    CHF  - Chronic b/l LE edema with cellulitic changes, otherwise no meaningful evidence of volume overload  - EF 60-65% in 2/2023  - Hold home Lasix 40mg PO qd  - Continue IV Lasix 40mg qd  - Strict I/Os, daily weights    Afib  - Continue Xarelto and Metoprolol   - Monitor and replete lytes, keep K>4, Mg>2      - Other cardiovascular workup will depend on clinical course.  - All other workup per primary team.  - Will continue to follow.             57 yo F with PMHx of T2DM, Afib on Xarelto, CHF (EF 60-65% in 2/2023), neuropathy, chronic b/l LE wounds admitted for cellulitis    CHF  - Chronic b/l LE edema with cellulitic changes, otherwise no meaningful evidence of volume overload  - EF 60-65% in 2/2023  - IV Lasix 40mg qd  - Strict I/Os, daily weights    Afib  - Continue Xarelto and Metoprolol   - Monitor and replete lytes, keep K>4, Mg>2    - Other cardiovascular workup will depend on clinical course.  - All other workup per primary team.  - Will continue to follow.

## 2023-10-09 NOTE — ED PROVIDER NOTE - PHYSICAL EXAMINATION
Bilateral lower extremity erythema edema, right lower extremity with positive drainage from anterior shin, neurovascular compromise, warm and tender to palpation

## 2023-10-09 NOTE — H&P ADULT - PROBLEM SELECTOR PLAN 6
- Reporting changes with urination consistent with both diabetes and Nephrotic disease   - UA ordered, follow up results  - Urine culture ordered, follow up results   - Continue with Vanc/Cefepime

## 2023-10-09 NOTE — H&P ADULT - PROBLEM SELECTOR PLAN 5
- Unknown EF, no Echo found in system of [facility]   - Pt on Lasix 20mg BID at home   - Continue with Lasix 40 mg IVP BID given LE edema   - Continue to monitor - Unknown EF, no Echo found in system of [facility]   - Pt on Lasix 20mg BID at home   - Continue with Lasix 40mg IVP daily  - Continue to monitor  -Dr Umaña consulted

## 2023-10-09 NOTE — H&P ADULT - PROBLEM SELECTOR PLAN 4
Chronic, History of DM2  - Hold home medications  - 18 units Basaglar qhs --> continue with 12 units at night    - Moderate dose insulin corrective scale  - Hypoglycemia protocol, Accuchecks AC&HS  - Diet regular food with DASH/TLC  - F/u HbA1c AM Chronic, History of DM2  - Hold home medications  - Serum glucose 369 on admission, possibly elevated due to infection   - 18 units Basaglar qhs --> continue with 12 units at night    - Premeal Admelog 8 units --> continue with 6 units TID   - Moderate dose insulin corrective scale  - Hypoglycemia protocol, Accuchecks AC&HS  - Diet regular food with DASH/TLC  - F/u HbA1c AM Chronic, History of DM2  - Hold home medications  - Serum glucose 369 on admission, possibly elevated due to infection   - 18 units Basaglar qhs --> continue with 18 units lantus at night    - Premeal Admelog 8 units --> continue with 6 units TID   - Moderate dose insulin corrective scale  - Hypoglycemia protocol, Accuchecks AC&HS  - Diet regular food with DASH/TLC  - F/u HbA1c AM

## 2023-10-09 NOTE — H&P ADULT - PROBLEM SELECTOR PLAN 1
- Worsening Right LE draining lesion on anterior shin and generalized worsening bilateral lower extremity edema and erythema   - Afebrile, wbc wnl, Lactate 1.2, ESR 29 (elevated)   - S/p Morphine 4mg IVP x1, Zofran 4 mg IVP x1, Zosyn IVPB, Vancomycin IVPB, 1L NS bolus in ED   - Continue Vancomycin Cefepime   - Blood and Wound Cultures sent, follow up results   - Doppler studies ordered to R/O DVT given sedentary lifestyle  - Pain management: Tylenol 650 mg mild, Tramadol 50 mg moderate, Oxycodone 5 mg severe   - Lasix 40 mg IVP BID   - Vascular studies on prior admission showing significant venous reflux, without evidence of extensive stenosis or vascular disease   - Infectious Disease consulted, Dr. Mohr, follow up recommendations   - Podiatry consulted, Dr. Meek, follow up recommendations - Worsening Right LE draining lesion on anterior shin and generalized worsening bilateral lower extremity edema and erythema   - XR b/L lower extremities and R foot performed, generalized soft issue edema without evidence of bony involvement as per personal read, official report pending   - Afebrile, wbc wnl, Lactate 1.2, ESR 29 (elevated)   - S/p Morphine 4mg IVP x1, Zofran 4 mg IVP x1, Zosyn IVPB, Vancomycin IVPB, 1L NS bolus in ED   - Continue Vancomycin Cefepime   - Blood and Wound Cultures sent, follow up results   - Doppler studies ordered to R/O DVT given sedentary lifestyle  - Pain management: Tylenol 650 mg mild, Tramadol 50 mg moderate, Oxycodone 5 mg severe   - Lasix 40 mg IVP BID   - Vascular studies on prior admission showing significant venous reflux, without evidence of extensive stenosis or vascular disease   - Infectious Disease consulted, Dr. Mohr, follow up recommendations   - Podiatry consulted, Dr. Meek, follow up recommendations - Worsening Right LE draining lesion on anterior shin and generalized worsening bilateral lower extremity edema and erythema   - XR b/L lower extremities and R foot performed, generalized soft issue edema without evidence of bony involvement as per personal read, official report pending   - Afebrile, wbc wnl, Lactate 1.2, ESR 29 (elevated)   - S/p Morphine 4mg IVP x1, Zofran 4 mg IVP x1, Zosyn IVPB, Vancomycin IVPB, 1L NS bolus in ED   - Continue with Vancomycin 1250mg q12 hr and Cefepime 2 g q8 hr   - Blood and Wound Cultures sent, follow up results   - Doppler studies ordered to R/O DVT given sedentary lifestyle  - Pain management: Tylenol 650 mg mild, Oxycodone 2.5 mg moderate, Oxycodone 5 mg severe   - Lasix 40 mg IVP BID   - Vascular studies on prior admission showing significant venous reflux, without evidence of extensive stenosis or vascular disease   - Infectious Disease consulted, Dr. Mohr, follow up recommendations   - Podiatry consulted, Dr. Meek, follow up recommendations - Worsening Right LE draining lesion on anterior shin and generalized worsening bilateral lower extremity edema and erythema   - XR b/L lower extremities and R foot performed, generalized soft issue edema without evidence of bony involvement as per personal read, official report pending   - Afebrile, wbc wnl, Lactate 1.2, ESR 29 (elevated)   - S/p Morphine 4mg IVP x1, Zofran 4 mg IVP x1, Zosyn IVPB, Vancomycin IVPB, 1L NS bolus in ED   - Continue with Vancomycin 1250mg q12 hr and Cefepime 2 g q8 hr   - Blood and Wound Cultures sent, follow up results   - Doppler studies ordered to R/O DVT given sedentary lifestyle  - Pain management: Tylenol 650 mg mild, Oxycodone 2.5 mg moderate, Oxycodone 5 mg severe   - Lasix 40 mg IVP daily  - Vascular studies on prior admission showing significant venous reflux, without evidence of extensive stenosis or vascular disease   - Infectious Disease consulted, Dr. Mohr, follow up recommendations   - Podiatry consulted, Dr. Meek, follow up recommendations - Worsening Right LE draining lesion on anterior shin and generalized worsening bilateral lower extremity edema and erythema   - XR b/L lower extremities and R foot performed, generalized soft issue edema without evidence of bony involvement as per personal read, official report pending   - Afebrile, wbc wnl, Lactate 1.2, ESR 29 (elevated)   - S/p Morphine 4mg IVP x1, Zofran 4 mg IVP x1, Zosyn IVPB, Vancomycin IVPB, 1L NS bolus in ED   - Continue with Vancomycin 1250mg q12 hr and Cefepime 2 g q8 hr   - Blood and Wound Cultures sent, follow up results   - Doppler studies ordered to R/O DVT given sedentary lifestyle  - Pain management: Tylenol 650 mg mild, Oxycodone 2.5 mg moderate, Oxycodone 5 mg severe   - Lasix 40 mg IVP daily  - Vascular studies on prior admission showing significant venous reflux, without evidence of extensive stenosis or vascular disease   - Infectious Disease consulted, Dr. Viviana Spring, follow up recommendations   - Podiatry consulted, Dr. Meek, follow up recommendations

## 2023-10-10 LAB
A1C WITH ESTIMATED AVERAGE GLUCOSE RESULT: 13.8 % — HIGH (ref 4–5.6)
ALBUMIN SERPL ELPH-MCNC: 2.7 G/DL — LOW (ref 3.3–5)
ALP SERPL-CCNC: 74 U/L — SIGNIFICANT CHANGE UP (ref 40–120)
ALT FLD-CCNC: 23 U/L — SIGNIFICANT CHANGE UP (ref 12–78)
ANION GAP SERPL CALC-SCNC: 7 MMOL/L — SIGNIFICANT CHANGE UP (ref 5–17)
AST SERPL-CCNC: 18 U/L — SIGNIFICANT CHANGE UP (ref 15–37)
BASOPHILS # BLD AUTO: 0.03 K/UL — SIGNIFICANT CHANGE UP (ref 0–0.2)
BASOPHILS NFR BLD AUTO: 0.4 % — SIGNIFICANT CHANGE UP (ref 0–2)
BILIRUB SERPL-MCNC: 0.8 MG/DL — SIGNIFICANT CHANGE UP (ref 0.2–1.2)
BUN SERPL-MCNC: 11 MG/DL — SIGNIFICANT CHANGE UP (ref 7–23)
CALCIUM SERPL-MCNC: 8.7 MG/DL — SIGNIFICANT CHANGE UP (ref 8.5–10.1)
CHLORIDE SERPL-SCNC: 104 MMOL/L — SIGNIFICANT CHANGE UP (ref 96–108)
CO2 SERPL-SCNC: 27 MMOL/L — SIGNIFICANT CHANGE UP (ref 22–31)
CREAT SERPL-MCNC: 0.65 MG/DL — SIGNIFICANT CHANGE UP (ref 0.5–1.3)
EGFR: 101 ML/MIN/1.73M2 — SIGNIFICANT CHANGE UP
EOSINOPHIL # BLD AUTO: 0.12 K/UL — SIGNIFICANT CHANGE UP (ref 0–0.5)
EOSINOPHIL NFR BLD AUTO: 1.6 % — SIGNIFICANT CHANGE UP (ref 0–6)
ESTIMATED AVERAGE GLUCOSE: 349 MG/DL — HIGH (ref 68–114)
GLUCOSE SERPL-MCNC: 331 MG/DL — HIGH (ref 70–99)
HCT VFR BLD CALC: 37.9 % — SIGNIFICANT CHANGE UP (ref 34.5–45)
HGB BLD-MCNC: 12.8 G/DL — SIGNIFICANT CHANGE UP (ref 11.5–15.5)
IMM GRANULOCYTES NFR BLD AUTO: 0.3 % — SIGNIFICANT CHANGE UP (ref 0–0.9)
LYMPHOCYTES # BLD AUTO: 1.29 K/UL — SIGNIFICANT CHANGE UP (ref 1–3.3)
LYMPHOCYTES # BLD AUTO: 17.2 % — SIGNIFICANT CHANGE UP (ref 13–44)
MCHC RBC-ENTMCNC: 32 PG — SIGNIFICANT CHANGE UP (ref 27–34)
MCHC RBC-ENTMCNC: 33.8 GM/DL — SIGNIFICANT CHANGE UP (ref 32–36)
MCV RBC AUTO: 94.8 FL — SIGNIFICANT CHANGE UP (ref 80–100)
MONOCYTES # BLD AUTO: 0.67 K/UL — SIGNIFICANT CHANGE UP (ref 0–0.9)
MONOCYTES NFR BLD AUTO: 9 % — SIGNIFICANT CHANGE UP (ref 2–14)
NEUTROPHILS # BLD AUTO: 5.35 K/UL — SIGNIFICANT CHANGE UP (ref 1.8–7.4)
NEUTROPHILS NFR BLD AUTO: 71.5 % — SIGNIFICANT CHANGE UP (ref 43–77)
NRBC # BLD: 0 /100 WBCS — SIGNIFICANT CHANGE UP (ref 0–0)
PLATELET # BLD AUTO: 187 K/UL — SIGNIFICANT CHANGE UP (ref 150–400)
POTASSIUM SERPL-MCNC: 3.8 MMOL/L — SIGNIFICANT CHANGE UP (ref 3.5–5.3)
POTASSIUM SERPL-SCNC: 3.8 MMOL/L — SIGNIFICANT CHANGE UP (ref 3.5–5.3)
PROT SERPL-MCNC: 6.7 G/DL — SIGNIFICANT CHANGE UP (ref 6–8.3)
RBC # BLD: 4 M/UL — SIGNIFICANT CHANGE UP (ref 3.8–5.2)
RBC # FLD: 13 % — SIGNIFICANT CHANGE UP (ref 10.3–14.5)
SODIUM SERPL-SCNC: 138 MMOL/L — SIGNIFICANT CHANGE UP (ref 135–145)
WBC # BLD: 7.48 K/UL — SIGNIFICANT CHANGE UP (ref 3.8–10.5)
WBC # FLD AUTO: 7.48 K/UL — SIGNIFICANT CHANGE UP (ref 3.8–10.5)

## 2023-10-10 PROCEDURE — 99233 SBSQ HOSP IP/OBS HIGH 50: CPT

## 2023-10-10 PROCEDURE — 99221 1ST HOSP IP/OBS SF/LOW 40: CPT

## 2023-10-10 PROCEDURE — 93306 TTE W/DOPPLER COMPLETE: CPT | Mod: 26

## 2023-10-10 PROCEDURE — 99232 SBSQ HOSP IP/OBS MODERATE 35: CPT

## 2023-10-10 RX ORDER — INSULIN GLARGINE 100 [IU]/ML
15 INJECTION, SOLUTION SUBCUTANEOUS EVERY MORNING
Refills: 0 | Status: DISCONTINUED | OUTPATIENT
Start: 2023-10-11 | End: 2023-10-13

## 2023-10-10 RX ORDER — INSULIN LISPRO 100/ML
10 VIAL (ML) SUBCUTANEOUS
Refills: 0 | Status: DISCONTINUED | OUTPATIENT
Start: 2023-10-10 | End: 2023-10-15

## 2023-10-10 RX ORDER — INSULIN GLARGINE 100 [IU]/ML
25 INJECTION, SOLUTION SUBCUTANEOUS AT BEDTIME
Refills: 0 | Status: DISCONTINUED | OUTPATIENT
Start: 2023-10-10 | End: 2023-10-13

## 2023-10-10 RX ADMIN — POLYETHYLENE GLYCOL 3350 17 GRAM(S): 17 POWDER, FOR SOLUTION ORAL at 11:45

## 2023-10-10 RX ADMIN — NYSTATIN CREAM 1 APPLICATION(S): 100000 CREAM TOPICAL at 05:11

## 2023-10-10 RX ADMIN — RIVAROXABAN 20 MILLIGRAM(S): KIT at 18:00

## 2023-10-10 RX ADMIN — OXYCODONE HYDROCHLORIDE 5 MILLIGRAM(S): 5 TABLET ORAL at 08:35

## 2023-10-10 RX ADMIN — CEFEPIME 100 MILLIGRAM(S): 1 INJECTION, POWDER, FOR SOLUTION INTRAMUSCULAR; INTRAVENOUS at 14:57

## 2023-10-10 RX ADMIN — Medication 40 MILLIGRAM(S): at 05:10

## 2023-10-10 RX ADMIN — NYSTATIN CREAM 1 APPLICATION(S): 100000 CREAM TOPICAL at 14:57

## 2023-10-10 RX ADMIN — CEFEPIME 100 MILLIGRAM(S): 1 INJECTION, POWDER, FOR SOLUTION INTRAMUSCULAR; INTRAVENOUS at 05:10

## 2023-10-10 RX ADMIN — OXYCODONE HYDROCHLORIDE 5 MILLIGRAM(S): 5 TABLET ORAL at 09:15

## 2023-10-10 RX ADMIN — GABAPENTIN 300 MILLIGRAM(S): 400 CAPSULE ORAL at 14:57

## 2023-10-10 RX ADMIN — Medication 166.67 MILLIGRAM(S): at 01:08

## 2023-10-10 RX ADMIN — Medication 6 UNIT(S): at 12:41

## 2023-10-10 RX ADMIN — Medication 100 MILLIGRAM(S): at 05:11

## 2023-10-10 RX ADMIN — Medication 166.67 MILLIGRAM(S): at 13:00

## 2023-10-10 RX ADMIN — GABAPENTIN 300 MILLIGRAM(S): 400 CAPSULE ORAL at 05:10

## 2023-10-10 RX ADMIN — NYSTATIN CREAM 1 APPLICATION(S): 100000 CREAM TOPICAL at 14:58

## 2023-10-10 RX ADMIN — Medication 8: at 17:19

## 2023-10-10 RX ADMIN — Medication 8: at 08:26

## 2023-10-10 RX ADMIN — Medication 6 UNIT(S): at 08:25

## 2023-10-10 RX ADMIN — Medication 8: at 12:42

## 2023-10-10 RX ADMIN — Medication 10 UNIT(S): at 17:20

## 2023-10-10 NOTE — PROGRESS NOTE ADULT - ASSESSMENT
59 yo F with PMHx of T2DM, Afib on Xarelto, CHF (EF 60-65% in 2/2023), neuropathy, chronic b/l LE wounds admitted for cellulitis    CHF  - Chronic b/l LE edema with cellulitic changes, otherwise no meaningful evidence of volume overload  - EF 60-65% in 2/2023  - IV Lasix 40mg qd  - check echo   - Strict I/Os, daily weights    Afib  - Continue Xarelto and Metoprolol   - Monitor and replete lytes, keep K>4, Mg>2    - cont abx per primary  - need better DM control.   - Other cardiovascular workup will depend on clinical course.  - All other workup per primary team.  - Will continue to follow.

## 2023-10-10 NOTE — PROGRESS NOTE ADULT - SUBJECTIVE AND OBJECTIVE BOX
[facility]  Jarett Stein PA-C  [address] [place] [address]  [phone]      INTERVAL HPI/OVERNIGHT EVENTS:  Pt s/e  C/o bilateral LE pain and lower abdominal pain  Otherwise no new GI complaints    MEDICATIONS  (STANDING):  cefepime   IVPB 2000 milliGRAM(s) IV Intermittent every 8 hours  dextrose 5%. 1000 milliLiter(s) (100 mL/Hr) IV Continuous <Continuous>  dextrose 5%. 1000 milliLiter(s) (50 mL/Hr) IV Continuous <Continuous>  dextrose 50% Injectable 25 Gram(s) IV Push once  dextrose 50% Injectable 25 Gram(s) IV Push once  dextrose 50% Injectable 12.5 Gram(s) IV Push once  furosemide   Injectable 40 milliGRAM(s) IV Push daily  gabapentin 300 milliGRAM(s) Oral every 8 hours  glucagon  Injectable 1 milliGRAM(s) IntraMuscular once  insulin glargine Injectable (LANTUS) 18 Unit(s) SubCutaneous at bedtime  insulin lispro (ADMELOG) corrective regimen sliding scale   SubCutaneous three times a day before meals  insulin lispro (ADMELOG) corrective regimen sliding scale   SubCutaneous at bedtime  insulin lispro Injectable (ADMELOG) 6 Unit(s) SubCutaneous three times a day before meals  metoprolol succinate  milliGRAM(s) Oral daily  naloxone Injectable 0.4 milliGRAM(s) IV Push once  nystatin Powder 1 Application(s) Topical three times a day  nystatin Powder 1 Application(s) Topical three times a day  polyethylene glycol 3350 17 Gram(s) Oral daily  rivaroxaban 20 milliGRAM(s) Oral with dinner  senna 2 Tablet(s) Oral at bedtime  vancomycin  IVPB 1250 milliGRAM(s) IV Intermittent every 12 hours    MEDICATIONS  (PRN):  acetaminophen     Tablet .. 650 milliGRAM(s) Oral every 6 hours PRN Temp greater or equal to 38C (100.4F), Mild Pain (1 - 3)  aluminum hydroxide/magnesium hydroxide/simethicone Suspension 30 milliLiter(s) Oral every 4 hours PRN Dyspepsia  bisacodyl 5 milliGRAM(s) Oral daily PRN Constipation  dextrose Oral Gel 15 Gram(s) Oral once PRN Blood Glucose LESS THAN 70 milliGRAM(s)/deciliter  melatonin 3 milliGRAM(s) Oral at bedtime PRN Insomnia  ondansetron Injectable 4 milliGRAM(s) IV Push every 8 hours PRN Nausea and/or Vomiting  oxyCODONE    IR 2.5 milliGRAM(s) Oral every 4 hours PRN Moderate Pain (4 - 6)  oxyCODONE    IR 5 milliGRAM(s) Oral every 4 hours PRN Severe Pain (7 - 10)      Allergies    No Known Allergies    PHYSICAL EXAM:   Vital Signs:  Vital Signs Last 24 Hrs  T(C): 37.2 (10 Oct 2023 04:55), Max: 37.2 (10 Oct 2023 04:55)  T(F): 98.9 (10 Oct 2023 04:55), Max: 98.9 (10 Oct 2023 04:55)  HR: 103 (10 Oct 2023 04:55) (98 - 103)  BP: 125/75 (10 Oct 2023 04:55) (102/67 - 125/75)  BP(mean): --  RR: 18 (10 Oct 2023 04:55) (18 - 22)  SpO2: 92% (10 Oct 2023 04:55) (92% - 96%)    Parameters below as of 10 Oct 2023 04:55  Patient On (Oxygen Delivery Method): room air      Daily     Daily Weight in k (10 Oct 2023 04:55)    GENERAL:  Appears stated age  HEENT:  NC/AT  CHEST:  Full & symmetric excursion  HEART:  Regular rhythm  ABDOMEN:  Soft, non-tender, non-distended  EXTEREMITIES:  no cyanosis  SKIN:  No rash  NEURO:  Alert      LABS:                        12.8   7.48  )-----------( 187      ( 10 Oct 2023 05:45 )             37.9     10-10    138  |  104  |  11  ----------------------------<  331<H>  3.8   |  27  |  0.65    Ca    8.7      10 Oct 2023 05:45    TPro  6.7  /  Alb  2.7<L>  /  TBili  0.8  /  DBili  x   /  AST  18  /  ALT  23  /  AlkPhos  74  10-10    PT/INR - ( 09 Oct 2023 11:55 )   PT: 16.5 sec;   INR: 1.42 ratio         PTT - ( 09 Oct 2023 11:55 )  PTT:30.4 sec  Urinalysis Basic - ( 10 Oct 2023 05:45 )    Color: x / Appearance: x / SG: x / pH: x  Gluc: 331 mg/dL / Ketone: x  / Bili: x / Urobili: x   Blood: x / Protein: x / Nitrite: x   Leuk Esterase: x / RBC: x / WBC x   Sq Epi: x / Non Sq Epi: x / Bacteria: x

## 2023-10-10 NOTE — PHYSICAL THERAPY INITIAL EVALUATION ADULT - ADDITIONAL COMMENTS
58F [place] with friend. Reports [address] home; bedroom and bathroom are on the main floor.  Patient reports independence with ADLs and uses a walker when ambulating within the community.

## 2023-10-10 NOTE — CARE COORDINATION ASSESSMENT. - OTHER PERTINENT REFERRAL INFORMATION
Spoke with patient at bedside.   Role of case management explained w verbalized understanding. Patient states she lives in a PH on 1 level. She was negotiating ambulation well w RW until recently when increase of pain in LE started. She states she lives w a friend Rafaela who is her HCP. She had VN service in the past for wound care but does not recall which agency. PTE is recommending [facility], patient is resistant . CM to continue to follow w social work for DC plan pending hosp course.

## 2023-10-10 NOTE — PHYSICAL THERAPY INITIAL EVALUATION ADULT - LEVEL OF INDEPENDENCE: SUPINE/SIT, REHAB EVAL
Patient arrived to er from [facility]. Patient tested positive for covid around 2 weeks ago and since has had sob.    supervision

## 2023-10-10 NOTE — PHYSICAL THERAPY INITIAL EVALUATION ADULT - ADL SKILLS, REHAB EVAL
What Type Of Note Output Would You Prefer (Optional)?: Standard Output independent How Severe Is Your Skin Lesion?: mild Has Your Skin Lesion Been Treated?: been treated Is This A New Presentation, Or A Follow-Up?: Skin Lesion

## 2023-10-10 NOTE — PROGRESS NOTE ADULT - ASSESSMENT
abdominal pain    CT noted; mesenteric vessels patent  had extensive GI workup  she does not know who her GI is; she is a poor historian  do not anticipate repeating her endoscopic eval    I reviewed the overnight course of events on the unit, re-confirming the patient history. I discussed the care with the patient and their family  Differential diagnosis and plan of care discussed with patient after the evaluation  40 minutes spent on total encounter of which more than fifty percent of the encounter was spent counseling and/or coordinating care by the attending physician.  Advanced care planning was discussed with patient and family.  Advanced care planning forms were reviewed and discussed.  Risks, benefits and alternatives of gastroenterologic procedures were discussed in detail and all questions were answered. verbal instruction/written material

## 2023-10-10 NOTE — PROGRESS NOTE ADULT - SUBJECTIVE AND OBJECTIVE BOX
Patient is a 58y old  Female who presents with a chief complaint of Right Left Wound (10 Oct 2023 11:26)      INTERVAL HPI/OVERNIGHT EVENTS: Patient seen and examined at bedside. No overnight events.    MEDICATIONS  (STANDING):  cefepime   IVPB 2000 milliGRAM(s) IV Intermittent every 8 hours  dextrose 5%. 1000 milliLiter(s) (100 mL/Hr) IV Continuous <Continuous>  dextrose 5%. 1000 milliLiter(s) (50 mL/Hr) IV Continuous <Continuous>  dextrose 50% Injectable 25 Gram(s) IV Push once  dextrose 50% Injectable 25 Gram(s) IV Push once  dextrose 50% Injectable 12.5 Gram(s) IV Push once  furosemide   Injectable 40 milliGRAM(s) IV Push daily  gabapentin 300 milliGRAM(s) Oral every 8 hours  glucagon  Injectable 1 milliGRAM(s) IntraMuscular once  insulin glargine Injectable (LANTUS) 18 Unit(s) SubCutaneous at bedtime  insulin lispro (ADMELOG) corrective regimen sliding scale   SubCutaneous three times a day before meals  insulin lispro (ADMELOG) corrective regimen sliding scale   SubCutaneous at bedtime  insulin lispro Injectable (ADMELOG) 6 Unit(s) SubCutaneous three times a day before meals  metoprolol succinate  milliGRAM(s) Oral daily  naloxone Injectable 0.4 milliGRAM(s) IV Push once  nystatin Powder 1 Application(s) Topical three times a day  nystatin Powder 1 Application(s) Topical three times a day  polyethylene glycol 3350 17 Gram(s) Oral daily  rivaroxaban 20 milliGRAM(s) Oral with dinner  senna 2 Tablet(s) Oral at bedtime  vancomycin  IVPB 1250 milliGRAM(s) IV Intermittent every 12 hours    MEDICATIONS  (PRN):  acetaminophen     Tablet .. 650 milliGRAM(s) Oral every 6 hours PRN Temp greater or equal to 38C (100.4F), Mild Pain (1 - 3)  aluminum hydroxide/magnesium hydroxide/simethicone Suspension 30 milliLiter(s) Oral every 4 hours PRN Dyspepsia  bisacodyl 5 milliGRAM(s) Oral daily PRN Constipation  dextrose Oral Gel 15 Gram(s) Oral once PRN Blood Glucose LESS THAN 70 milliGRAM(s)/deciliter  melatonin 3 milliGRAM(s) Oral at bedtime PRN Insomnia  ondansetron Injectable 4 milliGRAM(s) IV Push every 8 hours PRN Nausea and/or Vomiting  oxyCODONE    IR 2.5 milliGRAM(s) Oral every 4 hours PRN Moderate Pain (4 - 6)  oxyCODONE    IR 5 milliGRAM(s) Oral every 4 hours PRN Severe Pain (7 - 10)      Allergies    No Known Allergies    Intolerances      Vital Signs Last 24 Hrs  T(C): 36.9 (10 Oct 2023 11:38), Max: 37.2 (10 Oct 2023 04:55)  T(F): 98.5 (10 Oct 2023 11:38), Max: 98.9 (10 Oct 2023 04:55)  HR: 101 (10 Oct 2023 11:38) (98 - 103)  BP: 104/67 (10 Oct 2023 11:38) (104/67 - 125/75)  BP(mean): --  RR: 18 (10 Oct 2023 11:38) (18 - 18)  SpO2: 93% (10 Oct 2023 11:38) (92% - 95%)    Parameters below as of 10 Oct 2023 11:38  Patient On (Oxygen Delivery Method): room air      I&O's Summary    09 Oct 2023 07:01  -  10 Oct 2023 07:00  --------------------------------------------------------  IN: 0 mL / OUT: 350 mL / NET: -350 mL    10 Oct 2023 07:01  -  10 Oct 2023 15:20  --------------------------------------------------------  IN: 0 mL / OUT: 1700 mL / NET: -1700 mL      BMI (kg/m2): 48.8 (10-09-23 @ 10:40)    PHYSICAL EXAM:  GENERAL: NAD  HEENT:  AT/NC, anicteric, moist mucous membranes, EOMI, PERRL, no lid-lag, conjunctiva and sclera clear  CHEST/LUNG:  CTA b/l, no rales, wheezes, or rhonchi,  normal respiratory effort, no intercostal retractions  HEART:  RRR, S1, S2, no murmurs; 1+ pitting edema  ABDOMEN:  BS+, soft, nontender, nondistended  MSK/EXTREMITIES: faint peripheral pulses, venous stasis changes   NERVOUS SYSTEM: answers questions and follows commands appropriately, A&Ox3 grossly moves all extremities   PSYCH: Appropriate affect, Alert & Awake; fair judgement      LABS: Personally reviewed  CBC                        12.8   7.48  )-----------( 187      ( 10 Oct 2023 05:45 )             37.9     CMP  10-10    138  |  104  |  11  ----------------------------<  331  3.8   |  27  |  0.65    Ca    8.7      10 Oct 2023 05:45    TPro  6.7  /  Alb  2.7  /  TBili  0.8  /  DBili  x   /  AST  18  /  ALT  23  /  AlkPhos  74  10-10          PT/INR - ( 09 Oct 2023 11:55 )   PT: 16.5 sec;   INR: 1.42 ratio         PTT - ( 09 Oct 2023 11:55 )  PTT:30.4 sec  Lactate, Blood: 1.2 mmol/L (10-09 @ 11:55)                        POCT Blood Glucose.: 303 mg/dL (10 Oct 2023 11:55)  POCT Blood Glucose.: 340 mg/dL (10 Oct 2023 08:01)  POCT Blood Glucose.: 285 mg/dL (09 Oct 2023 21:43)  POCT Blood Glucose.: 323 mg/dL (09 Oct 2023 17:15)      Urinalysis Basic - ( 10 Oct 2023 05:45 )    Color: x / Appearance: x / SG: x / pH: x  Gluc: 331 mg/dL / Ketone: x  / Bili: x / Urobili: x   Blood: x / Protein: x / Nitrite: x   Leuk Esterase: x / RBC: x / WBC x   Sq Epi: x / Non Sq Epi: x / Bacteria: x        COVID-19 PCR: NotDetec (10-09-23 @ 11:55)          RADIOLOGY & ADDITIONAL TESTS: Personally reviewed.     Consultant(s) Notes Reviewed:  [x] YES  [ ] NO   Discussed with SW/JOSE, RN

## 2023-10-10 NOTE — PROGRESS NOTE ADULT - SUBJECTIVE AND OBJECTIVE BOX
[facility] -- Corbin Abel, Angela Goodman, Tico Simmons Savella  Office # [phone]      Follow Up:  edema    Subjective/Observations: Patient seen and examined. Events noted. Resting comfortably in bed. No complaints of chest pain, dyspnea, or palpitations reported.  c/o pain in her legs.       REVIEW OF SYSTEMS: All other review of systems is negative unless indicated above    PAST MEDICAL & SURGICAL HISTORY:  Atrial fibrillation      Type 2 diabetes mellitus      Congestive heart failure      Scalp psoriasis      S/P tonsillectomy      S/P appendectomy      H/O umbilical hernia repair          MEDICATIONS  (STANDING):  cefepime   IVPB 2000 milliGRAM(s) IV Intermittent every 8 hours  dextrose 5%. 1000 milliLiter(s) (50 mL/Hr) IV Continuous <Continuous>  dextrose 5%. 1000 milliLiter(s) (100 mL/Hr) IV Continuous <Continuous>  dextrose 50% Injectable 25 Gram(s) IV Push once  dextrose 50% Injectable 25 Gram(s) IV Push once  dextrose 50% Injectable 12.5 Gram(s) IV Push once  furosemide   Injectable 40 milliGRAM(s) IV Push daily  gabapentin 300 milliGRAM(s) Oral every 8 hours  glucagon  Injectable 1 milliGRAM(s) IntraMuscular once  insulin glargine Injectable (LANTUS) 18 Unit(s) SubCutaneous at bedtime  insulin lispro (ADMELOG) corrective regimen sliding scale   SubCutaneous three times a day before meals  insulin lispro (ADMELOG) corrective regimen sliding scale   SubCutaneous at bedtime  insulin lispro Injectable (ADMELOG) 6 Unit(s) SubCutaneous three times a day before meals  metoprolol succinate  milliGRAM(s) Oral daily  naloxone Injectable 0.4 milliGRAM(s) IV Push once  nystatin Powder 1 Application(s) Topical three times a day  nystatin Powder 1 Application(s) Topical three times a day  polyethylene glycol 3350 17 Gram(s) Oral daily  rivaroxaban 20 milliGRAM(s) Oral with dinner  senna 2 Tablet(s) Oral at bedtime  vancomycin  IVPB 1250 milliGRAM(s) IV Intermittent every 12 hours    MEDICATIONS  (PRN):  acetaminophen     Tablet .. 650 milliGRAM(s) Oral every 6 hours PRN Temp greater or equal to 38C (100.4F), Mild Pain (1 - 3)  aluminum hydroxide/magnesium hydroxide/simethicone Suspension 30 milliLiter(s) Oral every 4 hours PRN Dyspepsia  bisacodyl 5 milliGRAM(s) Oral daily PRN Constipation  dextrose Oral Gel 15 Gram(s) Oral once PRN Blood Glucose LESS THAN 70 milliGRAM(s)/deciliter  melatonin 3 milliGRAM(s) Oral at bedtime PRN Insomnia  ondansetron Injectable 4 milliGRAM(s) IV Push every 8 hours PRN Nausea and/or Vomiting  oxyCODONE    IR 2.5 milliGRAM(s) Oral every 4 hours PRN Moderate Pain (4 - 6)  oxyCODONE    IR 5 milliGRAM(s) Oral every 4 hours PRN Severe Pain (7 - 10)      Allergies    No Known Allergies    Intolerances            Vital Signs Last 24 Hrs  T(C): 37.2 (10 Oct 2023 04:55), Max: 37.2 (10 Oct 2023 04:55)  T(F): 98.9 (10 Oct 2023 04:55), Max: 98.9 (10 Oct 2023 04:55)  HR: 103 (10 Oct 2023 04:55) (98 - 119)  BP: 125/75 (10 Oct 2023 04:55) (102/67 - 130/67)  BP(mean): --  RR: 18 (10 Oct 2023 04:55) (18 - 22)  SpO2: 92% (10 Oct 2023 04:55) (92% - 96%)    Parameters below as of 10 Oct 2023 04:55  Patient On (Oxygen Delivery Method): room air        I&O's Summary    09 Oct 2023 07:01  -  10 Oct 2023 07:00  --------------------------------------------------------  IN: 0 mL / OUT: 350 mL / NET: -350 mL    10 Oct 2023 07:01  -  10 Oct 2023 09:59  --------------------------------------------------------  IN: 0 mL / OUT: 1700 mL / NET: -1700 mL      Weight (kg): 113.4 (10-09 @ 10:40)    PHYSICAL EXAM:    Constitutional: appears in pain, awake   HEENT: Moist Mucous Membranes, Anicteric  Pulmonary: Decreased breath sounds b/l. No rales, crackles or wheeze appreciated.   Cardiovascular: Regular, S1 and S2, No murmurs, rubs, gallops or clicks  Gastrointestinal: Bowel Sounds present, soft, nontender.   Lymph: ++ peripheral edema. No lymphadenopathy.  Skin: No visible rashes or ulcers.  Psych:  Mood & affect appropriate for situation    LABS: All Labs Reviewed:                        12.8   7.48  )-----------( 187      ( 10 Oct 2023 05:45 )             37.9                         13.8   6.65  )-----------( 203      ( 09 Oct 2023 11:55 )             39.5     10 Oct 2023 05:45    138    |  104    |  11     ----------------------------<  331    3.8     |  27     |  0.65   09 Oct 2023 11:55    136    |  98     |  11     ----------------------------<  369    3.5     |  27     |  0.71     Ca    8.7        10 Oct 2023 05:45  Ca    8.7        09 Oct 2023 11:55    TPro  6.7    /  Alb  2.7    /  TBili  0.8    /  DBili  x      /  AST  18     /  ALT  23     /  AlkPhos  74     10 Oct 2023 05:45  TPro  7.4    /  Alb  3.1    /  TBili  1.0    /  DBili  x      /  AST  19     /  ALT  30     /  AlkPhos  86     09 Oct 2023 11:55    PT/INR - ( 09 Oct 2023 11:55 )   PT: 16.5 sec;   INR: 1.42 ratio         PTT - ( 09 Oct 2023 11:55 )  PTT:30.4 sec    - Troponin:

## 2023-10-10 NOTE — CARE COORDINATION ASSESSMENT. - NSCAREPROVIDERS_GEN_ALL_CORE_FT
CARE PROVIDERS:  Accepting Physician: Garrick Spring  Administration: Anirudh Alvarado  Admitting: Garrick Spring  Attending: Garrick Spring  Cardiology Technician: Brittny Zurita  Case Management: Sagrario Chester  Consultant: Mukund Rodríguez  Consultant: Tyler Matthew  Consultant: Raysa Doshi  Consultant: Carole Mohr  Consultant: Kory Umaña  Consultant: Weil, Patricia  Consultant: Mandy Vilalvicencio  Consultant: Amy Galarza  Consultant: Viviana Spring  Consultant: Gia Stein  Covering Team: Silvana Garcia  ED Attending: Antony Trevino  ED Nurse: Duane Sanchez  Nurse: Ruby Barajas  Nurse: Chelita Huitron  Nurse: Kady Abdullahi  Ordered: Doctor, Unknown  Ordered: ADM, User  Ordered: ServiceAccount, SCMMLM  Ordered: ServiceAccount, SCMMLM  Ordered: ServiceAccount, SCMMLM  Override: Mohan, Omaira  PCA/Nursing Assistant: Antoinette Lee  PCA/Nursing Assistant: Patricia Rocha  Physical Therapy: Rick Farias  Physical Therapy: Peggy Sy  Primary Team: Nicole Neal  Primary Team: Rodger Gonsales  Primary Team: Evelin Merino  Primary Team: Donald Bender  Primary Team: Betsy Bonilla  Registered Dietitian: Vandana Monsivais  Respiratory Therapy: Tanya Singh  Respiratory Therapy: Azeem Guadalupe

## 2023-10-10 NOTE — PROGRESS NOTE ADULT - PROBLEM SELECTOR PLAN 5
- Pt on Lasix 20mg BID at home   - Continue with Lasix 40mg IVP daily  - Continue to monitor  -Dr Umaña consulted recommendations appreciated  - EF 60-65% in 2/2023

## 2023-10-10 NOTE — PROGRESS NOTE ADULT - PROBLEM SELECTOR PLAN 3
Patient reports diffuse severe pain in her abdomen and legs with PO intake   - Pt received a inpatient and outpatient workup including on prior admissions   - EGD/colonoscopy 2022, negative   - CTA Abdomen to evaluate for mesenteric ischemia, follow up results   - GI consulted Dr. Matthew

## 2023-10-10 NOTE — PROGRESS NOTE ADULT - PROBLEM SELECTOR PLAN 1
Worsening Right LE draining lesion on anterior shin and generalized worsening bilateral lower extremity edema and erythema   - XR b/L lower extremities and R foot performed, generalized soft issue edema without evidence of bony involvement  - Afebrile, wbc wnl, Lactate 1.2, ESR 29 (elevated)   - S/p Morphine 4mg IVP x1, Zofran 4 mg IVP x1, Zosyn IVPB, Vancomycin IVPB, 1L NS bolus in ED   - Continue with Vancomycin 1250mg q12 hr and Cefepime 2 g q8 hr   - Blood and Wound Cultures sent, follow up results   - Doppler studies ordered to R/O DVT given sedentary lifestyle  - Pain management: Tylenol 650 mg mild, Oxycodone 2.5 mg moderate, Oxycodone 5 mg severe   - Lasix 40 mg IVP daily  - Vascular studies on prior admission showing significant venous reflux, without evidence of extensive stenosis or vascular disease   - Infectious Disease consulted, Dr. Vivinaa Spring, follow up recommendations   - Podiatry consulted, Dr. Meek, follow up recommendations

## 2023-10-10 NOTE — PROGRESS NOTE ADULT - SUBJECTIVE AND OBJECTIVE BOX
MELITON LOZANO is a 58yFemale , patient examined and chart reviewed.    INTERVAL HPI/ OVERNIGHT EVENTS:   In chair. C/o tanner leg pain- burning sensation.    PAST MEDICAL & SURGICAL HISTORY:  Atrial fibrillation  Type 2 diabetes mellitus  Congestive heart failure  Scalp psoriasis  S/P tonsillectomy  S/P appendectomy  H/O umbilical hernia repair      For details regarding the patient's social history, family history, and other miscellaneous elements, please refer the initial infectious diseases consultation and/or the admitting history and physical examination for this admission.      ROS:  CONSTITUTIONAL:  Negative fever or chills  EYES:  Negative  blurry vision or double vision  CARDIOVASCULAR:  Negative for chest pain or palpitations  RESPIRATORY:  Negative for cough, wheezing, or SOB   GASTROINTESTINAL:  Negative for nausea, vomiting, diarrhea, constipation, or abdominal pain  GENITOURINARY:  Negative frequency, urgency or dysuria  NEUROLOGIC:  No headache, confusion, dizziness, lightheadedness  All other systems were reviewed and are negative     No Known Allergies      Current inpatient medications :    ANTIBIOTICS/RELEVANT:  cefepime   IVPB 2000 milliGRAM(s) IV Intermittent every 8 hours  naloxone Injectable 0.4 milliGRAM(s) IV Push once  vancomycin  IVPB 1250 milliGRAM(s) IV Intermittent every 12 hours      acetaminophen     Tablet .. 650 milliGRAM(s) Oral every 6 hours PRN  aluminum hydroxide/magnesium hydroxide/simethicone Suspension 30 milliLiter(s) Oral every 4 hours PRN  bisacodyl 5 milliGRAM(s) Oral daily PRN  dextrose 5%. 1000 milliLiter(s) IV Continuous <Continuous>  dextrose 5%. 1000 milliLiter(s) IV Continuous <Continuous>  dextrose 50% Injectable 25 Gram(s) IV Push once  dextrose 50% Injectable 25 Gram(s) IV Push once  dextrose 50% Injectable 12.5 Gram(s) IV Push once  dextrose Oral Gel 15 Gram(s) Oral once PRN  furosemide   Injectable 40 milliGRAM(s) IV Push daily  gabapentin 300 milliGRAM(s) Oral every 8 hours  glucagon  Injectable 1 milliGRAM(s) IntraMuscular once  insulin glargine Injectable (LANTUS) 25 Unit(s) SubCutaneous at bedtime  insulin lispro (ADMELOG) corrective regimen sliding scale   SubCutaneous three times a day before meals  insulin lispro (ADMELOG) corrective regimen sliding scale   SubCutaneous at bedtime  insulin lispro Injectable (ADMELOG) 10 Unit(s) SubCutaneous three times a day before meals  melatonin 3 milliGRAM(s) Oral at bedtime PRN  metoprolol succinate  milliGRAM(s) Oral daily  nystatin Powder 1 Application(s) Topical three times a day  nystatin Powder 1 Application(s) Topical three times a day  ondansetron Injectable 4 milliGRAM(s) IV Push every 8 hours PRN  oxyCODONE    IR 2.5 milliGRAM(s) Oral every 4 hours PRN  oxyCODONE    IR 5 milliGRAM(s) Oral every 4 hours PRN  polyethylene glycol 3350 17 Gram(s) Oral daily  rivaroxaban 20 milliGRAM(s) Oral with dinner  senna 2 Tablet(s) Oral at bedtime      Objective:    10-09 @ 07:01  -  10-10 @ 07:00  --------------------------------------------------------  IN: 0 mL / OUT: 350 mL / NET: -350 mL    10-10 @ 07:01  -  10-10 @ 15:39  --------------------------------------------------------  IN: 0 mL / OUT: 1700 mL / NET: -1700 mL      T(C): 36.9 (10-10-23 @ 11:38), Max: 37.2 (10-10-23 @ 04:55)  HR: 101 (10-10-23 @ 11:38) (98 - 103)  BP: 104/67 (10-10-23 @ 11:38) (104/67 - 125/75)  RR: 18 (10-10-23 @ 11:38) (18 - 18)  SpO2: 93% (10-10-23 @ 11:38) (92% - 95%)        Physical Exam:  General:  no acute distress  Neck: supple, trachea midline  Lungs: clear, no wheeze/rhonchi  Cardiovascular: regular rate and rhythm, S1 S2  Abdomen: soft, nontender,  bowel sounds normal  Neurological: alert and oriented x3  Skin: no rash  Extremities: Tanner LE edema erythema Right > Left        LABS:                          12.8   7.48  )-----------( 187      ( 10 Oct 2023 05:45 )             37.9       10-10    138  |  104  |  11  ----------------------------<  331<H>  3.8   |  27  |  0.65    Ca    8.7      10 Oct 2023 05:45    TPro  6.7  /  Alb  2.7<L>  /  TBili  0.8  /  DBili  x   /  AST  18  /  ALT  23  /  AlkPhos  74  10-10      PT/INR - ( 09 Oct 2023 11:55 )   PT: 16.5 sec;   INR: 1.42 ratio         PTT - ( 09 Oct 2023 11:55 )  PTT:30.4 sec    MICROBIOLOGY:          RADIOLOGY & ADDITIONAL STUDIES:          Assessment :  59 yo F with PMHx of T2DM, Afib on Xarelto, and CHF with unknown EF admitted with worsening Right LE draining lesion on anterior shin and generalized worsening bilateral lower extremity edema and erythema   Hx MRSA  Leg pain burning sensation prob sec DM peripheral neuropathy    Plan :   Continue Vancomycin 1250mg q12hr  Dc Cefepime   Fu cultures  Trend temps and cbc  Wound care per wound care service  Elevate leg    Continue with present regiment.  Appropriate use of antibiotics and adverse effects reviewed.    > 35 minutes were spent in direct patient care reviewing notes, medications ,labs data/ imaging , discussion with multidisciplinary team.    Thank you for allowing me to participate in care of your patient .    Carole Mohr MD  Infectious Disease  [phone]

## 2023-10-10 NOTE — CONSULT NOTE ADULT - ASSESSMENT
Physical Exam:   Vital Signs Last 24 Hrs  T(C): 36.9 (10 Oct 2023 11:38), Max: 37.2 (10 Oct 2023 04:55)  T(F): 98.5 (10 Oct 2023 11:38), Max: 98.9 (10 Oct 2023 04:55)  HR: 101 (10 Oct 2023 11:38) (98 - 103)  BP: 104/67 (10 Oct 2023 11:38) (104/67 - 125/75)  BP(mean): --  RR: 18 (10 Oct 2023 11:38) (18 - 18)  SpO2: 93% (10 Oct 2023 11:38) (92% - 95%)    Parameters below as of 10 Oct 2023 11:38  Patient On (Oxygen Delivery Method): room air      CAPILLARY BLOOD GLUCOSE      POCT Blood Glucose.: 303 mg/dL (10 Oct 2023 11:55)  POCT Blood Glucose.: 340 mg/dL (10 Oct 2023 08:01)  POCT Blood Glucose.: 285 mg/dL (09 Oct 2023 21:43)  POCT Blood Glucose.: 323 mg/dL (09 Oct 2023 17:15)      Cholesterol, Serum: 113 mg/dL (05.19.21 @ 08:36)     HDL Cholesterol, Serum: 22 mg/dL (05.19.21 @ 08:36)     LDL Cholesterol Calculated: 66 mg/dL (05.19.21 @ 08:36)     DIET: CC  >50%

## 2023-10-10 NOTE — CONSULT NOTE ADULT - SUBJECTIVE AND OBJECTIVE BOX
Patient is a 58y old  Female who presents with a chief complaint of Right Left Wound (10 Oct 2023 15:39)    Type 2 DM DX >10 years ago, B/L neuropathy, Diabetic foot ulcers, CHF, Afib. reports managed by Endo Dr. Chawla last seen in April need to confirm, current A1c 13.8%. states Rx home basaglar 18 units and admelog 8 units premeal, metformin 1000mg BID. pt verbalizes proper pen administration and injection sites. pt states she has not been checking her BG, was using CGM previously. discussed importance of BG monitoring and trending, treating appropriately with insulin and titrating dose, goals 100-180mg/dL. discussed CC diet, carb identification and portion control, prioritizing lean protein and nonstarchy vegetables. discussed severe neuropathy as possible side effect from persistent hyperglycemia, need to increase insulin regimen for improved glycemic control. pt requested referral to julieth cramer, will consult Dr. Craig Perlman and review list of [facility] endos. will send script for testing supplies, pt states she has enough insulin @ home, discussed increasing dosages. verbal education and handouts provided  diabetes education provided- A1c measure and BG targets  fasting, <180 2 hours postmeal. medication MOA and considerations/side effects, inhospital BGM frequency and insulin administration, s/s of hyperglycemia/hypoglycemia and management, glycemic control and preventing complications, consistent carb diet, balanced plate method, consistent meal planning. sick day management, provider f/u  Hx ASCVD, CKD, HF, afib    HPI:  57 yo F with PMHx of T2DM, Afib on Xarelto, and CHF with unknown EF presents to the ED with 2 week history of worsening lower extremity edema, erythema, and pain. Pt states that the symptoms began two weeks ago with new symptom onset of right LE pus drainage from the anterior shin. 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 During the discussion, pt becomes distressed and begins to cry, stating that when she eats she has excruciating burning pain in her legs with associated discharge from beneath her abdominal panus. Pt has undergone GI workup in past without significant results. Pt states that she did not come to the ER earlier as her care giver was on vacation. Pt endorses headaches, confusion, chest pain, and shortness of breath when she eats as it exacerbates her pain in her legs. Endorses changes in urine, with notable sweet smelling, cloudy, sometimes frothy urine. Endorses chronic weakness, numbness, and tingling in bilateral lower extremities.     ED Course:   Vitals: BP: 130/67, HR: 119, Temp: 98.3, RR: 18, SpO2: 95% on RA   Labs:  ESR 29, WBC 6.65, PT 16.5, INR 1.42, Serum Glucose 369,   CXR: Clear lung fields as per personal read, official report pending   XR b/L lower extremities and R foot: generalized soft issue edema without evidence of bony involvement as per personal read, official report pending   EKG: Atrial Fibrillation 118 bpm with nonspecific ST and T wave abnormalities   Received in the ED: Morphine 4mg IVP x1, Zofran 4 mg IVP x1, Zosyn IVPB, Vancomycin IVPB, 1L NS bolus    (09 Oct 2023 13:42)      PAST MEDICAL & SURGICAL HISTORY:  Atrial fibrillation      Type 2 diabetes mellitus      Congestive heart failure      Scalp psoriasis      S/P tonsillectomy      S/P appendectomy      H/O umbilical hernia repair    Allergies    No Known Allergies    Intolerances        MEDICATIONS  (STANDING):  cefepime   IVPB 2000 milliGRAM(s) IV Intermittent every 8 hours  dextrose 5%. 1000 milliLiter(s) (50 mL/Hr) IV Continuous <Continuous>  dextrose 5%. 1000 milliLiter(s) (100 mL/Hr) IV Continuous <Continuous>  dextrose 50% Injectable 25 Gram(s) IV Push once  dextrose 50% Injectable 25 Gram(s) IV Push once  dextrose 50% Injectable 12.5 Gram(s) IV Push once  furosemide   Injectable 40 milliGRAM(s) IV Push daily  gabapentin 300 milliGRAM(s) Oral every 8 hours  glucagon  Injectable 1 milliGRAM(s) IntraMuscular once  insulin glargine Injectable (LANTUS) 25 Unit(s) SubCutaneous at bedtime  insulin lispro (ADMELOG) corrective regimen sliding scale   SubCutaneous three times a day before meals  insulin lispro (ADMELOG) corrective regimen sliding scale   SubCutaneous at bedtime  insulin lispro Injectable (ADMELOG) 10 Unit(s) SubCutaneous three times a day before meals  metoprolol succinate  milliGRAM(s) Oral daily  naloxone Injectable 0.4 milliGRAM(s) IV Push once  nystatin Powder 1 Application(s) Topical three times a day  nystatin Powder 1 Application(s) Topical three times a day  polyethylene glycol 3350 17 Gram(s) Oral daily  rivaroxaban 20 milliGRAM(s) Oral with dinner  senna 2 Tablet(s) Oral at bedtime  vancomycin  IVPB 1250 milliGRAM(s) IV Intermittent every 12 hours

## 2023-10-10 NOTE — PROGRESS NOTE ADULT - ASSESSMENT
59 yo F with PMHx of T2DM, Afib on Xarelto, and CHF with unknown EF presents to the ED with 2 week history of worsening lower extremity edema, erythema, and pain, admitted for wound care and management

## 2023-10-10 NOTE — CONSULT NOTE ADULT - PROBLEM SELECTOR RECOMMENDATION 9
Type 2 A1c 13.9% adm R leg wound  add 15 units Lantus in AM, C/W 25 units Lantus @ HS  c/w admelog 10 units premeal and mod ISS  CC diet and accucheck ACHS  Recommend endocrine-Perlman onconsult  FU appt: TBA, will assist in getting endo appt.  DSC recommendations: return to home increased regimen and glucose monitoring, lifestyle and diet modifications  diabetes education provided as documented above  Diabetes support info and cell # [phone] given   Goal 100-180 mg/dL; 140-180 mg/dL in critical care areas

## 2023-10-11 LAB
ANION GAP SERPL CALC-SCNC: 5 MMOL/L — SIGNIFICANT CHANGE UP (ref 5–17)
BUN SERPL-MCNC: 14 MG/DL — SIGNIFICANT CHANGE UP (ref 7–23)
CALCIUM SERPL-MCNC: 8.9 MG/DL — SIGNIFICANT CHANGE UP (ref 8.5–10.1)
CHLORIDE SERPL-SCNC: 102 MMOL/L — SIGNIFICANT CHANGE UP (ref 96–108)
CO2 SERPL-SCNC: 29 MMOL/L — SIGNIFICANT CHANGE UP (ref 22–31)
CREAT SERPL-MCNC: 0.6 MG/DL — SIGNIFICANT CHANGE UP (ref 0.5–1.3)
EGFR: 103 ML/MIN/1.73M2 — SIGNIFICANT CHANGE UP
GLUCOSE SERPL-MCNC: 310 MG/DL — HIGH (ref 70–99)
HCT VFR BLD CALC: 38.9 % — SIGNIFICANT CHANGE UP (ref 34.5–45)
HGB BLD-MCNC: 12.8 G/DL — SIGNIFICANT CHANGE UP (ref 11.5–15.5)
MCHC RBC-ENTMCNC: 31.9 PG — SIGNIFICANT CHANGE UP (ref 27–34)
MCHC RBC-ENTMCNC: 32.9 GM/DL — SIGNIFICANT CHANGE UP (ref 32–36)
MCV RBC AUTO: 97 FL — SIGNIFICANT CHANGE UP (ref 80–100)
NRBC # BLD: 0 /100 WBCS — SIGNIFICANT CHANGE UP (ref 0–0)
PLATELET # BLD AUTO: 179 K/UL — SIGNIFICANT CHANGE UP (ref 150–400)
POTASSIUM SERPL-MCNC: 3.7 MMOL/L — SIGNIFICANT CHANGE UP (ref 3.5–5.3)
POTASSIUM SERPL-SCNC: 3.7 MMOL/L — SIGNIFICANT CHANGE UP (ref 3.5–5.3)
RBC # BLD: 4.01 M/UL — SIGNIFICANT CHANGE UP (ref 3.8–5.2)
RBC # FLD: 13 % — SIGNIFICANT CHANGE UP (ref 10.3–14.5)
SODIUM SERPL-SCNC: 136 MMOL/L — SIGNIFICANT CHANGE UP (ref 135–145)
VANCOMYCIN TROUGH SERPL-MCNC: 14.1 UG/ML — SIGNIFICANT CHANGE UP (ref 10–20)
WBC # BLD: 6.56 K/UL — SIGNIFICANT CHANGE UP (ref 3.8–10.5)
WBC # FLD AUTO: 6.56 K/UL — SIGNIFICANT CHANGE UP (ref 3.8–10.5)

## 2023-10-11 PROCEDURE — 99232 SBSQ HOSP IP/OBS MODERATE 35: CPT

## 2023-10-11 PROCEDURE — 99233 SBSQ HOSP IP/OBS HIGH 50: CPT

## 2023-10-11 RX ADMIN — RIVAROXABAN 20 MILLIGRAM(S): KIT at 17:36

## 2023-10-11 RX ADMIN — OXYCODONE HYDROCHLORIDE 5 MILLIGRAM(S): 5 TABLET ORAL at 07:56

## 2023-10-11 RX ADMIN — GABAPENTIN 300 MILLIGRAM(S): 400 CAPSULE ORAL at 22:13

## 2023-10-11 RX ADMIN — NYSTATIN CREAM 1 APPLICATION(S): 100000 CREAM TOPICAL at 13:29

## 2023-10-11 RX ADMIN — NYSTATIN CREAM 1 APPLICATION(S): 100000 CREAM TOPICAL at 06:44

## 2023-10-11 RX ADMIN — INSULIN GLARGINE 25 UNIT(S): 100 INJECTION, SOLUTION SUBCUTANEOUS at 22:16

## 2023-10-11 RX ADMIN — OXYCODONE HYDROCHLORIDE 5 MILLIGRAM(S): 5 TABLET ORAL at 00:45

## 2023-10-11 RX ADMIN — OXYCODONE HYDROCHLORIDE 5 MILLIGRAM(S): 5 TABLET ORAL at 18:45

## 2023-10-11 RX ADMIN — INSULIN GLARGINE 15 UNIT(S): 100 INJECTION, SOLUTION SUBCUTANEOUS at 07:57

## 2023-10-11 RX ADMIN — GABAPENTIN 300 MILLIGRAM(S): 400 CAPSULE ORAL at 13:29

## 2023-10-11 RX ADMIN — Medication 100 MILLIGRAM(S): at 06:44

## 2023-10-11 RX ADMIN — OXYCODONE HYDROCHLORIDE 5 MILLIGRAM(S): 5 TABLET ORAL at 17:51

## 2023-10-11 RX ADMIN — OXYCODONE HYDROCHLORIDE 5 MILLIGRAM(S): 5 TABLET ORAL at 23:29

## 2023-10-11 RX ADMIN — OXYCODONE HYDROCHLORIDE 5 MILLIGRAM(S): 5 TABLET ORAL at 08:50

## 2023-10-11 RX ADMIN — Medication 166.67 MILLIGRAM(S): at 01:12

## 2023-10-11 RX ADMIN — GABAPENTIN 300 MILLIGRAM(S): 400 CAPSULE ORAL at 06:44

## 2023-10-11 RX ADMIN — Medication 10 UNIT(S): at 17:28

## 2023-10-11 RX ADMIN — NYSTATIN CREAM 1 APPLICATION(S): 100000 CREAM TOPICAL at 22:13

## 2023-10-11 RX ADMIN — Medication 10 UNIT(S): at 08:01

## 2023-10-11 RX ADMIN — Medication 166.67 MILLIGRAM(S): at 13:30

## 2023-10-11 RX ADMIN — NYSTATIN CREAM 1 APPLICATION(S): 100000 CREAM TOPICAL at 22:15

## 2023-10-11 RX ADMIN — Medication 10 UNIT(S): at 12:14

## 2023-10-11 RX ADMIN — Medication 40 MILLIGRAM(S): at 06:44

## 2023-10-11 RX ADMIN — SENNA PLUS 2 TABLET(S): 8.6 TABLET ORAL at 22:13

## 2023-10-11 RX ADMIN — Medication 6: at 08:00

## 2023-10-11 RX ADMIN — NYSTATIN CREAM 1 APPLICATION(S): 100000 CREAM TOPICAL at 13:31

## 2023-10-11 RX ADMIN — Medication 6: at 17:27

## 2023-10-11 RX ADMIN — Medication 8: at 12:14

## 2023-10-11 RX ADMIN — OXYCODONE HYDROCHLORIDE 5 MILLIGRAM(S): 5 TABLET ORAL at 22:59

## 2023-10-11 NOTE — PROGRESS NOTE ADULT - PROBLEM SELECTOR PLAN 1
Worsening Right LE draining lesion on anterior shin and generalized worsening bilateral lower extremity edema and erythema   - XR b/L lower extremities and R foot performed, generalized soft issue edema without evidence of bony involvement  - Continue with Vancomycin 1250mg q12 hr and Cefepime 2 g q8 hr   -Monitor renal function, Vanco trough.   - Blood Cultures NGTD  -F/u wound cultures   - Pain management: Tylenol 650 mg mild, Oxycodone 2.5 mg moderate, Oxycodone 5 mg severe   - Lasix 40 mg IVP daily  - Vascular studies on prior admission showed significant venous reflux, without evidence of extensive stenosis or vascular disease   - Infectious Disease consulted, Dr. Viviana Spring, following   - Podiatry consulted, Dr. Meek  -Local wound care per Podiatry   -Acute pain. Oxycodone IR ordered for as needed use. Bowel regimen to prevent constipation on opiates

## 2023-10-11 NOTE — PROGRESS NOTE ADULT - SUBJECTIVE AND OBJECTIVE BOX
Patient is a 58y old  Female who presents with a chief complaint of Right Left Wound (11 Oct 2023 08:46)       INTERVAL HPI/OVERNIGHT EVENTS: patient seen and examined at bedside. C/o pain. Denies chest pain, palpitation, sob     MEDICATIONS  (STANDING):  dextrose 5%. 1000 milliLiter(s) (100 mL/Hr) IV Continuous <Continuous>  dextrose 5%. 1000 milliLiter(s) (50 mL/Hr) IV Continuous <Continuous>  dextrose 50% Injectable 25 Gram(s) IV Push once  dextrose 50% Injectable 25 Gram(s) IV Push once  dextrose 50% Injectable 12.5 Gram(s) IV Push once  furosemide   Injectable 40 milliGRAM(s) IV Push daily  gabapentin 300 milliGRAM(s) Oral every 8 hours  glucagon  Injectable 1 milliGRAM(s) IntraMuscular once  insulin glargine Injectable (LANTUS) 25 Unit(s) SubCutaneous at bedtime  insulin lispro (ADMELOG) corrective regimen sliding scale   SubCutaneous three times a day before meals  insulin lispro (ADMELOG) corrective regimen sliding scale   SubCutaneous at bedtime  insulin lispro Injectable (ADMELOG) 10 Unit(s) SubCutaneous three times a day before meals  metoprolol succinate  milliGRAM(s) Oral daily  naloxone Injectable 0.4 milliGRAM(s) IV Push once  nystatin Powder 1 Application(s) Topical three times a day  nystatin Powder 1 Application(s) Topical three times a day  polyethylene glycol 3350 17 Gram(s) Oral daily  rivaroxaban 20 milliGRAM(s) Oral with dinner  senna 2 Tablet(s) Oral at bedtime  vancomycin  IVPB 1250 milliGRAM(s) IV Intermittent every 12 hours    MEDICATIONS  (PRN):  acetaminophen     Tablet .. 650 milliGRAM(s) Oral every 6 hours PRN Temp greater or equal to 38C (100.4F), Mild Pain (1 - 3)  aluminum hydroxide/magnesium hydroxide/simethicone Suspension 30 milliLiter(s) Oral every 4 hours PRN Dyspepsia  bisacodyl 5 milliGRAM(s) Oral daily PRN Constipation  dextrose Oral Gel 15 Gram(s) Oral once PRN Blood Glucose LESS THAN 70 milliGRAM(s)/deciliter  melatonin 3 milliGRAM(s) Oral at bedtime PRN Insomnia  ondansetron Injectable 4 milliGRAM(s) IV Push every 8 hours PRN Nausea and/or Vomiting  oxyCODONE    IR 2.5 milliGRAM(s) Oral every 4 hours PRN Moderate Pain (4 - 6)  oxyCODONE    IR 5 milliGRAM(s) Oral every 4 hours PRN Severe Pain (7 - 10)      Allergies    No Known Allergies    Intolerances        REVIEW OF SYSTEMS:  Per HPI, all other ROS noted Negative   Vital Signs Last 24 Hrs  T(C): 36.7 (11 Oct 2023 04:47), Max: 37.8 (10 Oct 2023 22:33)  T(F): 98 (11 Oct 2023 04:47), Max: 100.1 (10 Oct 2023 22:33)  HR: 93 (11 Oct 2023 04:47) (93 - 101)  BP: 122/72 (11 Oct 2023 04:47) (104/67 - 122/72)  BP(mean): --  RR: 18 (11 Oct 2023 04:47) (18 - 18)  SpO2: 94% (11 Oct 2023 04:47) (92% - 94%)    Parameters below as of 11 Oct 2023 04:47  Patient On (Oxygen Delivery Method): room air        PHYSICAL EXAM:  GENERAL: NAD, awake, alert   HEENT:  AT/NC, anicteric, moist mucous membranes, EOMI, PERRL, no lid-lag, conjunctiva and sclera clear  CHEST/LUNG:  CTA b/l, no rales, wheezes, or rhonchi,  normal respiratory effort, no intercostal retractions  HEART:  RRR, S1, S2, no murmurs; 1+ pitting edema  ABDOMEN:  BS+, soft, nontender, nondistended  MSK/EXTREMITIES: faint peripheral pulses, venous stasis changes   NERVOUS SYSTEM: answers questions and follows commands appropriately, A&Ox3 grossly moves all extremities   PSYCH: Appropriate affect, Alert & Awake; fair judgement    LABS:                        12.8   6.56  )-----------( 179      ( 11 Oct 2023 06:40 )             38.9     11 Oct 2023 06:40    136    |  102    |  14     ----------------------------<  310    3.7     |  29     |  0.60     Ca    8.9        11 Oct 2023 06:40      PT/INR - ( 09 Oct 2023 11:55 )   PT: 16.5 sec;   INR: 1.42 ratio         PTT - ( 09 Oct 2023 11:55 )  PTT:30.4 sec  CAPILLARY BLOOD GLUCOSE      POCT Blood Glucose.: 295 mg/dL (11 Oct 2023 07:28)  POCT Blood Glucose.: 301 mg/dL (10 Oct 2023 23:31)  POCT Blood Glucose.: 298 mg/dL (10 Oct 2023 23:19)  POCT Blood Glucose.: 314 mg/dL (10 Oct 2023 21:29)  POCT Blood Glucose.: 337 mg/dL (10 Oct 2023 17:03)  POCT Blood Glucose.: 303 mg/dL (10 Oct 2023 11:55)    BLOOD CULTURE  10-09 @ 15:27   50,000 - 99,000 CFU/mL Gram Negative Rods  --  --  10-09 @ 11:55   No growth at 24 hours  --  --  10-09 @ 11:20   No growth at 24 hours  --  --    RADIOLOGY & ADDITIONAL TESTS:    Imaging Personally Reviewed:  [ ] YES     Consultant(s) Notes Reviewed:      Care Discussed with Consultants/Other Providers:

## 2023-10-11 NOTE — DIETITIAN INITIAL EVALUATION ADULT - PROBLEM SELECTOR PLAN 2
- Continue home medications of Metoprolol and Xarelto   - Pt states that she feels her palpitations when she eats and pain is exacerbated  - EKG: Atrial Fibrillation 118 bpm with nonspecific ST and T wave abnormalities  - Cardiology consulted, Dr Umaña, follow up recommendations

## 2023-10-11 NOTE — PROGRESS NOTE ADULT - ASSESSMENT
57 yo F with PMHx of T2DM, Afib on Xarelto, and CHF with unknown EF presents to the ED with 2 week history of worsening lower extremity edema, erythema, and pain, admitted for wound care and management

## 2023-10-11 NOTE — CONSULT NOTE ADULT - PROBLEM SELECTOR RECOMMENDATION 9
cont lantus 25 units qhs  cont admelog 10 units 3x/day before meals  cont admelog corrective scale coverage qac/qhs  cont cons cho diet  goal bg 100-180 in hosp setting

## 2023-10-11 NOTE — CONSULT NOTE ADULT - SUBJECTIVE AND OBJECTIVE BOX
Patient is a 58y old  Female who presents with a chief complaint of Right Left Wound (11 Oct 2023 08:46)      Reason For Consult: dm2 uncontrolled    HPI:  57 yo F with PMHx of T2DM, Afib on Xarelto, and CHF with unknown EF presents to the ED with 2 week history of worsening lower extremity edema, erythema, and pain. Pt states that the symptoms began two weeks ago with new symptom onset of right LE pus drainage from the anterior shin. 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 During the discussion, pt becomes distressed and begins to cry, stating that when she eats she has excruciating burning pain in her legs with associated discharge from beneath her abdominal panus. Pt has undergone GI workup in past without significant results. Pt states that she did not come to the ER earlier as her care giver was on vacation. Pt endorses headaches, confusion, chest pain, and shortness of breath when she eats as it exacerbates her pain in her legs. Endorses changes in urine, with notable sweet smelling, cloudy, sometimes frothy urine. Endorses chronic weakness, numbness, and tingling in bilateral lower extremities.     ED Course:   Vitals: BP: 130/67, HR: 119, Temp: 98.3, RR: 18, SpO2: 95% on RA   Labs:  ESR 29, WBC 6.65, PT 16.5, INR 1.42, Serum Glucose 369,   CXR: Clear lung fields as per personal read, official report pending   XR b/L lower extremities and R foot: generalized soft issue edema without evidence of bony involvement as per personal read, official report pending   EKG: Atrial Fibrillation 118 bpm with nonspecific ST and T wave abnormalities   Received in the ED: Morphine 4mg IVP x1, Zofran 4 mg IVP x1, Zosyn IVPB, Vancomycin IVPB, 1L NS bolus    (09 Oct 2023 13:42)      PAST MEDICAL & SURGICAL HISTORY:  Atrial fibrillation      Type 2 diabetes mellitus      Congestive heart failure      Scalp psoriasis      S/P tonsillectomy      S/P appendectomy      H/O umbilical hernia repair          FAMILY HISTORY:  No pertinent family history in first degree relatives          Social History:    MEDICATIONS  (STANDING):  dextrose 5%. 1000 milliLiter(s) (100 mL/Hr) IV Continuous <Continuous>  dextrose 5%. 1000 milliLiter(s) (50 mL/Hr) IV Continuous <Continuous>  dextrose 50% Injectable 25 Gram(s) IV Push once  dextrose 50% Injectable 25 Gram(s) IV Push once  dextrose 50% Injectable 12.5 Gram(s) IV Push once  furosemide   Injectable 40 milliGRAM(s) IV Push daily  gabapentin 300 milliGRAM(s) Oral every 8 hours  glucagon  Injectable 1 milliGRAM(s) IntraMuscular once  insulin glargine Injectable (LANTUS) 25 Unit(s) SubCutaneous at bedtime  insulin lispro (ADMELOG) corrective regimen sliding scale   SubCutaneous three times a day before meals  insulin lispro (ADMELOG) corrective regimen sliding scale   SubCutaneous at bedtime  insulin lispro Injectable (ADMELOG) 10 Unit(s) SubCutaneous three times a day before meals  metoprolol succinate  milliGRAM(s) Oral daily  naloxone Injectable 0.4 milliGRAM(s) IV Push once  nystatin Powder 1 Application(s) Topical three times a day  nystatin Powder 1 Application(s) Topical three times a day  polyethylene glycol 3350 17 Gram(s) Oral daily  rivaroxaban 20 milliGRAM(s) Oral with dinner  senna 2 Tablet(s) Oral at bedtime  vancomycin  IVPB 1250 milliGRAM(s) IV Intermittent every 12 hours    MEDICATIONS  (PRN):  acetaminophen     Tablet .. 650 milliGRAM(s) Oral every 6 hours PRN Temp greater or equal to 38C (100.4F), Mild Pain (1 - 3)  aluminum hydroxide/magnesium hydroxide/simethicone Suspension 30 milliLiter(s) Oral every 4 hours PRN Dyspepsia  bisacodyl 5 milliGRAM(s) Oral daily PRN Constipation  dextrose Oral Gel 15 Gram(s) Oral once PRN Blood Glucose LESS THAN 70 milliGRAM(s)/deciliter  melatonin 3 milliGRAM(s) Oral at bedtime PRN Insomnia  ondansetron Injectable 4 milliGRAM(s) IV Push every 8 hours PRN Nausea and/or Vomiting  oxyCODONE    IR 2.5 milliGRAM(s) Oral every 4 hours PRN Moderate Pain (4 - 6)  oxyCODONE    IR 5 milliGRAM(s) Oral every 4 hours PRN Severe Pain (7 - 10)        T(C): 36.7 (10-11-23 @ 04:47), Max: 37.8 (10-10-23 @ 22:33)  HR: 93 (10-11-23 @ 04:47) (93 - 101)  BP: 122/72 (10-11-23 @ 04:47) (104/67 - 122/72)  RR: 18 (10-11-23 @ 04:47) (18 - 18)  SpO2: 94% (10-11-23 @ 04:47) (92% - 94%)  Wt(kg): --    PHYSICAL EXAM:  GENERAL: NAD, well-groomed, well-developed  HEAD:  Atraumatic, Normocephalic  NECK: Supple, No JVD, Normal thyroid  CHEST/LUNG: Clear to percussion bilaterally; No rales, rhonchi, wheezing, or rubs  HEART: Regular rate and rhythm; No murmurs, rubs, or gallops  ABDOMEN: Soft, Nontender, Nondistended; Bowel sounds present  EXTREMITIES:  2+ Peripheral Pulses, No clubbing, cyanosis, or edema  SKIN: No rashes or lesions    CAPILLARY BLOOD GLUCOSE      POCT Blood Glucose.: 295 mg/dL (11 Oct 2023 07:28)  POCT Blood Glucose.: 301 mg/dL (10 Oct 2023 23:31)  POCT Blood Glucose.: 298 mg/dL (10 Oct 2023 23:19)  POCT Blood Glucose.: 314 mg/dL (10 Oct 2023 21:29)  POCT Blood Glucose.: 337 mg/dL (10 Oct 2023 17:03)  POCT Blood Glucose.: 303 mg/dL (10 Oct 2023 11:55)                            12.8   6.56  )-----------( 179      ( 11 Oct 2023 06:40 )             38.9       CMP:  10-11 @ 06:40  SGPT --  Albumin --   Alk Phos --   Anion Gap 5   SGOT --   Total Bili --   BUN 14   Calcium Total 8.9   CO2 29   Chloride 102   Creatinine 0.60   eGFR if AA --   eGFR if non AA --   Glucose 310   Potassium 3.7   Protein --   Sodium 136      Thyroid Function Tests:      Diabetes Tests:       Radiology:

## 2023-10-11 NOTE — DIETITIAN INITIAL EVALUATION ADULT - PROBLEM SELECTOR PLAN 1
- Worsening Right LE draining lesion on anterior shin and generalized worsening bilateral lower extremity edema and erythema   - XR b/L lower extremities and R foot performed, generalized soft issue edema without evidence of bony involvement as per personal read, official report pending   - Afebrile, wbc wnl, Lactate 1.2, ESR 29 (elevated)   - S/p Morphine 4mg IVP x1, Zofran 4 mg IVP x1, Zosyn IVPB, Vancomycin IVPB, 1L NS bolus in ED   - Continue with Vancomycin 1250mg q12 hr and Cefepime 2 g q8 hr   - Blood and Wound Cultures sent, follow up results   - Doppler studies ordered to R/O DVT given sedentary lifestyle  - Pain management: Tylenol 650 mg mild, Oxycodone 2.5 mg moderate, Oxycodone 5 mg severe   - Lasix 40 mg IVP daily  - Vascular studies on prior admission showing significant venous reflux, without evidence of extensive stenosis or vascular disease   - Infectious Disease consulted, Dr. Viviana Spring, follow up recommendations   - Podiatry consulted, Dr. Meek, follow up recommendations

## 2023-10-11 NOTE — DIETITIAN INITIAL EVALUATION ADULT - PROBLEM SELECTOR PLAN 4
Chronic, History of DM2  - Hold home medications  - Serum glucose 369 on admission, possibly elevated due to infection   - 18 units Basaglar qhs --> continue with 18 units lantus at night    - Premeal Admelog 8 units --> continue with 6 units TID   - Moderate dose insulin corrective scale  - Hypoglycemia protocol, Accuchecks AC&HS  - Diet regular food with DASH/TLC  - F/u HbA1c AM

## 2023-10-11 NOTE — PROGRESS NOTE ADULT - ASSESSMENT
abdominal pain    CT noted; mesenteric vessels patent  had extensive GI workup  she does not know who her GI is; she is a poor historian  do not anticipate repeating her endoscopic eval    I reviewed the overnight course of events on the unit, re-confirming the patient history. I discussed the care with the patient and their family  Differential diagnosis and plan of care discussed with patient after the evaluation  40 minutes spent on total encounter of which more than fifty percent of the encounter was spent counseling and/or coordinating care by the attending physician.  Advanced care planning was discussed with patient and family.  Advanced care planning forms were reviewed and discussed.  Risks, benefits and alternatives of gastroenterologic procedures were discussed in detail and all questions were answered.

## 2023-10-11 NOTE — PROGRESS NOTE ADULT - SUBJECTIVE AND OBJECTIVE BOX
[facility]  Jarett Stein PA-C  [address] [place] [address]  [phone]      INTERVAL HPI/OVERNIGHT EVENTS:  Pt s/e  C/o bilateral leg pain  Intermittent abdominal pain    MEDICATIONS  (STANDING):  dextrose 5%. 1000 milliLiter(s) (50 mL/Hr) IV Continuous <Continuous>  dextrose 5%. 1000 milliLiter(s) (100 mL/Hr) IV Continuous <Continuous>  dextrose 50% Injectable 25 Gram(s) IV Push once  dextrose 50% Injectable 25 Gram(s) IV Push once  dextrose 50% Injectable 12.5 Gram(s) IV Push once  furosemide   Injectable 40 milliGRAM(s) IV Push daily  gabapentin 300 milliGRAM(s) Oral every 8 hours  glucagon  Injectable 1 milliGRAM(s) IntraMuscular once  insulin glargine Injectable (LANTUS) 25 Unit(s) SubCutaneous at bedtime  insulin glargine Injectable (LANTUS) 15 Unit(s) SubCutaneous every morning  insulin lispro (ADMELOG) corrective regimen sliding scale   SubCutaneous three times a day before meals  insulin lispro (ADMELOG) corrective regimen sliding scale   SubCutaneous at bedtime  insulin lispro Injectable (ADMELOG) 10 Unit(s) SubCutaneous three times a day before meals  metoprolol succinate  milliGRAM(s) Oral daily  naloxone Injectable 0.4 milliGRAM(s) IV Push once  nystatin Powder 1 Application(s) Topical three times a day  nystatin Powder 1 Application(s) Topical three times a day  polyethylene glycol 3350 17 Gram(s) Oral daily  rivaroxaban 20 milliGRAM(s) Oral with dinner  senna 2 Tablet(s) Oral at bedtime  vancomycin  IVPB 1250 milliGRAM(s) IV Intermittent every 12 hours    MEDICATIONS  (PRN):  acetaminophen     Tablet .. 650 milliGRAM(s) Oral every 6 hours PRN Temp greater or equal to 38C (100.4F), Mild Pain (1 - 3)  aluminum hydroxide/magnesium hydroxide/simethicone Suspension 30 milliLiter(s) Oral every 4 hours PRN Dyspepsia  bisacodyl 5 milliGRAM(s) Oral daily PRN Constipation  dextrose Oral Gel 15 Gram(s) Oral once PRN Blood Glucose LESS THAN 70 milliGRAM(s)/deciliter  melatonin 3 milliGRAM(s) Oral at bedtime PRN Insomnia  ondansetron Injectable 4 milliGRAM(s) IV Push every 8 hours PRN Nausea and/or Vomiting  oxyCODONE    IR 2.5 milliGRAM(s) Oral every 4 hours PRN Moderate Pain (4 - 6)  oxyCODONE    IR 5 milliGRAM(s) Oral every 4 hours PRN Severe Pain (7 - 10)      Allergies    No Known Allergies    PHYSICAL EXAM:   Vital Signs:  Vital Signs Last 24 Hrs  T(C): 36.7 (11 Oct 2023 04:47), Max: 37.8 (10 Oct 2023 22:33)  T(F): 98 (11 Oct 2023 04:47), Max: 100.1 (10 Oct 2023 22:33)  HR: 93 (11 Oct 2023 04:47) (93 - 99)  BP: 122/72 (11 Oct 2023 04:47) (114/74 - 122/72)  BP(mean): --  RR: 18 (11 Oct 2023 04:47) (18 - 18)  SpO2: 94% (11 Oct 2023 04:47) (92% - 94%)    Parameters below as of 11 Oct 2023 04:47  Patient On (Oxygen Delivery Method): room air      Daily Height in cm: 152.4 (10 Oct 2023 16:45)    Daily Weight in k.5 (11 Oct 2023 04:47)    GENERAL:  Appears stated age  HEENT:  NC/AT  CHEST:  Full & symmetric excursion  HEART:  Regular rhythm  ABDOMEN:  Soft, non-tender, non-distended  EXTEREMITIES:  no cyanosis  SKIN:  No rash  NEURO:  Alert      LABS:                        12.8   6.56  )-----------( 179      ( 11 Oct 2023 06:40 )             38.9     10-11    136  |  102  |  14  ----------------------------<  310<H>  3.7   |  29  |  0.60    Ca    8.9      11 Oct 2023 06:40    TPro  6.7  /  Alb  2.7<L>  /  TBili  0.8  /  DBili  x   /  AST  18  /  ALT  23  /  AlkPhos  74  10-10      Urinalysis Basic - ( 11 Oct 2023 06:40 )    Color: x / Appearance: x / SG: x / pH: x  Gluc: 310 mg/dL / Ketone: x  / Bili: x / Urobili: x   Blood: x / Protein: x / Nitrite: x   Leuk Esterase: x / RBC: x / WBC x   Sq Epi: x / Non Sq Epi: x / Bacteria: x

## 2023-10-11 NOTE — DIETITIAN INITIAL EVALUATION ADULT - PROBLEM SELECTOR PLAN 5
- Unknown EF, no Echo found in system of [facility]   - Pt on Lasix 20mg BID at home   - Continue with Lasix 40mg IVP daily  - Continue to monitor  -Dr Umaña consulted

## 2023-10-11 NOTE — DIETITIAN INITIAL EVALUATION ADULT - PERSON TAUGHT/METHOD
written consistent cho heart healthy diet information left at bedside/written material/patient instructed

## 2023-10-11 NOTE — CONSULT NOTE ADULT - SUBJECTIVE AND OBJECTIVE BOX
Patient is a 58y old  Female who presents with a chief complaint of Right Left Wound (10 Oct 2023 16:45)      Reason For Consult: dm2 uncontrolled    HPI:  59 yo F with PMHx of T2DM, Afib on Xarelto, and CHF with unknown EF presents to the ED with 2 week history of worsening lower extremity edema, erythema, and pain. Pt states that the symptoms began two weeks ago with new symptom onset of right LE pus drainage from the anterior shin. 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 During the discussion, pt becomes distressed and begins to cry, stating that when she eats she has excruciating burning pain in her legs with associated discharge from beneath her abdominal panus. Pt has undergone GI workup in past without significant results. Pt states that she did not come to the ER earlier as her care giver was on vacation. Pt endorses headaches, confusion, chest pain, and shortness of breath when she eats as it exacerbates her pain in her legs. Endorses changes in urine, with notable sweet smelling, cloudy, sometimes frothy urine. Endorses chronic weakness, numbness, and tingling in bilateral lower extremities.     ED Course:   Vitals: BP: 130/67, HR: 119, Temp: 98.3, RR: 18, SpO2: 95% on RA   Labs:  ESR 29, WBC 6.65, PT 16.5, INR 1.42, Serum Glucose 369,   CXR: Clear lung fields as per personal read, official report pending   XR b/L lower extremities and R foot: generalized soft issue edema without evidence of bony involvement as per personal read, official report pending   EKG: Atrial Fibrillation 118 bpm with nonspecific ST and T wave abnormalities   Received in the ED: Morphine 4mg IVP x1, Zofran 4 mg IVP x1, Zosyn IVPB, Vancomycin IVPB, 1L NS bolus    (09 Oct 2023 13:42)      PAST MEDICAL & SURGICAL HISTORY:  Atrial fibrillation      Type 2 diabetes mellitus      Congestive heart failure      Scalp psoriasis      S/P tonsillectomy      S/P appendectomy      H/O umbilical hernia repair          FAMILY HISTORY:  No pertinent family history in first degree relatives          Social History:    MEDICATIONS  (STANDING):  dextrose 5%. 1000 milliLiter(s) (50 mL/Hr) IV Continuous <Continuous>  dextrose 5%. 1000 milliLiter(s) (100 mL/Hr) IV Continuous <Continuous>  dextrose 50% Injectable 25 Gram(s) IV Push once  dextrose 50% Injectable 25 Gram(s) IV Push once  dextrose 50% Injectable 12.5 Gram(s) IV Push once  furosemide   Injectable 40 milliGRAM(s) IV Push daily  gabapentin 300 milliGRAM(s) Oral every 8 hours  glucagon  Injectable 1 milliGRAM(s) IntraMuscular once  insulin glargine Injectable (LANTUS) 25 Unit(s) SubCutaneous at bedtime  insulin lispro (ADMELOG) corrective regimen sliding scale   SubCutaneous three times a day before meals  insulin lispro (ADMELOG) corrective regimen sliding scale   SubCutaneous at bedtime  insulin lispro Injectable (ADMELOG) 10 Unit(s) SubCutaneous three times a day before meals  metoprolol succinate  milliGRAM(s) Oral daily  naloxone Injectable 0.4 milliGRAM(s) IV Push once  nystatin Powder 1 Application(s) Topical three times a day  nystatin Powder 1 Application(s) Topical three times a day  polyethylene glycol 3350 17 Gram(s) Oral daily  rivaroxaban 20 milliGRAM(s) Oral with dinner  senna 2 Tablet(s) Oral at bedtime  vancomycin  IVPB 1250 milliGRAM(s) IV Intermittent every 12 hours    MEDICATIONS  (PRN):  acetaminophen     Tablet .. 650 milliGRAM(s) Oral every 6 hours PRN Temp greater or equal to 38C (100.4F), Mild Pain (1 - 3)  aluminum hydroxide/magnesium hydroxide/simethicone Suspension 30 milliLiter(s) Oral every 4 hours PRN Dyspepsia  bisacodyl 5 milliGRAM(s) Oral daily PRN Constipation  dextrose Oral Gel 15 Gram(s) Oral once PRN Blood Glucose LESS THAN 70 milliGRAM(s)/deciliter  melatonin 3 milliGRAM(s) Oral at bedtime PRN Insomnia  ondansetron Injectable 4 milliGRAM(s) IV Push every 8 hours PRN Nausea and/or Vomiting  oxyCODONE    IR 2.5 milliGRAM(s) Oral every 4 hours PRN Moderate Pain (4 - 6)  oxyCODONE    IR 5 milliGRAM(s) Oral every 4 hours PRN Severe Pain (7 - 10)        T(C): 36.7 (10-11-23 @ 04:47), Max: 37.8 (10-10-23 @ 22:33)  HR: 93 (10-11-23 @ 04:47) (93 - 101)  BP: 122/72 (10-11-23 @ 04:47) (104/67 - 122/72)  RR: 18 (10-11-23 @ 04:47) (18 - 18)  SpO2: 94% (10-11-23 @ 04:47) (92% - 94%)  Wt(kg): --    PHYSICAL EXAM:  GENERAL: NAD, well-groomed, well-developed  HEAD:  Atraumatic, Normocephalic  NECK: Supple, No JVD, Normal thyroid  CHEST/LUNG: Clear to percussion bilaterally; No rales, rhonchi, wheezing, or rubs  HEART: Regular rate and rhythm; No murmurs, rubs, or gallops  ABDOMEN: Soft, Nontender, Nondistended; Bowel sounds present  EXTREMITIES:le dsg  CAPILLARY BLOOD GLUCOSE      POCT Blood Glucose.: 295 mg/dL (11 Oct 2023 07:28)  POCT Blood Glucose.: 301 mg/dL (10 Oct 2023 23:31)  POCT Blood Glucose.: 298 mg/dL (10 Oct 2023 23:19)  POCT Blood Glucose.: 314 mg/dL (10 Oct 2023 21:29)  POCT Blood Glucose.: 337 mg/dL (10 Oct 2023 17:03)  POCT Blood Glucose.: 303 mg/dL (10 Oct 2023 11:55)                            12.8   6.56  )-----------( 179      ( 11 Oct 2023 06:40 )             38.9       CMP:  10-11 @ 06:40  SGPT --  Albumin --   Alk Phos --   Anion Gap 5   SGOT --   Total Bili --   BUN 14   Calcium Total 8.9   CO2 29   Chloride 102   Creatinine 0.60   eGFR if AA --   eGFR if non AA --   Glucose 310   Potassium 3.7   Protein --   Sodium 136      Thyroid Function Tests:      Diabetes Tests:       Radiology:

## 2023-10-11 NOTE — CONSULT NOTE ADULT - PROBLEM SELECTOR RECOMMENDATION 9
mdii intensified  cont lantus 15 qam/ 25 qhs  cont admelog 10 units 3x/day before meals  cont cons cho diet  goal bg 100-180 in hosp setting

## 2023-10-11 NOTE — DIETITIAN INITIAL EVALUATION ADULT - PERTINENT LABORATORY DATA
10-11    136  |  102  |  14  ----------------------------<  310<H>  3.7   |  29  |  0.60    Ca    8.9      11 Oct 2023 06:40    TPro  6.7  /  Alb  2.7<L>  /  TBili  0.8  /  DBili  x   /  AST  18  /  ALT  23  /  AlkPhos  74  10-10  POCT Blood Glucose.: 295 mg/dL (10-11-23 @ 07:28)  A1C with Estimated Average Glucose Result: 13.8 % (10-10-23 @ 05:45)  A1C with Estimated Average Glucose Result: 12.8 % (02-12-23 @ 05:51)

## 2023-10-11 NOTE — DIETITIAN INITIAL EVALUATION ADULT - OTHER INFO
Reason for Admission: Right Left Wound  History of Present Illness:   59 yo F with PMHx of T2DM, Afib on Xarelto, and CHF with unknown EF presents to the ED with 2 week history of worsening lower extremity edema, erythema, and pain. Pt states that the symptoms began two weeks ago with new symptom onset of right LE pus drainage from the anterior shin. 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 During the discussion, pt becomes distressed and begins to cry, stating that when she eats she has excruciating burning pain in her legs with associated discharge from beneath her abdominal panus. Pt has undergone GI workup in past without significant results. Pt states that she did not come to the ER earlier as her care giver was on vacation. Pt endorses headaches, confusion, chest pain, and shortness of breath when she eats as it exacerbates her pain in her legs.  weight 240# admit  + 3 left and right leg edema  patient on meformin and basaglar kwik pen PTA A1c 13.8% left copy of consistent cho heart healthy diet at bedside  RD attempted to wake patient this AM without success

## 2023-10-11 NOTE — PROGRESS NOTE ADULT - ASSESSMENT
59 yo F with PMH of T2DM, Afib on Xarelto, CHF (EF 60-65% in 2/2023), neuropathy, chronic b/l LE wounds admitted for cellulitis    CHF, A fib  - Chronic b/l LE edema with cellulitic changes, otherwise no meaningful evidence of volume overload  - EF 60-65% in 2/2023  - Continue IV Lasix 40mg qd  - check echo   - Strict I/Os, daily weights    - Rate controlled per flow sheets 90-100s   - Continue Xarelto and Metoprolol     - Continue antibiotics for cellulitis     - Monitor and replete lytes, keep K>4, Mg>2.  - Will continue to follow.    Mandy Villavicencio, MS FNP, AGACNP  Nurse Practitioner- Cardiology   Please call on TEAMS

## 2023-10-11 NOTE — PROGRESS NOTE ADULT - SUBJECTIVE AND OBJECTIVE BOX
MELITON LOZANO is a 58yFemale , patient examined and chart reviewed.    INTERVAL HPI/ OVERNIGHT EVENTS:   No events. Tmax 100.1    PAST MEDICAL & SURGICAL HISTORY:  Atrial fibrillation  Type 2 diabetes mellitus  Congestive heart failure  Scalp psoriasis  S/P tonsillectomy  S/P appendectomy  H/O umbilical hernia repair      For details regarding the patient's social history, family history, and other miscellaneous elements, please refer the initial infectious diseases consultation and/or the admitting history and physical examination for this admission.      ROS: Poor historian + leg pain    No Known Allergies      Current inpatient medications :    ANTIBIOTICS/RELEVANT:  vancomycin  IVPB 1250 milliGRAM(s) IV Intermittent every 12 hours    MEDICATIONS  (STANDING):  dextrose 5%. 1000 milliLiter(s) (50 mL/Hr) IV Continuous <Continuous>  dextrose 5%. 1000 milliLiter(s) (100 mL/Hr) IV Continuous <Continuous>  dextrose 50% Injectable 25 Gram(s) IV Push once  dextrose 50% Injectable 25 Gram(s) IV Push once  dextrose 50% Injectable 12.5 Gram(s) IV Push once  furosemide   Injectable 40 milliGRAM(s) IV Push daily  gabapentin 300 milliGRAM(s) Oral every 8 hours  glucagon  Injectable 1 milliGRAM(s) IntraMuscular once  insulin glargine Injectable (LANTUS) 25 Unit(s) SubCutaneous at bedtime  insulin glargine Injectable (LANTUS) 15 Unit(s) SubCutaneous every morning  insulin lispro (ADMELOG) corrective regimen sliding scale   SubCutaneous three times a day before meals  insulin lispro (ADMELOG) corrective regimen sliding scale   SubCutaneous at bedtime  insulin lispro Injectable (ADMELOG) 10 Unit(s) SubCutaneous three times a day before meals  metoprolol succinate  milliGRAM(s) Oral daily  naloxone Injectable 0.4 milliGRAM(s) IV Push once  nystatin Powder 1 Application(s) Topical three times a day  nystatin Powder 1 Application(s) Topical three times a day  polyethylene glycol 3350 17 Gram(s) Oral daily  rivaroxaban 20 milliGRAM(s) Oral with dinner  senna 2 Tablet(s) Oral at bedtime      MEDICATIONS  (PRN):  acetaminophen     Tablet .. 650 milliGRAM(s) Oral every 6 hours PRN Temp greater or equal to 38C (100.4F), Mild Pain (1 - 3)  aluminum hydroxide/magnesium hydroxide/simethicone Suspension 30 milliLiter(s) Oral every 4 hours PRN Dyspepsia  bisacodyl 5 milliGRAM(s) Oral daily PRN Constipation  dextrose Oral Gel 15 Gram(s) Oral once PRN Blood Glucose LESS THAN 70 milliGRAM(s)/deciliter  melatonin 3 milliGRAM(s) Oral at bedtime PRN Insomnia  ondansetron Injectable 4 milliGRAM(s) IV Push every 8 hours PRN Nausea and/or Vomiting  oxyCODONE    IR 2.5 milliGRAM(s) Oral every 4 hours PRN Moderate Pain (4 - 6)  oxyCODONE    IR 5 milliGRAM(s) Oral every 4 hours PRN Severe Pain (7 - 10)      Objective:  Vital Signs Last 24 Hrs  T(C): 37.8 (11 Oct 2023 13:24), Max: 37.8 (10 Oct 2023 22:33)  T(F): 100.1 (11 Oct 2023 13:24), Max: 100.1 (10 Oct 2023 22:33)  HR: 94 (11 Oct 2023 13:24) (93 - 99)  BP: 121/81 (11 Oct 2023 13:24) (114/74 - 122/72)  RR: 18 (11 Oct 2023 13:24) (18 - 18)  SpO2: 94% (11 Oct 2023 13:24) (92% - 94%)    Parameters below as of 11 Oct 2023 13:24  Patient On (Oxygen Delivery Method): room air          Physical Exam:  General:  no acute distress  Neck: supple, trachea midline  Lungs: clear, no wheeze/rhonchi  Cardiovascular: regular rate and rhythm, S1 S2  Abdomen: soft, nontender,  bowel sounds normal  Neurological: alert and oriented x3  Skin: no rash  Extremities: Tanner LE edema erythema Right > Left  Chronic stasis + tender to touch        LABS:                        12.8   6.56  )-----------( 179      ( 11 Oct 2023 06:40 )             38.9   10-11    136  |  102  |  14  ----------------------------<  310<H>  3.7   |  29  |  0.60    Ca    8.9      11 Oct 2023 06:40    TPro  6.7  /  Alb  2.7<L>  /  TBili  0.8  /  DBili  x   /  AST  18  /  ALT  23  /  AlkPhos  74  10-10      MICROBIOLOGY:    Culture - Urine (collected 09 Oct 2023 15:27)  Source: Clean Catch Clean Catch (Midstream)  Preliminary Report (11 Oct 2023 07:46):    50,000 - 99,000 CFU/mL Gram Negative Rods    Culture - Blood (collected 09 Oct 2023 11:55)  Source: .Blood Blood-Peripheral  Preliminary Report (10 Oct 2023 19:02):    No growth at 24 hours    Culture - Blood (collected 09 Oct 2023 11:20)  Source: .Blood Blood-Peripheral  Preliminary Report (10 Oct 2023 19:02):    No growth at 24 hours    RADIOLOGY & ADDITIONAL STUDIES:          Assessment :  59 yo F with PMHx of T2DM, Afib on Xarelto, and CHF with unknown EF admitted with worsening Right LE draining lesion on anterior shin and generalized worsening bilateral lower extremity edema and erythema   Hx MRSA  Leg pain burning sensation prob sec DM peripheral neuropathy  Ua and Ucx noted - 50,000 - 99,000 CFU/mL Gram Negative Rods - likely asymptomatic bacteruria      Plan :   Continue Vancomycin 1250mg q12h  Fu cultures  Trend temps and cbc  Wound care per wound care service  Elevate leg    Continue with present regiment.  Appropriate use of antibiotics and adverse effects reviewed.    > 35 minutes were spent in direct patient care reviewing notes, medications ,labs data/ imaging , discussion with multidisciplinary team.    Thank you for allowing me to participate in care of your patient .    Carole Mohr MD  Infectious Disease  [phone]

## 2023-10-11 NOTE — DIETITIAN INITIAL EVALUATION ADULT - PERTINENT MEDS FT
MEDICATIONS  (STANDING):  dextrose 5%. 1000 milliLiter(s) (100 mL/Hr) IV Continuous <Continuous>  dextrose 5%. 1000 milliLiter(s) (50 mL/Hr) IV Continuous <Continuous>  dextrose 50% Injectable 25 Gram(s) IV Push once  dextrose 50% Injectable 25 Gram(s) IV Push once  dextrose 50% Injectable 12.5 Gram(s) IV Push once  furosemide   Injectable 40 milliGRAM(s) IV Push daily  gabapentin 300 milliGRAM(s) Oral every 8 hours  glucagon  Injectable 1 milliGRAM(s) IntraMuscular once  insulin glargine Injectable (LANTUS) 25 Unit(s) SubCutaneous at bedtime  insulin lispro (ADMELOG) corrective regimen sliding scale   SubCutaneous three times a day before meals  insulin lispro (ADMELOG) corrective regimen sliding scale   SubCutaneous at bedtime  insulin lispro Injectable (ADMELOG) 10 Unit(s) SubCutaneous three times a day before meals  metoprolol succinate  milliGRAM(s) Oral daily  naloxone Injectable 0.4 milliGRAM(s) IV Push once  nystatin Powder 1 Application(s) Topical three times a day  nystatin Powder 1 Application(s) Topical three times a day  polyethylene glycol 3350 17 Gram(s) Oral daily  rivaroxaban 20 milliGRAM(s) Oral with dinner  senna 2 Tablet(s) Oral at bedtime  vancomycin  IVPB 1250 milliGRAM(s) IV Intermittent every 12 hours    MEDICATIONS  (PRN):  acetaminophen     Tablet .. 650 milliGRAM(s) Oral every 6 hours PRN Temp greater or equal to 38C (100.4F), Mild Pain (1 - 3)  aluminum hydroxide/magnesium hydroxide/simethicone Suspension 30 milliLiter(s) Oral every 4 hours PRN Dyspepsia  bisacodyl 5 milliGRAM(s) Oral daily PRN Constipation  dextrose Oral Gel 15 Gram(s) Oral once PRN Blood Glucose LESS THAN 70 milliGRAM(s)/deciliter  melatonin 3 milliGRAM(s) Oral at bedtime PRN Insomnia  ondansetron Injectable 4 milliGRAM(s) IV Push every 8 hours PRN Nausea and/or Vomiting  oxyCODONE    IR 2.5 milliGRAM(s) Oral every 4 hours PRN Moderate Pain (4 - 6)  oxyCODONE    IR 5 milliGRAM(s) Oral every 4 hours PRN Severe Pain (7 - 10)

## 2023-10-11 NOTE — DIETITIAN INITIAL EVALUATION ADULT - ORAL INTAKE PTA/DIET HISTORY
patient seen sleeping soundly visited 2X this AM. per CNA patient ate yogurt for breakfast.   ? last BM   no food allergies   this admit taking up to 100% of meals per flow sheet  await visit with patient when awake for nutrition history

## 2023-10-11 NOTE — PROGRESS NOTE ADULT - SUBJECTIVE AND OBJECTIVE BOX
[facility] -- Angela Alaniz, Tico Simmons Savella, Goodger, Cohen: Office # [phone]    Follow Up:  edema    Subjective/Observations: Patient seen and examined. Events noted. Resting comfortably in bed. No complaints of chest pain, dyspnea, or palpitations reported.  Tolerating room air. c/o pain in her legs. + LE edema with cellulitis.      REVIEW OF SYSTEMS: All other review of systems are negative unless indicated above    PAST MEDICAL & SURGICAL HISTORY:  Atrial fibrillation      Type 2 diabetes mellitus      Congestive heart failure      Scalp psoriasis      S/P tonsillectomy      S/P appendectomy      H/O umbilical hernia repair    MEDICATIONS  (STANDING):  dextrose 5%. 1000 milliLiter(s) (50 mL/Hr) IV Continuous <Continuous>  dextrose 5%. 1000 milliLiter(s) (100 mL/Hr) IV Continuous <Continuous>  dextrose 50% Injectable 25 Gram(s) IV Push once  dextrose 50% Injectable 12.5 Gram(s) IV Push once  dextrose 50% Injectable 25 Gram(s) IV Push once  furosemide   Injectable 40 milliGRAM(s) IV Push daily  gabapentin 300 milliGRAM(s) Oral every 8 hours  glucagon  Injectable 1 milliGRAM(s) IntraMuscular once  insulin glargine Injectable (LANTUS) 25 Unit(s) SubCutaneous at bedtime  insulin lispro (ADMELOG) corrective regimen sliding scale   SubCutaneous three times a day before meals  insulin lispro (ADMELOG) corrective regimen sliding scale   SubCutaneous at bedtime  insulin lispro Injectable (ADMELOG) 10 Unit(s) SubCutaneous three times a day before meals  metoprolol succinate  milliGRAM(s) Oral daily  naloxone Injectable 0.4 milliGRAM(s) IV Push once  nystatin Powder 1 Application(s) Topical three times a day  nystatin Powder 1 Application(s) Topical three times a day  polyethylene glycol 3350 17 Gram(s) Oral daily  rivaroxaban 20 milliGRAM(s) Oral with dinner  senna 2 Tablet(s) Oral at bedtime  vancomycin  IVPB 1250 milliGRAM(s) IV Intermittent every 12 hours    MEDICATIONS  (PRN):  acetaminophen     Tablet .. 650 milliGRAM(s) Oral every 6 hours PRN Temp greater or equal to 38C (100.4F), Mild Pain (1 - 3)  aluminum hydroxide/magnesium hydroxide/simethicone Suspension 30 milliLiter(s) Oral every 4 hours PRN Dyspepsia  bisacodyl 5 milliGRAM(s) Oral daily PRN Constipation  dextrose Oral Gel 15 Gram(s) Oral once PRN Blood Glucose LESS THAN 70 milliGRAM(s)/deciliter  melatonin 3 milliGRAM(s) Oral at bedtime PRN Insomnia  ondansetron Injectable 4 milliGRAM(s) IV Push every 8 hours PRN Nausea and/or Vomiting  oxyCODONE    IR 2.5 milliGRAM(s) Oral every 4 hours PRN Moderate Pain (4 - 6)  oxyCODONE    IR 5 milliGRAM(s) Oral every 4 hours PRN Severe Pain (7 - 10)    Allergies  No Known Allergies    Vital Signs Last 24 Hrs  T(C): 36.7 (11 Oct 2023 04:47), Max: 37.8 (10 Oct 2023 22:33)  T(F): 98 (11 Oct 2023 04:47), Max: 100.1 (10 Oct 2023 22:33)  HR: 93 (11 Oct 2023 04:47) (93 - 101)  BP: 122/72 (11 Oct 2023 04:47) (104/67 - 122/72)  BP(mean): --  RR: 18 (11 Oct 2023 04:47) (18 - 18)  SpO2: 94% (11 Oct 2023 04:47) (92% - 94%)    Parameters below as of 11 Oct 2023 04:47  Patient On (Oxygen Delivery Method): room air      I&O's Summary    10 Oct 2023 07:01  -  11 Oct 2023 07:00  --------------------------------------------------------  IN: 1660 mL / OUT: 2200 mL / NET: -540 mL      Weight (kg): 113.4 (10-10 @ 16:45)    TELE: Not on telemetry   PHYSICAL EXAM:  Constitutional: NAD, awake and alert  HEENT: Moist Mucous Membranes, Anicteric  Pulmonary: Non-labored, breath sounds are clear bilaterally, Diminished at bases No wheezing, rales or rhonchi  Cardiovascular: Regular, S1 and S2, No murmurs, No rubs, gallops or clicks  Gastrointestinal:  soft, nontender, nondistended   Lymph: +2-3 peripheral edema. No lymphadenopathy.   Skin: No visible rashes or ulcers.  Psych:  Mood & affect appropriate      LABS: All Labs Reviewed:                        12.8   6.56  )-----------( 179      ( 11 Oct 2023 06:40 )             38.9                         12.8   7.48  )-----------( 187      ( 10 Oct 2023 05:45 )             37.9                         13.8   6.65  )-----------( 203      ( 09 Oct 2023 11:55 )             39.5     11 Oct 2023 06:40    136    |  102    |  14     ----------------------------<  310    3.7     |  29     |  0.60   10 Oct 2023 05:45    138    |  104    |  11     ----------------------------<  331    3.8     |  27     |  0.65   09 Oct 2023 11:55    136    |  98     |  11     ----------------------------<  369    3.5     |  27     |  0.71     Ca    8.9        11 Oct 2023 06:40  Ca    8.7        10 Oct 2023 05:45  Ca    8.7        09 Oct 2023 11:55    TPro  6.7    /  Alb  2.7    /  TBili  0.8    /  DBili  x      /  AST  18     /  ALT  23     /  AlkPhos  74     10 Oct 2023 05:45  TPro  7.4    /  Alb  3.1    /  TBili  1.0    /  DBili  x      /  AST  19     /  ALT  30     /  AlkPhos  86     09 Oct 2023 11:55   LIVER FUNCTIONS - ( 10 Oct 2023 05:45 )  Alb: 2.7 g/dL / Pro: 6.7 g/dL / ALK PHOS: 74 U/L / ALT: 23 U/L / AST: 18 U/L / GGT: x           PT/INR - ( 09 Oct 2023 11:55 )   PT: 16.5 sec;   INR: 1.42 ratio         PTT - ( 09 Oct 2023 11:55 )  PTT:30.4 secLactate, Blood: 1.2 mmol/L (10-09-23 @ 11:55)    12 Lead ECG:   Ventricular Rate 118 BPM    Atrial Rate 141 BPM    QRS Duration 78 ms    Q-T Interval 336 ms    QTC Calculation(Bazett) 470 ms    R Axis 7 degrees    T Axis -77 degrees    Diagnosis Line Atrial fibrillation  Nonspecific ST and T wave abnormality  Abnormal ECG  Confirmed by fernando Umaña ([id number]) on 10/9/2023 12:12:07 PM (10-09-23 @ 11:25)      TRANSTHORACIC ECHOCARDIOGRAM REPORT  ________________________________________________________________________________                                      _______       Pt. Name:       MELITON LOZANO Study Date:    10/10/2023  MRN:            [id number]          YOB: 1965  Accession #:    [id number]         Age:           58 years  Account#:       [id number]        Gender:        F  Heart Rate:                       Height:        59.84 in (152.00 cm)  Rhythm:      Weight:        249.12 lb (113.00 kg)  Blood Pressure: 125/75 mmHg       BSA/BMI:       2.05 m² / 48.91 kg/m²  ________________________________________________________________________________________  Referring Physician:    [id number] Garrick Spring  Interpreting Physician: Fahad Simmons  Primary Sonographer:    Brittny Zurita RDCS    CPT:               ECHO TTE WO CON COMP W DOPP - 28804.m  Indication(s):     Heart failure, unspecified - I50.9  Procedure:         Transthoracic echocardiogram with 2-D, M-mode and complete                     spectral and color flow Doppler.  Ordering Location: [facility]    _______________________________________________________________________________________     CONCLUSIONS:      1. Left ventricular systolic function is normal with an ejection fraction of 66 % by White's method of disks.   2. There is normal LV mass and concentric remodeling.   3. Normal left ventricular diastolic function.   4. Normal right ventricular cavity size, wall thickness and systolic function.   5. The left atrium is normal in size.   6. Trace mitral regurgitation.   7. Structurally normal mitral valve with normal leaflet excursion.   8. There is calcification of the mitral valve annulus.    ________________________________________________________________________________________  FINDINGS:     Left Ventricle:  Left ventricular systolic function is normal with a calculated ejection fraction of 66 % by the White's biplane method of disks. There is normal left ventricular diastolic function. Mild to moderate left ventricular hypertrophy. There is normal LV mass and concentric remodeling.     Right Ventricle:  The right ventricular cavity is normal in size, normal wall thickness and normal systolic function.     Left Atrium:  The left atrium is normal in size.     Right Atrium:  The right atrium is normal in size with an indexed volume of 13.23 ml/m².     Aortic Valve:  The aortic valve is tricuspid with normal leaflet excursion. There is no aortic valve stenosis. There is no evidence of aortic regurgitation.     Mitral Valve:  Structurally normal mitral valve with normal leaflet excursion. There is diffuse mitral valve thickening. There is calcification of the mitral valve annulus. There is no mitral valve stenosis. There is trace mitral regurgitation.     Tricuspid Valve:  Structurally normal tricuspid valve with normal leaflet excursion. There is trace tricuspid regurgitation. Estimated pulmonary artery systolic pressure is 28 mmHg.     Pulmonic Valve:  Structurally normal pulmonic valve with normal leaflet excursion. There is trace pulmonic regurgitation.     Aorta:  The aortic root at the sinuses of Valsalva is normal in size, measuring 3.20 cm (indexed 1.57 cm/m²).     Pericardium:  No pericardial effusion seen.     Systemic Veins:  The inferior vena cava is normal in size measuring 1.10 cm in diameter, (normal <2.1cm) with normal inspiratory collapse (normal >50%) consistent with normal right atrial pressure (~3, range 0-5mmHg).  ____________________________________________________________________  Quantitative Data:  Left Ventricle Measurements: (Indexed to BSA)     IVSd (2D):   1.3 cm  LVPWd (2D):  1.0 cm  LVIDd (2D):  4.2 cm  LVIDs (2D):  2.5 cm  LV Mass:     167 g  81.9 g/m²  LV Vol d, MOD A2C: 90.1 ml 44.04 ml/m²  LV Vol d, MOD A4C: 81.2 ml 39.68 ml/m²  LV Vol d, MOD BP:  87.1 ml 42.59 ml/m²  LV Vol s, MOD A2C: 29.7 ml 14.53 ml/m²  LV Vol s, MOD A4C: 29.3 ml 14.33 ml/m²  LV Vol s, MOD BP:  29.5 ml 14.41 ml/m²  LVOT SV MOD BP:    57.6 ml  LV EF% MOD BP:     66 %     MV E Vmax:    1.19 m/s  e' lateral:   11.10 cm/s  e' medial:    10.10 cm/s  E/e' lateral: 10.76  E/e' medial:  11.90  E/e' Average: 11.26  MV DT:        136 msec    Aorta Measurements: (normal range) (Indexed to BSA)     Sinuses of Valsalva: 3.20 cm (2.7 - 3.3 cm)       Left Atrium Measurements: (Indexed to BSA)  LA Diam 2D: 2.98 cm    Right Atrial Measurements:     RA Vol:       27.05 ml  RA Vol Index: 13.23 ml/m²    Mitral Valve Measurements:     MV E Vmax: 1.2 m/s       Tricuspid Valve Measurements:     TR Vmax:          2.5 m/s  TR Peak Gradient: 24.9 mmHg  RA Pressure:      3 mmHg  PASP:             28 mmHg    ________________________________________________________________________________________  Electronically signed on 10/10/2023 at 9:18:26 AM by Fahad Simmons         *** Final ***

## 2023-10-12 LAB
-  AMIKACIN: SIGNIFICANT CHANGE UP
-  AMOXICILLIN/CLAVULANIC ACID: SIGNIFICANT CHANGE UP
-  AMPICILLIN/SULBACTAM: SIGNIFICANT CHANGE UP
-  AMPICILLIN: SIGNIFICANT CHANGE UP
-  AZTREONAM: SIGNIFICANT CHANGE UP
-  CEFAZOLIN: SIGNIFICANT CHANGE UP
-  CEFEPIME: SIGNIFICANT CHANGE UP
-  CEFOXITIN: SIGNIFICANT CHANGE UP
-  CEFTRIAXONE: SIGNIFICANT CHANGE UP
-  CIPROFLOXACIN: SIGNIFICANT CHANGE UP
-  ERTAPENEM: SIGNIFICANT CHANGE UP
-  GENTAMICIN: SIGNIFICANT CHANGE UP
-  IMIPENEM: SIGNIFICANT CHANGE UP
-  LEVOFLOXACIN: SIGNIFICANT CHANGE UP
-  MEROPENEM: SIGNIFICANT CHANGE UP
-  NITROFURANTOIN: SIGNIFICANT CHANGE UP
-  PIPERACILLIN/TAZOBACTAM: SIGNIFICANT CHANGE UP
-  TOBRAMYCIN: SIGNIFICANT CHANGE UP
-  TRIMETHOPRIM/SULFAMETHOXAZOLE: SIGNIFICANT CHANGE UP
ANION GAP SERPL CALC-SCNC: 5 MMOL/L — SIGNIFICANT CHANGE UP (ref 5–17)
BUN SERPL-MCNC: 15 MG/DL — SIGNIFICANT CHANGE UP (ref 7–23)
CALCIUM SERPL-MCNC: 9.1 MG/DL — SIGNIFICANT CHANGE UP (ref 8.5–10.1)
CHLORIDE SERPL-SCNC: 102 MMOL/L — SIGNIFICANT CHANGE UP (ref 96–108)
CO2 SERPL-SCNC: 30 MMOL/L — SIGNIFICANT CHANGE UP (ref 22–31)
CREAT SERPL-MCNC: 0.64 MG/DL — SIGNIFICANT CHANGE UP (ref 0.5–1.3)
CULTURE RESULTS: SIGNIFICANT CHANGE UP
EGFR: 102 ML/MIN/1.73M2 — SIGNIFICANT CHANGE UP
GLUCOSE SERPL-MCNC: 247 MG/DL — HIGH (ref 70–99)
HCT VFR BLD CALC: 39.9 % — SIGNIFICANT CHANGE UP (ref 34.5–45)
HGB BLD-MCNC: 13.2 G/DL — SIGNIFICANT CHANGE UP (ref 11.5–15.5)
MCHC RBC-ENTMCNC: 31.8 PG — SIGNIFICANT CHANGE UP (ref 27–34)
MCHC RBC-ENTMCNC: 33.1 GM/DL — SIGNIFICANT CHANGE UP (ref 32–36)
MCV RBC AUTO: 96.1 FL — SIGNIFICANT CHANGE UP (ref 80–100)
METHOD TYPE: SIGNIFICANT CHANGE UP
NRBC # BLD: 0 /100 WBCS — SIGNIFICANT CHANGE UP (ref 0–0)
ORGANISM # SPEC MICROSCOPIC CNT: SIGNIFICANT CHANGE UP
ORGANISM # SPEC MICROSCOPIC CNT: SIGNIFICANT CHANGE UP
PLATELET # BLD AUTO: 205 K/UL — SIGNIFICANT CHANGE UP (ref 150–400)
POTASSIUM SERPL-MCNC: 4 MMOL/L — SIGNIFICANT CHANGE UP (ref 3.5–5.3)
POTASSIUM SERPL-SCNC: 4 MMOL/L — SIGNIFICANT CHANGE UP (ref 3.5–5.3)
RBC # BLD: 4.15 M/UL — SIGNIFICANT CHANGE UP (ref 3.8–5.2)
RBC # FLD: 13.1 % — SIGNIFICANT CHANGE UP (ref 10.3–14.5)
SODIUM SERPL-SCNC: 137 MMOL/L — SIGNIFICANT CHANGE UP (ref 135–145)
SPECIMEN SOURCE: SIGNIFICANT CHANGE UP
WBC # BLD: 6.54 K/UL — SIGNIFICANT CHANGE UP (ref 3.8–10.5)
WBC # FLD AUTO: 6.54 K/UL — SIGNIFICANT CHANGE UP (ref 3.8–10.5)

## 2023-10-12 PROCEDURE — 99233 SBSQ HOSP IP/OBS HIGH 50: CPT

## 2023-10-12 RX ORDER — ACETAMINOPHEN 500 MG
1000 TABLET ORAL ONCE
Refills: 0 | Status: COMPLETED | OUTPATIENT
Start: 2023-10-12 | End: 2023-10-12

## 2023-10-12 RX ADMIN — INSULIN GLARGINE 15 UNIT(S): 100 INJECTION, SOLUTION SUBCUTANEOUS at 08:10

## 2023-10-12 RX ADMIN — INSULIN GLARGINE 25 UNIT(S): 100 INJECTION, SOLUTION SUBCUTANEOUS at 21:29

## 2023-10-12 RX ADMIN — GABAPENTIN 300 MILLIGRAM(S): 400 CAPSULE ORAL at 21:08

## 2023-10-12 RX ADMIN — Medication 400 MILLIGRAM(S): at 20:37

## 2023-10-12 RX ADMIN — Medication 4: at 18:06

## 2023-10-12 RX ADMIN — Medication 10 UNIT(S): at 08:09

## 2023-10-12 RX ADMIN — SENNA PLUS 2 TABLET(S): 8.6 TABLET ORAL at 21:08

## 2023-10-12 RX ADMIN — NYSTATIN CREAM 1 APPLICATION(S): 100000 CREAM TOPICAL at 21:09

## 2023-10-12 RX ADMIN — Medication 40 MILLIGRAM(S): at 08:11

## 2023-10-12 RX ADMIN — NYSTATIN CREAM 1 APPLICATION(S): 100000 CREAM TOPICAL at 05:39

## 2023-10-12 RX ADMIN — Medication 8: at 11:55

## 2023-10-12 RX ADMIN — OXYCODONE HYDROCHLORIDE 5 MILLIGRAM(S): 5 TABLET ORAL at 18:09

## 2023-10-12 RX ADMIN — RIVAROXABAN 20 MILLIGRAM(S): KIT at 18:07

## 2023-10-12 RX ADMIN — OXYCODONE HYDROCHLORIDE 5 MILLIGRAM(S): 5 TABLET ORAL at 18:40

## 2023-10-12 RX ADMIN — OXYCODONE HYDROCHLORIDE 5 MILLIGRAM(S): 5 TABLET ORAL at 14:34

## 2023-10-12 RX ADMIN — OXYCODONE HYDROCHLORIDE 5 MILLIGRAM(S): 5 TABLET ORAL at 05:44

## 2023-10-12 RX ADMIN — Medication 10 MILLIGRAM(S): at 18:26

## 2023-10-12 RX ADMIN — GABAPENTIN 300 MILLIGRAM(S): 400 CAPSULE ORAL at 05:39

## 2023-10-12 RX ADMIN — Medication 6: at 08:09

## 2023-10-12 RX ADMIN — Medication 10 UNIT(S): at 11:56

## 2023-10-12 RX ADMIN — OXYCODONE HYDROCHLORIDE 5 MILLIGRAM(S): 5 TABLET ORAL at 06:14

## 2023-10-12 RX ADMIN — Medication 1000 MILLIGRAM(S): at 21:19

## 2023-10-12 RX ADMIN — OXYCODONE HYDROCHLORIDE 5 MILLIGRAM(S): 5 TABLET ORAL at 14:02

## 2023-10-12 RX ADMIN — Medication 10 UNIT(S): at 18:07

## 2023-10-12 RX ADMIN — Medication 166.67 MILLIGRAM(S): at 00:51

## 2023-10-12 NOTE — PROGRESS NOTE ADULT - SUBJECTIVE AND OBJECTIVE BOX
MELITON LOZANO is a 58yFemale , patient examined and chart reviewed.    INTERVAL HPI/ OVERNIGHT EVENTS:   No events. Afebrile.    PAST MEDICAL & SURGICAL HISTORY:  Atrial fibrillation  Type 2 diabetes mellitus  Congestive heart failure  Scalp psoriasis  S/P tonsillectomy  S/P appendectomy  H/O umbilical hernia repair      For details regarding the patient's social history, family history, and other miscellaneous elements, please refer the initial infectious diseases consultation and/or the admitting history and physical examination for this admission.      ROS: Poor historian + leg pain    No Known Allergies      Current inpatient medications :    ANTIBIOTICS/RELEVANT:  vancomycin  IVPB 1250 milliGRAM(s) IV Intermittent every 12 hours    MEDICATIONS  (STANDING):  cefepime   IVPB 1000 milliGRAM(s) IV Intermittent every 12 hours  dextrose 5%. 1000 milliLiter(s) (50 mL/Hr) IV Continuous <Continuous>  dextrose 5%. 1000 milliLiter(s) (100 mL/Hr) IV Continuous <Continuous>  dextrose 50% Injectable 12.5 Gram(s) IV Push once  dextrose 50% Injectable 25 Gram(s) IV Push once  dextrose 50% Injectable 25 Gram(s) IV Push once  gabapentin 300 milliGRAM(s) Oral every 8 hours  glucagon  Injectable 1 milliGRAM(s) IntraMuscular once  hydrocortisone 1% Cream 1 Application(s) Topical two times a day  insulin glargine Injectable (LANTUS) 25 Unit(s) SubCutaneous at bedtime  insulin glargine Injectable (LANTUS) 15 Unit(s) SubCutaneous every morning  insulin lispro (ADMELOG) corrective regimen sliding scale   SubCutaneous three times a day before meals  insulin lispro (ADMELOG) corrective regimen sliding scale   SubCutaneous at bedtime  insulin lispro Injectable (ADMELOG) 10 Unit(s) SubCutaneous three times a day before meals  metoprolol succinate  milliGRAM(s) Oral daily  naloxone Injectable 0.4 milliGRAM(s) IV Push once  nystatin Powder 1 Application(s) Topical three times a day  nystatin Powder 1 Application(s) Topical three times a day  polyethylene glycol 3350 17 Gram(s) Oral daily  rivaroxaban 20 milliGRAM(s) Oral with dinner  senna 2 Tablet(s) Oral at bedtime  torsemide 20 milliGRAM(s) Oral daily    MEDICATIONS  (PRN):  acetaminophen     Tablet .. 650 milliGRAM(s) Oral every 6 hours PRN Temp greater or equal to 38C (100.4F), Mild Pain (1 - 3)  aluminum hydroxide/magnesium hydroxide/simethicone Suspension 30 milliLiter(s) Oral every 4 hours PRN Dyspepsia  bisacodyl 5 milliGRAM(s) Oral daily PRN Constipation  bisacodyl Suppository 10 milliGRAM(s) Rectal daily PRN Constipation  dextrose Oral Gel 15 Gram(s) Oral once PRN Blood Glucose LESS THAN 70 milliGRAM(s)/deciliter  melatonin 3 milliGRAM(s) Oral at bedtime PRN Insomnia  ondansetron Injectable 4 milliGRAM(s) IV Push every 8 hours PRN Nausea and/or Vomiting  oxyCODONE    IR 5 milliGRAM(s) Oral every 4 hours PRN Severe Pain (7 - 10)  oxyCODONE    IR 2.5 milliGRAM(s) Oral every 4 hours PRN Moderate Pain (4 - 6)      Objective:  Vital Signs Last 24 Hrs  T(C): 36.7 (12 Oct 2023 19:54), Max: 36.7 (12 Oct 2023 19:54)  T(F): 98.1 (12 Oct 2023 19:54), Max: 98.1 (12 Oct 2023 19:54)  HR: 108 (12 Oct 2023 19:54) (82 - 108)  BP: 123/84 (12 Oct 2023 19:54) (102/72 - 123/84)  RR: 18 (12 Oct 2023 19:54) (18 - 18)  SpO2: 95% (12 Oct 2023 19:54) (94% - 96%)    Parameters below as of 12 Oct 2023 19:54  Patient On (Oxygen Delivery Method): room air      Physical Exam:  General:  no acute distress  Neck: supple, trachea midline  Lungs: clear, no wheeze/rhonchi  Cardiovascular: regular rate and rhythm, S1 S2  Abdomen: soft, nontender,  bowel sounds normal  Neurological: alert and oriented x3  Skin: no rash  Extremities: Tanner LE edema erythema Right > Left  Chronic stasis + tender to touch        LABS:                        13.2   6.54  )-----------( 205      ( 12 Oct 2023 06:44 )             39.9   10-12    137  |  102  |  15  ----------------------------<  247<H>  4.0   |  30  |  0.64    Ca    9.1      12 Oct 2023 06:44    MICROBIOLOGY:  Culture - Urine (10.09.23 @ 15:27)    -  Amikacin: S <=16   -  Amoxicillin/Clavulanic Acid: S <=8/4   -  Ampicillin: R 16 These ampicillin results predict results for amoxicillin   -  Ampicillin/Sulbactam: S <=4/2   -  Aztreonam: S <=4   -  Cefazolin: S <=2   -  Cefepime: S <=2   -  Cefoxitin: I 16   -  Ceftriaxone: S <=1   -  Ciprofloxacin: S <=0.25   -  Ertapenem: S <=0.5   -  Gentamicin: S <=2   -  Imipenem: S <=1   -  Levofloxacin: S <=0.5   -  Meropenem: S <=1   -  Nitrofurantoin: S <=32 Should not be used to treat pyelonephritis   -  Piperacillin/Tazobactam: S <=8   -  Tobramycin: S <=2   -  Trimethoprim/Sulfamethoxazole: S <=0.5/9.5   Specimen Source: Clean Catch Clean Catch (Midstream)   Culture Results:   50,000 - 99,000 CFU/mL Klebsiella variicola   Organism Identification: Klebsiella variicola   Organism: Klebsiella variicola   Method Type: SASHA    Culture - Blood (collected 09 Oct 2023 11:55)  Source: .Blood Blood-Peripheral  Preliminary Report (10 Oct 2023 19:02):    No growth at 24 hours    Culture - Blood (collected 09 Oct 2023 11:20)  Source: .Blood Blood-Peripheral  Preliminary Report (10 Oct 2023 19:02):    No growth at 24 hours    RADIOLOGY & ADDITIONAL STUDIES:          Assessment :  57 yo F with PMHx of T2DM, Afib on Xarelto, and CHF with unknown EF admitted with worsening Right LE draining lesion on anterior shin and generalized worsening bilateral lower extremity edema and erythema   Hx MRSA  Leg pain burning sensation prob sec DM peripheral neuropathy  Ua and Ucx noted - 50,000 - 99,000 CFU/mL Gram Negative Rods - likely asymptomatic bacteruria      Plan :   On Vancomycin 1250mg q12h  Can change to po Doxycycline 100mg bid x 7 days for dc  Trend temps and cbc  Wound care per wound care service  Elevate leg  Stable from ID standpoint  Dc planning     Continue with present regiment.  Appropriate use of antibiotics and adverse effects reviewed.    > 35 minutes were spent in direct patient care reviewing notes, medications ,labs data/ imaging , discussion with multidisciplinary team.    Thank you for allowing me to participate in care of your patient .    Carole Mohr MD  Infectious Disease  [phone]

## 2023-10-12 NOTE — PROGRESS NOTE ADULT - ASSESSMENT
59 yo F with PMH of T2DM, Afib on Xarelto, CHF (EF 60-65% in 2/2023), neuropathy, chronic b/l LE wounds admitted for cellulitis    Edema, Afib  - Chronic b/l LE edema with cellulitic changes, otherwise, no meaningful evidence of volume overload  - No O2 requirement.  Pro-BNP unremarkable and CxR not consistent with CHF  - TTE showed normal LVF, EF 66% with no significant valvular dissease  - D/C IV Lasix and start Torsemide 20 mg daily, 10/13    - Rate controlled per flow sheets 90-100s   - Continue Xarelto and Metoprolol     - Abx for cellulitis per ID  - Monitor and replete lytes, keep K>4, Mg>2.  - Will continue to follow.    Nina Salcedo DNP, NP-C, AGACNP-C  Cardiology   Call TEAMS        57 yo F with PMH of T2DM, Afib on Xarelto, CHF (EF 60-65% in 2/2023), neuropathy, chronic b/l LE wounds admitted for cellulitis    Edema, Afib  - Chronic b/l LE edema with cellulitic changes, otherwise, no meaningful evidence of volume overload  - No O2 requirement.  Pro-BNP unremarkable and CxR not consistent with CHF  - TTE showed normal LVF, EF 66% with no significant valvular disease  - D/C IV Lasix and start Torsemide 20 mg daily, 10/13    - Rate controlled per flow sheets 90-100s   - Continue Xarelto and Metoprolol     - Abx for cellulitis per ID  - Monitor and replete lytes, keep K>4, Mg>2.  - Will continue to follow.    Nina Salcedo DNP, NP-C, AGACNP-C  Cardiology   Call TEAMS

## 2023-10-12 NOTE — PROGRESS NOTE ADULT - PROBLEM SELECTOR PLAN 1
Worsening Right LE draining lesion on anterior shin and generalized worsening bilateral lower extremity edema and erythema   - XR b/L lower extremities and R foot performed, generalized soft issue edema without evidence of bony involvement  - Continue with Vancomycin 1250mg q12 hr and Cefepime 2 g q8 hr per ID   -Monitor renal function, Vanco trough.   - Blood Cultures NGTD  - Pain management: Tylenol 650 mg mild, Oxycodone 2.5 mg moderate, Oxycodone 5 mg severe   - Lasix 40 mg IVP daily per cardio.   - Vascular studies on prior admission showed significant venous reflux, without evidence of extensive stenosis or vascular disease   - Infectious Disease consulted, Dr. Viviana Spring, following   - Podiatry consulted, Dr. Meek  -Local wound care per Podiatry   -Acute pain. Oxycodone IR ordered for as needed use. Bowel regimen to prevent constipation on opiates

## 2023-10-12 NOTE — CASE MANAGEMENT PROGRESS NOTE - NSCMPROGRESSNOTE_GEN_ALL_CORE
Following w social work for safe DC plan.Home vs recommended [facility]. Acute- remains on [facility]

## 2023-10-12 NOTE — PROGRESS NOTE ADULT - PROBLEM SELECTOR PLAN 3
Patient reported  diffuse severe pain in abdomen initially that improved now but she states that whenever she drinks wanter or eats her legs hurt   - Pt received a inpatient and outpatient workup including on prior admissions   - EGD/colonoscopy 2022, negative   - CTA Abdomen to evaluate for mesenteric ischemia, follow up results   - GI following   -Bowel regimen

## 2023-10-12 NOTE — PROGRESS NOTE ADULT - SUBJECTIVE AND OBJECTIVE BOX
[facility] -- Angela Alaniz, Tico Simmons Savella, , Angel Luis Whitaker  Office # [phone]    Follow Up:  Edema, Hx Afib    Subjective/Observations: Comfortable on RA.  No respiratory or cardiac discomfort offered    REVIEW OF SYSTEMS: All other review of systems is negative unless indicated above  PAST MEDICAL & SURGICAL HISTORY:  Atrial fibrillation  Type 2 diabetes mellitus  Congestive heart failure  Scalp psoriasis  S/P tonsillectomy  S/P appendectomy  H/O umbilical hernia repair    MEDICATIONS  (STANDING):  dextrose 5%. 1000 milliLiter(s) (100 mL/Hr) IV Continuous <Continuous>  dextrose 5%. 1000 milliLiter(s) (50 mL/Hr) IV Continuous <Continuous>  dextrose 50% Injectable 12.5 Gram(s) IV Push once  dextrose 50% Injectable 25 Gram(s) IV Push once  dextrose 50% Injectable 25 Gram(s) IV Push once  furosemide   Injectable 40 milliGRAM(s) IV Push daily  gabapentin 300 milliGRAM(s) Oral every 8 hours  glucagon  Injectable 1 milliGRAM(s) IntraMuscular once  insulin glargine Injectable (LANTUS) 15 Unit(s) SubCutaneous every morning  insulin glargine Injectable (LANTUS) 25 Unit(s) SubCutaneous at bedtime  insulin lispro (ADMELOG) corrective regimen sliding scale   SubCutaneous three times a day before meals  insulin lispro (ADMELOG) corrective regimen sliding scale   SubCutaneous at bedtime  insulin lispro Injectable (ADMELOG) 10 Unit(s) SubCutaneous three times a day before meals  metoprolol succinate  milliGRAM(s) Oral daily  naloxone Injectable 0.4 milliGRAM(s) IV Push once  nystatin Powder 1 Application(s) Topical three times a day  nystatin Powder 1 Application(s) Topical three times a day  polyethylene glycol 3350 17 Gram(s) Oral daily  rivaroxaban 20 milliGRAM(s) Oral with dinner  senna 2 Tablet(s) Oral at bedtime  vancomycin  IVPB 1250 milliGRAM(s) IV Intermittent every 12 hours    MEDICATIONS  (PRN):  acetaminophen     Tablet .. 650 milliGRAM(s) Oral every 6 hours PRN Temp greater or equal to 38C (100.4F), Mild Pain (1 - 3)  aluminum hydroxide/magnesium hydroxide/simethicone Suspension 30 milliLiter(s) Oral every 4 hours PRN Dyspepsia  bisacodyl 5 milliGRAM(s) Oral daily PRN Constipation  dextrose Oral Gel 15 Gram(s) Oral once PRN Blood Glucose LESS THAN 70 milliGRAM(s)/deciliter  melatonin 3 milliGRAM(s) Oral at bedtime PRN Insomnia  ondansetron Injectable 4 milliGRAM(s) IV Push every 8 hours PRN Nausea and/or Vomiting  oxyCODONE    IR 2.5 milliGRAM(s) Oral every 4 hours PRN Moderate Pain (4 - 6)  oxyCODONE    IR 5 milliGRAM(s) Oral every 4 hours PRN Severe Pain (7 - 10)    Allergies    No Known Allergies    Intolerances    Vital Signs Last 24 Hrs  T(C): 36.6 (12 Oct 2023 05:03), Max: 37.8 (11 Oct 2023 13:24)  T(F): 97.9 (12 Oct 2023 05:03), Max: 100.1 (11 Oct 2023 13:24)  HR: 82 (12 Oct 2023 05:03) (82 - 94)  BP: 102/72 (12 Oct 2023 05:03) (102/72 - 121/81)  BP(mean): --  RR: 18 (12 Oct 2023 05:03) (18 - 18)  SpO2: 96% (12 Oct 2023 05:03) (94% - 96%)    Parameters below as of 12 Oct 2023 05:03  Patient On (Oxygen Delivery Method): room air    I&O's Summary    11 Oct 2023 07:01  -  12 Oct 2023 07:00  --------------------------------------------------------  IN: 1780 mL / OUT: 750 mL / NET: 1030 mL    PHYSICAL EXAM:  TELE: Not on tele  Constitutional: NAD, awake and alert, obese  HEENT: Moist Mucous Membranes, Anicteric  Pulmonary: Non-labored, breath sounds are clear bilaterally, No wheezing, rales or rhonchi  Cardiovascular: IRRR, S1 and S2, No murmurs, rubs, gallops or clicks  Gastrointestinal: Bowel Sounds present, soft, nontender.   Lymph: No peripheral edema. No lymphadenopathy.  Skin: No visible rashes or ulcers.  +chronic skin changes on BLE  Psych:  Mood & affect: Flat  LABS: All Labs Reviewed:                        13.2   6.54  )-----------( 205      ( 12 Oct 2023 06:44 )             39.9                         12.8   6.56  )-----------( 179      ( 11 Oct 2023 06:40 )             38.9                         12.8   7.48  )-----------( 187      ( 10 Oct 2023 05:45 )             37.9     12 Oct 2023 06:44    137    |  102    |  15     ----------------------------<  247    4.0     |  30     |  0.64   11 Oct 2023 06:40    136    |  102    |  14     ----------------------------<  310    3.7     |  29     |  0.60   10 Oct 2023 05:45    138    |  104    |  11     ----------------------------<  331    3.8     |  27     |  0.65     Ca    9.1        12 Oct 2023 06:44  Ca    8.9        11 Oct 2023 06:40  Ca    8.7        10 Oct 2023 05:45    TPro  6.7    /  Alb  2.7    /  TBili  0.8    /  DBili  x      /  AST  18     /  ALT  23     /  AlkPhos  74     10 Oct 2023 05:45  TPro  7.4    /  Alb  3.1    /  TBili  1.0    /  DBili  x      /  AST  19     /  ALT  30     /  AlkPhos  86     09 Oct 2023 11:55    12 Lead ECG:   Ventricular Rate 118 BPM    Atrial Rate 141 BPM    QRS Duration 78 ms    Q-T Interval 336 ms    QTC Calculation(Bazett) 470 ms    R Axis 7 degrees    T Axis -77 degrees    Diagnosis Line Atrial fibrillation  Nonspecific ST and T wave abnormality  Abnormal ECG  Confirmed by fernando Umaña ([id number]) on 10/9/2023 12:12:07 PM (10-09-23 @ 11:25)    TRANSTHORACIC ECHOCARDIOGRAM REPORT  ________________________________________________________________________________                                      _______  Pt. Name:       MELITON LOZANO Study Date:    10/10/2023  MRN:            [id number]          YOB: 1965  Accession #:    [id number]         Age:           58 years  Account#:       [id number]        Gender:        F  Heart Rate:                       Height:        59.84 in (152.00 cm)  Rhythm:      Weight:        249.12 lb (113.00 kg)  Blood Pressure: 125/75 mmHg       BSA/BMI:       2.05 m² / 48.91 kg/m²  ________________________________________________________________________________________  Referring Physician:    [id number] Garrick Spring  Interpreting Physician: Fahad Simmons  Primary Sonographer:    Brittny Zurita RDCS    CPT:               ECHO TTE WO CON COMP W DOPP - 08855.m  Indication(s):     Heart failure, unspecified - I50.9  Procedure:         Transthoracic echocardiogram with 2-D, M-mode and complete                     spectral and color flow Doppler.  Ordering Location: [facility]  _______________________________________________________________________________________     CONCLUSIONS:      1. Left ventricular systolic function is normal with an ejection fraction of 66 % by White's method of disks.   2. There is normal LV mass and concentric remodeling.   3. Normal left ventricular diastolic function.   4. Normal right ventricular cavity size, wall thickness and systolic function.   5. The left atrium is normal in size.   6. Trace mitral regurgitation.   7. Structurally normal mitral valve with normal leaflet excursion.   8. There is calcification of the mitral valve annulus.  ________________________________________________________________________________________  FINDINGS:     Left Ventricle:  Left ventricular systolic function is normal with a calculated ejection fraction of 66 % by the White's biplane method of disks. There is normal left ventricular diastolic function. Mild to moderate left ventricular hypertrophy. There is normal LV mass and concentric remodeling.     Right Ventricle:  The right ventricular cavity is normal in size, normal wall thickness and normal systolic function.     Left Atrium:  The left atrium is normal in size.     Right Atrium:  The right atrium is normal in size with an indexed volume of 13.23 ml/m².     Aortic Valve:  The aortic valve is tricuspid with normal leaflet excursion. There is no aortic valve stenosis. There is no evidence of aortic regurgitation.     Mitral Valve:  Structurally normal mitral valve with normal leaflet excursion. There is diffuse mitral valve thickening. There is calcification of the mitral valve annulus. There is no mitral valve stenosis. There is trace mitral regurgitation.     Tricuspid Valve:  Structurally normal tricuspid valve with normal leaflet excursion. There is trace tricuspid regurgitation. Estimated pulmonary artery systolic pressure is 28 mmHg.     Pulmonic Valve:  Structurally normal pulmonic valve with normal leaflet excursion. There is trace pulmonic regurgitation.     Aorta:  The aortic root at the sinuses of Valsalva is normal in size, measuring 3.20 cm (indexed 1.57 cm/m²).     Pericardium:  No pericardial effusion seen.     Systemic Veins:  The inferior vena cava is normal in size measuring 1.10 cm in diameter, (normal <2.1cm) with normal inspiratory collapse (normal >50%) consistent with normal right atrial pressure (~3, range 0-5mmHg).  ____________________________________________________________________  Quantitative Data:  Left Ventricle Measurements: (Indexed to BSA)     IVSd (2D):   1.3 cm  LVPWd (2D):  1.0 cm  LVIDd (2D):  4.2 cm  LVIDs (2D):  2.5 cm  LV Mass:     167 g  81.9 g/m²  LV Vol d, MOD A2C: 90.1 ml 44.04 ml/m²  LV Vol d, MOD A4C: 81.2 ml 39.68 ml/m²  LV Vol d, MOD BP:  87.1 ml 42.59 ml/m²  LV Vol s, MOD A2C: 29.7 ml 14.53 ml/m²  LV Vol s, MOD A4C: 29.3 ml 14.33 ml/m²  LV Vol s, MOD BP:  29.5 ml 14.41 ml/m²  LVOT SV MOD BP:    57.6 ml  LV EF% MOD BP:     66 %     MV E Vmax:    1.19 m/s  e' lateral:   11.10 cm/s  e' medial:    10.10 cm/s  E/e' lateral: 10.76  E/e' medial:  11.90  E/e' Average: 11.26  MV DT:        136 msec    Aorta Measurements: (normal range) (Indexed to BSA)     Sinuses of Valsalva: 3.20 cm (2.7 - 3.3 cm)    Left Atrium Measurements: (Indexed to BSA)  LA Diam 2D: 2.98 cm    Right Atrial Measurements:     RA Vol:       27.05 ml  RA Vol Index: 13.23 ml/m²    Mitral Valve Measurements:     MV E Vmax: 1.2 m/s    Tricuspid Valve Measurements:     TR Vmax:          2.5 m/s  TR Peak Gradient: 24.9 mmHg  RA Pressure:      3 mmHg  PASP:             28 mmHg  _______________________________________________________________________________________  Electronically signed on 10/10/2023 at 9:18:26 AM by Fahad Simmons    *** Final ***

## 2023-10-12 NOTE — PROGRESS NOTE ADULT - ASSESSMENT
59 yo F with PMHx of T2DM, Afib on Xarelto, and CHF with unknown EF presents to the ED with 2 week history of worsening lower extremity edema, erythema, and pain, admitted for cellulitis and wound care

## 2023-10-12 NOTE — PROGRESS NOTE ADULT - SUBJECTIVE AND OBJECTIVE BOX
Patient is a 58y old  Female who presents with a chief complaint of Right Left Wound (11 Oct 2023 13:58)       INTERVAL HPI/OVERNIGHT EVENTS: Patient seen and examined at bedside. No new events/ complaints noted. Denies chest pain, palpitation, sob.     MEDICATIONS  (STANDING):  dextrose 5%. 1000 milliLiter(s) (100 mL/Hr) IV Continuous <Continuous>  dextrose 5%. 1000 milliLiter(s) (50 mL/Hr) IV Continuous <Continuous>  dextrose 50% Injectable 25 Gram(s) IV Push once  dextrose 50% Injectable 25 Gram(s) IV Push once  dextrose 50% Injectable 12.5 Gram(s) IV Push once  furosemide   Injectable 40 milliGRAM(s) IV Push daily  gabapentin 300 milliGRAM(s) Oral every 8 hours  glucagon  Injectable 1 milliGRAM(s) IntraMuscular once  insulin glargine Injectable (LANTUS) 15 Unit(s) SubCutaneous every morning  insulin glargine Injectable (LANTUS) 25 Unit(s) SubCutaneous at bedtime  insulin lispro (ADMELOG) corrective regimen sliding scale   SubCutaneous three times a day before meals  insulin lispro (ADMELOG) corrective regimen sliding scale   SubCutaneous at bedtime  insulin lispro Injectable (ADMELOG) 10 Unit(s) SubCutaneous three times a day before meals  metoprolol succinate  milliGRAM(s) Oral daily  naloxone Injectable 0.4 milliGRAM(s) IV Push once  nystatin Powder 1 Application(s) Topical three times a day  nystatin Powder 1 Application(s) Topical three times a day  polyethylene glycol 3350 17 Gram(s) Oral daily  rivaroxaban 20 milliGRAM(s) Oral with dinner  senna 2 Tablet(s) Oral at bedtime  vancomycin  IVPB 1250 milliGRAM(s) IV Intermittent every 12 hours    MEDICATIONS  (PRN):  acetaminophen     Tablet .. 650 milliGRAM(s) Oral every 6 hours PRN Temp greater or equal to 38C (100.4F), Mild Pain (1 - 3)  aluminum hydroxide/magnesium hydroxide/simethicone Suspension 30 milliLiter(s) Oral every 4 hours PRN Dyspepsia  bisacodyl 5 milliGRAM(s) Oral daily PRN Constipation  dextrose Oral Gel 15 Gram(s) Oral once PRN Blood Glucose LESS THAN 70 milliGRAM(s)/deciliter  melatonin 3 milliGRAM(s) Oral at bedtime PRN Insomnia  ondansetron Injectable 4 milliGRAM(s) IV Push every 8 hours PRN Nausea and/or Vomiting  oxyCODONE    IR 2.5 milliGRAM(s) Oral every 4 hours PRN Moderate Pain (4 - 6)  oxyCODONE    IR 5 milliGRAM(s) Oral every 4 hours PRN Severe Pain (7 - 10)      Allergies    No Known Allergies    Intolerances        REVIEW OF SYSTEMS:  Per HPI, all other ROS noted Negative   Vital Signs Last 24 Hrs  T(C): 36.6 (12 Oct 2023 05:03), Max: 37.8 (11 Oct 2023 13:24)  T(F): 97.9 (12 Oct 2023 05:03), Max: 100.1 (11 Oct 2023 13:24)  HR: 82 (12 Oct 2023 05:03) (82 - 94)  BP: 102/72 (12 Oct 2023 05:03) (102/72 - 121/81)  BP(mean): --  RR: 18 (12 Oct 2023 05:03) (18 - 18)  SpO2: 96% (12 Oct 2023 05:03) (94% - 96%)    Parameters below as of 12 Oct 2023 05:03  Patient On (Oxygen Delivery Method): room air        PHYSICAL EXAM:  GENERAL: NAD, awake, alert   HEENT:  AT/NC, anicteric, moist mucous membranes, EOMI, PERRL, no lid-lag, conjunctiva and sclera clear  CHEST/LUNG:  CTA b/l, no rales, wheezes, or rhonchi,  normal respiratory effort, no intercostal retractions  HEART:  RRR, S1, S2, no murmurs; 1+ pitting edema  ABDOMEN:  BS+, soft, nontender, nondistended  MSK/EXTREMITIES: + chronic venous stasis changes   NERVOUS SYSTEM: answers questions and follows commands appropriately, A&Ox3 grossly moves all extremities   PSYCH: Appropriate affect, Alert & Awake; fair judgement    LABS:                        13.2   6.54  )-----------( 205      ( 12 Oct 2023 06:44 )             39.9     12 Oct 2023 06:44    137    |  102    |  15     ----------------------------<  247    4.0     |  30     |  0.64     Ca    9.1        12 Oct 2023 06:44        CAPILLARY BLOOD GLUCOSE      POCT Blood Glucose.: 259 mg/dL (12 Oct 2023 07:49)  POCT Blood Glucose.: 236 mg/dL (11 Oct 2023 22:13)  POCT Blood Glucose.: 261 mg/dL (11 Oct 2023 16:59)  POCT Blood Glucose.: 318 mg/dL (11 Oct 2023 11:36)    BLOOD CULTURE  10-09 @ 15:27   50,000 - 99,000 CFU/mL Klebsiella variicola  --  --  10-09 @ 11:55   No growth at 48 Hours  --  --  10-09 @ 11:20   No growth at 48 Hours  --  --    RADIOLOGY & ADDITIONAL TESTS:    Imaging Personally Reviewed:  [ ] YES     Consultant(s) Notes Reviewed:      Care Discussed with Consultants/Other Providers:

## 2023-10-12 NOTE — PROGRESS NOTE ADULT - SUBJECTIVE AND OBJECTIVE BOX
[facility]  Jarett Stein PA-C  [address] [place] [address]  [phone]      INTERVAL HPI/OVERNIGHT EVENTS:  Pt s/e  No new GI events    MEDICATIONS  (STANDING):  dextrose 5%. 1000 milliLiter(s) (50 mL/Hr) IV Continuous <Continuous>  dextrose 5%. 1000 milliLiter(s) (100 mL/Hr) IV Continuous <Continuous>  dextrose 50% Injectable 25 Gram(s) IV Push once  dextrose 50% Injectable 12.5 Gram(s) IV Push once  dextrose 50% Injectable 25 Gram(s) IV Push once  furosemide   Injectable 40 milliGRAM(s) IV Push daily  gabapentin 300 milliGRAM(s) Oral every 8 hours  glucagon  Injectable 1 milliGRAM(s) IntraMuscular once  insulin glargine Injectable (LANTUS) 25 Unit(s) SubCutaneous at bedtime  insulin glargine Injectable (LANTUS) 15 Unit(s) SubCutaneous every morning  insulin lispro (ADMELOG) corrective regimen sliding scale   SubCutaneous three times a day before meals  insulin lispro (ADMELOG) corrective regimen sliding scale   SubCutaneous at bedtime  insulin lispro Injectable (ADMELOG) 10 Unit(s) SubCutaneous three times a day before meals  metoprolol succinate  milliGRAM(s) Oral daily  naloxone Injectable 0.4 milliGRAM(s) IV Push once  nystatin Powder 1 Application(s) Topical three times a day  nystatin Powder 1 Application(s) Topical three times a day  polyethylene glycol 3350 17 Gram(s) Oral daily  rivaroxaban 20 milliGRAM(s) Oral with dinner  senna 2 Tablet(s) Oral at bedtime  vancomycin  IVPB 1250 milliGRAM(s) IV Intermittent every 12 hours    MEDICATIONS  (PRN):  acetaminophen     Tablet .. 650 milliGRAM(s) Oral every 6 hours PRN Temp greater or equal to 38C (100.4F), Mild Pain (1 - 3)  aluminum hydroxide/magnesium hydroxide/simethicone Suspension 30 milliLiter(s) Oral every 4 hours PRN Dyspepsia  bisacodyl 5 milliGRAM(s) Oral daily PRN Constipation  dextrose Oral Gel 15 Gram(s) Oral once PRN Blood Glucose LESS THAN 70 milliGRAM(s)/deciliter  melatonin 3 milliGRAM(s) Oral at bedtime PRN Insomnia  ondansetron Injectable 4 milliGRAM(s) IV Push every 8 hours PRN Nausea and/or Vomiting  oxyCODONE    IR 2.5 milliGRAM(s) Oral every 4 hours PRN Moderate Pain (4 - 6)  oxyCODONE    IR 5 milliGRAM(s) Oral every 4 hours PRN Severe Pain (7 - 10)      Allergies    No Known Allergies    PHYSICAL EXAM:   Vital Signs:  Vital Signs Last 24 Hrs  T(C): 36.6 (12 Oct 2023 05:03), Max: 37.8 (11 Oct 2023 13:24)  T(F): 97.9 (12 Oct 2023 05:03), Max: 100.1 (11 Oct 2023 13:24)  HR: 82 (12 Oct 2023 05:03) (82 - 94)  BP: 102/72 (12 Oct 2023 05:03) (102/72 - 121/81)  BP(mean): --  RR: 18 (12 Oct 2023 05:03) (18 - 18)  SpO2: 96% (12 Oct 2023 05:03) (94% - 96%)    Parameters below as of 12 Oct 2023 05:03  Patient On (Oxygen Delivery Method): room air      Daily     Daily Weight in k (12 Oct 2023 05:03)    GENERAL:  Appears stated age  HEENT:  NC/AT  CHEST:  Full & symmetric excursion  HEART:  Regular rhythm  ABDOMEN:  Soft, non-tender, non-distended  EXTEREMITIES:  no cyanosis  SKIN:  No rash  NEURO:  Alert      LABS:                        13.2   6.54  )-----------( 205      ( 12 Oct 2023 06:44 )             39.9     10-12    137  |  102  |  15  ----------------------------<  247<H>  4.0   |  30  |  0.64    Ca    9.1      12 Oct 2023 06:44        Urinalysis Basic - ( 12 Oct 2023 06:44 )    Color: x / Appearance: x / SG: x / pH: x  Gluc: 247 mg/dL / Ketone: x  / Bili: x / Urobili: x   Blood: x / Protein: x / Nitrite: x   Leuk Esterase: x / RBC: x / WBC x   Sq Epi: x / Non Sq Epi: x / Bacteria: x

## 2023-10-12 NOTE — CHART NOTE - NSCHARTNOTEFT_GEN_A_CORE
Called by RN as patient c/o diffuse abdominal pain, screaming in pain. Patient seen and examined at bedside. Patient reports diffuse "burning" abdominal pain that has been constant all day. Admits to constipation (last BM 1 week ago and was not normal per patient). Reports no improvement with bowel regimen thus far. The patient denies nausea, vomiting. Also admits to burning between her legs - states these symptoms are not new. Patient states that pain regimen as ordered (tylenol, oxycodone) intermittently helps alleviate her pain, but not always. Physical exam notable for initially comfortable appearing female (upon my arrival to the room), followed by patient becoming tearful and crying in pain. Abdominal exam notable for hypoactive bowel sounds (? limited by body habitus), soft abdomen, nondistended, nontender, + fungal rash at abdominal crease. Lower extremity swelling. Unclear etiology of abdominal pain -had CT earlier that showed patency of mesenteric vessels. Could be component of severe constipation. Will give ofirmev 1g x1 for pain control at this time. Enema already given to patient this evening. RN to call with changes/concerns. Called by RN as patient c/o diffuse abdominal pain, screaming in pain. Patient seen and examined at bedside. Patient reports diffuse "burning" abdominal pain that has been constant all day. Admits to constipation (last BM 1 week ago and was not normal per patient). Reports no improvement with bowel regimen thus far. The patient denies nausea, vomiting. Also admits to burning between her legs - states these symptoms are not new. Patient states that pain regimen as ordered (tylenol, oxycodone) intermittently helps alleviate her pain, but not always. Physical exam notable for initially comfortable appearing female (upon my arrival to the room), followed by patient becoming tearful and crying in pain. Abdominal exam notable for hypoactive bowel sounds (? limited by body habitus), soft abdomen, nondistended, nontender, + fungal rash at abdominal crease. Lower extremity swelling. Unclear etiology of abdominal pain -had CT earlier that showed patency of mesenteric vessels. Could be component of severe constipation. Will give ofirmev 1g x1 for pain control at this time. Bowel regimen ordered standing already. Bisacodyl Enema already given to patient this evening without relief. Will trial SMOG enema for constipation. RN to call with changes/concerns.

## 2023-10-13 LAB
ALBUMIN SERPL ELPH-MCNC: 2.4 G/DL — LOW (ref 3.3–5)
ALP SERPL-CCNC: 74 U/L — SIGNIFICANT CHANGE UP (ref 40–120)
ALT FLD-CCNC: 22 U/L — SIGNIFICANT CHANGE UP (ref 12–78)
ANION GAP SERPL CALC-SCNC: 7 MMOL/L — SIGNIFICANT CHANGE UP (ref 5–17)
APPEARANCE UR: ABNORMAL
AST SERPL-CCNC: 18 U/L — SIGNIFICANT CHANGE UP (ref 15–37)
BASOPHILS # BLD AUTO: 0.03 K/UL — SIGNIFICANT CHANGE UP (ref 0–0.2)
BASOPHILS NFR BLD AUTO: 0.5 % — SIGNIFICANT CHANGE UP (ref 0–2)
BILIRUB SERPL-MCNC: 0.6 MG/DL — SIGNIFICANT CHANGE UP (ref 0.2–1.2)
BILIRUB UR-MCNC: NEGATIVE — SIGNIFICANT CHANGE UP
BUN SERPL-MCNC: 14 MG/DL — SIGNIFICANT CHANGE UP (ref 7–23)
CALCIUM SERPL-MCNC: 9.1 MG/DL — SIGNIFICANT CHANGE UP (ref 8.5–10.1)
CHLORIDE SERPL-SCNC: 102 MMOL/L — SIGNIFICANT CHANGE UP (ref 96–108)
CO2 SERPL-SCNC: 29 MMOL/L — SIGNIFICANT CHANGE UP (ref 22–31)
COLOR SPEC: YELLOW — SIGNIFICANT CHANGE UP
CREAT SERPL-MCNC: 0.66 MG/DL — SIGNIFICANT CHANGE UP (ref 0.5–1.3)
DIFF PNL FLD: ABNORMAL
EGFR: 101 ML/MIN/1.73M2 — SIGNIFICANT CHANGE UP
EOSINOPHIL # BLD AUTO: 0.1 K/UL — SIGNIFICANT CHANGE UP (ref 0–0.5)
EOSINOPHIL NFR BLD AUTO: 1.6 % — SIGNIFICANT CHANGE UP (ref 0–6)
GLUCOSE SERPL-MCNC: 254 MG/DL — HIGH (ref 70–99)
GLUCOSE UR QL: 500 MG/DL
HCT VFR BLD CALC: 38.8 % — SIGNIFICANT CHANGE UP (ref 34.5–45)
HGB BLD-MCNC: 13.1 G/DL — SIGNIFICANT CHANGE UP (ref 11.5–15.5)
IMM GRANULOCYTES NFR BLD AUTO: 0.6 % — SIGNIFICANT CHANGE UP (ref 0–0.9)
KETONES UR-MCNC: NEGATIVE — SIGNIFICANT CHANGE UP
LEUKOCYTE ESTERASE UR-ACNC: ABNORMAL
LYMPHOCYTES # BLD AUTO: 1.23 K/UL — SIGNIFICANT CHANGE UP (ref 1–3.3)
LYMPHOCYTES # BLD AUTO: 19.1 % — SIGNIFICANT CHANGE UP (ref 13–44)
MCHC RBC-ENTMCNC: 32 PG — SIGNIFICANT CHANGE UP (ref 27–34)
MCHC RBC-ENTMCNC: 33.8 GM/DL — SIGNIFICANT CHANGE UP (ref 32–36)
MCV RBC AUTO: 94.9 FL — SIGNIFICANT CHANGE UP (ref 80–100)
MONOCYTES # BLD AUTO: 0.45 K/UL — SIGNIFICANT CHANGE UP (ref 0–0.9)
MONOCYTES NFR BLD AUTO: 7 % — SIGNIFICANT CHANGE UP (ref 2–14)
NEUTROPHILS # BLD AUTO: 4.6 K/UL — SIGNIFICANT CHANGE UP (ref 1.8–7.4)
NEUTROPHILS NFR BLD AUTO: 71.2 % — SIGNIFICANT CHANGE UP (ref 43–77)
NITRITE UR-MCNC: NEGATIVE — SIGNIFICANT CHANGE UP
NRBC # BLD: 0 /100 WBCS — SIGNIFICANT CHANGE UP (ref 0–0)
PH UR: 5.5 — SIGNIFICANT CHANGE UP (ref 5–8)
PLATELET # BLD AUTO: 223 K/UL — SIGNIFICANT CHANGE UP (ref 150–400)
POTASSIUM SERPL-MCNC: 4.1 MMOL/L — SIGNIFICANT CHANGE UP (ref 3.5–5.3)
POTASSIUM SERPL-SCNC: 4.1 MMOL/L — SIGNIFICANT CHANGE UP (ref 3.5–5.3)
PROT SERPL-MCNC: 6.5 G/DL — SIGNIFICANT CHANGE UP (ref 6–8.3)
PROT UR-MCNC: SIGNIFICANT CHANGE UP
RBC # BLD: 4.09 M/UL — SIGNIFICANT CHANGE UP (ref 3.8–5.2)
RBC # FLD: 13 % — SIGNIFICANT CHANGE UP (ref 10.3–14.5)
SODIUM SERPL-SCNC: 138 MMOL/L — SIGNIFICANT CHANGE UP (ref 135–145)
SP GR SPEC: 1.02 — SIGNIFICANT CHANGE UP (ref 1–1.03)
UROBILINOGEN FLD QL: 1 — SIGNIFICANT CHANGE UP (ref 0.2–1)
VANCOMYCIN TROUGH SERPL-MCNC: 14.4 UG/ML — SIGNIFICANT CHANGE UP (ref 10–20)
WBC # BLD: 6.45 K/UL — SIGNIFICANT CHANGE UP (ref 3.8–10.5)
WBC # FLD AUTO: 6.45 K/UL — SIGNIFICANT CHANGE UP (ref 3.8–10.5)

## 2023-10-13 PROCEDURE — 99233 SBSQ HOSP IP/OBS HIGH 50: CPT

## 2023-10-13 PROCEDURE — 99221 1ST HOSP IP/OBS SF/LOW 40: CPT

## 2023-10-13 PROCEDURE — 99232 SBSQ HOSP IP/OBS MODERATE 35: CPT

## 2023-10-13 RX ORDER — INSULIN GLARGINE 100 [IU]/ML
30 INJECTION, SOLUTION SUBCUTANEOUS EVERY MORNING
Refills: 0 | Status: DISCONTINUED | OUTPATIENT
Start: 2023-10-13 | End: 2023-10-19

## 2023-10-13 RX ORDER — KETOROLAC TROMETHAMINE 30 MG/ML
30 SYRINGE (ML) INJECTION ONCE
Refills: 0 | Status: DISCONTINUED | OUTPATIENT
Start: 2023-10-13 | End: 2023-10-13

## 2023-10-13 RX ORDER — INSULIN GLARGINE 100 [IU]/ML
30 INJECTION, SOLUTION SUBCUTANEOUS AT BEDTIME
Refills: 0 | Status: DISCONTINUED | OUTPATIENT
Start: 2023-10-13 | End: 2023-10-19

## 2023-10-13 RX ORDER — HYDROCORTISONE 1 %
1 OINTMENT (GRAM) TOPICAL
Refills: 0 | Status: DISCONTINUED | OUTPATIENT
Start: 2023-10-13 | End: 2023-10-19

## 2023-10-13 RX ORDER — CEFTRIAXONE 500 MG/1
1000 INJECTION, POWDER, FOR SOLUTION INTRAMUSCULAR; INTRAVENOUS EVERY 24 HOURS
Refills: 0 | Status: COMPLETED | OUTPATIENT
Start: 2023-10-13 | End: 2023-10-15

## 2023-10-13 RX ORDER — CEFEPIME 1 G/1
1000 INJECTION, POWDER, FOR SOLUTION INTRAMUSCULAR; INTRAVENOUS EVERY 12 HOURS
Refills: 0 | Status: DISCONTINUED | OUTPATIENT
Start: 2023-10-13 | End: 2023-10-13

## 2023-10-13 RX ADMIN — NYSTATIN CREAM 1 APPLICATION(S): 100000 CREAM TOPICAL at 21:39

## 2023-10-13 RX ADMIN — OXYCODONE HYDROCHLORIDE 2.5 MILLIGRAM(S): 5 TABLET ORAL at 22:30

## 2023-10-13 RX ADMIN — OXYCODONE HYDROCHLORIDE 5 MILLIGRAM(S): 5 TABLET ORAL at 10:34

## 2023-10-13 RX ADMIN — OXYCODONE HYDROCHLORIDE 2.5 MILLIGRAM(S): 5 TABLET ORAL at 21:36

## 2023-10-13 RX ADMIN — OXYCODONE HYDROCHLORIDE 5 MILLIGRAM(S): 5 TABLET ORAL at 01:00

## 2023-10-13 RX ADMIN — OXYCODONE HYDROCHLORIDE 5 MILLIGRAM(S): 5 TABLET ORAL at 14:47

## 2023-10-13 RX ADMIN — Medication 100 MILLIGRAM(S): at 05:28

## 2023-10-13 RX ADMIN — Medication 6: at 07:56

## 2023-10-13 RX ADMIN — POLYETHYLENE GLYCOL 3350 17 GRAM(S): 17 POWDER, FOR SOLUTION ORAL at 10:36

## 2023-10-13 RX ADMIN — Medication 166.67 MILLIGRAM(S): at 01:58

## 2023-10-13 RX ADMIN — NYSTATIN CREAM 1 APPLICATION(S): 100000 CREAM TOPICAL at 05:29

## 2023-10-13 RX ADMIN — Medication 3 MILLIGRAM(S): at 21:38

## 2023-10-13 RX ADMIN — CEFTRIAXONE 100 MILLIGRAM(S): 500 INJECTION, POWDER, FOR SOLUTION INTRAMUSCULAR; INTRAVENOUS at 14:47

## 2023-10-13 RX ADMIN — RIVAROXABAN 20 MILLIGRAM(S): KIT at 17:03

## 2023-10-13 RX ADMIN — Medication 1 APPLICATION(S): at 05:29

## 2023-10-13 RX ADMIN — Medication 10 UNIT(S): at 12:36

## 2023-10-13 RX ADMIN — INSULIN GLARGINE 30 UNIT(S): 100 INJECTION, SOLUTION SUBCUTANEOUS at 08:02

## 2023-10-13 RX ADMIN — Medication 10 UNIT(S): at 17:03

## 2023-10-13 RX ADMIN — Medication 166.67 MILLIGRAM(S): at 13:11

## 2023-10-13 RX ADMIN — Medication 4: at 17:03

## 2023-10-13 RX ADMIN — Medication 30 MILLIGRAM(S): at 06:04

## 2023-10-13 RX ADMIN — Medication 30 MILLIGRAM(S): at 05:29

## 2023-10-13 RX ADMIN — CEFEPIME 100 MILLIGRAM(S): 1 INJECTION, POWDER, FOR SOLUTION INTRAMUSCULAR; INTRAVENOUS at 02:13

## 2023-10-13 RX ADMIN — GABAPENTIN 300 MILLIGRAM(S): 400 CAPSULE ORAL at 14:47

## 2023-10-13 RX ADMIN — NYSTATIN CREAM 1 APPLICATION(S): 100000 CREAM TOPICAL at 14:47

## 2023-10-13 RX ADMIN — INSULIN GLARGINE 30 UNIT(S): 100 INJECTION, SOLUTION SUBCUTANEOUS at 21:44

## 2023-10-13 RX ADMIN — OXYCODONE HYDROCHLORIDE 5 MILLIGRAM(S): 5 TABLET ORAL at 00:10

## 2023-10-13 RX ADMIN — Medication 2: at 12:35

## 2023-10-13 RX ADMIN — Medication 20 MILLIGRAM(S): at 05:28

## 2023-10-13 RX ADMIN — GABAPENTIN 300 MILLIGRAM(S): 400 CAPSULE ORAL at 05:28

## 2023-10-13 RX ADMIN — NYSTATIN CREAM 1 APPLICATION(S): 100000 CREAM TOPICAL at 14:48

## 2023-10-13 RX ADMIN — Medication 1 APPLICATION(S): at 17:03

## 2023-10-13 RX ADMIN — Medication 10 UNIT(S): at 07:56

## 2023-10-13 RX ADMIN — GABAPENTIN 300 MILLIGRAM(S): 400 CAPSULE ORAL at 21:39

## 2023-10-13 NOTE — BH CONSULTATION LIAISON ASSESSMENT NOTE - HPI (INCLUDE ILLNESS QUALITY, SEVERITY, DURATION, TIMING, CONTEXT, MODIFYING FACTORS, ASSOCIATED SIGNS AND SYMPTOMS)
Patient seen, evaluated and chart reviewed. Patient is a 57 y/o WF with PMHx of T2DM, Afib on Xarelto, and CHF with unknown EF presents to the ED with 2 week history of worsening lower extremity edema, erythema, and pain. Pt states that the symptoms began two weeks ago with new symptom onset of right LE pus drainage from the anterior shin. 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 During the discussion, pt becomes distressed and begins to cry, stating that when she eats she has excruciating burning pain in her legs with associated discharge from beneath her abdominal panus. Pt has undergone GI workup in past without significant results. Pt states that she did not come to the ER earlier as her care giver was on vacation. Pt endorses headaches, confusion, chest pain, and shortness of breath when she eats as it exacerbates her pain in her legs. Endorses changes in urine, with notable sweet smelling, cloudy, sometimes frothy urine. Endorses chronic weakness, numbness, and tingling in bilateral lower extremities. Patient at this time continues to complain of chronic leg pain, and refuses a psychopharmacological trial.

## 2023-10-13 NOTE — PROGRESS NOTE ADULT - ASSESSMENT
59 yo F with PMH of T2DM, Afib on Xarelto, CHF (EF 60-65% in 2/2023), neuropathy, chronic b/l LE wounds admitted for cellulitis    Edema, Afib  - Chronic b/l LE edema with cellulitic changes, otherwise, no meaningful evidence of volume overload  - No O2 requirement.  Pro-BNP unremarkable and CxR not consistent with CHF  - TTE showed normal LVF, EF 66% with no significant valvular disease  - s/p IV Lasix.  Now on Torsemide 20 mg daily    - Rate mostly rate-controlled per flow sheets at 80-100s   - Continue Xarelto and Metoprolol     - Abx for cellulitis per ID  - Monitor and replete lytes, keep K>4, Mg>2.  - Will continue to follow.    Nina Salcedo DNP, NP-C, AGACNP-C  Cardiology   Call TEAMS

## 2023-10-13 NOTE — BH CONSULTATION LIAISON ASSESSMENT NOTE - CURRENT MEDICATION
MEDICATIONS  (STANDING):  cefepime   IVPB 1000 milliGRAM(s) IV Intermittent every 12 hours  dextrose 5%. 1000 milliLiter(s) (100 mL/Hr) IV Continuous <Continuous>  dextrose 5%. 1000 milliLiter(s) (50 mL/Hr) IV Continuous <Continuous>  dextrose 50% Injectable 25 Gram(s) IV Push once  dextrose 50% Injectable 12.5 Gram(s) IV Push once  dextrose 50% Injectable 25 Gram(s) IV Push once  gabapentin 300 milliGRAM(s) Oral every 8 hours  glucagon  Injectable 1 milliGRAM(s) IntraMuscular once  hydrocortisone 1% Cream 1 Application(s) Topical two times a day  insulin glargine Injectable (LANTUS) 30 Unit(s) SubCutaneous every morning  insulin glargine Injectable (LANTUS) 30 Unit(s) SubCutaneous at bedtime  insulin lispro (ADMELOG) corrective regimen sliding scale   SubCutaneous three times a day before meals  insulin lispro (ADMELOG) corrective regimen sliding scale   SubCutaneous at bedtime  insulin lispro Injectable (ADMELOG) 10 Unit(s) SubCutaneous three times a day before meals  metoprolol succinate  milliGRAM(s) Oral daily  naloxone Injectable 0.4 milliGRAM(s) IV Push once  nystatin Powder 1 Application(s) Topical three times a day  nystatin Powder 1 Application(s) Topical three times a day  polyethylene glycol 3350 17 Gram(s) Oral daily  rivaroxaban 20 milliGRAM(s) Oral with dinner  senna 2 Tablet(s) Oral at bedtime  torsemide 20 milliGRAM(s) Oral daily  vancomycin  IVPB 1250 milliGRAM(s) IV Intermittent every 12 hours    MEDICATIONS  (PRN):  acetaminophen     Tablet .. 650 milliGRAM(s) Oral every 6 hours PRN Temp greater or equal to 38C (100.4F), Mild Pain (1 - 3)  aluminum hydroxide/magnesium hydroxide/simethicone Suspension 30 milliLiter(s) Oral every 4 hours PRN Dyspepsia  bisacodyl 5 milliGRAM(s) Oral daily PRN Constipation  bisacodyl Suppository 10 milliGRAM(s) Rectal daily PRN Constipation  dextrose Oral Gel 15 Gram(s) Oral once PRN Blood Glucose LESS THAN 70 milliGRAM(s)/deciliter  melatonin 3 milliGRAM(s) Oral at bedtime PRN Insomnia  ondansetron Injectable 4 milliGRAM(s) IV Push every 8 hours PRN Nausea and/or Vomiting  oxyCODONE    IR 2.5 milliGRAM(s) Oral every 4 hours PRN Moderate Pain (4 - 6)  oxyCODONE    IR 5 milliGRAM(s) Oral every 4 hours PRN Severe Pain (7 - 10)

## 2023-10-13 NOTE — CASE MANAGEMENT PROGRESS NOTE - NSCMPROGRESSNOTE_GEN_ALL_CORE
CM consult noted for stairs, PT recommends ROYAL, CM to follow for needs.  CM consult noted for stairs and DM supplies, PT recommends ROYAL, CM to follow for needs, pt stated she will be seeing a new primary doctor after DC. DM RN tasked

## 2023-10-13 NOTE — PROGRESS NOTE ADULT - SUBJECTIVE AND OBJECTIVE BOX
Patient is a 58y old  Female who presents with a chief complaint of Right Left Wound (13 Oct 2023 07:43)      INTERVAL HPI/OVERNIGHT EVENTS: Patient seen and examined at bedside. C/o pain all over. No chest pain, sob. No constipation or diarrhea     MEDICATIONS  (STANDING):  cefepime   IVPB 1000 milliGRAM(s) IV Intermittent every 12 hours  dextrose 5%. 1000 milliLiter(s) (50 mL/Hr) IV Continuous <Continuous>  dextrose 5%. 1000 milliLiter(s) (100 mL/Hr) IV Continuous <Continuous>  dextrose 50% Injectable 25 Gram(s) IV Push once  dextrose 50% Injectable 25 Gram(s) IV Push once  dextrose 50% Injectable 12.5 Gram(s) IV Push once  gabapentin 300 milliGRAM(s) Oral every 8 hours  glucagon  Injectable 1 milliGRAM(s) IntraMuscular once  hydrocortisone 1% Cream 1 Application(s) Topical two times a day  insulin glargine Injectable (LANTUS) 30 Unit(s) SubCutaneous every morning  insulin glargine Injectable (LANTUS) 30 Unit(s) SubCutaneous at bedtime  insulin lispro (ADMELOG) corrective regimen sliding scale   SubCutaneous three times a day before meals  insulin lispro (ADMELOG) corrective regimen sliding scale   SubCutaneous at bedtime  insulin lispro Injectable (ADMELOG) 10 Unit(s) SubCutaneous three times a day before meals  metoprolol succinate  milliGRAM(s) Oral daily  naloxone Injectable 0.4 milliGRAM(s) IV Push once  nystatin Powder 1 Application(s) Topical three times a day  nystatin Powder 1 Application(s) Topical three times a day  polyethylene glycol 3350 17 Gram(s) Oral daily  rivaroxaban 20 milliGRAM(s) Oral with dinner  senna 2 Tablet(s) Oral at bedtime  torsemide 20 milliGRAM(s) Oral daily  vancomycin  IVPB 1250 milliGRAM(s) IV Intermittent every 12 hours    MEDICATIONS  (PRN):  acetaminophen     Tablet .. 650 milliGRAM(s) Oral every 6 hours PRN Temp greater or equal to 38C (100.4F), Mild Pain (1 - 3)  aluminum hydroxide/magnesium hydroxide/simethicone Suspension 30 milliLiter(s) Oral every 4 hours PRN Dyspepsia  bisacodyl 5 milliGRAM(s) Oral daily PRN Constipation  bisacodyl Suppository 10 milliGRAM(s) Rectal daily PRN Constipation  dextrose Oral Gel 15 Gram(s) Oral once PRN Blood Glucose LESS THAN 70 milliGRAM(s)/deciliter  melatonin 3 milliGRAM(s) Oral at bedtime PRN Insomnia  ondansetron Injectable 4 milliGRAM(s) IV Push every 8 hours PRN Nausea and/or Vomiting  oxyCODONE    IR 2.5 milliGRAM(s) Oral every 4 hours PRN Moderate Pain (4 - 6)  oxyCODONE    IR 5 milliGRAM(s) Oral every 4 hours PRN Severe Pain (7 - 10)      Allergies    No Known Allergies    Intolerances        REVIEW OF SYSTEMS:  Per HPI. All other ROS noted Negative   Vital Signs Last 24 Hrs  T(C): 36.8 (13 Oct 2023 05:24), Max: 36.8 (13 Oct 2023 05:24)  T(F): 98.2 (13 Oct 2023 05:24), Max: 98.2 (13 Oct 2023 05:24)  HR: 95 (13 Oct 2023 05:24) (87 - 108)  BP: 112/73 (13 Oct 2023 05:24) (111/74 - 123/84)  BP(mean): --  RR: 18 (13 Oct 2023 05:24) (18 - 18)  SpO2: 94% (13 Oct 2023 05:24) (94% - 95%)    Parameters below as of 13 Oct 2023 05:24  Patient On (Oxygen Delivery Method): room air        PHYSICAL EXAM:  GENERAL: NAD, awake, alert   HEENT:  AT/NC, anicteric, moist mucous membranes, EOMI, PERRL, no lid-lag, conjunctiva and sclera clear  CHEST/LUNG:  CTA b/l, no rales, wheezes, or rhonchi,  normal respiratory effort, no intercostal retractions  HEART:  RRR, S1, S2, no murmurs; 1+ pitting edema  ABDOMEN:  BS+, soft, nontender, nondistended  MSK/EXTREMITIES: + chronic venous stasis changes bilat LE   NERVOUS SYSTEM: answers questions and follows commands appropriately, A&Ox3 grossly moves all extremities   PSYCH: Appropriate affect, Alert & Awake; fair judgement  Skin: Warm, dry. chronic  venous stasis changes bilat LE     LABS:      Ca    9.1        12 Oct 2023 06:44        CAPILLARY BLOOD GLUCOSE      POCT Blood Glucose.: 254 mg/dL (13 Oct 2023 07:50)  POCT Blood Glucose.: 206 mg/dL (12 Oct 2023 21:27)  POCT Blood Glucose.: 248 mg/dL (12 Oct 2023 18:03)  POCT Blood Glucose.: 301 mg/dL (12 Oct 2023 11:43)    BLOOD CULTURE  10-09 @ 15:27   50,000 - 99,000 CFU/mL Klebsiella variicola  --  Klebsiella variicola  10-09 @ 11:55   No growth at 72 Hours  --  --  10-09 @ 11:20   No growth at 72 Hours  --  --    RADIOLOGY & ADDITIONAL TESTS:    Imaging Personally Reviewed:  [ ] YES     Consultant(s) Notes Reviewed:      Care Discussed with Consultants/Other Providers:

## 2023-10-13 NOTE — BH CONSULTATION LIAISON ASSESSMENT NOTE - NSBHCHARTREVIEWLAB_PSY_A_CORE FT
13.1   6.45  )-----------( 223      ( 13 Oct 2023 08:50 )             38.8   10-13    138  |  102  |  14  ----------------------------<  254<H>  4.1   |  29  |  0.66    Ca    9.1      13 Oct 2023 08:50    TPro  6.5  /  Alb  2.4<L>  /  TBili  0.6  /  DBili  x   /  AST  18  /  ALT  22  /  AlkPhos  74  10-13

## 2023-10-13 NOTE — PROGRESS NOTE ADULT - ASSESSMENT
57 yo F with PMHx of T2DM, Afib on Xarelto, and CHF with unknown EF presents to the ED with 2 week history of worsening lower extremity edema, erythema, and pain, admitted for cellulitis and wound care

## 2023-10-13 NOTE — PROGRESS NOTE ADULT - SUBJECTIVE AND OBJECTIVE BOX
CAPILLARY BLOOD GLUCOSE      POCT Blood Glucose.: 206 mg/dL (12 Oct 2023 21:27)  POCT Blood Glucose.: 248 mg/dL (12 Oct 2023 18:03)  POCT Blood Glucose.: 301 mg/dL (12 Oct 2023 11:43)  POCT Blood Glucose.: 259 mg/dL (12 Oct 2023 07:49)      Vital Signs Last 24 Hrs  T(C): 36.8 (13 Oct 2023 05:24), Max: 36.8 (13 Oct 2023 05:24)  T(F): 98.2 (13 Oct 2023 05:24), Max: 98.2 (13 Oct 2023 05:24)  HR: 95 (13 Oct 2023 05:24) (87 - 108)  BP: 112/73 (13 Oct 2023 05:24) (111/74 - 123/84)  BP(mean): --  RR: 18 (13 Oct 2023 05:24) (18 - 18)  SpO2: 94% (13 Oct 2023 05:24) (94% - 95%)    Parameters below as of 13 Oct 2023 05:24  Patient On (Oxygen Delivery Method): room air        General: WN/WD NAD  Respiratory: CTA B/L  CV: RRR, S1S2, no murmurs, rubs or gallops  Abdominal: Soft, NT, ND +BS, Last BM  Extremities: No edema, + peripheral pulses     10-12    137  |  102  |  15  ----------------------------<  247<H>  4.0   |  30  |  0.64    Ca    9.1      12 Oct 2023 06:44        dextrose 50% Injectable 25 Gram(s) IV Push once  dextrose 50% Injectable 25 Gram(s) IV Push once  dextrose 50% Injectable 12.5 Gram(s) IV Push once  dextrose Oral Gel 15 Gram(s) Oral once PRN  glucagon  Injectable 1 milliGRAM(s) IntraMuscular once  insulin glargine Injectable (LANTUS) 30 Unit(s) SubCutaneous every morning  insulin glargine Injectable (LANTUS) 30 Unit(s) SubCutaneous at bedtime  insulin lispro (ADMELOG) corrective regimen sliding scale   SubCutaneous three times a day before meals  insulin lispro (ADMELOG) corrective regimen sliding scale   SubCutaneous at bedtime  insulin lispro Injectable (ADMELOG) 10 Unit(s) SubCutaneous three times a day before meals

## 2023-10-13 NOTE — PROGRESS NOTE ADULT - SUBJECTIVE AND OBJECTIVE BOX
MELITON LOZANO is a 58yFemale , patient examined and chart reviewed.    INTERVAL HPI/ OVERNIGHT EVENTS:   Events noted. C/o Abd pain overnight. Better today.  C/o pain all over with dysuria.   Started on Cefepime overnight.    PAST MEDICAL & SURGICAL HISTORY:  Atrial fibrillation  Type 2 diabetes mellitus  Congestive heart failure  Scalp psoriasis  S/P tonsillectomy  S/P appendectomy  H/O umbilical hernia repair      For details regarding the patient's social history, family history, and other miscellaneous elements, please refer the initial infectious diseases consultation and/or the admitting history and physical examination for this admission.      ROS: Poor historian + leg pain    No Known Allergies      Current inpatient medications :    ANTIBIOTICS/RELEVANT:  vancomycin  IVPB 1250 milliGRAM(s) IV Intermittent every 12 hours  cefepime   IVPB 1000 milliGRAM(s) IV Intermittent every 12 hours    MEDICATIONS  (STANDING):  dextrose 5%. 1000 milliLiter(s) (50 mL/Hr) IV Continuous <Continuous>  dextrose 5%. 1000 milliLiter(s) (100 mL/Hr) IV Continuous <Continuous>  dextrose 50% Injectable 25 Gram(s) IV Push once  dextrose 50% Injectable 25 Gram(s) IV Push once  dextrose 50% Injectable 12.5 Gram(s) IV Push once  gabapentin 300 milliGRAM(s) Oral every 8 hours  glucagon  Injectable 1 milliGRAM(s) IntraMuscular once  hydrocortisone 1% Cream 1 Application(s) Topical two times a day  insulin glargine Injectable (LANTUS) 30 Unit(s) SubCutaneous every morning  insulin glargine Injectable (LANTUS) 30 Unit(s) SubCutaneous at bedtime  insulin lispro (ADMELOG) corrective regimen sliding scale   SubCutaneous at bedtime  insulin lispro (ADMELOG) corrective regimen sliding scale   SubCutaneous three times a day before meals  insulin lispro Injectable (ADMELOG) 10 Unit(s) SubCutaneous three times a day before meals  metoprolol succinate  milliGRAM(s) Oral daily  naloxone Injectable 0.4 milliGRAM(s) IV Push once  nystatin Powder 1 Application(s) Topical three times a day  nystatin Powder 1 Application(s) Topical three times a day  polyethylene glycol 3350 17 Gram(s) Oral daily  rivaroxaban 20 milliGRAM(s) Oral with dinner  senna 2 Tablet(s) Oral at bedtime  torsemide 20 milliGRAM(s) Oral daily    MEDICATIONS  (PRN):  acetaminophen     Tablet .. 650 milliGRAM(s) Oral every 6 hours PRN Temp greater or equal to 38C (100.4F), Mild Pain (1 - 3)  aluminum hydroxide/magnesium hydroxide/simethicone Suspension 30 milliLiter(s) Oral every 4 hours PRN Dyspepsia  bisacodyl 5 milliGRAM(s) Oral daily PRN Constipation  bisacodyl Suppository 10 milliGRAM(s) Rectal daily PRN Constipation  dextrose Oral Gel 15 Gram(s) Oral once PRN Blood Glucose LESS THAN 70 milliGRAM(s)/deciliter  melatonin 3 milliGRAM(s) Oral at bedtime PRN Insomnia  ondansetron Injectable 4 milliGRAM(s) IV Push every 8 hours PRN Nausea and/or Vomiting  oxyCODONE    IR 2.5 milliGRAM(s) Oral every 4 hours PRN Moderate Pain (4 - 6)  oxyCODONE    IR 5 milliGRAM(s) Oral every 4 hours PRN Severe Pain (7 - 10)        Objective:  Vital Signs Last 24 Hrs  T(C): 37 (13 Oct 2023 13:17), Max: 37 (13 Oct 2023 13:17)  T(F): 98.6 (13 Oct 2023 13:17), Max: 98.6 (13 Oct 2023 13:17)  HR: 64 (13 Oct 2023 13:17) (64 - 108)  BP: 116/75 (13 Oct 2023 13:17) (112/73 - 123/84)  RR: 18 (13 Oct 2023 13:17) (18 - 18)  SpO2: 96% (13 Oct 2023 13:17) (94% - 96%)    Parameters below as of 13 Oct 2023 13:17  Patient On (Oxygen Delivery Method): room air      Physical Exam:  General:  no acute distress  Neck: supple, trachea midline  Lungs: clear, no wheeze/rhonchi  Cardiovascular: regular rate and rhythm, S1 S2  Abdomen: soft, nontender,  bowel sounds normal  Neurological: alert and oriented x3  Skin: no rash  Extremities: Tanner LE edema erythema Right > Left improving  Chronic stasis + tender to touch        LABS:                        13.1   6.45  )-----------( 223      ( 13 Oct 2023 08:50 )             38.8   10-13    138  |  102  |  14  ----------------------------<  254<H>  4.1   |  29  |  0.66    Ca    9.1      13 Oct 2023 08:50    TPro  6.5  /  Alb  2.4<L>  /  TBili  0.6  /  DBili  x   /  AST  18  /  ALT  22  /  AlkPhos  74  10-13      MICROBIOLOGY:  Culture - Urine (10.09.23 @ 15:27)    -  Amikacin: S <=16   -  Amoxicillin/Clavulanic Acid: S <=8/4   -  Ampicillin: R 16 These ampicillin results predict results for amoxicillin   -  Ampicillin/Sulbactam: S <=4/2   -  Aztreonam: S <=4   -  Cefazolin: S <=2   -  Cefepime: S <=2   -  Cefoxitin: I 16   -  Ceftriaxone: S <=1   -  Ciprofloxacin: S <=0.25   -  Ertapenem: S <=0.5   -  Gentamicin: S <=2   -  Imipenem: S <=1   -  Levofloxacin: S <=0.5   -  Meropenem: S <=1   -  Nitrofurantoin: S <=32 Should not be used to treat pyelonephritis   -  Piperacillin/Tazobactam: S <=8   -  Tobramycin: S <=2   -  Trimethoprim/Sulfamethoxazole: S <=0.5/9.5   Specimen Source: Clean Catch Clean Catch (Midstream)   Culture Results:   50,000 - 99,000 CFU/mL Klebsiella variicola   Organism Identification: Klebsiella variicola   Organism: Klebsiella variicola   Method Type: SASHA    Culture - Blood (collected 09 Oct 2023 11:55)  Source: .Blood Blood-Peripheral  Preliminary Report (10 Oct 2023 19:02):    No growth at 24 hours    Culture - Blood (collected 09 Oct 2023 11:20)  Source: .Blood Blood-Peripheral  Preliminary Report (10 Oct 2023 19:02):    No growth at 24 hours    RADIOLOGY & ADDITIONAL STUDIES:          Assessment :  57 yo F with PMHx of T2DM, Afib on Xarelto, and CHF with unknown EF admitted with worsening Right LE draining lesion on anterior shin and generalized worsening bilateral lower extremity edema and erythema sec cellulitis  Hx MRSA  Leg pain burning sensation prob sec DM peripheral neuropathy  Ua and Ucx noted - 50,000 - 99,000 CFU/mL Klebsiella variicola - pt c/o dysuria - UTI   Abd pain resolved    Plan :   On Vancomycin 1250mg q12h till 10/19 can change to po Doxycyline 100mg po bid to finish course when ready for dc  Change Cefepime to Rocephin x 3 days  Trend temps and cbc  Wound care per wound care service  Elevate leg  Asp precautions  Pulm toileting  Stable from ID standpoint    D/w Dr uRiz    Continue with present regiment.  Appropriate use of antibiotics and adverse effects reviewed.    > 35 minutes were spent in direct patient care reviewing notes, medications ,labs data/ imaging , discussion with multidisciplinary team.    Thank you for allowing me to participate in care of your patient .    Carole Mohr MD  Infectious Disease  [phone]

## 2023-10-13 NOTE — PROGRESS NOTE ADULT - SUBJECTIVE AND OBJECTIVE BOX
[facility] -- Angela Alaniz, Tico Simmons Savella, , Angel Luis Whitaker  Office # [phone]    Follow Up:   Edema, Hx Afib    Subjective/Observations: Sound asleep, comfortable on RA.  Not in any from of distress/discomfort.  Awakened with no complaints    REVIEW OF SYSTEMS: All other review of systems is negative unless indicated above  PAST MEDICAL & SURGICAL HISTORY:  Atrial fibrillation  Type 2 diabetes mellitus  Congestive heart failure  Scalp psoriasis  S/P tonsillectomy  S/P appendectomy  H/O umbilical hernia repair    MEDICATIONS  (STANDING):  cefepime   IVPB 1000 milliGRAM(s) IV Intermittent every 12 hours  dextrose 5%. 1000 milliLiter(s) (50 mL/Hr) IV Continuous <Continuous>  dextrose 5%. 1000 milliLiter(s) (100 mL/Hr) IV Continuous <Continuous>  dextrose 50% Injectable 25 Gram(s) IV Push once  dextrose 50% Injectable 25 Gram(s) IV Push once  dextrose 50% Injectable 12.5 Gram(s) IV Push once  gabapentin 300 milliGRAM(s) Oral every 8 hours  glucagon  Injectable 1 milliGRAM(s) IntraMuscular once  hydrocortisone 1% Cream 1 Application(s) Topical two times a day  insulin glargine Injectable (LANTUS) 30 Unit(s) SubCutaneous at bedtime  insulin glargine Injectable (LANTUS) 30 Unit(s) SubCutaneous every morning  insulin lispro (ADMELOG) corrective regimen sliding scale   SubCutaneous three times a day before meals  insulin lispro (ADMELOG) corrective regimen sliding scale   SubCutaneous at bedtime  insulin lispro Injectable (ADMELOG) 10 Unit(s) SubCutaneous three times a day before meals  metoprolol succinate  milliGRAM(s) Oral daily  naloxone Injectable 0.4 milliGRAM(s) IV Push once  nystatin Powder 1 Application(s) Topical three times a day  nystatin Powder 1 Application(s) Topical three times a day  polyethylene glycol 3350 17 Gram(s) Oral daily  rivaroxaban 20 milliGRAM(s) Oral with dinner  senna 2 Tablet(s) Oral at bedtime  torsemide 20 milliGRAM(s) Oral daily  vancomycin  IVPB 1250 milliGRAM(s) IV Intermittent every 12 hours    MEDICATIONS  (PRN):  acetaminophen     Tablet .. 650 milliGRAM(s) Oral every 6 hours PRN Temp greater or equal to 38C (100.4F), Mild Pain (1 - 3)  aluminum hydroxide/magnesium hydroxide/simethicone Suspension 30 milliLiter(s) Oral every 4 hours PRN Dyspepsia  bisacodyl 5 milliGRAM(s) Oral daily PRN Constipation  bisacodyl Suppository 10 milliGRAM(s) Rectal daily PRN Constipation  dextrose Oral Gel 15 Gram(s) Oral once PRN Blood Glucose LESS THAN 70 milliGRAM(s)/deciliter  melatonin 3 milliGRAM(s) Oral at bedtime PRN Insomnia  ondansetron Injectable 4 milliGRAM(s) IV Push every 8 hours PRN Nausea and/or Vomiting  oxyCODONE    IR 5 milliGRAM(s) Oral every 4 hours PRN Severe Pain (7 - 10)  oxyCODONE    IR 2.5 milliGRAM(s) Oral every 4 hours PRN Moderate Pain (4 - 6)    Allergies    No Known Allergies    Intolerances    Vital Signs Last 24 Hrs  T(C): 36.8 (13 Oct 2023 05:24), Max: 36.8 (13 Oct 2023 05:24)  T(F): 98.2 (13 Oct 2023 05:24), Max: 98.2 (13 Oct 2023 05:24)  HR: 95 (13 Oct 2023 05:24) (87 - 108)  BP: 112/73 (13 Oct 2023 05:24) (111/74 - 123/84)  BP(mean): --  RR: 18 (13 Oct 2023 05:24) (18 - 18)  SpO2: 94% (13 Oct 2023 05:24) (94% - 95%)    Parameters below as of 13 Oct 2023 05:24  Patient On (Oxygen Delivery Method): room air    I&O's Summary    12 Oct 2023 07:01  -  13 Oct 2023 07:00  --------------------------------------------------------  IN: 120 mL / OUT: 0 mL / NET: 120 mL    PHYSICAL EXAM:  TELE: Not on tele  Constitutional: NAD, awake and alert, obese  HEENT: Moist Mucous Membranes, Anicteric  Pulmonary: Non-labored, breath sounds are clear bilaterally, No wheezing, rales or rhonchi  Cardiovascular: IRRR, S1 and S2, No murmurs, rubs, gallops or clicks  Gastrointestinal: Bowel Sounds present, soft, nontender.   Lymph: No peripheral edema. No lymphadenopathy.  Skin: No visible rashes or ulcers.  +chronic skin changes on BLE  Psych:  Mood & affect: Flat      LABS: All Labs Reviewed:                        13.1   6.45  )-----------( 223      ( 13 Oct 2023 08:50 )             38.8                         13.2   6.54  )-----------( 205      ( 12 Oct 2023 06:44 )             39.9                         12.8   6.56  )-----------( 179      ( 11 Oct 2023 06:40 )             38.9     13 Oct 2023 08:50    138    |  102    |  14     ----------------------------<  254    4.1     |  29     |  0.66   12 Oct 2023 06:44    137    |  102    |  15     ----------------------------<  247    4.0     |  30     |  0.64   11 Oct 2023 06:40    136    |  102    |  14     ----------------------------<  310    3.7     |  29     |  0.60     Ca    9.1        13 Oct 2023 08:50  Ca    9.1        12 Oct 2023 06:44  Ca    8.9        11 Oct 2023 06:40    TPro  6.5    /  Alb  2.4    /  TBili  0.6    /  DBili  x      /  AST  18     /  ALT  22     /  AlkPhos  74     13 Oct 2023 08:50          12 Lead ECG:   Ventricular Rate 118 BPM    Atrial Rate 141 BPM    QRS Duration 78 ms    Q-T Interval 336 ms    QTC Calculation(Bazett) 470 ms    R Axis 7 degrees    T Axis -77 degrees    Diagnosis Line Atrial fibrillation  Nonspecific ST and T wave abnormality  Abnormal ECG  Confirmed by fernando Umaña ([id number]) on 10/9/2023 12:12:07 PM (10-09-23 @ 11:25)      TRANSTHORACIC ECHOCARDIOGRAM REPORT  ________________________________________________________________________________                                      _______       Pt. Name:       MELITON LOZANO Study Date:    10/10/2023  MRN:            [id number]          YOB: 1965  Accession #:    [id number]         Age:           58 years  Account#:       [id number]        Gender:        F  Heart Rate:                       Height:        59.84 in (152.00 cm)  Rhythm:      Weight:        249.12 lb (113.00 kg)  Blood Pressure: 125/75 mmHg       BSA/BMI:       2.05 m² / 48.91 kg/m²  ________________________________________________________________________________________  Referring Physician:    [id number] Garrick Spring  Interpreting Physician: Fahad Simmons  Primary Sonographer:    Brittny Zurita RDCS    CPT:               ECHO TTE WO CON COMP W DOPP - 50460.m  Indication(s):     Heart failure, unspecified - I50.9  Procedure:         Transthoracic echocardiogram with 2-D, M-mode and complete                     spectral and color flow Doppler.  Ordering Location: [facility]    _______________________________________________________________________________________     CONCLUSIONS:      1. Left ventricular systolic function is normal with an ejection fraction of 66 % by White's method of disks.   2. There is normal LV mass and concentric remodeling.   3. Normal left ventricular diastolic function.   4. Normal right ventricular cavity size, wall thickness and systolic function.   5. The left atrium is normal in size.   6. Trace mitral regurgitation.   7. Structurally normal mitral valve with normal leaflet excursion.   8. There is calcification of the mitral valve annulus.  ________________________________________________________________________________________  FINDINGS:     Left Ventricle:  Left ventricular systolic function is normal with a calculated ejection fraction of 66 % by the White's biplane method of disks. There is normal left ventricular diastolic function. Mild to moderate left ventricular hypertrophy. There is normal LV mass and concentric remodeling.     Right Ventricle:  The right ventricular cavity is normal in size, normal wall thickness and normal systolic function.     Left Atrium:  The left atrium is normal in size.     Right Atrium:  The right atrium is normal in size with an indexed volume of 13.23 ml/m².     Aortic Valve:  The aortic valve is tricuspid with normal leaflet excursion. There is no aortic valve stenosis. There is no evidence of aortic regurgitation.     Mitral Valve:  Structurally normal mitral valve with normal leaflet excursion. There is diffuse mitral valve thickening. There is calcification of the mitral valve annulus. There is no mitral valve stenosis. There is trace mitral regurgitation.     Tricuspid Valve:  Structurally normal tricuspid valve with normal leaflet excursion. There is trace tricuspid regurgitation. Estimated pulmonary artery systolic pressure is 28 mmHg.     Pulmonic Valve:  Structurally normal pulmonic valve with normal leaflet excursion. There is trace pulmonic regurgitation.     Aorta:  The aortic root at the sinuses of Valsalva is normal in size, measuring 3.20 cm (indexed 1.57 cm/m²).     Pericardium:  No pericardial effusion seen.     Systemic Veins:  The inferior vena cava is normal in size measuring 1.10 cm in diameter, (normal <2.1cm) with normal inspiratory collapse (normal >50%) consistent with normal right atrial pressure (~3, range 0-5mmHg).  ____________________________________________________________________  Quantitative Data:  Left Ventricle Measurements: (Indexed to BSA)     IVSd (2D):   1.3 cm  LVPWd (2D):  1.0 cm  LVIDd (2D):  4.2 cm  LVIDs (2D):  2.5 cm  LV Mass:     167 g  81.9 g/m²  LV Vol d, MOD A2C: 90.1 ml 44.04 ml/m²  LV Vol d, MOD A4C: 81.2 ml 39.68 ml/m²  LV Vol d, MOD BP:  87.1 ml 42.59 ml/m²  LV Vol s, MOD A2C: 29.7 ml 14.53 ml/m²  LV Vol s, MOD A4C: 29.3 ml 14.33 ml/m²  LV Vol s, MOD BP:  29.5 ml 14.41 ml/m²  LVOT SV MOD BP:    57.6 ml  LV EF% MOD BP:     66 %     MV E Vmax:    1.19 m/s  e' lateral:   11.10 cm/s  e' medial:    10.10 cm/s  E/e' lateral: 10.76  E/e' medial:  11.90  E/e' Average: 11.26  MV DT:        136 msec    Aorta Measurements: (normal range) (Indexed to BSA)     Sinuses of Valsalva: 3.20 cm (2.7 - 3.3 cm)    Left Atrium Measurements: (Indexed to BSA)  LA Diam 2D: 2.98 cm    Right Atrial Measurements:     RA Vol:       27.05 ml  RA Vol Index: 13.23 ml/m²    Mitral Valve Measurements:     MV E Vmax: 1.2 m/s    Tricuspid Valve Measurements:     TR Vmax:          2.5 m/s  TR Peak Gradient: 24.9 mmHg  RA Pressure:      3 mmHg  PASP:             28 mmHg    ________________________________________________________________________________________  Electronically signed on 10/10/2023 at 9:18:26 AM by Fahad Simmons         *** Final ***

## 2023-10-13 NOTE — SOCIAL WORK PROGRESS NOTE - NSSWPROGRESSNOTE_GEN_ALL_CORE
Sw spoke abdias, friend whom is her hcp. Per PT, pt would benefit from [facility]. Pt instructed sw to speak w hcp. Sw spoke w hcp whom is in agreement w [facility]. sw to leave dc packet for hcp to review for choices. sw to request maritza. Hcp in agreement that pt need increased asst at [facility]. Plan now remains for cynthia, sw awaiting choices.

## 2023-10-13 NOTE — PROGRESS NOTE ADULT - PROBLEM SELECTOR PLAN 1
Worsening Right LE draining lesion on anterior shin and generalized worsening bilateral lower extremity edema and erythema   - XR b/L lower extremities and R foot performed, generalized soft issue edema without evidence of bony involvement  - Continue with Vancomycin 1250mg q12 hr and Cefepime 2 g q8 hr per ID   -Monitor renal function, Vanco trough. Cefepime was added overnight by for symptomatic UTI, patient c/o dysuria. ID to f/u   - Blood Cultures NGTD  - Pain management: Tylenol 650 mg mild, Oxycodone 2.5 mg moderate, Oxycodone 5 mg severe   - Lasix 40 mg IVP daily per cardio.   - Vascular studies on prior admission showed significant venous reflux, without evidence of extensive stenosis or vascular disease   - Infectious Disease consulted, Dr. Viviana Spring, following   - Podiatry consulted, Dr. Meek  -Local wound care per Podiatry   -Acute pain. Oxycodone IR ordered for as needed use. Bowel regimen to prevent constipation on opiates

## 2023-10-13 NOTE — BH CONSULTATION LIAISON ASSESSMENT NOTE - NSBHCHARTREVIEWVS_PSY_A_CORE FT
Vital Signs Last 24 Hrs  T(C): 37 (13 Oct 2023 13:17), Max: 37 (13 Oct 2023 13:17)  T(F): 98.6 (13 Oct 2023 13:17), Max: 98.6 (13 Oct 2023 13:17)  HR: 64 (13 Oct 2023 13:17) (64 - 108)  BP: 116/75 (13 Oct 2023 13:17) (112/73 - 123/84)  BP(mean): --  RR: 18 (13 Oct 2023 13:17) (18 - 18)  SpO2: 96% (13 Oct 2023 13:17) (94% - 96%)    Parameters below as of 13 Oct 2023 13:17  Patient On (Oxygen Delivery Method): room air

## 2023-10-13 NOTE — PROGRESS NOTE ADULT - PROBLEM SELECTOR PLAN 1
lantus increased 30 units 2x/day  cont admelog 10 units 3x/day before meals  cont admelog corrective scale coverage qac/qhs  cont cons cho diet  goal bg 100-180 in hosp setting

## 2023-10-13 NOTE — PROGRESS NOTE ADULT - SUBJECTIVE AND OBJECTIVE BOX
[facility]  Jarett Stein PA-C  [address] [place] [address]  [phone]      INTERVAL HPI/OVERNIGHT EVENTS:  Pt s/e  C/o diffuse pain all over    MEDICATIONS  (STANDING):  cefepime   IVPB 1000 milliGRAM(s) IV Intermittent every 12 hours  dextrose 5%. 1000 milliLiter(s) (50 mL/Hr) IV Continuous <Continuous>  dextrose 5%. 1000 milliLiter(s) (100 mL/Hr) IV Continuous <Continuous>  dextrose 50% Injectable 25 Gram(s) IV Push once  dextrose 50% Injectable 25 Gram(s) IV Push once  dextrose 50% Injectable 12.5 Gram(s) IV Push once  gabapentin 300 milliGRAM(s) Oral every 8 hours  glucagon  Injectable 1 milliGRAM(s) IntraMuscular once  hydrocortisone 1% Cream 1 Application(s) Topical two times a day  insulin glargine Injectable (LANTUS) 30 Unit(s) SubCutaneous every morning  insulin glargine Injectable (LANTUS) 30 Unit(s) SubCutaneous at bedtime  insulin lispro (ADMELOG) corrective regimen sliding scale   SubCutaneous three times a day before meals  insulin lispro (ADMELOG) corrective regimen sliding scale   SubCutaneous at bedtime  insulin lispro Injectable (ADMELOG) 10 Unit(s) SubCutaneous three times a day before meals  metoprolol succinate  milliGRAM(s) Oral daily  naloxone Injectable 0.4 milliGRAM(s) IV Push once  nystatin Powder 1 Application(s) Topical three times a day  nystatin Powder 1 Application(s) Topical three times a day  polyethylene glycol 3350 17 Gram(s) Oral daily  rivaroxaban 20 milliGRAM(s) Oral with dinner  senna 2 Tablet(s) Oral at bedtime  torsemide 20 milliGRAM(s) Oral daily  vancomycin  IVPB 1250 milliGRAM(s) IV Intermittent every 12 hours    MEDICATIONS  (PRN):  acetaminophen     Tablet .. 650 milliGRAM(s) Oral every 6 hours PRN Temp greater or equal to 38C (100.4F), Mild Pain (1 - 3)  aluminum hydroxide/magnesium hydroxide/simethicone Suspension 30 milliLiter(s) Oral every 4 hours PRN Dyspepsia  bisacodyl 5 milliGRAM(s) Oral daily PRN Constipation  bisacodyl Suppository 10 milliGRAM(s) Rectal daily PRN Constipation  dextrose Oral Gel 15 Gram(s) Oral once PRN Blood Glucose LESS THAN 70 milliGRAM(s)/deciliter  melatonin 3 milliGRAM(s) Oral at bedtime PRN Insomnia  ondansetron Injectable 4 milliGRAM(s) IV Push every 8 hours PRN Nausea and/or Vomiting  oxyCODONE    IR 2.5 milliGRAM(s) Oral every 4 hours PRN Moderate Pain (4 - 6)  oxyCODONE    IR 5 milliGRAM(s) Oral every 4 hours PRN Severe Pain (7 - 10)      Allergies    No Known Allergies      PHYSICAL EXAM:   Vital Signs:  Vital Signs Last 24 Hrs  T(C): 36.8 (13 Oct 2023 05:24), Max: 36.8 (13 Oct 2023 05:24)  T(F): 98.2 (13 Oct 2023 05:24), Max: 98.2 (13 Oct 2023 05:24)  HR: 95 (13 Oct 2023 05:24) (87 - 108)  BP: 112/73 (13 Oct 2023 05:24) (111/74 - 123/84)  BP(mean): --  RR: 18 (13 Oct 2023 05:24) (18 - 18)  SpO2: 94% (13 Oct 2023 05:24) (94% - 95%)    Parameters below as of 13 Oct 2023 05:24  Patient On (Oxygen Delivery Method): room air      Daily     Daily Weight in k.5 (13 Oct 2023 06:14)    GENERAL:  Appears stated age  HEENT:  NC/AT  CHEST:  Full & symmetric excursion  HEART:  Regular rhythm  ABDOMEN:  Soft, non-tender, non-distended  EXTEREMITIES:  no cyanosis  SKIN:  No rash  NEURO:  Alert      LABS:                        13.1   6.45  )-----------( 223      ( 13 Oct 2023 08:50 )             38.8     10-13    138  |  102  |  14  ----------------------------<  254<H>  4.1   |  29  |  0.66    Ca    9.1      13 Oct 2023 08:50    TPro  6.5  /  Alb  2.4<L>  /  TBili  0.6  /  DBili  x   /  AST  18  /  ALT  22  /  AlkPhos  74  10-13      Urinalysis Basic - ( 13 Oct 2023 08:50 )    Color: x / Appearance: x / SG: x / pH: x  Gluc: 254 mg/dL / Ketone: x  / Bili: x / Urobili: x   Blood: x / Protein: x / Nitrite: x   Leuk Esterase: x / RBC: x / WBC x   Sq Epi: x / Non Sq Epi: x / Bacteria: x

## 2023-10-14 LAB
ALBUMIN SERPL ELPH-MCNC: 2.5 G/DL — LOW (ref 3.3–5)
ALP SERPL-CCNC: 77 U/L — SIGNIFICANT CHANGE UP (ref 40–120)
ALT FLD-CCNC: 22 U/L — SIGNIFICANT CHANGE UP (ref 12–78)
ANION GAP SERPL CALC-SCNC: 9 MMOL/L — SIGNIFICANT CHANGE UP (ref 5–17)
AST SERPL-CCNC: 18 U/L — SIGNIFICANT CHANGE UP (ref 15–37)
BASOPHILS # BLD AUTO: 0.02 K/UL — SIGNIFICANT CHANGE UP (ref 0–0.2)
BASOPHILS NFR BLD AUTO: 0.3 % — SIGNIFICANT CHANGE UP (ref 0–2)
BILIRUB SERPL-MCNC: 0.6 MG/DL — SIGNIFICANT CHANGE UP (ref 0.2–1.2)
BUN SERPL-MCNC: 17 MG/DL — SIGNIFICANT CHANGE UP (ref 7–23)
CALCIUM SERPL-MCNC: 8.9 MG/DL — SIGNIFICANT CHANGE UP (ref 8.5–10.1)
CHLORIDE SERPL-SCNC: 101 MMOL/L — SIGNIFICANT CHANGE UP (ref 96–108)
CO2 SERPL-SCNC: 28 MMOL/L — SIGNIFICANT CHANGE UP (ref 22–31)
CREAT SERPL-MCNC: 0.76 MG/DL — SIGNIFICANT CHANGE UP (ref 0.5–1.3)
CULTURE RESULTS: SIGNIFICANT CHANGE UP
CULTURE RESULTS: SIGNIFICANT CHANGE UP
EGFR: 90 ML/MIN/1.73M2 — SIGNIFICANT CHANGE UP
EOSINOPHIL # BLD AUTO: 0.13 K/UL — SIGNIFICANT CHANGE UP (ref 0–0.5)
EOSINOPHIL NFR BLD AUTO: 2.2 % — SIGNIFICANT CHANGE UP (ref 0–6)
GLUCOSE SERPL-MCNC: 282 MG/DL — HIGH (ref 70–99)
HCT VFR BLD CALC: 39.2 % — SIGNIFICANT CHANGE UP (ref 34.5–45)
HGB BLD-MCNC: 13.2 G/DL — SIGNIFICANT CHANGE UP (ref 11.5–15.5)
IMM GRANULOCYTES NFR BLD AUTO: 0.7 % — SIGNIFICANT CHANGE UP (ref 0–0.9)
LYMPHOCYTES # BLD AUTO: 1.16 K/UL — SIGNIFICANT CHANGE UP (ref 1–3.3)
LYMPHOCYTES # BLD AUTO: 19.6 % — SIGNIFICANT CHANGE UP (ref 13–44)
MCHC RBC-ENTMCNC: 32 PG — SIGNIFICANT CHANGE UP (ref 27–34)
MCHC RBC-ENTMCNC: 33.7 GM/DL — SIGNIFICANT CHANGE UP (ref 32–36)
MCV RBC AUTO: 94.9 FL — SIGNIFICANT CHANGE UP (ref 80–100)
MONOCYTES # BLD AUTO: 0.52 K/UL — SIGNIFICANT CHANGE UP (ref 0–0.9)
MONOCYTES NFR BLD AUTO: 8.8 % — SIGNIFICANT CHANGE UP (ref 2–14)
NEUTROPHILS # BLD AUTO: 4.05 K/UL — SIGNIFICANT CHANGE UP (ref 1.8–7.4)
NEUTROPHILS NFR BLD AUTO: 68.4 % — SIGNIFICANT CHANGE UP (ref 43–77)
NRBC # BLD: 0 /100 WBCS — SIGNIFICANT CHANGE UP (ref 0–0)
PLATELET # BLD AUTO: 212 K/UL — SIGNIFICANT CHANGE UP (ref 150–400)
POTASSIUM SERPL-MCNC: 3.6 MMOL/L — SIGNIFICANT CHANGE UP (ref 3.5–5.3)
POTASSIUM SERPL-SCNC: 3.6 MMOL/L — SIGNIFICANT CHANGE UP (ref 3.5–5.3)
PROT SERPL-MCNC: 6.6 G/DL — SIGNIFICANT CHANGE UP (ref 6–8.3)
RBC # BLD: 4.13 M/UL — SIGNIFICANT CHANGE UP (ref 3.8–5.2)
RBC # FLD: 12.8 % — SIGNIFICANT CHANGE UP (ref 10.3–14.5)
SODIUM SERPL-SCNC: 138 MMOL/L — SIGNIFICANT CHANGE UP (ref 135–145)
SPECIMEN SOURCE: SIGNIFICANT CHANGE UP
SPECIMEN SOURCE: SIGNIFICANT CHANGE UP
VANCOMYCIN TROUGH SERPL-MCNC: 17.1 UG/ML — SIGNIFICANT CHANGE UP (ref 10–20)
WBC # BLD: 5.92 K/UL — SIGNIFICANT CHANGE UP (ref 3.8–10.5)
WBC # FLD AUTO: 5.92 K/UL — SIGNIFICANT CHANGE UP (ref 3.8–10.5)

## 2023-10-14 PROCEDURE — 99232 SBSQ HOSP IP/OBS MODERATE 35: CPT

## 2023-10-14 PROCEDURE — 99233 SBSQ HOSP IP/OBS HIGH 50: CPT

## 2023-10-14 RX ORDER — VANCOMYCIN HCL 1 G
750 VIAL (EA) INTRAVENOUS EVERY 12 HOURS
Refills: 0 | Status: COMPLETED | OUTPATIENT
Start: 2023-10-15 | End: 2023-10-16

## 2023-10-14 RX ADMIN — NYSTATIN CREAM 1 APPLICATION(S): 100000 CREAM TOPICAL at 21:36

## 2023-10-14 RX ADMIN — NYSTATIN CREAM 1 APPLICATION(S): 100000 CREAM TOPICAL at 06:01

## 2023-10-14 RX ADMIN — OXYCODONE HYDROCHLORIDE 5 MILLIGRAM(S): 5 TABLET ORAL at 12:26

## 2023-10-14 RX ADMIN — Medication 100 MILLIGRAM(S): at 06:01

## 2023-10-14 RX ADMIN — NYSTATIN CREAM 1 APPLICATION(S): 100000 CREAM TOPICAL at 13:32

## 2023-10-14 RX ADMIN — POLYETHYLENE GLYCOL 3350 17 GRAM(S): 17 POWDER, FOR SOLUTION ORAL at 12:29

## 2023-10-14 RX ADMIN — GABAPENTIN 300 MILLIGRAM(S): 400 CAPSULE ORAL at 13:29

## 2023-10-14 RX ADMIN — OXYCODONE HYDROCHLORIDE 5 MILLIGRAM(S): 5 TABLET ORAL at 21:30

## 2023-10-14 RX ADMIN — Medication 10 UNIT(S): at 08:02

## 2023-10-14 RX ADMIN — INSULIN GLARGINE 30 UNIT(S): 100 INJECTION, SOLUTION SUBCUTANEOUS at 08:01

## 2023-10-14 RX ADMIN — Medication 166.67 MILLIGRAM(S): at 00:51

## 2023-10-14 RX ADMIN — SENNA PLUS 2 TABLET(S): 8.6 TABLET ORAL at 21:14

## 2023-10-14 RX ADMIN — OXYCODONE HYDROCHLORIDE 5 MILLIGRAM(S): 5 TABLET ORAL at 06:00

## 2023-10-14 RX ADMIN — OXYCODONE HYDROCHLORIDE 5 MILLIGRAM(S): 5 TABLET ORAL at 06:53

## 2023-10-14 RX ADMIN — CEFTRIAXONE 100 MILLIGRAM(S): 500 INJECTION, POWDER, FOR SOLUTION INTRAMUSCULAR; INTRAVENOUS at 13:32

## 2023-10-14 RX ADMIN — Medication 1 APPLICATION(S): at 17:07

## 2023-10-14 RX ADMIN — INSULIN GLARGINE 30 UNIT(S): 100 INJECTION, SOLUTION SUBCUTANEOUS at 21:34

## 2023-10-14 RX ADMIN — RIVAROXABAN 20 MILLIGRAM(S): KIT at 17:06

## 2023-10-14 RX ADMIN — Medication 1 APPLICATION(S): at 06:03

## 2023-10-14 RX ADMIN — OXYCODONE HYDROCHLORIDE 5 MILLIGRAM(S): 5 TABLET ORAL at 13:20

## 2023-10-14 RX ADMIN — GABAPENTIN 300 MILLIGRAM(S): 400 CAPSULE ORAL at 06:00

## 2023-10-14 RX ADMIN — Medication 10 UNIT(S): at 17:07

## 2023-10-14 RX ADMIN — GABAPENTIN 300 MILLIGRAM(S): 400 CAPSULE ORAL at 21:13

## 2023-10-14 RX ADMIN — Medication 20 MILLIGRAM(S): at 06:00

## 2023-10-14 RX ADMIN — Medication 6: at 12:30

## 2023-10-14 RX ADMIN — Medication 6: at 08:01

## 2023-10-14 RX ADMIN — OXYCODONE HYDROCHLORIDE 5 MILLIGRAM(S): 5 TABLET ORAL at 21:13

## 2023-10-14 RX ADMIN — Medication 4: at 17:06

## 2023-10-14 RX ADMIN — Medication 10 UNIT(S): at 12:30

## 2023-10-14 RX ADMIN — Medication 166.67 MILLIGRAM(S): at 13:57

## 2023-10-14 NOTE — PROGRESS NOTE ADULT - ASSESSMENT
59 yo F with PMHx of T2DM, Afib on Xarelto, and CHF with unknown EF admitted with worsening Right LE draining lesion on anterior shin and generalized worsening bilateral lower extremity edema and erythema sec cellulitis  Hx MRSA  Leg pain burning sensation prob sec DM peripheral neuropathy  Ua and Ucx noted - 50,000 - 99,000 CFU/mL Klebsiella variicola - pt c/o dysuria - UTI   Abd pain resolved  Vancomycin level noted    Plan :   On Vancomycin 1250mg q12h till 10/19    - can change to po Doxycyline 100mg po bid to finish course when ready for dc  S/p Cefepime > continue Rocephin x 3 days- end 10/15  Trend temps and cbc  Wound care per wound care service  Elevate leg  Asp precautions  Pulm toileting  Stable from ID standpoint    Covering for Dr. Onur Doshi M.D.  [facility], Division of Infectious Diseases  [phone]  After 5pm on weekdays and all day on weekends - please call [phone] 59 yo F with PMHx of T2DM, Afib on Xarelto, and CHF with unknown EF admitted with worsening Right LE draining lesion on anterior shin and generalized worsening bilateral lower extremity edema and erythema sec cellulitis  Hx MRSA  Leg pain burning sensation prob sec DM peripheral neuropathy  Ua and Ucx noted - 50,000 - 99,000 CFU/mL Klebsiella variicola - pt c/o dysuria - UTI   Abd pain resolved  Vancomycin level noted -supratherapeutic AUC    Plan :   On Vancomycin -- dec to 750mg q12h till 10/19 - D/w pharmacist    - can change to po Doxycyline 100mg po bid to finish course when ready for dc  S/p Cefepime > continue Rocephin x 3 days- end 10/15  Trend temps and cbc  Wound care per wound care service  Elevate leg  Asp precautions  Pulm toileting  Stable from ID standpoint    Covering for Dr. Onur Doshi M.D.  [facility], Division of Infectious Diseases  [phone]  After 5pm on weekdays and all day on weekends - please call [phone]

## 2023-10-14 NOTE — PROGRESS NOTE ADULT - SUBJECTIVE AND OBJECTIVE BOX
[facility] DIVISION OF INFECTIOUS DISEASES  CLIVE Zavala Y. Patel, S. Shah, G. Seng  [phone]  ([phone] - weekdays after 5pm and weekends)    Name: MELITON LOZANO  Age/Gender: 58y Female  MRN: [id number]    Interval History:  Patient seen and examined this morning.   States she has pain and burning all over, no change  No other complaints noted.  Notes reviewed  No concerning overnight events  Afebrile   Allergies: No Known Allergies      Objective:  Vitals:   T(F): 97.7 (10-14-23 @ 13:26), Max: 98.6 (10-13-23 @ 21:06)  HR: 80 (10-14-23 @ 13:26) (78 - 86)  BP: 151/101 (10-14-23 @ 13:26) (97/64 - 151/101)  RR: 19 (10-14-23 @ 13:26) (18 - 19)  SpO2: 92% (10-14-23 @ 13:26) (92% - 96%)  Physical Examination:  General: no acute distress  HEENT: NC/AT, anicteric, neck supple  Respiratory: no acc muscle use, breathing comfortably  Cardiovascular: S1 and S2 present  Gastrointestinal: normal appearing, nondistended  Extremities: b/l LE edema with erythema R>L  Skin: chronic stasis changes     Laboratory Studies:  CBC:                       13.2   5.92  )-----------( 212      ( 14 Oct 2023 09:20 )             39.2     WBC Trend:  5.92 10-14-23 @ 09:20  6.45 10-13-23 @ 08:50  6.54 10-12-23 @ 06:44  6.56 10-11-23 @ 06:40  7.48 10-10-23 @ 05:45  6.65 10-09-23 @ 11:55    CMP: 10-14    138  |  101  |  17  ----------------------------<  282<H>  3.6   |  28  |  0.76    Ca    8.9      14 Oct 2023 09:20    TPro  6.6  /  Alb  2.5<L>  /  TBili  0.6  /  DBili  x   /  AST  18  /  ALT  22  /  AlkPhos  77  10-14    Creatinine: 0.76 mg/dL (10-14-23 @ 09:20)  Creatinine: 0.66 mg/dL (10-13-23 @ 08:50)  Creatinine: 0.64 mg/dL (10-12-23 @ 06:44)  Creatinine: 0.60 mg/dL (10-11-23 @ 06:40)  Creatinine: 0.65 mg/dL (10-10-23 @ 05:45)  Creatinine: 0.71 mg/dL (10-09-23 @ 11:55)      LIVER FUNCTIONS - ( 14 Oct 2023 09:20 )  Alb: 2.5 g/dL / Pro: 6.6 g/dL / ALK PHOS: 77 U/L / ALT: 22 U/L / AST: 18 U/L / GGT: x           Vancomycin Level, Trough: 17.1 ug/mL (10-14-23 @ 12:40)  Vancomycin Level, Trough: 14.4 ug/mL (10-13-23 @ 00:09)  Vancomycin Level, Trough: 14.1 ug/mL (10-11-23 @ 13:28)    Microbiology: reviewed   Culture - Urine (collected 10-09-23 @ 15:27)  Source: Clean Catch Clean Catch (Midstream)  Final Report (10-12-23 @ 20:54):    50,000 - 99,000 CFU/mL Klebsiella variicola  Organism: Klebsiella variicola (10-12-23 @ 20:54)  Organism: Klebsiella variicola (10-12-23 @ 20:54)      Method Type: SASHA      -  Amikacin: S <=16      -  Amoxicillin/Clavulanic Acid: S <=8/4      -  Ampicillin: R 16 These ampicillin results predict results for amoxicillin      -  Ampicillin/Sulbactam: S <=4/2      -  Aztreonam: S <=4      -  Cefazolin: S <=2      -  Cefepime: S <=2      -  Cefoxitin: I 16      -  Ceftriaxone: S <=1      -  Ciprofloxacin: S <=0.25      -  Ertapenem: S <=0.5      -  Gentamicin: S <=2      -  Imipenem: S <=1      -  Levofloxacin: S <=0.5      -  Meropenem: S <=1      -  Nitrofurantoin: S <=32 Should not be used to treat pyelonephritis      -  Piperacillin/Tazobactam: S <=8      -  Tobramycin: S <=2      -  Trimethoprim/Sulfamethoxazole: S <=0.5/9.5    Culture - Blood (collected 10-09-23 @ 11:55)  Source: .Blood Blood-Peripheral  Preliminary Report (10-13-23 @ 19:01):    No growth at 4 days    Culture - Blood (collected 10-09-23 @ 11:20)  Source: .Blood Blood-Peripheral  Preliminary Report (10-13-23 @ 19:01):    No growth at 4 days    COVID-19 PCR: NotDetec (09 Oct 2023 11:55)    Radiology: reviewed     Medications:  acetaminophen     Tablet .. 650 milliGRAM(s) Oral every 6 hours PRN  aluminum hydroxide/magnesium hydroxide/simethicone Suspension 30 milliLiter(s) Oral every 4 hours PRN  bisacodyl 5 milliGRAM(s) Oral daily PRN  bisacodyl Suppository 10 milliGRAM(s) Rectal daily PRN  cefTRIAXone   IVPB 1000 milliGRAM(s) IV Intermittent every 24 hours  dextrose 5%. 1000 milliLiter(s) IV Continuous <Continuous>  dextrose 5%. 1000 milliLiter(s) IV Continuous <Continuous>  dextrose 50% Injectable 25 Gram(s) IV Push once  dextrose 50% Injectable 25 Gram(s) IV Push once  dextrose 50% Injectable 12.5 Gram(s) IV Push once  dextrose Oral Gel 15 Gram(s) Oral once PRN  gabapentin 300 milliGRAM(s) Oral every 8 hours  glucagon  Injectable 1 milliGRAM(s) IntraMuscular once  hydrocortisone 1% Cream 1 Application(s) Topical two times a day  insulin glargine Injectable (LANTUS) 30 Unit(s) SubCutaneous at bedtime  insulin glargine Injectable (LANTUS) 30 Unit(s) SubCutaneous every morning  insulin lispro (ADMELOG) corrective regimen sliding scale   SubCutaneous three times a day before meals  insulin lispro (ADMELOG) corrective regimen sliding scale   SubCutaneous at bedtime  insulin lispro Injectable (ADMELOG) 10 Unit(s) SubCutaneous three times a day before meals  melatonin 3 milliGRAM(s) Oral at bedtime PRN  metoprolol succinate  milliGRAM(s) Oral daily  naloxone Injectable 0.4 milliGRAM(s) IV Push once  nystatin Powder 1 Application(s) Topical three times a day  nystatin Powder 1 Application(s) Topical three times a day  ondansetron Injectable 4 milliGRAM(s) IV Push every 8 hours PRN  oxyCODONE    IR 2.5 milliGRAM(s) Oral every 4 hours PRN  oxyCODONE    IR 5 milliGRAM(s) Oral every 4 hours PRN  polyethylene glycol 3350 17 Gram(s) Oral daily  rivaroxaban 20 milliGRAM(s) Oral with dinner  senna 2 Tablet(s) Oral at bedtime  torsemide 20 milliGRAM(s) Oral daily  vancomycin  IVPB 1250 milliGRAM(s) IV Intermittent every 12 hours    Current Antimicrobials:  cefTRIAXone   IVPB 1000 milliGRAM(s) IV Intermittent every 24 hours  vancomycin  IVPB 1250 milliGRAM(s) IV Intermittent every 12 hours    Prior/Completed Antimicrobials:  piperacillin/tazobactam IVPB...  vancomycin  IVPB.

## 2023-10-14 NOTE — PROGRESS NOTE ADULT - SUBJECTIVE AND OBJECTIVE BOX
[facility] -- Angela Alaniz, Tico Simmons Savella, , Angel Luis Whitaker  Office # [phone]    Follow Up:      Subjective/Observations:     REVIEW OF SYSTEMS: All other review of systems is negative unless indicated above  PAST MEDICAL & SURGICAL HISTORY:  Atrial fibrillation      Type 2 diabetes mellitus      Congestive heart failure      Scalp psoriasis      S/P tonsillectomy      S/P appendectomy      H/O umbilical hernia repair        MEDICATIONS  (STANDING):  cefTRIAXone   IVPB 1000 milliGRAM(s) IV Intermittent every 24 hours  dextrose 5%. 1000 milliLiter(s) (100 mL/Hr) IV Continuous <Continuous>  dextrose 5%. 1000 milliLiter(s) (50 mL/Hr) IV Continuous <Continuous>  dextrose 50% Injectable 12.5 Gram(s) IV Push once  dextrose 50% Injectable 25 Gram(s) IV Push once  dextrose 50% Injectable 25 Gram(s) IV Push once  gabapentin 300 milliGRAM(s) Oral every 8 hours  glucagon  Injectable 1 milliGRAM(s) IntraMuscular once  hydrocortisone 1% Cream 1 Application(s) Topical two times a day  insulin glargine Injectable (LANTUS) 30 Unit(s) SubCutaneous at bedtime  insulin glargine Injectable (LANTUS) 30 Unit(s) SubCutaneous every morning  insulin lispro (ADMELOG) corrective regimen sliding scale   SubCutaneous three times a day before meals  insulin lispro (ADMELOG) corrective regimen sliding scale   SubCutaneous at bedtime  insulin lispro Injectable (ADMELOG) 10 Unit(s) SubCutaneous three times a day before meals  metoprolol succinate  milliGRAM(s) Oral daily  naloxone Injectable 0.4 milliGRAM(s) IV Push once  nystatin Powder 1 Application(s) Topical three times a day  nystatin Powder 1 Application(s) Topical three times a day  polyethylene glycol 3350 17 Gram(s) Oral daily  rivaroxaban 20 milliGRAM(s) Oral with dinner  senna 2 Tablet(s) Oral at bedtime  torsemide 20 milliGRAM(s) Oral daily  vancomycin  IVPB 1250 milliGRAM(s) IV Intermittent every 12 hours    MEDICATIONS  (PRN):  acetaminophen     Tablet .. 650 milliGRAM(s) Oral every 6 hours PRN Temp greater or equal to 38C (100.4F), Mild Pain (1 - 3)  aluminum hydroxide/magnesium hydroxide/simethicone Suspension 30 milliLiter(s) Oral every 4 hours PRN Dyspepsia  bisacodyl 5 milliGRAM(s) Oral daily PRN Constipation  bisacodyl Suppository 10 milliGRAM(s) Rectal daily PRN Constipation  dextrose Oral Gel 15 Gram(s) Oral once PRN Blood Glucose LESS THAN 70 milliGRAM(s)/deciliter  melatonin 3 milliGRAM(s) Oral at bedtime PRN Insomnia  ondansetron Injectable 4 milliGRAM(s) IV Push every 8 hours PRN Nausea and/or Vomiting  oxyCODONE    IR 2.5 milliGRAM(s) Oral every 4 hours PRN Moderate Pain (4 - 6)  oxyCODONE    IR 5 milliGRAM(s) Oral every 4 hours PRN Severe Pain (7 - 10)    Allergies    No Known Allergies    Intolerances      Vital Signs Last 24 Hrs  T(C): 36.6 (14 Oct 2023 05:28), Max: 37 (13 Oct 2023 13:17)  T(F): 97.8 (14 Oct 2023 05:28), Max: 98.6 (13 Oct 2023 13:17)  HR: 78 (14 Oct 2023 05:28) (64 - 86)  BP: 121/61 (14 Oct 2023 05:28) (97/64 - 121/61)  BP(mean): --  RR: 18 (14 Oct 2023 05:28) (18 - 18)  SpO2: 96% (14 Oct 2023 05:28) (93% - 96%)    Parameters below as of 14 Oct 2023 05:28  Patient On (Oxygen Delivery Method): room air      I&O's Summary      PHYSICAL EXAM:  TELE:   Constitutional: NAD, awake and alert, well-developed  HEENT: Moist Mucous Membranes, Anicteric  Pulmonary: Non-labored, breath sounds are clear bilaterally, No wheezing, rales or rhonchi  Cardiovascular: Regular, S1 and S2, No murmurs, rubs, gallops or clicks  Gastrointestinal: Bowel Sounds present, soft, nontender.   Lymph: No peripheral edema. No lymphadenopathy.  Skin: No visible rashes or ulcers.  Psych:  Mood & affect appropriate  LABS: All Labs Reviewed:                        13.1   6.45  )-----------( 223      ( 13 Oct 2023 08:50 )             38.8                         13.2   6.54  )-----------( 205      ( 12 Oct 2023 06:44 )             39.9     13 Oct 2023 08:50    138    |  102    |  14     ----------------------------<  254    4.1     |  29     |  0.66   12 Oct 2023 06:44    137    |  102    |  15     ----------------------------<  247    4.0     |  30     |  0.64     Ca    9.1        13 Oct 2023 08:50  Ca    9.1        12 Oct 2023 06:44    TPro  6.5    /  Alb  2.4    /  TBili  0.6    /  DBili  x      /  AST  18     /  ALT  22     /  AlkPhos  74     13 Oct 2023 08:50          12 Lead ECG:   Ventricular Rate 118 BPM    Atrial Rate 141 BPM    QRS Duration 78 ms    Q-T Interval 336 ms    QTC Calculation(Bazett) 470 ms    R Axis 7 degrees    T Axis -77 degrees    Diagnosis Line Atrial fibrillation  Nonspecific ST and T wave abnormality  Abnormal ECG  Confirmed by fernando Umaña ([id number]) on 10/9/2023 12:12:07 PM (10-09-23 @ 11:25)      TRANSTHORACIC ECHOCARDIOGRAM REPORT  ________________________________________________________________________________                                      _______       Pt. Name:       MELITON LOZANO Study Date:    10/10/2023  MRN:            [id number]          YOB: 1965  Accession #:    [id number]         Age:           58 years  Account#:       [id number]        Gender:        F  Heart Rate:                       Height:        59.84 in (152.00 cm)  Rhythm:      Weight:        249.12 lb (113.00 kg)  Blood Pressure: 125/75 mmHg       BSA/BMI:       2.05 m² / 48.91 kg/m²  ________________________________________________________________________________________  Referring Physician:    [id number] Garrick Spring  Interpreting Physician: Fahad Simmons  Primary Sonographer:    Brittny Zurita RDCS    CPT:               ECHO TTE WO CON COMP W DOPP - 52228.m  Indication(s):     Heart failure, unspecified - I50.9  Procedure:         Transthoracic echocardiogram with 2-D, M-mode and complete                     spectral and color flow Doppler.  Ordering Location: [facility]    _______________________________________________________________________________________     CONCLUSIONS:      1. Left ventricular systolic function is normal with an ejection fraction of 66 % by White's method of disks.   2. There is normal LV mass and concentric remodeling.   3. Normal left ventricular diastolic function.   4. Normal right ventricular cavity size, wall thickness and systolic function.   5. The left atrium is normal in size.   6. Trace mitral regurgitation.   7. Structurally normal mitral valve with normal leaflet excursion.   8. There is calcification of the mitral valve annulus.    ________________________________________________________________________________________  FINDINGS:     Left Ventricle:  Left ventricular systolic function is normal with a calculated ejection fraction of 66 % by the White's biplane method of disks. There is normal left ventricular diastolic function. Mild to moderate left ventricular hypertrophy. There is normal LV mass and concentric remodeling.     Right Ventricle:  The right ventricular cavity is normal in size, normal wall thickness and normal systolic function.     Left Atrium:  The left atrium is normal in size.     Right Atrium:  The right atrium is normal in size with an indexed volume of 13.23 ml/m².     Aortic Valve:  The aortic valve is tricuspid with normal leaflet excursion. There is no aortic valve stenosis. There is no evidence of aortic regurgitation.     Mitral Valve:  Structurally normal mitral valve with normal leaflet excursion. There is diffuse mitral valve thickening. There is calcification of the mitral valve annulus. There is no mitral valve stenosis. There is trace mitral regurgitation.     Tricuspid Valve:  Structurally normal tricuspid valve with normal leaflet excursion. There is trace tricuspid regurgitation. Estimated pulmonary artery systolic pressure is 28 mmHg.     Pulmonic Valve:  Structurally normal pulmonic valve with normal leaflet excursion. There is trace pulmonic regurgitation.     Aorta:  The aortic root at the sinuses of Valsalva is normal in size, measuring 3.20 cm (indexed 1.57 cm/m²).     Pericardium:  No pericardial effusion seen.     Systemic Veins:  The inferior vena cava is normal in size measuring 1.10 cm in diameter, (normal <2.1cm) with normal inspiratory collapse (normal >50%) consistent with normal right atrial pressure (~3, range 0-5mmHg).  ____________________________________________________________________  Quantitative Data:  Left Ventricle Measurements: (Indexed to BSA)     IVSd (2D):   1.3 cm  LVPWd (2D):  1.0 cm  LVIDd (2D):  4.2 cm  LVIDs (2D):  2.5 cm  LV Mass:     167 g  81.9 g/m²  LV Vol d, MOD A2C: 90.1 ml 44.04 ml/m²  LV Vol d, MOD A4C: 81.2 ml 39.68 ml/m²  LV Vol d, MOD BP:  87.1 ml 42.59 ml/m²  LV Vol s, MOD A2C: 29.7 ml 14.53 ml/m²  LV Vol s, MOD A4C: 29.3 ml 14.33 ml/m²  LV Vol s, MOD BP:  29.5 ml 14.41 ml/m²  LVOT SV MOD BP:    57.6 ml  LV EF% MOD BP:     66 %     MV E Vmax:    1.19 m/s  e' lateral:   11.10 cm/s  e' medial:    10.10 cm/s  E/e' lateral: 10.76  E/e' medial:  11.90  E/e' Average: 11.26  MV DT:        136 msec    Aorta Measurements: (normal range) (Indexed to BSA)     Sinuses of Valsalva: 3.20 cm (2.7 - 3.3 cm)       Left Atrium Measurements: (Indexed to BSA)  LA Diam 2D: 2.98 cm    Right Atrial Measurements:     RA Vol:       27.05 ml  RA Vol Index: 13.23 ml/m²    Mitral Valve Measurements:     MV E Vmax: 1.2 m/s       Tricuspid Valve Measurements:     TR Vmax:          2.5 m/s  TR Peak Gradient: 24.9 mmHg  RA Pressure:      3 mmHg  PASP:             28 mmHg    ________________________________________________________________________________________  Electronically signed on 10/10/2023 at 9:18:26 AM by Fahad Simmons         *** Final ***      [facility] -- Angela Alaniz, Tico Simmons Savella, , Angel Luis Whitaker  Office # [phone]    Follow Up:    Edema, Hx Afib    Subjective/Observations: Sleeping comfortably on RA.  Arousable on RA.  Denies any form of respiratory or cardiac discomfort.  c/o leg tenderness with touch    REVIEW OF SYSTEMS: All other review of systems is negative unless indicated above  PAST MEDICAL & SURGICAL HISTORY:  Atrial fibrillation  Type 2 diabetes mellitus  Congestive heart failure  Scalp psoriasis  S/P tonsillectomy  S/P appendectomy  H/O umbilical hernia repair    MEDICATIONS  (STANDING):  cefTRIAXone   IVPB 1000 milliGRAM(s) IV Intermittent every 24 hours  dextrose 5%. 1000 milliLiter(s) (100 mL/Hr) IV Continuous <Continuous>  dextrose 5%. 1000 milliLiter(s) (50 mL/Hr) IV Continuous <Continuous>  dextrose 50% Injectable 12.5 Gram(s) IV Push once  dextrose 50% Injectable 25 Gram(s) IV Push once  dextrose 50% Injectable 25 Gram(s) IV Push once  gabapentin 300 milliGRAM(s) Oral every 8 hours  glucagon  Injectable 1 milliGRAM(s) IntraMuscular once  hydrocortisone 1% Cream 1 Application(s) Topical two times a day  insulin glargine Injectable (LANTUS) 30 Unit(s) SubCutaneous at bedtime  insulin glargine Injectable (LANTUS) 30 Unit(s) SubCutaneous every morning  insulin lispro (ADMELOG) corrective regimen sliding scale   SubCutaneous three times a day before meals  insulin lispro (ADMELOG) corrective regimen sliding scale   SubCutaneous at bedtime  insulin lispro Injectable (ADMELOG) 10 Unit(s) SubCutaneous three times a day before meals  metoprolol succinate  milliGRAM(s) Oral daily  naloxone Injectable 0.4 milliGRAM(s) IV Push once  nystatin Powder 1 Application(s) Topical three times a day  nystatin Powder 1 Application(s) Topical three times a day  polyethylene glycol 3350 17 Gram(s) Oral daily  rivaroxaban 20 milliGRAM(s) Oral with dinner  senna 2 Tablet(s) Oral at bedtime  torsemide 20 milliGRAM(s) Oral daily  vancomycin  IVPB 1250 milliGRAM(s) IV Intermittent every 12 hours    MEDICATIONS  (PRN):  acetaminophen     Tablet .. 650 milliGRAM(s) Oral every 6 hours PRN Temp greater or equal to 38C (100.4F), Mild Pain (1 - 3)  aluminum hydroxide/magnesium hydroxide/simethicone Suspension 30 milliLiter(s) Oral every 4 hours PRN Dyspepsia  bisacodyl 5 milliGRAM(s) Oral daily PRN Constipation  bisacodyl Suppository 10 milliGRAM(s) Rectal daily PRN Constipation  dextrose Oral Gel 15 Gram(s) Oral once PRN Blood Glucose LESS THAN 70 milliGRAM(s)/deciliter  melatonin 3 milliGRAM(s) Oral at bedtime PRN Insomnia  ondansetron Injectable 4 milliGRAM(s) IV Push every 8 hours PRN Nausea and/or Vomiting  oxyCODONE    IR 2.5 milliGRAM(s) Oral every 4 hours PRN Moderate Pain (4 - 6)  oxyCODONE    IR 5 milliGRAM(s) Oral every 4 hours PRN Severe Pain (7 - 10)    Allergies    No Known Allergies    Intolerances    Vital Signs Last 24 Hrs  T(C): 36.6 (14 Oct 2023 05:28), Max: 37 (13 Oct 2023 13:17)  T(F): 97.8 (14 Oct 2023 05:28), Max: 98.6 (13 Oct 2023 13:17)  HR: 78 (14 Oct 2023 05:28) (64 - 86)  BP: 121/61 (14 Oct 2023 05:28) (97/64 - 121/61)  BP(mean): --  RR: 18 (14 Oct 2023 05:28) (18 - 18)  SpO2: 96% (14 Oct 2023 05:28) (93% - 96%)    Parameters below as of 14 Oct 2023 05:28  Patient On (Oxygen Delivery Method): room air    I&O's Summary      PHYSICAL EXAM:  TELE: Not on tele  Constitutional: NAD, awake and alert, obese  HEENT: Moist Mucous Membranes, Anicteric  Pulmonary: Non-labored, breath sounds are clear bilaterally, No wheezing, rales or rhonchi  Cardiovascular: IRRR, S1 and S2, No murmurs, rubs, gallops or clicks  Gastrointestinal: Bowel Sounds present, soft, nontender.   Lymph: No peripheral edema. No lymphadenopathy.  Skin: No visible rashes or ulcers.  +chronic skin changes on BLE  Psych:  Mood & affect: Appropriate    LABS: All Labs Reviewed:                        13.1   6.45  )-----------( 223      ( 13 Oct 2023 08:50 )             38.8                         13.2   6.54  )-----------( 205      ( 12 Oct 2023 06:44 )             39.9     13 Oct 2023 08:50    138    |  102    |  14     ----------------------------<  254    4.1     |  29     |  0.66   12 Oct 2023 06:44    137    |  102    |  15     ----------------------------<  247    4.0     |  30     |  0.64     Ca    9.1        13 Oct 2023 08:50  Ca    9.1        12 Oct 2023 06:44    TPro  6.5    /  Alb  2.4    /  TBili  0.6    /  DBili  x      /  AST  18     /  ALT  22     /  AlkPhos  74     13 Oct 2023 08:50    12 Lead ECG:   Ventricular Rate 118 BPM    Atrial Rate 141 BPM    QRS Duration 78 ms    Q-T Interval 336 ms    QTC Calculation(Bazett) 470 ms    R Axis 7 degrees    T Axis -77 degrees    Diagnosis Line Atrial fibrillation  Nonspecific ST and T wave abnormality  Abnormal ECG  Confirmed by fernando Umaña ([id number]) on 10/9/2023 12:12:07 PM (10-09-23 @ 11:25)      TRANSTHORACIC ECHOCARDIOGRAM REPORT  ________________________________________________________________________________                                      _______       Pt. Name:       MELITON LOZANO Study Date:    10/10/2023  MRN:            [id number]          YOB: 1965  Accession #:    [id number]         Age:           58 years  Account#:       [id number]        Gender:        F  Heart Rate:                       Height:        59.84 in (152.00 cm)  Rhythm:      Weight:        249.12 lb (113.00 kg)  Blood Pressure: 125/75 mmHg       BSA/BMI:       2.05 m² / 48.91 kg/m²  ________________________________________________________________________________________  Referring Physician:    [id number] Garrick Spring  Interpreting Physician: Fahad Simmons  Primary Sonographer:    Brittny Zurita JOANIE    CPT:               ECHO TTE WO CON COMP W DOPP - 70725.m  Indication(s):     Heart failure, unspecified - I50.9  Procedure:         Transthoracic echocardiogram with 2-D, M-mode and complete                     spectral and color flow Doppler.  Ordering Location: [facility]    _______________________________________________________________________________________     CONCLUSIONS:      1. Left ventricular systolic function is normal with an ejection fraction of 66 % by White's method of disks.   2. There is normal LV mass and concentric remodeling.   3. Normal left ventricular diastolic function.   4. Normal right ventricular cavity size, wall thickness and systolic function.   5. The left atrium is normal in size.   6. Trace mitral regurgitation.   7. Structurally normal mitral valve with normal leaflet excursion.   8. There is calcification of the mitral valve annulus.    ________________________________________________________________________________________  FINDINGS:     Left Ventricle:  Left ventricular systolic function is normal with a calculated ejection fraction of 66 % by the White's biplane method of disks. There is normal left ventricular diastolic function. Mild to moderate left ventricular hypertrophy. There is normal LV mass and concentric remodeling.     Right Ventricle:  The right ventricular cavity is normal in size, normal wall thickness and normal systolic function.     Left Atrium:  The left atrium is normal in size.     Right Atrium:  The right atrium is normal in size with an indexed volume of 13.23 ml/m².     Aortic Valve:  The aortic valve is tricuspid with normal leaflet excursion. There is no aortic valve stenosis. There is no evidence of aortic regurgitation.     Mitral Valve:  Structurally normal mitral valve with normal leaflet excursion. There is diffuse mitral valve thickening. There is calcification of the mitral valve annulus. There is no mitral valve stenosis. There is trace mitral regurgitation.     Tricuspid Valve:  Structurally normal tricuspid valve with normal leaflet excursion. There is trace tricuspid regurgitation. Estimated pulmonary artery systolic pressure is 28 mmHg.     Pulmonic Valve:  Structurally normal pulmonic valve with normal leaflet excursion. There is trace pulmonic regurgitation.     Aorta:  The aortic root at the sinuses of Valsalva is normal in size, measuring 3.20 cm (indexed 1.57 cm/m²).     Pericardium:  No pericardial effusion seen.     Systemic Veins:  The inferior vena cava is normal in size measuring 1.10 cm in diameter, (normal <2.1cm) with normal inspiratory collapse (normal >50%) consistent with normal right atrial pressure (~3, range 0-5mmHg).  ____________________________________________________________________  Quantitative Data:  Left Ventricle Measurements: (Indexed to BSA)     IVSd (2D):   1.3 cm  LVPWd (2D):  1.0 cm  LVIDd (2D):  4.2 cm  LVIDs (2D):  2.5 cm  LV Mass:     167 g  81.9 g/m²  LV Vol d, MOD A2C: 90.1 ml 44.04 ml/m²  LV Vol d, MOD A4C: 81.2 ml 39.68 ml/m²  LV Vol d, MOD BP:  87.1 ml 42.59 ml/m²  LV Vol s, MOD A2C: 29.7 ml 14.53 ml/m²  LV Vol s, MOD A4C: 29.3 ml 14.33 ml/m²  LV Vol s, MOD BP:  29.5 ml 14.41 ml/m²  LVOT SV MOD BP:    57.6 ml  LV EF% MOD BP:     66 %     MV E Vmax:    1.19 m/s  e' lateral:   11.10 cm/s  e' medial:    10.10 cm/s  E/e' lateral: 10.76  E/e' medial:  11.90  E/e' Average: 11.26  MV DT:        136 msec    Aorta Measurements: (normal range) (Indexed to BSA)     Sinuses of Valsalva: 3.20 cm (2.7 - 3.3 cm)       Left Atrium Measurements: (Indexed to BSA)  LA Diam 2D: 2.98 cm    Right Atrial Measurements:     RA Vol:       27.05 ml  RA Vol Index: 13.23 ml/m²    Mitral Valve Measurements:     MV E Vmax: 1.2 m/s       Tricuspid Valve Measurements:     TR Vmax:          2.5 m/s  TR Peak Gradient: 24.9 mmHg  RA Pressure:      3 mmHg  PASP:             28 mmHg    ________________________________________________________________________________________  Electronically signed on 10/10/2023 at 9:18:26 AM by Fahad Simmons         *** Final ***

## 2023-10-14 NOTE — PHARMACOTHERAPY INTERVENTION NOTE - COMMENTS
Patient is a 58 year old female being treated for a right lower extremity lesion with vancomycin IV 1250mg q12. Discussed with ID Dr. Mohr. Patients [id number] hour AUC/SASHA of 590 and steady state AUC/SASHA of 682. Reccomended collecting a trough prior to dose on 10/13 at 00:00 to assist in dosing. Agreed and order entered. 
59 yo female being treated with BLE SSTI currently ordered for vancomycin 1250 q12h and cefepime 2g q8h. Per ID recommendations -Dr. Mohr 10/10 "continue vancomycin 1250 q12h, dc cefepime". Order discontinued per discussion with Dr. Bender. 
Patient is on vancomycin 1250mg q12h for wound infection. Trough this AM resulted as 14.4, with an AUC of 629. Discussed w/ ID Dr. Belle Mohr and recommended an additional trough tomorrow at 1200 prior to next dose. MD agreed and level was ordered. 
Patient is a 58 year old female with a right lower extremity lesion being treated with IV vancomycin 1250mg twice daily. Trough collected on 10/14 resulted at 17.1 with a 24 hour AUC/SASHA that was supra-therapeutic. Discussed with Dr. Doshi (ID). Recommended decreasing dose to 750mg twice daily with a pre 4th trough on 10/16 AM with a 24hr AUC/SASHA of 415 and a steady state AUC of 445. Order entered per discussion.

## 2023-10-14 NOTE — PROGRESS NOTE ADULT - SUBJECTIVE AND OBJECTIVE BOX
[facility]  Jarett Stein PA-C  [address] [place] [address]  [phone]      INTERVAL HPI/OVERNIGHT EVENTS:  Pt s/e  No new GI events    MEDICATIONS  (STANDING):  cefTRIAXone   IVPB 1000 milliGRAM(s) IV Intermittent every 24 hours  dextrose 5%. 1000 milliLiter(s) (100 mL/Hr) IV Continuous <Continuous>  dextrose 5%. 1000 milliLiter(s) (50 mL/Hr) IV Continuous <Continuous>  dextrose 50% Injectable 25 Gram(s) IV Push once  dextrose 50% Injectable 25 Gram(s) IV Push once  dextrose 50% Injectable 12.5 Gram(s) IV Push once  gabapentin 300 milliGRAM(s) Oral every 8 hours  glucagon  Injectable 1 milliGRAM(s) IntraMuscular once  hydrocortisone 1% Cream 1 Application(s) Topical two times a day  insulin glargine Injectable (LANTUS) 30 Unit(s) SubCutaneous at bedtime  insulin glargine Injectable (LANTUS) 30 Unit(s) SubCutaneous every morning  insulin lispro (ADMELOG) corrective regimen sliding scale   SubCutaneous three times a day before meals  insulin lispro (ADMELOG) corrective regimen sliding scale   SubCutaneous at bedtime  insulin lispro Injectable (ADMELOG) 10 Unit(s) SubCutaneous three times a day before meals  metoprolol succinate  milliGRAM(s) Oral daily  naloxone Injectable 0.4 milliGRAM(s) IV Push once  nystatin Powder 1 Application(s) Topical three times a day  nystatin Powder 1 Application(s) Topical three times a day  polyethylene glycol 3350 17 Gram(s) Oral daily  rivaroxaban 20 milliGRAM(s) Oral with dinner  senna 2 Tablet(s) Oral at bedtime  torsemide 20 milliGRAM(s) Oral daily  vancomycin  IVPB 1250 milliGRAM(s) IV Intermittent every 12 hours    MEDICATIONS  (PRN):  acetaminophen     Tablet .. 650 milliGRAM(s) Oral every 6 hours PRN Temp greater or equal to 38C (100.4F), Mild Pain (1 - 3)  aluminum hydroxide/magnesium hydroxide/simethicone Suspension 30 milliLiter(s) Oral every 4 hours PRN Dyspepsia  bisacodyl 5 milliGRAM(s) Oral daily PRN Constipation  bisacodyl Suppository 10 milliGRAM(s) Rectal daily PRN Constipation  dextrose Oral Gel 15 Gram(s) Oral once PRN Blood Glucose LESS THAN 70 milliGRAM(s)/deciliter  melatonin 3 milliGRAM(s) Oral at bedtime PRN Insomnia  ondansetron Injectable 4 milliGRAM(s) IV Push every 8 hours PRN Nausea and/or Vomiting  oxyCODONE    IR 2.5 milliGRAM(s) Oral every 4 hours PRN Moderate Pain (4 - 6)  oxyCODONE    IR 5 milliGRAM(s) Oral every 4 hours PRN Severe Pain (7 - 10)      Allergies    No Known Allergies      PHYSICAL EXAM:   Vital Signs:  Vital Signs Last 24 Hrs  T(C): 36.6 (14 Oct 2023 05:28), Max: 37 (13 Oct 2023 13:17)  T(F): 97.8 (14 Oct 2023 05:28), Max: 98.6 (13 Oct 2023 13:17)  HR: 78 (14 Oct 2023 05:28) (64 - 86)  BP: 121/61 (14 Oct 2023 05:28) (97/64 - 121/61)  BP(mean): --  RR: 18 (14 Oct 2023 05:28) (18 - 18)  SpO2: 96% (14 Oct 2023 05:28) (93% - 96%)    Parameters below as of 14 Oct 2023 05:28  Patient On (Oxygen Delivery Method): room air      Daily     Daily Weight in k.4 (14 Oct 2023 05:28)    GENERAL:  Appears stated age  HEENT:  NC/AT  CHEST:  Full & symmetric excursion  HEART:  Regular rhythm  ABDOMEN:  Soft, non-tender, non-distended  EXTEREMITIES:  no cyanosis  SKIN:  No rash  NEURO:  Alert      LABS:                        13.2   5.92  )-----------( 212      ( 14 Oct 2023 09:20 )             39.2     10-14    138  |  101  |  17  ----------------------------<  282<H>  3.6   |  28  |  0.76    Ca    8.9      14 Oct 2023 09:20    TPro  6.6  /  Alb  2.5<L>  /  TBili  0.6  /  DBili  x   /  AST  18  /  ALT  22  /  AlkPhos  77  10-14      Urinalysis Basic - ( 14 Oct 2023 09:20 )    Color: x / Appearance: x / SG: x / pH: x  Gluc: 282 mg/dL / Ketone: x  / Bili: x / Urobili: x   Blood: x / Protein: x / Nitrite: x   Leuk Esterase: x / RBC: x / WBC x   Sq Epi: x / Non Sq Epi: x / Bacteria: x

## 2023-10-14 NOTE — PROGRESS NOTE ADULT - SUBJECTIVE AND OBJECTIVE BOX
Patient is a 58y old  Female who presents with a chief complaint of Right Left Wound (13 Oct 2023 12:18)       INTERVAL HPI/OVERNIGHT EVENTS: Patient seen and examined at bedside. Denies chest pain, sob, palpitation. No abdominal pain, nausea    MEDICATIONS  (STANDING):  cefTRIAXone   IVPB 1000 milliGRAM(s) IV Intermittent every 24 hours  dextrose 5%. 1000 milliLiter(s) (100 mL/Hr) IV Continuous <Continuous>  dextrose 5%. 1000 milliLiter(s) (50 mL/Hr) IV Continuous <Continuous>  dextrose 50% Injectable 25 Gram(s) IV Push once  dextrose 50% Injectable 12.5 Gram(s) IV Push once  dextrose 50% Injectable 25 Gram(s) IV Push once  gabapentin 300 milliGRAM(s) Oral every 8 hours  glucagon  Injectable 1 milliGRAM(s) IntraMuscular once  hydrocortisone 1% Cream 1 Application(s) Topical two times a day  insulin glargine Injectable (LANTUS) 30 Unit(s) SubCutaneous at bedtime  insulin glargine Injectable (LANTUS) 30 Unit(s) SubCutaneous every morning  insulin lispro (ADMELOG) corrective regimen sliding scale   SubCutaneous three times a day before meals  insulin lispro (ADMELOG) corrective regimen sliding scale   SubCutaneous at bedtime  insulin lispro Injectable (ADMELOG) 10 Unit(s) SubCutaneous three times a day before meals  metoprolol succinate  milliGRAM(s) Oral daily  naloxone Injectable 0.4 milliGRAM(s) IV Push once  nystatin Powder 1 Application(s) Topical three times a day  nystatin Powder 1 Application(s) Topical three times a day  polyethylene glycol 3350 17 Gram(s) Oral daily  rivaroxaban 20 milliGRAM(s) Oral with dinner  senna 2 Tablet(s) Oral at bedtime  torsemide 20 milliGRAM(s) Oral daily  vancomycin  IVPB 1250 milliGRAM(s) IV Intermittent every 12 hours    MEDICATIONS  (PRN):  acetaminophen     Tablet .. 650 milliGRAM(s) Oral every 6 hours PRN Temp greater or equal to 38C (100.4F), Mild Pain (1 - 3)  aluminum hydroxide/magnesium hydroxide/simethicone Suspension 30 milliLiter(s) Oral every 4 hours PRN Dyspepsia  bisacodyl 5 milliGRAM(s) Oral daily PRN Constipation  bisacodyl Suppository 10 milliGRAM(s) Rectal daily PRN Constipation  dextrose Oral Gel 15 Gram(s) Oral once PRN Blood Glucose LESS THAN 70 milliGRAM(s)/deciliter  melatonin 3 milliGRAM(s) Oral at bedtime PRN Insomnia  ondansetron Injectable 4 milliGRAM(s) IV Push every 8 hours PRN Nausea and/or Vomiting  oxyCODONE    IR 2.5 milliGRAM(s) Oral every 4 hours PRN Moderate Pain (4 - 6)  oxyCODONE    IR 5 milliGRAM(s) Oral every 4 hours PRN Severe Pain (7 - 10)      Allergies    No Known Allergies    Intolerances        REVIEW OF SYSTEMS:  Per HPI. All other ROS noted Negative  ALLERGY AND IMMUNOLOGIC: No hives or eczema    Vital Signs Last 24 Hrs  T(C): 36.6 (14 Oct 2023 05:28), Max: 37 (13 Oct 2023 13:17)  T(F): 97.8 (14 Oct 2023 05:28), Max: 98.6 (13 Oct 2023 13:17)  HR: 78 (14 Oct 2023 05:28) (64 - 86)  BP: 121/61 (14 Oct 2023 05:28) (97/64 - 121/61)  BP(mean): --  RR: 18 (14 Oct 2023 05:28) (18 - 18)  SpO2: 96% (14 Oct 2023 05:28) (93% - 96%)    Parameters below as of 14 Oct 2023 05:28  Patient On (Oxygen Delivery Method): room air        PHYSICAL EXAM:  GENERAL: NAD, awake, alert   HEENT:  AT/NC, anicteric, moist mucous membranes, EOMI, PERRL, no lid-lag, conjunctiva and sclera clear  CHEST/LUNG:  CTA b/l, no rales, wheezes, or rhonchi,  normal respiratory effort, no intercostal retractions  HEART:  RRR, S1, S2, no murmurs; 1+ pitting edema  ABDOMEN:  BS+, soft, nontender, nondistended  MSK/EXTREMITIES: + chronic venous stasis changes bilat LE   NERVOUS SYSTEM: answers questions and follows commands appropriately, A&Ox3 grossly moves all extremities   PSYCH: Appropriate affect, Alert & Awake; fair judgement  Skin: Warm, dry. chronic  venous stasis changes bilat LE     LABS:                        13.1   6.45  )-----------( 223      ( 13 Oct 2023 08:50 )             38.8     13 Oct 2023 08:50    138    |  102    |  14     ----------------------------<  254    4.1     |  29     |  0.66     Ca    9.1        13 Oct 2023 08:50    TPro  6.5    /  Alb  2.4    /  TBili  0.6    /  DBili  x      /  AST  18     /  ALT  22     /  AlkPhos  74     13 Oct 2023 08:50      CAPILLARY BLOOD GLUCOSE      POCT Blood Glucose.: 270 mg/dL (14 Oct 2023 07:38)  POCT Blood Glucose.: 243 mg/dL (13 Oct 2023 21:32)  POCT Blood Glucose.: 224 mg/dL (13 Oct 2023 16:58)  POCT Blood Glucose.: 197 mg/dL (13 Oct 2023 11:58)  POCT Blood Glucose.: 254 mg/dL (13 Oct 2023 07:50)    BLOOD CULTURE    RADIOLOGY & ADDITIONAL TESTS:    Imaging Personally Reviewed:  [ ] YES     Consultant(s) Notes Reviewed:      Care Discussed with Consultants/Other Providers:

## 2023-10-14 NOTE — PROGRESS NOTE ADULT - PROBLEM SELECTOR PLAN 1
Worsening Right LE draining lesion on anterior shin and generalized worsening bilateral lower extremity edema and erythema   - XR b/L lower extremities and R foot performed, generalized soft issue edema without evidence of bony involvement  - Continue with Vancomycin 1250mg q12 hr and Cefepime 2 g q8 hr per ID till 10/19/2023  -Plan for [facility] discharge when medically ready   -Monitor renal function, Vanco trough.   -Ceftriaxone for symptomatic UTI x 3 days, last dose 10/15/23   - Blood Cultures NGTD  - Pain management: Tylenol 650 mg mild, Oxycodone 2.5 mg moderate, Oxycodone 5 mg severe   - Lasix 40 mg IVP daily per cardio.   - Vascular studies on prior admission showed significant venous reflux, without evidence of extensive stenosis or vascular disease   - Podiatry  Dr. Meek  -Local wound care per Podiatry   -Acute pain. Oxycodone IR ordered for as needed use. Bowel regimen to prevent constipation on opiates

## 2023-10-14 NOTE — PROGRESS NOTE ADULT - ASSESSMENT
59 yo F with PMH of T2DM, Afib on Xarelto, CHF (EF 60-65% in 2/2023), neuropathy, chronic b/l LE wounds admitted for cellulitis    Edema, Persistent Afib  - Chronic b/l LE edema with cellulitic changes, otherwise, no meaningful evidence of volume overload.  Non-orthopneic  - No O2 requirement.  Pro-BNP unremarkable and CxR not consistent with CHF  - TTE showed normal LVF, EF 66% with no significant valvular disease  - s/p IV Lasix.  ContinueTorsemide 20 mg daily    - Rate-controlled Afib  - Continue Xarelto and Metoprolol     - Abx for cellulitis per ID  - Monitor and replete lytes, keep K>4, Mg>2.  - Will continue to follow.    Nina Salcedo DNP, NP-C, AGACNP-C  Cardiology   Call TEAMS

## 2023-10-15 LAB
ANION GAP SERPL CALC-SCNC: 6 MMOL/L — SIGNIFICANT CHANGE UP (ref 5–17)
BUN SERPL-MCNC: 20 MG/DL — SIGNIFICANT CHANGE UP (ref 7–23)
CALCIUM SERPL-MCNC: 9 MG/DL — SIGNIFICANT CHANGE UP (ref 8.5–10.1)
CHLORIDE SERPL-SCNC: 101 MMOL/L — SIGNIFICANT CHANGE UP (ref 96–108)
CO2 SERPL-SCNC: 30 MMOL/L — SIGNIFICANT CHANGE UP (ref 22–31)
CREAT SERPL-MCNC: 0.69 MG/DL — SIGNIFICANT CHANGE UP (ref 0.5–1.3)
EGFR: 100 ML/MIN/1.73M2 — SIGNIFICANT CHANGE UP
GLUCOSE SERPL-MCNC: 235 MG/DL — HIGH (ref 70–99)
HCT VFR BLD CALC: 40.4 % — SIGNIFICANT CHANGE UP (ref 34.5–45)
HGB BLD-MCNC: 13.4 G/DL — SIGNIFICANT CHANGE UP (ref 11.5–15.5)
MCHC RBC-ENTMCNC: 32 PG — SIGNIFICANT CHANGE UP (ref 27–34)
MCHC RBC-ENTMCNC: 33.2 GM/DL — SIGNIFICANT CHANGE UP (ref 32–36)
MCV RBC AUTO: 96.4 FL — SIGNIFICANT CHANGE UP (ref 80–100)
NRBC # BLD: 0 /100 WBCS — SIGNIFICANT CHANGE UP (ref 0–0)
PLATELET # BLD AUTO: 219 K/UL — SIGNIFICANT CHANGE UP (ref 150–400)
POTASSIUM SERPL-MCNC: 3.9 MMOL/L — SIGNIFICANT CHANGE UP (ref 3.5–5.3)
POTASSIUM SERPL-SCNC: 3.9 MMOL/L — SIGNIFICANT CHANGE UP (ref 3.5–5.3)
RBC # BLD: 4.19 M/UL — SIGNIFICANT CHANGE UP (ref 3.8–5.2)
RBC # FLD: 12.9 % — SIGNIFICANT CHANGE UP (ref 10.3–14.5)
SODIUM SERPL-SCNC: 137 MMOL/L — SIGNIFICANT CHANGE UP (ref 135–145)
WBC # BLD: 7.78 K/UL — SIGNIFICANT CHANGE UP (ref 3.8–10.5)
WBC # FLD AUTO: 7.78 K/UL — SIGNIFICANT CHANGE UP (ref 3.8–10.5)

## 2023-10-15 PROCEDURE — 99232 SBSQ HOSP IP/OBS MODERATE 35: CPT

## 2023-10-15 PROCEDURE — 99233 SBSQ HOSP IP/OBS HIGH 50: CPT

## 2023-10-15 RX ORDER — INSULIN LISPRO 100/ML
14 VIAL (ML) SUBCUTANEOUS
Refills: 0 | Status: DISCONTINUED | OUTPATIENT
Start: 2023-10-15 | End: 2023-10-16

## 2023-10-15 RX ADMIN — Medication 1 APPLICATION(S): at 17:42

## 2023-10-15 RX ADMIN — OXYCODONE HYDROCHLORIDE 5 MILLIGRAM(S): 5 TABLET ORAL at 15:33

## 2023-10-15 RX ADMIN — INSULIN GLARGINE 30 UNIT(S): 100 INJECTION, SOLUTION SUBCUTANEOUS at 08:33

## 2023-10-15 RX ADMIN — Medication 4: at 08:31

## 2023-10-15 RX ADMIN — RIVAROXABAN 20 MILLIGRAM(S): KIT at 17:05

## 2023-10-15 RX ADMIN — OXYCODONE HYDROCHLORIDE 5 MILLIGRAM(S): 5 TABLET ORAL at 14:29

## 2023-10-15 RX ADMIN — CEFTRIAXONE 100 MILLIGRAM(S): 500 INJECTION, POWDER, FOR SOLUTION INTRAMUSCULAR; INTRAVENOUS at 14:26

## 2023-10-15 RX ADMIN — NYSTATIN CREAM 1 APPLICATION(S): 100000 CREAM TOPICAL at 23:01

## 2023-10-15 RX ADMIN — OXYCODONE HYDROCHLORIDE 5 MILLIGRAM(S): 5 TABLET ORAL at 18:30

## 2023-10-15 RX ADMIN — GABAPENTIN 300 MILLIGRAM(S): 400 CAPSULE ORAL at 14:25

## 2023-10-15 RX ADMIN — Medication 10 UNIT(S): at 08:32

## 2023-10-15 RX ADMIN — Medication 250 MILLIGRAM(S): at 00:45

## 2023-10-15 RX ADMIN — SENNA PLUS 2 TABLET(S): 8.6 TABLET ORAL at 22:59

## 2023-10-15 RX ADMIN — GABAPENTIN 300 MILLIGRAM(S): 400 CAPSULE ORAL at 05:43

## 2023-10-15 RX ADMIN — GABAPENTIN 300 MILLIGRAM(S): 400 CAPSULE ORAL at 22:59

## 2023-10-15 RX ADMIN — Medication 250 MILLIGRAM(S): at 12:14

## 2023-10-15 RX ADMIN — Medication 14 UNIT(S): at 17:05

## 2023-10-15 RX ADMIN — NYSTATIN CREAM 1 APPLICATION(S): 100000 CREAM TOPICAL at 05:43

## 2023-10-15 RX ADMIN — NYSTATIN CREAM 1 APPLICATION(S): 100000 CREAM TOPICAL at 14:26

## 2023-10-15 RX ADMIN — NYSTATIN CREAM 1 APPLICATION(S): 100000 CREAM TOPICAL at 05:50

## 2023-10-15 RX ADMIN — OXYCODONE HYDROCHLORIDE 5 MILLIGRAM(S): 5 TABLET ORAL at 04:15

## 2023-10-15 RX ADMIN — INSULIN GLARGINE 30 UNIT(S): 100 INJECTION, SOLUTION SUBCUTANEOUS at 22:58

## 2023-10-15 RX ADMIN — Medication 4: at 17:04

## 2023-10-15 RX ADMIN — Medication 20 MILLIGRAM(S): at 05:43

## 2023-10-15 RX ADMIN — Medication 1 APPLICATION(S): at 05:43

## 2023-10-15 RX ADMIN — OXYCODONE HYDROCHLORIDE 5 MILLIGRAM(S): 5 TABLET ORAL at 03:50

## 2023-10-15 RX ADMIN — Medication 6: at 12:14

## 2023-10-15 NOTE — PROGRESS NOTE ADULT - SUBJECTIVE AND OBJECTIVE BOX
Patient is a 58y old  Female who presents with a chief complaint of Right Left Wound (14 Oct 2023 14:59)      INTERVAL HPI/OVERNIGHT EVENTS: No new events/complaints. Denies abdominal pain, chest pain, palpitation, sob    MEDICATIONS  (STANDING):  cefTRIAXone   IVPB 1000 milliGRAM(s) IV Intermittent every 24 hours  dextrose 5%. 1000 milliLiter(s) (100 mL/Hr) IV Continuous <Continuous>  dextrose 5%. 1000 milliLiter(s) (50 mL/Hr) IV Continuous <Continuous>  dextrose 50% Injectable 12.5 Gram(s) IV Push once  dextrose 50% Injectable 25 Gram(s) IV Push once  dextrose 50% Injectable 25 Gram(s) IV Push once  gabapentin 300 milliGRAM(s) Oral every 8 hours  glucagon  Injectable 1 milliGRAM(s) IntraMuscular once  hydrocortisone 1% Cream 1 Application(s) Topical two times a day  insulin glargine Injectable (LANTUS) 30 Unit(s) SubCutaneous every morning  insulin glargine Injectable (LANTUS) 30 Unit(s) SubCutaneous at bedtime  insulin lispro (ADMELOG) corrective regimen sliding scale   SubCutaneous three times a day before meals  insulin lispro (ADMELOG) corrective regimen sliding scale   SubCutaneous at bedtime  insulin lispro Injectable (ADMELOG) 10 Unit(s) SubCutaneous three times a day before meals  metoprolol succinate  milliGRAM(s) Oral daily  naloxone Injectable 0.4 milliGRAM(s) IV Push once  nystatin Powder 1 Application(s) Topical three times a day  nystatin Powder 1 Application(s) Topical three times a day  polyethylene glycol 3350 17 Gram(s) Oral daily  rivaroxaban 20 milliGRAM(s) Oral with dinner  senna 2 Tablet(s) Oral at bedtime  torsemide 20 milliGRAM(s) Oral daily  vancomycin  IVPB 750 milliGRAM(s) IV Intermittent every 12 hours    MEDICATIONS  (PRN):  acetaminophen     Tablet .. 650 milliGRAM(s) Oral every 6 hours PRN Temp greater or equal to 38C (100.4F), Mild Pain (1 - 3)  aluminum hydroxide/magnesium hydroxide/simethicone Suspension 30 milliLiter(s) Oral every 4 hours PRN Dyspepsia  bisacodyl 5 milliGRAM(s) Oral daily PRN Constipation  bisacodyl Suppository 10 milliGRAM(s) Rectal daily PRN Constipation  dextrose Oral Gel 15 Gram(s) Oral once PRN Blood Glucose LESS THAN 70 milliGRAM(s)/deciliter  melatonin 3 milliGRAM(s) Oral at bedtime PRN Insomnia  ondansetron Injectable 4 milliGRAM(s) IV Push every 8 hours PRN Nausea and/or Vomiting  oxyCODONE    IR 5 milliGRAM(s) Oral every 4 hours PRN Severe Pain (7 - 10)  oxyCODONE    IR 2.5 milliGRAM(s) Oral every 4 hours PRN Moderate Pain (4 - 6)      Allergies    No Known Allergies    Intolerances        REVIEW OF SYSTEMS:  Per HPI. All other ROS noted Negative   Vital Signs Last 24 Hrs  T(C): 37 (15 Oct 2023 05:20), Max: 37.3 (14 Oct 2023 21:25)  T(F): 98.6 (15 Oct 2023 05:20), Max: 99.2 (14 Oct 2023 21:25)  HR: 82 (15 Oct 2023 05:20) (80 - 85)  BP: 109/69 (15 Oct 2023 05:20) (109/69 - 151/101)  BP(mean): --  RR: 18 (15 Oct 2023 05:20) (18 - 19)  SpO2: 92% (15 Oct 2023 05:20) (92% - 93%)    Parameters below as of 15 Oct 2023 05:20  Patient On (Oxygen Delivery Method): room air        PHYSICAL EXAM:  GENERAL: NAD, awake, alert   HEENT:  AT/NC, anicteric, moist mucous membranes, EOMI, PERRL, no lid-lag, conjunctiva and sclera clear  CHEST/LUNG:  CTA b/l, no rales, wheezes, or rhonchi,  normal respiratory effort, no intercostal retractions  HEART:  RRR, S1, S2, no murmurs; 1+ pitting edema  ABDOMEN:  BS+, soft, nontender, nondistended  MSK/EXTREMITIES: + chronic venous stasis changes bilat LE   NERVOUS SYSTEM: answers questions and follows commands appropriately, A&Ox3 grossly moves all extremities   PSYCH: Appropriate affect, Alert & Awake; fair judgement  Skin: Warm, dry. chronic  venous stasis changes bilat LE   LABS:                        13.2   5.92  )-----------( 212      ( 14 Oct 2023 09:20 )             39.2     14 Oct 2023 09:20    138    |  101    |  17     ----------------------------<  282    3.6     |  28     |  0.76     Ca    8.9        14 Oct 2023 09:20    TPro  6.6    /  Alb  2.5    /  TBili  0.6    /  DBili  x      /  AST  18     /  ALT  22     /  AlkPhos  77     14 Oct 2023 09:20      CAPILLARY BLOOD GLUCOSE      POCT Blood Glucose.: 241 mg/dL (14 Oct 2023 21:16)  POCT Blood Glucose.: 233 mg/dL (14 Oct 2023 16:49)  POCT Blood Glucose.: 268 mg/dL (14 Oct 2023 11:33)  POCT Blood Glucose.: 270 mg/dL (14 Oct 2023 07:38)    BLOOD CULTURE    RADIOLOGY & ADDITIONAL TESTS:    Imaging Personally Reviewed:  [ ] YES     Consultant(s) Notes Reviewed:      Care Discussed with Consultants/Other Providers:

## 2023-10-15 NOTE — PROGRESS NOTE ADULT - SUBJECTIVE AND OBJECTIVE BOX
[facility]  [facility] Zavala Y. Patel, S. Shah, G. Seng  [phone]  ([phone] - weekdays after 5pm and weekends)    Name: MELITON LOZANO  Age/Gender: 58y Female  MRN: [id number]    Interval History:  Patient seen and examined.  No new complaints noted.  Notes reviewed  No concerning overnight events  Afebrile   Allergies: No Known Allergies    Objective:  Vitals:   T(F): 98.9 (10-15-23 @ 13:28), Max: 99.2 (10-14-23 @ 21:25)  HR: 85 (10-15-23 @ 13:28) (82 - 85)  BP: 110/73 (10-15-23 @ 13:28) (109/69 - 117/74)  RR: 18 (10-15-23 @ 13:28) (18 - 19)  SpO2: 97% (10-15-23 @ 13:28) (92% - 97%)  Physical Examination:  General: no acute distress  HEENT: NC/AT, anicteric, neck supple  Respiratory: no acc muscle use, breathing comfortably  Cardiovascular: S1 and S2 present  Gastrointestinal: normal appearing, nondistended  Extremities: b/l LE edema dec with some skin wrinkling  Skin: chronic stasis changes, LE erythema R>L      Laboratory Studies:  CBC:                       13.4   7.78  )-----------( 219      ( 15 Oct 2023 06:10 )             40.4     WBC Trend:  7.78 10-15-23 @ 06:10  5.92 10-14-23 @ 09:20  6.45 10-13-23 @ 08:50  6.54 10-12-23 @ 06:44  6.56 10-11-23 @ 06:40  7.48 10-10-23 @ 05:45  6.65 10-09-23 @ 11:55    CMP: 10-15    137  |  101  |  20  ----------------------------<  235<H>  3.9   |  30  |  0.69    Ca    9.0      15 Oct 2023 06:10    TPro  6.6  /  Alb  2.5<L>  /  TBili  0.6  /  DBili  x   /  AST  18  /  ALT  22  /  AlkPhos  77  10-14    Creatinine: 0.69 mg/dL (10-15-23 @ 06:10)  Creatinine: 0.76 mg/dL (10-14-23 @ 09:20)  Creatinine: 0.66 mg/dL (10-13-23 @ 08:50)  Creatinine: 0.64 mg/dL (10-12-23 @ 06:44)  Creatinine: 0.60 mg/dL (10-11-23 @ 06:40)  Creatinine: 0.65 mg/dL (10-10-23 @ 05:45)  Creatinine: 0.71 mg/dL (10-09-23 @ 11:55)    LIVER FUNCTIONS - ( 14 Oct 2023 09:20 )  Alb: 2.5 g/dL / Pro: 6.6 g/dL / ALK PHOS: 77 U/L / ALT: 22 U/L / AST: 18 U/L / GGT: x           Vancomycin Level, Trough: 17.1 ug/mL (10-14-23 @ 12:40)  Vancomycin Level, Trough: 14.4 ug/mL (10-13-23 @ 00:09)  Vancomycin Level, Trough: 14.1 ug/mL (10-11-23 @ 13:28)    Microbiology: reviewed   Culture - Urine (collected 10-09-23 @ 15:27)  Source: Clean Catch Clean Catch (Midstream)  Final Report (10-12-23 @ 20:54):    50,000 - 99,000 CFU/mL Klebsiella variicola  Organism: Klebsiella variicola (10-12-23 @ 20:54)  Organism: Klebsiella variicola (10-12-23 @ 20:54)      Method Type: SASHA      -  Amikacin: S <=16      -  Amoxicillin/Clavulanic Acid: S <=8/4      -  Ampicillin: R 16 These ampicillin results predict results for amoxicillin      -  Ampicillin/Sulbactam: S <=4/2      -  Aztreonam: S <=4      -  Cefazolin: S <=2      -  Cefepime: S <=2      -  Cefoxitin: I 16      -  Ceftriaxone: S <=1      -  Ciprofloxacin: S <=0.25      -  Ertapenem: S <=0.5      -  Gentamicin: S <=2      -  Imipenem: S <=1      -  Levofloxacin: S <=0.5      -  Meropenem: S <=1      -  Nitrofurantoin: S <=32 Should not be used to treat pyelonephritis      -  Piperacillin/Tazobactam: S <=8      -  Tobramycin: S <=2      -  Trimethoprim/Sulfamethoxazole: S <=0.5/9.5    Culture - Blood (collected 10-09-23 @ 11:55)  Source: .Blood Blood-Peripheral  Final Report (10-14-23 @ 19:00):    No growth at 5 days    Culture - Blood (collected 10-09-23 @ 11:20)  Source: .Blood Blood-Peripheral  Final Report (10-14-23 @ 19:00):    No growth at 5 days    COVID-19 PCR: NotDetec (09 Oct 2023 11:55)    Radiology: reviewed     Medications:  acetaminophen     Tablet .. 650 milliGRAM(s) Oral every 6 hours PRN  aluminum hydroxide/magnesium hydroxide/simethicone Suspension 30 milliLiter(s) Oral every 4 hours PRN  bisacodyl 5 milliGRAM(s) Oral daily PRN  bisacodyl Suppository 10 milliGRAM(s) Rectal daily PRN  dextrose 5%. 1000 milliLiter(s) IV Continuous <Continuous>  dextrose 5%. 1000 milliLiter(s) IV Continuous <Continuous>  dextrose 50% Injectable 25 Gram(s) IV Push once  dextrose 50% Injectable 25 Gram(s) IV Push once  dextrose 50% Injectable 12.5 Gram(s) IV Push once  dextrose Oral Gel 15 Gram(s) Oral once PRN  gabapentin 300 milliGRAM(s) Oral every 8 hours  glucagon  Injectable 1 milliGRAM(s) IntraMuscular once  hydrocortisone 1% Cream 1 Application(s) Topical two times a day  insulin glargine Injectable (LANTUS) 30 Unit(s) SubCutaneous every morning  insulin glargine Injectable (LANTUS) 30 Unit(s) SubCutaneous at bedtime  insulin lispro (ADMELOG) corrective regimen sliding scale   SubCutaneous three times a day before meals  insulin lispro (ADMELOG) corrective regimen sliding scale   SubCutaneous at bedtime  insulin lispro Injectable (ADMELOG) 14 Unit(s) SubCutaneous three times a day before meals  melatonin 3 milliGRAM(s) Oral at bedtime PRN  metoprolol succinate  milliGRAM(s) Oral daily  naloxone Injectable 0.4 milliGRAM(s) IV Push once  nystatin Powder 1 Application(s) Topical three times a day  nystatin Powder 1 Application(s) Topical three times a day  ondansetron Injectable 4 milliGRAM(s) IV Push every 8 hours PRN  oxyCODONE    IR 2.5 milliGRAM(s) Oral every 4 hours PRN  oxyCODONE    IR 5 milliGRAM(s) Oral every 4 hours PRN  polyethylene glycol 3350 17 Gram(s) Oral daily  rivaroxaban 20 milliGRAM(s) Oral with dinner  senna 2 Tablet(s) Oral at bedtime  torsemide 20 milliGRAM(s) Oral daily  vancomycin  IVPB 750 milliGRAM(s) IV Intermittent every 12 hours    Current Antimicrobials:  vancomycin  IVPB 750 milliGRAM(s) IV Intermittent every 12 hours    Prior/Completed Antimicrobials:  cefTRIAXone   IVPB  piperacillin/tazobactam IVPB...  vancomycin  IVPB.

## 2023-10-15 NOTE — PROGRESS NOTE ADULT - SUBJECTIVE AND OBJECTIVE BOX
[facility]  Jarett Stein PA-C  [address] [place] [address]  [phone]      INTERVAL HPI/OVERNIGHT EVENTS:  Pt s/e  No new GI events    MEDICATIONS  (STANDING):  cefTRIAXone   IVPB 1000 milliGRAM(s) IV Intermittent every 24 hours  dextrose 5%. 1000 milliLiter(s) (50 mL/Hr) IV Continuous <Continuous>  dextrose 5%. 1000 milliLiter(s) (100 mL/Hr) IV Continuous <Continuous>  dextrose 50% Injectable 25 Gram(s) IV Push once  dextrose 50% Injectable 25 Gram(s) IV Push once  dextrose 50% Injectable 12.5 Gram(s) IV Push once  gabapentin 300 milliGRAM(s) Oral every 8 hours  glucagon  Injectable 1 milliGRAM(s) IntraMuscular once  hydrocortisone 1% Cream 1 Application(s) Topical two times a day  insulin glargine Injectable (LANTUS) 30 Unit(s) SubCutaneous every morning  insulin glargine Injectable (LANTUS) 30 Unit(s) SubCutaneous at bedtime  insulin lispro (ADMELOG) corrective regimen sliding scale   SubCutaneous three times a day before meals  insulin lispro (ADMELOG) corrective regimen sliding scale   SubCutaneous at bedtime  insulin lispro Injectable (ADMELOG) 10 Unit(s) SubCutaneous three times a day before meals  metoprolol succinate  milliGRAM(s) Oral daily  naloxone Injectable 0.4 milliGRAM(s) IV Push once  nystatin Powder 1 Application(s) Topical three times a day  nystatin Powder 1 Application(s) Topical three times a day  polyethylene glycol 3350 17 Gram(s) Oral daily  rivaroxaban 20 milliGRAM(s) Oral with dinner  senna 2 Tablet(s) Oral at bedtime  torsemide 20 milliGRAM(s) Oral daily  vancomycin  IVPB 750 milliGRAM(s) IV Intermittent every 12 hours    MEDICATIONS  (PRN):  acetaminophen     Tablet .. 650 milliGRAM(s) Oral every 6 hours PRN Temp greater or equal to 38C (100.4F), Mild Pain (1 - 3)  aluminum hydroxide/magnesium hydroxide/simethicone Suspension 30 milliLiter(s) Oral every 4 hours PRN Dyspepsia  bisacodyl 5 milliGRAM(s) Oral daily PRN Constipation  bisacodyl Suppository 10 milliGRAM(s) Rectal daily PRN Constipation  dextrose Oral Gel 15 Gram(s) Oral once PRN Blood Glucose LESS THAN 70 milliGRAM(s)/deciliter  melatonin 3 milliGRAM(s) Oral at bedtime PRN Insomnia  ondansetron Injectable 4 milliGRAM(s) IV Push every 8 hours PRN Nausea and/or Vomiting  oxyCODONE    IR 5 milliGRAM(s) Oral every 4 hours PRN Severe Pain (7 - 10)  oxyCODONE    IR 2.5 milliGRAM(s) Oral every 4 hours PRN Moderate Pain (4 - 6)      Allergies    No Known Allergies      PHYSICAL EXAM:   Vital Signs:  Vital Signs Last 24 Hrs  T(C): 37 (15 Oct 2023 05:20), Max: 37.3 (14 Oct 2023 21:25)  T(F): 98.6 (15 Oct 2023 05:20), Max: 99.2 (14 Oct 2023 21:25)  HR: 82 (15 Oct 2023 05:20) (80 - 85)  BP: 109/69 (15 Oct 2023 05:20) (109/69 - 151/101)  BP(mean): --  RR: 18 (15 Oct 2023 05:20) (18 - 19)  SpO2: 92% (15 Oct 2023 05:20) (92% - 93%)    Parameters below as of 15 Oct 2023 05:20  Patient On (Oxygen Delivery Method): room air    GENERAL:  Appears stated age  HEENT:  NC/AT  CHEST:  Full & symmetric excursion  HEART:  Regular rhythm  ABDOMEN:  Soft, non-tender, non-distended  EXTEREMITIES:  no cyanosis  SKIN:  No rash  NEURO:  Alert      LABS:                        13.4   7.78  )-----------( 219      ( 15 Oct 2023 06:10 )             40.4     10-15    137  |  101  |  20  ----------------------------<  235<H>  3.9   |  30  |  0.69    Ca    9.0      15 Oct 2023 06:10    TPro  6.6  /  Alb  2.5<L>  /  TBili  0.6  /  DBili  x   /  AST  18  /  ALT  22  /  AlkPhos  77  10-14      Urinalysis Basic - ( 15 Oct 2023 06:10 )    Color: x / Appearance: x / SG: x / pH: x  Gluc: 235 mg/dL / Ketone: x  / Bili: x / Urobili: x   Blood: x / Protein: x / Nitrite: x   Leuk Esterase: x / RBC: x / WBC x   Sq Epi: x / Non Sq Epi: x / Bacteria: x

## 2023-10-15 NOTE — PROGRESS NOTE ADULT - SUBJECTIVE AND OBJECTIVE BOX
[facility] - Angela Alaniz, Iris, Tico, Leeroy, Sherman Hein  Office Number:  [phone]    Patient resting comfortably in bed in NAD.  Laying flat with no respiratory distress.  No complaints of chest pain, dyspnea, palpitations, PND, or orthopnea.    F/U for:  AF    Telemetry:      MEDICATIONS  (STANDING):  cefTRIAXone   IVPB 1000 milliGRAM(s) IV Intermittent every 24 hours  dextrose 5%. 1000 milliLiter(s) (50 mL/Hr) IV Continuous <Continuous>  dextrose 5%. 1000 milliLiter(s) (100 mL/Hr) IV Continuous <Continuous>  dextrose 50% Injectable 25 Gram(s) IV Push once  dextrose 50% Injectable 25 Gram(s) IV Push once  dextrose 50% Injectable 12.5 Gram(s) IV Push once  gabapentin 300 milliGRAM(s) Oral every 8 hours  glucagon  Injectable 1 milliGRAM(s) IntraMuscular once  hydrocortisone 1% Cream 1 Application(s) Topical two times a day  insulin glargine Injectable (LANTUS) 30 Unit(s) SubCutaneous every morning  insulin glargine Injectable (LANTUS) 30 Unit(s) SubCutaneous at bedtime  insulin lispro (ADMELOG) corrective regimen sliding scale   SubCutaneous three times a day before meals  insulin lispro (ADMELOG) corrective regimen sliding scale   SubCutaneous at bedtime  insulin lispro Injectable (ADMELOG) 14 Unit(s) SubCutaneous three times a day before meals  metoprolol succinate  milliGRAM(s) Oral daily  naloxone Injectable 0.4 milliGRAM(s) IV Push once  nystatin Powder 1 Application(s) Topical three times a day  nystatin Powder 1 Application(s) Topical three times a day  polyethylene glycol 3350 17 Gram(s) Oral daily  rivaroxaban 20 milliGRAM(s) Oral with dinner  senna 2 Tablet(s) Oral at bedtime  torsemide 20 milliGRAM(s) Oral daily  vancomycin  IVPB 750 milliGRAM(s) IV Intermittent every 12 hours    MEDICATIONS  (PRN):  acetaminophen     Tablet .. 650 milliGRAM(s) Oral every 6 hours PRN Temp greater or equal to 38C (100.4F), Mild Pain (1 - 3)  aluminum hydroxide/magnesium hydroxide/simethicone Suspension 30 milliLiter(s) Oral every 4 hours PRN Dyspepsia  bisacodyl 5 milliGRAM(s) Oral daily PRN Constipation  bisacodyl Suppository 10 milliGRAM(s) Rectal daily PRN Constipation  dextrose Oral Gel 15 Gram(s) Oral once PRN Blood Glucose LESS THAN 70 milliGRAM(s)/deciliter  melatonin 3 milliGRAM(s) Oral at bedtime PRN Insomnia  ondansetron Injectable 4 milliGRAM(s) IV Push every 8 hours PRN Nausea and/or Vomiting  oxyCODONE    IR 2.5 milliGRAM(s) Oral every 4 hours PRN Moderate Pain (4 - 6)  oxyCODONE    IR 5 milliGRAM(s) Oral every 4 hours PRN Severe Pain (7 - 10)      Allergies    No Known Allergies             Vital Signs Last 24 Hrs  T(C): 37 (15 Oct 2023 05:20), Max: 37.3 (14 Oct 2023 21:25)  T(F): 98.6 (15 Oct 2023 05:20), Max: 99.2 (14 Oct 2023 21:25)  HR: 82 (15 Oct 2023 05:20) (82 - 85)  BP: 109/69 (15 Oct 2023 05:20) (109/69 - 117/74)  BP(mean): --  RR: 18 (15 Oct 2023 05:20) (18 - 19)  SpO2: 92% (15 Oct 2023 05:20) (92% - 93%)    Parameters below as of 15 Oct 2023 05:20  Patient On (Oxygen Delivery Method): room air        I&O's Summary      ON EXAM:    Constitutional: NAD, awake and alert, obese  HEENT: Moist Mucous Membranes, Anicteric  Pulmonary: Non-labored, breath sounds are clear bilaterally, No wheezing, rales or rhonchi  Cardiovascular: IRRR, S1 and S2, No murmurs, rubs, gallops or clicks  Gastrointestinal: Bowel Sounds present, soft, nontender.   Lymph: No peripheral edema. No lymphadenopathy.  Skin: No visible rashes or ulcers.  +chronic skin changes on BLE  Psych:  Mood & affect: Appropriate    LABS: All Labs Reviewed:                        13.4   7.78  )-----------( 219      ( 15 Oct 2023 06:10 )             40.4                         13.2   5.92  )-----------( 212      ( 14 Oct 2023 09:20 )             39.2                         13.1   6.45  )-----------( 223      ( 13 Oct 2023 08:50 )             38.8     15 Oct 2023 06:10    137    |  101    |  20     ----------------------------<  235    3.9     |  30     |  0.69   14 Oct 2023 09:20    138    |  101    |  17     ----------------------------<  282    3.6     |  28     |  0.76   13 Oct 2023 08:50    138    |  102    |  14     ----------------------------<  254    4.1     |  29     |  0.66     Ca    9.0        15 Oct 2023 06:10  Ca    8.9        14 Oct 2023 09:20  Ca    9.1        13 Oct 2023 08:50    TPro  6.6    /  Alb  2.5    /  TBili  0.6    /  DBili  x      /  AST  18     /  ALT  22     /  AlkPhos  77     14 Oct 2023 09:20  TPro  6.5    /  Alb  2.4    /  TBili  0.6    /  DBili  x      /  AST  18     /  ALT  22     /  AlkPhos  74     13 Oct 2023 08:50

## 2023-10-15 NOTE — PROGRESS NOTE ADULT - PROBLEM SELECTOR PLAN 1
Worsening Right LE draining lesion on anterior shin and generalized worsening bilateral lower extremity edema and erythema   - XR b/L lower extremities and R foot performed, generalized soft issue edema without evidence of bony involvement  - Continue with Vancomycin as prescribed by ID. Monitor vanco trough, renal function  - Symptomatic UTI. On  Ceftriaxone x 3 days   -Plan for [facility] discharge when medically ready   -Monitor renal function, Vanco trough.   -Ceftriaxone for symptomatic UTI x 3 days, last dose 10/15/23   - Blood Cultures NGTD  - Pain management: Tylenol 650 mg mild, Oxycodone 2.5 mg moderate, Oxycodone 5 mg severe   - S/p  Lasix, now on Torsemide  - Vascular studies on prior admission showed significant venous reflux, without evidence of extensive stenosis or vascular disease   - Podiatry  Dr. Meek  -Local wound care per Podiatry   -Acute pain. Oxycodone IR ordered for as needed use. Bowel regimen to prevent constipation on opiates

## 2023-10-15 NOTE — PROGRESS NOTE ADULT - ASSESSMENT
57 yo F with PMHx of T2DM, Afib on Xarelto, and CHF with unknown EF admitted with worsening Right LE draining lesion on anterior shin and generalized worsening bilateral lower extremity edema and erythema sec cellulitis  Hx MRSA  Leg pain burning sensation prob sec DM peripheral neuropathy  Ua and Ucx noted - 50,000 - 99,000 CFU/mL Klebsiella variicola - pt c/o dysuria - UTI   Abd pain resolved  Vancomycin level noted -supratherapeutic AUC    Plan :   On Vancomycin 750mg q12h till 10/19 - check level tomorrow am   - can change to po Doxycyline 100mg po bid to finish course when ready for dc  S/p Cefepime > continue Rocephin x 3 days- end today 10/15  Trend temps and cbc  Wound care per wound care service  Elevate leg  Asp precautions  Pulm toileting  Stable from ID standpoint    Covering for Dr. Onur Doshi M.D.  [facility], Division of Infectious Diseases  [phone]  After 5pm on weekdays and all day on weekends - please call [phone]

## 2023-10-16 LAB
ANION GAP SERPL CALC-SCNC: 4 MMOL/L — LOW (ref 5–17)
BUN SERPL-MCNC: 19 MG/DL — SIGNIFICANT CHANGE UP (ref 7–23)
CALCIUM SERPL-MCNC: 8.9 MG/DL — SIGNIFICANT CHANGE UP (ref 8.5–10.1)
CHLORIDE SERPL-SCNC: 103 MMOL/L — SIGNIFICANT CHANGE UP (ref 96–108)
CO2 SERPL-SCNC: 31 MMOL/L — SIGNIFICANT CHANGE UP (ref 22–31)
CREAT SERPL-MCNC: 0.73 MG/DL — SIGNIFICANT CHANGE UP (ref 0.5–1.3)
EGFR: 95 ML/MIN/1.73M2 — SIGNIFICANT CHANGE UP
GLUCOSE SERPL-MCNC: 197 MG/DL — HIGH (ref 70–99)
HCT VFR BLD CALC: 38.4 % — SIGNIFICANT CHANGE UP (ref 34.5–45)
HGB BLD-MCNC: 12.9 G/DL — SIGNIFICANT CHANGE UP (ref 11.5–15.5)
MCHC RBC-ENTMCNC: 31.9 PG — SIGNIFICANT CHANGE UP (ref 27–34)
MCHC RBC-ENTMCNC: 33.6 GM/DL — SIGNIFICANT CHANGE UP (ref 32–36)
MCV RBC AUTO: 95 FL — SIGNIFICANT CHANGE UP (ref 80–100)
NRBC # BLD: 0 /100 WBCS — SIGNIFICANT CHANGE UP (ref 0–0)
PLATELET # BLD AUTO: 224 K/UL — SIGNIFICANT CHANGE UP (ref 150–400)
POTASSIUM SERPL-MCNC: 3.8 MMOL/L — SIGNIFICANT CHANGE UP (ref 3.5–5.3)
POTASSIUM SERPL-SCNC: 3.8 MMOL/L — SIGNIFICANT CHANGE UP (ref 3.5–5.3)
RBC # BLD: 4.04 M/UL — SIGNIFICANT CHANGE UP (ref 3.8–5.2)
RBC # FLD: 12.9 % — SIGNIFICANT CHANGE UP (ref 10.3–14.5)
SODIUM SERPL-SCNC: 138 MMOL/L — SIGNIFICANT CHANGE UP (ref 135–145)
VANCOMYCIN TROUGH SERPL-MCNC: 10.9 UG/ML — SIGNIFICANT CHANGE UP (ref 10–20)
WBC # BLD: 6.15 K/UL — SIGNIFICANT CHANGE UP (ref 3.8–10.5)
WBC # FLD AUTO: 6.15 K/UL — SIGNIFICANT CHANGE UP (ref 3.8–10.5)

## 2023-10-16 PROCEDURE — 99232 SBSQ HOSP IP/OBS MODERATE 35: CPT

## 2023-10-16 PROCEDURE — 99233 SBSQ HOSP IP/OBS HIGH 50: CPT

## 2023-10-16 RX ORDER — INSULIN LISPRO 100/ML
10 VIAL (ML) SUBCUTANEOUS
Refills: 0 | Status: DISCONTINUED | OUTPATIENT
Start: 2023-10-16 | End: 2023-10-17

## 2023-10-16 RX ORDER — TAMSULOSIN HYDROCHLORIDE 0.4 MG/1
0.4 CAPSULE ORAL AT BEDTIME
Refills: 0 | Status: DISCONTINUED | OUTPATIENT
Start: 2023-10-16 | End: 2023-10-19

## 2023-10-16 RX ADMIN — Medication 1 APPLICATION(S): at 05:51

## 2023-10-16 RX ADMIN — NYSTATIN CREAM 1 APPLICATION(S): 100000 CREAM TOPICAL at 21:51

## 2023-10-16 RX ADMIN — Medication 14 UNIT(S): at 08:00

## 2023-10-16 RX ADMIN — GABAPENTIN 300 MILLIGRAM(S): 400 CAPSULE ORAL at 13:15

## 2023-10-16 RX ADMIN — NYSTATIN CREAM 1 APPLICATION(S): 100000 CREAM TOPICAL at 13:15

## 2023-10-16 RX ADMIN — INSULIN GLARGINE 30 UNIT(S): 100 INJECTION, SOLUTION SUBCUTANEOUS at 08:01

## 2023-10-16 RX ADMIN — TAMSULOSIN HYDROCHLORIDE 0.4 MILLIGRAM(S): 0.4 CAPSULE ORAL at 21:50

## 2023-10-16 RX ADMIN — Medication 2: at 07:59

## 2023-10-16 RX ADMIN — Medication 1 APPLICATION(S): at 17:56

## 2023-10-16 RX ADMIN — OXYCODONE HYDROCHLORIDE 2.5 MILLIGRAM(S): 5 TABLET ORAL at 05:57

## 2023-10-16 RX ADMIN — GABAPENTIN 300 MILLIGRAM(S): 400 CAPSULE ORAL at 21:50

## 2023-10-16 RX ADMIN — RIVAROXABAN 20 MILLIGRAM(S): KIT at 18:02

## 2023-10-16 RX ADMIN — OXYCODONE HYDROCHLORIDE 5 MILLIGRAM(S): 5 TABLET ORAL at 18:03

## 2023-10-16 RX ADMIN — Medication 100 MILLIGRAM(S): at 05:51

## 2023-10-16 RX ADMIN — NYSTATIN CREAM 1 APPLICATION(S): 100000 CREAM TOPICAL at 05:52

## 2023-10-16 RX ADMIN — OXYCODONE HYDROCHLORIDE 2.5 MILLIGRAM(S): 5 TABLET ORAL at 01:03

## 2023-10-16 RX ADMIN — GABAPENTIN 300 MILLIGRAM(S): 400 CAPSULE ORAL at 05:51

## 2023-10-16 RX ADMIN — SENNA PLUS 2 TABLET(S): 8.6 TABLET ORAL at 21:50

## 2023-10-16 RX ADMIN — Medication 250 MILLIGRAM(S): at 15:30

## 2023-10-16 RX ADMIN — OXYCODONE HYDROCHLORIDE 5 MILLIGRAM(S): 5 TABLET ORAL at 10:52

## 2023-10-16 RX ADMIN — Medication 10 UNIT(S): at 17:55

## 2023-10-16 RX ADMIN — Medication 4: at 17:55

## 2023-10-16 RX ADMIN — Medication 20 MILLIGRAM(S): at 05:51

## 2023-10-16 RX ADMIN — OXYCODONE HYDROCHLORIDE 5 MILLIGRAM(S): 5 TABLET ORAL at 09:53

## 2023-10-16 RX ADMIN — NYSTATIN CREAM 1 APPLICATION(S): 100000 CREAM TOPICAL at 21:53

## 2023-10-16 RX ADMIN — INSULIN GLARGINE 30 UNIT(S): 100 INJECTION, SOLUTION SUBCUTANEOUS at 21:52

## 2023-10-16 RX ADMIN — Medication 250 MILLIGRAM(S): at 01:23

## 2023-10-16 NOTE — PROGRESS NOTE ADULT - PROBLEM SELECTOR PLAN 1
Worsening Right LE draining lesion on anterior shin and generalized worsening bilateral lower extremity edema and erythema   - XR b/L lower extremities and R foot performed, generalized soft issue edema without evidence of bony involvement  - Continue with Vancomycin as prescribed by ID. Monitor vanco trough, renal function  - Symptomatic UTI. On  Ceftriaxone x 3 days   -Plan for [facility] discharge when medically ready   -Monitor renal function, Vanco trough.   -Ceftriaxone for symptomatic UTI x 3 days, last dose 10/15/23   - Blood Cultures NGTD  - Pain management: Tylenol 650 mg mild, Oxycodone 2.5 mg moderate, Oxycodone 5 mg severe   - S/p  Lasix, now on Torsemide  - Vascular studies on prior admission showed significant venous reflux, without evidence of extensive stenosis or vascular disease   - Podiatry  Dr. Meek  -Local wound care per Podiatry   -Acute pain. Oxycodone IR ordered for as needed use. Bowel regimen to prevent constipation on opiates Worsening Right LE draining lesion on anterior shin and generalized worsening bilateral lower extremity edema and erythema   -RLE wound suspect 2/2 Diabetes with cellulitis  - XR b/L lower extremities and R foot performed, generalized soft issue edema without evidence of bony involvement  - Continue with Vancomycin as prescribed by ID. Monitor vanco trough, renal function  - Symptomatic UTI. On  Ceftriaxone x 3 days   -Plan for [facility] discharge when medically ready   -Monitor renal function, Vanco trough.   -Ceftriaxone for symptomatic UTI x 3 days, last dose 10/15/23   - Blood Cultures NGTD  - Pain management: Tylenol 650 mg mild, Oxycodone 2.5 mg moderate, Oxycodone 5 mg severe   - S/p  Lasix, now on Torsemide  - Vascular studies on prior admission showed significant venous reflux, without evidence of extensive stenosis or vascular disease   - Podiatry  Dr. Meek  -Local wound care per Podiatry   -Acute pain. Oxycodone IR ordered for as needed use. Bowel regimen to prevent constipation on opiates

## 2023-10-16 NOTE — PROGRESS NOTE ADULT - ASSESSMENT
59 yo F with PMH of T2DM, Afib on Xarelto, CHF (EF 60-65% in 2/2023), neuropathy, chronic b/l LE wounds admitted for cellulitis    Edema, Persistent Afib  - Chronic b/l LE edema with cellulitic changes, otherwise, no meaningful evidence of volume overload.  Non-orthopneic  - No O2 requirement.  Pro-BNP unremarkable and CxR not consistent with CHF  - TTE showed normal LVF, EF 66% with no significant valvular disease  - s/p IV Lasix.  ContinueTorsemide 20 mg daily    - Rate-controlled Afib  - Continue Xarelto and Metoprolol     - Abx for cellulitis per ID  - Monitor and replete lytes, keep K>4, Mg>2.  - Will continue to follow.    Mandy Villavicencio, MS FNP, AGACNP  Nurse Practitioner- Cardiology   Please call on TEAMS   57 yo F with PMH of T2DM, Afib on Xarelto, CHF (EF 60-65% in 2/2023), neuropathy, chronic b/l LE wounds admitted for cellulitis    Edema, Persistent Afib  - Chronic b/l LE edema with cellulitic changes, otherwise, no meaningful evidence of volume overload.  Non-orthopneic  - No O2 requirement.  Pro-BNP unremarkable and CxR not consistent with CHF  - TTE showed normal LVF, EF 66% with no significant valvular disease  - s/p IV Lasix.  Continue Torsemide 20 mg daily    - Rate-controlled Afib  - Continue Xarelto and Metoprolol     - Abx for cellulitis per ID  - Monitor and replete lytes, keep K>4, Mg>2.  - Will continue to follow.    Mandy Villavicencio, MS FNP, AGACNP  Nurse Practitioner- Cardiology   Please call on TEAMS

## 2023-10-16 NOTE — PROGRESS NOTE ADULT - SUBJECTIVE AND OBJECTIVE BOX
MELITON LOZANO is a 58yFemale , patient examined and chart reviewed.    INTERVAL HPI/ OVERNIGHT EVENTS:   Afebrile. Working with PT.    PAST MEDICAL & SURGICAL HISTORY:  Atrial fibrillation  Type 2 diabetes mellitus  Congestive heart failure  Scalp psoriasis  S/P tonsillectomy  S/P appendectomy  H/O umbilical hernia repair      For details regarding the patient's social history, family history, and other miscellaneous elements, please refer the initial infectious diseases consultation and/or the admitting history and physical examination for this admission.      ROS: Poor historian + leg pain    No Known Allergies      Current inpatient medications :    ANTIBIOTICS/RELEVANT:  vancomycin  IVPB 750 milliGRAM(s) IV Intermittent every 12 hours    MEDICATIONS  (STANDING):  dextrose 5%. 1000 milliLiter(s) (50 mL/Hr) IV Continuous <Continuous>  dextrose 5%. 1000 milliLiter(s) (100 mL/Hr) IV Continuous <Continuous>  dextrose 50% Injectable 25 Gram(s) IV Push once  dextrose 50% Injectable 25 Gram(s) IV Push once  dextrose 50% Injectable 12.5 Gram(s) IV Push once  gabapentin 300 milliGRAM(s) Oral every 8 hours  glucagon  Injectable 1 milliGRAM(s) IntraMuscular once  hydrocortisone 1% Cream 1 Application(s) Topical two times a day  insulin glargine Injectable (LANTUS) 30 Unit(s) SubCutaneous every morning  insulin glargine Injectable (LANTUS) 30 Unit(s) SubCutaneous at bedtime  insulin lispro (ADMELOG) corrective regimen sliding scale   SubCutaneous three times a day before meals  insulin lispro (ADMELOG) corrective regimen sliding scale   SubCutaneous at bedtime  insulin lispro Injectable (ADMELOG) 10 Unit(s) SubCutaneous three times a day before meals  metoprolol succinate  milliGRAM(s) Oral daily  naloxone Injectable 0.4 milliGRAM(s) IV Push once  nystatin Powder 1 Application(s) Topical three times a day  nystatin Powder 1 Application(s) Topical three times a day  polyethylene glycol 3350 17 Gram(s) Oral daily  rivaroxaban 20 milliGRAM(s) Oral with dinner  senna 2 Tablet(s) Oral at bedtime  tamsulosin 0.4 milliGRAM(s) Oral at bedtime  torsemide 20 milliGRAM(s) Oral daily    MEDICATIONS  (PRN):  acetaminophen     Tablet .. 650 milliGRAM(s) Oral every 6 hours PRN Temp greater or equal to 38C (100.4F), Mild Pain (1 - 3)  aluminum hydroxide/magnesium hydroxide/simethicone Suspension 30 milliLiter(s) Oral every 4 hours PRN Dyspepsia  bisacodyl 5 milliGRAM(s) Oral daily PRN Constipation  bisacodyl Suppository 10 milliGRAM(s) Rectal daily PRN Constipation  dextrose Oral Gel 15 Gram(s) Oral once PRN Blood Glucose LESS THAN 70 milliGRAM(s)/deciliter  melatonin 3 milliGRAM(s) Oral at bedtime PRN Insomnia  ondansetron Injectable 4 milliGRAM(s) IV Push every 8 hours PRN Nausea and/or Vomiting  oxyCODONE    IR 2.5 milliGRAM(s) Oral every 4 hours PRN Moderate Pain (4 - 6)  oxyCODONE    IR 5 milliGRAM(s) Oral every 4 hours PRN Severe Pain (7 - 10)    Objective:  Vital Signs Last 24 Hrs  T(C): 36.8 (16 Oct 2023 13:01), Max: 37.1 (15 Oct 2023 20:00)  T(F): 98.3 (16 Oct 2023 13:01), Max: 98.7 (15 Oct 2023 20:00)  HR: 73 (16 Oct 2023 13:01) (73 - 87)  BP: 99/67 (16 Oct 2023 13:01) (99/67 - 113/66)  RR: 18 (16 Oct 2023 13:01) (18 - 18)  SpO2: 92% (16 Oct 2023 13:01) (92% - 93%)    Parameters below as of 16 Oct 2023 13:01  Patient On (Oxygen Delivery Method): room air      Physical Exam:  General:  no acute distress  Neck: supple, trachea midline  Lungs: clear, no wheeze/rhonchi  Cardiovascular: regular rate and rhythm, S1 S2  Abdomen: soft, nontender,  bowel sounds normal  Neurological: alert and oriented x3  Skin: no rash  Extremities: Tanner LE edema erythema Right > Left improving  Chronic stasis       LABS:                        12.9   6.15  )-----------( 224      ( 16 Oct 2023 06:20 )             38.4   10-16    138  |  103  |  19  ----------------------------<  197<H>  3.8   |  31  |  0.73    Ca    8.9      16 Oct 2023 06:20        MICROBIOLOGY:  Culture - Urine (10.09.23 @ 15:27)    -  Amikacin: S <=16   -  Amoxicillin/Clavulanic Acid: S <=8/4   -  Ampicillin: R 16 These ampicillin results predict results for amoxicillin   -  Ampicillin/Sulbactam: S <=4/2   -  Aztreonam: S <=4   -  Cefazolin: S <=2   -  Cefepime: S <=2   -  Cefoxitin: I 16   -  Ceftriaxone: S <=1   -  Ciprofloxacin: S <=0.25   -  Ertapenem: S <=0.5   -  Gentamicin: S <=2   -  Imipenem: S <=1   -  Levofloxacin: S <=0.5   -  Meropenem: S <=1   -  Nitrofurantoin: S <=32 Should not be used to treat pyelonephritis   -  Piperacillin/Tazobactam: S <=8   -  Tobramycin: S <=2   -  Trimethoprim/Sulfamethoxazole: S <=0.5/9.5   Specimen Source: Clean Catch Clean Catch (Midstream)   Culture Results:   50,000 - 99,000 CFU/mL Klebsiella variicola   Organism Identification: Klebsiella variicola   Organism: Klebsiella variicola   Method Type: SASHA    Culture - Blood (collected 09 Oct 2023 11:55)  Source: .Blood Blood-Peripheral  Preliminary Report (10 Oct 2023 19:02):    No growth at 24 hours    Culture - Blood (collected 09 Oct 2023 11:20)  Source: .Blood Blood-Peripheral  Preliminary Report (10 Oct 2023 19:02):    No growth at 24 hours    RADIOLOGY & ADDITIONAL STUDIES:          Assessment :  59 yo F with PMHx of T2DM, Afib on Xarelto, and CHF with unknown EF admitted with worsening Right LE draining lesion on anterior shin and generalized worsening bilateral lower extremity edema and erythema sec cellulitis  Hx MRSA  Leg pain burning sensation prob sec DM peripheral neuropathy  Ua and Ucx noted - 50,000 - 99,000 CFU/mL Klebsiella variicola - pt c/o dysuria - UTI   Abd pain resolved    Plan :   On Vancomycin 750mg q12h till 10/19 will change to po Doxycyline 100mg po bid to finish course   Sp Rocephin x 3 days  Trend temps and cbc  Wound care per wound care service  Elevate leg  Asp precautions  Pulm toileting  Stable from ID standpoint      Continue with present regiment.  Appropriate use of antibiotics and adverse effects reviewed.    > 35 minutes were spent in direct patient care reviewing notes, medications ,labs data/ imaging , discussion with multidisciplinary team.    Thank you for allowing me to participate in care of your patient .    Carole Mohr MD  Infectious Disease  [phone]

## 2023-10-16 NOTE — SOCIAL WORK PROGRESS NOTE - NSSWPROGRESSNOTE_GEN_ALL_CORE
Pt and boyfriend/caretaker in agreement w cynthia. dc packet given and reviewed. following choices given: [facility] and [facility]. maria elena advised pt and boyfriend to choose additional facilities. maria elena has requested [facility] and to send to above facilities for eval.

## 2023-10-16 NOTE — PROGRESS NOTE ADULT - PROBLEM SELECTOR PLAN 3
Patient reported  diffuse severe pain in abdomen initially that improved now but she states that whenever she drinks wanter or eats her legs hurt   - Pt received a inpatient and outpatient workup including on prior admissions   - EGD/colonoscopy 2022, negative   - CTA Abdomen to evaluate for mesenteric ischemia, follow up results   - GI following   -Bowel regimen  - Urinary Retention. S/p straight Cath. Started on Flomax. Monitor Urine output. Ambulate as tolerated - Improved. Likely secondary to constipation and urinary retention   - Pt received a inpatient and outpatient workup including on prior admissions   - EGD/colonoscopy 2022, negative   - CTA Abdomen to evaluate for mesenteric ischemia, follow up results   - GI following   -Bowel regimen  - Urinary Retention. S/p straight Cath. Started on Flomax. Monitor Urine output. Ambulate as tolerated

## 2023-10-16 NOTE — PROGRESS NOTE ADULT - PROBLEM SELECTOR PLAN 5
- Pt on Lasix 20mg BID at home   - Continue with Lasix 40mg IVP daily  - Continue to monitor  -Dr Umaña consulted recommendations appreciated  - EF 60-65% in 2/2023 - Acute onchronic HfpEF  -S/p IV Lasix  -On Torsemide now   - Continue to monitor  -Dr Umaña consulted recommendations appreciated  - EF 60-65% in 2/2023

## 2023-10-16 NOTE — CHART NOTE - NSCHARTNOTEFT_GEN_A_CORE
RN called overnight to report that patient is retaining urine, bladder scan showed 700ml urine. Straight cath ordered.

## 2023-10-16 NOTE — PROGRESS NOTE ADULT - PROBLEM SELECTOR PLAN 1
cont lantus 30 units 2x/day  admelog increased 14 units 3x/day before meals  cont admelog corrective scale coverage qac/qhs   cont cons cho diet  goal bg 100-180 in hosp setting

## 2023-10-16 NOTE — PROGRESS NOTE ADULT - SUBJECTIVE AND OBJECTIVE BOX
[facility]  Jarett Stein PA-C  [address] [place] [address]  [phone]      INTERVAL HPI/OVERNIGHT EVENTS:  Pt s/e  No new GI events    MEDICATIONS  (STANDING):  dextrose 5%. 1000 milliLiter(s) (50 mL/Hr) IV Continuous <Continuous>  dextrose 5%. 1000 milliLiter(s) (100 mL/Hr) IV Continuous <Continuous>  dextrose 50% Injectable 25 Gram(s) IV Push once  dextrose 50% Injectable 25 Gram(s) IV Push once  dextrose 50% Injectable 12.5 Gram(s) IV Push once  gabapentin 300 milliGRAM(s) Oral every 8 hours  glucagon  Injectable 1 milliGRAM(s) IntraMuscular once  hydrocortisone 1% Cream 1 Application(s) Topical two times a day  insulin glargine Injectable (LANTUS) 30 Unit(s) SubCutaneous every morning  insulin glargine Injectable (LANTUS) 30 Unit(s) SubCutaneous at bedtime  insulin lispro (ADMELOG) corrective regimen sliding scale   SubCutaneous three times a day before meals  insulin lispro (ADMELOG) corrective regimen sliding scale   SubCutaneous at bedtime  insulin lispro Injectable (ADMELOG) 14 Unit(s) SubCutaneous three times a day before meals  metoprolol succinate  milliGRAM(s) Oral daily  naloxone Injectable 0.4 milliGRAM(s) IV Push once  nystatin Powder 1 Application(s) Topical three times a day  nystatin Powder 1 Application(s) Topical three times a day  polyethylene glycol 3350 17 Gram(s) Oral daily  rivaroxaban 20 milliGRAM(s) Oral with dinner  senna 2 Tablet(s) Oral at bedtime  tamsulosin 0.4 milliGRAM(s) Oral at bedtime  torsemide 20 milliGRAM(s) Oral daily  vancomycin  IVPB 750 milliGRAM(s) IV Intermittent every 12 hours    MEDICATIONS  (PRN):  acetaminophen     Tablet .. 650 milliGRAM(s) Oral every 6 hours PRN Temp greater or equal to 38C (100.4F), Mild Pain (1 - 3)  aluminum hydroxide/magnesium hydroxide/simethicone Suspension 30 milliLiter(s) Oral every 4 hours PRN Dyspepsia  bisacodyl 5 milliGRAM(s) Oral daily PRN Constipation  bisacodyl Suppository 10 milliGRAM(s) Rectal daily PRN Constipation  dextrose Oral Gel 15 Gram(s) Oral once PRN Blood Glucose LESS THAN 70 milliGRAM(s)/deciliter  melatonin 3 milliGRAM(s) Oral at bedtime PRN Insomnia  ondansetron Injectable 4 milliGRAM(s) IV Push every 8 hours PRN Nausea and/or Vomiting  oxyCODONE    IR 5 milliGRAM(s) Oral every 4 hours PRN Severe Pain (7 - 10)  oxyCODONE    IR 2.5 milliGRAM(s) Oral every 4 hours PRN Moderate Pain (4 - 6)      Allergies    No Known Allergies    PHYSICAL EXAM:   Vital Signs:  Vital Signs Last 24 Hrs  T(C): 36.6 (16 Oct 2023 05:29), Max: 37.2 (15 Oct 2023 13:28)  T(F): 97.8 (16 Oct 2023 05:29), Max: 98.9 (15 Oct 2023 13:28)  HR: 82 (16 Oct 2023 05:29) (82 - 87)  BP: 110/67 (16 Oct 2023 05:29) (110/67 - 113/66)  BP(mean): --  RR: 18 (16 Oct 2023 05:29) (18 - 18)  SpO2: 93% (16 Oct 2023 05:29) (93% - 97%)    Parameters below as of 16 Oct 2023 05:29  Patient On (Oxygen Delivery Method): room air      Daily     Daily Weight in k.5 (16 Oct 2023 05:29)    GENERAL:  Appears stated age  HEENT:  NC/AT  CHEST:  Full & symmetric excursion  HEART:  Regular rhythm  ABDOMEN:  Soft, non-tender, non-distended  EXTEREMITIES:  no cyanosis  SKIN:  No rash  NEURO:  Alert      LABS:                        12.9   6.15  )-----------( 224      ( 16 Oct 2023 06:20 )             38.4     10-    138  |  103  |  19  ----------------------------<  197<H>  3.8   |  31  |  0.73    Ca    8.9      16 Oct 2023 06:20        Urinalysis Basic - ( 16 Oct 2023 06:20 )    Color: x / Appearance: x / SG: x / pH: x  Gluc: 197 mg/dL / Ketone: x  / Bili: x / Urobili: x   Blood: x / Protein: x / Nitrite: x   Leuk Esterase: x / RBC: x / WBC x   Sq Epi: x / Non Sq Epi: x / Bacteria: x

## 2023-10-16 NOTE — PROGRESS NOTE ADULT - SUBJECTIVE AND OBJECTIVE BOX
CAPILLARY BLOOD GLUCOSE      POCT Blood Glucose.: 208 mg/dL (15 Oct 2023 21:20)  POCT Blood Glucose.: 222 mg/dL (15 Oct 2023 16:54)  POCT Blood Glucose.: 255 mg/dL (15 Oct 2023 11:50)      Vital Signs Last 24 Hrs  T(C): 36.6 (16 Oct 2023 05:29), Max: 37.2 (15 Oct 2023 13:28)  T(F): 97.8 (16 Oct 2023 05:29), Max: 98.9 (15 Oct 2023 13:28)  HR: 82 (16 Oct 2023 05:29) (82 - 87)  BP: 110/67 (16 Oct 2023 05:29) (110/67 - 113/66)  BP(mean): --  RR: 18 (16 Oct 2023 05:29) (18 - 18)  SpO2: 93% (16 Oct 2023 05:29) (93% - 97%)    Parameters below as of 16 Oct 2023 05:29  Patient On (Oxygen Delivery Method): room air        General: WN/WD NAD  Respiratory: CTA B/L  CV: RRR, S1S2, no murmurs, rubs or gallops  Abdominal: Soft, NT, ND +BS, Last BM  Extremities: No edema, + peripheral pulses     10-16    138  |  103  |  19  ----------------------------<  197<H>  3.8   |  31  |  0.73    Ca    8.9      16 Oct 2023 06:20    TPro  6.6  /  Alb  2.5<L>  /  TBili  0.6  /  DBili  x   /  AST  18  /  ALT  22  /  AlkPhos  77  10-14      dextrose 50% Injectable 25 Gram(s) IV Push once  dextrose 50% Injectable 25 Gram(s) IV Push once  dextrose 50% Injectable 12.5 Gram(s) IV Push once  dextrose Oral Gel 15 Gram(s) Oral once PRN  glucagon  Injectable 1 milliGRAM(s) IntraMuscular once  insulin glargine Injectable (LANTUS) 30 Unit(s) SubCutaneous every morning  insulin glargine Injectable (LANTUS) 30 Unit(s) SubCutaneous at bedtime  insulin lispro (ADMELOG) corrective regimen sliding scale   SubCutaneous three times a day before meals  insulin lispro (ADMELOG) corrective regimen sliding scale   SubCutaneous at bedtime  insulin lispro Injectable (ADMELOG) 14 Unit(s) SubCutaneous three times a day before meals

## 2023-10-16 NOTE — PROGRESS NOTE ADULT - SUBJECTIVE AND OBJECTIVE BOX
[facility] -- Angela Alaniz, Tico Simmons Savella, Goodger, Cohen: Office # [phone]    Follow Up:  A fib     Subjective/Observations: Patient seen and examined. Patient awake, alert, resting in bed. No complaints of chest pain, dyspnea, palpitations or dizziness. No signs of orthopnea or PND. Tolerating room air. Continues to c/o LE pain and edema. Reports that the swelling improved slightly, but pain remains the same.     REVIEW OF SYSTEMS: All other review of systems are negative unless indicated above    PAST MEDICAL & SURGICAL HISTORY:  Atrial fibrillation      Type 2 diabetes mellitus      Congestive heart failure      Scalp psoriasis      S/P tonsillectomy      S/P appendectomy      H/O umbilical hernia repair    MEDICATIONS  (STANDING):  dextrose 5%. 1000 milliLiter(s) (100 mL/Hr) IV Continuous <Continuous>  dextrose 5%. 1000 milliLiter(s) (50 mL/Hr) IV Continuous <Continuous>  dextrose 50% Injectable 25 Gram(s) IV Push once  dextrose 50% Injectable 12.5 Gram(s) IV Push once  dextrose 50% Injectable 25 Gram(s) IV Push once  gabapentin 300 milliGRAM(s) Oral every 8 hours  glucagon  Injectable 1 milliGRAM(s) IntraMuscular once  hydrocortisone 1% Cream 1 Application(s) Topical two times a day  insulin glargine Injectable (LANTUS) 30 Unit(s) SubCutaneous every morning  insulin glargine Injectable (LANTUS) 30 Unit(s) SubCutaneous at bedtime  insulin lispro (ADMELOG) corrective regimen sliding scale   SubCutaneous three times a day before meals  insulin lispro (ADMELOG) corrective regimen sliding scale   SubCutaneous at bedtime  insulin lispro Injectable (ADMELOG) 14 Unit(s) SubCutaneous three times a day before meals  metoprolol succinate  milliGRAM(s) Oral daily  naloxone Injectable 0.4 milliGRAM(s) IV Push once  nystatin Powder 1 Application(s) Topical three times a day  nystatin Powder 1 Application(s) Topical three times a day  polyethylene glycol 3350 17 Gram(s) Oral daily  rivaroxaban 20 milliGRAM(s) Oral with dinner  senna 2 Tablet(s) Oral at bedtime  tamsulosin 0.4 milliGRAM(s) Oral at bedtime  torsemide 20 milliGRAM(s) Oral daily  vancomycin  IVPB 750 milliGRAM(s) IV Intermittent every 12 hours    MEDICATIONS  (PRN):  acetaminophen     Tablet .. 650 milliGRAM(s) Oral every 6 hours PRN Temp greater or equal to 38C (100.4F), Mild Pain (1 - 3)  aluminum hydroxide/magnesium hydroxide/simethicone Suspension 30 milliLiter(s) Oral every 4 hours PRN Dyspepsia  bisacodyl 5 milliGRAM(s) Oral daily PRN Constipation  bisacodyl Suppository 10 milliGRAM(s) Rectal daily PRN Constipation  dextrose Oral Gel 15 Gram(s) Oral once PRN Blood Glucose LESS THAN 70 milliGRAM(s)/deciliter  melatonin 3 milliGRAM(s) Oral at bedtime PRN Insomnia  ondansetron Injectable 4 milliGRAM(s) IV Push every 8 hours PRN Nausea and/or Vomiting  oxyCODONE    IR 2.5 milliGRAM(s) Oral every 4 hours PRN Moderate Pain (4 - 6)  oxyCODONE    IR 5 milliGRAM(s) Oral every 4 hours PRN Severe Pain (7 - 10)    Allergies  No Known Allergies    Vital Signs Last 24 Hrs  T(C): 36.6 (16 Oct 2023 05:29), Max: 37.2 (15 Oct 2023 13:28)  T(F): 97.8 (16 Oct 2023 05:29), Max: 98.9 (15 Oct 2023 13:28)  HR: 82 (16 Oct 2023 05:29) (82 - 87)  BP: 110/67 (16 Oct 2023 05:29) (110/67 - 113/66)  BP(mean): --  RR: 18 (16 Oct 2023 05:29) (18 - 18)  SpO2: 93% (16 Oct 2023 05:29) (93% - 97%)    Parameters below as of 16 Oct 2023 05:29  Patient On (Oxygen Delivery Method): room air      I&O's Summary    15 Oct 2023 07:01  -  16 Oct 2023 07:00  --------------------------------------------------------  IN: 0 mL / OUT: 600 mL / NET: -600 mL          TELE: Not on telemetry   PHYSICAL EXAM:  Constitutional: NAD, awake and alert, Obese   HEENT: Moist Mucous Membranes, Anicteric  Pulmonary: Non-labored, breath sounds are clear bilaterally, No wheezing, rales or rhonchi  Cardiovascular: Regular, S1 and S2, No murmurs, No rubs, gallops or clicks  Gastrointestinal:  soft, nontender, nondistended   Lymph: +2 peripheral edema with chronic venous stasis skin changes. No lymphadenopathy.   Skin: No visible rashes or ulcers.  Psych:  Mood & affect appropriate      LABS: All Labs Reviewed:                        12.9   6.15  )-----------( 224      ( 16 Oct 2023 06:20 )             38.4                         13.4   7.78  )-----------( 219      ( 15 Oct 2023 06:10 )             40.4                         13.2   5.92  )-----------( 212      ( 14 Oct 2023 09:20 )             39.2     16 Oct 2023 06:20    138    |  103    |  19     ----------------------------<  197    3.8     |  31     |  0.73   15 Oct 2023 06:10    137    |  101    |  20     ----------------------------<  235    3.9     |  30     |  0.69   14 Oct 2023 09:20    138    |  101    |  17     ----------------------------<  282    3.6     |  28     |  0.76     Ca    8.9        16 Oct 2023 06:20  Ca    9.0        15 Oct 2023 06:10  Ca    8.9        14 Oct 2023 09:20    TPro  6.6    /  Alb  2.5    /  TBili  0.6    /  DBili  x      /  AST  18     /  ALT  22     /  AlkPhos  77     14 Oct 2023 09:20   LIVER FUNCTIONS - ( 14 Oct 2023 09:20 )  Alb: 2.5 g/dL / Pro: 6.6 g/dL / ALK PHOS: 77 U/L / ALT: 22 U/L / AST: 18 U/L / GGT: x             12 Lead ECG:   Ventricular Rate 118 BPM    Atrial Rate 141 BPM    QRS Duration 78 ms    Q-T Interval 336 ms    QTC Calculation(Bazett) 470 ms    R Axis 7 degrees    T Axis -77 degrees    Diagnosis Line Atrial fibrillation  Nonspecific ST and T wave abnormality  Abnormal ECG  Confirmed by fernando Umaña ([id number]) on 10/9/2023 12:12:07 PM (10-09-23 @ 11:25)      TRANSTHORACIC ECHOCARDIOGRAM REPORT  ________________________________________________________________________________                                      _______       Pt. Name:       MELITON LOZANO Study Date:    10/10/2023  MRN:            [id number]          YOB: 1965  Accession #:    [id number]         Age:           58 years  Account#:       [id number]        Gender:        F  Heart Rate:                       Height:        59.84 in (152.00 cm)  Rhythm:      Weight:        249.12 lb (113.00 kg)  Blood Pressure: 125/75 mmHg       BSA/BMI:       2.05 m² / 48.91 kg/m²  ________________________________________________________________________________________  Referring Physician:    [id number] Garrick Spring  Interpreting Physician: Fahad Simmons  Primary Sonographer:    Brittny Zurita [facility]    CPT:               ECHO TTE WO CON COMP W DOPP - 86649.m  Indication(s):     Heart failure, unspecified - I50.9  Procedure:         Transthoracic echocardiogram with 2-D, M-mode and complete                     spectral and color flow Doppler.  Ordering Location: [facility]    _______________________________________________________________________________________     CONCLUSIONS:      1. Left ventricular systolic function is normal with an ejection fraction of 66 % by White's method of disks.   2. There is normal LV mass and concentric remodeling.   3. Normal left ventricular diastolic function.   4. Normal right ventricular cavity size, wall thickness and systolic function.   5. The left atrium is normal in size.   6. Trace mitral regurgitation.   7. Structurally normal mitral valve with normal leaflet excursion.   8. There is calcification of the mitral valve annulus.    ________________________________________________________________________________________  FINDINGS:     Left Ventricle:  Left ventricular systolic function is normal with a calculated ejection fraction of 66 % by the White's biplane method of disks. There is normal left ventricular diastolic function. Mild to moderate left ventricular hypertrophy. There is normal LV mass and concentric remodeling.     Right Ventricle:  The right ventricular cavity is normal in size, normal wall thickness and normal systolic function.     Left Atrium:  The left atrium is normal in size.     Right Atrium:  The right atrium is normal in size with an indexed volume of 13.23 ml/m².     Aortic Valve:  The aortic valve is tricuspid with normal leaflet excursion. There is no aortic valve stenosis. There is no evidence of aortic regurgitation.     Mitral Valve:  Structurally normal mitral valve with normal leaflet excursion. There is diffuse mitral valve thickening. There is calcification of the mitral valve annulus. There is no mitral valve stenosis. There is trace mitral regurgitation.     Tricuspid Valve:  Structurally normal tricuspid valve with normal leaflet excursion. There is trace tricuspid regurgitation. Estimated pulmonary artery systolic pressure is 28 mmHg.     Pulmonic Valve:  Structurally normal pulmonic valve with normal leaflet excursion. There is trace pulmonic regurgitation.     Aorta:  The aortic root at the sinuses of Valsalva is normal in size, measuring 3.20 cm (indexed 1.57 cm/m²).     Pericardium:  No pericardial effusion seen.     Systemic Veins:  The inferior vena cava is normal in size measuring 1.10 cm in diameter, (normal <2.1cm) with normal inspiratory collapse (normal >50%) consistent with normal right atrial pressure (~3, range 0-5mmHg).  ____________________________________________________________________  Quantitative Data:  Left Ventricle Measurements: (Indexed to BSA)     IVSd (2D):   1.3 cm  LVPWd (2D):  1.0 cm  LVIDd (2D):  4.2 cm  LVIDs (2D):  2.5 cm  LV Mass:     167 g  81.9 g/m²  LV Vol d, MOD A2C: 90.1 ml 44.04 ml/m²  LV Vol d, MOD A4C: 81.2 ml 39.68 ml/m²  LV Vol d, MOD BP:  87.1 ml 42.59 ml/m²  LV Vol s, MOD A2C: 29.7 ml 14.53 ml/m²  LV Vol s, MOD A4C: 29.3 ml 14.33 ml/m²  LV Vol s, MOD BP:  29.5 ml 14.41 ml/m²  LVOT SV MOD BP:    57.6 ml  LV EF% MOD BP:     66 %     MV E Vmax:    1.19 m/s  e' lateral:   11.10 cm/s  e' medial:    10.10 cm/s  E/e' lateral: 10.76  E/e' medial:  11.90  E/e' Average: 11.26  MV DT:        136 msec    Aorta Measurements: (normal range) (Indexed to BSA)     Sinuses of Valsalva: 3.20 cm (2.7 - 3.3 cm)       Left Atrium Measurements: (Indexed to BSA)  LA Diam 2D: 2.98 cm    Right Atrial Measurements:     RA Vol:       27.05 ml  RA Vol Index: 13.23 ml/m²    Mitral Valve Measurements:     MV E Vmax: 1.2 m/s       Tricuspid Valve Measurements:     TR Vmax:          2.5 m/s  TR Peak Gradient: 24.9 mmHg  RA Pressure:      3 mmHg  PASP:             28 mmHg  ________________________________________________________________________________________  Electronically signed on 10/10/2023 at 9:18:26 AM by Fahad Simmons  *** Final ***

## 2023-10-17 DIAGNOSIS — R33.9 RETENTION OF URINE, UNSPECIFIED: ICD-10-CM

## 2023-10-17 PROCEDURE — 99232 SBSQ HOSP IP/OBS MODERATE 35: CPT

## 2023-10-17 PROCEDURE — 99233 SBSQ HOSP IP/OBS HIGH 50: CPT

## 2023-10-17 RX ORDER — INSULIN LISPRO 100/ML
10 VIAL (ML) SUBCUTANEOUS
Refills: 0 | Status: DISCONTINUED | OUTPATIENT
Start: 2023-10-17 | End: 2023-10-19

## 2023-10-17 RX ORDER — TRAMADOL HYDROCHLORIDE 50 MG/1
25 TABLET ORAL EVERY 6 HOURS
Refills: 0 | Status: DISCONTINUED | OUTPATIENT
Start: 2023-10-17 | End: 2023-10-19

## 2023-10-17 RX ORDER — OXYCODONE HYDROCHLORIDE 5 MG/1
2.5 TABLET ORAL ONCE
Refills: 0 | Status: DISCONTINUED | OUTPATIENT
Start: 2023-10-17 | End: 2023-10-17

## 2023-10-17 RX ADMIN — Medication 2: at 11:48

## 2023-10-17 RX ADMIN — Medication 100 MILLIGRAM(S): at 16:12

## 2023-10-17 RX ADMIN — SENNA PLUS 2 TABLET(S): 8.6 TABLET ORAL at 21:49

## 2023-10-17 RX ADMIN — Medication 10 UNIT(S): at 11:48

## 2023-10-17 RX ADMIN — INSULIN GLARGINE 30 UNIT(S): 100 INJECTION, SOLUTION SUBCUTANEOUS at 07:57

## 2023-10-17 RX ADMIN — POLYETHYLENE GLYCOL 3350 17 GRAM(S): 17 POWDER, FOR SOLUTION ORAL at 11:24

## 2023-10-17 RX ADMIN — Medication 10 UNIT(S): at 07:57

## 2023-10-17 RX ADMIN — OXYCODONE HYDROCHLORIDE 2.5 MILLIGRAM(S): 5 TABLET ORAL at 04:06

## 2023-10-17 RX ADMIN — Medication 2: at 06:52

## 2023-10-17 RX ADMIN — GABAPENTIN 300 MILLIGRAM(S): 400 CAPSULE ORAL at 13:00

## 2023-10-17 RX ADMIN — Medication 10 UNIT(S): at 16:55

## 2023-10-17 RX ADMIN — NYSTATIN CREAM 1 APPLICATION(S): 100000 CREAM TOPICAL at 06:50

## 2023-10-17 RX ADMIN — NYSTATIN CREAM 1 APPLICATION(S): 100000 CREAM TOPICAL at 21:50

## 2023-10-17 RX ADMIN — TAMSULOSIN HYDROCHLORIDE 0.4 MILLIGRAM(S): 0.4 CAPSULE ORAL at 21:49

## 2023-10-17 RX ADMIN — INSULIN GLARGINE 30 UNIT(S): 100 INJECTION, SOLUTION SUBCUTANEOUS at 21:58

## 2023-10-17 RX ADMIN — GABAPENTIN 300 MILLIGRAM(S): 400 CAPSULE ORAL at 21:49

## 2023-10-17 RX ADMIN — NYSTATIN CREAM 1 APPLICATION(S): 100000 CREAM TOPICAL at 11:24

## 2023-10-17 RX ADMIN — GABAPENTIN 300 MILLIGRAM(S): 400 CAPSULE ORAL at 06:48

## 2023-10-17 RX ADMIN — Medication 100 MILLIGRAM(S): at 06:51

## 2023-10-17 RX ADMIN — Medication 1 APPLICATION(S): at 16:14

## 2023-10-17 RX ADMIN — Medication 4: at 16:55

## 2023-10-17 RX ADMIN — Medication 1 APPLICATION(S): at 06:54

## 2023-10-17 RX ADMIN — RIVAROXABAN 20 MILLIGRAM(S): KIT at 16:12

## 2023-10-17 NOTE — PROGRESS NOTE ADULT - SUBJECTIVE AND OBJECTIVE BOX
MELITON LOZANO is a 58yFemale , patient examined and chart reviewed.    INTERVAL HPI/ OVERNIGHT EVENTS:   Afebrile. No events.    PAST MEDICAL & SURGICAL HISTORY:  Atrial fibrillation  Type 2 diabetes mellitus  Congestive heart failure  Scalp psoriasis  S/P tonsillectomy  S/P appendectomy  H/O umbilical hernia repair      For details regarding the patient's social history, family history, and other miscellaneous elements, please refer the initial infectious diseases consultation and/or the admitting history and physical examination for this admission.      ROS: Poor historian + leg pain    No Known Allergies      Current inpatient medications :    ANTIBIOTICS/RELEVANT:  doxycycline monohydrate Capsule 100 milliGRAM(s) Oral every 12 hours    MEDICATIONS  (STANDING):  dextrose 5%. 1000 milliLiter(s) (50 mL/Hr) IV Continuous <Continuous>  dextrose 5%. 1000 milliLiter(s) (100 mL/Hr) IV Continuous <Continuous>  dextrose 50% Injectable 25 Gram(s) IV Push once  dextrose 50% Injectable 25 Gram(s) IV Push once  dextrose 50% Injectable 12.5 Gram(s) IV Push once  gabapentin 300 milliGRAM(s) Oral every 8 hours  glucagon  Injectable 1 milliGRAM(s) IntraMuscular once  hydrocortisone 1% Cream 1 Application(s) Topical two times a day  insulin glargine Injectable (LANTUS) 30 Unit(s) SubCutaneous every morning  insulin glargine Injectable (LANTUS) 30 Unit(s) SubCutaneous at bedtime  insulin lispro (ADMELOG) corrective regimen sliding scale   SubCutaneous three times a day before meals  insulin lispro (ADMELOG) corrective regimen sliding scale   SubCutaneous at bedtime  insulin lispro Injectable (ADMELOG) 10 Unit(s) SubCutaneous three times a day before meals  metoprolol succinate  milliGRAM(s) Oral daily  naloxone Injectable 0.4 milliGRAM(s) IV Push once  nystatin Powder 1 Application(s) Topical three times a day  nystatin Powder 1 Application(s) Topical three times a day  polyethylene glycol 3350 17 Gram(s) Oral daily  rivaroxaban 20 milliGRAM(s) Oral with dinner  senna 2 Tablet(s) Oral at bedtime  tamsulosin 0.4 milliGRAM(s) Oral at bedtime  torsemide 20 milliGRAM(s) Oral daily    MEDICATIONS  (PRN):  acetaminophen     Tablet .. 650 milliGRAM(s) Oral every 6 hours PRN Temp greater or equal to 38C (100.4F), Mild Pain (1 - 3)  aluminum hydroxide/magnesium hydroxide/simethicone Suspension 30 milliLiter(s) Oral every 4 hours PRN Dyspepsia  bisacodyl 5 milliGRAM(s) Oral daily PRN Constipation  bisacodyl Suppository 10 milliGRAM(s) Rectal daily PRN Constipation  dextrose Oral Gel 15 Gram(s) Oral once PRN Blood Glucose LESS THAN 70 milliGRAM(s)/deciliter  melatonin 3 milliGRAM(s) Oral at bedtime PRN Insomnia  ondansetron Injectable 4 milliGRAM(s) IV Push every 8 hours PRN Nausea and/or Vomiting  oxycodone    5 mG/acetaminophen 325 mG 1 Tablet(s) Oral every 4 hours PRN Severe Pain (7 - 10)  traMADol 25 milliGRAM(s) Oral every 6 hours PRN Moderate Pain (4 - 6)      Objective:  Vital Signs Last 24 Hrs  T(C): 36.6 (17 Oct 2023 12:46), Max: 37.2 (17 Oct 2023 05:02)  T(F): 97.8 (17 Oct 2023 12:46), Max: 99 (17 Oct 2023 05:02)  HR: 84 (17 Oct 2023 12:46) (80 - 84)  BP: 116/73 (17 Oct 2023 12:46) (92/56 - 117/78)  RR: 18 (17 Oct 2023 12:46) (18 - 18)  SpO2: 93% (17 Oct 2023 12:46) (93% - 93%)    Parameters below as of 17 Oct 2023 12:46  Patient On (Oxygen Delivery Method): room air      Physical Exam:  General:  no acute distress  Neck: supple, trachea midline  Lungs: clear, no wheeze/rhonchi  Cardiovascular: regular rate and rhythm, S1 S2  Abdomen: soft, nontender,  bowel sounds normal  Neurological: alert and oriented x3  Skin: no rash  Extremities: Tanner LE edema erythema Right > Left improving  Chronic stasis       LABS:                        12.9   6.15  )-----------( 224      ( 16 Oct 2023 06:20 )             38.4   10-16    138  |  103  |  19  ----------------------------<  197<H>  3.8   |  31  |  0.73    Ca    8.9      16 Oct 2023 06:20      MICROBIOLOGY:  Culture - Urine (10.09.23 @ 15:27)    -  Amikacin: S <=16   -  Amoxicillin/Clavulanic Acid: S <=8/4   -  Ampicillin: R 16 These ampicillin results predict results for amoxicillin   -  Ampicillin/Sulbactam: S <=4/2   -  Aztreonam: S <=4   -  Cefazolin: S <=2   -  Cefepime: S <=2   -  Cefoxitin: I 16   -  Ceftriaxone: S <=1   -  Ciprofloxacin: S <=0.25   -  Ertapenem: S <=0.5   -  Gentamicin: S <=2   -  Imipenem: S <=1   -  Levofloxacin: S <=0.5   -  Meropenem: S <=1   -  Nitrofurantoin: S <=32 Should not be used to treat pyelonephritis   -  Piperacillin/Tazobactam: S <=8   -  Tobramycin: S <=2   -  Trimethoprim/Sulfamethoxazole: S <=0.5/9.5   Specimen Source: Clean Catch Clean Catch (Midstream)   Culture Results:   50,000 - 99,000 CFU/mL Klebsiella variicola   Organism Identification: Klebsiella variicola   Organism: Klebsiella variicola   Method Type: SASHA    Culture - Blood (collected 09 Oct 2023 11:55)  Source: .Blood Blood-Peripheral  Preliminary Report (10 Oct 2023 19:02):    No growth at 24 hours    Culture - Blood (collected 09 Oct 2023 11:20)  Source: .Blood Blood-Peripheral  Preliminary Report (10 Oct 2023 19:02):    No growth at 24 hours    RADIOLOGY & ADDITIONAL STUDIES:          Assessment :  57 yo F with PMHx of T2DM, Afib on Xarelto, and CHF with unknown EF admitted with worsening Right LE draining lesion on anterior shin and generalized worsening bilateral lower extremity edema and erythema sec cellulitis  Hx MRSA  Leg pain burning sensation prob sec DM peripheral neuropathy  Ua and Ucx noted - 50,000 - 99,000 CFU/mL Klebsiella variicola - pt c/o dysuria - UTI   Abd pain resolved    Plan :   Off Vancomycin---> po Doxycyline 100mg po bid till 10/19   Sp Rocephin x 3 days  Trend temps and cbc  Wound care per wound care service  Elevate leg  Asp precautions  Pulm toileting  Stable from ID standpoint      Continue with present regiment.  Appropriate use of antibiotics and adverse effects reviewed.    > 35 minutes were spent in direct patient care reviewing notes, medications ,labs data/ imaging , discussion with multidisciplinary team.    Thank you for allowing me to participate in care of your patient .    Carole Mohr MD  Infectious Disease  [phone]

## 2023-10-17 NOTE — PROGRESS NOTE ADULT - SUBJECTIVE AND OBJECTIVE BOX
[facility] -- Angela Alaniz, Tico Simmons Savella, Goodger, Cohen: Office # [phone]    Follow Up:  A fib     Subjective/Observations: Patient seen and examined. Patient awake, alert, resting in bed. No complaints of chest pain, dyspnea, palpitations or dizziness. No signs of orthopnea or PND. Tolerating room air. Continues to c/o LE pain and edema. + urinary retention, now with Cantu and c/o urethral pain.     REVIEW OF SYSTEMS: All other review of systems are negative unless indicated above    PAST MEDICAL & SURGICAL HISTORY:  Atrial fibrillation      Type 2 diabetes mellitus      Congestive heart failure      Scalp psoriasis      S/P tonsillectomy      S/P appendectomy      H/O umbilical hernia repair    MEDICATIONS  (STANDING):  dextrose 5%. 1000 milliLiter(s) (50 mL/Hr) IV Continuous <Continuous>  dextrose 5%. 1000 milliLiter(s) (100 mL/Hr) IV Continuous <Continuous>  dextrose 50% Injectable 25 Gram(s) IV Push once  dextrose 50% Injectable 25 Gram(s) IV Push once  dextrose 50% Injectable 12.5 Gram(s) IV Push once  doxycycline monohydrate Capsule 100 milliGRAM(s) Oral every 12 hours  gabapentin 300 milliGRAM(s) Oral every 8 hours  glucagon  Injectable 1 milliGRAM(s) IntraMuscular once  hydrocortisone 1% Cream 1 Application(s) Topical two times a day  insulin glargine Injectable (LANTUS) 30 Unit(s) SubCutaneous every morning  insulin glargine Injectable (LANTUS) 30 Unit(s) SubCutaneous at bedtime  insulin lispro (ADMELOG) corrective regimen sliding scale   SubCutaneous three times a day before meals  insulin lispro (ADMELOG) corrective regimen sliding scale   SubCutaneous at bedtime  insulin lispro Injectable (ADMELOG) 10 Unit(s) SubCutaneous three times a day before meals  metoprolol succinate  milliGRAM(s) Oral daily  naloxone Injectable 0.4 milliGRAM(s) IV Push once  nystatin Powder 1 Application(s) Topical three times a day  nystatin Powder 1 Application(s) Topical three times a day  polyethylene glycol 3350 17 Gram(s) Oral daily  rivaroxaban 20 milliGRAM(s) Oral with dinner  senna 2 Tablet(s) Oral at bedtime  tamsulosin 0.4 milliGRAM(s) Oral at bedtime  torsemide 20 milliGRAM(s) Oral daily    MEDICATIONS  (PRN):  acetaminophen     Tablet .. 650 milliGRAM(s) Oral every 6 hours PRN Temp greater or equal to 38C (100.4F), Mild Pain (1 - 3)  aluminum hydroxide/magnesium hydroxide/simethicone Suspension 30 milliLiter(s) Oral every 4 hours PRN Dyspepsia  bisacodyl 5 milliGRAM(s) Oral daily PRN Constipation  bisacodyl Suppository 10 milliGRAM(s) Rectal daily PRN Constipation  dextrose Oral Gel 15 Gram(s) Oral once PRN Blood Glucose LESS THAN 70 milliGRAM(s)/deciliter  melatonin 3 milliGRAM(s) Oral at bedtime PRN Insomnia  ondansetron Injectable 4 milliGRAM(s) IV Push every 8 hours PRN Nausea and/or Vomiting  oxycodone    5 mG/acetaminophen 325 mG 1 Tablet(s) Oral every 4 hours PRN Severe Pain (7 - 10)  traMADol 25 milliGRAM(s) Oral every 6 hours PRN Moderate Pain (4 - 6)    Allergies  No Known Allergies    Vital Signs Last 24 Hrs  T(C): 37.2 (17 Oct 2023 05:02), Max: 37.2 (17 Oct 2023 05:02)  T(F): 99 (17 Oct 2023 05:02), Max: 99 (17 Oct 2023 05:02)  HR: 80 (17 Oct 2023 05:02) (73 - 80)  BP: 92/56 (17 Oct 2023 05:02) (92/56 - 117/78)  BP(mean): --  RR: 18 (17 Oct 2023 05:02) (18 - 18)  SpO2: 93% (17 Oct 2023 05:02) (92% - 93%)    Parameters below as of 17 Oct 2023 05:02  Patient On (Oxygen Delivery Method): room air      I&O's Summary    16 Oct 2023 07:01  -  17 Oct 2023 07:00  --------------------------------------------------------  IN: 0 mL / OUT: 2250 mL / NET: -2250 mL          TELE: Not on telemetry   PHYSICAL EXAM:  Constitutional: NAD, awake and alert, Obese   HEENT: Moist Mucous Membranes, Anicteric  Pulmonary: Non-labored, breath sounds are clear bilaterally, No wheezing, rales or rhonchi  Cardiovascular: Regular, S1 and S2, No murmurs, No rubs, gallops or clicks  Gastrointestinal:  soft, nontender, nondistended   : + Cantu   Lymph: +2 peripheral edema with chronic venous stasis skin changes. No lymphadenopathy.   Skin: No visible rashes or ulcers.  Psych:  Mood & affect appropriate      LABS: All Labs Reviewed:                        12.9   6.15  )-----------( 224      ( 16 Oct 2023 06:20 )             38.4                         13.4   7.78  )-----------( 219      ( 15 Oct 2023 06:10 )             40.4     16 Oct 2023 06:20    138    |  103    |  19     ----------------------------<  197    3.8     |  31     |  0.73   15 Oct 2023 06:10    137    |  101    |  20     ----------------------------<  235    3.9     |  30     |  0.69     Ca    8.9        16 Oct 2023 06:20  Ca    9.0        15 Oct 2023 06:10         12 Lead ECG:   Ventricular Rate 118 BPM    Atrial Rate 141 BPM    QRS Duration 78 ms    Q-T Interval 336 ms    QTC Calculation(Bazett) 470 ms    R Axis 7 degrees    T Axis -77 degrees    Diagnosis Line Atrial fibrillation  Nonspecific ST and T wave abnormality  Abnormal ECG  Confirmed by fernando Umaña ([id number]) on 10/9/2023 12:12:07 PM (10-09-23 @ 11:25)      TRANSTHORACIC ECHOCARDIOGRAM REPORT  ________________________________________________________________________________                                      _______       Pt. Name:       MELITONTHELMA LOZANO Study Date:    10/10/2023  MRN:            [id number]          YOB: 1965  Accession #:    [id number]         Age:           58 years  Account#:       [id number]        Gender:        F  Heart Rate:                       Height:        59.84 in (152.00 cm)  Rhythm:      Weight:        249.12 lb (113.00 kg)  Blood Pressure: 125/75 mmHg       BSA/BMI:       2.05 m² / 48.91 kg/m²  ________________________________________________________________________________________  Referring Physician:    [id number] Garrick Spring  Interpreting Physician: Fahad Simmons  Primary Sonographer:    Brittny [facility]    CPT:               ECHO TTE WO CON COMP W DOPP - 54975.m  Indication(s):     Heart failure, unspecified - I50.9  Procedure:         Transthoracic echocardiogram with 2-D, M-mode and complete                     spectral and color flow Doppler.  Ordering Location: [facility]    _______________________________________________________________________________________     CONCLUSIONS:      1. Left ventricular systolic function is normal with an ejection fraction of 66 % by White's method of disks.   2. There is normal LV mass and concentric remodeling.   3. Normal left ventricular diastolic function.   4. Normal right ventricular cavity size, wall thickness and systolic function.   5. The left atrium is normal in size.   6. Trace mitral regurgitation.   7. Structurally normal mitral valve with normal leaflet excursion.   8. There is calcification of the mitral valve annulus.    ________________________________________________________________________________________  FINDINGS:     Left Ventricle:  Left ventricular systolic function is normal with a calculated ejection fraction of 66 % by the White's biplane method of disks. There is normal left ventricular diastolic function. Mild to moderate left ventricular hypertrophy. There is normal LV mass and concentric remodeling.     Right Ventricle:  The right ventricular cavity is normal in size, normal wall thickness and normal systolic function.     Left Atrium:  The left atrium is normal in size.     Right Atrium:  The right atrium is normal in size with an indexed volume of 13.23 ml/m².     Aortic Valve:  The aortic valve is tricuspid with normal leaflet excursion. There is no aortic valve stenosis. There is no evidence of aortic regurgitation.     Mitral Valve:  Structurally normal mitral valve with normal leaflet excursion. There is diffuse mitral valve thickening. There is calcification of the mitral valve annulus. There is no mitral valve stenosis. There is trace mitral regurgitation.     Tricuspid Valve:  Structurally normal tricuspid valve with normal leaflet excursion. There is trace tricuspid regurgitation. Estimated pulmonary artery systolic pressure is 28 mmHg.     Pulmonic Valve:  Structurally normal pulmonic valve with normal leaflet excursion. There is trace pulmonic regurgitation.     Aorta:  The aortic root at the sinuses of Valsalva is normal in size, measuring 3.20 cm (indexed 1.57 cm/m²).     Pericardium:  No pericardial effusion seen.     Systemic Veins:  The inferior vena cava is normal in size measuring 1.10 cm in diameter, (normal <2.1cm) with normal inspiratory collapse (normal >50%) consistent with normal right atrial pressure (~3, range 0-5mmHg).  ____________________________________________________________________  Quantitative Data:  Left Ventricle Measurements: (Indexed to BSA)     IVSd (2D):   1.3 cm  LVPWd (2D):  1.0 cm  LVIDd (2D):  4.2 cm  LVIDs (2D):  2.5 cm  LV Mass:     167 g  81.9 g/m²  LV Vol d, MOD A2C: 90.1 ml 44.04 ml/m²  LV Vol d, MOD A4C: 81.2 ml 39.68 ml/m²  LV Vol d, MOD BP:  87.1 ml 42.59 ml/m²  LV Vol s, MOD A2C: 29.7 ml 14.53 ml/m²  LV Vol s, MOD A4C: 29.3 ml 14.33 ml/m²  LV Vol s, MOD BP:  29.5 ml 14.41 ml/m²  LVOT SV MOD BP:    57.6 ml  LV EF% MOD BP:     66 %     MV E Vmax:    1.19 m/s  e' lateral:   11.10 cm/s  e' medial:    10.10 cm/s  E/e' lateral: 10.76  E/e' medial:  11.90  E/e' Average: 11.26  MV DT:        136 msec    Aorta Measurements: (normal range) (Indexed to BSA)     Sinuses of Valsalva: 3.20 cm (2.7 - 3.3 cm)  Left Atrium Measurements: (Indexed to BSA)  LA Diam 2D: 2.98 cm    Right Atrial Measurements:     RA Vol:       27.05 ml  RA Vol Index: 13.23 ml/m²    Mitral Valve Measurements:     MV E Vmax: 1.2 m/s       Tricuspid Valve Measurements:     TR Vmax:          2.5 m/s  TR Peak Gradient: 24.9 mmHg  RA Pressure:      3 mmHg  PASP:             28 mmHg    ________________________________________________________________________________________  Electronically signed on 10/10/2023 at 9:18:26 AM by Fahad Simmons    *** Final ***

## 2023-10-17 NOTE — PROGRESS NOTE ADULT - ASSESSMENT
abdominal pain    CT noted; mesenteric vessels patent  had extensive GI workup  she does not know who her GI is; she is a poor historian  do not anticipate repeating her endoscopic eval  bowel regimen    I reviewed the overnight course of events on the unit, re-confirming the patient history. I discussed the care with the patient and their family  Differential diagnosis and plan of care discussed with patient after the evaluation  40 minutes spent on total encounter of which more than fifty percent of the encounter was spent counseling and/or coordinating care by the attending physician.  Advanced care planning was discussed with patient and family.  Advanced care planning forms were reviewed and discussed.  Risks, benefits and alternatives of gastroenterologic procedures were discussed in detail and all questions were answered.

## 2023-10-17 NOTE — PROGRESS NOTE ADULT - PROBLEM SELECTOR PLAN 5
- Acute onchronic HfpEF  -S/p IV Lasix  -On Torsemide now   - Continue to monitor  -Dr Umaña consulted recommendations appreciated  - EF 60-65% in 2/2023

## 2023-10-17 NOTE — CHART NOTE - NSCHARTNOTEFT_GEN_A_CORE
Overnight called by RN for patient complaining of severe pain. Patient has been getting Oxycodone 2.5 mg and 5 mg PO for moderate and severe pain. Pain regimen was recently discontinued. Patient received one time dose of Oxycodone 2.5 mg PO, although patient states it did not improve the pain. Primary team to further address pain concerns.

## 2023-10-17 NOTE — CONSULT NOTE ADULT - PROBLEM SELECTOR RECOMMENDATION 9
Etiology multifactorial related to immobility and medical issued including diabetes mellitus. On tamsulosin 0.4mg daily. Cantu in place and draining well. Voiding trial. Etiology multifactorial related to immobility and medical issued including diabetes mellitus and constipation. On tamsulosin 0.4mg daily. Cantu in place and draining well. Voiding trial.

## 2023-10-17 NOTE — SOCIAL WORK PROGRESS NOTE - NSSWPROGRESSNOTE_GEN_ALL_CORE
pt not medically ready yet for dc per medical team. Pts options for cynthia (ones accepting ins plan) are [facility], [facility] and  [facility] .Boyfriend asked for her application to be sent to [facility] as well. sw has sent for eval to [facility].  sw to continue to follow for anticipated plan for cynthia.

## 2023-10-17 NOTE — PROGRESS NOTE ADULT - SUBJECTIVE AND OBJECTIVE BOX
Patient is a 58y old  Female who presents with a chief complaint of Right Left Wound (17 Oct 2023 06:57)       INTERVAL HPI/OVERNIGHT EVENTS: Patient seen and examined at bedside. C/o pain all over her body. Denies chest pain, palpitation, sob     MEDICATIONS  (STANDING):  dextrose 5%. 1000 milliLiter(s) (50 mL/Hr) IV Continuous <Continuous>  dextrose 5%. 1000 milliLiter(s) (100 mL/Hr) IV Continuous <Continuous>  dextrose 50% Injectable 12.5 Gram(s) IV Push once  dextrose 50% Injectable 25 Gram(s) IV Push once  dextrose 50% Injectable 25 Gram(s) IV Push once  doxycycline monohydrate Capsule 100 milliGRAM(s) Oral every 12 hours  gabapentin 300 milliGRAM(s) Oral every 8 hours  glucagon  Injectable 1 milliGRAM(s) IntraMuscular once  hydrocortisone 1% Cream 1 Application(s) Topical two times a day  insulin glargine Injectable (LANTUS) 30 Unit(s) SubCutaneous at bedtime  insulin glargine Injectable (LANTUS) 30 Unit(s) SubCutaneous every morning  insulin lispro (ADMELOG) corrective regimen sliding scale   SubCutaneous three times a day before meals  insulin lispro (ADMELOG) corrective regimen sliding scale   SubCutaneous at bedtime  insulin lispro Injectable (ADMELOG) 10 Unit(s) SubCutaneous three times a day before meals  metoprolol succinate  milliGRAM(s) Oral daily  naloxone Injectable 0.4 milliGRAM(s) IV Push once  nystatin Powder 1 Application(s) Topical three times a day  nystatin Powder 1 Application(s) Topical three times a day  polyethylene glycol 3350 17 Gram(s) Oral daily  rivaroxaban 20 milliGRAM(s) Oral with dinner  senna 2 Tablet(s) Oral at bedtime  tamsulosin 0.4 milliGRAM(s) Oral at bedtime  torsemide 20 milliGRAM(s) Oral daily    MEDICATIONS  (PRN):  acetaminophen     Tablet .. 650 milliGRAM(s) Oral every 6 hours PRN Temp greater or equal to 38C (100.4F), Mild Pain (1 - 3)  aluminum hydroxide/magnesium hydroxide/simethicone Suspension 30 milliLiter(s) Oral every 4 hours PRN Dyspepsia  bisacodyl 5 milliGRAM(s) Oral daily PRN Constipation  bisacodyl Suppository 10 milliGRAM(s) Rectal daily PRN Constipation  dextrose Oral Gel 15 Gram(s) Oral once PRN Blood Glucose LESS THAN 70 milliGRAM(s)/deciliter  melatonin 3 milliGRAM(s) Oral at bedtime PRN Insomnia  ondansetron Injectable 4 milliGRAM(s) IV Push every 8 hours PRN Nausea and/or Vomiting  oxycodone    5 mG/acetaminophen 325 mG 1 Tablet(s) Oral every 4 hours PRN Severe Pain (7 - 10)  traMADol 25 milliGRAM(s) Oral every 6 hours PRN Moderate Pain (4 - 6)      Allergies    No Known Allergies    Intolerances        REVIEW OF SYSTEMS:  CONSTITUTIONAL: No fever + Gen weakness   EYES: No eye pain, visual disturbances, or discharge  ENMT:  No difficulty hearing, tinnitus, vertigo; No sinus or throat pain  NECK: No pain or stiffness  RESPIRATORY: No cough, wheezing, chills or hemoptysis; No shortness of breath  CARDIOVASCULAR: No chest pain, palpitations, dizziness, or leg swelling  GASTROINTESTINAL: No abdominal or epigastric pain. No nausea, vomiting, or hematemesis  GENITOURINARY: No dysuria, frequency, hematuria, or incontinence  NEUROLOGICAL: No headaches, memory loss, loss of strength, numbness, or tremors  SKIN: No itching, burning, rashes, or lesions   LYMPH NODES: No enlarged glands    Vital Signs Last 24 Hrs  T(C): 37.2 (17 Oct 2023 05:02), Max: 37.2 (17 Oct 2023 05:02)  T(F): 99 (17 Oct 2023 05:02), Max: 99 (17 Oct 2023 05:02)  HR: 80 (17 Oct 2023 05:02) (73 - 80)  BP: 92/56 (17 Oct 2023 05:02) (92/56 - 117/78)  BP(mean): --  RR: 18 (17 Oct 2023 05:02) (18 - 18)  SpO2: 93% (17 Oct 2023 05:02) (92% - 93%)    Parameters below as of 17 Oct 2023 05:02  Patient On (Oxygen Delivery Method): room air        PHYSICAL EXAM:  GENERAL: awake, alert, + mild  distress due to pain   HEENT:  AT/NC, anicteric, moist mucous membranes, EOMI, PERRL, no lid-lag, conjunctiva and sclera clear  CHEST/LUNG:  CTA b/l, no rales, wheezes, or rhonchi,  normal respiratory effort, no intercostal retractions  HEART:  RRR, S1, S2, no murmurs; 1+ pitting edema  ABDOMEN:  BS+, soft, nontender, nondistended  MSK/EXTREMITIES: + chronic venous stasis changes bilat LE   NERVOUS SYSTEM: answers questions and follows commands appropriately, A&Ox3 grossly moves all extremities   PSYCH: Appropriate affect, Alert & Awake; fair judgement  Skin: Warm, dry. chronic  venous stasis changes bilat LE     LABS:      Ca    8.9        16 Oct 2023 06:20        CAPILLARY BLOOD GLUCOSE      POCT Blood Glucose.: 169 mg/dL (17 Oct 2023 07:39)  POCT Blood Glucose.: 184 mg/dL (17 Oct 2023 06:31)  POCT Blood Glucose.: 204 mg/dL (16 Oct 2023 21:43)  POCT Blood Glucose.: 231 mg/dL (16 Oct 2023 17:01)  POCT Blood Glucose.: 154 mg/dL (16 Oct 2023 13:07)  POCT Blood Glucose.: 144 mg/dL (16 Oct 2023 11:54)    BLOOD CULTURE    RADIOLOGY & ADDITIONAL TESTS:    Imaging Personally Reviewed:  [ ] YES     Consultant(s) Notes Reviewed:      Care Discussed with Consultants/Other Providers:

## 2023-10-17 NOTE — PROGRESS NOTE ADULT - SUBJECTIVE AND OBJECTIVE BOX
[facility]  Jarett Stein PA-C  [address] [place] [address]  [phone]      INTERVAL HPI/OVERNIGHT EVENTS:  Pt s/e  C/o constipation and intermittent lower abdominal pain  Otherwise no new GI events    MEDICATIONS  (STANDING):  dextrose 5%. 1000 milliLiter(s) (50 mL/Hr) IV Continuous <Continuous>  dextrose 5%. 1000 milliLiter(s) (100 mL/Hr) IV Continuous <Continuous>  dextrose 50% Injectable 25 Gram(s) IV Push once  dextrose 50% Injectable 25 Gram(s) IV Push once  dextrose 50% Injectable 12.5 Gram(s) IV Push once  doxycycline monohydrate Capsule 100 milliGRAM(s) Oral every 12 hours  gabapentin 300 milliGRAM(s) Oral every 8 hours  glucagon  Injectable 1 milliGRAM(s) IntraMuscular once  hydrocortisone 1% Cream 1 Application(s) Topical two times a day  insulin glargine Injectable (LANTUS) 30 Unit(s) SubCutaneous at bedtime  insulin glargine Injectable (LANTUS) 30 Unit(s) SubCutaneous every morning  insulin lispro (ADMELOG) corrective regimen sliding scale   SubCutaneous three times a day before meals  insulin lispro (ADMELOG) corrective regimen sliding scale   SubCutaneous at bedtime  insulin lispro Injectable (ADMELOG) 10 Unit(s) SubCutaneous three times a day before meals  metoprolol succinate  milliGRAM(s) Oral daily  naloxone Injectable 0.4 milliGRAM(s) IV Push once  nystatin Powder 1 Application(s) Topical three times a day  nystatin Powder 1 Application(s) Topical three times a day  polyethylene glycol 3350 17 Gram(s) Oral daily  rivaroxaban 20 milliGRAM(s) Oral with dinner  senna 2 Tablet(s) Oral at bedtime  tamsulosin 0.4 milliGRAM(s) Oral at bedtime  torsemide 20 milliGRAM(s) Oral daily    MEDICATIONS  (PRN):  acetaminophen     Tablet .. 650 milliGRAM(s) Oral every 6 hours PRN Temp greater or equal to 38C (100.4F), Mild Pain (1 - 3)  aluminum hydroxide/magnesium hydroxide/simethicone Suspension 30 milliLiter(s) Oral every 4 hours PRN Dyspepsia  bisacodyl 5 milliGRAM(s) Oral daily PRN Constipation  bisacodyl Suppository 10 milliGRAM(s) Rectal daily PRN Constipation  dextrose Oral Gel 15 Gram(s) Oral once PRN Blood Glucose LESS THAN 70 milliGRAM(s)/deciliter  melatonin 3 milliGRAM(s) Oral at bedtime PRN Insomnia  ondansetron Injectable 4 milliGRAM(s) IV Push every 8 hours PRN Nausea and/or Vomiting  oxycodone    5 mG/acetaminophen 325 mG 1 Tablet(s) Oral every 4 hours PRN Severe Pain (7 - 10)  traMADol 25 milliGRAM(s) Oral every 6 hours PRN Moderate Pain (4 - 6)      Allergies    No Known Allergies    PHYSICAL EXAM:   Vital Signs:  Vital Signs Last 24 Hrs  T(C): 37.2 (17 Oct 2023 05:02), Max: 37.2 (17 Oct 2023 05:02)  T(F): 99 (17 Oct 2023 05:02), Max: 99 (17 Oct 2023 05:02)  HR: 80 (17 Oct 2023 05:02) (73 - 80)  BP: 92/56 (17 Oct 2023 05:02) (92/56 - 117/78)  BP(mean): --  RR: 18 (17 Oct 2023 05:02) (18 - 18)  SpO2: 93% (17 Oct 2023 05:02) (92% - 93%)    Parameters below as of 17 Oct 2023 05:02  Patient On (Oxygen Delivery Method): room air      Daily     Daily Weight in k.8 (17 Oct 2023 05:02)    GENERAL:  Appears stated age  HEENT:  NC/AT  CHEST:  Full & symmetric excursion  HEART:  Regular rhythm  ABDOMEN:  Soft, non-tender, non-distended  EXTEREMITIES:  no cyanosis  SKIN:  No rash  NEURO:  Alert      LABS:                        12.9   6.15  )-----------( 224      ( 16 Oct 2023 06:20 )             38.4     10-    138  |  103  |  19  ----------------------------<  197<H>  3.8   |  31  |  0.73    Ca    8.9      16 Oct 2023 06:20        Urinalysis Basic - ( 16 Oct 2023 06:20 )    Color: x / Appearance: x / SG: x / pH: x  Gluc: 197 mg/dL / Ketone: x  / Bili: x / Urobili: x   Blood: x / Protein: x / Nitrite: x   Leuk Esterase: x / RBC: x / WBC x   Sq Epi: x / Non Sq Epi: x / Bacteria: x

## 2023-10-17 NOTE — CONSULT NOTE ADULT - SUBJECTIVE AND OBJECTIVE BOX
CHIEF COMPLAINT:    HISTORY OF PRESENT ILLNESS:    The patient is a 58 year old female with multiple medical problems who developed urinary retention. She was admitted with a LE cellulitis on 10/9/23. Yesterday she developed increased difficulty voiding. She was noted to have 952 cc in bladder on bladder scan and Cantu catheter was placed. Tamsulosin 0.4 mg daily was prescribed. She has not been ambulating much since admission     MEDICAL & SURGICAL HISTORY:  Atrial fibrillation      Type 2 diabetes mellitus      Congestive heart failure      Scalp psoriasis      S/P tonsillectomy      S/P appendectomy      H/O umbilical hernia repair          REVIEW OF SYSTEMS:    CONSTITUTIONAL: No weakness, fevers or chills  EYES/ENT: No visual changes;  No vertigo or throat pain   NECK: No pain or stiffness  RESPIRATORY: No cough, wheezing, hemoptysis; No shortness of breath  CARDIOVASCULAR: No chest pain or palpitations  GASTROINTESTINAL: No abdominal or epigastric pain. No nausea, vomiting, or hematemesis; No diarrhea or constipation. No melena or hematochezia.  GENITOURINARY: As above  NEUROLOGICAL: No numbness or weakness  SKIN: No itching, burning, rashes, or lesions   All other review of systems is negative unless indicated above.    MEDICATIONS  (STANDING):  dextrose 5%. 1000 milliLiter(s) (100 mL/Hr) IV Continuous <Continuous>  dextrose 5%. 1000 milliLiter(s) (50 mL/Hr) IV Continuous <Continuous>  dextrose 50% Injectable 12.5 Gram(s) IV Push once  dextrose 50% Injectable 25 Gram(s) IV Push once  dextrose 50% Injectable 25 Gram(s) IV Push once  doxycycline monohydrate Capsule 100 milliGRAM(s) Oral every 12 hours  gabapentin 300 milliGRAM(s) Oral every 8 hours  glucagon  Injectable 1 milliGRAM(s) IntraMuscular once  hydrocortisone 1% Cream 1 Application(s) Topical two times a day  insulin glargine Injectable (LANTUS) 30 Unit(s) SubCutaneous every morning  insulin glargine Injectable (LANTUS) 30 Unit(s) SubCutaneous at bedtime  insulin lispro (ADMELOG) corrective regimen sliding scale   SubCutaneous three times a day before meals  insulin lispro (ADMELOG) corrective regimen sliding scale   SubCutaneous at bedtime  insulin lispro Injectable (ADMELOG) 10 Unit(s) SubCutaneous three times a day before meals  metoprolol succinate  milliGRAM(s) Oral daily  naloxone Injectable 0.4 milliGRAM(s) IV Push once  nystatin Powder 1 Application(s) Topical three times a day  nystatin Powder 1 Application(s) Topical three times a day  polyethylene glycol 3350 17 Gram(s) Oral daily  rivaroxaban 20 milliGRAM(s) Oral with dinner  senna 2 Tablet(s) Oral at bedtime  tamsulosin 0.4 milliGRAM(s) Oral at bedtime  torsemide 20 milliGRAM(s) Oral daily    MEDICATIONS  (PRN):  acetaminophen     Tablet .. 650 milliGRAM(s) Oral every 6 hours PRN Temp greater or equal to 38C (100.4F), Mild Pain (1 - 3)  aluminum hydroxide/magnesium hydroxide/simethicone Suspension 30 milliLiter(s) Oral every 4 hours PRN Dyspepsia  bisacodyl 5 milliGRAM(s) Oral daily PRN Constipation  bisacodyl Suppository 10 milliGRAM(s) Rectal daily PRN Constipation  dextrose Oral Gel 15 Gram(s) Oral once PRN Blood Glucose LESS THAN 70 milliGRAM(s)/deciliter  melatonin 3 milliGRAM(s) Oral at bedtime PRN Insomnia  ondansetron Injectable 4 milliGRAM(s) IV Push every 8 hours PRN Nausea and/or Vomiting      Allergies    No Known Allergies    Intolerances        SOCIAL HISTORY:    FAMILY HISTORY:  No pertinent family history in first degree relatives        Vital Signs Last 24 Hrs  T(C): 37.2 (17 Oct 2023 05:02), Max: 37.2 (17 Oct 2023 05:02)  T(F): 99 (17 Oct 2023 05:02), Max: 99 (17 Oct 2023 05:02)  HR: 80 (17 Oct 2023 05:02) (73 - 80)  BP: 92/56 (17 Oct 2023 05:02) (92/56 - 117/78)  BP(mean): --  RR: 18 (17 Oct 2023 05:02) (18 - 18)  SpO2: 93% (17 Oct 2023 05:02) (92% - 93%)    Parameters below as of 17 Oct 2023 05:02  Patient On (Oxygen Delivery Method): room air        PHYSICAL EXAM:    Constitutional: NAD, well-developed  Back: Normal spine flexure, No CVA tenderness  Abd: Soft, NT/ND, No CVAT  : urethra and meatus normal. No cystocele,  or enterocele. Cantu in place. Draining well  Neurological: A/O x 3  Psychiatric: Normal mood, normal affect  Skin: No rashes    LABS:                        12.9   6.15  )-----------( 224      ( 16 Oct 2023 06:20 )             38.4     10-16    138  |  103  |  19  ----------------------------<  197<H>  3.8   |  31  |  0.73    Ca    8.9      16 Oct 2023 06:20        Urinalysis Basic - ( 16 Oct 2023 06:20 )    Color: x / Appearance: x / SG: x / pH: x  Gluc: 197 mg/dL / Ketone: x  / Bili: x / Urobili: x   Blood: x / Protein: x / Nitrite: x   Leuk Esterase: x / RBC: x / WBC x   Sq Epi: x / Non Sq Epi: x / Bacteria: x    RADIOLOGY & ADDITIONAL STUDIES:    < from: CT Angio Abdomen and Pelvis w/ IV Cont (10.09.23 @ 16:58) >    ACC: [id number] EXAM:  CT ANGIO ABD PELV (W)AW IC   ORDERED BY: PETER PEOPLES     PROCEDURE DATE:  10/09/2023          INTERPRETATION:  CLINICAL INFORMATION: Severe postprandial pain, concern   for mesenteric ischemia.    COMPARISON: CT arteriogram of the aorta and lower extremities dated   2/11/2023.    CONTRAST/COMPLICATIONS:  IV Contrast: Omnipaque 350  90 cc administered   10 cc discarded  Oral Contrast: NONE  Complications: None reported at time of study completion    PROCEDURE:  CT Angiography of the Abdomen and Pelvis was performed.  Arterial and venous phases were acquired.  Sagittal and coronal reformats were performed as well as 3D (MIP)   reconstructions.    FINDINGS:  LOWER CHEST: Within normal limits.    LIVER: Hepatomegaly.  BILE DUCTS: Normal caliber.  GALLBLADDER: Within normal limits.  SPLEEN: Within normal limits.  PANCREAS: Within normal limits.  ADRENALS: Within normal limits.  KIDNEYS/URETERS: Within normal limits.    BLADDER: Within normal limits.  REPRODUCTIVE ORGANS: Uterus and adnexa within normal limits.    BOWEL: No bowel obstruction. Appendix surgically absent.  PERITONEUM: No ascites.  VESSELS: Atheromatous calcifications of the abdominal aorta without   luminal narrowing or aneurysmal dilatation. The celiac, superior   mesenteric and inferior mesenteric arteries are widely patent. The   mesenteric veins are patent.  RETROPERITONEUM/LYMPH NODES: Bilateral inguinal lymph node enlargement.  ABDOMINAL WALL: Fat-containing ventral hernia with surrounding   infiltration in the subcutaneous fat and overlying skin thickening.  BONES: Within normal limits.    IMPRESSION:  No CT evidence of mesenteric arterial or venous insufficiency.        --- End of Report ---            CHELO CANTOR MD; Attending Radiologist  This document has been electronically signed. Oct  9 2023  8:51PM    < end of copied text >   CHIEF COMPLAINT:    HISTORY OF PRESENT ILLNESS:    The patient is a 58 year old female with multiple medical problems who developed urinary retention. She was admitted with a LE cellulitis on 10/9/23. Yesterday she developed increased difficulty voiding. She was noted to have 952 cc in bladder on bladder scan and Cantu catheter was placed. Tamsulosin 0.4 mg daily was prescribed. She has not been ambulating much since admission. Prior to this hospitalization she experienced incontinence and felt the need to strain to void at times. She has been constipated during this hospitalization.     MEDICAL & SURGICAL HISTORY:  Atrial fibrillation      Type 2 diabetes mellitus      Congestive heart failure      Scalp psoriasis      S/P tonsillectomy      S/P appendectomy      H/O umbilical hernia repair          REVIEW OF SYSTEMS:    CONSTITUTIONAL: No weakness, fevers or chills  EYES/ENT: No visual changes;  No vertigo or throat pain   NECK: No pain or stiffness  RESPIRATORY: No cough, wheezing, hemoptysis; No shortness of breath  CARDIOVASCULAR: No chest pain or palpitations  GASTROINTESTINAL: No abdominal or epigastric pain. No nausea, vomiting, or hematemesis; No diarrhea or constipation. No melena or hematochezia.  GENITOURINARY: As above  NEUROLOGICAL: No numbness or weakness  SKIN: No itching, burning, rashes, or lesions   All other review of systems is negative unless indicated above.    MEDICATIONS  (STANDING):  dextrose 5%. 1000 milliLiter(s) (100 mL/Hr) IV Continuous <Continuous>  dextrose 5%. 1000 milliLiter(s) (50 mL/Hr) IV Continuous <Continuous>  dextrose 50% Injectable 12.5 Gram(s) IV Push once  dextrose 50% Injectable 25 Gram(s) IV Push once  dextrose 50% Injectable 25 Gram(s) IV Push once  doxycycline monohydrate Capsule 100 milliGRAM(s) Oral every 12 hours  gabapentin 300 milliGRAM(s) Oral every 8 hours  glucagon  Injectable 1 milliGRAM(s) IntraMuscular once  hydrocortisone 1% Cream 1 Application(s) Topical two times a day  insulin glargine Injectable (LANTUS) 30 Unit(s) SubCutaneous every morning  insulin glargine Injectable (LANTUS) 30 Unit(s) SubCutaneous at bedtime  insulin lispro (ADMELOG) corrective regimen sliding scale   SubCutaneous three times a day before meals  insulin lispro (ADMELOG) corrective regimen sliding scale   SubCutaneous at bedtime  insulin lispro Injectable (ADMELOG) 10 Unit(s) SubCutaneous three times a day before meals  metoprolol succinate  milliGRAM(s) Oral daily  naloxone Injectable 0.4 milliGRAM(s) IV Push once  nystatin Powder 1 Application(s) Topical three times a day  nystatin Powder 1 Application(s) Topical three times a day  polyethylene glycol 3350 17 Gram(s) Oral daily  rivaroxaban 20 milliGRAM(s) Oral with dinner  senna 2 Tablet(s) Oral at bedtime  tamsulosin 0.4 milliGRAM(s) Oral at bedtime  torsemide 20 milliGRAM(s) Oral daily    MEDICATIONS  (PRN):  acetaminophen     Tablet .. 650 milliGRAM(s) Oral every 6 hours PRN Temp greater or equal to 38C (100.4F), Mild Pain (1 - 3)  aluminum hydroxide/magnesium hydroxide/simethicone Suspension 30 milliLiter(s) Oral every 4 hours PRN Dyspepsia  bisacodyl 5 milliGRAM(s) Oral daily PRN Constipation  bisacodyl Suppository 10 milliGRAM(s) Rectal daily PRN Constipation  dextrose Oral Gel 15 Gram(s) Oral once PRN Blood Glucose LESS THAN 70 milliGRAM(s)/deciliter  melatonin 3 milliGRAM(s) Oral at bedtime PRN Insomnia  ondansetron Injectable 4 milliGRAM(s) IV Push every 8 hours PRN Nausea and/or Vomiting      Allergies    No Known Allergies    Intolerances        SOCIAL HISTORY:    FAMILY HISTORY:  No pertinent family history in first degree relatives        Vital Signs Last 24 Hrs  T(C): 37.2 (17 Oct 2023 05:02), Max: 37.2 (17 Oct 2023 05:02)  T(F): 99 (17 Oct 2023 05:02), Max: 99 (17 Oct 2023 05:02)  HR: 80 (17 Oct 2023 05:02) (73 - 80)  BP: 92/56 (17 Oct 2023 05:02) (92/56 - 117/78)  BP(mean): --  RR: 18 (17 Oct 2023 05:02) (18 - 18)  SpO2: 93% (17 Oct 2023 05:02) (92% - 93%)    Parameters below as of 17 Oct 2023 05:02  Patient On (Oxygen Delivery Method): room air        PHYSICAL EXAM:    Constitutional: NAD, well-developed  Back: Normal spine flexure, No CVA tenderness  Abd: Soft, NT/ND, No CVAT  : urethra and meatus normal. No cystocele,  or enterocele. Cantu in place. Draining well  Neurological: A/O x 3  Psychiatric: Normal mood, normal affect  Skin: No rashes    LABS:                        12.9   6.15  )-----------( 224      ( 16 Oct 2023 06:20 )             38.4     10-16    138  |  103  |  19  ----------------------------<  197<H>  3.8   |  31  |  0.73    Ca    8.9      16 Oct 2023 06:20        Urinalysis Basic - ( 16 Oct 2023 06:20 )    Color: x / Appearance: x / SG: x / pH: x  Gluc: 197 mg/dL / Ketone: x  / Bili: x / Urobili: x   Blood: x / Protein: x / Nitrite: x   Leuk Esterase: x / RBC: x / WBC x   Sq Epi: x / Non Sq Epi: x / Bacteria: x    RADIOLOGY & ADDITIONAL STUDIES:    < from: CT Angio Abdomen and Pelvis w/ IV Cont (10.09.23 @ 16:58) >    ACC: [id number] EXAM:  CT ANGIO ABD PELV (W)AW IC   ORDERED BY: PETER PEOPLES     PROCEDURE DATE:  10/09/2023          INTERPRETATION:  CLINICAL INFORMATION: Severe postprandial pain, concern   for mesenteric ischemia.    COMPARISON: CT arteriogram of the aorta and lower extremities dated   2/11/2023.    CONTRAST/COMPLICATIONS:  IV Contrast: Omnipaque 350  90 cc administered   10 cc discarded  Oral Contrast: NONE  Complications: None reported at time of study completion    PROCEDURE:  CT Angiography of the Abdomen and Pelvis was performed.  Arterial and venous phases were acquired.  Sagittal and coronal reformats were performed as well as 3D (MIP)   reconstructions.    FINDINGS:  LOWER CHEST: Within normal limits.    LIVER: Hepatomegaly.  BILE DUCTS: Normal caliber.  GALLBLADDER: Within normal limits.  SPLEEN: Within normal limits.  PANCREAS: Within normal limits.  ADRENALS: Within normal limits.  KIDNEYS/URETERS: Within normal limits.    BLADDER: Within normal limits.  REPRODUCTIVE ORGANS: Uterus and adnexa within normal limits.    BOWEL: No bowel obstruction. Appendix surgically absent.  PERITONEUM: No ascites.  VESSELS: Atheromatous calcifications of the abdominal aorta without   luminal narrowing or aneurysmal dilatation. The celiac, superior   mesenteric and inferior mesenteric arteries are widely patent. The   mesenteric veins are patent.  RETROPERITONEUM/LYMPH NODES: Bilateral inguinal lymph node enlargement.  ABDOMINAL WALL: Fat-containing ventral hernia with surrounding   infiltration in the subcutaneous fat and overlying skin thickening.  BONES: Within normal limits.    IMPRESSION:  No CT evidence of mesenteric arterial or venous insufficiency.        --- End of Report ---            CHELO CANTOR MD; Attending Radiologist  This document has been electronically signed. Oct  9 2023  8:51PM    < end of copied text >

## 2023-10-17 NOTE — PROGRESS NOTE ADULT - ASSESSMENT
57 yo F with PMH of T2DM, Afib on Xarelto, CHF (EF 60-65% in 2/2023), neuropathy, chronic b/l LE wounds admitted for cellulitis    Edema, Persistent Afib  - Chronic b/l LE edema with cellulitic changes, otherwise, no meaningful evidence of volume overload.  Non-orthopneic  - No O2 requirement.  Pro-BNP unremarkable and CxR not consistent with CHF  - TTE showed normal LVF, EF 66% with no significant valvular disease  - s/p IV Lasix. Continue Torsemide 20 mg daily    - Rate-controlled Afib  - Continue Xarelto and Metoprolol     - Abx for cellulitis per ID  - Monitor and replete lytes, keep K>4, Mg>2.  - Will continue to follow.    Mandy Villavicencio, MS FNP, AGACNP  Nurse Practitioner- Cardiology   Please call on TEAMS

## 2023-10-17 NOTE — PROGRESS NOTE ADULT - PROBLEM SELECTOR PLAN 1
Worsening Right LE draining lesion on anterior shin and generalized worsening bilateral lower extremity edema and erythema   -RLE wound suspect 2/2 Diabetes with cellulitis  - XR b/L lower extremities and R foot performed, generalized soft issue edema without evidence of bony involvement  - S/p  Vancomycin, Now on doxycycline x 3 more days   - Symptomatic UTI. On  Ceftriaxone x 3 days, completed   -Plan for [facility] discharge when medically ready   - Blood Cultures NGTD  - Pain management: Tylenol 650 mg mild, Tramadol for moderate pain and oxycodone for severe pain   - S/p  Lasix, now on Torsemide  - Vascular studies on prior admission showed significant venous reflux, without evidence of extensive stenosis or vascular disease.   - Podiatry  Dr. Meek  -Local wound care per Podiatry   -Acute pain. Oxycodone IR ordered for as needed use. Bowel regimen to prevent constipation on opiates

## 2023-10-17 NOTE — PROGRESS NOTE ADULT - PROBLEM SELECTOR PLAN 3
- Continue home medications of Metoprolol and Xarelto   - Cardiology consulted, Dr Umaña, follow up recommendations

## 2023-10-18 LAB
ANION GAP SERPL CALC-SCNC: 7 MMOL/L — SIGNIFICANT CHANGE UP (ref 5–17)
ANION GAP SERPL CALC-SCNC: 7 MMOL/L — SIGNIFICANT CHANGE UP (ref 5–17)
BUN SERPL-MCNC: 18 MG/DL — SIGNIFICANT CHANGE UP (ref 7–23)
BUN SERPL-MCNC: 18 MG/DL — SIGNIFICANT CHANGE UP (ref 7–23)
CALCIUM SERPL-MCNC: 9 MG/DL — SIGNIFICANT CHANGE UP (ref 8.5–10.1)
CALCIUM SERPL-MCNC: 9 MG/DL — SIGNIFICANT CHANGE UP (ref 8.5–10.1)
CHLORIDE SERPL-SCNC: 107 MMOL/L — SIGNIFICANT CHANGE UP (ref 96–108)
CHLORIDE SERPL-SCNC: 107 MMOL/L — SIGNIFICANT CHANGE UP (ref 96–108)
CO2 SERPL-SCNC: 28 MMOL/L — SIGNIFICANT CHANGE UP (ref 22–31)
CO2 SERPL-SCNC: 28 MMOL/L — SIGNIFICANT CHANGE UP (ref 22–31)
CREAT SERPL-MCNC: 0.64 MG/DL — SIGNIFICANT CHANGE UP (ref 0.5–1.3)
CREAT SERPL-MCNC: 0.64 MG/DL — SIGNIFICANT CHANGE UP (ref 0.5–1.3)
EGFR: 102 ML/MIN/1.73M2 — SIGNIFICANT CHANGE UP
EGFR: 102 ML/MIN/1.73M2 — SIGNIFICANT CHANGE UP
GLUCOSE BLDC GLUCOMTR-MCNC: 140 MG/DL — HIGH (ref 70–99)
GLUCOSE BLDC GLUCOMTR-MCNC: 140 MG/DL — HIGH (ref 70–99)
GLUCOSE BLDC GLUCOMTR-MCNC: 166 MG/DL — HIGH (ref 70–99)
GLUCOSE BLDC GLUCOMTR-MCNC: 166 MG/DL — HIGH (ref 70–99)
GLUCOSE BLDC GLUCOMTR-MCNC: 194 MG/DL — HIGH (ref 70–99)
GLUCOSE BLDC GLUCOMTR-MCNC: 194 MG/DL — HIGH (ref 70–99)
GLUCOSE SERPL-MCNC: 181 MG/DL — HIGH (ref 70–99)
GLUCOSE SERPL-MCNC: 181 MG/DL — HIGH (ref 70–99)
HCT VFR BLD CALC: 39.6 % — SIGNIFICANT CHANGE UP (ref 34.5–45)
HCT VFR BLD CALC: 39.6 % — SIGNIFICANT CHANGE UP (ref 34.5–45)
HGB BLD-MCNC: 13 G/DL — SIGNIFICANT CHANGE UP (ref 11.5–15.5)
HGB BLD-MCNC: 13 G/DL — SIGNIFICANT CHANGE UP (ref 11.5–15.5)
MCHC RBC-ENTMCNC: 31.6 PG — SIGNIFICANT CHANGE UP (ref 27–34)
MCHC RBC-ENTMCNC: 31.6 PG — SIGNIFICANT CHANGE UP (ref 27–34)
MCHC RBC-ENTMCNC: 32.8 GM/DL — SIGNIFICANT CHANGE UP (ref 32–36)
MCHC RBC-ENTMCNC: 32.8 GM/DL — SIGNIFICANT CHANGE UP (ref 32–36)
MCV RBC AUTO: 96.4 FL — SIGNIFICANT CHANGE UP (ref 80–100)
MCV RBC AUTO: 96.4 FL — SIGNIFICANT CHANGE UP (ref 80–100)
NRBC # BLD: 0 /100 WBCS — SIGNIFICANT CHANGE UP (ref 0–0)
NRBC # BLD: 0 /100 WBCS — SIGNIFICANT CHANGE UP (ref 0–0)
PLATELET # BLD AUTO: 237 K/UL — SIGNIFICANT CHANGE UP (ref 150–400)
PLATELET # BLD AUTO: 237 K/UL — SIGNIFICANT CHANGE UP (ref 150–400)
POTASSIUM SERPL-MCNC: 4 MMOL/L — SIGNIFICANT CHANGE UP (ref 3.5–5.3)
POTASSIUM SERPL-MCNC: 4 MMOL/L — SIGNIFICANT CHANGE UP (ref 3.5–5.3)
POTASSIUM SERPL-SCNC: 4 MMOL/L — SIGNIFICANT CHANGE UP (ref 3.5–5.3)
POTASSIUM SERPL-SCNC: 4 MMOL/L — SIGNIFICANT CHANGE UP (ref 3.5–5.3)
RBC # BLD: 4.11 M/UL — SIGNIFICANT CHANGE UP (ref 3.8–5.2)
RBC # BLD: 4.11 M/UL — SIGNIFICANT CHANGE UP (ref 3.8–5.2)
RBC # FLD: 12.7 % — SIGNIFICANT CHANGE UP (ref 10.3–14.5)
RBC # FLD: 12.7 % — SIGNIFICANT CHANGE UP (ref 10.3–14.5)
SODIUM SERPL-SCNC: 142 MMOL/L — SIGNIFICANT CHANGE UP (ref 135–145)
SODIUM SERPL-SCNC: 142 MMOL/L — SIGNIFICANT CHANGE UP (ref 135–145)
WBC # BLD: 5.83 K/UL — SIGNIFICANT CHANGE UP (ref 3.8–10.5)
WBC # BLD: 5.83 K/UL — SIGNIFICANT CHANGE UP (ref 3.8–10.5)
WBC # FLD AUTO: 5.83 K/UL — SIGNIFICANT CHANGE UP (ref 3.8–10.5)
WBC # FLD AUTO: 5.83 K/UL — SIGNIFICANT CHANGE UP (ref 3.8–10.5)

## 2023-10-18 PROCEDURE — 99232 SBSQ HOSP IP/OBS MODERATE 35: CPT

## 2023-10-18 PROCEDURE — 99233 SBSQ HOSP IP/OBS HIGH 50: CPT | Mod: GC

## 2023-10-18 RX ORDER — CYCLOBENZAPRINE HYDROCHLORIDE 10 MG/1
5 TABLET, FILM COATED ORAL
Refills: 0 | Status: DISCONTINUED | OUTPATIENT
Start: 2023-10-18 | End: 2023-10-19

## 2023-10-18 RX ADMIN — TAMSULOSIN HYDROCHLORIDE 0.4 MILLIGRAM(S): 0.4 CAPSULE ORAL at 20:42

## 2023-10-18 RX ADMIN — RIVAROXABAN 20 MILLIGRAM(S): KIT at 17:05

## 2023-10-18 RX ADMIN — POLYETHYLENE GLYCOL 3350 17 GRAM(S): 17 POWDER, FOR SOLUTION ORAL at 11:55

## 2023-10-18 RX ADMIN — NYSTATIN CREAM 1 APPLICATION(S): 100000 CREAM TOPICAL at 13:51

## 2023-10-18 RX ADMIN — GABAPENTIN 300 MILLIGRAM(S): 400 CAPSULE ORAL at 20:43

## 2023-10-18 RX ADMIN — GABAPENTIN 300 MILLIGRAM(S): 400 CAPSULE ORAL at 06:16

## 2023-10-18 RX ADMIN — Medication 10 UNIT(S): at 17:05

## 2023-10-18 RX ADMIN — NYSTATIN CREAM 1 APPLICATION(S): 100000 CREAM TOPICAL at 20:46

## 2023-10-18 RX ADMIN — Medication 3 MILLIGRAM(S): at 20:43

## 2023-10-18 RX ADMIN — Medication 1 APPLICATION(S): at 17:07

## 2023-10-18 RX ADMIN — Medication 2: at 07:52

## 2023-10-18 RX ADMIN — NYSTATIN CREAM 1 APPLICATION(S): 100000 CREAM TOPICAL at 06:16

## 2023-10-18 RX ADMIN — GABAPENTIN 300 MILLIGRAM(S): 400 CAPSULE ORAL at 13:51

## 2023-10-18 RX ADMIN — Medication 100 MILLIGRAM(S): at 17:05

## 2023-10-18 RX ADMIN — Medication 1 APPLICATION(S): at 06:17

## 2023-10-18 RX ADMIN — INSULIN GLARGINE 30 UNIT(S): 100 INJECTION, SOLUTION SUBCUTANEOUS at 21:30

## 2023-10-18 RX ADMIN — Medication 100 MILLIGRAM(S): at 06:16

## 2023-10-18 RX ADMIN — Medication 2: at 11:56

## 2023-10-18 RX ADMIN — SENNA PLUS 2 TABLET(S): 8.6 TABLET ORAL at 20:43

## 2023-10-18 RX ADMIN — NYSTATIN CREAM 1 APPLICATION(S): 100000 CREAM TOPICAL at 20:44

## 2023-10-18 RX ADMIN — Medication 10 UNIT(S): at 07:51

## 2023-10-18 RX ADMIN — INSULIN GLARGINE 30 UNIT(S): 100 INJECTION, SOLUTION SUBCUTANEOUS at 07:52

## 2023-10-18 RX ADMIN — Medication 10 UNIT(S): at 11:55

## 2023-10-18 NOTE — PROGRESS NOTE ADULT - PROBLEM SELECTOR PLAN 3
chronic   hr rate controlled - Continue home medications of Metoprolol and Xarelto   - Cardiology consulted, Dr Umaña,

## 2023-10-18 NOTE — PROGRESS NOTE ADULT - TIME BILLING
Note written by attending. Meds, labs, vitals, chart reviewed. Will go to [facility] once medically ready. Counseling and education provided.
Note written by attending. Meds, labs, vitals, chart reviewed. Will go to [facility] once medically ready
Note written by attending. Meds, labs, vitals, chart reviewed. Will go to [facility] once medically ready. Counseling and education provided.
Note written by attending. Meds, labs, vitals, chart reviewed.  dc planning [facility]   medically  stable / pending auth .
Note written by attending. Meds, labs, vitals, chart reviewed. Will go to [facility] once medically ready. Counseling and education provided.
Note written by attending. Meds, labs, vitals, chart reviewed
Note written by attending. Meds, labs, vitals, chart reviewed. Will go to [facility] once medically ready. Counseling and education provided.
Note written by attending. Meds, labs, vitals, chart reviewed. Will go to [facility] once medically ready. Counseling and education provided.

## 2023-10-18 NOTE — PROGRESS NOTE ADULT - SUBJECTIVE AND OBJECTIVE BOX
MELITON LOZANO is a 58yFemale , patient examined and chart reviewed.    INTERVAL HPI/ OVERNIGHT EVENTS:   Afebrile. No events.    PAST MEDICAL & SURGICAL HISTORY:  Atrial fibrillation  Type 2 diabetes mellitus  Congestive heart failure  Scalp psoriasis  S/P tonsillectomy  S/P appendectomy  H/O umbilical hernia repair      For details regarding the patient's social history, family history, and other miscellaneous elements, please refer the initial infectious diseases consultation and/or the admitting history and physical examination for this admission.      ROS: Poor historian + leg pain    No Known Allergies      Current inpatient medications :    ANTIBIOTICS/RELEVANT:  doxycycline monohydrate Capsule 100 milliGRAM(s) Oral every 12 hours    MEDICATIONS  (STANDING):  dextrose 5%. 1000 milliLiter(s) (100 mL/Hr) IV Continuous <Continuous>  dextrose 5%. 1000 milliLiter(s) (50 mL/Hr) IV Continuous <Continuous>  dextrose 50% Injectable 25 Gram(s) IV Push once  dextrose 50% Injectable 25 Gram(s) IV Push once  dextrose 50% Injectable 12.5 Gram(s) IV Push once  doxycycline monohydrate Capsule 100 milliGRAM(s) Oral every 12 hours  gabapentin 300 milliGRAM(s) Oral every 8 hours  glucagon  Injectable 1 milliGRAM(s) IntraMuscular once  hydrocortisone 1% Cream 1 Application(s) Topical two times a day  insulin glargine Injectable (LANTUS) 30 Unit(s) SubCutaneous every morning  insulin glargine Injectable (LANTUS) 30 Unit(s) SubCutaneous at bedtime  insulin lispro (ADMELOG) corrective regimen sliding scale   SubCutaneous three times a day before meals  insulin lispro (ADMELOG) corrective regimen sliding scale   SubCutaneous at bedtime  insulin lispro Injectable (ADMELOG) 10 Unit(s) SubCutaneous three times a day before meals  metoprolol succinate  milliGRAM(s) Oral daily  naloxone Injectable 0.4 milliGRAM(s) IV Push once  nystatin Powder 1 Application(s) Topical three times a day  nystatin Powder 1 Application(s) Topical three times a day  polyethylene glycol 3350 17 Gram(s) Oral daily  rivaroxaban 20 milliGRAM(s) Oral with dinner  senna 2 Tablet(s) Oral at bedtime  tamsulosin 0.4 milliGRAM(s) Oral at bedtime  torsemide 20 milliGRAM(s) Oral daily    MEDICATIONS  (PRN):  acetaminophen     Tablet .. 650 milliGRAM(s) Oral every 6 hours PRN Temp greater or equal to 38C (100.4F), Mild Pain (1 - 3)  aluminum hydroxide/magnesium hydroxide/simethicone Suspension 30 milliLiter(s) Oral every 4 hours PRN Dyspepsia  bisacodyl 5 milliGRAM(s) Oral daily PRN Constipation  bisacodyl Suppository 10 milliGRAM(s) Rectal daily PRN Constipation  cyclobenzaprine 5 milliGRAM(s) Oral two times a day PRN Muscle Spasm  dextrose Oral Gel 15 Gram(s) Oral once PRN Blood Glucose LESS THAN 70 milliGRAM(s)/deciliter  melatonin 3 milliGRAM(s) Oral at bedtime PRN Insomnia  ondansetron Injectable 4 milliGRAM(s) IV Push every 8 hours PRN Nausea and/or Vomiting  oxycodone    5 mG/acetaminophen 325 mG 1 Tablet(s) Oral every 4 hours PRN Severe Pain (7 - 10)  traMADol 25 milliGRAM(s) Oral every 6 hours PRN Moderate Pain (4 - 6)        Objective:  Vital Signs Last 24 Hrs  T(C): 36.6 (18 Oct 2023 12:52), Max: 36.6 (17 Oct 2023 20:00)  T(F): 97.8 (18 Oct 2023 12:52), Max: 97.9 (17 Oct 2023 20:00)  HR: 79 (18 Oct 2023 12:52) (79 - 83)  BP: 129/76 (18 Oct 2023 12:52) (107/63 - 137/85)  RR: 18 (18 Oct 2023 12:52) (18 - 18)  SpO2: 95% (18 Oct 2023 12:52) (95% - 95%)    Parameters below as of 18 Oct 2023 12:52  Patient On (Oxygen Delivery Method): room air      Physical Exam:  General:  no acute distress  Neck: supple, trachea midline  Lungs: clear, no wheeze/rhonchi  Cardiovascular: regular rate and rhythm, S1 S2  Abdomen: soft, nontender,  bowel sounds normal  Neurological: alert and oriented x3  Skin: no rash  Extremities: Tanner LE edema erythema Right > Left improving  Chronic stasis       LABS:                        13.0   5.83  )-----------( 237      ( 18 Oct 2023 06:37 )             39.6   10-18    142  |  107  |  18  ----------------------------<  181<H>  4.0   |  28  |  0.64    Ca    9.0      18 Oct 2023 06:37      MICROBIOLOGY:  Culture - Urine (10.09.23 @ 15:27)    -  Amikacin: S <=16   -  Amoxicillin/Clavulanic Acid: S <=8/4   -  Ampicillin: R 16 These ampicillin results predict results for amoxicillin   -  Ampicillin/Sulbactam: S <=4/2   -  Aztreonam: S <=4   -  Cefazolin: S <=2   -  Cefepime: S <=2   -  Cefoxitin: I 16   -  Ceftriaxone: S <=1   -  Ciprofloxacin: S <=0.25   -  Ertapenem: S <=0.5   -  Gentamicin: S <=2   -  Imipenem: S <=1   -  Levofloxacin: S <=0.5   -  Meropenem: S <=1   -  Nitrofurantoin: S <=32 Should not be used to treat pyelonephritis   -  Piperacillin/Tazobactam: S <=8   -  Tobramycin: S <=2   -  Trimethoprim/Sulfamethoxazole: S <=0.5/9.5   Specimen Source: Clean Catch Clean Catch (Midstream)   Culture Results:   50,000 - 99,000 CFU/mL Klebsiella variicola   Organism Identification: Klebsiella variicola   Organism: Klebsiella variicola   Method Type: SASHA    Culture - Blood (collected 09 Oct 2023 11:55)  Source: .Blood Blood-Peripheral  Preliminary Report (10 Oct 2023 19:02):    No growth at 24 hours    Culture - Blood (collected 09 Oct 2023 11:20)  Source: .Blood Blood-Peripheral  Preliminary Report (10 Oct 2023 19:02):    No growth at 24 hours    RADIOLOGY & ADDITIONAL STUDIES:          Assessment :  57 yo F with PMHx of T2DM, Afib on Xarelto, and CHF with unknown EF admitted with worsening Right LE draining lesion on anterior shin and generalized worsening bilateral lower extremity edema and erythema sec cellulitis  Hx MRSA  Leg pain burning sensation prob sec DM peripheral neuropathy  Ua and Ucx noted - 50,000 - 99,000 CFU/mL Klebsiella variicola - pt c/o dysuria - UTI   Abd pain resolved  AUR - sullivan placed 10/16 seen by urology    Plan :   Off Vancomycin---> po Doxycyline 100mg po bid till 10/19   Sp Rocephin x 3 days  Trend temps and cbc  Sullivan per primary team/urology  Asp precautions  Pulm toileting  Stable from ID standpoint  Dc planning per primary team      Continue with present regiment.  Appropriate use of antibiotics and adverse effects reviewed.    > 35 minutes were spent in direct patient care reviewing notes, medications ,labs data/ imaging , discussion with multidisciplinary team.    Thank you for allowing me to participate in care of your patient .    Carole Mohr MD  Infectious Disease  [phone]

## 2023-10-18 NOTE — PROGRESS NOTE ADULT - PROBLEM SELECTOR PROBLEM 2
Atrial fibrillation
Atrial fibrillation
Abdominal pain
Atrial fibrillation
Abdominal pain

## 2023-10-18 NOTE — PROGRESS NOTE ADULT - SUBJECTIVE AND OBJECTIVE BOX
INTERVAL HPI/OVERNIGHT EVENTS: no problems with sullivan    MEDICATIONS  (STANDING):  dextrose 5%. 1000 milliLiter(s) (50 mL/Hr) IV Continuous <Continuous>  dextrose 5%. 1000 milliLiter(s) (100 mL/Hr) IV Continuous <Continuous>  dextrose 50% Injectable 25 Gram(s) IV Push once  dextrose 50% Injectable 25 Gram(s) IV Push once  dextrose 50% Injectable 12.5 Gram(s) IV Push once  doxycycline monohydrate Capsule 100 milliGRAM(s) Oral every 12 hours  gabapentin 300 milliGRAM(s) Oral every 8 hours  glucagon  Injectable 1 milliGRAM(s) IntraMuscular once  hydrocortisone 1% Cream 1 Application(s) Topical two times a day  insulin glargine Injectable (LANTUS) 30 Unit(s) SubCutaneous at bedtime  insulin glargine Injectable (LANTUS) 30 Unit(s) SubCutaneous every morning  insulin lispro (ADMELOG) corrective regimen sliding scale   SubCutaneous three times a day before meals  insulin lispro (ADMELOG) corrective regimen sliding scale   SubCutaneous at bedtime  insulin lispro Injectable (ADMELOG) 10 Unit(s) SubCutaneous three times a day before meals  metoprolol succinate  milliGRAM(s) Oral daily  naloxone Injectable 0.4 milliGRAM(s) IV Push once  nystatin Powder 1 Application(s) Topical three times a day  nystatin Powder 1 Application(s) Topical three times a day  polyethylene glycol 3350 17 Gram(s) Oral daily  rivaroxaban 20 milliGRAM(s) Oral with dinner  senna 2 Tablet(s) Oral at bedtime  tamsulosin 0.4 milliGRAM(s) Oral at bedtime  torsemide 20 milliGRAM(s) Oral daily    MEDICATIONS  (PRN):  acetaminophen     Tablet .. 650 milliGRAM(s) Oral every 6 hours PRN Temp greater or equal to 38C (100.4F), Mild Pain (1 - 3)  aluminum hydroxide/magnesium hydroxide/simethicone Suspension 30 milliLiter(s) Oral every 4 hours PRN Dyspepsia  bisacodyl 5 milliGRAM(s) Oral daily PRN Constipation  bisacodyl Suppository 10 milliGRAM(s) Rectal daily PRN Constipation  cyclobenzaprine 5 milliGRAM(s) Oral two times a day PRN Muscle Spasm  dextrose Oral Gel 15 Gram(s) Oral once PRN Blood Glucose LESS THAN 70 milliGRAM(s)/deciliter  melatonin 3 milliGRAM(s) Oral at bedtime PRN Insomnia  ondansetron Injectable 4 milliGRAM(s) IV Push every 8 hours PRN Nausea and/or Vomiting  oxycodone    5 mG/acetaminophen 325 mG 1 Tablet(s) Oral every 4 hours PRN Severe Pain (7 - 10)  traMADol 25 milliGRAM(s) Oral every 6 hours PRN Moderate Pain (4 - 6)        Vital Signs Last 24 Hrs  T(C): 36.5 (18 Oct 2023 05:00), Max: 36.6 (17 Oct 2023 12:46)  T(F): 97.7 (18 Oct 2023 05:00), Max: 97.9 (17 Oct 2023 20:00)  HR: 81 (18 Oct 2023 05:00) (81 - 84)  BP: 107/63 (18 Oct 2023 05:00) (107/63 - 137/85)  BP(mean): --  RR: 18 (18 Oct 2023 05:00) (18 - 18)  SpO2: 95% (18 Oct 2023 05:00) (93% - 95%)    Parameters below as of 18 Oct 2023 05:00  Patient On (Oxygen Delivery Method): room air            LABS:                        13.0   5.83  )-----------( 237      ( 18 Oct 2023 06:37 )             39.6     10-18    142  |  107  |  18  ----------------------------<  181<H>  4.0   |  28  |  0.64    Ca    9.0      18 Oct 2023 06:37        Urinalysis Basic - ( 18 Oct 2023 06:37 )    Color: x / Appearance: x / SG: x / pH: x  Gluc: 181 mg/dL / Ketone: x  / Bili: x / Urobili: x   Blood: x / Protein: x / Nitrite: x   Leuk Esterase: x / RBC: x / WBC x   Sq Epi: x / Non Sq Epi: x / Bacteria: x          RADIOLOGY & ADDITIONAL TESTS:

## 2023-10-18 NOTE — PROGRESS NOTE ADULT - PROBLEM SELECTOR PROBLEM 1
Type 2 diabetes mellitus
Urinary retention
Leg wound, right

## 2023-10-18 NOTE — PROGRESS NOTE ADULT - PROBLEM SELECTOR PROBLEM 5
Congestive heart failure

## 2023-10-18 NOTE — PROGRESS NOTE ADULT - SUBJECTIVE AND OBJECTIVE BOX
Patient is a 58y old  Female who presents with a chief complaint of Right Left Wound (18 Oct 2023 12:07)    pt seen and examine today  c/o all over body hurt , with new sullivan / voiding clean urine , no fever.   INTERVAL HPI/OVERNIGHT EVENTS:     T(C): 36.6 (10-18-23 @ 12:52), Max: 36.6 (10-17-23 @ 20:00)  HR: 79 (10-18-23 @ 12:52) (79 - 83)  BP: 129/76 (10-18-23 @ 12:52) (107/63 - 137/85)  RR: 18 (10-18-23 @ 12:52) (18 - 18)  SpO2: 95% (10-18-23 @ 12:52) (95% - 95%)  Wt(kg): --  I&O's Summary    17 Oct 2023 07:01  -  18 Oct 2023 07:00  --------------------------------------------------------  IN: 0 mL / OUT: 1300 mL / NET: -1300 mL        REVIEW OF SYSTEMS:  CONSTITUTIONAL: No fever, weight loss, or fatigue  EYES: No eye pain, visual disturbances, or discharge  ENMT:  No difficulty hearing, tinnitus, vertigo; No sinus or throat pain  NECK: No pain or stiffness  BREASTS: No pain, no masses  RESPIRATORY: No cough, wheezing, chills or hemoptysis; No shortness of breath  CARDIOVASCULAR: No chest pain, palpitations, dizziness, or leg swelling  GASTROINTESTINAL: No abdominal or epigastric pain. No nausea, vomiting ; No diarrhea or constipation. No melena or hematochezia.  GENITOURINARY: No dysuria, frequency, hematuria, or incontinence  NEUROLOGICAL: No headaches, memory loss, loss of strength, numbness, or tremors  SKIN: No itching, burning, rashes, or lesions   MUSCULOSKELETAL: No joint pain or swelling; No muscle, back, or extremity pain    PHYSICAL EXAM:  GENERAL: NAD,   HEAD:  Atraumatic, Normocephalic  EYES: EOMI, PERRLA, conjunctiva and sclera clear  ENMT: Moist mucous membranes  NECK: Supple, No JVD  NERVOUS SYSTEM:  Alert & Oriented X3; Motor Strength 5/5 B/L upper and lower extremities; DTRs 2+ intact and symmetric  CHEST/LUNG: Clear to percussion bilaterally; No rales, rhonchi, wheezing,   HEART: Regular rate and rhythm; No murmurs, no tachy   ABDOMEN: Soft, Nontender, Nondistended; Bowel sounds present  EXTREMITIES:  2+ Peripheral Pulses, No clubbing, cyanosis,  bl  + edema  SKIN: No rashes or lesions  bl lower ext venous stasis changes   gu sullivan / clear urine     MEDICATIONS  (STANDING):  dextrose 5%. 1000 milliLiter(s) (50 mL/Hr) IV Continuous <Continuous>  dextrose 5%. 1000 milliLiter(s) (100 mL/Hr) IV Continuous <Continuous>  dextrose 50% Injectable 25 Gram(s) IV Push once  dextrose 50% Injectable 25 Gram(s) IV Push once  dextrose 50% Injectable 12.5 Gram(s) IV Push once  doxycycline monohydrate Capsule 100 milliGRAM(s) Oral every 12 hours  gabapentin 300 milliGRAM(s) Oral every 8 hours  glucagon  Injectable 1 milliGRAM(s) IntraMuscular once  hydrocortisone 1% Cream 1 Application(s) Topical two times a day  insulin glargine Injectable (LANTUS) 30 Unit(s) SubCutaneous every morning  insulin glargine Injectable (LANTUS) 30 Unit(s) SubCutaneous at bedtime  insulin lispro (ADMELOG) corrective regimen sliding scale   SubCutaneous three times a day before meals  insulin lispro (ADMELOG) corrective regimen sliding scale   SubCutaneous at bedtime  insulin lispro Injectable (ADMELOG) 10 Unit(s) SubCutaneous three times a day before meals  metoprolol succinate  milliGRAM(s) Oral daily  naloxone Injectable 0.4 milliGRAM(s) IV Push once  nystatin Powder 1 Application(s) Topical three times a day  nystatin Powder 1 Application(s) Topical three times a day  polyethylene glycol 3350 17 Gram(s) Oral daily  rivaroxaban 20 milliGRAM(s) Oral with dinner  senna 2 Tablet(s) Oral at bedtime  tamsulosin 0.4 milliGRAM(s) Oral at bedtime  torsemide 20 milliGRAM(s) Oral daily    MEDICATIONS  (PRN):  acetaminophen     Tablet .. 650 milliGRAM(s) Oral every 6 hours PRN Temp greater or equal to 38C (100.4F), Mild Pain (1 - 3)  aluminum hydroxide/magnesium hydroxide/simethicone Suspension 30 milliLiter(s) Oral every 4 hours PRN Dyspepsia  bisacodyl 5 milliGRAM(s) Oral daily PRN Constipation  bisacodyl Suppository 10 milliGRAM(s) Rectal daily PRN Constipation  cyclobenzaprine 5 milliGRAM(s) Oral two times a day PRN Muscle Spasm  dextrose Oral Gel 15 Gram(s) Oral once PRN Blood Glucose LESS THAN 70 milliGRAM(s)/deciliter  melatonin 3 milliGRAM(s) Oral at bedtime PRN Insomnia  ondansetron Injectable 4 milliGRAM(s) IV Push every 8 hours PRN Nausea and/or Vomiting  oxycodone    5 mG/acetaminophen 325 mG 1 Tablet(s) Oral every 4 hours PRN Severe Pain (7 - 10)  traMADol 25 milliGRAM(s) Oral every 6 hours PRN Moderate Pain (4 - 6)      LABS:                        13.0   5.83  )-----------( 237      ( 18 Oct 2023 06:37 )             39.6     10-18    142  |  107  |  18  ----------------------------<  181<H>  4.0   |  28  |  0.64    Ca    9.0      18 Oct 2023 06:37        Urinalysis Basic - ( 18 Oct 2023 06:37 )    Color: x / Appearance: x / SG: x / pH: x  Gluc: 181 mg/dL / Ketone: x  / Bili: x / Urobili: x   Blood: x / Protein: x / Nitrite: x   Leuk Esterase: x / RBC: x / WBC x   Sq Epi: x / Non Sq Epi: x / Bacteria: x      CAPILLARY BLOOD GLUCOSE      POCT Blood Glucose.: 166 mg/dL (18 Oct 2023 11:42)  POCT Blood Glucose.: 189 mg/dL (18 Oct 2023 07:48)  POCT Blood Glucose.: 176 mg/dL (17 Oct 2023 21:56)  POCT Blood Glucose.: 250 mg/dL (17 Oct 2023 16:37)              RADIOLOGY & ADDITIONAL TESTS:    Imaging Personally Reviewed:   no new test     Advance Directives:  full code    Palliative Care:  Appropriate

## 2023-10-18 NOTE — PROGRESS NOTE ADULT - PROBLEM SELECTOR PROBLEM 3
Abdominal pain
Abdominal pain
Atrial fibrillation
Abdominal pain
Atrial fibrillation

## 2023-10-18 NOTE — PROGRESS NOTE ADULT - PROBLEM SELECTOR PLAN 1
Worsening Right LE draining lesion on anterior shin and generalized worsening bilateral lower extremity edema and erythema   -RLE wound suspect 2/2 Diabetes with cellulitis  - XR b/L lower extremities and R foot performed, generalized soft issue edema without evidence of bony involvement  - S/p  Vancomycin, Now on doxycycline x 3 day   - Symptomatic UTI. On  Ceftriaxone x 3 days, completed   -Plan for [facility] discharge when medically ready   - Blood Cultures NGTD  - Pain management: Tylenol 650 mg mild, Tramadol for moderate pain and oxycodone for severe pain   - S/p  Lasix, now on Torsemide for bl leg edema   - Vascular studies on prior admission showed significant venous reflux, without evidence of extensive stenosis or vascular disease.   - Podiatry  Dr. Meek  -Local wound care per Podiatry   -Acute pain. Oxycodone IR ordered for as needed use. Bowel regimen to prevent constipation on opiates

## 2023-10-18 NOTE — PROGRESS NOTE ADULT - SUBJECTIVE AND OBJECTIVE BOX
CAPILLARY BLOOD GLUCOSE      POCT Blood Glucose.: 166 mg/dL (18 Oct 2023 11:42)  POCT Blood Glucose.: 189 mg/dL (18 Oct 2023 07:48)  POCT Blood Glucose.: 176 mg/dL (17 Oct 2023 21:56)  POCT Blood Glucose.: 250 mg/dL (17 Oct 2023 16:37)      Vital Signs Last 24 Hrs  T(C): 36.5 (18 Oct 2023 05:00), Max: 36.6 (17 Oct 2023 12:46)  T(F): 97.7 (18 Oct 2023 05:00), Max: 97.9 (17 Oct 2023 20:00)  HR: 81 (18 Oct 2023 05:00) (81 - 84)  BP: 107/63 (18 Oct 2023 05:00) (107/63 - 137/85)  BP(mean): --  RR: 18 (18 Oct 2023 05:00) (18 - 18)  SpO2: 95% (18 Oct 2023 05:00) (93% - 95%)    Parameters below as of 18 Oct 2023 05:00  Patient On (Oxygen Delivery Method): room air        General: WN/WD NAD  Respiratory: CTA B/L  CV: RRR, S1S2, no murmurs, rubs or gallops  Abdominal: Soft, NT, ND +BS, Last BM  Extremities: No edema, + peripheral pulses     10-18    142  |  107  |  18  ----------------------------<  181<H>  4.0   |  28  |  0.64    Ca    9.0      18 Oct 2023 06:37        dextrose 50% Injectable 25 Gram(s) IV Push once  dextrose 50% Injectable 12.5 Gram(s) IV Push once  dextrose 50% Injectable 25 Gram(s) IV Push once  dextrose Oral Gel 15 Gram(s) Oral once PRN  glucagon  Injectable 1 milliGRAM(s) IntraMuscular once  insulin glargine Injectable (LANTUS) 30 Unit(s) SubCutaneous at bedtime  insulin glargine Injectable (LANTUS) 30 Unit(s) SubCutaneous every morning  insulin lispro (ADMELOG) corrective regimen sliding scale   SubCutaneous three times a day before meals  insulin lispro (ADMELOG) corrective regimen sliding scale   SubCutaneous at bedtime  insulin lispro Injectable (ADMELOG) 10 Unit(s) SubCutaneous three times a day before meals

## 2023-10-18 NOTE — PROGRESS NOTE ADULT - ASSESSMENT
59 yo F with PMH of T2DM, Afib on Xarelto, CHF (EF 60-65% in 2/2023), neuropathy, chronic b/l LE wounds admitted for cellulitis    Edema, Persistent Afib  - Chronic b/l LE edema with cellulitic changes, otherwise, no meaningful evidence of volume overload.  Non-orthopneic  - No O2 requirement.  Pro-BNP unremarkable and CxR not consistent with CHF  - TTE showed normal LVF, EF 66% with no significant valvular disease  - s/p IV Lasix. Continue Torsemide 20 mg daily    - Remains rate-controlled Afib  - Continue Xarelto and Metoprolol     - Abx for cellulitis per ID  - Monitor and replete lytes, keep K>4, Mg>2.  - Will continue to follow.  Otherwise, stable from cardiac standpoint.    Nina Salcedo DNP, NP-C, AGACNP-C  Cardiology   Call TEAMS

## 2023-10-18 NOTE — PROGRESS NOTE ADULT - PROBLEM SELECTOR PLAN 4
A1c 13.8  Chronic, History of DM2  - Hold home medications  - Serum glucose 369 on admission, possibly elevated due to infection   - 18 units Basaglar qhs --> increase to 20 units Lantus at night  - Premeal Admelog 8 units --> increase 10 units pre-meal TID  - Moderate dose insulin corrective scale  - Hypoglycemia protocol, Accuchecks AC&HS  - Diet regular food with DASH/TLC
uncontrolled   dm type 2  -A1c 13.8  Chronic, History of DM2  - Hold home medications  - Serum glucose 369 on admission, possibly elevated due to infection   -   30 units Lantus at am/night.  - Premeal Admelog 10 unit tid  - Moderate dose insulin corrective scale  - Hypoglycemia protocol, Accuchecks AC&HS  - Diet regular food with DASH/TLC

## 2023-10-18 NOTE — PROGRESS NOTE ADULT - PROBLEM SELECTOR PLAN 7
- Continue pts home Xarelto

## 2023-10-18 NOTE — PROGRESS NOTE ADULT - SUBJECTIVE AND OBJECTIVE BOX
[facility]  Jarett Stein PA-C  [address] [place] [address]  [phone]      INTERVAL HPI/OVERNIGHT EVENTS:  Pt s/e  C/o pain all over body  Otherwise no new GI events    MEDICATIONS  (STANDING):  dextrose 5%. 1000 milliLiter(s) (100 mL/Hr) IV Continuous <Continuous>  dextrose 5%. 1000 milliLiter(s) (50 mL/Hr) IV Continuous <Continuous>  dextrose 50% Injectable 25 Gram(s) IV Push once  dextrose 50% Injectable 12.5 Gram(s) IV Push once  dextrose 50% Injectable 25 Gram(s) IV Push once  doxycycline monohydrate Capsule 100 milliGRAM(s) Oral every 12 hours  gabapentin 300 milliGRAM(s) Oral every 8 hours  glucagon  Injectable 1 milliGRAM(s) IntraMuscular once  hydrocortisone 1% Cream 1 Application(s) Topical two times a day  insulin glargine Injectable (LANTUS) 30 Unit(s) SubCutaneous at bedtime  insulin glargine Injectable (LANTUS) 30 Unit(s) SubCutaneous every morning  insulin lispro (ADMELOG) corrective regimen sliding scale   SubCutaneous three times a day before meals  insulin lispro (ADMELOG) corrective regimen sliding scale   SubCutaneous at bedtime  insulin lispro Injectable (ADMELOG) 10 Unit(s) SubCutaneous three times a day before meals  metoprolol succinate  milliGRAM(s) Oral daily  naloxone Injectable 0.4 milliGRAM(s) IV Push once  nystatin Powder 1 Application(s) Topical three times a day  nystatin Powder 1 Application(s) Topical three times a day  polyethylene glycol 3350 17 Gram(s) Oral daily  rivaroxaban 20 milliGRAM(s) Oral with dinner  senna 2 Tablet(s) Oral at bedtime  tamsulosin 0.4 milliGRAM(s) Oral at bedtime  torsemide 20 milliGRAM(s) Oral daily    MEDICATIONS  (PRN):  acetaminophen     Tablet .. 650 milliGRAM(s) Oral every 6 hours PRN Temp greater or equal to 38C (100.4F), Mild Pain (1 - 3)  aluminum hydroxide/magnesium hydroxide/simethicone Suspension 30 milliLiter(s) Oral every 4 hours PRN Dyspepsia  bisacodyl 5 milliGRAM(s) Oral daily PRN Constipation  bisacodyl Suppository 10 milliGRAM(s) Rectal daily PRN Constipation  cyclobenzaprine 5 milliGRAM(s) Oral two times a day PRN Muscle Spasm  dextrose Oral Gel 15 Gram(s) Oral once PRN Blood Glucose LESS THAN 70 milliGRAM(s)/deciliter  melatonin 3 milliGRAM(s) Oral at bedtime PRN Insomnia  ondansetron Injectable 4 milliGRAM(s) IV Push every 8 hours PRN Nausea and/or Vomiting  oxycodone    5 mG/acetaminophen 325 mG 1 Tablet(s) Oral every 4 hours PRN Severe Pain (7 - 10)  traMADol 25 milliGRAM(s) Oral every 6 hours PRN Moderate Pain (4 - 6)      Allergies    No Known Allergies      PHYSICAL EXAM:   Vital Signs:  Vital Signs Last 24 Hrs  T(C): 36.5 (18 Oct 2023 05:00), Max: 36.6 (17 Oct 2023 12:46)  T(F): 97.7 (18 Oct 2023 05:00), Max: 97.9 (17 Oct 2023 20:00)  HR: 81 (18 Oct 2023 05:00) (81 - 84)  BP: 107/63 (18 Oct 2023 05:00) (107/63 - 137/85)  BP(mean): --  RR: 18 (18 Oct 2023 05:00) (18 - 18)  SpO2: 95% (18 Oct 2023 05:00) (93% - 95%)    Parameters below as of 18 Oct 2023 05:00  Patient On (Oxygen Delivery Method): room air      Daily     Daily Weight in k (18 Oct 2023 05:00)    GENERAL:  Appears stated age  HEENT:  NC/AT  CHEST:  Full & symmetric excursion  HEART:  Regular rhythm  ABDOMEN:  Soft, non-tender, non-distended  EXTEREMITIES:  no cyanosis  SKIN:  No rash  NEURO:  Alert      LABS:                        13.0   5.83  )-----------( 237      ( 18 Oct 2023 06:37 )             39.6     10-18    142  |  107  |  18  ----------------------------<  181<H>  4.0   |  28  |  0.64    Ca    9.0      18 Oct 2023 06:37        Urinalysis Basic - ( 18 Oct 2023 06:37 )    Color: x / Appearance: x / SG: x / pH: x  Gluc: 181 mg/dL / Ketone: x  / Bili: x / Urobili: x   Blood: x / Protein: x / Nitrite: x   Leuk Esterase: x / RBC: x / WBC x   Sq Epi: x / Non Sq Epi: x / Bacteria: x

## 2023-10-18 NOTE — PROGRESS NOTE ADULT - PROBLEM SELECTOR PROBLEM 6
At risk of urinary tract infection

## 2023-10-18 NOTE — PROGRESS NOTE ADULT - NUTRITIONAL ASSESSMENT
This patient has been assessed with a concern for Malnutrition and has been determined to have a diagnosis/diagnoses of Morbid obesity (BMI > 40).    This patient is being managed with:   Diet Consistent Carbohydrate w/Evening Snack-  Entered: Oct  9 2023  2:35PM  
This patient has been assessed with a concern for Malnutrition and has been determined to have a diagnosis/diagnoses of Morbid obesity (BMI > 40).    This patient is being managed with:   Diet Consistent Carbohydrate w/Evening Snack-  Entered: Oct  9 2023  2:35PM

## 2023-10-18 NOTE — PROGRESS NOTE ADULT - SUBJECTIVE AND OBJECTIVE BOX
[facility] -- Angela Alaniz, Tico Simmons Savella, , Angel Luis Whitaker  Office # [phone]    Follow Up:      Subjective/Observations:     REVIEW OF SYSTEMS: All other review of systems is negative unless indicated above  PAST MEDICAL & SURGICAL HISTORY:  Atrial fibrillation      Type 2 diabetes mellitus      Congestive heart failure      Scalp psoriasis      S/P tonsillectomy      S/P appendectomy      H/O umbilical hernia repair        MEDICATIONS  (STANDING):  dextrose 5%. 1000 milliLiter(s) (100 mL/Hr) IV Continuous <Continuous>  dextrose 5%. 1000 milliLiter(s) (50 mL/Hr) IV Continuous <Continuous>  dextrose 50% Injectable 25 Gram(s) IV Push once  dextrose 50% Injectable 12.5 Gram(s) IV Push once  dextrose 50% Injectable 25 Gram(s) IV Push once  doxycycline monohydrate Capsule 100 milliGRAM(s) Oral every 12 hours  gabapentin 300 milliGRAM(s) Oral every 8 hours  glucagon  Injectable 1 milliGRAM(s) IntraMuscular once  hydrocortisone 1% Cream 1 Application(s) Topical two times a day  insulin glargine Injectable (LANTUS) 30 Unit(s) SubCutaneous every morning  insulin glargine Injectable (LANTUS) 30 Unit(s) SubCutaneous at bedtime  insulin lispro (ADMELOG) corrective regimen sliding scale   SubCutaneous three times a day before meals  insulin lispro (ADMELOG) corrective regimen sliding scale   SubCutaneous at bedtime  insulin lispro Injectable (ADMELOG) 10 Unit(s) SubCutaneous three times a day before meals  metoprolol succinate  milliGRAM(s) Oral daily  naloxone Injectable 0.4 milliGRAM(s) IV Push once  nystatin Powder 1 Application(s) Topical three times a day  nystatin Powder 1 Application(s) Topical three times a day  polyethylene glycol 3350 17 Gram(s) Oral daily  rivaroxaban 20 milliGRAM(s) Oral with dinner  senna 2 Tablet(s) Oral at bedtime  tamsulosin 0.4 milliGRAM(s) Oral at bedtime  torsemide 20 milliGRAM(s) Oral daily    MEDICATIONS  (PRN):  acetaminophen     Tablet .. 650 milliGRAM(s) Oral every 6 hours PRN Temp greater or equal to 38C (100.4F), Mild Pain (1 - 3)  aluminum hydroxide/magnesium hydroxide/simethicone Suspension 30 milliLiter(s) Oral every 4 hours PRN Dyspepsia  bisacodyl 5 milliGRAM(s) Oral daily PRN Constipation  bisacodyl Suppository 10 milliGRAM(s) Rectal daily PRN Constipation  cyclobenzaprine 5 milliGRAM(s) Oral two times a day PRN Muscle Spasm  dextrose Oral Gel 15 Gram(s) Oral once PRN Blood Glucose LESS THAN 70 milliGRAM(s)/deciliter  melatonin 3 milliGRAM(s) Oral at bedtime PRN Insomnia  ondansetron Injectable 4 milliGRAM(s) IV Push every 8 hours PRN Nausea and/or Vomiting  oxycodone    5 mG/acetaminophen 325 mG 1 Tablet(s) Oral every 4 hours PRN Severe Pain (7 - 10)  traMADol 25 milliGRAM(s) Oral every 6 hours PRN Moderate Pain (4 - 6)    Allergies    No Known Allergies    Intolerances      Vital Signs Last 24 Hrs  T(C): 36.5 (18 Oct 2023 05:00), Max: 36.6 (17 Oct 2023 12:46)  T(F): 97.7 (18 Oct 2023 05:00), Max: 97.9 (17 Oct 2023 20:00)  HR: 81 (18 Oct 2023 05:00) (81 - 84)  BP: 107/63 (18 Oct 2023 05:00) (107/63 - 137/85)  BP(mean): --  RR: 18 (18 Oct 2023 05:00) (18 - 18)  SpO2: 95% (18 Oct 2023 05:00) (93% - 95%)    Parameters below as of 18 Oct 2023 05:00  Patient On (Oxygen Delivery Method): room air      I&O's Summary    17 Oct 2023 07:01  -  18 Oct 2023 07:00  --------------------------------------------------------  IN: 0 mL / OUT: 1300 mL / NET: -1300 mL        PHYSICAL EXAM:  TELE:   Constitutional: NAD, awake and alert, well-developed  HEENT: Moist Mucous Membranes, Anicteric  Pulmonary: Non-labored, breath sounds are clear bilaterally, No wheezing, rales or rhonchi  Cardiovascular: Regular, S1 and S2, No murmurs, rubs, gallops or clicks  Gastrointestinal: Bowel Sounds present, soft, nontender.   Lymph: No peripheral edema. No lymphadenopathy.  Skin: No visible rashes or ulcers.  Psych:  Mood & affect appropriate  LABS: All Labs Reviewed:                        13.0   5.83  )-----------( 237      ( 18 Oct 2023 06:37 )             39.6                         12.9   6.15  )-----------( 224      ( 16 Oct 2023 06:20 )             38.4     18 Oct 2023 06:37    142    |  107    |  18     ----------------------------<  181    4.0     |  28     |  0.64   16 Oct 2023 06:20    138    |  103    |  19     ----------------------------<  197    3.8     |  31     |  0.73     Ca    9.0        18 Oct 2023 06:37  Ca    8.9        16 Oct 2023 06:20            12 Lead ECG:   Ventricular Rate 118 BPM    Atrial Rate 141 BPM    QRS Duration 78 ms    Q-T Interval 336 ms    QTC Calculation(Bazett) 470 ms    R Axis 7 degrees    T Axis -77 degrees    Diagnosis Line Atrial fibrillation  Nonspecific ST and T wave abnormality  Abnormal ECG  Confirmed by fernando Umaña ([id number]) on 10/9/2023 12:12:07 PM (10-09-23 @ 11:25)      TRANSTHORACIC ECHOCARDIOGRAM REPORT  ________________________________________________________________________________                                      _______       Pt. Name:       MELITON LOZANO Study Date:    10/10/2023  MRN:            [id number]          YOB: 1965  Accession #:    [id number]         Age:           58 years  Account#:       [id number]        Gender:        F  Heart Rate:                       Height:        59.84 in (152.00 cm)  Rhythm:      Weight:        249.12 lb (113.00 kg)  Blood Pressure: 125/75 mmHg       BSA/BMI:       2.05 m² / 48.91 kg/m²  ________________________________________________________________________________________  Referring Physician:    [id number] Garrick Spring  Interpreting Physician: Fahad Simmons  Primary Sonographer:    Brittny Zurita JOANIE    CPT:               ECHO TTE WO CON COMP W DOPP - 24884.m  Indication(s):     Heart failure, unspecified - I50.9  Procedure:         Transthoracic echocardiogram with 2-D, M-mode and complete                     spectral and color flow Doppler.  Ordering Location: [facility]    _______________________________________________________________________________________     CONCLUSIONS:      1. Left ventricular systolic function is normal with an ejection fraction of 66 % by White's method of disks.   2. There is normal LV mass and concentric remodeling.   3. Normal left ventricular diastolic function.   4. Normal right ventricular cavity size, wall thickness and systolic function.   5. The left atrium is normal in size.   6. Trace mitral regurgitation.   7. Structurally normal mitral valve with normal leaflet excursion.   8. There is calcification of the mitral valve annulus.    ________________________________________________________________________________________  FINDINGS:     Left Ventricle:  Left ventricular systolic function is normal with a calculated ejection fraction of 66 % by the White's biplane method of disks. There is normal left ventricular diastolic function. Mild to moderate left ventricular hypertrophy. There is normal LV mass and concentric remodeling.     Right Ventricle:  The right ventricular cavity is normal in size, normal wall thickness and normal systolic function.     Left Atrium:  The left atrium is normal in size.     Right Atrium:  The right atrium is normal in size with an indexed volume of 13.23 ml/m².     Aortic Valve:  The aortic valve is tricuspid with normal leaflet excursion. There is no aortic valve stenosis. There is no evidence of aortic regurgitation.     Mitral Valve:  Structurally normal mitral valve with normal leaflet excursion. There is diffuse mitral valve thickening. There is calcification of the mitral valve annulus. There is no mitral valve stenosis. There is trace mitral regurgitation.     Tricuspid Valve:  Structurally normal tricuspid valve with normal leaflet excursion. There is trace tricuspid regurgitation. Estimated pulmonary artery systolic pressure is 28 mmHg.     Pulmonic Valve:  Structurally normal pulmonic valve with normal leaflet excursion. There is trace pulmonic regurgitation.     Aorta:  The aortic root at the sinuses of Valsalva is normal in size, measuring 3.20 cm (indexed 1.57 cm/m²).     Pericardium:  No pericardial effusion seen.     Systemic Veins:  The inferior vena cava is normal in size measuring 1.10 cm in diameter, (normal <2.1cm) with normal inspiratory collapse (normal >50%) consistent with normal right atrial pressure (~3, range 0-5mmHg).  ____________________________________________________________________  Quantitative Data:  Left Ventricle Measurements: (Indexed to BSA)     IVSd (2D):   1.3 cm  LVPWd (2D):  1.0 cm  LVIDd (2D):  4.2 cm  LVIDs (2D):  2.5 cm  LV Mass:     167 g  81.9 g/m²  LV Vol d, MOD A2C: 90.1 ml 44.04 ml/m²  LV Vol d, MOD A4C: 81.2 ml 39.68 ml/m²  LV Vol d, MOD BP:  87.1 ml 42.59 ml/m²  LV Vol s, MOD A2C: 29.7 ml 14.53 ml/m²  LV Vol s, MOD A4C: 29.3 ml 14.33 ml/m²  LV Vol s, MOD BP:  29.5 ml 14.41 ml/m²  LVOT SV MOD BP:    57.6 ml  LV EF% MOD BP:     66 %     MV E Vmax:    1.19 m/s  e' lateral:   11.10 cm/s  e' medial:    10.10 cm/s  E/e' lateral: 10.76  E/e' medial:  11.90  E/e' Average: 11.26  MV DT:        136 msec    Aorta Measurements: (normal range) (Indexed to BSA)     Sinuses of Valsalva: 3.20 cm (2.7 - 3.3 cm)       Left Atrium Measurements: (Indexed to BSA)  LA Diam 2D: 2.98 cm    Right Atrial Measurements:     RA Vol:       27.05 ml  RA Vol Index: 13.23 ml/m²    Mitral Valve Measurements:     MV E Vmax: 1.2 m/s       Tricuspid Valve Measurements:     TR Vmax:          2.5 m/s  TR Peak Gradient: 24.9 mmHg  RA Pressure:      3 mmHg  PASP:             28 mmHg    ________________________________________________________________________________________  Electronically signed on 10/10/2023 at 9:18:26 AM by Fahad Simmons         *** Final ***      [facility] -- Angela Alaniz, Tico Simmons Savella, , Angel Luis Whitaker  Office # [phone]    Follow Up:  Afib    Subjective/Observations: Awake and alert, c/o leg pains.  Comfortable on RA.  Denies any from of respiratory or cardiac discomfort    REVIEW OF SYSTEMS: All other review of systems is negative unless indicated above  PAST MEDICAL & SURGICAL HISTORY:  Atrial fibrillation  Type 2 diabetes mellitus  Congestive heart failure  Scalp psoriasis  S/P tonsillectomy  S/P appendectomy  H/O umbilical hernia repair    MEDICATIONS  (STANDING):  dextrose 5%. 1000 milliLiter(s) (100 mL/Hr) IV Continuous <Continuous>  dextrose 5%. 1000 milliLiter(s) (50 mL/Hr) IV Continuous <Continuous>  dextrose 50% Injectable 25 Gram(s) IV Push once  dextrose 50% Injectable 12.5 Gram(s) IV Push once  dextrose 50% Injectable 25 Gram(s) IV Push once  doxycycline monohydrate Capsule 100 milliGRAM(s) Oral every 12 hours  gabapentin 300 milliGRAM(s) Oral every 8 hours  glucagon  Injectable 1 milliGRAM(s) IntraMuscular once  hydrocortisone 1% Cream 1 Application(s) Topical two times a day  insulin glargine Injectable (LANTUS) 30 Unit(s) SubCutaneous every morning  insulin glargine Injectable (LANTUS) 30 Unit(s) SubCutaneous at bedtime  insulin lispro (ADMELOG) corrective regimen sliding scale   SubCutaneous three times a day before meals  insulin lispro (ADMELOG) corrective regimen sliding scale   SubCutaneous at bedtime  insulin lispro Injectable (ADMELOG) 10 Unit(s) SubCutaneous three times a day before meals  metoprolol succinate  milliGRAM(s) Oral daily  naloxone Injectable 0.4 milliGRAM(s) IV Push once  nystatin Powder 1 Application(s) Topical three times a day  nystatin Powder 1 Application(s) Topical three times a day  polyethylene glycol 3350 17 Gram(s) Oral daily  rivaroxaban 20 milliGRAM(s) Oral with dinner  senna 2 Tablet(s) Oral at bedtime  tamsulosin 0.4 milliGRAM(s) Oral at bedtime  torsemide 20 milliGRAM(s) Oral daily    MEDICATIONS  (PRN):  acetaminophen     Tablet .. 650 milliGRAM(s) Oral every 6 hours PRN Temp greater or equal to 38C (100.4F), Mild Pain (1 - 3)  aluminum hydroxide/magnesium hydroxide/simethicone Suspension 30 milliLiter(s) Oral every 4 hours PRN Dyspepsia  bisacodyl 5 milliGRAM(s) Oral daily PRN Constipation  bisacodyl Suppository 10 milliGRAM(s) Rectal daily PRN Constipation  cyclobenzaprine 5 milliGRAM(s) Oral two times a day PRN Muscle Spasm  dextrose Oral Gel 15 Gram(s) Oral once PRN Blood Glucose LESS THAN 70 milliGRAM(s)/deciliter  melatonin 3 milliGRAM(s) Oral at bedtime PRN Insomnia  ondansetron Injectable 4 milliGRAM(s) IV Push every 8 hours PRN Nausea and/or Vomiting  oxycodone    5 mG/acetaminophen 325 mG 1 Tablet(s) Oral every 4 hours PRN Severe Pain (7 - 10)  traMADol 25 milliGRAM(s) Oral every 6 hours PRN Moderate Pain (4 - 6)    Allergies    No Known Allergies    Intolerances    Vital Signs Last 24 Hrs  T(C): 36.5 (18 Oct 2023 05:00), Max: 36.6 (17 Oct 2023 12:46)  T(F): 97.7 (18 Oct 2023 05:00), Max: 97.9 (17 Oct 2023 20:00)  HR: 81 (18 Oct 2023 05:00) (81 - 84)  BP: 107/63 (18 Oct 2023 05:00) (107/63 - 137/85)  BP(mean): --  RR: 18 (18 Oct 2023 05:00) (18 - 18)  SpO2: 95% (18 Oct 2023 05:00) (93% - 95%)    Parameters below as of 18 Oct 2023 05:00  Patient On (Oxygen Delivery Method): room air    I&O's Summary    17 Oct 2023 07:01  -  18 Oct 2023 07:00  --------------------------------------------------------  IN: 0 mL / OUT: 1300 mL / NET: -1300 mL     PHYSICAL EXAM:  TELE: Not on tele  Constitutional: NAD, awake and alert, obese  HEENT: Moist Mucous Membranes, Anicteric  Pulmonary: Non-labored, breath sounds are clear bilaterally, No wheezing, rales or rhonchi  Cardiovascular: IRRR, S1 and S2, No murmurs, rubs, gallops or clicks  Gastrointestinal: Bowel Sounds present, soft, nontender.   Lymph: No peripheral edema. No lymphadenopathy.  Skin: No visible rashes.  +chronic skin changes on BLE  Psych:  Mood & affect: Flat  LABS: All Labs Reviewed:                        13.0   5.83  )-----------( 237      ( 18 Oct 2023 06:37 )             39.6                         12.9   6.15  )-----------( 224      ( 16 Oct 2023 06:20 )             38.4     18 Oct 2023 06:37    142    |  107    |  18     ----------------------------<  181    4.0     |  28     |  0.64   16 Oct 2023 06:20    138    |  103    |  19     ----------------------------<  197    3.8     |  31     |  0.73     Ca    9.0        18 Oct 2023 06:37  Ca    8.9        16 Oct 2023 06:20    12 Lead ECG:   Ventricular Rate 118 BPM    Atrial Rate 141 BPM    QRS Duration 78 ms    Q-T Interval 336 ms    QTC Calculation(Bazett) 470 ms    R Axis 7 degrees    T Axis -77 degrees    Diagnosis Line Atrial fibrillation  Nonspecific ST and T wave abnormality  Abnormal ECG  Confirmed by fernando Umaña ([id number]) on 10/9/2023 12:12:07 PM (10-09-23 @ 11:25)    TRANSTHORACIC ECHOCARDIOGRAM REPORT  ________________________________________________________________________________                                      _______  Pt. Name:       MELITON LOZANO Study Date:    10/10/2023  MRN:            [id number]          YOB: 1965  Accession #:    [id number]         Age:           58 years  Account#:       [id number]        Gender:        F  Heart Rate:                       Height:        59.84 in (152.00 cm)  Rhythm:      Weight:        249.12 lb (113.00 kg)  Blood Pressure: 125/75 mmHg       BSA/BMI:       2.05 m² / 48.91 kg/m²  ________________________________________________________________________________________  Referring Physician:    [id number] Garrick Spring  Interpreting Physician: Fahad Simmons  Primary Sonographer:    Brittny Zurita RDCS    CPT:               ECHO TTE WO CON COMP W DOPP - 96783.m  Indication(s):     Heart failure, unspecified - I50.9  Procedure:         Transthoracic echocardiogram with 2-D, M-mode and complete                     spectral and color flow Doppler.  Ordering Location: [facility]  _____________________________________________________________________________________     CONCLUSIONS:      1. Left ventricular systolic function is normal with an ejection fraction of 66 % by White's method of disks.   2. There is normal LV mass and concentric remodeling.   3. Normal left ventricular diastolic function.   4. Normal right ventricular cavity size, wall thickness and systolic function.   5. The left atrium is normal in size.   6. Trace mitral regurgitation.   7. Structurally normal mitral valve with normal leaflet excursion.   8. There is calcification of the mitral valve annulus.  _____________________________________________________________________________________  FINDINGS:     Left Ventricle:  Left ventricular systolic function is normal with a calculated ejection fraction of 66 % by the White's biplane method of disks. There is normal left ventricular diastolic function. Mild to moderate left ventricular hypertrophy. There is normal LV mass and concentric remodeling.     Right Ventricle:  The right ventricular cavity is normal in size, normal wall thickness and normal systolic function.     Left Atrium:  The left atrium is normal in size.     Right Atrium:  The right atrium is normal in size with an indexed volume of 13.23 ml/m².     Aortic Valve:  The aortic valve is tricuspid with normal leaflet excursion. There is no aortic valve stenosis. There is no evidence of aortic regurgitation.     Mitral Valve:  Structurally normal mitral valve with normal leaflet excursion. There is diffuse mitral valve thickening. There is calcification of the mitral valve annulus. There is no mitral valve stenosis. There is trace mitral regurgitation.     Tricuspid Valve:  Structurally normal tricuspid valve with normal leaflet excursion. There is trace tricuspid regurgitation. Estimated pulmonary artery systolic pressure is 28 mmHg.     Pulmonic Valve:  Structurally normal pulmonic valve with normal leaflet excursion. There is trace pulmonic regurgitation.     Aorta:  The aortic root at the sinuses of Valsalva is normal in size, measuring 3.20 cm (indexed 1.57 cm/m²).     Pericardium:  No pericardial effusion seen.     Systemic Veins:  The inferior vena cava is normal in size measuring 1.10 cm in diameter, (normal <2.1cm) with normal inspiratory collapse (normal >50%) consistent with normal right atrial pressure (~3, range 0-5mmHg).  ____________________________________________________________________  Quantitative Data:  Left Ventricle Measurements: (Indexed to BSA)     IVSd (2D):   1.3 cm  LVPWd (2D):  1.0 cm  LVIDd (2D):  4.2 cm  LVIDs (2D):  2.5 cm  LV Mass:     167 g  81.9 g/m²  LV Vol d, MOD A2C: 90.1 ml 44.04 ml/m²  LV Vol d, MOD A4C: 81.2 ml 39.68 ml/m²  LV Vol d, MOD BP:  87.1 ml 42.59 ml/m²  LV Vol s, MOD A2C: 29.7 ml 14.53 ml/m²  LV Vol s, MOD A4C: 29.3 ml 14.33 ml/m²  LV Vol s, MOD BP:  29.5 ml 14.41 ml/m²  LVOT SV MOD BP:    57.6 ml  LV EF% MOD BP:     66 %     MV E Vmax:    1.19 m/s  e' lateral:   11.10 cm/s  e' medial:    10.10 cm/s  E/e' lateral: 10.76  E/e' medial:  11.90  E/e' Average: 11.26  MV DT:        136 msec    Aorta Measurements: (normal range) (Indexed to BSA)     Sinuses of Valsalva: 3.20 cm (2.7 - 3.3 cm)       Left Atrium Measurements: (Indexed to BSA)  LA Diam 2D: 2.98 cm    Right Atrial Measurements:     RA Vol:       27.05 ml  RA Vol Index: 13.23 ml/m²    Mitral Valve Measurements:     MV E Vmax: 1.2 m/s       Tricuspid Valve Measurements:     TR Vmax:          2.5 m/s  TR Peak Gradient: 24.9 mmHg  RA Pressure:      3 mmHg  PASP:             28 mmHg    ________________________________________________________________________________________  Electronically signed on 10/10/2023 at 9:18:26 AM by Fahad Simmons         *** Final ***

## 2023-10-18 NOTE — PROGRESS NOTE ADULT - PROBLEM SELECTOR PLAN 1
cont lantus 30 units 2x/day   cont admelog 10 units 3x/day before meals  cont admelog corrective scale coverage qac/qhs  cont cons cho diet  goal bg 100-180 in hosp setting

## 2023-10-18 NOTE — PROGRESS NOTE ADULT - PROBLEM SELECTOR PLAN 6
- Reporting changes with urination consistent with both diabetes and Nephrotic disease   - UA and UCx ordered  - Continue with Vanc/Cefepime
-Ucx noted - 50,000 - 99,000 CFU/mL Klebsiella variicola - pt c/o dysuria - UTI     tt 3 days of iv abx  Rocephin .

## 2023-10-18 NOTE — PROGRESS NOTE ADULT - PROBLEM SELECTOR PLAN 5
- Acute on chronic HfpEF  -S/p IV Lasix  -On Torsemide now   -Dr Umaña cardio  - EF 60-65% in 2/2023

## 2023-10-18 NOTE — PROGRESS NOTE ADULT - PROBLEM SELECTOR PLAN 2
- Continue home medications of Metoprolol and Xarelto   - Pt states that she feels her palpitations when she eats and pain is exacerbated  - EKG: Atrial Fibrillation 118 bpm with nonspecific ST and T wave abnormalities  - Cardiology consulted, Dr Umaña, follow up recommendations
- Improved. Likely secondary to constipation and urinary retention   - Pt received a inpatient and outpatient workup including on prior admissions   - EGD/colonoscopy 2022, negative   - CTA Abdomen to evaluate for mesenteric ischemia, follow up results   - GI following   -Bowel regimen for constipation   - Urinary Retention. S/p straight Cath. Started on Flomax. Monitor Urine output. Ambulate as tolerated. Seen maria luisa HANNA
- Continue home medications of Metoprolol and Xarelto   - Pt states that she feels her palpitations when she eats and pain is exacerbated  - EKG: Atrial Fibrillation 118 bpm with nonspecific ST and T wave abnormalities  - Cardiology consulted, Dr Umaña, follow up recommendations
- Improved. Likely secondary to constipation and urinary retention   - Pt received a inpatient and outpatient workup including on prior admissions   - EGD/colonoscopy 2022, negative   - CTA Abdomen to evaluate for mesenteric ischemia, follow up results   - GI following dr berry gp  -Bowel regimen for constipation -bm +  - Urinary Retention possible sec to constipation . S/p straight Cath. Started on Flomax,  Seen maria luisa Brandt - voiding trial in rehab once pt more ambulatory.

## 2023-10-19 ENCOUNTER — TRANSCRIPTION ENCOUNTER (OUTPATIENT)
Age: 58
End: 2023-10-19

## 2023-10-19 VITALS
TEMPERATURE: 98 F | OXYGEN SATURATION: 91 % | DIASTOLIC BLOOD PRESSURE: 62 MMHG | SYSTOLIC BLOOD PRESSURE: 125 MMHG | HEART RATE: 81 BPM | RESPIRATION RATE: 18 BRPM

## 2023-10-19 DIAGNOSIS — S90.822A BLISTER (NONTHERMAL), LEFT FOOT, INITIAL ENCOUNTER: ICD-10-CM

## 2023-10-19 LAB
GLUCOSE BLDC GLUCOMTR-MCNC: 101 MG/DL — HIGH (ref 70–99)
GLUCOSE BLDC GLUCOMTR-MCNC: 101 MG/DL — HIGH (ref 70–99)
GLUCOSE BLDC GLUCOMTR-MCNC: 145 MG/DL — HIGH (ref 70–99)
GLUCOSE BLDC GLUCOMTR-MCNC: 145 MG/DL — HIGH (ref 70–99)
GLUCOSE BLDC GLUCOMTR-MCNC: 149 MG/DL — HIGH (ref 70–99)
GLUCOSE BLDC GLUCOMTR-MCNC: 149 MG/DL — HIGH (ref 70–99)

## 2023-10-19 PROCEDURE — 99232 SBSQ HOSP IP/OBS MODERATE 35: CPT

## 2023-10-19 PROCEDURE — 99239 HOSP IP/OBS DSCHRG MGMT >30: CPT | Mod: GC

## 2023-10-19 RX ORDER — METOPROLOL TARTRATE 50 MG
1 TABLET ORAL
Qty: 0 | Refills: 0 | DISCHARGE
Start: 2023-10-19

## 2023-10-19 RX ORDER — INSULIN LISPRO 100/ML
0 VIAL (ML) SUBCUTANEOUS
Qty: 0 | Refills: 0 | DISCHARGE
Start: 2023-10-19

## 2023-10-19 RX ORDER — INSULIN GLARGINE 100 [IU]/ML
30 INJECTION, SOLUTION SUBCUTANEOUS
Qty: 5 | Refills: 3
Start: 2023-10-19 | End: 2024-02-15

## 2023-10-19 RX ORDER — FUROSEMIDE 40 MG
1 TABLET ORAL
Refills: 0 | DISCHARGE

## 2023-10-19 RX ORDER — TAMSULOSIN HYDROCHLORIDE 0.4 MG/1
1 CAPSULE ORAL
Qty: 0 | Refills: 0 | DISCHARGE
Start: 2023-10-19

## 2023-10-19 RX ORDER — GABAPENTIN 400 MG/1
1 CAPSULE ORAL
Qty: 0 | Refills: 0 | DISCHARGE
Start: 2023-10-19

## 2023-10-19 RX ORDER — INSULIN ASPART 100 [IU]/ML
12 INJECTION, SOLUTION SUBCUTANEOUS
Qty: 5 | Refills: 3
Start: 2023-10-19 | End: 2024-02-15

## 2023-10-19 RX ORDER — FLUCONAZOLE 150 MG/1
400 TABLET ORAL ONCE
Refills: 0 | Status: COMPLETED | OUTPATIENT
Start: 2023-10-19 | End: 2023-10-19

## 2023-10-19 RX ORDER — INSULIN GLARGINE 100 [IU]/ML
30 INJECTION, SOLUTION SUBCUTANEOUS
Qty: 0 | Refills: 0 | DISCHARGE
Start: 2023-10-19

## 2023-10-19 RX ORDER — INSULIN GLARGINE 100 [IU]/ML
18 INJECTION, SOLUTION SUBCUTANEOUS
Refills: 0 | DISCHARGE

## 2023-10-19 RX ORDER — ERGOCALCIFEROL 1.25 MG/1
1 CAPSULE ORAL
Refills: 0 | DISCHARGE

## 2023-10-19 RX ORDER — METOPROLOL TARTRATE 50 MG
1 TABLET ORAL
Qty: 0 | Refills: 0 | DISCHARGE

## 2023-10-19 RX ORDER — POLYETHYLENE GLYCOL 3350 17 G/17G
17 POWDER, FOR SOLUTION ORAL
Qty: 0 | Refills: 0 | DISCHARGE
Start: 2023-10-19

## 2023-10-19 RX ORDER — CYCLOBENZAPRINE HYDROCHLORIDE 10 MG/1
1 TABLET, FILM COATED ORAL
Qty: 0 | Refills: 0 | DISCHARGE
Start: 2023-10-19

## 2023-10-19 RX ORDER — NYSTATIN CREAM 100000 [USP'U]/G
1 CREAM TOPICAL
Qty: 0 | Refills: 0 | DISCHARGE
Start: 2023-10-19

## 2023-10-19 RX ORDER — SENNA PLUS 8.6 MG/1
2 TABLET ORAL
Qty: 0 | Refills: 0 | DISCHARGE
Start: 2023-10-19

## 2023-10-19 RX ORDER — METFORMIN HYDROCHLORIDE 850 MG/1
1 TABLET ORAL
Refills: 0 | DISCHARGE

## 2023-10-19 RX ORDER — ISOPROPYL ALCOHOL, BENZOCAINE .7; .06 ML/ML; ML/ML
0 SWAB TOPICAL
Qty: 100 | Refills: 1
Start: 2023-10-19

## 2023-10-19 RX ORDER — OXYCODONE AND ACETAMINOPHEN 5; 325 MG/1; MG/1
1 TABLET ORAL
Qty: 0 | Refills: 0 | DISCHARGE
Start: 2023-10-19

## 2023-10-19 RX ORDER — TRAMADOL HYDROCHLORIDE 50 MG/1
0.5 TABLET ORAL
Qty: 0 | Refills: 0 | DISCHARGE
Start: 2023-10-19

## 2023-10-19 RX ORDER — RIVAROXABAN 15 MG-20MG
1 KIT ORAL
Qty: 0 | Refills: 0 | DISCHARGE
Start: 2023-10-19

## 2023-10-19 RX ORDER — MULTIVIT-MIN/FERROUS GLUCONATE 9 MG/15 ML
1 LIQUID (ML) ORAL DAILY
Refills: 0 | Status: CANCELLED | OUTPATIENT
Start: 2023-10-19 | End: 2023-10-19

## 2023-10-19 RX ORDER — RIVAROXABAN 15 MG-20MG
1 KIT ORAL
Qty: 0 | Refills: 0 | DISCHARGE

## 2023-10-19 RX ORDER — INSULIN LISPRO 100/ML
10 VIAL (ML) SUBCUTANEOUS
Qty: 0 | Refills: 0 | DISCHARGE
Start: 2023-10-19

## 2023-10-19 RX ADMIN — GABAPENTIN 300 MILLIGRAM(S): 400 CAPSULE ORAL at 06:31

## 2023-10-19 RX ADMIN — Medication 10 UNIT(S): at 08:06

## 2023-10-19 RX ADMIN — NYSTATIN CREAM 1 APPLICATION(S): 100000 CREAM TOPICAL at 13:15

## 2023-10-19 RX ADMIN — Medication 100 MILLIGRAM(S): at 06:31

## 2023-10-19 RX ADMIN — GABAPENTIN 300 MILLIGRAM(S): 400 CAPSULE ORAL at 13:15

## 2023-10-19 RX ADMIN — Medication 20 MILLIGRAM(S): at 06:31

## 2023-10-19 RX ADMIN — Medication 1 APPLICATION(S): at 06:33

## 2023-10-19 RX ADMIN — INSULIN GLARGINE 30 UNIT(S): 100 INJECTION, SOLUTION SUBCUTANEOUS at 08:06

## 2023-10-19 RX ADMIN — NYSTATIN CREAM 1 APPLICATION(S): 100000 CREAM TOPICAL at 06:33

## 2023-10-19 RX ADMIN — Medication 10 UNIT(S): at 11:54

## 2023-10-19 RX ADMIN — FLUCONAZOLE 400 MILLIGRAM(S): 150 TABLET ORAL at 12:15

## 2023-10-19 NOTE — DISCHARGE NOTE PROVIDER - DETAILS OF MALNUTRITION DIAGNOSIS/DIAGNOSES
This patient has been assessed with a concern for Malnutrition and was treated during this hospitalization for the following Nutrition diagnosis/diagnoses:     -  10/16/2023: Morbid obesity (BMI > 40)

## 2023-10-19 NOTE — CHART NOTE - NSCHARTNOTEFT_GEN_A_CORE
Assessment/Follow up: Pt A+Ox4 during visit. Continues on consistent carbohydrate diet w/ hs snack. Lack of appetite reported with small po intake; 26-75% documented in EMR past week. Pt became very emotional during visit stating she has severe pain/nausea & it is worse when she eats. States she has had GI workup in the past without significant results. Alternatives between diarrhea & constipation. Bowel regimen rx. Last BM 10/19. Encouraged pt to consume small/frequent meals with emphasis on protein sources at all meals/snacks. Pt agreeable to glucerna supplement 8oz po BID to help meet estimated energy/pro needs. Uncontrolled DM, Hgba1c 13.8%(10/10). Hyperglycemia improved. On 30units Lantus q am & q hs plus 10unit admelog TID. Pt states she is barely eating the carbohydrates from the tray, trying to focus on protein intake. Justin feet 2+edema, on torsemide. Recommend changing diet to Consistent Carbohydrate, Low Na with hs snack plus glucerna 8oz BID.  Verbal diet/DM nutrition therapy provided however pt declined written instructions. Plan for discharge to rehab today.        Factors impacting intake: [ ] none [ ] nausea  [ ] vomiting [ ] diarrhea [ ] constipation  [ ]chewing problems [ ] swallowing issues  [x ] other: severe pain, nausea, GI issues per pt    Diet Presciption: Diet, Consistent Carbohydrate w/Evening Snack (10-09-23 @ 14:42)    Intake: Small; 25-75% per pt/EMR past week    Current Weight: 246.6lbs(10/19), Justin feet 2+edema      Pertinent Medications: MEDICATIONS  (STANDING):  dextrose 5%. 1000 milliLiter(s) (50 mL/Hr) IV Continuous <Continuous>  dextrose 5%. 1000 milliLiter(s) (100 mL/Hr) IV Continuous <Continuous>  dextrose 50% Injectable 25 Gram(s) IV Push once  dextrose 50% Injectable 25 Gram(s) IV Push once  dextrose 50% Injectable 12.5 Gram(s) IV Push once  doxycycline monohydrate Capsule 100 milliGRAM(s) Oral every 12 hours  gabapentin 300 milliGRAM(s) Oral every 8 hours  glucagon  Injectable 1 milliGRAM(s) IntraMuscular once  hydrocortisone 1% Cream 1 Application(s) Topical two times a day  insulin glargine Injectable (LANTUS) 30 Unit(s) SubCutaneous at bedtime  insulin glargine Injectable (LANTUS) 30 Unit(s) SubCutaneous every morning  insulin lispro (ADMELOG) corrective regimen sliding scale   SubCutaneous three times a day before meals  insulin lispro (ADMELOG) corrective regimen sliding scale   SubCutaneous at bedtime  insulin lispro Injectable (ADMELOG) 10 Unit(s) SubCutaneous three times a day before meals  metoprolol succinate  milliGRAM(s) Oral daily  naloxone Injectable 0.4 milliGRAM(s) IV Push once  nystatin Powder 1 Application(s) Topical three times a day  nystatin Powder 1 Application(s) Topical three times a day  polyethylene glycol 3350 17 Gram(s) Oral daily  rivaroxaban 20 milliGRAM(s) Oral with dinner  senna 2 Tablet(s) Oral at bedtime  tamsulosin 0.4 milliGRAM(s) Oral at bedtime  torsemide 20 milliGRAM(s) Oral daily    MEDICATIONS  (PRN):  acetaminophen     Tablet .. 650 milliGRAM(s) Oral every 6 hours PRN Temp greater or equal to 38C (100.4F), Mild Pain (1 - 3)  aluminum hydroxide/magnesium hydroxide/simethicone Suspension 30 milliLiter(s) Oral every 4 hours PRN Dyspepsia  bisacodyl 5 milliGRAM(s) Oral daily PRN Constipation  bisacodyl Suppository 10 milliGRAM(s) Rectal daily PRN Constipation  cyclobenzaprine 5 milliGRAM(s) Oral two times a day PRN Muscle Spasm  dextrose Oral Gel 15 Gram(s) Oral once PRN Blood Glucose LESS THAN 70 milliGRAM(s)/deciliter  melatonin 3 milliGRAM(s) Oral at bedtime PRN Insomnia  ondansetron Injectable 4 milliGRAM(s) IV Push every 8 hours PRN Nausea and/or Vomiting  oxycodone    5 mG/acetaminophen 325 mG 1 Tablet(s) Oral every 4 hours PRN Severe Pain (7 - 10)  traMADol 25 milliGRAM(s) Oral every 6 hours PRN Moderate Pain (4 - 6)    Pertinent Labs: 10-18 Na142 mmol/L Glu 181 mg/dL<H> K+ 4.0 mmol/L Cr  0.64 mg/dL BUN 18 mg/dL 10-14 Alb 2.5 g/dL<L>     CAPILLARY BLOOD GLUCOSE      POCT Blood Glucose.: 145 mg/dL (19 Oct 2023 11:51)  POCT Blood Glucose.: 149 mg/dL (19 Oct 2023 07:32)  POCT Blood Glucose.: 194 mg/dL (18 Oct 2023 21:16)  POCT Blood Glucose.: 140 mg/dL (18 Oct 2023 16:56)    Skin: MAD/IAD, right leg cellulitis, left leg cellulitis, left heel blister    Estimated Needs:   [ x] no change since previous assessment  [ ] recalculated:     Previous Nutrition Diagnosis:   [ ] Inadequate Energy Intake [ ]Inadequate Oral Intake [ ] Excessive Energy Intake   [ ] Underweight [ ] Increased Nutrient Needs [x ] Overweight/Obesity   [ ] Altered GI Function [ ] Unintended Weight Loss [ ] Food & Nutrition Related Knowledge Deficit [ ] Malnutrition   [x ] Altered nutrition related labs     Nutrition Diagnosis is [x ] ongoing  [ ] resolved [ ] not applicable     New Nutrition Diagnosis: [x ] Inadequate oral intake related to decreased ability to consume sufficient energy as evidenced by severe pain/nausea that are worsened while eating.       Interventions:   Recommend  [x ] Change Diet To: Consistent carbohydrate, Low Na w/ hs snack. Encourage consumption of protein sources at all meals.   [x ] Nutrition Supplement: Glucerna 8oz po BID.   [ ] Nutrition Support  [x ] Other: MVI with minerals daily.     Monitoring and Evaluation:   [x ] PO intake [ x ] Tolerance to diet prescription [ x ] weights [ x ] labs[ x ] follow up per protocol  [ ] other:

## 2023-10-19 NOTE — DISCHARGE NOTE PROVIDER - CARE PROVIDER_API CALL
Desmond Brandt  Urology  [address], [address]  [place] [address]  Phone: [phone]  Fax: [phone]  Follow Up Time:    Desmond Brandt  Urology  [address], [address]  [place] [address]  Phone: [phone]  Fax: [phone]  Follow Up Time:     Kory Umaña  Cardiovascular Disease  [address], [place] [id number]  Phone: [phone]  Fax: [phone]  Follow Up Time:

## 2023-10-19 NOTE — SOCIAL WORK PROGRESS NOTE - NSSWPROGRESSNOTE_GEN_ALL_CORE
Auth was received for cynthia at [facility]/Auth rec from [facility] auth # [id number]/ 3 Days Follow up w dianne SPANN [phone] . sw to inform boyfriend. Pt in agreement.  [facility] no further [facility] services indicated at this time.

## 2023-10-19 NOTE — PROGRESS NOTE ADULT - ASSESSMENT
59 yo F with PMH of T2DM, Afib on Xarelto, CHF (EF 60-65% in 2/2023), neuropathy, chronic b/l LE wounds admitted for cellulitis    Edema, Persistent Afib  - Chronic b/l LE edema with cellulitic changes, otherwise, no meaningful evidence of volume overload.  Non-orthopneic  - No O2 requirement.  Pro-BNP unremarkable and CxR not consistent with CHF  - TTE showed normal LVF, EF 66% with no significant valvular disease  - Continue Torsemide 20 mg daily    - Remains rate-controlled Afib  - Continue Xarelto and Metoprolol     - PO Abx for cellulitis per ID  - Monitor and replete lytes, keep K>4, Mg>2.  - Stable for D/C from cardiac standpoint.  to f/u with her private cardiologist, Dr. Denny Salcedo DNP, NP-C, AGACNP-C  Cardiology   Call TEAMS

## 2023-10-19 NOTE — DISCHARGE NOTE NURSING/CASE MANAGEMENT/SOCIAL WORK - NSDCPETBCESMAN_GEN_ALL_CORE
If you are a smoker, it is important for your health to stop smoking. Please be aware that second hand smoke is also harmful.
Specialty Care

## 2023-10-19 NOTE — CONSULT NOTE ADULT - PROVIDER SPECIALTY LIST ADULT
Gastroenterology
Podiatry
Cardiology
Urology
Endocrinology
Infectious Disease
Endocrinology
Wound Care
Diabetes

## 2023-10-19 NOTE — DISCHARGE NOTE PROVIDER - NSDCFUADDAPPT_GEN_ALL_CORE_FT
voiding trial in 3 to 4 days once more ambulatory.   voiding trial in 3 to 4 days once more ambulatory. fu your blood sugar and bp closely.

## 2023-10-19 NOTE — DISCHARGE NOTE NURSING/CASE MANAGEMENT/SOCIAL WORK - PATIENT PORTAL LINK FT
You can access the FollowMyHealth Patient Portal offered by [facility] by registering at the following website: [email or web address]. By joining [employer]’s FollowMyHealth portal, you will also be able to view your health information using other applications (apps) compatible with our system.

## 2023-10-19 NOTE — PROGRESS NOTE ADULT - REASON FOR ADMISSION
Right Left Wound

## 2023-10-19 NOTE — DISCHARGE NOTE PROVIDER - HOSPITAL COURSE
-pt  admitted  with  RLE wound  with cellulitis suspect 2/2 Diabetes   - XR b/L lower extremities and R foot performed, generalized soft issue edema without evidence of bony involvement  - S/p  Vancomycin    cellulitis improved - Blood Cultures NGTD , Now on doxycycline x 3 day   - Symptomatic UTI pt c/o dysuria  - -Ucx noted - 50,000 - 99,000 CFU/mL Klebsiella variicola -  iv abx  Ceftriaxone x 3 days  completed   pt  bl leg edema with venous stasis - keep  refusing venous compression stocking   - Pain management: Tylenol 650 mg mild, Tramadol for moderate pain and oxycodone for severe pain   S/p iv  Lasix  Torsemide for bl leg edema  and  Vascular studies on prior admission showed significant venous reflux, without evidence of extensive stenosis or vascular disease.   - Podiatry  Dr. Meek  lt heel blister / seen by also wound care dr grace continue xeroform dressing , pt   with new  urinary retention  Likely secondary to constipation  with new sullivan placed   ,  CTA Abdomen to evaluate for mesenteric ischemia-   No CT evidence of mesenteric arterial or venous insufficiency.  - GI following dr berry gp  -Bowel regimen for constipation -bm +  - Urinary Retention possible sec to constipation . . Started on Flomax,  Seen [facility] dr Brandt - voiding trial in rehab once pt more ambulatory.  . -Plan for [facility] discharge when medically ready    -pt  admitted  with  RLE dm wound  with rt cellulitis suspect 2/2  uncontrolled Diabetes   - XR b/L lower extremities and R foot performed, generalized soft issue edema without evidence of bony involvement  - S/p  Vancomycin    cellulitis improved - Blood Cultures NGTD , Now on doxycycline x 3 day finished course   - Symptomatic UTI pt c/o dysuria  - -Ucx noted - 50,000 - 99,000 CFU/mL Klebsiella variicola -  iv abx  Ceftriaxone x 3 days  completed   pt  bl leg edema with venous stasis - keep  refusing venous compression stocking   Pain management: Tylenol 650 mg mild, Tramadol for moderate pain and oxycodone for severe pain   S/p iv  Lasix  Torsemide for bl leg edema  and  Vascular studies on prior admission showed significant venous reflux, without evidence of extensive stenosis or vascular disease.   - Podiatry  Dr. Meek  lt heel blister / seen by also wound care dr grace  continue xeroform dressing , pt   with new  urinary retention  Likely secondary to constipation  with new sullivan placed   ,  CTA Abdomen to evaluate for mesenteric ischemia-   No CT evidence of  acute  mesenteric arterial or venous insufficiency. GI following dr berry gp  abdominal  pain  was   possible with underlying chronic mesenteric ischemia with hx of  afib   -Bowel regimen for constipation -bm +  - Urinary Retention possible sec to constipation ,  Started on Flomax,    Seen [facility]  dr Brandt - voiding trial in rehab once pt more ambulatory so can be dc with sullivan .   pt having vaginal fungal infection oral dose 400 mg / fluconazole given with nystatin powder.   . -Plan for [facility] discharge  medically ready . pt boyfriend mr mckeon   aware all dc  tt plan .   physical exam  day of dc  10/19/23      Vital Signs Last 24 Hrs  T(C): 36.7 (19 Oct 2023 04:56), Max: 36.7 (18 Oct 2023 20:04)  T(F): 98 (19 Oct 2023 04:56), Max: 98.1 (18 Oct 2023 20:04)  HR: 81 (19 Oct 2023 04:56) (78 - 81)  BP: 117/76 (19 Oct 2023 04:56) (117/76 - 129/76)  BP(mean): --  RR: 18 (19 Oct 2023 04:56) (18 - 18)  SpO2: 94% (19 Oct 2023 04:56) (94% - 96%)    Parameters below as of 19 Oct 2023 04:56  Patient On (Oxygen Delivery Method): room air      PHYSICAL EXAM:  GENERAL: NAD,   HEAD:  Atraumatic, Normocephalic  EYES: EOMI, PERRLA, conjunctiva and sclera clear  ENMT: Moist mucous membranes  NECK: Supple, No JVD  NERVOUS SYSTEM:  Alert & Oriented X3; Motor Strength 5/5 B/L upper and lower extremities; DTRs 2+ intact and symmetric  CHEST/LUNG:  percussion bilaterally; No rales, rhonchi, wheezing,   HEART: Regular rate and rhythm; No murmurs, no tachy   ABDOMEN: Soft, Nontender, Nondistended; Bowel sounds present  EXTREMITIES:  2+ Peripheral Pulses, No clubbing, cyanosis,  bl  + edema  SKIN: No rashes or lesions  bl lower ext venous stasis changes / lt heal blister+  gu sullivan / clear urine     total time spend 40 min .

## 2023-10-19 NOTE — PROGRESS NOTE ADULT - SUBJECTIVE AND OBJECTIVE BOX
[facility] -- Angela Alaniz, Tico Simmons Savella, , Angel Luis Whitaker  Office # [phone]    Follow Up:   Afib    Subjective/Observations: Awake and alert.  Comfortable on RA.  Denies any from of respiratory or cardiac discomfort    REVIEW OF SYSTEMS: All other review of systems is negative unless indicated above  PAST MEDICAL & SURGICAL HISTORY:  Atrial fibrillation  Type 2 diabetes mellitus  Congestive heart failure  Scalp psoriasis  S/P tonsillectomy  S/P appendectomy  H/O umbilical hernia repair    MEDICATIONS  (STANDING):  dextrose 5%. 1000 milliLiter(s) (50 mL/Hr) IV Continuous <Continuous>  dextrose 5%. 1000 milliLiter(s) (100 mL/Hr) IV Continuous <Continuous>  dextrose 50% Injectable 25 Gram(s) IV Push once  dextrose 50% Injectable 12.5 Gram(s) IV Push once  dextrose 50% Injectable 25 Gram(s) IV Push once  doxycycline monohydrate Capsule 100 milliGRAM(s) Oral every 12 hours  fluconAZOLE   Tablet 400 milliGRAM(s) Oral once  gabapentin 300 milliGRAM(s) Oral every 8 hours  glucagon  Injectable 1 milliGRAM(s) IntraMuscular once  hydrocortisone 1% Cream 1 Application(s) Topical two times a day  insulin glargine Injectable (LANTUS) 30 Unit(s) SubCutaneous every morning  insulin glargine Injectable (LANTUS) 30 Unit(s) SubCutaneous at bedtime  insulin lispro (ADMELOG) corrective regimen sliding scale   SubCutaneous three times a day before meals  insulin lispro (ADMELOG) corrective regimen sliding scale   SubCutaneous at bedtime  insulin lispro Injectable (ADMELOG) 10 Unit(s) SubCutaneous three times a day before meals  metoprolol succinate  milliGRAM(s) Oral daily  naloxone Injectable 0.4 milliGRAM(s) IV Push once  nystatin Powder 1 Application(s) Topical three times a day  nystatin Powder 1 Application(s) Topical three times a day  polyethylene glycol 3350 17 Gram(s) Oral daily  rivaroxaban 20 milliGRAM(s) Oral with dinner  senna 2 Tablet(s) Oral at bedtime  tamsulosin 0.4 milliGRAM(s) Oral at bedtime  torsemide 20 milliGRAM(s) Oral daily    MEDICATIONS  (PRN):  acetaminophen     Tablet .. 650 milliGRAM(s) Oral every 6 hours PRN Temp greater or equal to 38C (100.4F), Mild Pain (1 - 3)  aluminum hydroxide/magnesium hydroxide/simethicone Suspension 30 milliLiter(s) Oral every 4 hours PRN Dyspepsia  bisacodyl 5 milliGRAM(s) Oral daily PRN Constipation  bisacodyl Suppository 10 milliGRAM(s) Rectal daily PRN Constipation  cyclobenzaprine 5 milliGRAM(s) Oral two times a day PRN Muscle Spasm  dextrose Oral Gel 15 Gram(s) Oral once PRN Blood Glucose LESS THAN 70 milliGRAM(s)/deciliter  melatonin 3 milliGRAM(s) Oral at bedtime PRN Insomnia  ondansetron Injectable 4 milliGRAM(s) IV Push every 8 hours PRN Nausea and/or Vomiting  oxycodone    5 mG/acetaminophen 325 mG 1 Tablet(s) Oral every 4 hours PRN Severe Pain (7 - 10)  traMADol 25 milliGRAM(s) Oral every 6 hours PRN Moderate Pain (4 - 6)    Allergies    No Known Allergies    Intolerances    Vital Signs Last 24 Hrs  T(C): 36.7 (19 Oct 2023 04:56), Max: 36.7 (18 Oct 2023 20:04)  T(F): 98 (19 Oct 2023 04:56), Max: 98.1 (18 Oct 2023 20:04)  HR: 81 (19 Oct 2023 04:56) (78 - 81)  BP: 117/76 (19 Oct 2023 04:56) (117/76 - 129/76)  BP(mean): --  RR: 18 (19 Oct 2023 04:56) (18 - 18)  SpO2: 94% (19 Oct 2023 04:56) (94% - 96%)    Parameters below as of 19 Oct 2023 04:56  Patient On (Oxygen Delivery Method): room air     PHYSICAL EXAM:  TELE: Not on tele  Constitutional: NAD, awake and alert, obese  HEENT: Moist Mucous Membranes, Anicteric  Pulmonary: Non-labored, breath sounds are clear bilaterally, No wheezing, rales or rhonchi  Cardiovascular: IRRR, S1 and S2, No murmurs, rubs, gallops or clicks  Gastrointestinal: Bowel Sounds present, soft, nontender.   Lymph: No peripheral edema. No lymphadenopathy.  Skin: No visible rashes.  +chronic skin changes on BLE  Psych:  Mood & affect: Flat    I&O's Summary    18 Oct 2023 07:01  -  19 Oct 2023 07:00  --------------------------------------------------------  IN: 0 mL / OUT: 900 mL / NET: -900 mL         LABS: All Labs Reviewed:                        13.0   5.83  )-----------( 237      ( 18 Oct 2023 06:37 )             39.6     18 Oct 2023 06:37    142    |  107    |  18     ----------------------------<  181    4.0     |  28     |  0.64     Ca    9.0        18 Oct 2023 06:37      12 Lead ECG:   Ventricular Rate 118 BPM    Atrial Rate 141 BPM    QRS Duration 78 ms    Q-T Interval 336 ms    QTC Calculation(Bazett) 470 ms    R Axis 7 degrees    T Axis -77 degrees    Diagnosis Line Atrial fibrillation  Nonspecific ST and T wave abnormality  Abnormal ECG  Confirmed by fernando Umaña ([id number]) on 10/9/2023 12:12:07 PM (10-09-23 @ 11:25)    TRANSTHORACIC ECHOCARDIOGRAM REPORT  ________________________________________________________________________________                                      _______   Pt. Name:       MELITON LOZANO Study Date:    10/10/2023  MRN:            [id number]          YOB: 1965  Accession #:    [id number]         Age:           58 years  Account#:       [id number]        Gender:        F  Heart Rate:                       Height:        59.84 in (152.00 cm)  Rhythm:      Weight:        249.12 lb (113.00 kg)  Blood Pressure: 125/75 mmHg       BSA/BMI:       2.05 m² / 48.91 kg/m²  ________________________________________________________________________________________  Referring Physician:    [id number] Garrick Spring  Interpreting Physician: Fahad Simmons  Primary Sonographer:    Brittny Zurita [facility]    CPT:               ECHO TTE WO CON COMP W DOPP - 16427.m  Indication(s):     Heart failure, unspecified - I50.9  Procedure:         Transthoracic echocardiogram with 2-D, M-mode and complete                     spectral and color flow Doppler.  Ordering Location: [facility]  _______________________________________________________________________________________     CONCLUSIONS:      1. Left ventricular systolic function is normal with an ejection fraction of 66 % by White's method of disks.   2. There is normal LV mass and concentric remodeling.   3. Normal left ventricular diastolic function.   4. Normal right ventricular cavity size, wall thickness and systolic function.   5. The left atrium is normal in size.   6. Trace mitral regurgitation.   7. Structurally normal mitral valve with normal leaflet excursion.   8. There is calcification of the mitral valve annulus.  ____________________________________________________________________________________  FINDINGS:     Left Ventricle:  Left ventricular systolic function is normal with a calculated ejection fraction of 66 % by the White's biplane method of disks. There is normal left ventricular diastolic function. Mild to moderate left ventricular hypertrophy. There is normal LV mass and concentric remodeling.     Right Ventricle:  The right ventricular cavity is normal in size, normal wall thickness and normal systolic function.     Left Atrium:  The left atrium is normal in size.     Right Atrium:  The right atrium is normal in size with an indexed volume of 13.23 ml/m².     Aortic Valve:  The aortic valve is tricuspid with normal leaflet excursion. There is no aortic valve stenosis. There is no evidence of aortic regurgitation.     Mitral Valve:  Structurally normal mitral valve with normal leaflet excursion. There is diffuse mitral valve thickening. There is calcification of the mitral valve annulus. There is no mitral valve stenosis. There is trace mitral regurgitation.     Tricuspid Valve:  Structurally normal tricuspid valve with normal leaflet excursion. There is trace tricuspid regurgitation. Estimated pulmonary artery systolic pressure is 28 mmHg.     Pulmonic Valve:  Structurally normal pulmonic valve with normal leaflet excursion. There is trace pulmonic regurgitation.     Aorta:  The aortic root at the sinuses of Valsalva is normal in size, measuring 3.20 cm (indexed 1.57 cm/m²).     Pericardium:  No pericardial effusion seen.     Systemic Veins:  The inferior vena cava is normal in size measuring 1.10 cm in diameter, (normal <2.1cm) with normal inspiratory collapse (normal >50%) consistent with normal right atrial pressure (~3, range 0-5mmHg).  ____________________________________________________________________  Quantitative Data:  Left Ventricle Measurements: (Indexed to BSA)     IVSd (2D):   1.3 cm  LVPWd (2D):  1.0 cm  LVIDd (2D):  4.2 cm  LVIDs (2D):  2.5 cm  LV Mass:     167 g  81.9 g/m²  LV Vol d, MOD A2C: 90.1 ml 44.04 ml/m²  LV Vol d, MOD A4C: 81.2 ml 39.68 ml/m²  LV Vol d, MOD BP:  87.1 ml 42.59 ml/m²  LV Vol s, MOD A2C: 29.7 ml 14.53 ml/m²  LV Vol s, MOD A4C: 29.3 ml 14.33 ml/m²  LV Vol s, MOD BP:  29.5 ml 14.41 ml/m²  LVOT SV MOD BP:    57.6 ml  LV EF% MOD BP:     66 %     MV E Vmax:    1.19 m/s  e' lateral:   11.10 cm/s  e' medial:    10.10 cm/s  E/e' lateral: 10.76  E/e' medial:  11.90  E/e' Average: 11.26  MV DT:        136 msec    Aorta Measurements: (normal range) (Indexed to BSA)     Sinuses of Valsalva: 3.20 cm (2.7 - 3.3 cm)    Left Atrium Measurements: (Indexed to BSA)  LA Diam 2D: 2.98 cm    Right Atrial Measurements:     RA Vol:       27.05 ml  RA Vol Index: 13.23 ml/m²    Mitral Valve Measurements:     MV E Vmax: 1.2 m/s       Tricuspid Valve Measurements:     TR Vmax:          2.5 m/s  TR Peak Gradient: 24.9 mmHg  RA Pressure:      3 mmHg  PASP:             28 mmHg    ________________________________________________________________________________________  Electronically signed on 10/10/2023 at 9:18:26 AM by Fahad Simmons         *** Final ***

## 2023-10-19 NOTE — PROGRESS NOTE ADULT - SUBJECTIVE AND OBJECTIVE BOX
MELITON LOZANO is a 58yFemale , patient examined and chart reviewed.    INTERVAL HPI/ OVERNIGHT EVENTS:   Afebrile. No events.  NAD.    PAST MEDICAL & SURGICAL HISTORY:  Atrial fibrillation  Type 2 diabetes mellitus  Congestive heart failure  Scalp psoriasis  S/P tonsillectomy  S/P appendectomy  H/O umbilical hernia repair      For details regarding the patient's social history, family history, and other miscellaneous elements, please refer the initial infectious diseases consultation and/or the admitting history and physical examination for this admission.      ROS: Poor historian + leg pain    No Known Allergies      Current inpatient medications :    ANTIBIOTICS/RELEVANT:  doxycycline monohydrate Capsule 100 milliGRAM(s) Oral every 12 hours    MEDICATIONS  (STANDING):  dextrose 5%. 1000 milliLiter(s) (50 mL/Hr) IV Continuous <Continuous>  dextrose 5%. 1000 milliLiter(s) (100 mL/Hr) IV Continuous <Continuous>  dextrose 50% Injectable 25 Gram(s) IV Push once  dextrose 50% Injectable 25 Gram(s) IV Push once  dextrose 50% Injectable 12.5 Gram(s) IV Push once  gabapentin 300 milliGRAM(s) Oral every 8 hours  glucagon  Injectable 1 milliGRAM(s) IntraMuscular once  hydrocortisone 1% Cream 1 Application(s) Topical two times a day  insulin glargine Injectable (LANTUS) 30 Unit(s) SubCutaneous every morning  insulin glargine Injectable (LANTUS) 30 Unit(s) SubCutaneous at bedtime  insulin lispro (ADMELOG) corrective regimen sliding scale   SubCutaneous three times a day before meals  insulin lispro (ADMELOG) corrective regimen sliding scale   SubCutaneous at bedtime  insulin lispro Injectable (ADMELOG) 10 Unit(s) SubCutaneous three times a day before meals  metoprolol succinate  milliGRAM(s) Oral daily  naloxone Injectable 0.4 milliGRAM(s) IV Push once  nystatin Powder 1 Application(s) Topical three times a day  nystatin Powder 1 Application(s) Topical three times a day  polyethylene glycol 3350 17 Gram(s) Oral daily  rivaroxaban 20 milliGRAM(s) Oral with dinner  senna 2 Tablet(s) Oral at bedtime  tamsulosin 0.4 milliGRAM(s) Oral at bedtime  torsemide 20 milliGRAM(s) Oral daily    MEDICATIONS  (PRN):  acetaminophen     Tablet .. 650 milliGRAM(s) Oral every 6 hours PRN Temp greater or equal to 38C (100.4F), Mild Pain (1 - 3)  aluminum hydroxide/magnesium hydroxide/simethicone Suspension 30 milliLiter(s) Oral every 4 hours PRN Dyspepsia  bisacodyl 5 milliGRAM(s) Oral daily PRN Constipation  bisacodyl Suppository 10 milliGRAM(s) Rectal daily PRN Constipation  cyclobenzaprine 5 milliGRAM(s) Oral two times a day PRN Muscle Spasm  dextrose Oral Gel 15 Gram(s) Oral once PRN Blood Glucose LESS THAN 70 milliGRAM(s)/deciliter  melatonin 3 milliGRAM(s) Oral at bedtime PRN Insomnia  ondansetron Injectable 4 milliGRAM(s) IV Push every 8 hours PRN Nausea and/or Vomiting  oxycodone    5 mG/acetaminophen 325 mG 1 Tablet(s) Oral every 4 hours PRN Severe Pain (7 - 10)  traMADol 25 milliGRAM(s) Oral every 6 hours PRN Moderate Pain (4 - 6)        Objective:  Vital Signs Last 24 Hrs  T(C): 36.6 (19 Oct 2023 13:10), Max: 36.7 (18 Oct 2023 20:04)  T(F): 97.8 (19 Oct 2023 13:10), Max: 98.1 (18 Oct 2023 20:04)  HR: 81 (19 Oct 2023 13:10) (78 - 81)  BP: 125/62 (19 Oct 2023 13:10) (117/76 - 125/80)  RR: 18 (19 Oct 2023 13:10) (18 - 18)  SpO2: 91% (19 Oct 2023 13:10) (91% - 96%)    Parameters below as of 19 Oct 2023 13:10  Patient On (Oxygen Delivery Method): room air      Physical Exam:  General:  no acute distress  Neck: supple, trachea midline  Lungs: clear, no wheeze/rhonchi  Cardiovascular: regular rate and rhythm, S1 S2  Abdomen: soft, nontender,  bowel sounds normal  Neurological: alert and oriented x3  Skin: no rash  Extremities: Tanner LE edema erythema Right > Left improved  Chronic stasis       LABS:                        13.0   5.83  )-----------( 237      ( 18 Oct 2023 06:37 )             39.6   10-18    142  |  107  |  18  ----------------------------<  181<H>  4.0   |  28  |  0.64    Ca    9.0      18 Oct 2023 06:37        MICROBIOLOGY:  Culture - Urine (10.09.23 @ 15:27)    -  Amikacin: S <=16   -  Amoxicillin/Clavulanic Acid: S <=8/4   -  Ampicillin: R 16 These ampicillin results predict results for amoxicillin   -  Ampicillin/Sulbactam: S <=4/2   -  Aztreonam: S <=4   -  Cefazolin: S <=2   -  Cefepime: S <=2   -  Cefoxitin: I 16   -  Ceftriaxone: S <=1   -  Ciprofloxacin: S <=0.25   -  Ertapenem: S <=0.5   -  Gentamicin: S <=2   -  Imipenem: S <=1   -  Levofloxacin: S <=0.5   -  Meropenem: S <=1   -  Nitrofurantoin: S <=32 Should not be used to treat pyelonephritis   -  Piperacillin/Tazobactam: S <=8   -  Tobramycin: S <=2   -  Trimethoprim/Sulfamethoxazole: S <=0.5/9.5   Specimen Source: Clean Catch Clean Catch (Midstream)   Culture Results:   50,000 - 99,000 CFU/mL Klebsiella variicola   Organism Identification: Klebsiella variicola   Organism: Klebsiella variicola   Method Type: SASHA    Culture - Blood (collected 09 Oct 2023 11:55)  Source: .Blood Blood-Peripheral  Preliminary Report (10 Oct 2023 19:02):    No growth at 24 hours    Culture - Blood (collected 09 Oct 2023 11:20)  Source: .Blood Blood-Peripheral  Preliminary Report (10 Oct 2023 19:02):    No growth at 24 hours    RADIOLOGY & ADDITIONAL STUDIES:          Assessment :  57 yo F with PMHx of T2DM, Afib on Xarelto, and CHF with unknown EF admitted with worsening Right LE draining lesion on anterior shin and generalized worsening bilateral lower extremity edema and erythema sec cellulitis  Hx MRSA  Leg pain burning sensation prob sec DM peripheral neuropathy  Ua and Ucx noted - 50,000 - 99,000 CFU/mL Klebsiella variicola - pt c/o dysuria - UTI   Abd pain resolved  AUR - sullivan placed 10/16 seen by urology  Clinically stable    Plan :   Off Vancomycin---> po Doxycyline 100mg po bid till 10/19   Sp Rocephin x 3 days  Trend temps and cbc  Sullivan per primary team/urology  Asp precautions  Pulm toileting  Stable from ID standpoint  Dc planning per primary team      Continue with present regiment.  Appropriate use of antibiotics and adverse effects reviewed.    > 35 minutes were spent in direct patient care reviewing notes, medications ,labs data/ imaging , discussion with multidisciplinary team.    Thank you for allowing me to participate in care of your patient .    Carole Mohr MD  Infectious Disease  [phone]

## 2023-10-19 NOTE — CONSULT NOTE ADULT - SUBJECTIVE AND OBJECTIVE BOX
NOTE IN PROGRESS    Patient seen and evaluated  Right lower leg wound healed, cellulitis is improved.  Left heel blister noted, continue local wound care.    Wound Care Instructions:  1. Remove left foot dressings.  2. Dress wound with silver alginate and dry, sterile dressing.  3. Change dressings three times a week (MWF, or TThSat) and monitor for any signs of infection.  4. Patient is to [facility] with Dr. Watson, Dr. Galarza, or Dr. Meek within 1 week upon discharge. SUBJECTIVE:  58y year old Female seen at [facility] 1EAS 106 D1 for right lower leg wound and cellulitis. Pt also seen for left posterior heel blister.      Allergies    No Known Allergies    Intolerances        MEDICATIONS  (STANDING):  dextrose 5%. 1000 milliLiter(s) (50 mL/Hr) IV Continuous <Continuous>  dextrose 5%. 1000 milliLiter(s) (100 mL/Hr) IV Continuous <Continuous>  dextrose 50% Injectable 25 Gram(s) IV Push once  dextrose 50% Injectable 25 Gram(s) IV Push once  dextrose 50% Injectable 12.5 Gram(s) IV Push once  doxycycline monohydrate Capsule 100 milliGRAM(s) Oral every 12 hours  gabapentin 300 milliGRAM(s) Oral every 8 hours  glucagon  Injectable 1 milliGRAM(s) IntraMuscular once  hydrocortisone 1% Cream 1 Application(s) Topical two times a day  insulin glargine Injectable (LANTUS) 30 Unit(s) SubCutaneous every morning  insulin glargine Injectable (LANTUS) 30 Unit(s) SubCutaneous at bedtime  insulin lispro (ADMELOG) corrective regimen sliding scale   SubCutaneous three times a day before meals  insulin lispro (ADMELOG) corrective regimen sliding scale   SubCutaneous at bedtime  insulin lispro Injectable (ADMELOG) 10 Unit(s) SubCutaneous three times a day before meals  metoprolol succinate  milliGRAM(s) Oral daily  naloxone Injectable 0.4 milliGRAM(s) IV Push once  nystatin Powder 1 Application(s) Topical three times a day  nystatin Powder 1 Application(s) Topical three times a day  polyethylene glycol 3350 17 Gram(s) Oral daily  rivaroxaban 20 milliGRAM(s) Oral with dinner  senna 2 Tablet(s) Oral at bedtime  tamsulosin 0.4 milliGRAM(s) Oral at bedtime  torsemide 20 milliGRAM(s) Oral daily    MEDICATIONS  (PRN):  acetaminophen     Tablet .. 650 milliGRAM(s) Oral every 6 hours PRN Temp greater or equal to 38C (100.4F), Mild Pain (1 - 3)  aluminum hydroxide/magnesium hydroxide/simethicone Suspension 30 milliLiter(s) Oral every 4 hours PRN Dyspepsia  bisacodyl 5 milliGRAM(s) Oral daily PRN Constipation  bisacodyl Suppository 10 milliGRAM(s) Rectal daily PRN Constipation  cyclobenzaprine 5 milliGRAM(s) Oral two times a day PRN Muscle Spasm  dextrose Oral Gel 15 Gram(s) Oral once PRN Blood Glucose LESS THAN 70 milliGRAM(s)/deciliter  melatonin 3 milliGRAM(s) Oral at bedtime PRN Insomnia  ondansetron Injectable 4 milliGRAM(s) IV Push every 8 hours PRN Nausea and/or Vomiting  oxycodone    5 mG/acetaminophen 325 mG 1 Tablet(s) Oral every 4 hours PRN Severe Pain (7 - 10)  traMADol 25 milliGRAM(s) Oral every 6 hours PRN Moderate Pain (4 - 6)      Vital Signs Last 24 Hrs  T(C): 36.6 (19 Oct 2023 13:10), Max: 36.7 (18 Oct 2023 20:04)  T(F): 97.8 (19 Oct 2023 13:10), Max: 98.1 (18 Oct 2023 20:04)  HR: 81 (19 Oct 2023 13:10) (78 - 81)  BP: 125/62 (19 Oct 2023 13:10) (117/76 - 125/80)  BP(mean): --  RR: 18 (19 Oct 2023 13:10) (18 - 18)  SpO2: 91% (19 Oct 2023 13:10) (91% - 96%)    Parameters below as of 19 Oct 2023 13:10  Patient On (Oxygen Delivery Method): room air        PHYSICAL EXAM:  Vascular: DP & PT faintly palpable bilaterally  Neurological: Light touch sensation diminished bilaterally  Musculoskeletal: 4/5 strength in all quadrants bilaterally  Dermatological: right lower leg wound healed, left posterior heel blister down to skin.                          13.0   5.83  )-----------( 237      ( 18 Oct 2023 06:37 )             39.6       10-18    142  |  107  |  18  ----------------------------<  181<H>  4.0   |  28  |  0.64    Ca    9.0      18 Oct 2023 06:37

## 2023-10-19 NOTE — CONSULT NOTE ADULT - REASON FOR ADMISSION
Right Left Wound

## 2023-10-19 NOTE — CONSULT NOTE ADULT - CONSULT REQUESTED BY NAME
Clementine Mccauley
Dr. Ruiz
Dr. Spring
becca
Dr. Martin
Dr. Spring
LESTER Spring
Clementine Mccauley
Dr. Garrick Spring

## 2023-10-19 NOTE — CONSULT NOTE ADULT - CONSULT REQUESTED DATE/TIME
11-Oct-2023 09:21
09-Oct-2023 15:12
09-Oct-2023 16:22
09-Oct-2023 14:54
09-Oct-2023 16:46
19-Oct-2023 13:03
17-Oct-2023 06:57
10-Oct-2023 16:46
11-Oct-2023 08:46

## 2023-10-19 NOTE — CONSULT NOTE ADULT - PROBLEM SELECTOR RECOMMENDATION 9
Patient examined and evaluated. No dressings to the right lower leg, wound has epithelialized. Offloading to the left heel, continue local wound care.

## 2023-10-19 NOTE — DISCHARGE NOTE NURSING/CASE MANAGEMENT/SOCIAL WORK - FLU SEASON?
Yes...
On Sunday she fell from the bed at night. Monday she had bad fever, all week fever on and off. I took her to the doctor, all the tests were negative. Tonight she woke up with ear pain (left) - mother

## 2023-10-19 NOTE — DISCHARGE NOTE PROVIDER - NSDCCPCAREPLAN_GEN_ALL_CORE_FT
PRINCIPAL DISCHARGE DIAGNOSIS  Diagnosis: Cellulitis of lower extremity  Assessment and Plan of Treatment:      PRINCIPAL DISCHARGE DIAGNOSIS  Diagnosis: Cellulitis of lower extremity  Assessment and Plan of Treatment: you were treated with  iv abx resolved cellulitis switch to  po abx doxy 100 mg po bid  for 1 more day course  , blood cult neg      SECONDARY DISCHARGE DIAGNOSES  Diagnosis: Atrial fibrillation  Assessment and Plan of Treatment: continue home meds metoprolol  / rate controlled also ac xerlato    Diagnosis: Leg wound, right  Assessment and Plan of Treatment: seen by dr grace wound care  -legs elevation, Xeroform, dry dressing to right leg - need bl leg venous stocking [ pt refused in hospital to use it] / offloading boot.    Diagnosis: Congestive heart failure  Assessment and Plan of Treatment: chronic chf - continue torsemide  TTE showed normal LVF, EF 66% with no significant valvular disease    Diagnosis: Type 2 diabetes mellitus  Assessment and Plan of Treatment: Type 2 diabetes mellitus.    uncontrolled     -A1c 13.8-  on lantus 30 mg sub bid and risss , dm diet repeat hb a1c in 3 month .

## 2023-10-19 NOTE — PROGRESS NOTE ADULT - NS ATTEND AMEND GEN_ALL_CORE FT
57 yo F with PMH of T2DM, Afib on Xarelto, CHF (EF 60-65% in 2/2023), neuropathy, chronic b/l LE wounds admitted for cellulitis    Edema, Persistent Afib  - Chronic b/l LE edema with cellulitic changes, otherwise, no meaningful evidence of volume overload.  Non-orthopneic  - No O2 requirement.  Pro-BNP unremarkable and CxR not consistent with CHF  - TTE showed normal LVF, EF 66% with no significant valvular disease  - s/p IV Lasix.  Now torsemide 20 mg daily    - Rate-controlled Afib  - Continue Xarelto and Metoprolol
59 yo F with PMH of T2DM, Afib on Xarelto, CHF (EF 60-65% in 2/2023), neuropathy, chronic b/l LE wounds admitted for cellulitis     - Chronic b/l LE edema with cellulitic changes, otherwise no meaningful evidence of volume overload  - EF 60-65% in 2/2023  - Continue IV Lasix 40mg qd  - echo    -af on ac, rate controlled  -cont bb  -abx for cellulitis as per primary team
59 yo F with PMH of T2DM, Afib on Xarelto, CHF (EF 60-65% in 2/2023), neuropathy, chronic b/l LE wounds admitted for cellulitis    Edema, Persistent Afib  - Chronic b/l LE edema with cellulitic changes, otherwise, no meaningful evidence of volume overload.  Non-orthopneic  - No O2 requirement.  Pro-BNP unremarkable and CxR not consistent with CHF  - TTE showed normal LVF, EF 66% with no significant valvular disease  - s/p IV Lasix.  Now torsemide 20 mg daily    - Rate-controlled Afib  - Continue Xarelto and Metoprolol
57 yo F with PMH of T2DM, Afib on Xarelto, CHF (EF 60-65% in 2/2023), neuropathy, chronic b/l LE wounds admitted for cellulitis     - Chronic b/l LE edema with cellulitic changes, otherwise no meaningful evidence of volume overload  - EF 60-65% in 2/2023  - IV lasix changed to po torsemide  - TTE with preserved LV and RV function  - AF on ac, rate controlled  - cont bb  - abx for cellulitis as per primary team.
57 yo F with PMH of T2DM, Afib on Xarelto, CHF (EF 60-65% in 2/2023), neuropathy, chronic b/l LE wounds admitted for cellulitis    Edema, Persistent Afib  - Chronic b/l LE edema with cellulitic changes, otherwise, no meaningful evidence of volume overload.  Non-orthopneic  - No O2 requirement.  Pro-BNP unremarkable and CxR not consistent with CHF  - TTE showed normal LVF, EF 66% with no significant valvular disease  - s/p IV Lasix.  Now torsemide 20 mg daily    - Rate-controlled Afib  - Continue Xarelto and Metoprolol
59 yo F with PMH of T2DM, Afib on Xarelto, CHF (EF 60-65% in 2/2023), neuropathy, chronic b/l LE wounds admitted for cellulitis    Edema, Persistent Afib  - Chronic b/l LE edema with cellulitic changes, otherwise, no meaningful evidence of volume overload.  Non-orthopneic  - No O2 requirement.  Pro-BNP unremarkable and CxR not consistent with CHF  - TTE showed normal LVF, EF 66% with no significant valvular disease  - s/p IV Lasix.  Now torsemide 20 mg daily    - Rate-controlled Afib  - Continue Xarelto and Metoprolol.
57 yo F with PMH of T2DM, Afib on Xarelto, CHF (EF 60-65% in 2/2023), neuropathy, chronic b/l LE wounds admitted for cellulitis     - Chronic b/l LE edema with cellulitic changes, otherwise no meaningful evidence of volume overload  - EF 60-65% in 2/2023  - IV lasix changed to po torsemide  - TTE with preserved LV and RV function  - AF on ac, rate controlled  - cont bb  - abx for cellulitis as per primary team.
59 yo F with PMH of T2DM, Afib on Xarelto, CHF (EF 60-65% in 2/2023), neuropathy, chronic b/l LE wounds admitted for cellulitis     - Chronic b/l LE edema with cellulitic changes, otherwise no meaningful evidence of volume overload  - EF 60-65% in 2/2023  - Continue IV Lasix 40mg for today, can consider transition to PO in AM  - TTE with preserved LV and RV function  - AF on ac, rate controlled  - cont bb  - abx for cellulitis as per primary team.

## 2023-10-19 NOTE — PROGRESS NOTE ADULT - PROVIDER SPECIALTY LIST ADULT
Cardiology
Cardiology
Gastroenterology
Gastroenterology
Infectious Disease
Infectious Disease
Urology
Cardiology
Endocrinology
Gastroenterology
Gastroenterology
Infectious Disease
Cardiology
Gastroenterology
Infectious Disease
Infectious Disease
Endocrinology
Endocrinology
Gastroenterology
Gastroenterology
Infectious Disease
Internal Medicine
Hospitalist
Internal Medicine
Hospitalist
Internal Medicine

## 2023-10-19 NOTE — DISCHARGE NOTE PROVIDER - NSDCMRMEDTOKEN_GEN_ALL_CORE_FT
bisacodyl 5 mg oral delayed release tablet: 1 tab(s) orally once a day As needed Constipation  cyclobenzaprine 5 mg oral tablet: 1 tab(s) orally 2 times a day As needed Muscle Spasm  gabapentin 300 mg oral capsule: 1 cap(s) orally every 8 hours  insulin glargine 100 units/mL subcutaneous solution: 30 unit(s) subcutaneous once a day in am  insulin glargine 100 units/mL subcutaneous solution: 30 unit(s) subcutaneous once a day (at bedtime) bedtime  insulin lispro 100 units/mL injectable solution: 10 international unit(s) injectable 3 times a day premeal  insulin lispro 100 units/mL injectable solution: injectable once a day riss- 2 Unit(s) if Glucose 151 - 200  4 Unit(s) if Glucose 201 - 250  6 Unit(s) if Glucose 251 - 300  8 Unit(s) if Glucose 301 - 350  10 Unit(s) if Glucose 351 - 400  12 Unit(s) if Glucose Greater Than 400  metoprolol succinate 100 mg oral tablet, extended release: 1 tab(s) orally once a day  nystatin 100,000 units/g topical powder: 1 Apply topically to affected area 3 times a day  oxycodone-acetaminophen 5 mg-325 mg oral tablet: 1 tab(s) orally every 4 hours As needed Severe Pain (7 - 10)  polyethylene glycol 3350 oral powder for reconstitution: 17 gram(s) orally once a day  rivaroxaban 20 mg oral tablet: 1 tab(s) orally once a day (before a meal)  senna leaf extract oral tablet: 2 tab(s) orally once a day (at bedtime)  tamsulosin 0.4 mg oral capsule: 1 cap(s) orally once a day (at bedtime)  torsemide 20 mg oral tablet: 1 tab(s) orally once a day  traMADol 50 mg oral tablet: 0.5 tab(s) orally every 6 hours As needed Moderate Pain (4 - 6)   bisacodyl 5 mg oral delayed release tablet: 1 tab(s) orally once a day As needed Constipation  cyclobenzaprine 5 mg oral tablet: 1 tab(s) orally 2 times a day As needed Muscle Spasm  Doxy-Caps 100 mg oral tablet: 1 tab(s) orally 2 times a day for one more day  gabapentin 300 mg oral capsule: 1 cap(s) orally every 8 hours  insulin glargine 100 units/mL subcutaneous solution: 30 unit(s) subcutaneous once a day in am  insulin glargine 100 units/mL subcutaneous solution: 30 unit(s) subcutaneous once a day (at bedtime) bedtime  insulin lispro 100 units/mL injectable solution: 10 international unit(s) injectable 3 times a day premeal  insulin lispro 100 units/mL injectable solution: injectable once a day riss- 2 Unit(s) if Glucose 151 - 200  4 Unit(s) if Glucose 201 - 250  6 Unit(s) if Glucose 251 - 300  8 Unit(s) if Glucose 301 - 350  10 Unit(s) if Glucose 351 - 400  12 Unit(s) if Glucose Greater Than 400  metoprolol succinate 100 mg oral tablet, extended release: 1 tab(s) orally once a day  nystatin 100,000 units/g topical powder: 1 Apply topically to affected area 3 times a day  oxycodone-acetaminophen 5 mg-325 mg oral tablet: 1 tab(s) orally every 4 hours As needed Severe Pain (7 - 10)  polyethylene glycol 3350 oral powder for reconstitution: 17 gram(s) orally once a day  rivaroxaban 20 mg oral tablet: 1 tab(s) orally once a day (before a meal)  senna leaf extract oral tablet: 2 tab(s) orally once a day (at bedtime)  tamsulosin 0.4 mg oral capsule: 1 cap(s) orally once a day (at bedtime)  torsemide 20 mg oral tablet: 1 tab(s) orally once a day  traMADol 50 mg oral tablet: 0.5 tab(s) orally every 6 hours As needed Moderate Pain (4 - 6)

## 2023-10-19 NOTE — CONSULT NOTE ADULT - CONSULT REASON
Wound infection
CHF
dm2 uncontrolled
Bilateral legs cellulitis.
Left heel wound, right leg wound and cellulitis
dm2 uncontrolled
everything hurts
Urinary retention
58y A1C with Estimated Average Glucose Result: 13.8 % (10-10-23 @ 05:45)   diabetes mellitus uncontrolled type 2

## 2023-10-20 NOTE — CONSULT NOTE ADULT - PROBLEM/RECOMMENDATION-2
10/13 CHF update from [facility] already addressed, Heart failure assessment  Weights have trended down about 2-3 lb.  Blood pressure is within normal limits to occasionally borderline Heart rates within normal limits     No changes made    New vs;still remain stable, not routed to Karen, nothing is acute           DISPLAY PLAN FREE TEXT

## 2023-11-01 ENCOUNTER — APPOINTMENT (OUTPATIENT)
Dept: WOUND CARE | Facility: HOSPITAL | Age: 58
End: 2023-11-01

## 2023-11-13 PROCEDURE — 85025 COMPLETE CBC W/AUTO DIFF WBC: CPT

## 2023-11-13 PROCEDURE — 80053 COMPREHEN METABOLIC PANEL: CPT

## 2023-11-13 PROCEDURE — 96374 THER/PROPH/DIAG INJ IV PUSH: CPT

## 2023-11-13 PROCEDURE — 85610 PROTHROMBIN TIME: CPT

## 2023-11-13 PROCEDURE — 96375 TX/PRO/DX INJ NEW DRUG ADDON: CPT

## 2023-11-13 PROCEDURE — 82962 GLUCOSE BLOOD TEST: CPT

## 2023-11-13 PROCEDURE — 85652 RBC SED RATE AUTOMATED: CPT

## 2023-11-13 PROCEDURE — 87086 URINE CULTURE/COLONY COUNT: CPT

## 2023-11-13 PROCEDURE — 93005 ELECTROCARDIOGRAM TRACING: CPT

## 2023-11-13 PROCEDURE — 87635 SARS-COV-2 COVID-19 AMP PRB: CPT

## 2023-11-13 PROCEDURE — 80048 BASIC METABOLIC PNL TOTAL CA: CPT

## 2023-11-13 PROCEDURE — 93306 TTE W/DOPPLER COMPLETE: CPT

## 2023-11-13 PROCEDURE — 87077 CULTURE AEROBIC IDENTIFY: CPT

## 2023-11-13 PROCEDURE — 73590 X-RAY EXAM OF LOWER LEG: CPT

## 2023-11-13 PROCEDURE — 87186 SC STD MICRODIL/AGAR DIL: CPT

## 2023-11-13 PROCEDURE — 85027 COMPLETE CBC AUTOMATED: CPT

## 2023-11-13 PROCEDURE — 83605 ASSAY OF LACTIC ACID: CPT

## 2023-11-13 PROCEDURE — 80202 ASSAY OF VANCOMYCIN: CPT

## 2023-11-13 PROCEDURE — 71045 X-RAY EXAM CHEST 1 VIEW: CPT

## 2023-11-13 PROCEDURE — 97162 PT EVAL MOD COMPLEX 30 MIN: CPT

## 2023-11-13 PROCEDURE — 97110 THERAPEUTIC EXERCISES: CPT

## 2023-11-13 PROCEDURE — 74174 CTA ABD&PLVS W/CONTRAST: CPT

## 2023-11-13 PROCEDURE — 87040 BLOOD CULTURE FOR BACTERIA: CPT

## 2023-11-13 PROCEDURE — 85730 THROMBOPLASTIN TIME PARTIAL: CPT

## 2023-11-13 PROCEDURE — 83036 HEMOGLOBIN GLYCOSYLATED A1C: CPT

## 2023-11-13 PROCEDURE — 81001 URINALYSIS AUTO W/SCOPE: CPT

## 2023-11-13 PROCEDURE — 73630 X-RAY EXAM OF FOOT: CPT

## 2023-11-13 PROCEDURE — 36415 COLL VENOUS BLD VENIPUNCTURE: CPT

## 2023-11-13 PROCEDURE — 86140 C-REACTIVE PROTEIN: CPT

## 2023-11-13 PROCEDURE — 99285 EMERGENCY DEPT VISIT HI MDM: CPT | Mod: 25

## 2023-11-13 PROCEDURE — 83880 ASSAY OF NATRIURETIC PEPTIDE: CPT

## 2023-11-27 ENCOUNTER — APPOINTMENT (OUTPATIENT)
Dept: WOUND CARE | Facility: HOSPITAL | Age: 58
End: 2023-11-27

## 2024-01-18 NOTE — CARE COORDINATION ASSESSMENT. - CURRENT MENTAL STATUS/COGNITIVE FUNCTIONING
Janelle White comes into clinic today at the request of LINK Yanes MD Ordering Provider for Med Injection only Ketamine .    Procedure Prep:  Medication double check completed by: brenda Miller RN  Prior to administration pt identified by name and : yes    Nursing Assessment:  Appearance: casual clothing   Mood: okay  Affect: wnl  Eye contact: good      2024    10:15 AM   PHQ   PHQ-9 Total Score 17   Q9: Thoughts of better off dead/self-harm past 2 weeks Nearly every day     QIDDSR 16 weekly assessment: score 16. Assessment was scanned to EHR.     Procedure Performed:  VSS and mental status WNL. Patient was given ketamine. See MAR for details.     Post Procedure Assessment:  Patient tolerated the treatment with the following side effects: dissociation. Vital signs were monitored, see VS Flowsheet. Patient stated they felt ready to go home prior to discharge. AVS was offered to patient and patient declined. Patient was instructed not to drive for the remainder of the day and to notify clinic if any concerns arise.     Next appt: 3 weeks      This service provided today was under the supervising provider of the day ROSALIA Taveras MD, who was available if needed.          Fiona Simon RN  
alert/oriented to person/oriented to place/oriented to time/oriented to situation

## 2024-01-24 NOTE — PROVIDER CONTACT NOTE (CRITICAL VALUE NOTIFICATION) - SITUATION
Jennifer calls and is giving an update on the increase of Amitriptyline to 20mg nightly.    Mother feels like it is working very well and would like a refill sent in      
Thank you for update.  Refills sent in.  PDMP reviewed and all prescriptions updated  
Hgb 6.8

## 2024-02-02 ENCOUNTER — OUTPATIENT (OUTPATIENT)
Dept: OUTPATIENT SERVICES | Facility: HOSPITAL | Age: 59
LOS: 1 days | Discharge: HOME | End: 2024-02-02
Payer: MEDICARE

## 2024-02-02 ENCOUNTER — APPOINTMENT (OUTPATIENT)
Dept: WOUND CARE | Facility: HOSPITAL | Age: 59
End: 2024-02-02
Payer: MEDICARE

## 2024-02-02 VITALS
DIASTOLIC BLOOD PRESSURE: 66 MMHG | WEIGHT: 233 LBS | SYSTOLIC BLOOD PRESSURE: 98 MMHG | HEART RATE: 70 BPM | TEMPERATURE: 98 F | BODY MASS INDEX: 43.99 KG/M2 | HEIGHT: 61 IN | OXYGEN SATURATION: 100 % | RESPIRATION RATE: 15 BRPM

## 2024-02-02 DIAGNOSIS — L97.801 NON-PRESSURE CHRONIC ULCER OF OTHER PART OF UNSPECIFIED LOWER LEG LIMITED TO BREAKDOWN OF SKIN: ICD-10-CM

## 2024-02-02 DIAGNOSIS — Z98.890 OTHER SPECIFIED POSTPROCEDURAL STATES: Chronic | ICD-10-CM

## 2024-02-02 DIAGNOSIS — Z90.89 ACQUIRED ABSENCE OF OTHER ORGANS: Chronic | ICD-10-CM

## 2024-02-02 DIAGNOSIS — Z90.49 ACQUIRED ABSENCE OF OTHER SPECIFIED PARTS OF DIGESTIVE TRACT: Chronic | ICD-10-CM

## 2024-02-02 PROCEDURE — G0463: CPT

## 2024-02-02 PROCEDURE — 99214 OFFICE O/P EST MOD 30 MIN: CPT

## 2024-02-02 RX ORDER — OXYCODONE HYDROCHLORIDE AND ACETAMINOPHEN 10; 325 MG/1; MG/1
10-325 TABLET ORAL
Refills: 0 | Status: ACTIVE | COMMUNITY

## 2024-02-02 RX ORDER — BACITRACIN 500 [IU]/G
500 OINTMENT TOPICAL 3 TIMES DAILY
Qty: 1 | Refills: 1 | Status: DISCONTINUED | COMMUNITY
Start: 2023-04-19 | End: 2024-02-02

## 2024-02-02 RX ORDER — OXYCODONE 5 MG/1
5 TABLET ORAL
Qty: 10 | Refills: 0 | Status: DISCONTINUED | COMMUNITY
Start: 2023-04-08 | End: 2024-02-02

## 2024-02-02 RX ORDER — CLOTRIMAZOLE 10 MG/G
1 CREAM TOPICAL 3 TIMES DAILY
Qty: 2 | Refills: 3 | Status: DISCONTINUED | COMMUNITY
Start: 2023-04-11 | End: 2024-02-02

## 2024-02-02 RX ORDER — SOD CHLOR,BICARB/SQUEEZ BOTTLE
PACKET, WITH RINSE DEVICE NASAL
Qty: 1 | Refills: 3 | Status: DISCONTINUED | COMMUNITY
Start: 2023-04-19 | End: 2024-02-02

## 2024-02-02 RX ORDER — DULOXETINE HYDROCHLORIDE 30 MG/1
CAPSULE, DELAYED RELEASE ORAL
Refills: 0 | Status: ACTIVE | COMMUNITY

## 2024-02-13 DIAGNOSIS — I50.9 HEART FAILURE, UNSPECIFIED: ICD-10-CM

## 2024-02-13 DIAGNOSIS — I48.91 UNSPECIFIED ATRIAL FIBRILLATION: ICD-10-CM

## 2024-02-13 DIAGNOSIS — Z79.4 LONG TERM (CURRENT) USE OF INSULIN: ICD-10-CM

## 2024-02-13 DIAGNOSIS — E66.01 MORBID (SEVERE) OBESITY DUE TO EXCESS CALORIES: ICD-10-CM

## 2024-02-13 DIAGNOSIS — Z79.84 LONG TERM (CURRENT) USE OF ORAL HYPOGLYCEMIC DRUGS: ICD-10-CM

## 2024-02-13 DIAGNOSIS — Z79.899 OTHER LONG TERM (CURRENT) DRUG THERAPY: ICD-10-CM

## 2024-02-13 DIAGNOSIS — Z79.01 LONG TERM (CURRENT) USE OF ANTICOAGULANTS: ICD-10-CM

## 2024-02-13 DIAGNOSIS — E11.40 TYPE 2 DIABETES MELLITUS WITH DIABETIC NEUROPATHY, UNSPECIFIED: ICD-10-CM

## 2024-02-13 DIAGNOSIS — E11.622 TYPE 2 DIABETES MELLITUS WITH OTHER SKIN ULCER: ICD-10-CM

## 2024-02-13 DIAGNOSIS — L97.812 NON-PRESSURE CHRONIC ULCER OF OTHER PART OF RIGHT LOWER LEG WITH FAT LAYER EXPOSED: ICD-10-CM

## 2024-02-13 DIAGNOSIS — Z86.718 PERSONAL HISTORY OF OTHER VENOUS THROMBOSIS AND EMBOLISM: ICD-10-CM

## 2024-02-13 DIAGNOSIS — M25.50 PAIN IN UNSPECIFIED JOINT: ICD-10-CM

## 2024-02-13 DIAGNOSIS — Z90.49 ACQUIRED ABSENCE OF OTHER SPECIFIED PARTS OF DIGESTIVE TRACT: ICD-10-CM

## 2024-02-13 DIAGNOSIS — I11.0 HYPERTENSIVE HEART DISEASE WITH HEART FAILURE: ICD-10-CM

## 2024-02-13 DIAGNOSIS — Z98.49 CATARACT EXTRACTION STATUS, UNSPECIFIED EYE: ICD-10-CM

## 2024-02-13 NOTE — REVIEW OF SYSTEMS
[Joint Stiffness] : joint stiffness [Skin Wound] : skin wound [Negative] : Heme/Lymph [Fever] : no fever [Chills] : no chills [Eye Pain] : no eye pain [Loss Of Hearing] : no hearing loss [Shortness Of Breath] : no shortness of breath [Wheezing] : no wheezing [Abdominal Pain] : no abdominal pain [Anxiety] : no anxiety [FreeTextEntry5] : HTN, HLD , A- fib , CHF [FreeTextEntry7] : Morbid obesity  [de-identified] : DFU 3 posterior left heel  down to fat , smaller and clean with granular tissue  [de-identified] : IDDM with neuropathy  [de-identified] : JADEN

## 2024-02-13 NOTE — ASSESSMENT
[Verbal] : Verbal [Written] : Written [Demo] : Demo [Patient] : Patient [Other: ___] : [unfilled] [Fair - mild discomfort, physical impairment, low acceptance] : Fair - mild discomfort, physical impairment, low acceptance [Verbalizes knowledge/Understanding] : Verbalizes knowledge/understanding [Dressing changes] : dressing changes [Foot Care] : foot care [Skin Care] : skin care [Signs and symptoms of infection] : sign and symptoms of infection [Venous Disease] : venous disease [Nutrition] : nutrition [Arterial Disease] : arterial disease [How and When to Call] : how and when to call [Pain Management] : pain management [Compression Therapy] : compression therapy [Patient responsibility to plan of care] : patient responsibility to plan of care [] : Yes [Stable] : stable [LTC] : LTC [Wheelchair] : Wheelchair [Not Applicable - Long Term Care/Home Health Agency] : Long Term Care/Home Health Agency: Not Applicable [FreeTextEntry2] : Infection prevention Wound care (dressing changes) Maintain optimal skin integrity to high pressure areas Compression therapy Nutrition and wound healing Elevation and low sodium compliance. Pressure relief/ Pressure redistribution Offloading the stress on skin structures and decreasing potential pathologic biomechanical influences.  [FreeTextEntry3] : Initial Visit [FreeTextEntry4] : -F/U 2 weeks. -Start use of  Silver Alginate on RLE wounds and recommended ACE wraps for B/L LE. -Vascular consult submitted, patient had vascular studies within the last year, SHAREE Meek would like a vascular consult. -As per SHAREE Meek, No need to repeat vascular studies at this time. -SHAREE Meek discussed follow up with a Cardiologist due to the edema in bilateral lower legs. -Patient resides at [facility], instructions provided to patient and friend for wound care and compression therapy. Wound care will be provided at the rehab center. -Patient reports that she is having trouble eating and eating also causes pain in her legs, SHAREE recommended to follow up with a GI doctor and endocrinologist. [FreeTextEntry1] : copy of wound care instructions provided to patient

## 2024-02-13 NOTE — PLAN
[FreeTextEntry1] : Patient examined and evaluated at this time. Continue local wound care and offloading. Discussed the importance of compression and elevation. Vascular evaluation at this time. Spent 30 minutes for patient care and medical decision making. Patient to follow up in 2 weeks.

## 2024-02-13 NOTE — PHYSICAL EXAM
[4 x 4] : 4 x 4  [Abdominal Pad] : Abdominal Pad [0] : left 0 [1+] : left 1+ [Ankle Swelling (On Exam)] : present [Ankle Swelling Bilaterally] : bilaterally  [Ankle Swelling On The Left] : moderate [Skin Ulcer] : ulcer [Alert] : alert [Oriented to Person] : oriented to person [Oriented to Place] : oriented to place [Calm] : calm [Varicose Veins Of Lower Extremities] : not present [] : not present [Purpura] : no purpura  [Petechiae] : no petechiae [Skin Induration] : no induration [de-identified] : calm [de-identified] : HTN, HLD , A- fib , hx CHF [de-identified] : Edema to bilateral lower extremity  [de-identified] : morbid obesity  [de-identified] : Right lower leg wound down to skin, subcutaneous tissue, fat [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  [FreeTextEntry1] : Anterior lower leg [FreeTextEntry2] : 20.0cm [FreeTextEntry3] : 10.0cm [FreeTextEntry4] : 0.1cm [de-identified] : serous [de-identified] : mild erythema consistent with venous stasis [de-identified] : red, moist tissue with fibrinous exudate [de-identified] :  Post compression placement assessment: -circulation WNL -patient states full comfort and full ROM -two fingers can slide in when assessed [de-identified] :  Silver Alginate [de-identified] :  Mechanically cleansed with sterile gauze and normal saline 0.9%  B/L DP/PT pulses weak palpable with (+) doppler sounds, cool toes to touch, capillary refill < 3 seconds. Thickened toenails on bilateral feet. Dryness noted on bilateral feet/toes and lower legs. +4 nonpitting edema of bilateral lower legs/feet  [FreeTextEntry7] : Lower leg [de-identified] :  Post compression placement assessment: -circulation WNL -patient states full comfort and full ROM -two fingers can slide in when assessed [de-identified] : No wound care product [TWNoteComboBox1] : Right [TWNoteComboBox4] : Moderate [TWNoteComboBox5] : No [TWNoteComboBox6] : Venous [de-identified] : No [de-identified] : Erythema [de-identified] : None [de-identified] : None [de-identified] : 50% [de-identified] : Yes [de-identified] : Ace wraps [de-identified] : 3x Weekly [de-identified] : Primary Dressing [TWNoteComboBox9] : Left [de-identified] : Ace wraps [de-identified] : Daily [de-identified] : Compression

## 2024-02-13 NOTE — VITALS
[Pain related to present condition?] : The patient's  pain is related to present condition. [Sharp] : sharp [Burning] : burning [Continuous] : continuous [] : Yes [de-identified] : 10/10, patient reports that she feels like she is walking on balloons from the "water" in her legs/feet [FreeTextEntry3] : bilateral lower legs [FreeTextEntry1] : rest [FreeTextEntry2] : elevation of lower legs [FreeTextEntry4] : after wound care patient reports the pain is "better" [FreeTextEntry5] : Medication reconciliation, initial visit

## 2024-02-13 NOTE — HISTORY OF PRESENT ILLNESS
[FreeTextEntry1] : Patient is known to [facility], she had a left heel DFU that was healed in August. Patient was admitted to [facility] October 2023 for cellulitis of lower legs and she now resides at a rehabilitation center. The wound care doctor comes once a week at rehabilitation center, patient is unsure of the topical management that should be placed. Right leg wound has been present since October. Patient accompanied by friend.

## 2024-02-15 ENCOUNTER — OUTPATIENT (OUTPATIENT)
Dept: OUTPATIENT SERVICES | Facility: HOSPITAL | Age: 59
LOS: 1 days | Discharge: HOME | End: 2024-02-15
Payer: MEDICARE

## 2024-02-15 ENCOUNTER — APPOINTMENT (OUTPATIENT)
Dept: WOUND CARE | Facility: HOSPITAL | Age: 59
End: 2024-02-15
Payer: MEDICARE

## 2024-02-15 VITALS
DIASTOLIC BLOOD PRESSURE: 76 MMHG | TEMPERATURE: 98.6 F | OXYGEN SATURATION: 98 % | SYSTOLIC BLOOD PRESSURE: 120 MMHG | HEIGHT: 61 IN | BODY MASS INDEX: 43.99 KG/M2 | HEART RATE: 73 BPM | RESPIRATION RATE: 18 BRPM | WEIGHT: 233 LBS

## 2024-02-15 DIAGNOSIS — L97.801 NON-PRESSURE CHRONIC ULCER OF OTHER PART OF UNSPECIFIED LOWER LEG LIMITED TO BREAKDOWN OF SKIN: ICD-10-CM

## 2024-02-15 DIAGNOSIS — Z90.89 ACQUIRED ABSENCE OF OTHER ORGANS: Chronic | ICD-10-CM

## 2024-02-15 DIAGNOSIS — Z90.49 ACQUIRED ABSENCE OF OTHER SPECIFIED PARTS OF DIGESTIVE TRACT: Chronic | ICD-10-CM

## 2024-02-15 DIAGNOSIS — Z98.890 OTHER SPECIFIED POSTPROCEDURAL STATES: Chronic | ICD-10-CM

## 2024-02-15 PROCEDURE — 99213 OFFICE O/P EST LOW 20 MIN: CPT

## 2024-02-15 PROCEDURE — G0463: CPT

## 2024-02-15 RX ORDER — PREGABALIN 25 MG/1
25 CAPSULE ORAL
Qty: 90 | Refills: 0 | Status: COMPLETED | COMMUNITY
End: 2024-02-15

## 2024-02-15 RX ORDER — NYSTATIN 100000 1/G
100000 POWDER TOPICAL
Qty: 15 | Refills: 0 | Status: COMPLETED | COMMUNITY
Start: 2023-04-10 | End: 2024-02-15

## 2024-02-15 RX ORDER — FUROSEMIDE 40 MG/1
40 TABLET ORAL DAILY
Qty: 60 | Refills: 4 | Status: COMPLETED | COMMUNITY
Start: 1900-01-01 | End: 2024-02-15

## 2024-02-15 RX ORDER — METHOCARBAMOL 500 MG/1
500 TABLET, FILM COATED ORAL
Qty: 28 | Refills: 0 | Status: COMPLETED | COMMUNITY
End: 2024-02-15

## 2024-02-15 RX ORDER — NYSTATIN 100000 [USP'U]/ML
100000 SUSPENSION ORAL
Qty: 240 | Refills: 0 | Status: COMPLETED | COMMUNITY
Start: 2023-04-10 | End: 2024-02-15

## 2024-02-15 RX ORDER — ERGOCALCIFEROL 1.25 MG/1
1.25 MG CAPSULE ORAL
Qty: 8 | Refills: 0 | Status: COMPLETED | COMMUNITY
Start: 2023-03-28 | End: 2024-02-15

## 2024-02-15 NOTE — PHYSICAL EXAM
[4 x 4] : 4 x 4  [Abdominal Pad] : Abdominal Pad [Normal Breath Sounds] : Normal breath sounds [Normal Heart Sounds] : normal heart sounds [2+] : left 2+ [1+] : left 1+ [0] : left 0 [Ankle Swelling (On Exam)] : present [] : bilaterally [Ankle Swelling Bilaterally] : severe [Alert] : alert [Oriented to Person] : oriented to person [Oriented to Place] : oriented to place [Oriented to Time] : oriented to time [Calm] : calm [Varicose Veins Of Lower Extremities] : not present [de-identified] : Well-developed, Well-nourished in no acute distress. Morbidly obese  [de-identified] : Within normal limits. [de-identified] : Within normal limits. [de-identified] : Within normal limits. [de-identified] : Bilateral legs with significant edema, right leg with less weeping, erythema, no acute infection, periwound skin is intact with no cellulitis. [FreeTextEntry1] : Anterior lower leg [FreeTextEntry2] : 18.0cm [FreeTextEntry3] : 10.0cm [FreeTextEntry4] : 0.1cm [de-identified] : serous [de-identified] : mild erythema consistent with venous stasis [de-identified] : red, moist tissue with fibrinous exudate [de-identified] :  Post compression placement assessment: -circulation WNL -patient states full comfort and full ROM -two fingers can slide in when assessed [de-identified] :  Silver Alginate [de-identified] :  Mechanically cleansed with sterile gauze and normal saline 0.9% Kerlix    [FreeTextEntry7] : Lower leg [de-identified] :  Post compression placement assessment: -circulation WNL -patient states full comfort and full ROM -two fingers can slide in when assessed [de-identified] : No wound care product [TWNoteComboBox1] : Right [TWNoteComboBox4] : Moderate [TWNoteComboBox5] : No [TWNoteComboBox6] : Venous [de-identified] : No [de-identified] : Erythema [de-identified] : None [de-identified] : None [de-identified] : 50% [de-identified] : Yes [de-identified] : Ace wraps [de-identified] : 3x Weekly [de-identified] : Primary Dressing [TWNoteComboBox9] : Left [de-identified] : Ace wraps [de-identified] : Daily [de-identified] : Compression

## 2024-02-15 NOTE — PLAN
[FreeTextEntry1] : Silver Alginate, dry dressing, ACE wrap, venous study, return to office in two weeks. 25 minutes spent for patient care and medical decision making.

## 2024-02-15 NOTE — ASSESSMENT
[Verbal] : Verbal [Written] : Written [Demo] : Demo [Patient] : Patient [Other: ___] : [unfilled] [Fair - mild discomfort, physical impairment, low acceptance] : Fair - mild discomfort, physical impairment, low acceptance [Verbalizes knowledge/Understanding] : Verbalizes knowledge/understanding [Dressing changes] : dressing changes [Foot Care] : foot care [Skin Care] : skin care [Signs and symptoms of infection] : sign and symptoms of infection [Venous Disease] : venous disease [Nutrition] : nutrition [Arterial Disease] : arterial disease [How and When to Call] : how and when to call [Pain Management] : pain management [Compression Therapy] : compression therapy [Patient responsibility to plan of care] : patient responsibility to plan of care [Stable] : stable [LTC] : LTC [Wheelchair] : Wheelchair [Not Applicable - Long Term Care/Home Health Agency] : Long Term Care/Home Health Agency: Not Applicable [] : Yes [FreeTextEntry2] : Infection prevention Wound care (dressing changes) Maintain optimal skin integrity to high pressure areas Compression therapy Nutrition and wound healing Elevation and low sodium compliance. Pressure relief/ Pressure redistribution Offloading the stress on skin structures and decreasing potential pathologic biomechanical influences.  [FreeTextEntry3] : Slight improvement [FreeTextEntry4] : -F/U 2 weeks. Auth submitted for Vascular testing and consult   Patient has an appointment with a new cardiologist on 2/19/24 Patient temporarily resides at [facility], instructions and script for supplies provided to patient and friend for wound care and compression therapy. Wound care will be provided at the rehab center.  [FreeTextEntry1] : Right leg weeping edema with less weeping, no acute infection.

## 2024-02-15 NOTE — VITALS
[Pain related to present condition?] : The patient's  pain is related to present condition. [Sharp] : sharp [Burning] : burning [Continuous] : continuous [] : No [de-identified] : 10/10, patient reports that she feels like she is walking on balloons from the "water" in her legs/feet [FreeTextEntry3] : bilateral lower legs [FreeTextEntry1] : rest [FreeTextEntry2] : elevation of lower legs [FreeTextEntry4] : after wound care patient reports the pain is "better"

## 2024-02-19 DIAGNOSIS — E11.622 TYPE 2 DIABETES MELLITUS WITH OTHER SKIN ULCER: ICD-10-CM

## 2024-02-19 DIAGNOSIS — I11.0 HYPERTENSIVE HEART DISEASE WITH HEART FAILURE: ICD-10-CM

## 2024-02-19 DIAGNOSIS — Z79.84 LONG TERM (CURRENT) USE OF ORAL HYPOGLYCEMIC DRUGS: ICD-10-CM

## 2024-02-19 DIAGNOSIS — E66.01 MORBID (SEVERE) OBESITY DUE TO EXCESS CALORIES: ICD-10-CM

## 2024-02-19 DIAGNOSIS — Z98.49 CATARACT EXTRACTION STATUS, UNSPECIFIED EYE: ICD-10-CM

## 2024-02-19 DIAGNOSIS — Z86.718 PERSONAL HISTORY OF OTHER VENOUS THROMBOSIS AND EMBOLISM: ICD-10-CM

## 2024-02-19 DIAGNOSIS — Z79.01 LONG TERM (CURRENT) USE OF ANTICOAGULANTS: ICD-10-CM

## 2024-02-19 DIAGNOSIS — L97.812 NON-PRESSURE CHRONIC ULCER OF OTHER PART OF RIGHT LOWER LEG WITH FAT LAYER EXPOSED: ICD-10-CM

## 2024-02-19 DIAGNOSIS — I48.91 UNSPECIFIED ATRIAL FIBRILLATION: ICD-10-CM

## 2024-02-19 DIAGNOSIS — M25.50 PAIN IN UNSPECIFIED JOINT: ICD-10-CM

## 2024-02-19 DIAGNOSIS — I50.9 HEART FAILURE, UNSPECIFIED: ICD-10-CM

## 2024-02-19 DIAGNOSIS — Z79.4 LONG TERM (CURRENT) USE OF INSULIN: ICD-10-CM

## 2024-02-19 DIAGNOSIS — E11.40 TYPE 2 DIABETES MELLITUS WITH DIABETIC NEUROPATHY, UNSPECIFIED: ICD-10-CM

## 2024-02-19 DIAGNOSIS — Z79.899 OTHER LONG TERM (CURRENT) DRUG THERAPY: ICD-10-CM

## 2024-02-19 DIAGNOSIS — Z90.49 ACQUIRED ABSENCE OF OTHER SPECIFIED PARTS OF DIGESTIVE TRACT: ICD-10-CM

## 2024-02-23 NOTE — ED ADULT NURSE NOTE - NSSUHOSCREENINGYN_ED_ALL_ED
February 23, 2024     Patient: Jacquelyn Tavares   YOB: 2016   Date of Visit: 2/23/2024       To Whom it May Concern:    Jacquelyn Tavares was seen in my clinic on 2/23/2024 at 8:00 am.     Please excuse Jacquelyn for her absence from school on the date listed above to be able to make her appointment.    Sincerely,       Electronically signed by: Maria Luisa/REESE Delgadillo CNP    Medical information is confidential and cannot be disclosed without the written consent of the patient or her representative.     Yes - the patient is able to be screened

## 2024-02-26 ENCOUNTER — APPOINTMENT (OUTPATIENT)
Dept: WOUND CARE | Facility: HOSPITAL | Age: 59
End: 2024-02-26
Payer: MEDICARE

## 2024-02-26 ENCOUNTER — OUTPATIENT (OUTPATIENT)
Dept: OUTPATIENT SERVICES | Facility: HOSPITAL | Age: 59
LOS: 1 days | Discharge: HOME | End: 2024-02-26
Payer: MEDICARE

## 2024-02-26 VITALS
HEIGHT: 61 IN | RESPIRATION RATE: 18 BRPM | BODY MASS INDEX: 43.99 KG/M2 | DIASTOLIC BLOOD PRESSURE: 84 MMHG | HEART RATE: 87 BPM | OXYGEN SATURATION: 95 % | SYSTOLIC BLOOD PRESSURE: 128 MMHG | WEIGHT: 233 LBS | TEMPERATURE: 97.8 F

## 2024-02-26 DIAGNOSIS — E11.622 TYPE 2 DIABETES MELLITUS WITH OTHER SKIN ULCER: ICD-10-CM

## 2024-02-26 DIAGNOSIS — Z90.49 ACQUIRED ABSENCE OF OTHER SPECIFIED PARTS OF DIGESTIVE TRACT: Chronic | ICD-10-CM

## 2024-02-26 DIAGNOSIS — Z98.890 OTHER SPECIFIED POSTPROCEDURAL STATES: Chronic | ICD-10-CM

## 2024-02-26 DIAGNOSIS — Z90.89 ACQUIRED ABSENCE OF OTHER ORGANS: Chronic | ICD-10-CM

## 2024-02-26 PROCEDURE — G0463: CPT

## 2024-02-26 PROCEDURE — 99213 OFFICE O/P EST LOW 20 MIN: CPT

## 2024-02-26 NOTE — PHYSICAL EXAM
[4 x 4] : 4 x 4  [Abdominal Pad] : Abdominal Pad [Normal Heart Sounds] : normal heart sounds [Normal Breath Sounds] : Normal breath sounds [2+] : right 2+ [1+] : right 1+ [0] : left 0 [Ankle Swelling (On Exam)] : present [Ankle Swelling Bilaterally] : severe [Ankle Swelling On The Left] : moderate [] : bilaterally [Oriented to Person] : oriented to person [Alert] : alert [Oriented to Place] : oriented to place [Oriented to Time] : oriented to time [Calm] : calm

## 2024-02-26 NOTE — ASSESSMENT
[Verbal] : Verbal [Written] : Written [Patient] : Patient [Demo] : Demo [Other: ___] : [unfilled] [Fair - mild discomfort, physical impairment, low acceptance] : Fair - mild discomfort, physical impairment, low acceptance [Dressing changes] : dressing changes [Verbalizes knowledge/Understanding] : Verbalizes knowledge/understanding [Foot Care] : foot care [Skin Care] : skin care [Signs and symptoms of infection] : sign and symptoms of infection [Venous Disease] : venous disease [Arterial Disease] : arterial disease [Nutrition] : nutrition [How and When to Call] : how and when to call [Pain Management] : pain management [Patient responsibility to plan of care] : patient responsibility to plan of care [Compression Therapy] : compression therapy [Glycemic Control] : glycemic control [Stable] : stable [Wheelchair] : Wheelchair [Faxed - Long Term Care/Home Health Agency] : Long Term Care/Home Health Agency: Faxed

## 2024-02-28 ENCOUNTER — OUTPATIENT (OUTPATIENT)
Dept: OUTPATIENT SERVICES | Facility: HOSPITAL | Age: 59
LOS: 1 days | Discharge: HOME | End: 2024-02-28
Payer: MEDICARE

## 2024-02-28 ENCOUNTER — NON-APPOINTMENT (OUTPATIENT)
Age: 59
End: 2024-02-28

## 2024-02-28 ENCOUNTER — APPOINTMENT (OUTPATIENT)
Dept: WOUND CARE | Facility: HOSPITAL | Age: 59
End: 2024-02-28
Payer: MEDICARE

## 2024-02-28 VITALS
DIASTOLIC BLOOD PRESSURE: 66 MMHG | WEIGHT: 233 LBS | HEIGHT: 61 IN | BODY MASS INDEX: 43.99 KG/M2 | RESPIRATION RATE: 18 BRPM | SYSTOLIC BLOOD PRESSURE: 133 MMHG | TEMPERATURE: 98.2 F | OXYGEN SATURATION: 97 % | HEART RATE: 77 BPM

## 2024-02-28 DIAGNOSIS — E11.622 TYPE 2 DIABETES MELLITUS WITH OTHER SKIN ULCER: ICD-10-CM

## 2024-02-28 DIAGNOSIS — L97.812 NON-PRESSURE CHRONIC ULCER OF OTHER PART OF RIGHT LOWER LEG WITH FAT LAYER EXPOSED: ICD-10-CM

## 2024-02-28 DIAGNOSIS — Z98.890 OTHER SPECIFIED POSTPROCEDURAL STATES: Chronic | ICD-10-CM

## 2024-02-28 DIAGNOSIS — Z90.49 ACQUIRED ABSENCE OF OTHER SPECIFIED PARTS OF DIGESTIVE TRACT: Chronic | ICD-10-CM

## 2024-02-28 DIAGNOSIS — L97.801 NON-PRESSURE CHRONIC ULCER OF OTHER PART OF UNSPECIFIED LOWER LEG LIMITED TO BREAKDOWN OF SKIN: ICD-10-CM

## 2024-02-28 DIAGNOSIS — Z90.89 ACQUIRED ABSENCE OF OTHER ORGANS: Chronic | ICD-10-CM

## 2024-02-28 PROCEDURE — G0463: CPT

## 2024-02-28 PROCEDURE — ZZZZZ: CPT

## 2024-02-29 ENCOUNTER — APPOINTMENT (OUTPATIENT)
Dept: WOUND CARE | Facility: HOSPITAL | Age: 59
End: 2024-02-29

## 2024-03-01 ENCOUNTER — APPOINTMENT (OUTPATIENT)
Dept: WOUND CARE | Facility: HOSPITAL | Age: 59
End: 2024-03-01

## 2024-03-01 ENCOUNTER — OUTPATIENT (OUTPATIENT)
Dept: OUTPATIENT SERVICES | Facility: HOSPITAL | Age: 59
LOS: 1 days | End: 2024-03-01
Payer: MEDICARE

## 2024-03-01 DIAGNOSIS — Z90.49 ACQUIRED ABSENCE OF OTHER SPECIFIED PARTS OF DIGESTIVE TRACT: Chronic | ICD-10-CM

## 2024-03-01 DIAGNOSIS — Z98.890 OTHER SPECIFIED POSTPROCEDURAL STATES: Chronic | ICD-10-CM

## 2024-03-01 DIAGNOSIS — Z90.89 ACQUIRED ABSENCE OF OTHER ORGANS: Chronic | ICD-10-CM

## 2024-03-01 DIAGNOSIS — L97.812 NON-PRESSURE CHRONIC ULCER OF OTHER PART OF RIGHT LOWER LEG WITH FAT LAYER EXPOSED: ICD-10-CM

## 2024-03-01 PROCEDURE — 93970 EXTREMITY STUDY: CPT | Mod: 26

## 2024-03-01 PROCEDURE — 93970 EXTREMITY STUDY: CPT

## 2024-03-02 DIAGNOSIS — Z90.49 ACQUIRED ABSENCE OF OTHER SPECIFIED PARTS OF DIGESTIVE TRACT: ICD-10-CM

## 2024-03-02 DIAGNOSIS — Z79.4 LONG TERM (CURRENT) USE OF INSULIN: ICD-10-CM

## 2024-03-02 DIAGNOSIS — E11.622 TYPE 2 DIABETES MELLITUS WITH OTHER SKIN ULCER: ICD-10-CM

## 2024-03-02 DIAGNOSIS — M25.50 PAIN IN UNSPECIFIED JOINT: ICD-10-CM

## 2024-03-02 DIAGNOSIS — E11.40 TYPE 2 DIABETES MELLITUS WITH DIABETIC NEUROPATHY, UNSPECIFIED: ICD-10-CM

## 2024-03-02 DIAGNOSIS — I11.0 HYPERTENSIVE HEART DISEASE WITH HEART FAILURE: ICD-10-CM

## 2024-03-02 DIAGNOSIS — I50.9 HEART FAILURE, UNSPECIFIED: ICD-10-CM

## 2024-03-02 DIAGNOSIS — Z86.718 PERSONAL HISTORY OF OTHER VENOUS THROMBOSIS AND EMBOLISM: ICD-10-CM

## 2024-03-02 DIAGNOSIS — Z79.899 OTHER LONG TERM (CURRENT) DRUG THERAPY: ICD-10-CM

## 2024-03-02 DIAGNOSIS — Z79.01 LONG TERM (CURRENT) USE OF ANTICOAGULANTS: ICD-10-CM

## 2024-03-02 DIAGNOSIS — I48.91 UNSPECIFIED ATRIAL FIBRILLATION: ICD-10-CM

## 2024-03-02 DIAGNOSIS — L97.812 NON-PRESSURE CHRONIC ULCER OF OTHER PART OF RIGHT LOWER LEG WITH FAT LAYER EXPOSED: ICD-10-CM

## 2024-03-02 DIAGNOSIS — Z79.84 LONG TERM (CURRENT) USE OF ORAL HYPOGLYCEMIC DRUGS: ICD-10-CM

## 2024-03-02 DIAGNOSIS — Z98.49 CATARACT EXTRACTION STATUS, UNSPECIFIED EYE: ICD-10-CM

## 2024-03-04 ENCOUNTER — APPOINTMENT (OUTPATIENT)
Dept: WOUND CARE | Facility: HOSPITAL | Age: 59
End: 2024-03-04
Payer: MEDICARE

## 2024-03-04 ENCOUNTER — OUTPATIENT (OUTPATIENT)
Dept: OUTPATIENT SERVICES | Facility: HOSPITAL | Age: 59
LOS: 1 days | Discharge: HOME | End: 2024-03-04
Payer: MEDICARE

## 2024-03-04 VITALS
BODY MASS INDEX: 43.99 KG/M2 | SYSTOLIC BLOOD PRESSURE: 112 MMHG | WEIGHT: 233 LBS | HEIGHT: 61 IN | OXYGEN SATURATION: 96 % | RESPIRATION RATE: 20 BRPM | TEMPERATURE: 97.8 F | HEART RATE: 74 BPM | DIASTOLIC BLOOD PRESSURE: 77 MMHG

## 2024-03-04 DIAGNOSIS — E11.622 TYPE 2 DIABETES MELLITUS WITH OTHER SKIN ULCER: ICD-10-CM

## 2024-03-04 DIAGNOSIS — Z98.890 OTHER SPECIFIED POSTPROCEDURAL STATES: Chronic | ICD-10-CM

## 2024-03-04 DIAGNOSIS — Z90.49 ACQUIRED ABSENCE OF OTHER SPECIFIED PARTS OF DIGESTIVE TRACT: Chronic | ICD-10-CM

## 2024-03-04 DIAGNOSIS — Z90.89 ACQUIRED ABSENCE OF OTHER ORGANS: Chronic | ICD-10-CM

## 2024-03-04 PROCEDURE — G0463: CPT

## 2024-03-04 PROCEDURE — 99213 OFFICE O/P EST LOW 20 MIN: CPT

## 2024-03-04 NOTE — HISTORY OF PRESENT ILLNESS
[FreeTextEntry1] : Patient has increasing serous drainage for past few days otherwise has no C/O pain or discomfort. 3/4/24 heels appear closed. Significant decrease in drainage and ulcers smaller. No SOI

## 2024-03-04 NOTE — ASSESSMENT
[Verbal] : Verbal [Written] : Written [Demo] : Demo [Other: ___] : [unfilled] [Patient] : Patient [Fair - mild discomfort, physical impairment, low acceptance] : Fair - mild discomfort, physical impairment, low acceptance [Verbalizes knowledge/Understanding] : Verbalizes knowledge/understanding [Foot Care] : foot care [Dressing changes] : dressing changes [Skin Care] : skin care [Signs and symptoms of infection] : sign and symptoms of infection [Venous Disease] : venous disease [Nutrition] : nutrition [Arterial Disease] : arterial disease [How and When to Call] : how and when to call [Pain Management] : pain management [Compression Therapy] : compression therapy [Patient responsibility to plan of care] : patient responsibility to plan of care [Glycemic Control] : glycemic control [Stable] : stable [Wheelchair] : Wheelchair [Home] : Home [Faxed - Long Term Care/Home Health Agency] : Long Term Care/Home Health Agency: Faxed [] : Yes [FreeTextEntry2] : Infection Prevention Glycemic control/weight loss Promote Skin Integrity Offloading Elevation Compression Compliance Low Na+ Diet Maintain acceptable levels of pain Demonstrates use of both pharmacological and nonpharmacological pain management interventions [FreeTextEntry4] : -F/U 1 week -Vascular studies were completed, MD instructed patient to make an appointment next visit with MD Rodríguez for Vascular review and wound care assessment. Vascular consult for MD Rodríguez was submitted. -Patient given patient access information for pain management therapy. -Patient had a question regarding Gout (patient reports that she has joint pain in her feet and her toes "look like gout toes"), instructed patient to reach out to PCP for gout work-up and diagnosis. -Patient saw cardiologist on 2/19/24, echocardiogram ordered and pending -Wound care instructions and copy of script provided to patient and faxed to [facility]. [FreeTextEntry1] : [facility]

## 2024-03-04 NOTE — PHYSICAL EXAM
[4 x 4] : 4 x 4  [Abdominal Pad] : Abdominal Pad [Normal Heart Sounds] : normal heart sounds [Normal Breath Sounds] : Normal breath sounds [2+] : left 2+ [1+] : right 1+ [0] : left 0 [Ankle Swelling (On Exam)] : present [Ankle Swelling Bilaterally] : severe [] : bilaterally [Ankle Swelling On The Left] : moderate [Alert] : alert [Oriented to Person] : oriented to person [Oriented to Place] : oriented to place [Oriented to Time] : oriented to time [Calm] : calm [de-identified] : Well-developed, Well-nourished in no acute distress. [de-identified] : Within normal limits. [de-identified] : Within normal limits. [de-identified] : Within normal limits. [de-identified] : Right leg with moderate serous weeping, ulcers are clean, no cellulitis, no infection.  [FreeTextEntry1] : Anterior lower leg-  scattered weeping edema  [de-identified] : Largest area of denuded epidermis measures 5.0cmx2.5cmx0.1cm. [FreeTextEntry3] : 14.5 [FreeTextEntry4] : 0.1cm [FreeTextEntry2] : 20.0 [de-identified] : serous [de-identified] : red, moist tissue [de-identified] :  Post compression placement assessment: -circulation WNL -patient states full comfort and full ROM -two fingers can slide in when assessed [de-identified] :  Adaptic touch and then Silver Alginate [de-identified] : Mechanically cleansed with sterile gauze and normal saline 0.9%  Patient prefers not to have Kerlix wrap on lower leg at this time, secured with paper tape and then the dressing is held on by ACE wrap.    [FreeTextEntry7] : Lower leg [de-identified] :  Post compression placement assessment: -circulation WNL -patient states full comfort and full ROM -two fingers can slide in when assessed [de-identified] : No wound care product [TWNoteComboBox1] : Right [TWNoteComboBox6] : Venous [TWNoteComboBox5] : No [TWNoteComboBox4] : Moderate [de-identified] : No [de-identified] : Normal [de-identified] : None [de-identified] : None [de-identified] : No [de-identified] : 100% [de-identified] : Ace wraps [de-identified] : Primary Dressing [de-identified] : 3x Weekly [TWNoteComboBox9] : Left [de-identified] : Ace wraps [de-identified] : Daily [de-identified] : Compression

## 2024-03-04 NOTE — PLAN
[FreeTextEntry1] : Silver Alginate, dry dressing, ACE wrap right side bilateral ace wraps,  elevate lower legs return to office in one week.  25 minutes spent for patient care and medical decision making.

## 2024-03-04 NOTE — VITALS
[Pain related to present condition?] : The patient's  pain is related to present condition. [Sharp] : sharp [Burning] : burning [Shooting] : shooting [] : No [FreeTextEntry3] : Bilateral legs [de-identified] : 10/10 states" feels like someone is taking a razor blade and cutting me up" [FreeTextEntry1] : pt reports, "nothing helps but the pain killer takes the edge off" [FreeTextEntry4] : legs elevated and dressed [FreeTextEntry2] : Pt reports" the minute I eat"

## 2024-03-05 DIAGNOSIS — Z98.49 CATARACT EXTRACTION STATUS, UNSPECIFIED EYE: ICD-10-CM

## 2024-03-05 DIAGNOSIS — I11.0 HYPERTENSIVE HEART DISEASE WITH HEART FAILURE: ICD-10-CM

## 2024-03-05 DIAGNOSIS — E11.622 TYPE 2 DIABETES MELLITUS WITH OTHER SKIN ULCER: ICD-10-CM

## 2024-03-05 DIAGNOSIS — Z86.718 PERSONAL HISTORY OF OTHER VENOUS THROMBOSIS AND EMBOLISM: ICD-10-CM

## 2024-03-05 DIAGNOSIS — Z90.49 ACQUIRED ABSENCE OF OTHER SPECIFIED PARTS OF DIGESTIVE TRACT: ICD-10-CM

## 2024-03-05 DIAGNOSIS — L97.812 NON-PRESSURE CHRONIC ULCER OF OTHER PART OF RIGHT LOWER LEG WITH FAT LAYER EXPOSED: ICD-10-CM

## 2024-03-05 DIAGNOSIS — Z79.899 OTHER LONG TERM (CURRENT) DRUG THERAPY: ICD-10-CM

## 2024-03-05 DIAGNOSIS — I50.9 HEART FAILURE, UNSPECIFIED: ICD-10-CM

## 2024-03-05 DIAGNOSIS — Z79.4 LONG TERM (CURRENT) USE OF INSULIN: ICD-10-CM

## 2024-03-05 DIAGNOSIS — M25.50 PAIN IN UNSPECIFIED JOINT: ICD-10-CM

## 2024-03-05 DIAGNOSIS — Z79.01 LONG TERM (CURRENT) USE OF ANTICOAGULANTS: ICD-10-CM

## 2024-03-05 DIAGNOSIS — Z79.84 LONG TERM (CURRENT) USE OF ORAL HYPOGLYCEMIC DRUGS: ICD-10-CM

## 2024-03-05 DIAGNOSIS — E11.40 TYPE 2 DIABETES MELLITUS WITH DIABETIC NEUROPATHY, UNSPECIFIED: ICD-10-CM

## 2024-03-05 DIAGNOSIS — I48.91 UNSPECIFIED ATRIAL FIBRILLATION: ICD-10-CM

## 2024-03-11 ENCOUNTER — OUTPATIENT (OUTPATIENT)
Dept: OUTPATIENT SERVICES | Facility: HOSPITAL | Age: 59
LOS: 1 days | Discharge: HOME | End: 2024-03-11
Payer: MEDICARE

## 2024-03-11 ENCOUNTER — APPOINTMENT (OUTPATIENT)
Dept: WOUND CARE | Facility: HOSPITAL | Age: 59
End: 2024-03-11
Payer: MEDICARE

## 2024-03-11 VITALS
WEIGHT: 233 LBS | HEART RATE: 77 BPM | TEMPERATURE: 98 F | DIASTOLIC BLOOD PRESSURE: 79 MMHG | BODY MASS INDEX: 43.99 KG/M2 | HEIGHT: 61 IN | SYSTOLIC BLOOD PRESSURE: 137 MMHG | RESPIRATION RATE: 18 BRPM | OXYGEN SATURATION: 96 %

## 2024-03-11 DIAGNOSIS — Z90.89 ACQUIRED ABSENCE OF OTHER ORGANS: Chronic | ICD-10-CM

## 2024-03-11 DIAGNOSIS — Z98.890 OTHER SPECIFIED POSTPROCEDURAL STATES: Chronic | ICD-10-CM

## 2024-03-11 DIAGNOSIS — Z90.49 ACQUIRED ABSENCE OF OTHER SPECIFIED PARTS OF DIGESTIVE TRACT: Chronic | ICD-10-CM

## 2024-03-11 DIAGNOSIS — E11.622 TYPE 2 DIABETES MELLITUS WITH OTHER SKIN ULCER: ICD-10-CM

## 2024-03-11 PROCEDURE — G0463: CPT

## 2024-03-11 PROCEDURE — 99213 OFFICE O/P EST LOW 20 MIN: CPT

## 2024-03-11 RX ORDER — TORSEMIDE 20 MG/1
20 TABLET ORAL
Refills: 0 | Status: ACTIVE | COMMUNITY

## 2024-03-11 RX ORDER — GABAPENTIN 300 MG/1
300 CAPSULE ORAL
Qty: 30 | Refills: 2 | Status: ACTIVE | COMMUNITY

## 2024-03-11 NOTE — PLAN
[FreeTextEntry1] : Silver Alginate, Adaptic, dry dressing, ACE wrap, return to office in two weeks. 25 minutes spent for patient care and medical decision making.

## 2024-03-11 NOTE — VITALS
[Pain related to present condition?] : The patient's  pain is related to present condition. [Sharp] : sharp [Burning] : burning [Shooting] : shooting [] : No [FreeTextEntry3] : Bilateral legs [de-identified] : 10/10 states" feels like someone is taking a razor blade and cutting me up" [FreeTextEntry1] : pt reports, "nothing helps but the pain killer takes the edge off" [FreeTextEntry2] : Touching the area & elevating legs [FreeTextEntry4] : Legs dangling [FreeTextEntry5] : Gabapentin is 1x/day now & Torsemide is 2 pills AM - meds updated

## 2024-03-11 NOTE — HISTORY OF PRESENT ILLNESS
[FreeTextEntry1] : Right leg ulcers are clean, less weeping, no C/O, has had vascular study done two weeks ago.

## 2024-03-11 NOTE — ASSESSMENT
[Verbal] : Verbal [Demo] : Demo [Written] : Written [Other: ___] : [unfilled] [Patient] : Patient [Verbalizes knowledge/Understanding] : Verbalizes knowledge/understanding [Fair - mild discomfort, physical impairment, low acceptance] : Fair - mild discomfort, physical impairment, low acceptance [Dressing changes] : dressing changes [Foot Care] : foot care [Skin Care] : skin care [Venous Disease] : venous disease [Signs and symptoms of infection] : sign and symptoms of infection [Nutrition] : nutrition [Arterial Disease] : arterial disease [Pain Management] : pain management [How and When to Call] : how and when to call [Glycemic Control] : glycemic control [Compression Therapy] : compression therapy [Patient responsibility to plan of care] : patient responsibility to plan of care [] : Yes [Stable] : stable [Home] : Home [Wheelchair] : Wheelchair [Faxed - Long Term Care/Home Health Agency] : Long Term Care/Home Health Agency: Faxed [FreeTextEntry2] : Infection Prevention Glycemic control/weight loss Promote Skin Integrity Offloading Elevation Compression Compliance Low Na+ Diet Maintain acceptable levels of pain Demonstrates use of both pharmacological and nonpharmacological pain management interventions [FreeTextEntry4] : Pt stated her legs were wrapped by [facility] RN but they were so poorly wrapped with no compression in place and taped on it doesn't make sense. Pt stated they have no supplies; supply order placed with [facility] instructions & patient given 2 6" ACE wraps per their request. Vascular studies interpreted by Dr. Rodríguez who advised pt the studies revealed no issues. Continue to treat as ordered and when healed we'll determine the type of compression therapy that will be helpful for the patient. [facility] instructions signed and faxed to [facility] F/U 2 weeks [FreeTextEntry1] : [facility]

## 2024-03-11 NOTE — PHYSICAL EXAM
[4 x 4] : 4 x 4  [Abdominal Pad] : Abdominal Pad [Normal Breath Sounds] : Normal breath sounds [Normal Heart Sounds] : normal heart sounds [1+] : right 1+ [2+] : left 2+ [0] : right 0 [Ankle Swelling (On Exam)] : present [] : bilaterally [Ankle Swelling Bilaterally] : severe [Alert] : alert [Oriented to Person] : oriented to person [Oriented to Place] : oriented to place [Oriented to Time] : oriented to time [Calm] : calm [de-identified] : Well-developed, Well-nourished in no acute distress. Morbidly obese.  [de-identified] : Within normal limits. [de-identified] : Within normal limits. [de-identified] : Within normal limits. [de-identified] : Right leg with less weeping, ulcers are clean, no acute infection, periwound skin is intact with no cellulitis. [de-identified] : Denuded epidermis on anterior foot - measures 5.0 x2.5 x0.1 [FreeTextEntry1] : Right Anterior Lower Leg- scattered weeping edema  [FreeTextEntry3] : 10.0 [FreeTextEntry2] : 16.0 [FreeTextEntry4] : 0.1 [de-identified] : Small serous [de-identified] : red, moist tissue [de-identified] :  Post compression placement assessment: -circulation WNL -patient states full comfort and full ROM -two fingers can slide in when assessed [de-identified] :  Adaptic Touch & Silver Alginate [de-identified] : Mechanically cleansed with sterile gauze and 0.9% normal saline. Dry Dressing NO Kerlix - pt prefers not to use [FreeTextEntry7] : Left Lower Leg [de-identified] :  Post compression placement assessment: -circulation WNL -patient states full comfort and full ROM -two fingers can slide in when assessed [de-identified] : No product [de-identified] : Mechanically cleansed with sterile gauze and 0.9% normal saline. [TWNoteComboBox1] : Right [TWNoteComboBox5] : No [de-identified] : No [TWNoteComboBox6] : Venous [de-identified] : Normal [de-identified] : None [de-identified] : None [de-identified] : 100% [de-identified] : No [de-identified] : Ace wraps [de-identified] : 3x Weekly [TWNoteComboBox9] : Left [de-identified] : Primary Dressing [de-identified] : Daily [de-identified] : Ace wraps [de-identified] : Compression

## 2024-03-12 DIAGNOSIS — Z79.01 LONG TERM (CURRENT) USE OF ANTICOAGULANTS: ICD-10-CM

## 2024-03-12 DIAGNOSIS — Z98.49 CATARACT EXTRACTION STATUS, UNSPECIFIED EYE: ICD-10-CM

## 2024-03-12 DIAGNOSIS — Z90.49 ACQUIRED ABSENCE OF OTHER SPECIFIED PARTS OF DIGESTIVE TRACT: ICD-10-CM

## 2024-03-12 DIAGNOSIS — Z79.899 OTHER LONG TERM (CURRENT) DRUG THERAPY: ICD-10-CM

## 2024-03-12 DIAGNOSIS — I11.0 HYPERTENSIVE HEART DISEASE WITH HEART FAILURE: ICD-10-CM

## 2024-03-12 DIAGNOSIS — I50.9 HEART FAILURE, UNSPECIFIED: ICD-10-CM

## 2024-03-12 DIAGNOSIS — Z79.84 LONG TERM (CURRENT) USE OF ORAL HYPOGLYCEMIC DRUGS: ICD-10-CM

## 2024-03-12 DIAGNOSIS — E11.622 TYPE 2 DIABETES MELLITUS WITH OTHER SKIN ULCER: ICD-10-CM

## 2024-03-12 DIAGNOSIS — Z86.718 PERSONAL HISTORY OF OTHER VENOUS THROMBOSIS AND EMBOLISM: ICD-10-CM

## 2024-03-12 DIAGNOSIS — E11.40 TYPE 2 DIABETES MELLITUS WITH DIABETIC NEUROPATHY, UNSPECIFIED: ICD-10-CM

## 2024-03-12 DIAGNOSIS — I48.91 UNSPECIFIED ATRIAL FIBRILLATION: ICD-10-CM

## 2024-03-12 DIAGNOSIS — L97.812 NON-PRESSURE CHRONIC ULCER OF OTHER PART OF RIGHT LOWER LEG WITH FAT LAYER EXPOSED: ICD-10-CM

## 2024-03-12 DIAGNOSIS — Z79.4 LONG TERM (CURRENT) USE OF INSULIN: ICD-10-CM

## 2024-03-12 DIAGNOSIS — M25.50 PAIN IN UNSPECIFIED JOINT: ICD-10-CM

## 2024-03-22 ENCOUNTER — OUTPATIENT (OUTPATIENT)
Dept: OUTPATIENT SERVICES | Facility: HOSPITAL | Age: 59
LOS: 1 days | Discharge: HOME | End: 2024-03-22
Payer: MEDICARE

## 2024-03-22 ENCOUNTER — APPOINTMENT (OUTPATIENT)
Dept: WOUND CARE | Facility: HOSPITAL | Age: 59
End: 2024-03-22
Payer: MEDICARE

## 2024-03-22 DIAGNOSIS — Z90.49 ACQUIRED ABSENCE OF OTHER SPECIFIED PARTS OF DIGESTIVE TRACT: Chronic | ICD-10-CM

## 2024-03-22 DIAGNOSIS — Z98.890 OTHER SPECIFIED POSTPROCEDURAL STATES: Chronic | ICD-10-CM

## 2024-03-22 DIAGNOSIS — E11.622 TYPE 2 DIABETES MELLITUS WITH OTHER SKIN ULCER: ICD-10-CM

## 2024-03-22 DIAGNOSIS — Z90.89 ACQUIRED ABSENCE OF OTHER ORGANS: Chronic | ICD-10-CM

## 2024-03-22 PROCEDURE — G0463: CPT

## 2024-03-22 PROCEDURE — 99213 OFFICE O/P EST LOW 20 MIN: CPT

## 2024-03-22 NOTE — PLAN
[FreeTextEntry1] : Silver Alginate, dry dressing, ACE wrap, return to office in two weeks. 25 minutes spent for patient care and medical decision making.

## 2024-03-22 NOTE — PHYSICAL EXAM
[4 x 4] : 4 x 4  [Abdominal Pad] : Abdominal Pad [Normal Breath Sounds] : Normal breath sounds [Normal Heart Sounds] : normal heart sounds [2+] : left 2+ [1+] : left 1+ [0] : left 0 [Ankle Swelling Bilaterally] : bilaterally  [] : bilaterally [Ankle Swelling On The Left] : moderate [Alert] : alert [Oriented to Place] : oriented to place [Oriented to Person] : oriented to person [Calm] : calm [Oriented to Time] : oriented to time [de-identified] : Well-developed, Well-nourished in no acute distress. [de-identified] : Within normal limits. [de-identified] : Within normal limits. [de-identified] : Within normal limits. [de-identified] : Right leg ulcers are clean, minimal weeping, no infection or cellulitis.  [de-identified] : Denuded epidermis on anterior foot - healed [FreeTextEntry1] : Right Anterior Lower Leg- scattered weeping edema  [FreeTextEntry2] : 13.0cm [FreeTextEntry3] : 8.0cm [FreeTextEntry4] : 0.1cm [de-identified] : serosanguinous  [de-identified] : extending onto dorsal foot [de-identified] : red, moist tissue [de-identified] :  Post compression placement assessment: -circulation WNL -patient states full comfort and full ROM -two fingers can slide in when assessed [de-identified] :  Adaptic Touch & Silver Alginate [de-identified] : Mechanically cleansed with sterile gauze and 0.9% normal saline. Dry Dressing Secured dressing with cloth tape prior to ACE wrap application NO Kerlix - pt prefers not to use [FreeTextEntry7] : Left Lower Leg [de-identified] :  Post compression placement assessment: -circulation WNL -patient states full comfort and full ROM -two fingers can slide in when assessed [de-identified] : No product [de-identified] : Mechanically cleansed with sterile gauze and 0.9% normal saline. [TWNoteComboBox1] : Right [TWNoteComboBox5] : No [TWNoteComboBox4] : Small [TWNoteComboBox6] : Venous [de-identified] : No [de-identified] : Erythema [de-identified] : None [de-identified] : None [de-identified] : 100% [de-identified] : No [de-identified] : Ace wraps [de-identified] : Primary Dressing [de-identified] : 3x Weekly [TWNoteComboBox9] : Left [de-identified] : Ace wraps [de-identified] : Daily [de-identified] : Compression

## 2024-03-22 NOTE — ASSESSMENT
[Verbal] : Verbal [Demo] : Demo [Written] : Written [Other: ___] : [unfilled] [Patient] : Patient [Fair - mild discomfort, physical impairment, low acceptance] : Fair - mild discomfort, physical impairment, low acceptance [Verbalizes knowledge/Understanding] : Verbalizes knowledge/understanding [Dressing changes] : dressing changes [Skin Care] : skin care [Foot Care] : foot care [Nutrition] : nutrition [Signs and symptoms of infection] : sign and symptoms of infection [Venous Disease] : venous disease [How and When to Call] : how and when to call [Arterial Disease] : arterial disease [Patient responsibility to plan of care] : patient responsibility to plan of care [Compression Therapy] : compression therapy [Pain Management] : pain management [Glycemic Control] : glycemic control [] : Yes [Stable] : stable [Ambulatory] : Ambulatory [Home] : Home [Faxed - Long Term Care/Home Health Agency] : Long Term Care/Home Health Agency: Faxed [FreeTextEntry2] : Infection Prevention Glycemic control/weight loss Promote Skin Integrity Offloading Elevation Compression Compliance Low Na+ Diet Maintain acceptable levels of pain Demonstrates use of both pharmacological and nonpharmacological pain management interventions [FreeTextEntry4] : -Patient sent to [facility] by [facility] RN, due to erythema of RLE. No other S/S of infection noted. Advised patient and friend (Alexandro) of S/S of infection and when to seek medical attention. -Patient is interested in Diabetic education, she has a follow up appointment with her endocrinologist and will ask for education through that office, if the patient does not receive education, offered to place a consult next visit (for the following visit) for education. -Patient reports that she has overall trouble eating and body aches when she does eat, she is followed by a GI doctor. -[facility] instructions signed and faxed to [facility] -F/U 2 weeks [FreeTextEntry3] : Improvement in measurements, erythema in periwound skin no other S/S of infection noted [FreeTextEntry1] : Right leg stasis ulcers are stable, no acute infection.

## 2024-03-22 NOTE — HISTORY OF PRESENT ILLNESS
[FreeTextEntry1] : Visiting nurse was concern about right leg ulcer therefor patient was send here for evaluation. patient has no C/O pain or discomfort.

## 2024-03-22 NOTE — VITALS
[Burning] : burning [Pain related to present condition?] : The patient's  pain is related to present condition. [Sharp] : sharp [Shooting] : shooting [] : No [de-identified] : 10/10 states" feels like someone is taking a razor blade and cutting me up" [FreeTextEntry3] : Bilateral legs [FreeTextEntry1] : pt reports, "nothing helps but the pain killer takes the edge off" [FreeTextEntry2] : Touching the area & elevating legs [FreeTextEntry4] : Legs dangling

## 2024-03-25 ENCOUNTER — APPOINTMENT (OUTPATIENT)
Dept: WOUND CARE | Facility: HOSPITAL | Age: 59
End: 2024-03-25

## 2024-03-31 DIAGNOSIS — Z86.718 PERSONAL HISTORY OF OTHER VENOUS THROMBOSIS AND EMBOLISM: ICD-10-CM

## 2024-03-31 DIAGNOSIS — Z79.899 OTHER LONG TERM (CURRENT) DRUG THERAPY: ICD-10-CM

## 2024-03-31 DIAGNOSIS — Z90.49 ACQUIRED ABSENCE OF OTHER SPECIFIED PARTS OF DIGESTIVE TRACT: ICD-10-CM

## 2024-03-31 DIAGNOSIS — M25.50 PAIN IN UNSPECIFIED JOINT: ICD-10-CM

## 2024-03-31 DIAGNOSIS — I50.9 HEART FAILURE, UNSPECIFIED: ICD-10-CM

## 2024-03-31 DIAGNOSIS — Z79.84 LONG TERM (CURRENT) USE OF ORAL HYPOGLYCEMIC DRUGS: ICD-10-CM

## 2024-03-31 DIAGNOSIS — I11.0 HYPERTENSIVE HEART DISEASE WITH HEART FAILURE: ICD-10-CM

## 2024-03-31 DIAGNOSIS — Z98.49 CATARACT EXTRACTION STATUS, UNSPECIFIED EYE: ICD-10-CM

## 2024-03-31 DIAGNOSIS — Z79.01 LONG TERM (CURRENT) USE OF ANTICOAGULANTS: ICD-10-CM

## 2024-03-31 DIAGNOSIS — I48.91 UNSPECIFIED ATRIAL FIBRILLATION: ICD-10-CM

## 2024-03-31 DIAGNOSIS — L97.812 NON-PRESSURE CHRONIC ULCER OF OTHER PART OF RIGHT LOWER LEG WITH FAT LAYER EXPOSED: ICD-10-CM

## 2024-03-31 DIAGNOSIS — Z79.4 LONG TERM (CURRENT) USE OF INSULIN: ICD-10-CM

## 2024-03-31 DIAGNOSIS — E11.40 TYPE 2 DIABETES MELLITUS WITH DIABETIC NEUROPATHY, UNSPECIFIED: ICD-10-CM

## 2024-03-31 DIAGNOSIS — E11.622 TYPE 2 DIABETES MELLITUS WITH OTHER SKIN ULCER: ICD-10-CM

## 2024-04-02 ENCOUNTER — APPOINTMENT (OUTPATIENT)
Dept: CT IMAGING | Facility: CLINIC | Age: 59
End: 2024-04-02
Payer: MEDICARE

## 2024-04-02 PROCEDURE — 75571 CT HRT W/O DYE W/CA TEST: CPT

## 2024-04-02 NOTE — H&P ADULT - NSHPOUTPATIENTPROVIDERS_GEN_ALL_CORE
Please call patient. Shoulder x-ray shows osteoarthritis and possible rotator cuff tear. Please place order service to orthopedic surgery.
Endocrinology: Dr. Zambrano  Podiatry: Dr. Darden   PCP and Cardiology no longer accepted insurance

## 2024-04-08 ENCOUNTER — APPOINTMENT (OUTPATIENT)
Dept: WOUND CARE | Facility: HOSPITAL | Age: 59
End: 2024-04-08
Payer: MEDICARE

## 2024-04-08 ENCOUNTER — OUTPATIENT (OUTPATIENT)
Dept: OUTPATIENT SERVICES | Facility: HOSPITAL | Age: 59
LOS: 1 days | Discharge: HOME | End: 2024-04-08
Payer: MEDICARE

## 2024-04-08 VITALS
HEART RATE: 83 BPM | RESPIRATION RATE: 18 BRPM | WEIGHT: 233 LBS | SYSTOLIC BLOOD PRESSURE: 111 MMHG | HEIGHT: 61 IN | TEMPERATURE: 98 F | DIASTOLIC BLOOD PRESSURE: 68 MMHG | OXYGEN SATURATION: 98 % | BODY MASS INDEX: 43.99 KG/M2

## 2024-04-08 DIAGNOSIS — Z90.89 ACQUIRED ABSENCE OF OTHER ORGANS: Chronic | ICD-10-CM

## 2024-04-08 DIAGNOSIS — E11.622 TYPE 2 DIABETES MELLITUS WITH OTHER SKIN ULCER: ICD-10-CM

## 2024-04-08 DIAGNOSIS — Z90.49 ACQUIRED ABSENCE OF OTHER SPECIFIED PARTS OF DIGESTIVE TRACT: Chronic | ICD-10-CM

## 2024-04-08 DIAGNOSIS — Z98.890 OTHER SPECIFIED POSTPROCEDURAL STATES: Chronic | ICD-10-CM

## 2024-04-08 PROCEDURE — 99213 OFFICE O/P EST LOW 20 MIN: CPT

## 2024-04-08 PROCEDURE — G0463: CPT

## 2024-04-08 NOTE — VITALS
[Pain related to present condition?] : The patient's  pain is related to present condition. [Sharp] : sharp [Burning] : burning [Shooting] : shooting [] : No [de-identified] : 10/10 states" feels like someone is taking a razor blade and cutting me up" [FreeTextEntry3] : Bilateral legs [FreeTextEntry1] : pt reports, "nothing helps but the pain killer takes the edge off" [FreeTextEntry2] : Touching the area & elevating legs [FreeTextEntry4] : Legs dangling

## 2024-04-08 NOTE — PHYSICAL EXAM
[Abdominal Pad] : Abdominal Pad [Normal Breath Sounds] : Normal breath sounds [Normal Heart Sounds] : normal heart sounds [2+] : left 2+ [0] : left 0 [Ankle Swelling (On Exam)] : present [Ankle Swelling Bilaterally] : bilaterally  [Varicose Veins Of Lower Extremities] : bilaterally [] : bilaterally [Ankle Swelling On The Left] : moderate [Alert] : alert [Oriented to Person] : oriented to person [Oriented to Place] : oriented to place [Oriented to Time] : oriented to time [Calm] : calm [de-identified] : Well-developed, Well-nourished in no acute distress. [de-identified] : Within normal limits. [de-identified] : Within normal limits. [de-identified] : Right leg ulcers are stable, some serous drainage, no odor, no acute infection, periwound skin is intact with no cellulitis. [4 x 4] : 4 x 4  [de-identified] : Denuded epidermis on anterior foot - healed [FreeTextEntry1] : Right Anterior Lower Leg- scattered weeping edema  [FreeTextEntry2] : 13.0cm [FreeTextEntry3] : 8.0cm [FreeTextEntry4] : 0.1cm [de-identified] : serosanguinous  [de-identified] : extending onto dorsal foot [de-identified] : red, moist tissue [de-identified] :  Post compression placement assessment: -circulation WNL -patient states full comfort and full ROM -two fingers can slide in when assessed [de-identified] :  Adaptic Touch & Silver Alginate [de-identified] : Mechanically cleansed with sterile gauze and 0.9% normal saline. Dry Dressing Secured dressing with cloth tape prior to ACE wrap application NO Kerlix - pt prefers not to use [de-identified] : wound re-opened [FreeTextEntry7] : Left Lower Leg [FreeTextEntry8] : 2.0 [FreeTextEntry9] : 1.8 [de-identified] : 0.1 [de-identified] : Serosanguinous [de-identified] :  Post compression placement assessment: -circulation WNL -patient states full comfort and full ROM -two fingers can slide in when assessed [de-identified] : Silver Alginate [de-identified] : Mechanically cleansed with sterile gauze and 0.9% normal saline. Dry Dressing Secured dressing with cloth tape prior to ACE wrap application NO Kerlix - pt prefers not to use [TWNoteComboBox1] : Right [TWNoteComboBox4] : Small [TWNoteComboBox5] : No [TWNoteComboBox6] : Venous [de-identified] : No [de-identified] : Erythema [de-identified] : None [de-identified] : None [de-identified] : 100% [de-identified] : No [de-identified] : Ace wraps [de-identified] : 3x Weekly [de-identified] : Primary Dressing [TWNoteComboBox9] : Left [de-identified] : Moderate [de-identified] : No [de-identified] : Venous [de-identified] : No [de-identified] : Erythema [de-identified] : None [de-identified] : None [de-identified] : Ace wraps [de-identified] : 3x Weekly [de-identified] : Primary Dressing

## 2024-04-08 NOTE — PLAN
[FreeTextEntry1] : Adaptic, Silver Alginate, dry dressing, ACE wrap, return to office in two weeks. 25 minutes spent for patient care and medical decision making.

## 2024-04-08 NOTE — ASSESSMENT
[Verbal] : Verbal [Written] : Written [Demo] : Demo [Patient] : Patient [Other: ___] : [unfilled] [Fair - mild discomfort, physical impairment, low acceptance] : Fair - mild discomfort, physical impairment, low acceptance [Dressing changes] : dressing changes [Foot Care] : foot care [Skin Care] : skin care [Signs and symptoms of infection] : sign and symptoms of infection [Venous Disease] : venous disease [Nutrition] : nutrition [Arterial Disease] : arterial disease [How and When to Call] : how and when to call [Pain Management] : pain management [Compression Therapy] : compression therapy [Patient responsibility to plan of care] : patient responsibility to plan of care [Glycemic Control] : glycemic control [Stable] : stable [Home] : Home [Ambulatory] : Ambulatory [Faxed - Long Term Care/Home Health Agency] : Long Term Care/Home Health Agency: Faxed [Needs reinforcement] : needs reinforcement [] : No [FreeTextEntry2] : Infection Prevention Glycemic control/weight loss Promote Skin Integrity Offloading Elevation Compression Compliance Low Na+ Diet Maintain acceptable levels of pain Demonstrates use of both pharmacological and nonpharmacological pain management interventions [FreeTextEntry3] : Improvement in measurements, erythema in periwound skin no other S/S of infection noted [FreeTextEntry4] : -[facility] instructions and script for supplies signed and faxed to [facility] -F/U 2 weeks [FreeTextEntry1] : Right leg stasis ulcers arestable, no acute infection.

## 2024-04-09 DIAGNOSIS — Z79.4 LONG TERM (CURRENT) USE OF INSULIN: ICD-10-CM

## 2024-04-09 DIAGNOSIS — M25.50 PAIN IN UNSPECIFIED JOINT: ICD-10-CM

## 2024-04-09 DIAGNOSIS — Z98.49 CATARACT EXTRACTION STATUS, UNSPECIFIED EYE: ICD-10-CM

## 2024-04-09 DIAGNOSIS — I48.91 UNSPECIFIED ATRIAL FIBRILLATION: ICD-10-CM

## 2024-04-09 DIAGNOSIS — Z90.49 ACQUIRED ABSENCE OF OTHER SPECIFIED PARTS OF DIGESTIVE TRACT: ICD-10-CM

## 2024-04-09 DIAGNOSIS — E11.622 TYPE 2 DIABETES MELLITUS WITH OTHER SKIN ULCER: ICD-10-CM

## 2024-04-09 DIAGNOSIS — I11.0 HYPERTENSIVE HEART DISEASE WITH HEART FAILURE: ICD-10-CM

## 2024-04-09 DIAGNOSIS — L97.812 NON-PRESSURE CHRONIC ULCER OF OTHER PART OF RIGHT LOWER LEG WITH FAT LAYER EXPOSED: ICD-10-CM

## 2024-04-09 DIAGNOSIS — Z86.718 PERSONAL HISTORY OF OTHER VENOUS THROMBOSIS AND EMBOLISM: ICD-10-CM

## 2024-04-09 DIAGNOSIS — Z79.01 LONG TERM (CURRENT) USE OF ANTICOAGULANTS: ICD-10-CM

## 2024-04-09 DIAGNOSIS — Z79.84 LONG TERM (CURRENT) USE OF ORAL HYPOGLYCEMIC DRUGS: ICD-10-CM

## 2024-04-09 DIAGNOSIS — E11.40 TYPE 2 DIABETES MELLITUS WITH DIABETIC NEUROPATHY, UNSPECIFIED: ICD-10-CM

## 2024-04-09 DIAGNOSIS — Z79.899 OTHER LONG TERM (CURRENT) DRUG THERAPY: ICD-10-CM

## 2024-04-09 DIAGNOSIS — I50.9 HEART FAILURE, UNSPECIFIED: ICD-10-CM

## 2024-04-16 ENCOUNTER — APPOINTMENT (OUTPATIENT)
Dept: CARDIOLOGY | Facility: CLINIC | Age: 59
End: 2024-04-16
Payer: MEDICARE

## 2024-04-16 DIAGNOSIS — M79.89 OTHER SPECIFIED SOFT TISSUE DISORDERS: ICD-10-CM

## 2024-04-16 PROCEDURE — 99204 OFFICE O/P NEW MOD 45 MIN: CPT

## 2024-04-16 NOTE — ASSESSMENT
[FreeTextEntry1] : Assessment: 1.  lymphedema 2.  Recurrent cellulitis.   Plan 1. Patient has primary lymphedema and has tried conservative therapies such as compression stockings and leg elevation for over one month, but still remains symptomatic.  There is hyperpigmentation and lipodermatosclerosis on exam.  Will arrange for a pneumatic pump. 2.  Wound care 3.  Referral to [facility] lymphedema therapy .

## 2024-04-16 NOTE — REASON FOR VISIT
[FreeTextEntry1] : 4/16  59 y/o F with PMHX Anxiety, Afib, CHF, History of Chronic L Heel Ulcer, History of R Leg Stasis Ulcers, DM, Diabetic Neuropathy, Obesity, Polyarthralgia, Chronic Back Pain, who presents for evaluation. History of weeping LE edema. Seeing wound care Dr. Rodríguez.   LE venous duplex 3/2024: No evidence of deep or superficial venous thrombosis. No evidence of deep venous reflux. Bilateral superficial venous reflux in the bilateral accessory small saphenous veins as well as the left small saphenous vein.  MARICEL 4/2023: Right lower extremity: The ankle brachial index is 1.07. TBI is 0.70 with digital pressures of 117 mmHg. The pulse waveforms are near normal. Left lower extremity: The ankle brachial index is 1.12. TBI is 0.67 with digital pressures of 111 mmHg. The pulse waveforms are near normal. Diffusely calcified noncompressible vessels limiting evaluation of segmental pressures.

## 2024-04-17 ENCOUNTER — INPATIENT (INPATIENT)
Facility: HOSPITAL | Age: 59
LOS: 4 days | Discharge: HOME | DRG: 603 | End: 2024-04-22
Attending: STUDENT IN AN ORGANIZED HEALTH CARE EDUCATION/TRAINING PROGRAM | Admitting: STUDENT IN AN ORGANIZED HEALTH CARE EDUCATION/TRAINING PROGRAM
Payer: MEDICARE

## 2024-04-17 ENCOUNTER — APPOINTMENT (OUTPATIENT)
Dept: WOUND CARE | Facility: HOSPITAL | Age: 59
End: 2024-04-17
Payer: MEDICARE

## 2024-04-17 ENCOUNTER — OUTPATIENT (OUTPATIENT)
Dept: OUTPATIENT SERVICES | Facility: HOSPITAL | Age: 59
LOS: 1 days | Discharge: ANOTHER HOSPITAL | End: 2024-04-17
Payer: MEDICARE

## 2024-04-17 VITALS
OXYGEN SATURATION: 95 % | TEMPERATURE: 97.6 F | DIASTOLIC BLOOD PRESSURE: 78 MMHG | BODY MASS INDEX: 43.99 KG/M2 | HEIGHT: 61 IN | HEART RATE: 91 BPM | RESPIRATION RATE: 18 BRPM | WEIGHT: 233 LBS | SYSTOLIC BLOOD PRESSURE: 138 MMHG

## 2024-04-17 VITALS
SYSTOLIC BLOOD PRESSURE: 136 MMHG | WEIGHT: 253.97 LBS | OXYGEN SATURATION: 94 % | RESPIRATION RATE: 18 BRPM | HEART RATE: 120 BPM | TEMPERATURE: 98 F | DIASTOLIC BLOOD PRESSURE: 96 MMHG | HEIGHT: 64 IN

## 2024-04-17 DIAGNOSIS — Z90.89 ACQUIRED ABSENCE OF OTHER ORGANS: Chronic | ICD-10-CM

## 2024-04-17 DIAGNOSIS — Z98.890 OTHER SPECIFIED POSTPROCEDURAL STATES: Chronic | ICD-10-CM

## 2024-04-17 DIAGNOSIS — Z90.49 ACQUIRED ABSENCE OF OTHER SPECIFIED PARTS OF DIGESTIVE TRACT: Chronic | ICD-10-CM

## 2024-04-17 DIAGNOSIS — E11.622 TYPE 2 DIABETES MELLITUS WITH OTHER SKIN ULCER: ICD-10-CM

## 2024-04-17 DIAGNOSIS — L03.115 CELLULITIS OF RIGHT LOWER LIMB: ICD-10-CM

## 2024-04-17 DIAGNOSIS — L03.90 CELLULITIS, UNSPECIFIED: ICD-10-CM

## 2024-04-17 LAB
ALBUMIN SERPL ELPH-MCNC: 3.7 G/DL — SIGNIFICANT CHANGE UP (ref 3.3–5)
ALP SERPL-CCNC: 128 U/L — HIGH (ref 40–120)
ALT FLD-CCNC: 30 U/L — SIGNIFICANT CHANGE UP (ref 12–78)
ANION GAP SERPL CALC-SCNC: 8 MMOL/L — SIGNIFICANT CHANGE UP (ref 5–17)
AST SERPL-CCNC: 19 U/L — SIGNIFICANT CHANGE UP (ref 15–37)
BASOPHILS # BLD AUTO: 0.04 K/UL — SIGNIFICANT CHANGE UP (ref 0–0.2)
BASOPHILS NFR BLD AUTO: 0.5 % — SIGNIFICANT CHANGE UP (ref 0–2)
BILIRUB SERPL-MCNC: 0.5 MG/DL — SIGNIFICANT CHANGE UP (ref 0.2–1.2)
BUN SERPL-MCNC: 20 MG/DL — SIGNIFICANT CHANGE UP (ref 7–23)
CALCIUM SERPL-MCNC: 9.8 MG/DL — SIGNIFICANT CHANGE UP (ref 8.5–10.1)
CHLORIDE SERPL-SCNC: 100 MMOL/L — SIGNIFICANT CHANGE UP (ref 96–108)
CO2 SERPL-SCNC: 32 MMOL/L — HIGH (ref 22–31)
CREAT SERPL-MCNC: 0.98 MG/DL — SIGNIFICANT CHANGE UP (ref 0.5–1.3)
EGFR: 66 ML/MIN/1.73M2 — SIGNIFICANT CHANGE UP
EOSINOPHIL # BLD AUTO: 0.14 K/UL — SIGNIFICANT CHANGE UP (ref 0–0.5)
EOSINOPHIL NFR BLD AUTO: 1.8 % — SIGNIFICANT CHANGE UP (ref 0–6)
GLUCOSE SERPL-MCNC: 395 MG/DL — HIGH (ref 70–99)
HCT VFR BLD CALC: 39.5 % — SIGNIFICANT CHANGE UP (ref 34.5–45)
HGB BLD-MCNC: 13.4 G/DL — SIGNIFICANT CHANGE UP (ref 11.5–15.5)
IMM GRANULOCYTES NFR BLD AUTO: 0.4 % — SIGNIFICANT CHANGE UP (ref 0–0.9)
LACTATE SERPL-SCNC: 2.9 MMOL/L — HIGH (ref 0.7–2)
LYMPHOCYTES # BLD AUTO: 1.59 K/UL — SIGNIFICANT CHANGE UP (ref 1–3.3)
LYMPHOCYTES # BLD AUTO: 19.9 % — SIGNIFICANT CHANGE UP (ref 13–44)
MAGNESIUM SERPL-MCNC: 2.1 MG/DL — SIGNIFICANT CHANGE UP (ref 1.6–2.6)
MCHC RBC-ENTMCNC: 30.5 PG — SIGNIFICANT CHANGE UP (ref 27–34)
MCHC RBC-ENTMCNC: 33.9 GM/DL — SIGNIFICANT CHANGE UP (ref 32–36)
MCV RBC AUTO: 89.8 FL — SIGNIFICANT CHANGE UP (ref 80–100)
MONOCYTES # BLD AUTO: 0.54 K/UL — SIGNIFICANT CHANGE UP (ref 0–0.9)
MONOCYTES NFR BLD AUTO: 6.8 % — SIGNIFICANT CHANGE UP (ref 2–14)
NEUTROPHILS # BLD AUTO: 5.64 K/UL — SIGNIFICANT CHANGE UP (ref 1.8–7.4)
NEUTROPHILS NFR BLD AUTO: 70.6 % — SIGNIFICANT CHANGE UP (ref 43–77)
NRBC # BLD: 0 /100 WBCS — SIGNIFICANT CHANGE UP (ref 0–0)
NT-PROBNP SERPL-SCNC: 450 PG/ML — HIGH (ref 0–125)
PHOSPHATE SERPL-MCNC: 3.8 MG/DL — SIGNIFICANT CHANGE UP (ref 2.5–4.5)
PLATELET # BLD AUTO: 238 K/UL — SIGNIFICANT CHANGE UP (ref 150–400)
POTASSIUM SERPL-MCNC: 4.2 MMOL/L — SIGNIFICANT CHANGE UP (ref 3.5–5.3)
POTASSIUM SERPL-SCNC: 4.2 MMOL/L — SIGNIFICANT CHANGE UP (ref 3.5–5.3)
PROT SERPL-MCNC: 8.1 G/DL — SIGNIFICANT CHANGE UP (ref 6–8.3)
RBC # BLD: 4.4 M/UL — SIGNIFICANT CHANGE UP (ref 3.8–5.2)
RBC # FLD: 13.2 % — SIGNIFICANT CHANGE UP (ref 10.3–14.5)
SODIUM SERPL-SCNC: 140 MMOL/L — SIGNIFICANT CHANGE UP (ref 135–145)
WBC # BLD: 7.98 K/UL — SIGNIFICANT CHANGE UP (ref 3.8–10.5)
WBC # FLD AUTO: 7.98 K/UL — SIGNIFICANT CHANGE UP (ref 3.8–10.5)

## 2024-04-17 PROCEDURE — 99214 OFFICE O/P EST MOD 30 MIN: CPT

## 2024-04-17 PROCEDURE — 99285 EMERGENCY DEPT VISIT HI MDM: CPT

## 2024-04-17 PROCEDURE — 73590 X-RAY EXAM OF LOWER LEG: CPT | Mod: 26,RT

## 2024-04-17 PROCEDURE — G0463: CPT

## 2024-04-17 RX ORDER — INSULIN GLARGINE 100 [IU]/ML
40 INJECTION, SOLUTION SUBCUTANEOUS AT BEDTIME
Refills: 0 | Status: DISCONTINUED | OUTPATIENT
Start: 2024-04-17 | End: 2024-04-22

## 2024-04-17 RX ORDER — VANCOMYCIN HCL 1 G
1500 VIAL (EA) INTRAVENOUS ONCE
Refills: 0 | Status: COMPLETED | OUTPATIENT
Start: 2024-04-17 | End: 2024-04-17

## 2024-04-17 RX ORDER — VANCOMYCIN HCL 1 G
1750 VIAL (EA) INTRAVENOUS EVERY 12 HOURS
Refills: 0 | Status: DISCONTINUED | OUTPATIENT
Start: 2024-04-18 | End: 2024-04-18

## 2024-04-17 RX ORDER — ACETAMINOPHEN 500 MG
1000 TABLET ORAL ONCE
Refills: 0 | Status: COMPLETED | OUTPATIENT
Start: 2024-04-17 | End: 2024-04-17

## 2024-04-17 RX ADMIN — Medication 1000 MILLIGRAM(S): at 23:30

## 2024-04-17 RX ADMIN — Medication 250 MILLIGRAM(S): at 21:42

## 2024-04-17 RX ADMIN — INSULIN GLARGINE 40 UNIT(S): 100 INJECTION, SOLUTION SUBCUTANEOUS at 22:36

## 2024-04-17 RX ADMIN — Medication 400 MILLIGRAM(S): at 22:41

## 2024-04-17 NOTE — ED ADULT NURSE NOTE - OBJECTIVE STATEMENT
Pt presents to the ED c/o b/l leg cellulitis, worse on R leg. Pt getting treated at wound care center, sent in for IV abx and infectious disease consult. Hx of Dm2. Pt endorses fevers, chills, and "burning" in b/l legs. IV established in left arm with a 20G, labs drawn and sent, call bell in reach, warm blanket provided, bed in lowest position, side rails up x2, MD evaluation in progress. Pt is ambulatory. Alert and oriented. Drainage noted from R leg. Pt also endorsing n/v and dec PO intake, along with weight increase.

## 2024-04-17 NOTE — ED PROVIDER NOTE - OBJECTIVE STATEMENT
57 y/o F with PMHx of uncontrolled DM2, Afib on Xarelto, and CHF, chronic LE venous insufficiency presenting with RLE erythema    Patient reports subjective chills and worsening erythema since last weekend. Seen by wound care Dr. Samuel who recommended that she be admitted for parental abx. States LE swelling not improving with diuretics and states BG uncontrolled despite poor PO intake. Did not take basal insulin today, states ran out of needles (44 units qHS). Last hospitalized in October and treated with Vancomycin, discharged on PO doxycycline.   PCP: [facility] (Geri)

## 2024-04-17 NOTE — ED ADULT NURSE NOTE - NSFALLUNIVINTERV_ED_ALL_ED
Bed/Stretcher in lowest position, wheels locked, appropriate side rails in place/Call bell, personal items and telephone in reach/Instruct patient to call for assistance before getting out of bed/chair/stretcher/Non-slip footwear applied when patient is off stretcher/[place] to call system/Physically safe environment - no spills, clutter or unnecessary equipment/Purposeful proactive rounding/Room/bathroom lighting operational, light cord in reach

## 2024-04-17 NOTE — VITALS
[Pain related to present condition?] : The patient's  pain is related to present condition. [Burning] : burning [Unbearable] : unbearable [Continuous] : continuous [] : Yes

## 2024-04-17 NOTE — ASSESSMENT
[Verbal] : Verbal [Written] : Written [Demo] : Demo [Patient] : Patient [Other: ___] : [unfilled] [Good - alert, interested, motivated] : Good - alert, interested, motivated [Demonstrates independently] : demonstrates independently [Dressing changes] : dressing changes [Foot Care] : foot care [Skin Care] : skin care [Signs and symptoms of infection] : sign and symptoms of infection [Venous Disease] : venous disease [Nutrition] : nutrition [How and When to Call] : how and when to call [Pain Management] : pain management [Off-loading] : off-loading [Home Health] : home health [Hospitalization] : hospitalization [Patient responsibility to plan of care] : patient responsibility to plan of care [] : Yes [Stable] : stable [Hospital] : Hospital [Other: ____] : [unfilled] [Faxed - Long Term Care/Home Health Agency] : Long Term Care/Home Health Agency: Faxed

## 2024-04-17 NOTE — ED PROVIDER NOTE - PHYSICAL EXAMINATION
GENERAL: no acute distress; well-developed  HEAD:  Atraumatic, Normocephalic  EYES: PERRLA, conjunctiva and sclera clear  ENT: MMM; oropharynx clear  NECK: Supple, No JVD  CHEST/LUNG: Clear to auscultation bilaterally; No wheeze  HEART: Regular rate and rhythm; No murmurs, rubs, or gallops  ABDOMEN: Soft, Nontender, Nondistended; Bowel sounds present  EXTREMITIES:  2+ Peripheral Pulses, Chronic LE venous stasis changes with superimposed erythema on RLE with warmth extending down to foot.   PSYCH: AAOx3  NEUROLOGY: no focal motor or sensory deficits. 5/5 muscle strength in all extremities.   SKIN: No rashes or lesions

## 2024-04-17 NOTE — ED PROVIDER NOTE - CLINICAL SUMMARY MEDICAL DECISION MAKING FREE TEXT BOX
59 y/o F with PMHx of uncontrolled DM2, Afib on Xarelto, and CHF, chronic LE venous insufficiency presenting with RLE erythema  Clinically appears to be cellulitis  History of MRSA will cover with Vancomycin   Send screening labs, blood cultures  Admit for further parental abx given extent of erythema and uncontrolled DM2. 59 y/o F with PMHx of uncontrolled DM2, Afib on Xarelto, and CHF, chronic LE venous insufficiency presenting with RLE erythema  Clinically appears to be cellulitis  History of MRSA will cover with Vancomycin   Send screening labs, blood cultures  Admit for further parental abx given extent of erythema and uncontrolled DM2.  LE Duplex negative on 3/1 59 y/o F with PMHx of uncontrolled DM2, Afib on Xarelto, and CHF, chronic LE venous insufficiency presenting with RLE erythema  Clinically appears to be cellulitis  History of MRSA will cover with Vancomycin   Send screening labs, blood cultures  Admit for further parental abx given extent of erythema and uncontrolled DM2.  LE Duplex negative on 3/1  Plain films to r/o subcutaneous air. No crepitus on exam.

## 2024-04-17 NOTE — ED ADULT TRIAGE NOTE - CHIEF COMPLAINT QUOTE
72F w/ PMH of pancreatic cancer s/p chemo and resection (Nov 2017, Dr. Titus) sent in for evaluation of worsening abdominal pain.  Per son, patient was referred for outpatient abdominal US which showed "extra fluid" in her abdomen and she was told to come to the ED.  Abdominal pain is located epi/midgastric region, no radiation.  Denies any n/v, has been eating and drinking normally.  Endorses soft stools, not fatty or malodorous.  Also with worsening lower extremity edema for past month. Pt sent from woundcare for r/o cellulitis from Rt knee down.

## 2024-04-17 NOTE — PHYSICAL EXAM
[4 x 4] : 4 x 4  [Abdominal Pad] : Abdominal Pad [Normal Thyroid] : the thyroid was normal [Normal Breath Sounds] : Normal breath sounds [Normal Heart Sounds] : normal heart sounds [Normal Rate and Rhythm] : normal rate and rhythm [Ankle Swelling (On Exam)] : present [] : of the right leg [Ankle Swelling Bilaterally] : severe [Alert] : alert [Oriented to Person] : oriented to person [Oriented to Place] : oriented to place [Oriented to Time] : oriented to time [Calm] : calm

## 2024-04-18 DIAGNOSIS — E11.9 TYPE 2 DIABETES MELLITUS WITHOUT COMPLICATIONS: ICD-10-CM

## 2024-04-18 DIAGNOSIS — I50.30 UNSPECIFIED DIASTOLIC (CONGESTIVE) HEART FAILURE: ICD-10-CM

## 2024-04-18 DIAGNOSIS — R09.89 OTHER SPECIFIED SYMPTOMS AND SIGNS INVOLVING THE CIRCULATORY AND RESPIRATORY SYSTEMS: ICD-10-CM

## 2024-04-18 DIAGNOSIS — L03.90 CELLULITIS, UNSPECIFIED: ICD-10-CM

## 2024-04-18 DIAGNOSIS — R10.9 UNSPECIFIED ABDOMINAL PAIN: ICD-10-CM

## 2024-04-18 DIAGNOSIS — Z29.9 ENCOUNTER FOR PROPHYLACTIC MEASURES, UNSPECIFIED: ICD-10-CM

## 2024-04-18 DIAGNOSIS — I48.91 UNSPECIFIED ATRIAL FIBRILLATION: ICD-10-CM

## 2024-04-18 LAB
A1C WITH ESTIMATED AVERAGE GLUCOSE RESULT: 11.1 % — HIGH (ref 4–5.6)
ALBUMIN SERPL ELPH-MCNC: 3.5 G/DL — SIGNIFICANT CHANGE UP (ref 3.3–5)
ALP SERPL-CCNC: 119 U/L — SIGNIFICANT CHANGE UP (ref 40–120)
ALT FLD-CCNC: 27 U/L — SIGNIFICANT CHANGE UP (ref 12–78)
ANION GAP SERPL CALC-SCNC: 7 MMOL/L — SIGNIFICANT CHANGE UP (ref 5–17)
AST SERPL-CCNC: 18 U/L — SIGNIFICANT CHANGE UP (ref 15–37)
BASOPHILS # BLD AUTO: 0.03 K/UL — SIGNIFICANT CHANGE UP (ref 0–0.2)
BASOPHILS NFR BLD AUTO: 0.3 % — SIGNIFICANT CHANGE UP (ref 0–2)
BILIRUB SERPL-MCNC: 0.6 MG/DL — SIGNIFICANT CHANGE UP (ref 0.2–1.2)
BUN SERPL-MCNC: 22 MG/DL — SIGNIFICANT CHANGE UP (ref 7–23)
CALCIUM SERPL-MCNC: 9.5 MG/DL — SIGNIFICANT CHANGE UP (ref 8.5–10.1)
CHLORIDE SERPL-SCNC: 100 MMOL/L — SIGNIFICANT CHANGE UP (ref 96–108)
CO2 SERPL-SCNC: 29 MMOL/L — SIGNIFICANT CHANGE UP (ref 22–31)
CREAT SERPL-MCNC: 0.87 MG/DL — SIGNIFICANT CHANGE UP (ref 0.5–1.3)
EGFR: 77 ML/MIN/1.73M2 — SIGNIFICANT CHANGE UP
EOSINOPHIL # BLD AUTO: 0.1 K/UL — SIGNIFICANT CHANGE UP (ref 0–0.5)
EOSINOPHIL NFR BLD AUTO: 1.1 % — SIGNIFICANT CHANGE UP (ref 0–6)
ESTIMATED AVERAGE GLUCOSE: 272 MG/DL — HIGH (ref 68–114)
GLUCOSE SERPL-MCNC: 404 MG/DL — HIGH (ref 70–99)
HCT VFR BLD CALC: 37.4 % — SIGNIFICANT CHANGE UP (ref 34.5–45)
HGB BLD-MCNC: 12.9 G/DL — SIGNIFICANT CHANGE UP (ref 11.5–15.5)
IMM GRANULOCYTES NFR BLD AUTO: 0.2 % — SIGNIFICANT CHANGE UP (ref 0–0.9)
LACTATE SERPL-SCNC: 2.2 MMOL/L — HIGH (ref 0.7–2)
LACTATE SERPL-SCNC: 3.9 MMOL/L — HIGH (ref 0.7–2)
LYMPHOCYTES # BLD AUTO: 1.46 K/UL — SIGNIFICANT CHANGE UP (ref 1–3.3)
LYMPHOCYTES # BLD AUTO: 16.6 % — SIGNIFICANT CHANGE UP (ref 13–44)
MCHC RBC-ENTMCNC: 30.6 PG — SIGNIFICANT CHANGE UP (ref 27–34)
MCHC RBC-ENTMCNC: 34.5 GM/DL — SIGNIFICANT CHANGE UP (ref 32–36)
MCV RBC AUTO: 88.8 FL — SIGNIFICANT CHANGE UP (ref 80–100)
MONOCYTES # BLD AUTO: 0.56 K/UL — SIGNIFICANT CHANGE UP (ref 0–0.9)
MONOCYTES NFR BLD AUTO: 6.4 % — SIGNIFICANT CHANGE UP (ref 2–14)
NEUTROPHILS # BLD AUTO: 6.62 K/UL — SIGNIFICANT CHANGE UP (ref 1.8–7.4)
NEUTROPHILS NFR BLD AUTO: 75.4 % — SIGNIFICANT CHANGE UP (ref 43–77)
NRBC # BLD: 0 /100 WBCS — SIGNIFICANT CHANGE UP (ref 0–0)
PLATELET # BLD AUTO: 221 K/UL — SIGNIFICANT CHANGE UP (ref 150–400)
POTASSIUM SERPL-MCNC: 4 MMOL/L — SIGNIFICANT CHANGE UP (ref 3.5–5.3)
POTASSIUM SERPL-SCNC: 4 MMOL/L — SIGNIFICANT CHANGE UP (ref 3.5–5.3)
PROT SERPL-MCNC: 7.9 G/DL — SIGNIFICANT CHANGE UP (ref 6–8.3)
RBC # BLD: 4.21 M/UL — SIGNIFICANT CHANGE UP (ref 3.8–5.2)
RBC # FLD: 13.2 % — SIGNIFICANT CHANGE UP (ref 10.3–14.5)
SODIUM SERPL-SCNC: 136 MMOL/L — SIGNIFICANT CHANGE UP (ref 135–145)
VANCOMYCIN TROUGH SERPL-MCNC: 16.6 UG/ML — SIGNIFICANT CHANGE UP (ref 10–20)
WBC # BLD: 8.79 K/UL — SIGNIFICANT CHANGE UP (ref 3.8–10.5)
WBC # FLD AUTO: 8.79 K/UL — SIGNIFICANT CHANGE UP (ref 3.8–10.5)

## 2024-04-18 PROCEDURE — 93970 EXTREMITY STUDY: CPT | Mod: 26

## 2024-04-18 PROCEDURE — 93010 ELECTROCARDIOGRAM REPORT: CPT

## 2024-04-18 PROCEDURE — 99223 1ST HOSP IP/OBS HIGH 75: CPT | Mod: GC

## 2024-04-18 RX ORDER — OXYCODONE AND ACETAMINOPHEN 5; 325 MG/1; MG/1
1 TABLET ORAL ONCE
Refills: 0 | Status: COMPLETED | OUTPATIENT
Start: 2024-04-18 | End: 2024-04-18

## 2024-04-18 RX ORDER — RIVAROXABAN 15 MG-20MG
20 KIT ORAL
Refills: 0 | Status: DISCONTINUED | OUTPATIENT
Start: 2024-04-18 | End: 2024-04-22

## 2024-04-18 RX ORDER — METOPROLOL TARTRATE 50 MG
100 TABLET ORAL DAILY
Refills: 0 | Status: DISCONTINUED | OUTPATIENT
Start: 2024-04-18 | End: 2024-04-22

## 2024-04-18 RX ORDER — OXYCODONE HYDROCHLORIDE 5 MG/1
10 TABLET ORAL EVERY 6 HOURS
Refills: 0 | Status: DISCONTINUED | OUTPATIENT
Start: 2024-04-18 | End: 2024-04-18

## 2024-04-18 RX ORDER — POLYETHYLENE GLYCOL 3350 17 G/17G
17 POWDER, FOR SOLUTION ORAL DAILY
Refills: 0 | Status: DISCONTINUED | OUTPATIENT
Start: 2024-04-18 | End: 2024-04-22

## 2024-04-18 RX ORDER — DULOXETINE HYDROCHLORIDE 30 MG/1
60 CAPSULE, DELAYED RELEASE ORAL DAILY
Refills: 0 | Status: DISCONTINUED | OUTPATIENT
Start: 2024-04-18 | End: 2024-04-22

## 2024-04-18 RX ORDER — CEFEPIME 1 G/1
2000 INJECTION, POWDER, FOR SOLUTION INTRAMUSCULAR; INTRAVENOUS EVERY 8 HOURS
Refills: 0 | Status: DISCONTINUED | OUTPATIENT
Start: 2024-04-18 | End: 2024-04-22

## 2024-04-18 RX ORDER — MORPHINE SULFATE 50 MG/1
1 CAPSULE, EXTENDED RELEASE ORAL EVERY 6 HOURS
Refills: 0 | Status: DISCONTINUED | OUTPATIENT
Start: 2024-04-18 | End: 2024-04-22

## 2024-04-18 RX ORDER — LANOLIN ALCOHOL/MO/W.PET/CERES
3 CREAM (GRAM) TOPICAL AT BEDTIME
Refills: 0 | Status: DISCONTINUED | OUTPATIENT
Start: 2024-04-18 | End: 2024-04-22

## 2024-04-18 RX ORDER — LISINOPRIL 2.5 MG/1
2.5 TABLET ORAL DAILY
Refills: 0 | Status: DISCONTINUED | OUTPATIENT
Start: 2024-04-18 | End: 2024-04-22

## 2024-04-18 RX ORDER — GABAPENTIN 400 MG/1
300 CAPSULE ORAL EVERY 8 HOURS
Refills: 0 | Status: DISCONTINUED | OUTPATIENT
Start: 2024-04-18 | End: 2024-04-22

## 2024-04-18 RX ORDER — SENNA PLUS 8.6 MG/1
2 TABLET ORAL AT BEDTIME
Refills: 0 | Status: DISCONTINUED | OUTPATIENT
Start: 2024-04-18 | End: 2024-04-22

## 2024-04-18 RX ORDER — GLUCAGON INJECTION, SOLUTION 0.5 MG/.1ML
1 INJECTION, SOLUTION SUBCUTANEOUS ONCE
Refills: 0 | Status: DISCONTINUED | OUTPATIENT
Start: 2024-04-18 | End: 2024-04-22

## 2024-04-18 RX ORDER — SODIUM CHLORIDE 9 MG/ML
1000 INJECTION, SOLUTION INTRAVENOUS
Refills: 0 | Status: DISCONTINUED | OUTPATIENT
Start: 2024-04-18 | End: 2024-04-22

## 2024-04-18 RX ORDER — MORPHINE SULFATE 50 MG/1
2 CAPSULE, EXTENDED RELEASE ORAL EVERY 6 HOURS
Refills: 0 | Status: DISCONTINUED | OUTPATIENT
Start: 2024-04-18 | End: 2024-04-22

## 2024-04-18 RX ORDER — INSULIN GLARGINE 100 [IU]/ML
40 INJECTION, SOLUTION SUBCUTANEOUS AT BEDTIME
Refills: 0 | Status: DISCONTINUED | OUTPATIENT
Start: 2024-04-18 | End: 2024-04-18

## 2024-04-18 RX ORDER — TAMSULOSIN HYDROCHLORIDE 0.4 MG/1
0.4 CAPSULE ORAL AT BEDTIME
Refills: 0 | Status: DISCONTINUED | OUTPATIENT
Start: 2024-04-18 | End: 2024-04-22

## 2024-04-18 RX ORDER — OXYCODONE HYDROCHLORIDE 5 MG/1
10 TABLET ORAL EVERY 6 HOURS
Refills: 0 | Status: DISCONTINUED | OUTPATIENT
Start: 2024-04-18 | End: 2024-04-22

## 2024-04-18 RX ORDER — ACETAMINOPHEN 500 MG
1000 TABLET ORAL EVERY 8 HOURS
Refills: 0 | Status: DISCONTINUED | OUTPATIENT
Start: 2024-04-18 | End: 2024-04-22

## 2024-04-18 RX ORDER — INSULIN LISPRO 100/ML
20 VIAL (ML) SUBCUTANEOUS
Refills: 0 | Status: DISCONTINUED | OUTPATIENT
Start: 2024-04-18 | End: 2024-04-22

## 2024-04-18 RX ORDER — VANCOMYCIN HCL 1 G
1250 VIAL (EA) INTRAVENOUS EVERY 12 HOURS
Refills: 0 | Status: DISCONTINUED | OUTPATIENT
Start: 2024-04-19 | End: 2024-04-21

## 2024-04-18 RX ORDER — DEXTROSE 50 % IN WATER 50 %
15 SYRINGE (ML) INTRAVENOUS ONCE
Refills: 0 | Status: DISCONTINUED | OUTPATIENT
Start: 2024-04-18 | End: 2024-04-22

## 2024-04-18 RX ORDER — PANTOPRAZOLE SODIUM 20 MG/1
40 TABLET, DELAYED RELEASE ORAL
Refills: 0 | Status: DISCONTINUED | OUTPATIENT
Start: 2024-04-18 | End: 2024-04-22

## 2024-04-18 RX ORDER — MORPHINE SULFATE 50 MG/1
2 CAPSULE, EXTENDED RELEASE ORAL ONCE
Refills: 0 | Status: DISCONTINUED | OUTPATIENT
Start: 2024-04-18 | End: 2024-04-18

## 2024-04-18 RX ORDER — DEXTROSE 50 % IN WATER 50 %
25 SYRINGE (ML) INTRAVENOUS ONCE
Refills: 0 | Status: DISCONTINUED | OUTPATIENT
Start: 2024-04-18 | End: 2024-04-22

## 2024-04-18 RX ORDER — MORPHINE SULFATE 50 MG/1
2 CAPSULE, EXTENDED RELEASE ORAL EVERY 4 HOURS
Refills: 0 | Status: DISCONTINUED | OUTPATIENT
Start: 2024-04-18 | End: 2024-04-18

## 2024-04-18 RX ORDER — MORPHINE SULFATE 50 MG/1
4 CAPSULE, EXTENDED RELEASE ORAL EVERY 4 HOURS
Refills: 0 | Status: DISCONTINUED | OUTPATIENT
Start: 2024-04-18 | End: 2024-04-18

## 2024-04-18 RX ORDER — CEFEPIME 1 G/1
INJECTION, POWDER, FOR SOLUTION INTRAMUSCULAR; INTRAVENOUS
Refills: 0 | Status: DISCONTINUED | OUTPATIENT
Start: 2024-04-18 | End: 2024-04-22

## 2024-04-18 RX ORDER — INSULIN LISPRO 100/ML
VIAL (ML) SUBCUTANEOUS
Refills: 0 | Status: DISCONTINUED | OUTPATIENT
Start: 2024-04-18 | End: 2024-04-22

## 2024-04-18 RX ORDER — INSULIN LISPRO 100/ML
VIAL (ML) SUBCUTANEOUS AT BEDTIME
Refills: 0 | Status: DISCONTINUED | OUTPATIENT
Start: 2024-04-18 | End: 2024-04-22

## 2024-04-18 RX ORDER — ACETAMINOPHEN 500 MG
650 TABLET ORAL EVERY 6 HOURS
Refills: 0 | Status: DISCONTINUED | OUTPATIENT
Start: 2024-04-18 | End: 2024-04-18

## 2024-04-18 RX ORDER — DEXTROSE 50 % IN WATER 50 %
12.5 SYRINGE (ML) INTRAVENOUS ONCE
Refills: 0 | Status: DISCONTINUED | OUTPATIENT
Start: 2024-04-18 | End: 2024-04-22

## 2024-04-18 RX ORDER — DEXTROSE 10 % IN WATER 10 %
125 INTRAVENOUS SOLUTION INTRAVENOUS ONCE
Refills: 0 | Status: DISCONTINUED | OUTPATIENT
Start: 2024-04-18 | End: 2024-04-22

## 2024-04-18 RX ORDER — SODIUM CHLORIDE 9 MG/ML
500 INJECTION INTRAMUSCULAR; INTRAVENOUS; SUBCUTANEOUS ONCE
Refills: 0 | Status: COMPLETED | OUTPATIENT
Start: 2024-04-18 | End: 2024-04-18

## 2024-04-18 RX ORDER — CEFEPIME 1 G/1
2000 INJECTION, POWDER, FOR SOLUTION INTRAMUSCULAR; INTRAVENOUS ONCE
Refills: 0 | Status: COMPLETED | OUTPATIENT
Start: 2024-04-18 | End: 2024-04-18

## 2024-04-18 RX ORDER — SUCRALFATE 1 G
1 TABLET ORAL
Refills: 0 | Status: DISCONTINUED | OUTPATIENT
Start: 2024-04-18 | End: 2024-04-22

## 2024-04-18 RX ADMIN — Medication 250 MILLIGRAM(S): at 10:01

## 2024-04-18 RX ADMIN — Medication 1 GRAM(S): at 21:26

## 2024-04-18 RX ADMIN — Medication 20 UNIT(S): at 08:52

## 2024-04-18 RX ADMIN — MORPHINE SULFATE 2 MILLIGRAM(S): 50 CAPSULE, EXTENDED RELEASE ORAL at 06:52

## 2024-04-18 RX ADMIN — Medication 8: at 08:52

## 2024-04-18 RX ADMIN — GABAPENTIN 300 MILLIGRAM(S): 400 CAPSULE ORAL at 06:21

## 2024-04-18 RX ADMIN — MORPHINE SULFATE 2 MILLIGRAM(S): 50 CAPSULE, EXTENDED RELEASE ORAL at 06:27

## 2024-04-18 RX ADMIN — RIVAROXABAN 20 MILLIGRAM(S): KIT at 17:16

## 2024-04-18 RX ADMIN — Medication 60 MILLIGRAM(S): at 06:21

## 2024-04-18 RX ADMIN — Medication 20 UNIT(S): at 17:17

## 2024-04-18 RX ADMIN — LISINOPRIL 2.5 MILLIGRAM(S): 2.5 TABLET ORAL at 06:21

## 2024-04-18 RX ADMIN — MORPHINE SULFATE 2 MILLIGRAM(S): 50 CAPSULE, EXTENDED RELEASE ORAL at 02:05

## 2024-04-18 RX ADMIN — POLYETHYLENE GLYCOL 3350 17 GRAM(S): 17 POWDER, FOR SOLUTION ORAL at 11:29

## 2024-04-18 RX ADMIN — MORPHINE SULFATE 2 MILLIGRAM(S): 50 CAPSULE, EXTENDED RELEASE ORAL at 03:15

## 2024-04-18 RX ADMIN — SENNA PLUS 2 TABLET(S): 8.6 TABLET ORAL at 21:26

## 2024-04-18 RX ADMIN — GABAPENTIN 300 MILLIGRAM(S): 400 CAPSULE ORAL at 21:26

## 2024-04-18 RX ADMIN — CEFEPIME 100 MILLIGRAM(S): 1 INJECTION, POWDER, FOR SOLUTION INTRAMUSCULAR; INTRAVENOUS at 02:33

## 2024-04-18 RX ADMIN — INSULIN GLARGINE 40 UNIT(S): 100 INJECTION, SOLUTION SUBCUTANEOUS at 21:27

## 2024-04-18 RX ADMIN — CEFEPIME 100 MILLIGRAM(S): 1 INJECTION, POWDER, FOR SOLUTION INTRAMUSCULAR; INTRAVENOUS at 09:13

## 2024-04-18 RX ADMIN — MORPHINE SULFATE 2 MILLIGRAM(S): 50 CAPSULE, EXTENDED RELEASE ORAL at 02:54

## 2024-04-18 RX ADMIN — Medication 100 MILLIGRAM(S): at 06:21

## 2024-04-18 RX ADMIN — TAMSULOSIN HYDROCHLORIDE 0.4 MILLIGRAM(S): 0.4 CAPSULE ORAL at 21:26

## 2024-04-18 RX ADMIN — MORPHINE SULFATE 2 MILLIGRAM(S): 50 CAPSULE, EXTENDED RELEASE ORAL at 01:07

## 2024-04-18 RX ADMIN — Medication 20 UNIT(S): at 11:49

## 2024-04-18 RX ADMIN — GABAPENTIN 300 MILLIGRAM(S): 400 CAPSULE ORAL at 13:59

## 2024-04-18 RX ADMIN — Medication 250 MILLIGRAM(S): at 22:38

## 2024-04-18 RX ADMIN — DULOXETINE HYDROCHLORIDE 60 MILLIGRAM(S): 30 CAPSULE, DELAYED RELEASE ORAL at 11:28

## 2024-04-18 RX ADMIN — PANTOPRAZOLE SODIUM 40 MILLIGRAM(S): 20 TABLET, DELAYED RELEASE ORAL at 21:26

## 2024-04-18 RX ADMIN — Medication 6: at 17:17

## 2024-04-18 RX ADMIN — CEFEPIME 100 MILLIGRAM(S): 1 INJECTION, POWDER, FOR SOLUTION INTRAMUSCULAR; INTRAVENOUS at 17:16

## 2024-04-18 RX ADMIN — Medication 10: at 11:50

## 2024-04-18 NOTE — PATIENT PROFILE ADULT - FALL HARM RISK - HARM RISK INTERVENTIONS
Assistance with ambulation/Assistance OOB with selected safe patient handling equipment/Communicate Risk of Fall with Harm to all staff/Discuss with provider need for PT consult/Monitor gait and stability/Reinforce activity limits and safety measures with patient and family/Tailored Fall Risk Interventions/Visual Cue: Yellow wristband and red socks/Bed in lowest position, wheels locked, appropriate side rails in place/Call bell, personal items and telephone in reach/Instruct patient to call for assistance before getting out of bed or chair/Non-slip footwear when patient is out of bed/[place] to call system/Physically safe environment - no spills, clutter or unnecessary equipment/Purposeful Proactive Rounding/Room/bathroom lighting operational, light cord in reach

## 2024-04-18 NOTE — PATIENT CHOICE NOTE. - NSPTCHOICESTATE_GEN_ALL_CORE

## 2024-04-18 NOTE — CARE COORDINATION ASSESSMENT. - NS SW [facility] DISCIPLINE
stated she is currently receiving services from [facility] [phone]/physical therapy/skilled nursing

## 2024-04-18 NOTE — H&P ADULT - NSHPPHYSICALEXAM_GEN_ALL_CORE
T(C): 36.7 (04-18-24 @ 01:01), Max: 36.7 (04-18-24 @ 01:01)  HR: 105 (04-18-24 @ 01:01) (105 - 120)  BP: 148/77 (04-18-24 @ 01:01) (136/96 - 148/77)  RR: 18 (04-18-24 @ 01:01) (18 - 18)  SpO2: 93% (04-18-24 @ 01:01) (93% - 94%)  Wt(kg): --    Physical Exam:   GENERAL: tearful, obese, NAD  HEENT: head NC/AT; conjunctiva & sclera clear; hearing grossly intact, moist mucous membranes  NECK: supple, no JVD  RESPIRATORY: CTA B/L, no wheezing, rales, rhonchi or rubs  CARDIOVASCULAR: S1&S2, irregular rhythm, no murmurs or gallops  ABDOMEN: soft, non-tender, +distended, + Bowel sounds, no guarding, rebound or rigidity  MUSCULOSKELETAL:  b/l LE pitting edema and erythema, RLE wound draining  NEUROLOGIC: AA&O X3, no focal deficits T(C): 36.7 (04-18-24 @ 01:01), Max: 36.7 (04-18-24 @ 01:01)  HR: 105 (04-18-24 @ 01:01) (105 - 120)  BP: 148/77 (04-18-24 @ 01:01) (136/96 - 148/77)  RR: 18 (04-18-24 @ 01:01) (18 - 18)  SpO2: 93% (04-18-24 @ 01:01) (93% - 94%)  Wt(kg): --  Physical Exam:   GENERAL: tearful, obese, NAD  HEENT: head NC/AT; conjunctiva & sclera clear; hearing grossly intact, moist mucous membranes  NECK: supple, no JVD  RESPIRATORY: CTA B/L, no wheezing, rales, rhonchi or rubs  CARDIOVASCULAR: S1&S2, irregular rhythm, no murmurs or gallops  ABDOMEN: soft, non-tender, +distended, + Bowel sounds, no guarding, rebound or rigidity  MUSCULOSKELETAL:  b/l LE pitting edema and erythema, RLE wound draining  NEUROLOGIC: AA&O X3, no focal deficits

## 2024-04-18 NOTE — H&P ADULT - PROBLEM SELECTOR PLAN 4
Patient with chronic atrial fibrillation  - Rate Control: Continue Toprol XL 100mg qd  - Anticoagulation: Continue Xarelto 20mg qd  - Monitor and replete electrolytes

## 2024-04-18 NOTE — H&P ADULT - PROBLEM SELECTOR PLAN 1
RLE wound and drainage, sent in by wound care center for IV abx  - R tib fib XR with no air in subcutaneous tissue on personal read, f/u official read  - S/p 1.5g Vancomycin in ED  - Continue IV abx vancomycin and cefepime  - Lactate trend 2.9 -> 3.9 - Will give 500cc NS (in setting of venous insufficiency and HFpEF) and repeat in AM  - F/u BCx x2  - Pending LE Dopplers to r/o DVT  - PRN morphine for pain  - Bowel regimen with daily senna and miralax while on opioids  - ID (Dr. Carole Mohr) consulted RLE wound and drainage, sent in by wound care center for IV abx  - R tib fib XR with no air in subcutaneous tissue on personal read, f/u official read  - S/p 1.5g Vancomycin in ED  - Continue IV abx vancomycin and cefepime  - Lactate trend 2.9 -> 3.9 - Will give 500cc NS (in setting of venous insufficiency and HFpEF) and repeat in AM  - F/u BCx x2  - Pending LE Dopplers to r/o DVT  - PRN morphine for pain  - Bowel regimen with daily senna and Miralax while on opioids  - ID (Dr. Carole Mohr) consulted

## 2024-04-18 NOTE — CARE COORDINATION ASSESSMENT. - NSCAREPROVIDERS_GEN_ALL_CORE_FT
CARE PROVIDERS:  Accepting Physician: Gia Wilkinson  Administration: Kurt Zapien  Admitting: Gia Wilkinson  Attending: Gia Wilkinson  Case Management: Ramirez Quintero  ED Attending: Anirudh Cowan  ED Nurse: Shin Agosto  Nurse: Shin Agosto  Nurse: Rodger Carr  Nurse: Cody Blake  Nurse: Lauren Wilkerson  Ordered: Doctor, Unknown  Ordered: ADM, User  Override: Lauren Wilkerson  PCA/Nursing Assistant: Isabelle Candelaria  PCA/Nursing Assistant: Danielle Esqueda  Primary Team: Erica Frankel  Primary Team: Elizabeth Champagne  Primary Team: Arias Landry  Primary Team: Cj Bae  Primary Team: Gia Wilkinson  Registered Dietitian: Nazia Brown  Respiratory Therapy: Renny Howell// Supp. Assoc.: Mary Doty

## 2024-04-18 NOTE — H&P ADULT - ASSESSMENT
57yo F with pmhx T2DM, Afib on Xarelto, HFpEF, chronic LE venous insufficiency presenting from wound care center with RLE swelling and erythema. Admitted for RLE cellulitis

## 2024-04-18 NOTE — H&P ADULT - HISTORY OF PRESENT ILLNESS
59yo F with pmhx T2DM, Afib on Xarelto, HFpEF, chronic LE venous insufficiency presenting from wound care center with RLE swelling and erythema. Patient has been experiencing chills and worsening erythema for the past 5 days. Swelling has not improved with diuretics. She was seen by wound care earlier today and sent in for IV abx. Has a hx of MRSA. Notes her blood glucose has been uncontrolled despite poor PO intake, and has not been taking her Lantus because she ran out of needles.    IN THE ED  VS: T 97.5, , /96, RR 18, SpO2 94% on RA  Labs: Glucose 395, Lactate 2.9,   Imaging: R tib fib XR on personal read with no evidence of subcutaneous air  S/p 1.5g Vanco, 40u Lantus, 1g Ofirmev 57yo F with pmhx T2DM, Afib on Xarelto, HFpEF, chronic LE venous insufficiency presenting from wound care center with RLE swelling and erythema. Patient has been experiencing chills and worsening erythema for the past 5 days. Swelling has not improved with diuretics. She was seen by wound care earlier today and sent in for IV abx. Has a hx of MRSA. Notes her blood glucose has been uncontrolled despite poor PO intake, and has not been taking her Lantus because she ran out of needles.  IN THE ED  VS: T 97.5, , /96, RR 18, SpO2 94% on RA  Labs: Glucose 395, Lactate 2.9,   Imaging: R tib fib XR on personal read with no evidence of subcutaneous air  S/p 1.5g Vanco, 40u Lantus, 1g Ofirmev

## 2024-04-18 NOTE — CASE MANAGEMENT PROGRESS NOTE - NSCMPROGRESSNOTE_GEN_ALL_CORE
Discussed pt on rounds, pt remains acute, on IV Vanco, IV Cefepime. CM will continue to collaborate with interdisciplinary team and remain available to assist.

## 2024-04-18 NOTE — CAREGIVER ENGAGEMENT NOTE - CAREGIVER OUTREACH NOTES - FREE TEXT
Met patient at bedside who gave consent to speak with caregiver.  Explained role of CM to caregiver, verbalized understanding. Pt caregiver was made aware a CM will remain available through hospitalization.  Contact information given in discharge/ transitions resource folder.

## 2024-04-18 NOTE — CARE COORDINATION ASSESSMENT. - OTHER PERTINENT DISCHARGE PLANNING INFORMATION:
Met patient at bedside.  Explained role of CM, verbalized understanding. Pt was made aware a CM will remain available through hospitalization.  Contact information given in discharge/ transitions resource folder. CM met w pt. Pt provided verbal consent to speak with Blayne Sandoval friend - [phone] via telepohone. Per pt lives with alone and stated she was independent w ADL, ambulation, and med management prior to admission. Pt stated her friend drives her to appointments, has 4 stairs to enter home, 0 steps inside stated everything is on same level, stated she is currently receiving services from [facility] [phone], has walker and cane. CM verified: PCP: Dr. Barillas and [facility] [place]: stated no. Pt stated caregiver Blayne will  when medically safe for discharge.

## 2024-04-18 NOTE — H&P ADULT - NSHPOUTPATIENTPROVIDERS_GEN_ALL_CORE
PCP: Dr. Barillas  Podiatry: Dr. Adelso [place]: Dr. Zambrano  Wound Care: Dr. Samuel PCP: Dr. Barillas  Podiatry: Dr. Adelso [place]: Dr. Zambrano  Wound Care: Dr. Samuel  Cardio: Dr. Sánchez

## 2024-04-18 NOTE — H&P ADULT - PROBLEM SELECTOR PLAN 3
- Last TTE 10/2023 with LVEF 66%  -   - Continue home torsemide 60mg qd  - Continue lisinopril 2.5 mg qd  - Keep K >4.0, Mg > 2.0

## 2024-04-18 NOTE — CARE COORDINATION ASSESSMENT. - PRO ARRIVE FROM
CM met w pt. Pt provided verbal consent to speak with Blayne Sandoval friend - [phone] via telepohone. Per pt lives with alone and stated she was independent w ADL, ambulation, and med management prior to admission. Pt stated her friend drives her to appointments, has 4 stairs to enter home, 0 steps inside stated everything is on same level, stated she is currently receiving services from [facility] [phone], has walker and cane. CM verified: PCP: Dr. Barillas and [facility]. [place]: stated no. Pt stated caregiver Blayne will  when medically safe for discharge.
Bladder non-tender and non-distended. Urine clear yellow.

## 2024-04-18 NOTE — PATIENT CHOICE NOTE. - NSPTCHOICENOTES_GEN_ALL_CORE
D/C resource folder provided at bedside this includes lists for both rehab facilities and home care agencies. Pt is currently receiving services through [facility] [phone] and would like to resume services if recommended by the interdisciplinary team.

## 2024-04-18 NOTE — H&P ADULT - PROBLEM SELECTOR PLAN 2
Insulin dependent, poorly controlled, last A1c 10/2023 13.8%  -F/u A1c  -Continue home regimen, Lantus 40u qhs and Admelog 20u premeal  -Mod ISS  -Regular finger sticks  -Consistent carb diet  -Hypoglycemic protocol  -Continue gabapentin and duloxetine for diabetic neuropathy

## 2024-04-18 NOTE — H&P ADULT - ATTENDING COMMENTS
59yo F with pmhx T2DM, Afib on Xarelto, HFpEF, chronic LE venous insufficiency presenting from wound care center with RLE swelling and erythema. Admitted for RLE cellulitis    Agree with above. Edited where appropriate

## 2024-04-18 NOTE — H&P ADULT - NSHPREVIEWOFSYSTEMS_GEN_ALL_CORE
REVIEW OF SYSTEMS:    CONSTITUTIONAL: No weakness, fevers or chills  HEENT:  No headache, no visual changes, no sore throat, neck supple  RESPIRATORY: No cough, wheezing, hemoptysis; +shortness of breath  CARDIOVASCULAR: No chest pain or palpitations  GASTROINTESTINAL: No abdominal pain, no nausea, vomiting, or hematemesis; No diarrhea or constipation. No melena or hematochezia.  GENITOURINARY: No dysuria, frequency or hematuria  NEUROLOGICAL: No focal weakness or dizziness   MSK: +b/l LE and UE parasthesias  SKIN: RLE redness and draining wound, LLE erythema REVIEW OF SYSTEMS:  CONSTITUTIONAL: No weakness, fevers or chills  HEENT:  No headache, no visual changes, no sore throat, neck supple  RESPIRATORY: No cough, wheezing, hemoptysis; +shortness of breath  CARDIOVASCULAR: No chest pain or palpitations  GASTROINTESTINAL: No abdominal pain, no nausea, vomiting, or hematemesis; No diarrhea or constipation. No melena or hematochezia.  GENITOURINARY: No dysuria, frequency or hematuria  NEUROLOGICAL: No focal weakness or dizziness   MSK: +b/l LE and UE parasthesias  SKIN: RLE redness and draining wound, LLE erythema

## 2024-04-18 NOTE — H&P ADULT - NSHPSOCIALHISTORY_GEN_ALL_CORE
Tobacco Usage: denies  Alcohol Usage: denies  Substance Use: denies  Lives with: HCP and partner  ADLs: needs assistance  Ambulates: with walker

## 2024-04-19 LAB
ALBUMIN SERPL ELPH-MCNC: 2.8 G/DL — LOW (ref 3.3–5)
ALP SERPL-CCNC: 94 U/L — SIGNIFICANT CHANGE UP (ref 40–120)
ALT FLD-CCNC: 21 U/L — SIGNIFICANT CHANGE UP (ref 12–78)
ANION GAP SERPL CALC-SCNC: 7 MMOL/L — SIGNIFICANT CHANGE UP (ref 5–17)
AST SERPL-CCNC: 16 U/L — SIGNIFICANT CHANGE UP (ref 15–37)
BASOPHILS # BLD AUTO: 0.03 K/UL — SIGNIFICANT CHANGE UP (ref 0–0.2)
BASOPHILS NFR BLD AUTO: 0.4 % — SIGNIFICANT CHANGE UP (ref 0–2)
BILIRUB SERPL-MCNC: 0.6 MG/DL — SIGNIFICANT CHANGE UP (ref 0.2–1.2)
BUN SERPL-MCNC: 26 MG/DL — HIGH (ref 7–23)
CALCIUM SERPL-MCNC: 8.3 MG/DL — LOW (ref 8.5–10.1)
CHLORIDE SERPL-SCNC: 99 MMOL/L — SIGNIFICANT CHANGE UP (ref 96–108)
CO2 SERPL-SCNC: 30 MMOL/L — SIGNIFICANT CHANGE UP (ref 22–31)
CREAT SERPL-MCNC: 0.88 MG/DL — SIGNIFICANT CHANGE UP (ref 0.5–1.3)
EGFR: 76 ML/MIN/1.73M2 — SIGNIFICANT CHANGE UP
EOSINOPHIL # BLD AUTO: 0.13 K/UL — SIGNIFICANT CHANGE UP (ref 0–0.5)
EOSINOPHIL NFR BLD AUTO: 1.8 % — SIGNIFICANT CHANGE UP (ref 0–6)
GLUCOSE SERPL-MCNC: 317 MG/DL — HIGH (ref 70–99)
HCT VFR BLD CALC: 35.3 % — SIGNIFICANT CHANGE UP (ref 34.5–45)
HGB BLD-MCNC: 11.9 G/DL — SIGNIFICANT CHANGE UP (ref 11.5–15.5)
IMM GRANULOCYTES NFR BLD AUTO: 0.4 % — SIGNIFICANT CHANGE UP (ref 0–0.9)
LACTATE SERPL-SCNC: 2.1 MMOL/L — HIGH (ref 0.7–2)
LACTATE SERPL-SCNC: 2.5 MMOL/L — HIGH (ref 0.7–2)
LYMPHOCYTES # BLD AUTO: 1.78 K/UL — SIGNIFICANT CHANGE UP (ref 1–3.3)
LYMPHOCYTES # BLD AUTO: 25.2 % — SIGNIFICANT CHANGE UP (ref 13–44)
MCHC RBC-ENTMCNC: 30.6 PG — SIGNIFICANT CHANGE UP (ref 27–34)
MCHC RBC-ENTMCNC: 33.7 GM/DL — SIGNIFICANT CHANGE UP (ref 32–36)
MCV RBC AUTO: 90.7 FL — SIGNIFICANT CHANGE UP (ref 80–100)
MONOCYTES # BLD AUTO: 0.49 K/UL — SIGNIFICANT CHANGE UP (ref 0–0.9)
MONOCYTES NFR BLD AUTO: 7 % — SIGNIFICANT CHANGE UP (ref 2–14)
NEUTROPHILS # BLD AUTO: 4.59 K/UL — SIGNIFICANT CHANGE UP (ref 1.8–7.4)
NEUTROPHILS NFR BLD AUTO: 65.2 % — SIGNIFICANT CHANGE UP (ref 43–77)
NRBC # BLD: 0 /100 WBCS — SIGNIFICANT CHANGE UP (ref 0–0)
PLATELET # BLD AUTO: 203 K/UL — SIGNIFICANT CHANGE UP (ref 150–400)
POTASSIUM SERPL-MCNC: 3.9 MMOL/L — SIGNIFICANT CHANGE UP (ref 3.5–5.3)
POTASSIUM SERPL-SCNC: 3.9 MMOL/L — SIGNIFICANT CHANGE UP (ref 3.5–5.3)
PROT SERPL-MCNC: 6.6 G/DL — SIGNIFICANT CHANGE UP (ref 6–8.3)
RBC # BLD: 3.89 M/UL — SIGNIFICANT CHANGE UP (ref 3.8–5.2)
RBC # FLD: 13.5 % — SIGNIFICANT CHANGE UP (ref 10.3–14.5)
SODIUM SERPL-SCNC: 136 MMOL/L — SIGNIFICANT CHANGE UP (ref 135–145)
WBC # BLD: 7.05 K/UL — SIGNIFICANT CHANGE UP (ref 3.8–10.5)
WBC # FLD AUTO: 7.05 K/UL — SIGNIFICANT CHANGE UP (ref 3.8–10.5)

## 2024-04-19 PROCEDURE — 99232 SBSQ HOSP IP/OBS MODERATE 35: CPT

## 2024-04-19 RX ADMIN — Medication 60 MILLIGRAM(S): at 05:53

## 2024-04-19 RX ADMIN — TAMSULOSIN HYDROCHLORIDE 0.4 MILLIGRAM(S): 0.4 CAPSULE ORAL at 22:02

## 2024-04-19 RX ADMIN — OXYCODONE HYDROCHLORIDE 10 MILLIGRAM(S): 5 TABLET ORAL at 14:03

## 2024-04-19 RX ADMIN — Medication 20 UNIT(S): at 17:42

## 2024-04-19 RX ADMIN — OXYCODONE HYDROCHLORIDE 10 MILLIGRAM(S): 5 TABLET ORAL at 02:46

## 2024-04-19 RX ADMIN — PANTOPRAZOLE SODIUM 40 MILLIGRAM(S): 20 TABLET, DELAYED RELEASE ORAL at 17:48

## 2024-04-19 RX ADMIN — CEFEPIME 100 MILLIGRAM(S): 1 INJECTION, POWDER, FOR SOLUTION INTRAMUSCULAR; INTRAVENOUS at 02:46

## 2024-04-19 RX ADMIN — Medication 20 UNIT(S): at 12:11

## 2024-04-19 RX ADMIN — Medication 1000 MILLIGRAM(S): at 21:48

## 2024-04-19 RX ADMIN — Medication 4: at 12:12

## 2024-04-19 RX ADMIN — OXYCODONE HYDROCHLORIDE 10 MILLIGRAM(S): 5 TABLET ORAL at 21:00

## 2024-04-19 RX ADMIN — PANTOPRAZOLE SODIUM 40 MILLIGRAM(S): 20 TABLET, DELAYED RELEASE ORAL at 05:53

## 2024-04-19 RX ADMIN — Medication 1000 MILLIGRAM(S): at 05:53

## 2024-04-19 RX ADMIN — Medication 166.67 MILLIGRAM(S): at 21:49

## 2024-04-19 RX ADMIN — OXYCODONE HYDROCHLORIDE 10 MILLIGRAM(S): 5 TABLET ORAL at 21:47

## 2024-04-19 RX ADMIN — Medication 1000 MILLIGRAM(S): at 14:02

## 2024-04-19 RX ADMIN — Medication 1 GRAM(S): at 12:10

## 2024-04-19 RX ADMIN — RIVAROXABAN 20 MILLIGRAM(S): KIT at 17:49

## 2024-04-19 RX ADMIN — DULOXETINE HYDROCHLORIDE 60 MILLIGRAM(S): 30 CAPSULE, DELAYED RELEASE ORAL at 12:11

## 2024-04-19 RX ADMIN — Medication 1 GRAM(S): at 17:48

## 2024-04-19 RX ADMIN — SENNA PLUS 2 TABLET(S): 8.6 TABLET ORAL at 21:47

## 2024-04-19 RX ADMIN — Medication 100 MILLIGRAM(S): at 05:54

## 2024-04-19 RX ADMIN — Medication 1000 MILLIGRAM(S): at 22:45

## 2024-04-19 RX ADMIN — Medication 4: at 17:42

## 2024-04-19 RX ADMIN — POLYETHYLENE GLYCOL 3350 17 GRAM(S): 17 POWDER, FOR SOLUTION ORAL at 12:11

## 2024-04-19 RX ADMIN — Medication 1 GRAM(S): at 02:46

## 2024-04-19 RX ADMIN — Medication 2: at 21:49

## 2024-04-19 RX ADMIN — Medication 1 GRAM(S): at 23:18

## 2024-04-19 RX ADMIN — Medication 166.67 MILLIGRAM(S): at 10:19

## 2024-04-19 RX ADMIN — Medication 6: at 07:42

## 2024-04-19 RX ADMIN — LISINOPRIL 2.5 MILLIGRAM(S): 2.5 TABLET ORAL at 05:53

## 2024-04-19 RX ADMIN — Medication 1 GRAM(S): at 07:45

## 2024-04-19 RX ADMIN — CEFEPIME 100 MILLIGRAM(S): 1 INJECTION, POWDER, FOR SOLUTION INTRAMUSCULAR; INTRAVENOUS at 09:34

## 2024-04-19 RX ADMIN — INSULIN GLARGINE 40 UNIT(S): 100 INJECTION, SOLUTION SUBCUTANEOUS at 21:48

## 2024-04-19 RX ADMIN — Medication 20 UNIT(S): at 07:42

## 2024-04-19 RX ADMIN — Medication 1000 MILLIGRAM(S): at 06:50

## 2024-04-19 RX ADMIN — OXYCODONE HYDROCHLORIDE 10 MILLIGRAM(S): 5 TABLET ORAL at 03:45

## 2024-04-19 RX ADMIN — CEFEPIME 100 MILLIGRAM(S): 1 INJECTION, POWDER, FOR SOLUTION INTRAMUSCULAR; INTRAVENOUS at 17:49

## 2024-04-19 NOTE — CASE MANAGEMENT PROGRESS NOTE - NSCMPROGRESSNOTE_GEN_ALL_CORE
Discussed patient with Dr. Guidry. I met with pt and daughter Pascale at bedside who states patient's wife and daughter will be away as of today. Best  now is patient's son=Jt @ [phone]. Pt's daughter Pascale confirmed they have hired a 24/7 private HHA to stay with pt till needed ([place] while wife and daughter will be away). Pt now on 3L NC in chair. CM following for DC readiness and home O2 need. Family req W/C as pt is less ambulatory and will likely req home O2 as well.  Anticipate pt will need home O2, W/C and [facility] on DC. As per Dr. Guidry, no WE dc planned at this time as still want to wean off O2 further. Dr. Guidry aware of RX for W/C and O2 to be signed when she determines home O2 is required and patient is nearing DC readiness. CM will continue to follow.

## 2024-04-20 DIAGNOSIS — L03.115 CELLULITIS OF RIGHT LOWER LIMB: ICD-10-CM

## 2024-04-20 DIAGNOSIS — Z79.84 LONG TERM (CURRENT) USE OF ORAL HYPOGLYCEMIC DRUGS: ICD-10-CM

## 2024-04-20 DIAGNOSIS — Z90.49 ACQUIRED ABSENCE OF OTHER SPECIFIED PARTS OF DIGESTIVE TRACT: ICD-10-CM

## 2024-04-20 DIAGNOSIS — M25.50 PAIN IN UNSPECIFIED JOINT: ICD-10-CM

## 2024-04-20 DIAGNOSIS — E11.40 TYPE 2 DIABETES MELLITUS WITH DIABETIC NEUROPATHY, UNSPECIFIED: ICD-10-CM

## 2024-04-20 DIAGNOSIS — Z79.899 OTHER LONG TERM (CURRENT) DRUG THERAPY: ICD-10-CM

## 2024-04-20 DIAGNOSIS — Z98.49 CATARACT EXTRACTION STATUS, UNSPECIFIED EYE: ICD-10-CM

## 2024-04-20 DIAGNOSIS — L97.812 NON-PRESSURE CHRONIC ULCER OF OTHER PART OF RIGHT LOWER LEG WITH FAT LAYER EXPOSED: ICD-10-CM

## 2024-04-20 DIAGNOSIS — E11.622 TYPE 2 DIABETES MELLITUS WITH OTHER SKIN ULCER: ICD-10-CM

## 2024-04-20 DIAGNOSIS — I11.0 HYPERTENSIVE HEART DISEASE WITH HEART FAILURE: ICD-10-CM

## 2024-04-20 DIAGNOSIS — I48.91 UNSPECIFIED ATRIAL FIBRILLATION: ICD-10-CM

## 2024-04-20 DIAGNOSIS — Z86.718 PERSONAL HISTORY OF OTHER VENOUS THROMBOSIS AND EMBOLISM: ICD-10-CM

## 2024-04-20 DIAGNOSIS — Z79.4 LONG TERM (CURRENT) USE OF INSULIN: ICD-10-CM

## 2024-04-20 DIAGNOSIS — I50.9 HEART FAILURE, UNSPECIFIED: ICD-10-CM

## 2024-04-20 DIAGNOSIS — Z79.01 LONG TERM (CURRENT) USE OF ANTICOAGULANTS: ICD-10-CM

## 2024-04-20 LAB
ALBUMIN SERPL ELPH-MCNC: 3.1 G/DL — LOW (ref 3.3–5)
ALP SERPL-CCNC: 100 U/L — SIGNIFICANT CHANGE UP (ref 40–120)
ALT FLD-CCNC: 24 U/L — SIGNIFICANT CHANGE UP (ref 12–78)
ANION GAP SERPL CALC-SCNC: 5 MMOL/L — SIGNIFICANT CHANGE UP (ref 5–17)
AST SERPL-CCNC: 19 U/L — SIGNIFICANT CHANGE UP (ref 15–37)
BASOPHILS # BLD AUTO: 0.03 K/UL — SIGNIFICANT CHANGE UP (ref 0–0.2)
BASOPHILS NFR BLD AUTO: 0.5 % — SIGNIFICANT CHANGE UP (ref 0–2)
BILIRUB SERPL-MCNC: 0.6 MG/DL — SIGNIFICANT CHANGE UP (ref 0.2–1.2)
BUN SERPL-MCNC: 29 MG/DL — HIGH (ref 7–23)
CALCIUM SERPL-MCNC: 9.4 MG/DL — SIGNIFICANT CHANGE UP (ref 8.5–10.1)
CHLORIDE SERPL-SCNC: 103 MMOL/L — SIGNIFICANT CHANGE UP (ref 96–108)
CO2 SERPL-SCNC: 31 MMOL/L — SIGNIFICANT CHANGE UP (ref 22–31)
CREAT SERPL-MCNC: 0.89 MG/DL — SIGNIFICANT CHANGE UP (ref 0.5–1.3)
EGFR: 75 ML/MIN/1.73M2 — SIGNIFICANT CHANGE UP
EOSINOPHIL # BLD AUTO: 0.13 K/UL — SIGNIFICANT CHANGE UP (ref 0–0.5)
EOSINOPHIL NFR BLD AUTO: 2 % — SIGNIFICANT CHANGE UP (ref 0–6)
GLUCOSE SERPL-MCNC: 221 MG/DL — HIGH (ref 70–99)
HCT VFR BLD CALC: 39.2 % — SIGNIFICANT CHANGE UP (ref 34.5–45)
HGB BLD-MCNC: 13.1 G/DL — SIGNIFICANT CHANGE UP (ref 11.5–15.5)
IMM GRANULOCYTES NFR BLD AUTO: 0.5 % — SIGNIFICANT CHANGE UP (ref 0–0.9)
LYMPHOCYTES # BLD AUTO: 1.28 K/UL — SIGNIFICANT CHANGE UP (ref 1–3.3)
LYMPHOCYTES # BLD AUTO: 20.1 % — SIGNIFICANT CHANGE UP (ref 13–44)
MCHC RBC-ENTMCNC: 30.5 PG — SIGNIFICANT CHANGE UP (ref 27–34)
MCHC RBC-ENTMCNC: 33.4 GM/DL — SIGNIFICANT CHANGE UP (ref 32–36)
MCV RBC AUTO: 91.2 FL — SIGNIFICANT CHANGE UP (ref 80–100)
MONOCYTES # BLD AUTO: 0.42 K/UL — SIGNIFICANT CHANGE UP (ref 0–0.9)
MONOCYTES NFR BLD AUTO: 6.6 % — SIGNIFICANT CHANGE UP (ref 2–14)
NEUTROPHILS # BLD AUTO: 4.47 K/UL — SIGNIFICANT CHANGE UP (ref 1.8–7.4)
NEUTROPHILS NFR BLD AUTO: 70.3 % — SIGNIFICANT CHANGE UP (ref 43–77)
NRBC # BLD: 0 /100 WBCS — SIGNIFICANT CHANGE UP (ref 0–0)
PLATELET # BLD AUTO: 215 K/UL — SIGNIFICANT CHANGE UP (ref 150–400)
POTASSIUM SERPL-MCNC: 4.5 MMOL/L — SIGNIFICANT CHANGE UP (ref 3.5–5.3)
POTASSIUM SERPL-SCNC: 4.5 MMOL/L — SIGNIFICANT CHANGE UP (ref 3.5–5.3)
PROT SERPL-MCNC: 7.8 G/DL — SIGNIFICANT CHANGE UP (ref 6–8.3)
RBC # BLD: 4.3 M/UL — SIGNIFICANT CHANGE UP (ref 3.8–5.2)
RBC # FLD: 13.4 % — SIGNIFICANT CHANGE UP (ref 10.3–14.5)
SODIUM SERPL-SCNC: 139 MMOL/L — SIGNIFICANT CHANGE UP (ref 135–145)
VANCOMYCIN TROUGH SERPL-MCNC: 26 UG/ML — CRITICAL HIGH (ref 10–20)
VANCOMYCIN TROUGH SERPL-MCNC: 27.4 UG/ML — CRITICAL HIGH (ref 10–20)
WBC # BLD: 6.36 K/UL — SIGNIFICANT CHANGE UP (ref 3.8–10.5)
WBC # FLD AUTO: 6.36 K/UL — SIGNIFICANT CHANGE UP (ref 3.8–10.5)

## 2024-04-20 PROCEDURE — 99232 SBSQ HOSP IP/OBS MODERATE 35: CPT

## 2024-04-20 RX ADMIN — TAMSULOSIN HYDROCHLORIDE 0.4 MILLIGRAM(S): 0.4 CAPSULE ORAL at 21:37

## 2024-04-20 RX ADMIN — CEFEPIME 100 MILLIGRAM(S): 1 INJECTION, POWDER, FOR SOLUTION INTRAMUSCULAR; INTRAVENOUS at 09:07

## 2024-04-20 RX ADMIN — CEFEPIME 100 MILLIGRAM(S): 1 INJECTION, POWDER, FOR SOLUTION INTRAMUSCULAR; INTRAVENOUS at 17:21

## 2024-04-20 RX ADMIN — OXYCODONE HYDROCHLORIDE 10 MILLIGRAM(S): 5 TABLET ORAL at 04:55

## 2024-04-20 RX ADMIN — Medication 6: at 12:10

## 2024-04-20 RX ADMIN — Medication 4: at 07:52

## 2024-04-20 RX ADMIN — GABAPENTIN 300 MILLIGRAM(S): 400 CAPSULE ORAL at 21:37

## 2024-04-20 RX ADMIN — OXYCODONE HYDROCHLORIDE 10 MILLIGRAM(S): 5 TABLET ORAL at 14:36

## 2024-04-20 RX ADMIN — Medication 60 MILLIGRAM(S): at 05:50

## 2024-04-20 RX ADMIN — RIVAROXABAN 20 MILLIGRAM(S): KIT at 17:21

## 2024-04-20 RX ADMIN — Medication 1 GRAM(S): at 17:20

## 2024-04-20 RX ADMIN — OXYCODONE HYDROCHLORIDE 10 MILLIGRAM(S): 5 TABLET ORAL at 08:55

## 2024-04-20 RX ADMIN — Medication 1000 MILLIGRAM(S): at 14:55

## 2024-04-20 RX ADMIN — Medication 1 GRAM(S): at 12:10

## 2024-04-20 RX ADMIN — Medication 1000 MILLIGRAM(S): at 14:36

## 2024-04-20 RX ADMIN — OXYCODONE HYDROCHLORIDE 10 MILLIGRAM(S): 5 TABLET ORAL at 20:30

## 2024-04-20 RX ADMIN — Medication 20 UNIT(S): at 12:10

## 2024-04-20 RX ADMIN — SENNA PLUS 2 TABLET(S): 8.6 TABLET ORAL at 21:36

## 2024-04-20 RX ADMIN — OXYCODONE HYDROCHLORIDE 10 MILLIGRAM(S): 5 TABLET ORAL at 07:55

## 2024-04-20 RX ADMIN — Medication 20 UNIT(S): at 07:53

## 2024-04-20 RX ADMIN — Medication 166.67 MILLIGRAM(S): at 10:23

## 2024-04-20 RX ADMIN — Medication 1000 MILLIGRAM(S): at 22:30

## 2024-04-20 RX ADMIN — PANTOPRAZOLE SODIUM 40 MILLIGRAM(S): 20 TABLET, DELAYED RELEASE ORAL at 17:21

## 2024-04-20 RX ADMIN — CEFEPIME 100 MILLIGRAM(S): 1 INJECTION, POWDER, FOR SOLUTION INTRAMUSCULAR; INTRAVENOUS at 01:23

## 2024-04-20 RX ADMIN — OXYCODONE HYDROCHLORIDE 10 MILLIGRAM(S): 5 TABLET ORAL at 19:55

## 2024-04-20 RX ADMIN — LISINOPRIL 2.5 MILLIGRAM(S): 2.5 TABLET ORAL at 05:50

## 2024-04-20 RX ADMIN — OXYCODONE HYDROCHLORIDE 10 MILLIGRAM(S): 5 TABLET ORAL at 14:55

## 2024-04-20 RX ADMIN — POLYETHYLENE GLYCOL 3350 17 GRAM(S): 17 POWDER, FOR SOLUTION ORAL at 12:10

## 2024-04-20 RX ADMIN — PANTOPRAZOLE SODIUM 40 MILLIGRAM(S): 20 TABLET, DELAYED RELEASE ORAL at 05:50

## 2024-04-20 RX ADMIN — Medication 1 GRAM(S): at 05:50

## 2024-04-20 RX ADMIN — Medication 100 MILLIGRAM(S): at 05:50

## 2024-04-20 RX ADMIN — OXYCODONE HYDROCHLORIDE 10 MILLIGRAM(S): 5 TABLET ORAL at 03:55

## 2024-04-20 RX ADMIN — INSULIN GLARGINE 40 UNIT(S): 100 INJECTION, SOLUTION SUBCUTANEOUS at 21:36

## 2024-04-20 RX ADMIN — Medication 20 UNIT(S): at 17:21

## 2024-04-20 RX ADMIN — Medication 1000 MILLIGRAM(S): at 21:37

## 2024-04-20 RX ADMIN — GABAPENTIN 300 MILLIGRAM(S): 400 CAPSULE ORAL at 14:36

## 2024-04-20 NOTE — PROVIDER CONTACT NOTE (CRITICAL VALUE NOTIFICATION) - ACTION/TREATMENT ORDERED:
Ok, no further orders at this time
Rpt lactate in AM
No new orders
Regina barry for now
No new orders. IV fluid if the pt agrees

## 2024-04-21 LAB
ALBUMIN SERPL ELPH-MCNC: 3.2 G/DL — LOW (ref 3.3–5)
ALP SERPL-CCNC: 105 U/L — SIGNIFICANT CHANGE UP (ref 40–120)
ALT FLD-CCNC: 23 U/L — SIGNIFICANT CHANGE UP (ref 12–78)
ANION GAP SERPL CALC-SCNC: 6 MMOL/L — SIGNIFICANT CHANGE UP (ref 5–17)
AST SERPL-CCNC: 20 U/L — SIGNIFICANT CHANGE UP (ref 15–37)
BASOPHILS # BLD AUTO: 0.03 K/UL — SIGNIFICANT CHANGE UP (ref 0–0.2)
BASOPHILS NFR BLD AUTO: 0.6 % — SIGNIFICANT CHANGE UP (ref 0–2)
BILIRUB SERPL-MCNC: 0.6 MG/DL — SIGNIFICANT CHANGE UP (ref 0.2–1.2)
BUN SERPL-MCNC: 34 MG/DL — HIGH (ref 7–23)
CALCIUM SERPL-MCNC: 9.3 MG/DL — SIGNIFICANT CHANGE UP (ref 8.5–10.1)
CHLORIDE SERPL-SCNC: 103 MMOL/L — SIGNIFICANT CHANGE UP (ref 96–108)
CO2 SERPL-SCNC: 32 MMOL/L — HIGH (ref 22–31)
CREAT SERPL-MCNC: 0.92 MG/DL — SIGNIFICANT CHANGE UP (ref 0.5–1.3)
EGFR: 72 ML/MIN/1.73M2 — SIGNIFICANT CHANGE UP
EOSINOPHIL # BLD AUTO: 0.12 K/UL — SIGNIFICANT CHANGE UP (ref 0–0.5)
EOSINOPHIL NFR BLD AUTO: 2.2 % — SIGNIFICANT CHANGE UP (ref 0–6)
GLUCOSE SERPL-MCNC: 219 MG/DL — HIGH (ref 70–99)
HCT VFR BLD CALC: 41.6 % — SIGNIFICANT CHANGE UP (ref 34.5–45)
HGB BLD-MCNC: 13.6 G/DL — SIGNIFICANT CHANGE UP (ref 11.5–15.5)
IMM GRANULOCYTES NFR BLD AUTO: 0.7 % — SIGNIFICANT CHANGE UP (ref 0–0.9)
LYMPHOCYTES # BLD AUTO: 1.2 K/UL — SIGNIFICANT CHANGE UP (ref 1–3.3)
LYMPHOCYTES # BLD AUTO: 22.2 % — SIGNIFICANT CHANGE UP (ref 13–44)
MCHC RBC-ENTMCNC: 30.2 PG — SIGNIFICANT CHANGE UP (ref 27–34)
MCHC RBC-ENTMCNC: 32.7 GM/DL — SIGNIFICANT CHANGE UP (ref 32–36)
MCV RBC AUTO: 92.4 FL — SIGNIFICANT CHANGE UP (ref 80–100)
MONOCYTES # BLD AUTO: 0.36 K/UL — SIGNIFICANT CHANGE UP (ref 0–0.9)
MONOCYTES NFR BLD AUTO: 6.7 % — SIGNIFICANT CHANGE UP (ref 2–14)
NEUTROPHILS # BLD AUTO: 3.65 K/UL — SIGNIFICANT CHANGE UP (ref 1.8–7.4)
NEUTROPHILS NFR BLD AUTO: 67.6 % — SIGNIFICANT CHANGE UP (ref 43–77)
NRBC # BLD: 0 /100 WBCS — SIGNIFICANT CHANGE UP (ref 0–0)
PLATELET # BLD AUTO: 200 K/UL — SIGNIFICANT CHANGE UP (ref 150–400)
POTASSIUM SERPL-MCNC: 4.3 MMOL/L — SIGNIFICANT CHANGE UP (ref 3.5–5.3)
POTASSIUM SERPL-SCNC: 4.3 MMOL/L — SIGNIFICANT CHANGE UP (ref 3.5–5.3)
PROT SERPL-MCNC: 7.8 G/DL — SIGNIFICANT CHANGE UP (ref 6–8.3)
RBC # BLD: 4.5 M/UL — SIGNIFICANT CHANGE UP (ref 3.8–5.2)
RBC # FLD: 13.6 % — SIGNIFICANT CHANGE UP (ref 10.3–14.5)
SODIUM SERPL-SCNC: 141 MMOL/L — SIGNIFICANT CHANGE UP (ref 135–145)
VANCOMYCIN TROUGH SERPL-MCNC: 20 UG/ML — SIGNIFICANT CHANGE UP (ref 10–20)
WBC # BLD: 5.4 K/UL — SIGNIFICANT CHANGE UP (ref 3.8–10.5)
WBC # FLD AUTO: 5.4 K/UL — SIGNIFICANT CHANGE UP (ref 3.8–10.5)

## 2024-04-21 PROCEDURE — 99232 SBSQ HOSP IP/OBS MODERATE 35: CPT

## 2024-04-21 RX ADMIN — GABAPENTIN 300 MILLIGRAM(S): 400 CAPSULE ORAL at 22:11

## 2024-04-21 RX ADMIN — Medication 1 GRAM(S): at 00:18

## 2024-04-21 RX ADMIN — Medication 20 UNIT(S): at 11:58

## 2024-04-21 RX ADMIN — Medication 1 GRAM(S): at 05:58

## 2024-04-21 RX ADMIN — OXYCODONE HYDROCHLORIDE 10 MILLIGRAM(S): 5 TABLET ORAL at 07:55

## 2024-04-21 RX ADMIN — OXYCODONE HYDROCHLORIDE 10 MILLIGRAM(S): 5 TABLET ORAL at 14:17

## 2024-04-21 RX ADMIN — Medication 1 GRAM(S): at 11:13

## 2024-04-21 RX ADMIN — Medication 100 MILLIGRAM(S): at 05:59

## 2024-04-21 RX ADMIN — Medication 1000 MILLIGRAM(S): at 22:12

## 2024-04-21 RX ADMIN — TAMSULOSIN HYDROCHLORIDE 0.4 MILLIGRAM(S): 0.4 CAPSULE ORAL at 22:12

## 2024-04-21 RX ADMIN — Medication 60 MILLIGRAM(S): at 05:58

## 2024-04-21 RX ADMIN — Medication 20 UNIT(S): at 07:55

## 2024-04-21 RX ADMIN — Medication 166.67 MILLIGRAM(S): at 10:15

## 2024-04-21 RX ADMIN — OXYCODONE HYDROCHLORIDE 10 MILLIGRAM(S): 5 TABLET ORAL at 01:56

## 2024-04-21 RX ADMIN — OXYCODONE HYDROCHLORIDE 10 MILLIGRAM(S): 5 TABLET ORAL at 14:52

## 2024-04-21 RX ADMIN — OXYCODONE HYDROCHLORIDE 10 MILLIGRAM(S): 5 TABLET ORAL at 02:30

## 2024-04-21 RX ADMIN — Medication 1000 MILLIGRAM(S): at 14:52

## 2024-04-21 RX ADMIN — Medication 1000 MILLIGRAM(S): at 06:26

## 2024-04-21 RX ADMIN — Medication 20 UNIT(S): at 17:06

## 2024-04-21 RX ADMIN — PANTOPRAZOLE SODIUM 40 MILLIGRAM(S): 20 TABLET, DELAYED RELEASE ORAL at 17:05

## 2024-04-21 RX ADMIN — PANTOPRAZOLE SODIUM 40 MILLIGRAM(S): 20 TABLET, DELAYED RELEASE ORAL at 05:59

## 2024-04-21 RX ADMIN — Medication 4: at 07:55

## 2024-04-21 RX ADMIN — Medication 1000 MILLIGRAM(S): at 20:48

## 2024-04-21 RX ADMIN — RIVAROXABAN 20 MILLIGRAM(S): KIT at 17:05

## 2024-04-21 RX ADMIN — OXYCODONE HYDROCHLORIDE 10 MILLIGRAM(S): 5 TABLET ORAL at 07:59

## 2024-04-21 RX ADMIN — CEFEPIME 100 MILLIGRAM(S): 1 INJECTION, POWDER, FOR SOLUTION INTRAMUSCULAR; INTRAVENOUS at 01:56

## 2024-04-21 RX ADMIN — LISINOPRIL 2.5 MILLIGRAM(S): 2.5 TABLET ORAL at 05:59

## 2024-04-21 RX ADMIN — CEFEPIME 100 MILLIGRAM(S): 1 INJECTION, POWDER, FOR SOLUTION INTRAMUSCULAR; INTRAVENOUS at 09:11

## 2024-04-21 RX ADMIN — OXYCODONE HYDROCHLORIDE 10 MILLIGRAM(S): 5 TABLET ORAL at 20:18

## 2024-04-21 RX ADMIN — SENNA PLUS 2 TABLET(S): 8.6 TABLET ORAL at 22:12

## 2024-04-21 RX ADMIN — Medication 6: at 11:59

## 2024-04-21 RX ADMIN — GABAPENTIN 300 MILLIGRAM(S): 400 CAPSULE ORAL at 05:58

## 2024-04-21 RX ADMIN — Medication 1000 MILLIGRAM(S): at 05:59

## 2024-04-21 RX ADMIN — Medication 1 GRAM(S): at 17:05

## 2024-04-21 RX ADMIN — CEFEPIME 100 MILLIGRAM(S): 1 INJECTION, POWDER, FOR SOLUTION INTRAMUSCULAR; INTRAVENOUS at 17:05

## 2024-04-21 RX ADMIN — GABAPENTIN 300 MILLIGRAM(S): 400 CAPSULE ORAL at 14:17

## 2024-04-21 RX ADMIN — Medication 1000 MILLIGRAM(S): at 14:17

## 2024-04-21 RX ADMIN — INSULIN GLARGINE 40 UNIT(S): 100 INJECTION, SOLUTION SUBCUTANEOUS at 22:13

## 2024-04-21 RX ADMIN — POLYETHYLENE GLYCOL 3350 17 GRAM(S): 17 POWDER, FOR SOLUTION ORAL at 11:13

## 2024-04-21 RX ADMIN — OXYCODONE HYDROCHLORIDE 10 MILLIGRAM(S): 5 TABLET ORAL at 20:48

## 2024-04-21 NOTE — PHARMACOTHERAPY INTERVENTION NOTE - NSPHARMCOMMASP
ASP - Dose optimization/Non-Renal dose adjustment
ASP - Therapy recommended/ Alternative therapy
ASP - Lab/ test recommended

## 2024-04-21 NOTE — DIETITIAN INITIAL EVALUATION ADULT - ADD RECOMMEND
Recommendations: continue diet as ordered, further GI workup, + MVI w. minerals, review and honor pref and tolerances as needed, monitor po, wt, skin, labs, BM, sx GI distress, encourage education when appropriate.

## 2024-04-21 NOTE — PROGRESS NOTE ADULT - PROBLEM SELECTOR PLAN 1
RLE wound and drainage, sent in by wound care center for IV abx  - Continue IV abx vancomycin and cefepime  - BCx x2 NGTD  - LE Dopplers - no evidence of DVT  - PRN morphine for pain, patient not opioid naive requesting increase in pain regimen, physiatry recs appreciated re: pain control regimen  - Bowel regimen with daily senna and Miralax while on opioids  - ID (Dr. Carole Mohr) consulted, recs appreciated
RLE wound and drainage, sent in by wound care center for IV abx  - S/p 1.5g Vancomycin in ED  - Continue IV abx vancomycin and cefepime  - F/u BCx x2  - LE Dopplers - no evidence of DVT  - PRN morphine for pain, patient not opioid naive requesting increase in pain regimen, physiatry recs appreciated re: pain control regimen  - Bowel regimen with daily senna and Miralax while on opioids  - ID (Dr. Carole Mohr) consulted, appreciate recs
RLE wound and drainage, sent in by wound care center for IV abx  - Continue IV abx vancomycin and cefepime  - BCx x2 NGTD  - LE Dopplers - no evidence of DVT  - PRN morphine for pain, patient not opioid naive requesting increase in pain regimen, physiatry recs appreciated re: pain control regimen  - Bowel regimen with daily senna and Miralax while on opioids  - ID (Dr. Carole Mohr) consulted, recs appreciated
RLE wound and drainage, sent in by wound care center for IV abx  - S/p 1.5g Vancomycin in ED  - Continue IV abx vancomycin and cefepime  - Lactate trend 2.9 -> 3.9 - Will give 500cc NS (in setting of venous insufficiency and HFpEF) and repeat in AM- downtrend to 2.2, can repeat in AM  - F/u BCx x2  - LE Dopplers - no evidence of DVT  - PRN morphine for pain, patient not opioid naive requesting increase in pain regimen, will consult PMR for optimal pain control regimen  - Bowel regimen with daily senna and Miralax while on opioids  - ID (Dr. Carole Mohr) consulted, appreciate recs

## 2024-04-21 NOTE — DIETITIAN INITIAL EVALUATION ADULT - OTHER INFO
"59yo F with pmhx T2DM, Afib on Xarelto, HFpEF, chronic LE venous insufficiency presenting from wound care center with RLE swelling and erythema. Patient has been experiencing chills and worsening erythema for the past 5 days. Swelling has not improved with diuretics. She was seen by wound care earlier today and sent in for IV abx. Has a hx of MRSA. Notes her blood glucose has been uncontrolled despite poor PO intake, and has not been taking her Lantus because she ran out of needles."    Pt seen at bedside, reports po intake poor for over a year due to a number of GI associated concerns such as abdominal discomfort that pt states has been going on for years, however reports no decrease in wt over this time.  Reports some issues swallowing, SLP consult appreciated. Episode of blood in stool and nausea currently reported. Labs reviewed, BUN and BG elevated. Skin impaired due to cellulitis. Morbid obesity likely related to multiple factors such as medication, sedentary lifestyle, comorbidities and suspect pt dietary recall incomplete. Pt declines education at this time. GI following questioning gastroparesis, however, pt states she has had the test in the past with neg results. Recommend obtain lipase levels. A1c-11.1, declined nutritional education at this time. Remains available.  Pt declined to update preferences and tolerances at this time. Encourage PO intake, continue carb controlled diet, fiber encouraged. BMI elevated >40, morbid obesity. NKFA. Pt may require additional workout from outside GI. No hx celiacs, crohns reported at this time. Unsure of ubw. Last Bm reported by pt to be 4/20 in of which stool was bloody. Pt reports straining during defecation. Continue current diet at this time. Pending further GI workup. RD to monitor and f/u. RD remains available as needed.

## 2024-04-21 NOTE — DIETITIAN INITIAL EVALUATION ADULT - PROBLEM SELECTOR PLAN 1
RLE wound and drainage, sent in by wound care center for IV abx  - R tib fib XR with no air in subcutaneous tissue on personal read, f/u official read  - S/p 1.5g Vancomycin in ED  - Continue IV abx vancomycin and cefepime  - Lactate trend 2.9 -> 3.9 - Will give 500cc NS (in setting of venous insufficiency and HFpEF) and repeat in AM  - F/u BCx x2  - Pending LE Dopplers to r/o DVT  - PRN morphine for pain  - Bowel regimen with daily senna and Miralax while on opioids  - ID (Dr. Carole Mohr) consulted

## 2024-04-21 NOTE — PROGRESS NOTE ADULT - PROBLEM SELECTOR PLAN 4
-Patient complaining of abdominal discomfort and poor appetite. Complains of pain throughout her body whenever she eats  -GI consulted- possible gastroparesis, recommend starting protonix BID and carafate QID and assess response
-Patient complaining of abdominal discomfort and poor appetite. Complains of pain throughout her body whenever she eats  -GI consulted- possible gastroparesis, continue protonix BID and carafate QID and assess response
-Patient complaining of abdominal discomfort and poor appetite. Complains of pain throughout her body whenever she eats  -GI consulted- possible gastroparesis, recommend starting protonix BID and carafate QID and assess response
-Patient complaining of abdominal discomfort and poor appetite. Complains of pain throughout her body whenever she eats  -GI consulted- possible gastroparesis, recommend starting protonix BID and carafate QID and assess response

## 2024-04-21 NOTE — PHARMACOTHERAPY INTERVENTION NOTE - COMMENTS
Patient is a 58 year old female on IV vancomycin for a RLE cellulitis. Discussed with ID Dr. Mohr. Patients trough resulted this morning at 20 and dose was given. Per precise PK calculations- dose should be held as patient is supratherapeutic with an AUC/SASHA of 630. Recommended switching to PO doxycycline 100mg BID per ID plan. Accepted and order entered to start tomorrow morning. 
As per policy, dose adjusted vancomycin 1750mg IV q12h to vancomycin 1250mg IV q12h in the setting of a vancomycin trough level of 16.6mcg/mL. According to [employer], the patient's current steady-state vancomycin AUC of ~730mg*hr/mL, which is above the goal range of 400-600mg*hr/mL. A vancomycin regimen of 1250mg IV q12h is predicted to result in a steady-state vancomycin AUC of ~550mg*hr/mL.    Cinda LeggettD, [facility]  Clinical Pharmacy Specialist, Infectious Diseases  Tele-Antimicrobial Stewardship Program (Tele-ASP)  Tele-ASP Phone: [phone] 
Patient is a 57 yo F on vancomycin 1750 mg q12h. Discussed with ID Dr. Mohr and recommended trough for today @ 2100 to help guide with dosing. Recommendation accepted and trough entered.

## 2024-04-21 NOTE — DIETITIAN INITIAL EVALUATION ADULT - ORAL INTAKE PTA/DIET HISTORY
Pt seen at bedside, reports po intake poor. She states she is not eating well due to chronic abdominal discomfort in addition to stating that "when I touch food to my tongue my whole body starts to hurt". Pt declines any hx allergies to food. At this time, gives incomplete dietary recall.

## 2024-04-21 NOTE — DIETITIAN INITIAL EVALUATION ADULT - PERTINENT MEDS FT
MEDICATIONS  (STANDING):  acetaminophen     Tablet .. 1000 milliGRAM(s) Oral every 8 hours  cefepime   IVPB      cefepime   IVPB 2000 milliGRAM(s) IV Intermittent every 8 hours  dextrose 10% Bolus 125 milliLiter(s) IV Bolus once  dextrose 5%. 1000 milliLiter(s) (100 mL/Hr) IV Continuous <Continuous>  dextrose 5%. 1000 milliLiter(s) (50 mL/Hr) IV Continuous <Continuous>  dextrose 50% Injectable 12.5 Gram(s) IV Push once  dextrose 50% Injectable 25 Gram(s) IV Push once  DULoxetine 60 milliGRAM(s) Oral daily  gabapentin 300 milliGRAM(s) Oral every 8 hours  glucagon  Injectable 1 milliGRAM(s) IntraMuscular once  insulin glargine Injectable (LANTUS) 40 Unit(s) SubCutaneous at bedtime  insulin lispro (ADMELOG) corrective regimen sliding scale   SubCutaneous at bedtime  insulin lispro (ADMELOG) corrective regimen sliding scale   SubCutaneous three times a day before meals  insulin lispro Injectable (ADMELOG) 20 Unit(s) SubCutaneous three times a day before meals  lisinopril 2.5 milliGRAM(s) Oral daily  metoprolol succinate  milliGRAM(s) Oral daily  oxyCODONE    IR 10 milliGRAM(s) Oral every 6 hours  pantoprazole    Tablet 40 milliGRAM(s) Oral two times a day  polyethylene glycol 3350 17 Gram(s) Oral daily  rivaroxaban 20 milliGRAM(s) Oral with dinner  senna 2 Tablet(s) Oral at bedtime  sucralfate 1 Gram(s) Oral four times a day  tamsulosin 0.4 milliGRAM(s) Oral at bedtime  torsemide 60 milliGRAM(s) Oral daily    MEDICATIONS  (PRN):  dextrose Oral Gel 15 Gram(s) Oral once PRN Blood Glucose LESS THAN 70 milliGRAM(s)/deciliter  melatonin 3 milliGRAM(s) Oral at bedtime PRN Insomnia  morphine  - Injectable 1 milliGRAM(s) IV Push every 6 hours PRN Moderate Pain (4 - 6)  morphine  - Injectable 2 milliGRAM(s) IV Push every 6 hours PRN Severe Pain (7 - 10)

## 2024-04-21 NOTE — PROGRESS NOTE ADULT - PROBLEM SELECTOR PLAN 2
Insulin dependent, poorly controlled, last A1c 10/2023 13.8%  -A1c- 11.1  -Continue home regimen, Lantus 40u qhs and Admelog 20u premeal  -Mod ISS  -Regular finger sticks  -Consistent carb diet  -Hypoglycemic protocol  -Continue gabapentin and duloxetine for diabetic neuropathy
Insulin dependent, poorly controlled, last A1c 10/2023 13.8%  -F/u A1c- 11.1  -Continue home regimen, Lantus 40u qhs and Admelog 20u premeal  -Mod ISS  -Regular finger sticks  -Consistent carb diet  -Hypoglycemic protocol  -Continue gabapentin and duloxetine for diabetic neuropathy

## 2024-04-21 NOTE — PROGRESS NOTE ADULT - ASSESSMENT
abdominal pain  nausea/vomiting    ? gastroparesis  proton pump inhibitor bid  carafate 1g four times a day  diet as ameena  anti-emetics prn  consider outpatient GES  d/w patient  will follow     I reviewed the overnight course of events on the unit, re-confirming the patient history. I discussed the care with the patient  Differential diagnosis and plan of care discussed with patient after the evaluation  35 minutes spent on total encounter of which more than fifty percent of the encounter was spent counseling and/or coordinating care by the attending physician.  
abdominal pain  nausea/vomiting    ? gastroparesis  proton pump inhibitor bid  carafate 1g four times a day  diet as ameena  anti-emetics prn  consider outpatient GES  d/w patient  will follow     I reviewed the overnight course of events on the unit, re-confirming the patient history. I discussed the care with the patient  Differential diagnosis and plan of care discussed with patient after the evaluation  35 minutes spent on total encounter of which more than fifty percent of the encounter was spent counseling and/or coordinating care by the attending physician.  
abdominal pain  nausea/vomiting    ? gastroparesis  proton pump inhibitor bid  carafate 1g four times a day  diet as ameena  anti-emetics prn  consider outpatient GES  monitor stool for bleeding   d/w patient  will follow     I reviewed the overnight course of events on the unit, re-confirming the patient history. I discussed the care with the patient  Differential diagnosis and plan of care discussed with patient after the evaluation  35 minutes spent on total encounter of which more than fifty percent of the encounter was spent counseling and/or coordinating care by the attending physician.  
59yo F with pmhx T2DM, Afib on Xarelto, HFpEF, chronic LE venous insufficiency presenting from wound care center with RLE swelling and erythema. Admitted for RLE cellulitis
57yo F with pmhx T2DM, Afib on Xarelto, HFpEF, chronic LE venous insufficiency presenting from wound care center with RLE swelling and erythema. Admitted for RLE cellulitis
59yo F with pmhx T2DM, Afib on Xarelto, HFpEF, chronic LE venous insufficiency presenting from wound care center with RLE swelling and erythema. Admitted for RLE cellulitis
59yo F with pmhx T2DM, Afib on Xarelto, HFpEF, chronic LE venous insufficiency presenting from wound care center with RLE swelling and erythema. Admitted for RLE cellulitis

## 2024-04-21 NOTE — DIETITIAN INITIAL EVALUATION ADULT - PERTINENT LABORATORY DATA
04-21    141  |  103  |  34<H>  ----------------------------<  219<H>  4.3   |  32<H>  |  0.92    Ca    9.3      21 Apr 2024 08:10    TPro  7.8  /  Alb  3.2<L>  /  TBili  0.6  /  DBili  x   /  AST  20  /  ALT  23  /  AlkPhos  105  04-21  POCT Blood Glucose.: 265 mg/dL (04-21-24 @ 11:55)  A1C with Estimated Average Glucose Result: 11.1 % (04-17-24 @ 21:15)  A1C with Estimated Average Glucose Result: 13.8 % (10-10-23 @ 05:45)

## 2024-04-22 ENCOUNTER — NON-APPOINTMENT (OUTPATIENT)
Age: 59
End: 2024-04-22

## 2024-04-22 ENCOUNTER — TRANSCRIPTION ENCOUNTER (OUTPATIENT)
Age: 59
End: 2024-04-22

## 2024-04-22 VITALS
RESPIRATION RATE: 18 BRPM | HEART RATE: 90 BPM | TEMPERATURE: 98 F | SYSTOLIC BLOOD PRESSURE: 111 MMHG | OXYGEN SATURATION: 94 % | DIASTOLIC BLOOD PRESSURE: 67 MMHG

## 2024-04-22 DIAGNOSIS — Z87.828 PERSONAL HISTORY OF OTHER (HEALED) PHYSICAL INJURY AND TRAUMA: ICD-10-CM

## 2024-04-22 LAB
ALBUMIN SERPL ELPH-MCNC: 2.8 G/DL — LOW (ref 3.3–5)
ALP SERPL-CCNC: 86 U/L — SIGNIFICANT CHANGE UP (ref 40–120)
ALT FLD-CCNC: 21 U/L — SIGNIFICANT CHANGE UP (ref 12–78)
ANION GAP SERPL CALC-SCNC: 4 MMOL/L — LOW (ref 5–17)
AST SERPL-CCNC: 19 U/L — SIGNIFICANT CHANGE UP (ref 15–37)
BASOPHILS # BLD AUTO: 0.03 K/UL — SIGNIFICANT CHANGE UP (ref 0–0.2)
BASOPHILS NFR BLD AUTO: 0.4 % — SIGNIFICANT CHANGE UP (ref 0–2)
BILIRUB SERPL-MCNC: 0.5 MG/DL — SIGNIFICANT CHANGE UP (ref 0.2–1.2)
BUN SERPL-MCNC: 39 MG/DL — HIGH (ref 7–23)
CALCIUM SERPL-MCNC: 8.4 MG/DL — LOW (ref 8.5–10.1)
CHLORIDE SERPL-SCNC: 103 MMOL/L — SIGNIFICANT CHANGE UP (ref 96–108)
CO2 SERPL-SCNC: 31 MMOL/L — SIGNIFICANT CHANGE UP (ref 22–31)
CREAT SERPL-MCNC: 1.1 MG/DL — SIGNIFICANT CHANGE UP (ref 0.5–1.3)
EGFR: 58 ML/MIN/1.73M2 — LOW
EOSINOPHIL # BLD AUTO: 0.14 K/UL — SIGNIFICANT CHANGE UP (ref 0–0.5)
EOSINOPHIL NFR BLD AUTO: 2 % — SIGNIFICANT CHANGE UP (ref 0–6)
GLUCOSE SERPL-MCNC: 222 MG/DL — HIGH (ref 70–99)
HCT VFR BLD CALC: 35.1 % — SIGNIFICANT CHANGE UP (ref 34.5–45)
HGB BLD-MCNC: 11.9 G/DL — SIGNIFICANT CHANGE UP (ref 11.5–15.5)
IMM GRANULOCYTES NFR BLD AUTO: 0.4 % — SIGNIFICANT CHANGE UP (ref 0–0.9)
LYMPHOCYTES # BLD AUTO: 1.51 K/UL — SIGNIFICANT CHANGE UP (ref 1–3.3)
LYMPHOCYTES # BLD AUTO: 22 % — SIGNIFICANT CHANGE UP (ref 13–44)
MCHC RBC-ENTMCNC: 30.7 PG — SIGNIFICANT CHANGE UP (ref 27–34)
MCHC RBC-ENTMCNC: 33.9 GM/DL — SIGNIFICANT CHANGE UP (ref 32–36)
MCV RBC AUTO: 90.7 FL — SIGNIFICANT CHANGE UP (ref 80–100)
MONOCYTES # BLD AUTO: 0.59 K/UL — SIGNIFICANT CHANGE UP (ref 0–0.9)
MONOCYTES NFR BLD AUTO: 8.6 % — SIGNIFICANT CHANGE UP (ref 2–14)
NEUTROPHILS # BLD AUTO: 4.55 K/UL — SIGNIFICANT CHANGE UP (ref 1.8–7.4)
NEUTROPHILS NFR BLD AUTO: 66.6 % — SIGNIFICANT CHANGE UP (ref 43–77)
NRBC # BLD: 0 /100 WBCS — SIGNIFICANT CHANGE UP (ref 0–0)
PLATELET # BLD AUTO: 209 K/UL — SIGNIFICANT CHANGE UP (ref 150–400)
POTASSIUM SERPL-MCNC: 4.1 MMOL/L — SIGNIFICANT CHANGE UP (ref 3.5–5.3)
POTASSIUM SERPL-SCNC: 4.1 MMOL/L — SIGNIFICANT CHANGE UP (ref 3.5–5.3)
PROT SERPL-MCNC: 6.7 G/DL — SIGNIFICANT CHANGE UP (ref 6–8.3)
RBC # BLD: 3.87 M/UL — SIGNIFICANT CHANGE UP (ref 3.8–5.2)
RBC # FLD: 13.7 % — SIGNIFICANT CHANGE UP (ref 10.3–14.5)
SODIUM SERPL-SCNC: 138 MMOL/L — SIGNIFICANT CHANGE UP (ref 135–145)
WBC # BLD: 6.85 K/UL — SIGNIFICANT CHANGE UP (ref 3.8–10.5)
WBC # FLD AUTO: 6.85 K/UL — SIGNIFICANT CHANGE UP (ref 3.8–10.5)

## 2024-04-22 PROCEDURE — 85025 COMPLETE CBC W/AUTO DIFF WBC: CPT

## 2024-04-22 PROCEDURE — 80202 ASSAY OF VANCOMYCIN: CPT

## 2024-04-22 PROCEDURE — 93970 EXTREMITY STUDY: CPT

## 2024-04-22 PROCEDURE — 36415 COLL VENOUS BLD VENIPUNCTURE: CPT

## 2024-04-22 PROCEDURE — 99239 HOSP IP/OBS DSCHRG MGMT >30: CPT

## 2024-04-22 PROCEDURE — 83735 ASSAY OF MAGNESIUM: CPT

## 2024-04-22 PROCEDURE — 93005 ELECTROCARDIOGRAM TRACING: CPT

## 2024-04-22 PROCEDURE — 83036 HEMOGLOBIN GLYCOSYLATED A1C: CPT

## 2024-04-22 PROCEDURE — 96374 THER/PROPH/DIAG INJ IV PUSH: CPT

## 2024-04-22 PROCEDURE — 82962 GLUCOSE BLOOD TEST: CPT

## 2024-04-22 PROCEDURE — 83880 ASSAY OF NATRIURETIC PEPTIDE: CPT

## 2024-04-22 PROCEDURE — 99285 EMERGENCY DEPT VISIT HI MDM: CPT | Mod: 25

## 2024-04-22 PROCEDURE — 80053 COMPREHEN METABOLIC PANEL: CPT

## 2024-04-22 PROCEDURE — 87040 BLOOD CULTURE FOR BACTERIA: CPT

## 2024-04-22 PROCEDURE — 83605 ASSAY OF LACTIC ACID: CPT

## 2024-04-22 PROCEDURE — 99221 1ST HOSP IP/OBS SF/LOW 40: CPT

## 2024-04-22 PROCEDURE — 73590 X-RAY EXAM OF LOWER LEG: CPT

## 2024-04-22 PROCEDURE — 84100 ASSAY OF PHOSPHORUS: CPT

## 2024-04-22 RX ORDER — DULOXETINE HYDROCHLORIDE 30 MG/1
1 CAPSULE, DELAYED RELEASE ORAL
Refills: 0 | DISCHARGE

## 2024-04-22 RX ORDER — LISINOPRIL 2.5 MG/1
1 TABLET ORAL
Refills: 0 | DISCHARGE

## 2024-04-22 RX ORDER — METOPROLOL TARTRATE 50 MG
1 TABLET ORAL
Qty: 0 | Refills: 0 | DISCHARGE
Start: 2024-04-22

## 2024-04-22 RX ORDER — INSULIN GLARGINE 100 [IU]/ML
40 INJECTION, SOLUTION SUBCUTANEOUS
Refills: 0 | DISCHARGE
Start: 2024-04-22

## 2024-04-22 RX ORDER — PANTOPRAZOLE SODIUM 20 MG/1
1 TABLET, DELAYED RELEASE ORAL
Qty: 60 | Refills: 0
Start: 2024-04-22 | End: 2024-05-21

## 2024-04-22 RX ORDER — METOPROLOL TARTRATE 50 MG
1 TABLET ORAL
Refills: 0 | DISCHARGE

## 2024-04-22 RX ORDER — GABAPENTIN 400 MG/1
1 CAPSULE ORAL
Refills: 0 | DISCHARGE
Start: 2024-04-22

## 2024-04-22 RX ORDER — LISINOPRIL 2.5 MG/1
1 TABLET ORAL
Qty: 0 | Refills: 0 | DISCHARGE
Start: 2024-04-22

## 2024-04-22 RX ORDER — ACETAMINOPHEN 500 MG
2 TABLET ORAL
Qty: 0 | Refills: 0 | DISCHARGE
Start: 2024-04-22

## 2024-04-22 RX ORDER — RIVAROXABAN 15 MG-20MG
1 KIT ORAL
Qty: 0 | Refills: 0 | DISCHARGE
Start: 2024-04-22

## 2024-04-22 RX ORDER — SUCRALFATE 1 G
1 TABLET ORAL
Qty: 120 | Refills: 0
Start: 2024-04-22 | End: 2024-05-21

## 2024-04-22 RX ORDER — INSULIN LISPRO 100/ML
20 VIAL (ML) SUBCUTANEOUS
Qty: 5 | Refills: 0
Start: 2024-04-22

## 2024-04-22 RX ORDER — DULOXETINE HYDROCHLORIDE 30 MG/1
0.5 CAPSULE, DELAYED RELEASE ORAL
Refills: 0 | DISCHARGE
Start: 2024-04-22

## 2024-04-22 RX ORDER — TAMSULOSIN HYDROCHLORIDE 0.4 MG/1
1 CAPSULE ORAL
Qty: 0 | Refills: 0 | DISCHARGE
Start: 2024-04-22

## 2024-04-22 RX ORDER — INSULIN GLARGINE 100 [IU]/ML
40 INJECTION, SOLUTION SUBCUTANEOUS
Refills: 0 | DISCHARGE

## 2024-04-22 RX ORDER — MORPHINE SULFATE 50 MG/1
2 CAPSULE, EXTENDED RELEASE ORAL ONCE
Refills: 0 | Status: DISCONTINUED | OUTPATIENT
Start: 2024-04-22 | End: 2024-04-22

## 2024-04-22 RX ADMIN — Medication 1 GRAM(S): at 17:06

## 2024-04-22 RX ADMIN — MORPHINE SULFATE 2 MILLIGRAM(S): 50 CAPSULE, EXTENDED RELEASE ORAL at 03:36

## 2024-04-22 RX ADMIN — Medication 1 GRAM(S): at 12:10

## 2024-04-22 RX ADMIN — OXYCODONE HYDROCHLORIDE 10 MILLIGRAM(S): 5 TABLET ORAL at 08:12

## 2024-04-22 RX ADMIN — POLYETHYLENE GLYCOL 3350 17 GRAM(S): 17 POWDER, FOR SOLUTION ORAL at 12:10

## 2024-04-22 RX ADMIN — Medication 1000 MILLIGRAM(S): at 05:26

## 2024-04-22 RX ADMIN — Medication 1000 MILLIGRAM(S): at 14:19

## 2024-04-22 RX ADMIN — Medication 1 GRAM(S): at 00:24

## 2024-04-22 RX ADMIN — GABAPENTIN 300 MILLIGRAM(S): 400 CAPSULE ORAL at 05:25

## 2024-04-22 RX ADMIN — Medication 4: at 08:12

## 2024-04-22 RX ADMIN — OXYCODONE HYDROCHLORIDE 10 MILLIGRAM(S): 5 TABLET ORAL at 08:10

## 2024-04-22 RX ADMIN — Medication 1 GRAM(S): at 05:25

## 2024-04-22 RX ADMIN — PANTOPRAZOLE SODIUM 40 MILLIGRAM(S): 20 TABLET, DELAYED RELEASE ORAL at 05:25

## 2024-04-22 RX ADMIN — Medication 20 UNIT(S): at 08:12

## 2024-04-22 RX ADMIN — Medication 100 MILLIGRAM(S): at 05:25

## 2024-04-22 RX ADMIN — GABAPENTIN 300 MILLIGRAM(S): 400 CAPSULE ORAL at 14:07

## 2024-04-22 RX ADMIN — OXYCODONE HYDROCHLORIDE 10 MILLIGRAM(S): 5 TABLET ORAL at 14:07

## 2024-04-22 RX ADMIN — Medication 1000 MILLIGRAM(S): at 05:56

## 2024-04-22 RX ADMIN — Medication 1000 MILLIGRAM(S): at 14:07

## 2024-04-22 RX ADMIN — CEFEPIME 100 MILLIGRAM(S): 1 INJECTION, POWDER, FOR SOLUTION INTRAMUSCULAR; INTRAVENOUS at 01:57

## 2024-04-22 RX ADMIN — Medication 2: at 12:10

## 2024-04-22 RX ADMIN — Medication 20 UNIT(S): at 12:10

## 2024-04-22 RX ADMIN — OXYCODONE HYDROCHLORIDE 10 MILLIGRAM(S): 5 TABLET ORAL at 14:19

## 2024-04-22 RX ADMIN — RIVAROXABAN 20 MILLIGRAM(S): KIT at 17:06

## 2024-04-22 RX ADMIN — OXYCODONE HYDROCHLORIDE 10 MILLIGRAM(S): 5 TABLET ORAL at 02:17

## 2024-04-22 RX ADMIN — Medication 100 MILLIGRAM(S): at 17:06

## 2024-04-22 RX ADMIN — MORPHINE SULFATE 2 MILLIGRAM(S): 50 CAPSULE, EXTENDED RELEASE ORAL at 04:06

## 2024-04-22 RX ADMIN — OXYCODONE HYDROCHLORIDE 10 MILLIGRAM(S): 5 TABLET ORAL at 01:57

## 2024-04-22 RX ADMIN — PANTOPRAZOLE SODIUM 40 MILLIGRAM(S): 20 TABLET, DELAYED RELEASE ORAL at 17:06

## 2024-04-22 RX ADMIN — Medication 20 UNIT(S): at 17:07

## 2024-04-22 RX ADMIN — Medication 60 MILLIGRAM(S): at 05:26

## 2024-04-22 RX ADMIN — Medication 4: at 17:07

## 2024-04-22 NOTE — CONSULT NOTE ADULT - ASSESSMENT
Right lower leg healed wound
abdominal pain  nausea/vomiting    ? gastroparesis  proton pump inhibitor bid  carafate 1g four times a day  diet as ameena  anti-emetics prn  d/w patient  will follow 
A/P 58y year old Female with     Istop reviewed: Reference #: [id number]  Rx for percocet  about 4-6 times daily is noted    Revised regimen to reflect oxycodone 10mg Q6H standing,  IV morphine 1 or 2mg PRN for PRN mod and severe pain  Tylenol 1000mg Q8H standing as well.    Primary team notes are reviewed    Laboratory studies reviewed including those mentioned earlier/above    Discussed management/coordinated care with primary team/referring provider.    High risk of morbidity from treatment with:

## 2024-04-22 NOTE — DISCHARGE NOTE PROVIDER - NSDCFUSCHEDAPPT_GEN_ALL_CORE_FT
[facility] Physician [facility]  [address]SCAN [address]  Scheduled Appointment: 04/23/2024    Riky Glez  [facility] Physician [facility]  [address]STRO [address]  Scheduled Appointment: 04/29/2024

## 2024-04-22 NOTE — DISCHARGE NOTE NURSING/CASE MANAGEMENT/SOCIAL WORK - PATIENT PORTAL LINK FT
You can access the FollowMyHealth Patient Portal offered by [facility] by registering at the following website: [email or web address]. By joining [employer]’s FollowMyHealth portal, you will also be able to view your health information using other applications (apps) compatible with our system.

## 2024-04-22 NOTE — DISCHARGE NOTE PROVIDER - NSDCMRMEDTOKEN_GEN_ALL_CORE_FT
acetaminophen 500 mg oral tablet: 2 tab(s) orally every 8 hours  Admelog 100 units/mL injectable solution: 20 unit(s) injectable 3 times a day (with meals)  bisacodyl 5 mg oral delayed release tablet: 1 tab(s) orally once a day As needed Constipation  doxycycline monohydrate 50 mg oral capsule: 2 cap(s) orally every 12 hours  DULoxetine 60 mg oral delayed release capsule: 1 cap(s) orally once a day  gabapentin 300 mg oral capsule: 1 cap(s) orally every 8 hours  insulin glargine 100 units/mL subcutaneous solution: 40 unit(s) subcutaneous once a day (at bedtime)  lisinopril 2.5 mg oral tablet: 1 tab(s) orally once a day  metoprolol succinate 100 mg oral tablet, extended release: 1 tab(s) orally once a day  oxycodone-acetaminophen 5 mg-325 mg oral tablet: 1 tab(s) orally every 4 hours As needed Severe Pain (7 - 10)  pantoprazole 40 mg oral delayed release tablet: 1 tab(s) orally 2 times a day  polyethylene glycol 3350 oral powder for reconstitution: 17 gram(s) orally once a day  Potassium Chloride (Eqv-Klor-Con M20) 20 mEq oral tablet, extended release: 1 tab(s) orally once a day  rivaroxaban 20 mg oral tablet: 1 tab(s) orally once a day (before a meal)  senna leaf extract oral tablet: 2 tab(s) orally once a day (at bedtime)  sucralfate 1 g oral tablet: 1 tab(s) orally 4 times a day  tamsulosin 0.4 mg oral capsule: 1 cap(s) orally once a day (at bedtime)  torsemide 60 mg oral tablet: 1 tab(s) orally once a day

## 2024-04-22 NOTE — DISCHARGE NOTE PROVIDER - [id number]_GEN_ALL_CORE_FT
PRINCIPAL DISCHARGE DIAGNOSIS  Diagnosis: Cellulitis  Assessment and Plan of Treatment: You were found to to have a skin infection caused by bacteria called cellulitis. Please continue to take Doxycycline 100 mg twice daily for 7 days and follow up with your PCP. Do not stop taking the antibiotic even if you start to feel better.  SEEK IMMEDIATE MEDICAL CARE IF YOU HAVE ANY OF THE FOLLOWING SYMPTOMS: worsening fever, sever pain, increased swelling and/or skin color changes after finishing your antibiotics, vomiting, diarrhea, or dizziness/lightheadedness.        SECONDARY DISCHARGE DIAGNOSES  Diagnosis: Atrial fibrillation  Assessment and Plan of Treatment: Resume home medication regimen

## 2024-04-22 NOTE — PROGRESS NOTE ADULT - PROVIDER SPECIALTY LIST ADULT
Gastroenterology
Infectious Disease
Gastroenterology
Gastroenterology
Infectious Disease
Infectious Disease
Hospitalist
Infectious Disease
Hospitalist

## 2024-04-22 NOTE — PROGRESS NOTE ADULT - SUBJECTIVE AND OBJECTIVE BOX
MELITON LOZANO is a 58yFemale , patient examined and chart reviewed.    INTERVAL HPI/ OVERNIGHT EVENTS:   No events. Afebrile.      PAST MEDICAL & SURGICAL HISTORY:  Atrial fibrillation  Type 2 diabetes mellitus  Congestive heart failure  Scalp psoriasis  S/P tonsillectomy  S/P appendectomy  H/O umbilical hernia repair      For details regarding the patient's social history, family history, and other miscellaneous elements, please refer the initial infectious diseases consultation and/or the admitting history and physical examination for this admission.    ROS:  Unable to obtain due to : poor historian    No Known Allergies    Current inpatient medications :    ANTIBIOTICS/RELEVANT:  cefepime   IVPB      cefepime   IVPB 2000 milliGRAM(s) IV Intermittent every 8 hours  vancomycin  IVPB 1250 milliGRAM(s) IV Intermittent every 12 hours      acetaminophen     Tablet .. 1000 milliGRAM(s) Oral every 8 hours  dextrose 10% Bolus 125 milliLiter(s) IV Bolus once  dextrose 5%. 1000 milliLiter(s) IV Continuous <Continuous>  dextrose 5%. 1000 milliLiter(s) IV Continuous <Continuous>  dextrose 50% Injectable 12.5 Gram(s) IV Push once  dextrose 50% Injectable 25 Gram(s) IV Push once  dextrose Oral Gel 15 Gram(s) Oral once PRN  DULoxetine 60 milliGRAM(s) Oral daily  gabapentin 300 milliGRAM(s) Oral every 8 hours  glucagon  Injectable 1 milliGRAM(s) IntraMuscular once  insulin glargine Injectable (LANTUS) 40 Unit(s) SubCutaneous at bedtime  insulin lispro (ADMELOG) corrective regimen sliding scale   SubCutaneous at bedtime  insulin lispro (ADMELOG) corrective regimen sliding scale   SubCutaneous three times a day before meals  insulin lispro Injectable (ADMELOG) 20 Unit(s) SubCutaneous three times a day before meals  lisinopril 2.5 milliGRAM(s) Oral daily  melatonin 3 milliGRAM(s) Oral at bedtime PRN  metoprolol succinate  milliGRAM(s) Oral daily  morphine  - Injectable 1 milliGRAM(s) IV Push every 6 hours PRN  morphine  - Injectable 2 milliGRAM(s) IV Push every 6 hours PRN  oxyCODONE    IR 10 milliGRAM(s) Oral every 6 hours  pantoprazole    Tablet 40 milliGRAM(s) Oral two times a day  polyethylene glycol 3350 17 Gram(s) Oral daily  rivaroxaban 20 milliGRAM(s) Oral with dinner  senna 2 Tablet(s) Oral at bedtime  sucralfate 1 Gram(s) Oral four times a day  tamsulosin 0.4 milliGRAM(s) Oral at bedtime  torsemide 60 milliGRAM(s) Oral daily      Objective:    Vital Signs Last 24 Hrs  T(C): 36.4 (20 Apr 2024 12:39), Max: 36.4 (19 Apr 2024 20:07)  T(F): 97.6 (20 Apr 2024 12:39), Max: 97.6 (19 Apr 2024 20:07)  HR: 75 (20 Apr 2024 12:39) (75 - 88)  BP: 98/66 (20 Apr 2024 12:39) (98/66 - 125/77)  RR: 20 (20 Apr 2024 12:39) (18 - 20)  SpO2: 96% (20 Apr 2024 12:39) (96% - 98%)    Parameters below as of 20 Apr 2024 12:39  Patient On (Oxygen Delivery Method): room air    Physical Exam:  General: no acute distress  Neck: supple, trachea midline  Lungs: clear, no wheeze/rhonchi  Cardiovascular: regular rate and rhythm, S1 S2  Abdomen: soft, nontender,  bowel sounds normal  Neurological: awake  Skin: no rash  Extremities: Right LE erythema better  + ulcer      LABS:                        13.1   6.36  )-----------( 215      ( 20 Apr 2024 07:45 )             39.2   04-20    139  |  103  |  29<H>  ----------------------------<  221<H>  4.5   |  31  |  0.89    Ca    9.4      20 Apr 2024 07:45    TPro  7.8  /  Alb  3.1<L>  /  TBili  0.6  /  DBili  x   /  AST  19  /  ALT  24  /  AlkPhos  100  04-20    Vancomycin Level, Trough: 16.6 ug/mL (04-18 @ 21:40)      MICROBIOLOGY:    Culture - Blood (collected 17 Apr 2024 21:15)  Source: .Blood Blood-Peripheral  Preliminary Report (20 Apr 2024 03:02):    No growth at 48 Hours    Culture - Blood (collected 17 Apr 2024 21:05)  Source: .Blood Blood-Venous  Preliminary Report (20 Apr 2024 03:02):    No growth at 48 Hours    RADIOLOGY & ADDITIONAL STUDIES:  ACC: [id number] EXAM:  US DPLX LWR EXT VEINS COMPL BI   ORDERED BY: NAMRATA HOUSER     PROCEDURE DATE:  04/18/2024          INTERPRETATION:  CLINICAL INFORMATION: Bilateral leg swelling and pain    COMPARISON: 3/1/2024    TECHNIQUE: Duplex sonography of the BILATERAL LOWER extremity veins with   color and spectral Doppler, with and without compression.    FINDINGS:    RIGHT:  Normal compressibility of the RIGHT common femoral, femoral and popliteal   veins.  Doppler examination shows normal spontaneous and phasic flow.  The deep veins in the right calf are not visualized due to patient body   habitus.    LEFT:  Normal compressibility of the LEFT common femoral, femoral and popliteal   veins.  Doppler examination shows normal spontaneous and phasic flow.  The deep veins in the left calf are not visualized due to patient's body   habitus.    IMPRESSION:    No evidence for deep vein thrombosis from the common femoral to the   popliteal veins bilaterally.      Assessment :   57yo F with pmhx T2DM, Afib on Xarelto, HFpEF, chronic LE venous insufficiency presenting from wound care center with RLE ulcers with leg swelling and erythema. + chills    Admitted with RIght LE cellulitis with lymphangitis- appears to be improving  Hx of MRSA cellulitis  Uncontrolled DM  Cultures NGTD    Plan :   Cont Vanc Cefepime  Anticipate changing to po Doxy 100mg bid x 7 days on 4/22 for dc   Trend temps and cbc  Asp precautions  Stable from ID standpoint    Continue with present regiment.  Appropriate use of antibiotics and adverse effects reviewed.    > 35 minutes were spent in direct patient care reviewing notes, medications ,labs data/ imaging , discussion with multidisciplinary team.    Thank you for allowing me to participate in care of your patient .    Carole Mohr MD  Infectious Disease  [phone]
     MELITON LOZANO is a 58yFemale , patient examined and chart reviewed.    INTERVAL HPI/ OVERNIGHT EVENTS:   No events. Afebrile.    PAST MEDICAL & SURGICAL HISTORY:  Atrial fibrillation  Type 2 diabetes mellitus  Congestive heart failure  Scalp psoriasis  S/P tonsillectomy  S/P appendectomy  H/O umbilical hernia repair      For details regarding the patient's social history, family history, and other miscellaneous elements, please refer the initial infectious diseases consultation and/or the admitting history and physical examination for this admission.    ROS:  Unable to obtain due to : poor historian    No Known Allergies    Current inpatient medications :    ANTIBIOTICS/RELEVANT:      MEDICATIONS  (STANDING):  acetaminophen     Tablet .. 1000 milliGRAM(s) Oral every 8 hours  dextrose 10% Bolus 125 milliLiter(s) IV Bolus once  dextrose 5%. 1000 milliLiter(s) (100 mL/Hr) IV Continuous <Continuous>  dextrose 5%. 1000 milliLiter(s) (50 mL/Hr) IV Continuous <Continuous>  dextrose 50% Injectable 12.5 Gram(s) IV Push once  dextrose 50% Injectable 25 Gram(s) IV Push once  DULoxetine 60 milliGRAM(s) Oral daily  gabapentin 300 milliGRAM(s) Oral every 8 hours  glucagon  Injectable 1 milliGRAM(s) IntraMuscular once  insulin glargine Injectable (LANTUS) 40 Unit(s) SubCutaneous at bedtime  insulin lispro (ADMELOG) corrective regimen sliding scale   SubCutaneous at bedtime  insulin lispro (ADMELOG) corrective regimen sliding scale   SubCutaneous three times a day before meals  insulin lispro Injectable (ADMELOG) 20 Unit(s) SubCutaneous three times a day before meals  lisinopril 2.5 milliGRAM(s) Oral daily  metoprolol succinate  milliGRAM(s) Oral daily  oxyCODONE    IR 10 milliGRAM(s) Oral every 6 hours  pantoprazole    Tablet 40 milliGRAM(s) Oral two times a day  polyethylene glycol 3350 17 Gram(s) Oral daily  rivaroxaban 20 milliGRAM(s) Oral with dinner  senna 2 Tablet(s) Oral at bedtime  sucralfate 1 Gram(s) Oral four times a day  tamsulosin 0.4 milliGRAM(s) Oral at bedtime  torsemide 60 milliGRAM(s) Oral daily    MEDICATIONS  (PRN):  dextrose Oral Gel 15 Gram(s) Oral once PRN Blood Glucose LESS THAN 70 milliGRAM(s)/deciliter  melatonin 3 milliGRAM(s) Oral at bedtime PRN Insomnia  morphine  - Injectable 2 milliGRAM(s) IV Push every 6 hours PRN Severe Pain (7 - 10)  morphine  - Injectable 1 milliGRAM(s) IV Push every 6 hours PRN Moderate Pain (4 - 6)      Objective:  Vital Signs Last 24 Hrs  T(C): 36.6 (22 Apr 2024 11:58), Max: 36.7 (21 Apr 2024 21:24)  T(F): 97.8 (22 Apr 2024 11:58), Max: 98.1 (21 Apr 2024 21:24)  HR: 90 (22 Apr 2024 11:58) (72 - 96)  BP: 111/67 (22 Apr 2024 11:58) (100/69 - 111/67)RR: 18 (22 Apr 2024 11:58) (18 - 18)  SpO2: 94% (22 Apr 2024 11:58) (93% - 96%)    Parameters below as of 22 Apr 2024 11:58  Patient On (Oxygen Delivery Method): room air      Physical Exam:  General: no acute distress  Neck: supple, trachea midline  Lungs: clear, no wheeze/rhonchi  Cardiovascular: regular rate and rhythm, S1 S2  Abdomen: soft, nontender,  bowel sounds normal  Neurological: awake  Skin: no rash  Extremities: Right LE erythema better  + ulcer      LABS:                        11.9   6.85  )-----------( 209      ( 22 Apr 2024 08:03 )             35.1   04-22    138  |  103  |  39<H>  ----------------------------<  222<H>  4.1   |  31  |  1.10    Ca    8.4<L>      22 Apr 2024 08:03    TPro  6.7  /  Alb  2.8<L>  /  TBili  0.5  /  DBili  x   /  AST  19  /  ALT  21  /  AlkPhos  86  04-22    Vancomycin Level, Trough (04.21.24 @ 08:10)    Vancomycin Level, Trough: 20    MICROBIOLOGY:    Culture - Blood (collected 17 Apr 2024 21:15)  Source: .Blood Blood-Peripheral  Preliminary Report (20 Apr 2024 03:02):    No growth at 48 Hours    Culture - Blood (collected 17 Apr 2024 21:05)  Source: .Blood Blood-Venous  Preliminary Report (20 Apr 2024 03:02):    No growth at 48 Hours      RADIOLOGY & ADDITIONAL STUDIES:  ACC: [id number] EXAM:  US DPLX LWR EXT VEINS COMPL BI   ORDERED BY: NAMRATA HOUSER     PROCEDURE DATE:  04/18/2024          INTERPRETATION:  CLINICAL INFORMATION: Bilateral leg swelling and pain    COMPARISON: 3/1/2024    TECHNIQUE: Duplex sonography of the BILATERAL LOWER extremity veins with   color and spectral Doppler, with and without compression.    FINDINGS:    RIGHT:  Normal compressibility of the RIGHT common femoral, femoral and popliteal   veins.  Doppler examination shows normal spontaneous and phasic flow.  The deep veins in the right calf are not visualized due to patient body   habitus.    LEFT:  Normal compressibility of the LEFT common femoral, femoral and popliteal   veins.  Doppler examination shows normal spontaneous and phasic flow.  The deep veins in the left calf are not visualized due to patient's body   habitus.    IMPRESSION:    No evidence for deep vein thrombosis from the common femoral to the   popliteal veins bilaterally.      Assessment :   59yo F with pmhx T2DM, Afib on Xarelto, HFpEF, chronic LE venous insufficiency presenting from wound care center with RLE ulcers with leg swelling and erythema. + chills    Admitted with RIght LE cellulitis with lymphangitis- appears to be improving  Hx of MRSA cellulitis  Uncontrolled DM  Cultures NGTD  Clinically stable    Plan :   Off Vanc Cefepime  Po Doxy 100mg bid x 7 days   Trend temps and cbc  Asp precautions  Stable from ID standpoint  Dc planning per primary team    Continue with present regiment.  Appropriate use of antibiotics and adverse effects reviewed.    > 35 minutes were spent in direct patient care reviewing notes, medications ,labs data/ imaging , discussion with multidisciplinary team.    Thank you for allowing me to participate in care of your patient .    Carole Mohr MD  Infectious Disease  [phone]
     MELITON LOZANO is a 58yFemale , patient examined and chart reviewed.    INTERVAL HPI/ OVERNIGHT EVENTS:   Awake. c/o leg pain.  Afebrile.  No events.    PAST MEDICAL & SURGICAL HISTORY:  Atrial fibrillation  Type 2 diabetes mellitus  Congestive heart failure  Scalp psoriasis  S/P tonsillectomy  S/P appendectomy  H/O umbilical hernia repair      For details regarding the patient's social history, family history, and other miscellaneous elements, please refer the initial infectious diseases consultation and/or the admitting history and physical examination for this admission.    ROS:  Unable to obtain due to : poor historian    No Known Allergies    Current inpatient medications :    ANTIBIOTICS/RELEVANT:  cefepime   IVPB      cefepime   IVPB 2000 milliGRAM(s) IV Intermittent every 8 hours  vancomycin  IVPB 1250 milliGRAM(s) IV Intermittent every 12 hours      acetaminophen     Tablet .. 1000 milliGRAM(s) Oral every 8 hours  dextrose 10% Bolus 125 milliLiter(s) IV Bolus once  dextrose 5%. 1000 milliLiter(s) IV Continuous <Continuous>  dextrose 5%. 1000 milliLiter(s) IV Continuous <Continuous>  dextrose 50% Injectable 12.5 Gram(s) IV Push once  dextrose 50% Injectable 25 Gram(s) IV Push once  dextrose Oral Gel 15 Gram(s) Oral once PRN  DULoxetine 60 milliGRAM(s) Oral daily  gabapentin 300 milliGRAM(s) Oral every 8 hours  glucagon  Injectable 1 milliGRAM(s) IntraMuscular once  insulin glargine Injectable (LANTUS) 40 Unit(s) SubCutaneous at bedtime  insulin lispro (ADMELOG) corrective regimen sliding scale   SubCutaneous at bedtime  insulin lispro (ADMELOG) corrective regimen sliding scale   SubCutaneous three times a day before meals  insulin lispro Injectable (ADMELOG) 20 Unit(s) SubCutaneous three times a day before meals  lisinopril 2.5 milliGRAM(s) Oral daily  melatonin 3 milliGRAM(s) Oral at bedtime PRN  metoprolol succinate  milliGRAM(s) Oral daily  morphine  - Injectable 1 milliGRAM(s) IV Push every 6 hours PRN  morphine  - Injectable 2 milliGRAM(s) IV Push every 6 hours PRN  oxyCODONE    IR 10 milliGRAM(s) Oral every 6 hours  pantoprazole    Tablet 40 milliGRAM(s) Oral two times a day  polyethylene glycol 3350 17 Gram(s) Oral daily  rivaroxaban 20 milliGRAM(s) Oral with dinner  senna 2 Tablet(s) Oral at bedtime  sucralfate 1 Gram(s) Oral four times a day  tamsulosin 0.4 milliGRAM(s) Oral at bedtime  torsemide 60 milliGRAM(s) Oral daily      Objective:    04-18 @ 07:01  -  04-19 @ 07:00  --------------------------------------------------------  IN: 600 mL / OUT: 0 mL / NET: 600 mL    04-19 @ 07:01  -  04-19 @ 16:51  --------------------------------------------------------  IN: 360 mL / OUT: 0 mL / NET: 360 mL      T(C): 36.8 (04-19-24 @ 11:43), Max: 36.8 (04-19-24 @ 11:43)  HR: 86 (04-19-24 @ 11:43) (86 - 89)  BP: 104/66 (04-19-24 @ 11:43) (104/66 - 142/83)  RR: 18 (04-19-24 @ 11:43) (18 - 18)  SpO2: 95% (04-19-24 @ 11:43) (93% - 95%)      Physical Exam:  General: no acute distress  Neck: supple, trachea midline  Lungs: clear, no wheeze/rhonchi  Cardiovascular: regular rate and rhythm, S1 S2  Abdomen: soft, nontender,  bowel sounds normal  Neurological: awake  Skin: no rash  Extremities: Right LE erythema better  + ulcer      LABS:                        11.9   7.05  )-----------( 203      ( 19 Apr 2024 07:15 )             35.3       04-19    136  |  99  |  26<H>  ----------------------------<  317<H>  3.9   |  30  |  0.88    Ca    8.3<L>      19 Apr 2024 07:15  Phos  3.8     04-17  Mg     2.1     04-17    TPro  6.6  /  Alb  2.8<L>  /  TBili  0.6  /  DBili  x   /  AST  16  /  ALT  21  /  AlkPhos  94  04-19      Vancomycin Level, Trough: 16.6 ug/mL (04-18 @ 21:40)      MICROBIOLOGY:  Culture - Blood (collected 17 Apr 2024 21:15)  Source: .Blood Blood-Peripheral  Preliminary Report (19 Apr 2024 03:02):    No growth at 24 hours    Culture - Blood (collected 17 Apr 2024 21:05)  Source: .Blood Blood-Venous  Preliminary Report (19 Apr 2024 03:02):    No growth at 24 hours    RADIOLOGY & ADDITIONAL STUDIES:  ACC: [id number] EXAM:  US DPLX LWR EXT VEINS COMPL BI   ORDERED BY: NAMRATA HOUSER     PROCEDURE DATE:  04/18/2024          INTERPRETATION:  CLINICAL INFORMATION: Bilateral leg swelling and pain    COMPARISON: 3/1/2024    TECHNIQUE: Duplex sonography of the BILATERAL LOWER extremity veins with   color and spectral Doppler, with and without compression.    FINDINGS:    RIGHT:  Normal compressibility of the RIGHT common femoral, femoral and popliteal   veins.  Doppler examination shows normal spontaneous and phasic flow.  The deep veins in the right calf are not visualized due to patient body   habitus.    LEFT:  Normal compressibility of the LEFT common femoral, femoral and popliteal   veins.  Doppler examination shows normal spontaneous and phasic flow.  The deep veins in the left calf are not visualized due to patient's body   habitus.    IMPRESSION:    No evidence for deep vein thrombosis from the common femoral to the   popliteal veins bilaterally.      Assessment :   59yo F with pmhx T2DM, Afib on Xarelto, HFpEF, chronic LE venous insufficiency presenting from wound care center with RLE ulcers with leg swelling and erythema. + chills    Admitted with RIght LE cellulitis with lymphangitis- appears to be improving  Hx of MRSA cellulitis  Uncontrolled DM    Plan :   Cont Vanc Cefepime  Fu cultures  Trend temps and cbc  Get wound culture if poss  Asp precautions  Stable from ID standpoint    Continue with present regiment.  Appropriate use of antibiotics and adverse effects reviewed.    > 35 minutes were spent in direct patient care reviewing notes, medications ,labs data/ imaging , discussion with multidisciplinary team.    Thank you for allowing me to participate in care of your patient .    Carole Mohr MD  Infectious Disease  [phone]
     MELITON LOZANO is a 58yFemale , patient examined and chart reviewed.    INTERVAL HPI/ OVERNIGHT EVENTS:   No events. Afebrile.    PAST MEDICAL & SURGICAL HISTORY:  Atrial fibrillation  Type 2 diabetes mellitus  Congestive heart failure  Scalp psoriasis  S/P tonsillectomy  S/P appendectomy  H/O umbilical hernia repair      For details regarding the patient's social history, family history, and other miscellaneous elements, please refer the initial infectious diseases consultation and/or the admitting history and physical examination for this admission.    ROS:  Unable to obtain due to : poor historian    No Known Allergies    Current inpatient medications :    ANTIBIOTICS/RELEVANT:  cefepime   IVPB      cefepime   IVPB 2000 milliGRAM(s) IV Intermittent every 8 hours  vancomycin  IVPB 1250 milliGRAM(s) IV Intermittent every 12 hours    MEDICATIONS  (STANDING):  acetaminophen     Tablet .. 1000 milliGRAM(s) Oral every 8 hours  dextrose 10% Bolus 125 milliLiter(s) IV Bolus once  dextrose 5%. 1000 milliLiter(s) (100 mL/Hr) IV Continuous <Continuous>  dextrose 5%. 1000 milliLiter(s) (50 mL/Hr) IV Continuous <Continuous>  dextrose 50% Injectable 25 Gram(s) IV Push once  dextrose 50% Injectable 12.5 Gram(s) IV Push once  DULoxetine 60 milliGRAM(s) Oral daily  gabapentin 300 milliGRAM(s) Oral every 8 hours  glucagon  Injectable 1 milliGRAM(s) IntraMuscular once  insulin glargine Injectable (LANTUS) 40 Unit(s) SubCutaneous at bedtime  insulin lispro (ADMELOG) corrective regimen sliding scale   SubCutaneous three times a day before meals  insulin lispro (ADMELOG) corrective regimen sliding scale   SubCutaneous at bedtime  insulin lispro Injectable (ADMELOG) 20 Unit(s) SubCutaneous three times a day before meals  lisinopril 2.5 milliGRAM(s) Oral daily  metoprolol succinate  milliGRAM(s) Oral daily  oxyCODONE    IR 10 milliGRAM(s) Oral every 6 hours  pantoprazole    Tablet 40 milliGRAM(s) Oral two times a day  polyethylene glycol 3350 17 Gram(s) Oral daily  rivaroxaban 20 milliGRAM(s) Oral with dinner  senna 2 Tablet(s) Oral at bedtime  sucralfate 1 Gram(s) Oral four times a day  tamsulosin 0.4 milliGRAM(s) Oral at bedtime  torsemide 60 milliGRAM(s) Oral daily    MEDICATIONS  (PRN):  dextrose Oral Gel 15 Gram(s) Oral once PRN Blood Glucose LESS THAN 70 milliGRAM(s)/deciliter  melatonin 3 milliGRAM(s) Oral at bedtime PRN Insomnia  morphine  - Injectable 1 milliGRAM(s) IV Push every 6 hours PRN Moderate Pain (4 - 6)  morphine  - Injectable 2 milliGRAM(s) IV Push every 6 hours PRN Severe Pain (7 - 10)      Objective:  Vital Signs Last 24 Hrs  T(C): 36.5 (21 Apr 2024 12:22), Max: 36.7 (20 Apr 2024 20:38)  T(F): 97.7 (21 Apr 2024 12:22), Max: 98.1 (20 Apr 2024 20:38)  HR: 74 (21 Apr 2024 12:22) (74 - 81)  BP: 102/66 (21 Apr 2024 12:22) (102/66 - 111/67)  RR: 20 (21 Apr 2024 12:22) (18 - 20)  SpO2: 98% (21 Apr 2024 12:22) (98% - 99%)    O2 Parameters below as of 21 Apr 2024 12:22  Patient On (Oxygen Delivery Method): room air      Physical Exam:  General: no acute distress  Neck: supple, trachea midline  Lungs: clear, no wheeze/rhonchi  Cardiovascular: regular rate and rhythm, S1 S2  Abdomen: soft, nontender,  bowel sounds normal  Neurological: awake  Skin: no rash  Extremities: Right LE erythema better  + ulcer      LABS:                                 13.6   5.40  )-----------( 200      ( 21 Apr 2024 08:10 )             41.6   04-21    141  |  103  |  34<H>  ----------------------------<  219<H>  4.3   |  32<H>  |  0.92    Ca    9.3      21 Apr 2024 08:10    TPro  7.8  /  Alb  3.2<L>  /  TBili  0.6  /  DBili  x   /  AST  20  /  ALT  23  /  AlkPhos  105  04-21    Vancomycin Level, Trough (04.21.24 @ 08:10)    Vancomycin Level, Trough: 20    MICROBIOLOGY:    Culture - Blood (collected 17 Apr 2024 21:15)  Source: .Blood Blood-Peripheral  Preliminary Report (20 Apr 2024 03:02):    No growth at 48 Hours    Culture - Blood (collected 17 Apr 2024 21:05)  Source: .Blood Blood-Venous  Preliminary Report (20 Apr 2024 03:02):    No growth at 48 Hours      RADIOLOGY & ADDITIONAL STUDIES:  ACC: [id number] EXAM:  US DPLX LWR EXT VEINS COMPL BI   ORDERED BY: NAMRATA HOUSER     PROCEDURE DATE:  04/18/2024          INTERPRETATION:  CLINICAL INFORMATION: Bilateral leg swelling and pain    COMPARISON: 3/1/2024    TECHNIQUE: Duplex sonography of the BILATERAL LOWER extremity veins with   color and spectral Doppler, with and without compression.    FINDINGS:    RIGHT:  Normal compressibility of the RIGHT common femoral, femoral and popliteal   veins.  Doppler examination shows normal spontaneous and phasic flow.  The deep veins in the right calf are not visualized due to patient body   habitus.    LEFT:  Normal compressibility of the LEFT common femoral, femoral and popliteal   veins.  Doppler examination shows normal spontaneous and phasic flow.  The deep veins in the left calf are not visualized due to patient's body   habitus.    IMPRESSION:    No evidence for deep vein thrombosis from the common femoral to the   popliteal veins bilaterally.      Assessment :   57yo F with pmhx T2DM, Afib on Xarelto, HFpEF, chronic LE venous insufficiency presenting from wound care center with RLE ulcers with leg swelling and erythema. + chills    Admitted with RIght LE cellulitis with lymphangitis- appears to be improving  Hx of MRSA cellulitis  Uncontrolled DM  Cultures NGTD    Plan :   On Vanc Cefepime  West to po Doxy 100mg bid x 7 days tmrw   Trend temps and cbc  Asp precautions  Stable from ID standpoint  Dc planning per primary team    Continue with present regiment.  Appropriate use of antibiotics and adverse effects reviewed.    > 35 minutes were spent in direct patient care reviewing notes, medications ,labs data/ imaging , discussion with multidisciplinary team.    Thank you for allowing me to participate in care of your patient .    Carole Mohr MD  Infectious Disease  [phone]
[facility]  Jarett Stein PA-C  [address] [place] [address]  [phone]      INTERVAL HPI/OVERNIGHT EVENTS:  Pt s/e  +chronic abdominal discomfort    MEDICATIONS  (STANDING):  acetaminophen     Tablet .. 1000 milliGRAM(s) Oral every 8 hours  cefepime   IVPB 2000 milliGRAM(s) IV Intermittent every 8 hours  cefepime   IVPB      dextrose 10% Bolus 125 milliLiter(s) IV Bolus once  dextrose 5%. 1000 milliLiter(s) (100 mL/Hr) IV Continuous <Continuous>  dextrose 5%. 1000 milliLiter(s) (50 mL/Hr) IV Continuous <Continuous>  dextrose 50% Injectable 12.5 Gram(s) IV Push once  dextrose 50% Injectable 25 Gram(s) IV Push once  DULoxetine 60 milliGRAM(s) Oral daily  gabapentin 300 milliGRAM(s) Oral every 8 hours  glucagon  Injectable 1 milliGRAM(s) IntraMuscular once  insulin glargine Injectable (LANTUS) 40 Unit(s) SubCutaneous at bedtime  insulin lispro (ADMELOG) corrective regimen sliding scale   SubCutaneous three times a day before meals  insulin lispro (ADMELOG) corrective regimen sliding scale   SubCutaneous at bedtime  insulin lispro Injectable (ADMELOG) 20 Unit(s) SubCutaneous three times a day before meals  lisinopril 2.5 milliGRAM(s) Oral daily  metoprolol succinate  milliGRAM(s) Oral daily  oxyCODONE    IR 10 milliGRAM(s) Oral every 6 hours  pantoprazole    Tablet 40 milliGRAM(s) Oral two times a day  polyethylene glycol 3350 17 Gram(s) Oral daily  rivaroxaban 20 milliGRAM(s) Oral with dinner  senna 2 Tablet(s) Oral at bedtime  sucralfate 1 Gram(s) Oral four times a day  tamsulosin 0.4 milliGRAM(s) Oral at bedtime  torsemide 60 milliGRAM(s) Oral daily  vancomycin  IVPB 1250 milliGRAM(s) IV Intermittent every 12 hours    MEDICATIONS  (PRN):  dextrose Oral Gel 15 Gram(s) Oral once PRN Blood Glucose LESS THAN 70 milliGRAM(s)/deciliter  melatonin 3 milliGRAM(s) Oral at bedtime PRN Insomnia  morphine  - Injectable 1 milliGRAM(s) IV Push every 6 hours PRN Moderate Pain (4 - 6)  morphine  - Injectable 2 milliGRAM(s) IV Push every 6 hours PRN Severe Pain (7 - 10)      Allergies    No Known Allergies      PHYSICAL EXAM:   Vital Signs Last 24 Hrs  T(C): 36.2 (20 Apr 2024 05:40), Max: 36.4 (19 Apr 2024 20:07)  T(F): 97.2 (20 Apr 2024 05:40), Max: 97.6 (19 Apr 2024 20:07)  HR: 79 (20 Apr 2024 05:40) (79 - 88)  BP: 125/77 (20 Apr 2024 05:40) (120/81 - 125/77)  BP(mean): --  RR: 20 (20 Apr 2024 05:40) (18 - 20)  SpO2: 98% (20 Apr 2024 05:40) (97% - 98%)    Parameters below as of 20 Apr 2024 05:40  Patient On (Oxygen Delivery Method): room air      GENERAL:  Appears stated age  HEENT:  NC/AT  CHEST:  Full & symmetric excursion  HEART:  Regular rhythm  ABDOMEN:  Soft, non-tender, non-distended  EXTEREMITIES:  no cyanosis  SKIN:  No rash  NEURO:  Alert      LABS:                        13.1   6.36  )-----------( 215      ( 20 Apr 2024 07:45 )             39.2     04-20    139  |  103  |  29<H>  ----------------------------<  221<H>  4.5   |  31  |  0.89    Ca    9.4      20 Apr 2024 07:45    TPro  7.8  /  Alb  3.1<L>  /  TBili  0.6  /  DBili  x   /  AST  19  /  ALT  24  /  AlkPhos  100  04-20          TPro  6.6  /  Alb  2.8<L>  /  TBili  0.6  /  DBili  x   /  AST  16  /  ALT  21  /  AlkPhos  94  04-19      Urinalysis Basic - ( 19 Apr 2024 07:15 )    Color: x / Appearance: x / SG: x / pH: x  Gluc: 317 mg/dL / Ketone: x  / Bili: x / Urobili: x   Blood: x / Protein: x / Nitrite: x   Leuk Esterase: x / RBC: x / WBC x   Sq Epi: x / Non Sq Epi: x / Bacteria: x  
[facility]  Jarett Stein PA-C  [address] [place] [address]  [phone]      INTERVAL HPI/OVERNIGHT EVENTS:  Pt s/e, reports having rectal bleeding last night, blood noted on tissue when she wiped, hgb stable  +chronic abdominal discomfort    MEDICATIONS  (STANDING):  acetaminophen     Tablet .. 1000 milliGRAM(s) Oral every 8 hours  cefepime   IVPB 2000 milliGRAM(s) IV Intermittent every 8 hours  cefepime   IVPB      dextrose 10% Bolus 125 milliLiter(s) IV Bolus once  dextrose 5%. 1000 milliLiter(s) (100 mL/Hr) IV Continuous <Continuous>  dextrose 5%. 1000 milliLiter(s) (50 mL/Hr) IV Continuous <Continuous>  dextrose 50% Injectable 12.5 Gram(s) IV Push once  dextrose 50% Injectable 25 Gram(s) IV Push once  DULoxetine 60 milliGRAM(s) Oral daily  gabapentin 300 milliGRAM(s) Oral every 8 hours  glucagon  Injectable 1 milliGRAM(s) IntraMuscular once  insulin glargine Injectable (LANTUS) 40 Unit(s) SubCutaneous at bedtime  insulin lispro (ADMELOG) corrective regimen sliding scale   SubCutaneous three times a day before meals  insulin lispro (ADMELOG) corrective regimen sliding scale   SubCutaneous at bedtime  insulin lispro Injectable (ADMELOG) 20 Unit(s) SubCutaneous three times a day before meals  lisinopril 2.5 milliGRAM(s) Oral daily  metoprolol succinate  milliGRAM(s) Oral daily  oxyCODONE    IR 10 milliGRAM(s) Oral every 6 hours  pantoprazole    Tablet 40 milliGRAM(s) Oral two times a day  polyethylene glycol 3350 17 Gram(s) Oral daily  rivaroxaban 20 milliGRAM(s) Oral with dinner  senna 2 Tablet(s) Oral at bedtime  sucralfate 1 Gram(s) Oral four times a day  tamsulosin 0.4 milliGRAM(s) Oral at bedtime  torsemide 60 milliGRAM(s) Oral daily  vancomycin  IVPB 1250 milliGRAM(s) IV Intermittent every 12 hours    MEDICATIONS  (PRN):  dextrose Oral Gel 15 Gram(s) Oral once PRN Blood Glucose LESS THAN 70 milliGRAM(s)/deciliter  melatonin 3 milliGRAM(s) Oral at bedtime PRN Insomnia  morphine  - Injectable 1 milliGRAM(s) IV Push every 6 hours PRN Moderate Pain (4 - 6)  morphine  - Injectable 2 milliGRAM(s) IV Push every 6 hours PRN Severe Pain (7 - 10)      Allergies    No Known Allergies      PHYSICAL EXAM:   Vital Signs Last 24 Hrs  T(C): 36.6 (21 Apr 2024 05:13), Max: 36.7 (20 Apr 2024 20:38)  T(F): 97.8 (21 Apr 2024 05:13), Max: 98.1 (20 Apr 2024 20:38)  HR: 81 (21 Apr 2024 05:13) (74 - 81)  BP: 111/67 (21 Apr 2024 05:13) (98/66 - 111/67)  BP(mean): --  RR: 20 (21 Apr 2024 05:13) (18 - 20)  SpO2: 99% (21 Apr 2024 05:13) (96% - 99%)    Parameters below as of 21 Apr 2024 05:13  Patient On (Oxygen Delivery Method): room air    GENERAL:  Appears stated age  HEENT:  NC/AT  CHEST:  Full & symmetric excursion  HEART:  Regular rhythm  ABDOMEN:  Soft, non-tender, non-distended  EXTEREMITIES:  no cyanosis  SKIN:  No rash  NEURO:  Alert      LABS:                        13.6   5.40  )-----------( 200      ( 21 Apr 2024 08:10 )             41.6     04-21    141  |  103  |  34<H>  ----------------------------<  219<H>  4.3   |  32<H>  |  0.92    Ca    9.3      21 Apr 2024 08:10    TPro  7.8  /  Alb  3.2<L>  /  TBili  0.6  /  DBili  x   /  AST  20  /  ALT  23  /  AlkPhos  105  04-20          Urinalysis Basic - ( 19 Apr 2024 07:15 )    Color: x / Appearance: x / SG: x / pH: x  Gluc: 317 mg/dL / Ketone: x  / Bili: x / Urobili: x   Blood: x / Protein: x / Nitrite: x   Leuk Esterase: x / RBC: x / WBC x   Sq Epi: x / Non Sq Epi: x / Bacteria: x  
[facility]  Jarett Stein PA-C  [address] [place] [address]  [phone]      INTERVAL HPI/OVERNIGHT EVENTS:  Pt s/e  +chronic abdominal discomfort    MEDICATIONS  (STANDING):  acetaminophen     Tablet .. 1000 milliGRAM(s) Oral every 8 hours  cefepime   IVPB 2000 milliGRAM(s) IV Intermittent every 8 hours  cefepime   IVPB      dextrose 10% Bolus 125 milliLiter(s) IV Bolus once  dextrose 5%. 1000 milliLiter(s) (100 mL/Hr) IV Continuous <Continuous>  dextrose 5%. 1000 milliLiter(s) (50 mL/Hr) IV Continuous <Continuous>  dextrose 50% Injectable 12.5 Gram(s) IV Push once  dextrose 50% Injectable 25 Gram(s) IV Push once  DULoxetine 60 milliGRAM(s) Oral daily  gabapentin 300 milliGRAM(s) Oral every 8 hours  glucagon  Injectable 1 milliGRAM(s) IntraMuscular once  insulin glargine Injectable (LANTUS) 40 Unit(s) SubCutaneous at bedtime  insulin lispro (ADMELOG) corrective regimen sliding scale   SubCutaneous three times a day before meals  insulin lispro (ADMELOG) corrective regimen sliding scale   SubCutaneous at bedtime  insulin lispro Injectable (ADMELOG) 20 Unit(s) SubCutaneous three times a day before meals  lisinopril 2.5 milliGRAM(s) Oral daily  metoprolol succinate  milliGRAM(s) Oral daily  oxyCODONE    IR 10 milliGRAM(s) Oral every 6 hours  pantoprazole    Tablet 40 milliGRAM(s) Oral two times a day  polyethylene glycol 3350 17 Gram(s) Oral daily  rivaroxaban 20 milliGRAM(s) Oral with dinner  senna 2 Tablet(s) Oral at bedtime  sucralfate 1 Gram(s) Oral four times a day  tamsulosin 0.4 milliGRAM(s) Oral at bedtime  torsemide 60 milliGRAM(s) Oral daily  vancomycin  IVPB 1250 milliGRAM(s) IV Intermittent every 12 hours    MEDICATIONS  (PRN):  dextrose Oral Gel 15 Gram(s) Oral once PRN Blood Glucose LESS THAN 70 milliGRAM(s)/deciliter  melatonin 3 milliGRAM(s) Oral at bedtime PRN Insomnia  morphine  - Injectable 1 milliGRAM(s) IV Push every 6 hours PRN Moderate Pain (4 - 6)  morphine  - Injectable 2 milliGRAM(s) IV Push every 6 hours PRN Severe Pain (7 - 10)      Allergies    No Known Allergies      PHYSICAL EXAM:   Vital Signs:  Vital Signs Last 24 Hrs  T(C): 36.8 (19 Apr 2024 11:43), Max: 36.8 (19 Apr 2024 11:43)  T(F): 98.3 (19 Apr 2024 11:43), Max: 98.3 (19 Apr 2024 11:43)  HR: 86 (19 Apr 2024 11:43) (86 - 89)  BP: 104/66 (19 Apr 2024 11:43) (104/66 - 142/83)  BP(mean): --  RR: 18 (19 Apr 2024 11:43) (18 - 18)  SpO2: 95% (19 Apr 2024 11:43) (93% - 95%)    Parameters below as of 19 Apr 2024 11:43  Patient On (Oxygen Delivery Method): room air      GENERAL:  Appears stated age  HEENT:  NC/AT  CHEST:  Full & symmetric excursion  HEART:  Regular rhythm  ABDOMEN:  Soft, non-tender, non-distended  EXTEREMITIES:  no cyanosis  SKIN:  No rash  NEURO:  Alert      LABS:                        11.9   7.05  )-----------( 203      ( 19 Apr 2024 07:15 )             35.3     04-19    136  |  99  |  26<H>  ----------------------------<  317<H>  3.9   |  30  |  0.88    Ca    8.3<L>      19 Apr 2024 07:15  Phos  3.8     04-17  Mg     2.1     04-17    TPro  6.6  /  Alb  2.8<L>  /  TBili  0.6  /  DBili  x   /  AST  16  /  ALT  21  /  AlkPhos  94  04-19      Urinalysis Basic - ( 19 Apr 2024 07:15 )    Color: x / Appearance: x / SG: x / pH: x  Gluc: 317 mg/dL / Ketone: x  / Bili: x / Urobili: x   Blood: x / Protein: x / Nitrite: x   Leuk Esterase: x / RBC: x / WBC x   Sq Epi: x / Non Sq Epi: x / Bacteria: x  
Patient is a 58y old  Female who presents with a chief complaint of RLE cellulitis (19 Apr 2024 12:12)      INTERVAL HPI/OVERNIGHT EVENTS: Patient seen and examined at bedside. No overnight events. Tolerating diet. Denies fever, chills, chest pain, shortness of breath, abdominal pain, nausea/vomiting, headache.    MEDICATIONS  (STANDING):  acetaminophen     Tablet .. 1000 milliGRAM(s) Oral every 8 hours  cefepime   IVPB      cefepime   IVPB 2000 milliGRAM(s) IV Intermittent every 8 hours  dextrose 10% Bolus 125 milliLiter(s) IV Bolus once  dextrose 5%. 1000 milliLiter(s) (100 mL/Hr) IV Continuous <Continuous>  dextrose 5%. 1000 milliLiter(s) (50 mL/Hr) IV Continuous <Continuous>  dextrose 50% Injectable 12.5 Gram(s) IV Push once  dextrose 50% Injectable 25 Gram(s) IV Push once  DULoxetine 60 milliGRAM(s) Oral daily  gabapentin 300 milliGRAM(s) Oral every 8 hours  glucagon  Injectable 1 milliGRAM(s) IntraMuscular once  insulin glargine Injectable (LANTUS) 40 Unit(s) SubCutaneous at bedtime  insulin lispro (ADMELOG) corrective regimen sliding scale   SubCutaneous at bedtime  insulin lispro (ADMELOG) corrective regimen sliding scale   SubCutaneous three times a day before meals  insulin lispro Injectable (ADMELOG) 20 Unit(s) SubCutaneous three times a day before meals  lisinopril 2.5 milliGRAM(s) Oral daily  metoprolol succinate  milliGRAM(s) Oral daily  oxyCODONE    IR 10 milliGRAM(s) Oral every 6 hours  pantoprazole    Tablet 40 milliGRAM(s) Oral two times a day  polyethylene glycol 3350 17 Gram(s) Oral daily  rivaroxaban 20 milliGRAM(s) Oral with dinner  senna 2 Tablet(s) Oral at bedtime  sucralfate 1 Gram(s) Oral four times a day  tamsulosin 0.4 milliGRAM(s) Oral at bedtime  torsemide 60 milliGRAM(s) Oral daily  vancomycin  IVPB 1250 milliGRAM(s) IV Intermittent every 12 hours    MEDICATIONS  (PRN):  dextrose Oral Gel 15 Gram(s) Oral once PRN Blood Glucose LESS THAN 70 milliGRAM(s)/deciliter  melatonin 3 milliGRAM(s) Oral at bedtime PRN Insomnia  morphine  - Injectable 1 milliGRAM(s) IV Push every 6 hours PRN Moderate Pain (4 - 6)  morphine  - Injectable 2 milliGRAM(s) IV Push every 6 hours PRN Severe Pain (7 - 10)      Allergies    No Known Allergies    Intolerances        REVIEW OF SYSTEMS:  CONSTITUTIONAL: No fever or chills  HEENT:  No headache, no sore throat  RESPIRATORY: No cough, wheezing, or shortness of breath  CARDIOVASCULAR: No chest pain, palpitations  GASTROINTESTINAL: No abd pain, nausea, vomiting, or diarrhea  GENITOURINARY: No dysuria, frequency, or hematuria  NEUROLOGICAL: no focal weakness or dizziness  MUSCULOSKELETAL: no myalgias     Vital Signs Last 24 Hrs  T(C): 36.8 (19 Apr 2024 11:43), Max: 36.8 (19 Apr 2024 11:43)  T(F): 98.3 (19 Apr 2024 11:43), Max: 98.3 (19 Apr 2024 11:43)  HR: 86 (19 Apr 2024 11:43) (86 - 89)  BP: 104/66 (19 Apr 2024 11:43) (104/66 - 142/83)  BP(mean): --  RR: 18 (19 Apr 2024 11:43) (18 - 18)  SpO2: 95% (19 Apr 2024 11:43) (93% - 95%)    Parameters below as of 19 Apr 2024 11:43  Patient On (Oxygen Delivery Method): room air        PHYSICAL EXAM:  GENERAL: NAD  HEENT:  anicteric, moist mucous membranes  CHEST/LUNG:  CTA b/l, no rales, wheezes, or rhonchi  HEART:  RRR, S1, S2  ABDOMEN:  BS+, soft, nontender, nondistended  EXTREMITIES: no edema, cyanosis, or calf tenderness  NERVOUS SYSTEM: answers questions and follows commands appropriately    LABS:                        11.9   7.05  )-----------( 203      ( 19 Apr 2024 07:15 )             35.3     CBC Full  -  ( 19 Apr 2024 07:15 )  WBC Count : 7.05 K/uL  Hemoglobin : 11.9 g/dL  Hematocrit : 35.3 %  Platelet Count - Automated : 203 K/uL  Mean Cell Volume : 90.7 fl  Mean Cell Hemoglobin : 30.6 pg  Mean Cell Hemoglobin Concentration : 33.7 gm/dL  Auto Neutrophil # : 4.59 K/uL  Auto Lymphocyte # : 1.78 K/uL  Auto Monocyte # : 0.49 K/uL  Auto Eosinophil # : 0.13 K/uL  Auto Basophil # : 0.03 K/uL  Auto Neutrophil % : 65.2 %  Auto Lymphocyte % : 25.2 %  Auto Monocyte % : 7.0 %  Auto Eosinophil % : 1.8 %  Auto Basophil % : 0.4 %    19 Apr 2024 07:15    136    |  99     |  26     ----------------------------<  317    3.9     |  30     |  0.88     Ca    8.3        19 Apr 2024 07:15    TPro  6.6    /  Alb  2.8    /  TBili  0.6    /  DBili  x      /  AST  16     /  ALT  21     /  AlkPhos  94     19 Apr 2024 07:15      Urinalysis Basic - ( 19 Apr 2024 07:15 )    Color: x / Appearance: x / SG: x / pH: x  Gluc: 317 mg/dL / Ketone: x  / Bili: x / Urobili: x   Blood: x / Protein: x / Nitrite: x   Leuk Esterase: x / RBC: x / WBC x   Sq Epi: x / Non Sq Epi: x / Bacteria: x      CAPILLARY BLOOD GLUCOSE      POCT Blood Glucose.: 246 mg/dL (19 Apr 2024 11:33)  POCT Blood Glucose.: 285 mg/dL (19 Apr 2024 07:33)  POCT Blood Glucose.: 215 mg/dL (18 Apr 2024 20:56)  POCT Blood Glucose.: 271 mg/dL (18 Apr 2024 16:54)        Culture - Blood (collected 04-17-24 @ 21:15)  Source: .Blood Blood-Peripheral  Preliminary Report (04-19-24 @ 03:02):    No growth at 24 hours    Culture - Blood (collected 04-17-24 @ 21:05)  Source: .Blood Blood-Venous  Preliminary Report (04-19-24 @ 03:02):    No growth at 24 hours        RADIOLOGY & ADDITIONAL TESTS:    Personally reviewed.     Consultant(s) Notes Reviewed:  [x] YES  [ ] NO    
Patient is a 58y old  Female who presents with a chief complaint of RLE cellulitis (20 Apr 2024 15:53)      INTERVAL HPI/OVERNIGHT EVENTS: Patient seen and examined at bedside. No overnight events. Tolerating diet. Denies fever, chills, chest pain, shortness of breath, abdominal pain, nausea/vomiting, headache.    MEDICATIONS  (STANDING):  acetaminophen     Tablet .. 1000 milliGRAM(s) Oral every 8 hours  cefepime   IVPB      cefepime   IVPB 2000 milliGRAM(s) IV Intermittent every 8 hours  dextrose 10% Bolus 125 milliLiter(s) IV Bolus once  dextrose 5%. 1000 milliLiter(s) (100 mL/Hr) IV Continuous <Continuous>  dextrose 5%. 1000 milliLiter(s) (50 mL/Hr) IV Continuous <Continuous>  dextrose 50% Injectable 25 Gram(s) IV Push once  dextrose 50% Injectable 12.5 Gram(s) IV Push once  DULoxetine 60 milliGRAM(s) Oral daily  gabapentin 300 milliGRAM(s) Oral every 8 hours  glucagon  Injectable 1 milliGRAM(s) IntraMuscular once  insulin glargine Injectable (LANTUS) 40 Unit(s) SubCutaneous at bedtime  insulin lispro (ADMELOG) corrective regimen sliding scale   SubCutaneous at bedtime  insulin lispro (ADMELOG) corrective regimen sliding scale   SubCutaneous three times a day before meals  insulin lispro Injectable (ADMELOG) 20 Unit(s) SubCutaneous three times a day before meals  lisinopril 2.5 milliGRAM(s) Oral daily  metoprolol succinate  milliGRAM(s) Oral daily  oxyCODONE    IR 10 milliGRAM(s) Oral every 6 hours  pantoprazole    Tablet 40 milliGRAM(s) Oral two times a day  polyethylene glycol 3350 17 Gram(s) Oral daily  rivaroxaban 20 milliGRAM(s) Oral with dinner  senna 2 Tablet(s) Oral at bedtime  sucralfate 1 Gram(s) Oral four times a day  tamsulosin 0.4 milliGRAM(s) Oral at bedtime  torsemide 60 milliGRAM(s) Oral daily  vancomycin  IVPB 1250 milliGRAM(s) IV Intermittent every 12 hours    MEDICATIONS  (PRN):  dextrose Oral Gel 15 Gram(s) Oral once PRN Blood Glucose LESS THAN 70 milliGRAM(s)/deciliter  melatonin 3 milliGRAM(s) Oral at bedtime PRN Insomnia  morphine  - Injectable 2 milliGRAM(s) IV Push every 6 hours PRN Severe Pain (7 - 10)  morphine  - Injectable 1 milliGRAM(s) IV Push every 6 hours PRN Moderate Pain (4 - 6)      Allergies    No Known Allergies    Intolerances        REVIEW OF SYSTEMS:  CONSTITUTIONAL: No fever or chills  HEENT:  No headache, no sore throat  RESPIRATORY: No cough, wheezing, or shortness of breath  CARDIOVASCULAR: No chest pain, palpitations  GASTROINTESTINAL: No abd pain, nausea, vomiting, or diarrhea  GENITOURINARY: No dysuria, frequency, or hematuria  NEUROLOGICAL: no focal weakness or dizziness  MUSCULOSKELETAL: no myalgias     Vital Signs Last 24 Hrs  T(C): 36.6 (21 Apr 2024 05:13), Max: 36.7 (20 Apr 2024 20:38)  T(F): 97.8 (21 Apr 2024 05:13), Max: 98.1 (20 Apr 2024 20:38)  HR: 81 (21 Apr 2024 05:13) (74 - 81)  BP: 111/67 (21 Apr 2024 05:13) (98/66 - 111/67)  BP(mean): --  RR: 20 (21 Apr 2024 05:13) (18 - 20)  SpO2: 99% (21 Apr 2024 05:13) (96% - 99%)    Parameters below as of 21 Apr 2024 05:13  Patient On (Oxygen Delivery Method): room air        PHYSICAL EXAM:  GENERAL: NAD  HEENT:  anicteric, moist mucous membranes  CHEST/LUNG:  CTA b/l, no rales, wheezes, or rhonchi  HEART:  RRR, S1, S2  ABDOMEN:  BS+, soft, nontender, nondistended  EXTREMITIES: no edema, cyanosis, or calf tenderness  NERVOUS SYSTEM: answers questions and follows commands appropriately    LABS:    CBC Full  -  ( 20 Apr 2024 07:45 )  WBC Count : 6.36 K/uL  Hemoglobin : 13.1 g/dL  Hematocrit : 39.2 %  Platelet Count - Automated : 215 K/uL  Mean Cell Volume : 91.2 fl  Mean Cell Hemoglobin : 30.5 pg  Mean Cell Hemoglobin Concentration : 33.4 gm/dL  Auto Neutrophil # : 4.47 K/uL  Auto Lymphocyte # : 1.28 K/uL  Auto Monocyte # : 0.42 K/uL  Auto Eosinophil # : 0.13 K/uL  Auto Basophil # : 0.03 K/uL  Auto Neutrophil % : 70.3 %  Auto Lymphocyte % : 20.1 %  Auto Monocyte % : 6.6 %  Auto Eosinophil % : 2.0 %  Auto Basophil % : 0.5 %      Ca    9.4        20 Apr 2024 07:45        Urinalysis Basic - ( 20 Apr 2024 07:45 )    Color: x / Appearance: x / SG: x / pH: x  Gluc: 221 mg/dL / Ketone: x  / Bili: x / Urobili: x   Blood: x / Protein: x / Nitrite: x   Leuk Esterase: x / RBC: x / WBC x   Sq Epi: x / Non Sq Epi: x / Bacteria: x      CAPILLARY BLOOD GLUCOSE      POCT Blood Glucose.: 218 mg/dL (21 Apr 2024 07:33)  POCT Blood Glucose.: 128 mg/dL (20 Apr 2024 21:22)  POCT Blood Glucose.: 124 mg/dL (20 Apr 2024 16:47)  POCT Blood Glucose.: 285 mg/dL (20 Apr 2024 11:58)        Culture - Blood (collected 04-17-24 @ 21:15)  Source: .Blood Blood-Peripheral  Preliminary Report (04-21-24 @ 03:01):    No growth at 72 Hours    Culture - Blood (collected 04-17-24 @ 21:05)  Source: .Blood Blood-Venous  Preliminary Report (04-21-24 @ 03:01):    No growth at 72 Hours        RADIOLOGY & ADDITIONAL TESTS:    Personally reviewed.     Consultant(s) Notes Reviewed:  [x] YES  [ ] NO    
INTERVAL HPI/OVERNIGHT EVENTS:  Patient seen and examined at bedside. States she is upset at her current state of health. States she has been gaining weight even though she does not eat much. States anytime she eats, she gets pains throughout her body which makes her lose her appetite. Patient also frustrated with the wounds on her legs that keep getting infected. Patient denies any chest pain, SOB at this time.    ROS: All other review of systems is negative unless indicated above.    MEDICATIONS  (STANDING):  cefepime   IVPB 2000 milliGRAM(s) IV Intermittent every 8 hours  cefepime   IVPB      dextrose 10% Bolus 125 milliLiter(s) IV Bolus once  dextrose 5%. 1000 milliLiter(s) (100 mL/Hr) IV Continuous <Continuous>  dextrose 5%. 1000 milliLiter(s) (50 mL/Hr) IV Continuous <Continuous>  dextrose 50% Injectable 12.5 Gram(s) IV Push once  dextrose 50% Injectable 25 Gram(s) IV Push once  DULoxetine 60 milliGRAM(s) Oral daily  gabapentin 300 milliGRAM(s) Oral every 8 hours  glucagon  Injectable 1 milliGRAM(s) IntraMuscular once  insulin glargine Injectable (LANTUS) 40 Unit(s) SubCutaneous at bedtime  insulin lispro (ADMELOG) corrective regimen sliding scale   SubCutaneous at bedtime  insulin lispro (ADMELOG) corrective regimen sliding scale   SubCutaneous three times a day before meals  insulin lispro Injectable (ADMELOG) 20 Unit(s) SubCutaneous three times a day before meals  lisinopril 2.5 milliGRAM(s) Oral daily  metoprolol succinate  milliGRAM(s) Oral daily  pantoprazole    Tablet 40 milliGRAM(s) Oral two times a day  polyethylene glycol 3350 17 Gram(s) Oral daily  rivaroxaban 20 milliGRAM(s) Oral with dinner  senna 2 Tablet(s) Oral at bedtime  sucralfate 1 Gram(s) Oral four times a day  tamsulosin 0.4 milliGRAM(s) Oral at bedtime  torsemide 60 milliGRAM(s) Oral daily  vancomycin  IVPB 1750 milliGRAM(s) IV Intermittent every 12 hours    MEDICATIONS  (PRN):  acetaminophen     Tablet .. 650 milliGRAM(s) Oral every 6 hours PRN Temp greater or equal to 38C (100.4F), Mild Pain (1 - 3)  dextrose Oral Gel 15 Gram(s) Oral once PRN Blood Glucose LESS THAN 70 milliGRAM(s)/deciliter  melatonin 3 milliGRAM(s) Oral at bedtime PRN Insomnia  morphine  - Injectable 2 milliGRAM(s) IV Push every 4 hours PRN Moderate Pain (4 - 6)  morphine  - Injectable 4 milliGRAM(s) IV Push every 4 hours PRN Severe Pain (7 - 10)      Allergies    No Known Allergies    Intolerances            Vital Signs Last 24 Hrs  T(C): 36.4 (18 Apr 2024 20:27), Max: 36.8 (18 Apr 2024 02:31)  T(F): 97.6 (18 Apr 2024 20:27), Max: 98.2 (18 Apr 2024 02:31)  HR: 89 (18 Apr 2024 20:27) (82 - 110)  BP: 142/83 (18 Apr 2024 20:27) (106/69 - 177/75)  BP(mean): --  RR: 18 (18 Apr 2024 20:27) (17 - 19)  SpO2: 94% (18 Apr 2024 20:27) (93% - 97%)    Parameters below as of 18 Apr 2024 20:27  Patient On (Oxygen Delivery Method): room air        04-18 @ 07:01  -  04-18 @ 20:40  --------------------------------------------------------  IN: 600 mL / OUT: 0 mL / NET: 600 mL    PHYSICAL EXAM:  GENERAL: sitting in chair, tearful, obese, NAD  HEENT: head NC/AT; conjunctiva & sclera clear; hearing grossly intact, moist mucous membranes  NECK: supple, no JVD  RESPIRATORY: CTA B/L, no wheezing, rales, rhonchi or rubs  CARDIOVASCULAR: S1&S2, irregular rhythm, no murmurs or gallops  ABDOMEN: soft, non-tender, +distended, + Bowel sounds, no guarding, rebound or rigidity  MUSCULOSKELETAL:  b/l LE pitting edema and erythema, RLE wound draining  NEUROLOGIC: AA&O X3, no focal deficits    LABS:                        12.9   8.79  )-----------( 221      ( 18 Apr 2024 04:46 )             37.4     04-18    136  |  100  |  22  ----------------------------<  404<H>  4.0   |  29  |  0.87    Ca    9.5      18 Apr 2024 04:46  Phos  3.8     04-17  Mg     2.1     04-17    TPro  7.9  /  Alb  3.5  /  TBili  0.6  /  DBili  x   /  AST  18  /  ALT  27  /  AlkPhos  119  04-18      Urinalysis Basic - ( 18 Apr 2024 04:46 )    Color: x / Appearance: x / SG: x / pH: x  Gluc: 404 mg/dL / Ketone: x  / Bili: x / Urobili: x   Blood: x / Protein: x / Nitrite: x   Leuk Esterase: x / RBC: x / WBC x   Sq Epi: x / Non Sq Epi: x / Bacteria: x        RADIOLOGY & ADDITIONAL TESTS:
Patient is a 58y old  Female who presents with a chief complaint of RLE cellulitis (20 Apr 2024 15:53)      INTERVAL HPI/OVERNIGHT EVENTS: Patient seen and examined at bedside. No overnight events. Tolerating diet. Denies fever, chills, chest pain, shortness of breath, abdominal pain, nausea/vomiting, headache.    MEDICATIONS  (STANDING):  acetaminophen     Tablet .. 1000 milliGRAM(s) Oral every 8 hours  cefepime   IVPB 2000 milliGRAM(s) IV Intermittent every 8 hours  cefepime   IVPB      dextrose 10% Bolus 125 milliLiter(s) IV Bolus once  dextrose 5%. 1000 milliLiter(s) (100 mL/Hr) IV Continuous <Continuous>  dextrose 5%. 1000 milliLiter(s) (50 mL/Hr) IV Continuous <Continuous>  dextrose 50% Injectable 12.5 Gram(s) IV Push once  dextrose 50% Injectable 25 Gram(s) IV Push once  DULoxetine 60 milliGRAM(s) Oral daily  gabapentin 300 milliGRAM(s) Oral every 8 hours  glucagon  Injectable 1 milliGRAM(s) IntraMuscular once  insulin glargine Injectable (LANTUS) 40 Unit(s) SubCutaneous at bedtime  insulin lispro (ADMELOG) corrective regimen sliding scale   SubCutaneous three times a day before meals  insulin lispro (ADMELOG) corrective regimen sliding scale   SubCutaneous at bedtime  insulin lispro Injectable (ADMELOG) 20 Unit(s) SubCutaneous three times a day before meals  lisinopril 2.5 milliGRAM(s) Oral daily  metoprolol succinate  milliGRAM(s) Oral daily  oxyCODONE    IR 10 milliGRAM(s) Oral every 6 hours  pantoprazole    Tablet 40 milliGRAM(s) Oral two times a day  polyethylene glycol 3350 17 Gram(s) Oral daily  rivaroxaban 20 milliGRAM(s) Oral with dinner  senna 2 Tablet(s) Oral at bedtime  sucralfate 1 Gram(s) Oral four times a day  tamsulosin 0.4 milliGRAM(s) Oral at bedtime  torsemide 60 milliGRAM(s) Oral daily  vancomycin  IVPB 1250 milliGRAM(s) IV Intermittent every 12 hours    MEDICATIONS  (PRN):  dextrose Oral Gel 15 Gram(s) Oral once PRN Blood Glucose LESS THAN 70 milliGRAM(s)/deciliter  melatonin 3 milliGRAM(s) Oral at bedtime PRN Insomnia  morphine  - Injectable 1 milliGRAM(s) IV Push every 6 hours PRN Moderate Pain (4 - 6)  morphine  - Injectable 2 milliGRAM(s) IV Push every 6 hours PRN Severe Pain (7 - 10)      Allergies    No Known Allergies    Intolerances        REVIEW OF SYSTEMS:  CONSTITUTIONAL: No fever or chills  HEENT:  No headache, no sore throat  RESPIRATORY: No cough, wheezing, or shortness of breath  CARDIOVASCULAR: No chest pain, palpitations  GASTROINTESTINAL: No abd pain, nausea, vomiting, or diarrhea  GENITOURINARY: No dysuria, frequency, or hematuria  NEUROLOGICAL: no focal weakness or dizziness  MUSCULOSKELETAL: no myalgias     Vital Signs Last 24 Hrs  T(C): 36.7 (20 Apr 2024 20:38), Max: 36.7 (20 Apr 2024 20:38)  T(F): 98.1 (20 Apr 2024 20:38), Max: 98.1 (20 Apr 2024 20:38)  HR: 74 (20 Apr 2024 20:38) (74 - 79)  BP: 106/69 (20 Apr 2024 20:38) (98/66 - 125/77)  BP(mean): --  RR: 18 (20 Apr 2024 20:38) (18 - 20)  SpO2: 98% (20 Apr 2024 20:38) (96% - 98%)    Parameters below as of 20 Apr 2024 20:38  Patient On (Oxygen Delivery Method): room air        PHYSICAL EXAM:  GENERAL: NAD  HEENT:  anicteric, moist mucous membranes  CHEST/LUNG:  CTA b/l, no rales, wheezes, or rhonchi  HEART:  RRR, S1, S2  ABDOMEN:  BS+, soft, nontender, nondistended  EXTREMITIES: no edema, cyanosis, or calf tenderness  NERVOUS SYSTEM: answers questions and follows commands appropriately    LABS:                        13.1   6.36  )-----------( 215      ( 20 Apr 2024 07:45 )             39.2     CBC Full  -  ( 20 Apr 2024 07:45 )  WBC Count : 6.36 K/uL  Hemoglobin : 13.1 g/dL  Hematocrit : 39.2 %  Platelet Count - Automated : 215 K/uL  Mean Cell Volume : 91.2 fl  Mean Cell Hemoglobin : 30.5 pg  Mean Cell Hemoglobin Concentration : 33.4 gm/dL  Auto Neutrophil # : 4.47 K/uL  Auto Lymphocyte # : 1.28 K/uL  Auto Monocyte # : 0.42 K/uL  Auto Eosinophil # : 0.13 K/uL  Auto Basophil # : 0.03 K/uL  Auto Neutrophil % : 70.3 %  Auto Lymphocyte % : 20.1 %  Auto Monocyte % : 6.6 %  Auto Eosinophil % : 2.0 %  Auto Basophil % : 0.5 %    20 Apr 2024 07:45    139    |  103    |  29     ----------------------------<  221    4.5     |  31     |  0.89     Ca    9.4        20 Apr 2024 07:45    TPro  7.8    /  Alb  3.1    /  TBili  0.6    /  DBili  x      /  AST  19     /  ALT  24     /  AlkPhos  100    20 Apr 2024 07:45      Urinalysis Basic - ( 20 Apr 2024 07:45 )    Color: x / Appearance: x / SG: x / pH: x  Gluc: 221 mg/dL / Ketone: x  / Bili: x / Urobili: x   Blood: x / Protein: x / Nitrite: x   Leuk Esterase: x / RBC: x / WBC x   Sq Epi: x / Non Sq Epi: x / Bacteria: x      CAPILLARY BLOOD GLUCOSE      POCT Blood Glucose.: 124 mg/dL (20 Apr 2024 16:47)  POCT Blood Glucose.: 285 mg/dL (20 Apr 2024 11:58)  POCT Blood Glucose.: 214 mg/dL (20 Apr 2024 07:46)  POCT Blood Glucose.: 253 mg/dL (19 Apr 2024 21:42)        Culture - Blood (collected 04-17-24 @ 21:15)  Source: .Blood Blood-Peripheral  Preliminary Report (04-20-24 @ 03:02):    No growth at 48 Hours    Culture - Blood (collected 04-17-24 @ 21:05)  Source: .Blood Blood-Venous  Preliminary Report (04-20-24 @ 03:02):    No growth at 48 Hours        RADIOLOGY & ADDITIONAL TESTS:    Personally reviewed.     Consultant(s) Notes Reviewed:  [x] YES  [ ] NO

## 2024-04-22 NOTE — DISCHARGE NOTE PROVIDER - CARE PROVIDER_API CALL
Chon Page  Gastroenterology  [address] [place] [id number]  Phone: [phone]  Fax: [phone]  Follow Up Time:

## 2024-04-22 NOTE — DISCHARGE NOTE NURSING/CASE MANAGEMENT/SOCIAL WORK - NSDCPEFALRISK_GEN_ALL_CORE
For information on Fall & Injury Prevention, visit: [email or web address] OR  [email or web address] OR  https://www.cdc.gov/steadi/patient.html

## 2024-04-22 NOTE — PROGRESS NOTE ADULT - REASON FOR ADMISSION
RLE cellulitis

## 2024-04-22 NOTE — CONSULT NOTE ADULT - SUBJECTIVE AND OBJECTIVE BOX
HPI:  57yo F with pmhx T2DM, Afib on Xarelto, HFpEF, chronic LE venous insufficiency presenting from wound care center with RLE ulcers with leg swelling and erythema. + chills Pt is a poor historian,. No fever chills n/v/d CP SOB,    Infectious Disease consult was called to evaluate pt and for antibiotic management      Past Medical & Surgical Hx:  PAST MEDICAL & SURGICAL HISTORY:  Atrial fibrillation  Type 2 diabetes mellitus  Congestive heart failure  Scalp psoriasis  S/P tonsillectomy  S/P appendectomy  H/O umbilical hernia repair        Social History--  EtOH: denies   Tobacco: denies   Drug Use: denies      FAMILY HISTORY:  No pertinent family history in first degree relatives        Allergies  No Known Allergies    Intolerances  NONE      Home Medications:  Admelog 100 units/mL injectable solution: 20 unit(s) injectable 3 times a day (with meals) (18 Apr 2024 01:34)  bisacodyl 5 mg oral delayed release tablet: 1 tab(s) orally once a day As needed Constipation (18 Apr 2024 01:14)  DULoxetine 60 mg oral delayed release capsule: 1 cap(s) orally once a day (18 Apr 2024 01:34)  gabapentin 300 mg oral capsule: 1 cap(s) orally every 8 hours (18 Apr 2024 01:14)  Lantus 100 units/mL subcutaneous solution: 40 unit(s) subcutaneous once a day (at bedtime) (18 Apr 2024 01:34)  lisinopril 2.5 mg oral tablet: 1 tab(s) orally once a day (18 Apr 2024 01:34)  metoprolol succinate 100 mg oral capsule, extended release: 1 cap(s) orally once a day (18 Apr 2024 01:34)  oxycodone-acetaminophen 5 mg-325 mg oral tablet: 1 tab(s) orally every 4 hours As needed Severe Pain (7 - 10) (18 Apr 2024 01:14)  polyethylene glycol 3350 oral powder for reconstitution: 17 gram(s) orally once a day (18 Apr 2024 01:14)  Potassium Chloride (Eqv-Klor-Con M20) 20 mEq oral tablet, extended release: 1 tab(s) orally once a day (18 Apr 2024 01:34)  rivaroxaban 20 mg oral tablet: 1 tab(s) orally once a day (before a meal) (18 Apr 2024 01:14)  senna leaf extract oral tablet: 2 tab(s) orally once a day (at bedtime) (18 Apr 2024 01:14)  tamsulosin 0.4 mg oral capsule: 1 cap(s) orally once a day (at bedtime) (18 Apr 2024 01:14)  torsemide 60 mg oral tablet: 1 tab(s) orally once a day (18 Apr 2024 01:34)      Current Inpatient Medications :    ANTIBIOTICS:   cefepime   IVPB 2000 milliGRAM(s) IV Intermittent every 8 hours  cefepime   IVPB      vancomycin  IVPB 1750 milliGRAM(s) IV Intermittent every 12 hours      OTHER RELEVANT MEDICATIONS :  acetaminophen     Tablet .. 650 milliGRAM(s) Oral every 6 hours PRN  dextrose 10% Bolus 125 milliLiter(s) IV Bolus once  dextrose 5%. 1000 milliLiter(s) IV Continuous <Continuous>  dextrose 5%. 1000 milliLiter(s) IV Continuous <Continuous>  dextrose 50% Injectable 12.5 Gram(s) IV Push once  dextrose 50% Injectable 25 Gram(s) IV Push once  dextrose Oral Gel 15 Gram(s) Oral once PRN  DULoxetine 60 milliGRAM(s) Oral daily  gabapentin 300 milliGRAM(s) Oral every 8 hours  glucagon  Injectable 1 milliGRAM(s) IntraMuscular once  insulin glargine Injectable (LANTUS) 40 Unit(s) SubCutaneous at bedtime  insulin lispro (ADMELOG) corrective regimen sliding scale   SubCutaneous at bedtime  insulin lispro (ADMELOG) corrective regimen sliding scale   SubCutaneous three times a day before meals  insulin lispro Injectable (ADMELOG) 20 Unit(s) SubCutaneous three times a day before meals  lisinopril 2.5 milliGRAM(s) Oral daily  melatonin 3 milliGRAM(s) Oral at bedtime PRN  metoprolol succinate  milliGRAM(s) Oral daily  morphine  - Injectable 4 milliGRAM(s) IV Push every 4 hours PRN  morphine  - Injectable 2 milliGRAM(s) IV Push every 4 hours PRN  pantoprazole    Tablet 40 milliGRAM(s) Oral two times a day  polyethylene glycol 3350 17 Gram(s) Oral daily  rivaroxaban 20 milliGRAM(s) Oral with dinner  senna 2 Tablet(s) Oral at bedtime  sucralfate 1 Gram(s) Oral four times a day  tamsulosin 0.4 milliGRAM(s) Oral at bedtime  torsemide 60 milliGRAM(s) Oral daily      ROS:  Unable to obtain due to : poor historian         I&O's Detail    18 Apr 2024 07:01  -  18 Apr 2024 21:30  --------------------------------------------------------  IN:    Oral Fluid: 600 mL  Total IN: 600 mL    OUT:  Total OUT: 0 mL    Total NET: 600 mL        Physical Exam:  Vital Signs Last 24 Hrs  T(C): 36.4 (18 Apr 2024 20:27), Max: 36.8 (18 Apr 2024 02:31)  T(F): 97.6 (18 Apr 2024 20:27), Max: 98.2 (18 Apr 2024 02:31)  HR: 89 (18 Apr 2024 20:27) (82 - 110)  BP: 142/83 (18 Apr 2024 20:27) (106/69 - 177/75)  RR: 18 (18 Apr 2024 20:27) (17 - 19)  SpO2: 94% (18 Apr 2024 20:27) (93% - 97%)    Parameters below as of 18 Apr 2024 20:27  Patient On (Oxygen Delivery Method): room air        General: no acute distress  Neck: supple, trachea midline  Lungs: clear, no wheeze/rhonchi  Cardiovascular: regular rate and rhythm, S1 S2  Abdomen: soft, nontender, ND, bowel sounds normal  Neurological:  alert and oriented x3  Skin: no rash  Extremities: Right LE erythema to thigh  Warm to touch + superficial ulcers      Labs:                         12.9   8.79  )-----------( 221      ( 18 Apr 2024 04:46 )             37.4   04-18    136  |  100  |  22  ----------------------------<  404<H>  4.0   |  29  |  0.87    Ca    9.5      18 Apr 2024 04:46  Phos  3.8     04-17  Mg     2.1     04-17    TPro  7.9  /  Alb  3.5  /  TBili  0.6  /  DBili  x   /  AST  18  /  ALT  27  /  AlkPhos  119  04-18      RECENT CULTURES:          RADIOLOGY & ADDITIONAL STUDIES:    ACC: [id number] EXAM:  US DPLX LWR EXT VEINS COMPL BI   ORDERED BY: NAMRATA HOUSER     PROCEDURE DATE:  04/18/2024          INTERPRETATION:  CLINICAL INFORMATION: Bilateral leg swelling and pain    COMPARISON: 3/1/2024    TECHNIQUE: Duplex sonography of the BILATERAL LOWER extremity veins with   color and spectral Doppler, with and without compression.    FINDINGS:    RIGHT:  Normal compressibility of the RIGHT common femoral, femoral and popliteal   veins.  Doppler examination shows normal spontaneous and phasic flow.  The deep veins in the right calf are not visualized due to patient body   habitus.    LEFT:  Normal compressibility of the LEFT common femoral, femoral and popliteal   veins.  Doppler examination shows normal spontaneous and phasic flow.  The deep veins in the left calf are not visualized due to patient's body   habitus.    IMPRESSION:    No evidence for deep vein thrombosis from the common femoral to the   popliteal veins bilaterally.      Assessment :   57yo F with pmhx T2DM, Afib on Xarelto, HFpEF, chronic LE venous insufficiency presenting from wound care center with RLE ulcers with leg swelling and erythema. + chills    Admitted with RIght LE cellulitis with lymphangitis  Hx of MRSA cellulitis  Uncontrolled DM    Plan :   Cont Vanc Cefepime  Fu cultures  Trend temps and cbc  Get wound culture  Asp precautions    Continue with present regiment .  Approptiate use of antibiotics and adverse effects reviewed.      > 45 minutes spent in direct patient care reviewing  the notes, lab data/ imaging , discussion with multidisciplinary team. All questions were addressed and answered to the best of my capacity .    Thank you for allowing me to participate in the care of your patient .      Carole Mohr MD  Infectious Disease  [phone]
S : 58y year old Female seen at bedside for Right leg  cellulitis/possible wound.    Chief Complaint : Patient is a 58y old  Female who presents with a chief complaint of RLE cellulitis (22 Apr 2024 12:53)    HPI : HPI:  57yo F with pmhx T2DM, Afib on Xarelto, HFpEF, chronic LE venous insufficiency presenting from wound care center with RLE swelling and erythema. Patient has been experiencing chills and worsening erythema for the past 5 days. Swelling has not improved with diuretics. She was seen by wound care earlier today and sent in for IV abx. Has a hx of MRSA. Notes her blood glucose has been uncontrolled despite poor PO intake, and has not been taking her Lantus because she ran out of needles.  IN THE ED  VS: T 97.5, , /96, RR 18, SpO2 94% on RA  Labs: Glucose 395, Lactate 2.9,   Imaging: R tib fib XR on personal read with no evidence of subcutaneous air  S/p 1.5g Vanco, 40u Lantus, 1g Ofirmev (18 Apr 2024 00:37)      Patient admits to  (-) Fevers, (-) Chills, (-) Nausea, (-) Vomiting, (-) Shortness of Breath      PMH: Atrial fibrillation    Type 2 diabetes mellitus    Congestive heart failure    Scalp psoriasis      PSH:S/P tonsillectomy    S/P appendectomy    H/O umbilical hernia repair        Allergies:No Known Allergies      Labs:                          11.9   6.85  )-----------( 209      ( 22 Apr 2024 08:03 )             35.1     WBC Trend  6.85 Date (04-22 @ 08:03)  5.40 Date (04-21 @ 08:10)  6.36 Date (04-20 @ 07:45)  7.05 Date (04-19 @ 07:15)  8.79 Date (04-18 @ 04:46)  7.98 Date (04-17 @ 21:15)      Chem  04-22    138  |  103  |  39<H>  ----------------------------<  222<H>  4.1   |  31  |  1.10    Ca    8.4<L>      22 Apr 2024 08:03    TPro  6.7  /  Alb  2.8<L>  /  TBili  0.5  /  DBili  x   /  AST  19  /  ALT  21  /  AlkPhos  86  04-22          T(F): 97.8 (04-22-24 @ 11:58), Max: 98.1 (04-21-24 @ 21:24)  HR: 90 (04-22-24 @ 11:58) (72 - 96)  BP: 111/67 (04-22-24 @ 11:58) (100/69 - 111/67)  RR: 18 (04-22-24 @ 11:58) (18 - 18)  SpO2: 94% (04-22-24 @ 11:58) (93% - 96%)  Wt(kg): --    O:   General: Pleasant  female NAD & AOX3.    Integument:  Skin warm, dry  bilateral.    Noted b/l venous stasis  Noted right anterior leg s/p healed wound, +erythema, - breaks in the integument, - drainage, no clinical signs of infection  Vascular: Dorsalis Pedis and Posterior Tibial pulses 1/4.  Capillary re-fill time less then 3 seconds digits 1-5 bilateral.    Neuro: Protective sensation diminished to the level of the digits bilateral.  MSK: Muscle strength 5/5 all major muscle groups bilateral.    
Physical Medicine and Rehabilitation Initial Evaluation    Patients acute care records reviewed and are summarized as follows:     Patient is a 58y Female who is admitted to acute care for     Radiological studies reviewed, including    Medical studies/laboratory studies reviewed, includin-18-24 @ 04:46    136  |  100  |  22             --------------------------< 404<H>     4.0  |  29  | 0.87    eGFR AA: --  eGFR N-AA: --    Calcium: 9.5  Phosphorus: --  Magnesium: --    AST: 18    ALT: 27  AlkPhos: 119  Protein: 7.9  Albumin: 3.5  TBili: 0.6  D-Bili: --      The patient was seen and examined at bedside.    ROS:  Constitutional: Denies fevers or chills  Eyes: Denies blurry vision or double vision  Ears/Nose/Mouth/Throat: Denies any pain on swallowing or dry mouth or runny nose  CV: Denies chest pain or palpitations  Respiratory: Denies cough or dyspnea  GI: Denies nausea, vomiting, abdominal pain  : Denies urinary frequency or dysuria  MSK: Denies any myalgia or arthralgia  Neuro: Denies any headache or dizziness  Psychiatric: Denies depression or anxiety    PAST MEDICAL & SURGICAL HISTORY:  Atrial fibrillation      Type 2 diabetes mellitus      Congestive heart failure      Scalp psoriasis      S/P tonsillectomy      S/P appendectomy      H/O umbilical hernia repair          Social, Family Hx: as above in HPI, Family history reviewed and found to be non pertinent to patients current disposition, denies history of alcohol, illicit drug use, tobacco use.    Medications:   acetaminophen     Tablet .. 1000 milliGRAM(s) Oral every 8 hours  cefepime   IVPB 2000 milliGRAM(s) IV Intermittent every 8 hours  cefepime   IVPB      dextrose 10% Bolus 125 milliLiter(s) IV Bolus once  dextrose 5%. 1000 milliLiter(s) IV Continuous <Continuous>  dextrose 5%. 1000 milliLiter(s) IV Continuous <Continuous>  dextrose 50% Injectable 12.5 Gram(s) IV Push once  dextrose 50% Injectable 25 Gram(s) IV Push once  dextrose Oral Gel 15 Gram(s) Oral once PRN  DULoxetine 60 milliGRAM(s) Oral daily  gabapentin 300 milliGRAM(s) Oral every 8 hours  glucagon  Injectable 1 milliGRAM(s) IntraMuscular once  insulin glargine Injectable (LANTUS) 40 Unit(s) SubCutaneous at bedtime  insulin lispro (ADMELOG) corrective regimen sliding scale   SubCutaneous at bedtime  insulin lispro (ADMELOG) corrective regimen sliding scale   SubCutaneous three times a day before meals  insulin lispro Injectable (ADMELOG) 20 Unit(s) SubCutaneous three times a day before meals  lisinopril 2.5 milliGRAM(s) Oral daily  melatonin 3 milliGRAM(s) Oral at bedtime PRN  metoprolol succinate  milliGRAM(s) Oral daily  morphine  - Injectable 1 milliGRAM(s) IV Push every 6 hours PRN  morphine  - Injectable 2 milliGRAM(s) IV Push every 6 hours PRN  oxyCODONE    IR 10 milliGRAM(s) Oral every 6 hours  pantoprazole    Tablet 40 milliGRAM(s) Oral two times a day  polyethylene glycol 3350 17 Gram(s) Oral daily  rivaroxaban 20 milliGRAM(s) Oral with dinner  senna 2 Tablet(s) Oral at bedtime  sucralfate 1 Gram(s) Oral four times a day  tamsulosin 0.4 milliGRAM(s) Oral at bedtime  torsemide 60 milliGRAM(s) Oral daily  vancomycin  IVPB 1750 milliGRAM(s) IV Intermittent every 12 hours      Physical Exam:   Vitals: T(C): 36.4 (24 @ 20:27), Max: 36.8 (24 @ 02:31)  HR: 89 (24 @ 20:27) (82 - 110)  BP: 142/83 (24 @ 20:27) (106/69 - 177/75)  RR: 18 (24 @ 20:27) (17 - 19)  SpO2: 94% (24 @ 20:27) (93% - 97%)    Constitutional: Gen: In no acute distress, cooperative with exam and questioning   Eyes: PERRL, no erythema of conjunctivae  Ears/Nose/Mouth/Throat: Mucous membranes moist, no thrush, no rhinorrhea  CV: Regular rate and rhythm, S1 S2, bilateral lower extremity pitting peripheral edema, pedal pulses intact  Resp: Good respiratory effort, Good air movement all lung fields  GI: Nontender, normoactive bowel sounds  Neuro: Cranial Nerves II-XII intact, sensation intact to light touch in peripheral upper and lower extremities  MSK: No cyanosis or clubbing of nails, strength 4-/5 in bilateral upper and lower extremities, ROM full in all extremities passively  Neck: No midline tenderness to palpation, supple  Skin: No rashes on limbs, normal temperature on palpation  Psychiatric: Awake alert fully oriented      
[facility]  Jarett Stein PA-C  [address] [place] [address]  [phone]      Chief Complaint:  Patient is a 58y old  Female who presents with a chief complaint of RLE cellulitis (2024 00:37)      HPI:57yo F with pmhx T2DM, Afib on Xarelto, HFpEF, chronic LE venous insufficiency presenting from wound care center with RLE swelling and erythema. Patient has been experiencing chills and worsening erythema for the past 5 days. Swelling has not improved with diuretics. She was seen by wound care earlier today and sent in for IV abx. Has a hx of MRSA. Notes her blood glucose has been uncontrolled despite poor PO intake, and has not been taking her Lantus because she ran out of needles.    Allergies:  No Known Allergies      Medications:  acetaminophen     Tablet .. 650 milliGRAM(s) Oral every 6 hours PRN  cefepime   IVPB 2000 milliGRAM(s) IV Intermittent every 8 hours  cefepime   IVPB      dextrose 10% Bolus 125 milliLiter(s) IV Bolus once  dextrose 5%. 1000 milliLiter(s) IV Continuous <Continuous>  dextrose 5%. 1000 milliLiter(s) IV Continuous <Continuous>  dextrose 50% Injectable 12.5 Gram(s) IV Push once  dextrose 50% Injectable 25 Gram(s) IV Push once  dextrose Oral Gel 15 Gram(s) Oral once PRN  DULoxetine 60 milliGRAM(s) Oral daily  gabapentin 300 milliGRAM(s) Oral every 8 hours  glucagon  Injectable 1 milliGRAM(s) IntraMuscular once  insulin glargine Injectable (LANTUS) 40 Unit(s) SubCutaneous at bedtime  insulin lispro (ADMELOG) corrective regimen sliding scale   SubCutaneous three times a day before meals  insulin lispro (ADMELOG) corrective regimen sliding scale   SubCutaneous at bedtime  insulin lispro Injectable (ADMELOG) 20 Unit(s) SubCutaneous three times a day before meals  lisinopril 2.5 milliGRAM(s) Oral daily  melatonin 3 milliGRAM(s) Oral at bedtime PRN  metoprolol succinate  milliGRAM(s) Oral daily  morphine  - Injectable 4 milliGRAM(s) IV Push every 4 hours PRN  morphine  - Injectable 2 milliGRAM(s) IV Push every 4 hours PRN  polyethylene glycol 3350 17 Gram(s) Oral daily  rivaroxaban 20 milliGRAM(s) Oral with dinner  senna 2 Tablet(s) Oral at bedtime  tamsulosin 0.4 milliGRAM(s) Oral at bedtime  torsemide 60 milliGRAM(s) Oral daily  vancomycin  IVPB 1750 milliGRAM(s) IV Intermittent every 12 hours      PMHX/PSHX:  Atrial fibrillation    Type 2 diabetes mellitus    Congestive heart failure    Scalp psoriasis    S/P tonsillectomy    S/P appendectomy    H/O umbilical hernia repair        Family history:  No pertinent family history in first degree relatives    No pertinent family history in first degree relatives        Social History:     ROS:     General:  no fevers, chills, night sweats, fatigue,   Eyes:  Good vision, no reported pain  ENT:  No sore throat, pain, runny nose, dysphagia  CV:  No pain, palpitations, hypo/hypertension  Resp:  No dyspnea, cough, tachypnea, wheezing  GI:  No pain, No nausea, No vomiting, No diarrhea, No constipation, No weight loss, No fever, No pruritis, No rectal bleeding, No tarry stools, No dysphagia,  :  No pain, bleeding, incontinence, nocturia  Muscle:  No pain, weakness  Neuro:  No weakness, tingling, memory problems  Psych:  No fatigue, insomnia, mood problems, depression  Endocrine:  No polyuria, polydipsia, cold/heat intolerance  Heme:  No petechiae, ecchymosis, easy bruisability  Skin:  No rash, tattoos, scars, edema      PHYSICAL EXAM:   Vital Signs:  Vital Signs Last 24 Hrs  T(C): 36.6 (2024 11:43), Max: 36.8 (2024 02:31)  T(F): 97.8 (2024 11:43), Max: 98.2 (2024 02:31)  HR: 82 (2024 11:43) (82 - 120)  BP: 106/69 (2024 11:43) (106/69 - 177/75)  BP(mean): --  RR: 17 (2024 11:43) (17 - 19)  SpO2: 94% (2024 11:43) (93% - 97%)    Parameters below as of 2024 11:43  Patient On (Oxygen Delivery Method): room air      Daily Height in cm: 162.56 (2024 19:57)    Daily Weight in k.7 (2024 06:10)    GENERAL:  Appears stated age,   HEENT:  NC/AT,    CHEST:  Full & symmetric excursion,   HEART:  Regular rhythm  ABDOMEN:  Soft, non-tender, non-distended,   EXTEREMITIES:  no cyanosis,clubbing or edema  SKIN:  No rash  NEURO:  Alert,    LABS:                        12.9   8.79  )-----------( 221      ( 2024 04:46 )             37.4     04-18    136  |  100  |  22  ----------------------------<  404<H>  4.0   |  29  |  0.87    Ca    9.5      2024 04:46  Phos  3.8     04-17  Mg     2.1     04-17    TPro  7.9  /  Alb  3.5  /  TBili  0.6  /  DBili  x   /  AST  18  /  ALT  27  /  AlkPhos  119  04-18    LIVER FUNCTIONS - ( 2024 04:46 )  Alb: 3.5 g/dL / Pro: 7.9 g/dL / ALK PHOS: 119 U/L / ALT: 27 U/L / AST: 18 U/L / GGT: x             Urinalysis Basic - ( 2024 04:46 )    Color: x / Appearance: x / SG: x / pH: x  Gluc: 404 mg/dL / Ketone: x  / Bili: x / Urobili: x   Blood: x / Protein: x / Nitrite: x   Leuk Esterase: x / RBC: x / WBC x   Sq Epi: x / Non Sq Epi: x / Bacteria: x          Imaging:

## 2024-04-22 NOTE — CHART NOTE - NSCHARTNOTEFT_GEN_A_CORE
Called by RN for pt c/o severe pain but no IV access    Spoke with pharmacy regarding alternatives to the ordered IV morphine: subQ acceptable alternative and faster acting than PO    Plan  1. SubQ morphine 2mg x1 ordered    RN to continue attempts to obtain IV access. RN to call with any changes
Called by RN for bleeding    Patient reports bleeding when wiping after using restroom  Patient reports she's had this in the past  Anal region evaluated with nurse at bedside, no fissures present, no signs of active bleeding, mild erythema around anus  Stable hemoglobin on chart review  Diet changed to high fiber  Likely internal hemorrhoids     Day team to re-assess

## 2024-04-22 NOTE — DISCHARGE NOTE PROVIDER - HOSPITAL COURSE
ADMISSION H+P:    HPI:  57yo F with pmhx T2DM, Afib on Xarelto, HFpEF, chronic LE venous insufficiency presenting from wound care center with RLE swelling and erythema. Patient has been experiencing chills and worsening erythema for the past 5 days. Swelling has not improved with diuretics. She was seen by wound care earlier today and sent in for IV abx. Has a hx of MRSA. Notes her blood glucose has been uncontrolled despite poor PO intake, and has not been taking her Lantus because she ran out of needles.  IN THE ED  VS: T 97.5, , /96, RR 18, SpO2 94% on RA  Labs: Glucose 395, Lactate 2.9,   Imaging: R tib fib XR on personal read with no evidence of subcutaneous air  S/p 1.5g Vanco, 40u Lantus, 1g Ofirmev (18 Apr 2024 00:37)      ---  HOSPITAL COURSE: Patient admitted with right LE cellulitis. ID (Dr. Mohr) followed patient. Responded to Vanc Cefepime and switched to PO Doxy 100mg bid x 7 days on discharge.     Patient was medically optimized and improved clinically throughout hospital course. Patient seen and examined on day of discharge.    Vital Signs  T(C): 36.6 (22 Apr 2024 11:58), Max: 36.7 (21 Apr 2024 21:24)  T(F): 97.8 (22 Apr 2024 11:58), Max: 98.1 (21 Apr 2024 21:24)  HR: 90 (22 Apr 2024 11:58) (72 - 96)  BP: 111/67 (22 Apr 2024 11:58) (100/69 - 111/67)  RR: 18 (22 Apr 2024 11:58) (18 - 18)  SpO2: 94% (22 Apr 2024 11:58) (93% - 96%)    Physical Exam:  General: well-developed, well-nourished, NAD  HEENT: normocephalic, atraumatic, EOMI, moist mucous membranes   Neck: supple, non-tender, no masses  Neurology: awake, alert, sensation intact  Respiratory: clear to auscultation bilaterally; no wheezes, rhonchi, or rales  CV: regular rate and rhythm, soft S1/S2, no murmurs, rubs, or gallops  Abdominal: soft, non-tender, non-distended, bowel sounds present  Extremities: no clubbing, cyanosis, or edema; palpable peripheral pulses  Musculoskeletal: no joint erythema or warmth, no joint swelling   Skin: warm, dry, normal color    Patient is medically stable for discharge to home with outpatient follow up.  ---  CONSULTANTS:   ID: Dr. Mohr    ---  TIME SPENT:   I, the attending physician, was physically present for the key portions of the evaluation and management (E/M) service provided. The total amount of time spent reviewing the hospital course, laboratory values, imaging findings, assessing/counseling the patient, discussing with consultant physicians, social work, nursing staff was 35 minutes.     ---  FINAL DISCHARGE DIAGNOSIS LIST:  Please see last daily progress note for final discharge diagnoses

## 2024-04-23 ENCOUNTER — APPOINTMENT (OUTPATIENT)
Dept: WOUND CARE | Facility: HOSPITAL | Age: 59
End: 2024-04-23
Payer: MEDICARE

## 2024-04-23 ENCOUNTER — OUTPATIENT (OUTPATIENT)
Dept: OUTPATIENT SERVICES | Facility: HOSPITAL | Age: 59
LOS: 1 days | Discharge: HOME | End: 2024-04-23
Payer: MEDICARE

## 2024-04-23 ENCOUNTER — APPOINTMENT (OUTPATIENT)
Dept: CT IMAGING | Facility: CLINIC | Age: 59
End: 2024-04-23
Payer: MEDICARE

## 2024-04-23 VITALS
SYSTOLIC BLOOD PRESSURE: 135 MMHG | RESPIRATION RATE: 18 BRPM | WEIGHT: 233 LBS | BODY MASS INDEX: 43.99 KG/M2 | HEIGHT: 61 IN | DIASTOLIC BLOOD PRESSURE: 74 MMHG | TEMPERATURE: 98 F | HEART RATE: 83 BPM | OXYGEN SATURATION: 98 %

## 2024-04-23 DIAGNOSIS — L03.115 CELLULITIS OF RIGHT LOWER LIMB: ICD-10-CM

## 2024-04-23 DIAGNOSIS — Z90.49 ACQUIRED ABSENCE OF OTHER SPECIFIED PARTS OF DIGESTIVE TRACT: Chronic | ICD-10-CM

## 2024-04-23 DIAGNOSIS — Z98.890 OTHER SPECIFIED POSTPROCEDURAL STATES: Chronic | ICD-10-CM

## 2024-04-23 DIAGNOSIS — Z86.14 PERSONAL HISTORY OF METHICILLIN RESISTANT STAPHYLOCOCCUS AUREUS INFECTION: ICD-10-CM

## 2024-04-23 LAB
CULTURE RESULTS: SIGNIFICANT CHANGE UP
CULTURE RESULTS: SIGNIFICANT CHANGE UP
SPECIMEN SOURCE: SIGNIFICANT CHANGE UP
SPECIMEN SOURCE: SIGNIFICANT CHANGE UP

## 2024-04-23 PROCEDURE — G0463: CPT

## 2024-04-23 PROCEDURE — 99213 OFFICE O/P EST LOW 20 MIN: CPT

## 2024-04-23 PROCEDURE — 75574 CT ANGIO HRT W/3D IMAGE: CPT

## 2024-04-23 NOTE — PHYSICAL EXAM
[4 x 4] : 4 x 4  [Abdominal Pad] : Abdominal Pad [Normal Thyroid] : the thyroid was normal [Normal Breath Sounds] : Normal breath sounds [Normal Heart Sounds] : normal heart sounds [Normal Rate and Rhythm] : normal rate and rhythm [Ankle Swelling (On Exam)] : present [Ankle Swelling Bilaterally] : severe [] : of the right leg [Ankle Swelling On The Left] : moderate [Alert] : alert [Oriented to Person] : oriented to person [Oriented to Place] : oriented to place [Oriented to Time] : oriented to time [Calm] : calm

## 2024-04-23 NOTE — ASSESSMENT
[Verbal] : Verbal [Written] : Written [Demo] : Demo [Patient] : Patient [Other: ___] : [unfilled] [Good - alert, interested, motivated] : Good - alert, interested, motivated [Demonstrates independently] : demonstrates independently [Dressing changes] : dressing changes [Foot Care] : foot care [Skin Care] : skin care [Signs and symptoms of infection] : sign and symptoms of infection [Venous Disease] : venous disease [Nutrition] : nutrition [How and When to Call] : how and when to call [Pain Management] : pain management [Off-loading] : off-loading [Home Health] : home health [Hospitalization] : hospitalization [Patient responsibility to plan of care] : patient responsibility to plan of care [Stable] : stable [Home] : Home [Faxed - Long Term Care/Home Health Agency] : Long Term Care/Home Health Agency: Faxed [] : Yes

## 2024-04-25 ENCOUNTER — APPOINTMENT (OUTPATIENT)
Dept: WOUND CARE | Facility: HOSPITAL | Age: 59
End: 2024-04-25
Payer: MEDICARE

## 2024-04-25 ENCOUNTER — OUTPATIENT (OUTPATIENT)
Dept: OUTPATIENT SERVICES | Facility: HOSPITAL | Age: 59
LOS: 1 days | Discharge: HOME | End: 2024-04-25
Payer: MEDICARE

## 2024-04-25 DIAGNOSIS — M25.50 PAIN IN UNSPECIFIED JOINT: ICD-10-CM

## 2024-04-25 DIAGNOSIS — Z79.01 LONG TERM (CURRENT) USE OF ANTICOAGULANTS: ICD-10-CM

## 2024-04-25 DIAGNOSIS — Z79.4 LONG TERM (CURRENT) USE OF INSULIN: ICD-10-CM

## 2024-04-25 DIAGNOSIS — Z90.49 ACQUIRED ABSENCE OF OTHER SPECIFIED PARTS OF DIGESTIVE TRACT: ICD-10-CM

## 2024-04-25 DIAGNOSIS — Z79.899 OTHER LONG TERM (CURRENT) DRUG THERAPY: ICD-10-CM

## 2024-04-25 DIAGNOSIS — E11.622 TYPE 2 DIABETES MELLITUS WITH OTHER SKIN ULCER: ICD-10-CM

## 2024-04-25 DIAGNOSIS — L03.115 CELLULITIS OF RIGHT LOWER LIMB: ICD-10-CM

## 2024-04-25 DIAGNOSIS — Z79.84 LONG TERM (CURRENT) USE OF ORAL HYPOGLYCEMIC DRUGS: ICD-10-CM

## 2024-04-25 DIAGNOSIS — E11.40 TYPE 2 DIABETES MELLITUS WITH DIABETIC NEUROPATHY, UNSPECIFIED: ICD-10-CM

## 2024-04-25 DIAGNOSIS — Z86.718 PERSONAL HISTORY OF OTHER VENOUS THROMBOSIS AND EMBOLISM: ICD-10-CM

## 2024-04-25 DIAGNOSIS — Z90.49 ACQUIRED ABSENCE OF OTHER SPECIFIED PARTS OF DIGESTIVE TRACT: Chronic | ICD-10-CM

## 2024-04-25 DIAGNOSIS — L97.812 NON-PRESSURE CHRONIC ULCER OF OTHER PART OF RIGHT LOWER LEG WITH FAT LAYER EXPOSED: ICD-10-CM

## 2024-04-25 DIAGNOSIS — I48.91 UNSPECIFIED ATRIAL FIBRILLATION: ICD-10-CM

## 2024-04-25 DIAGNOSIS — I50.9 HEART FAILURE, UNSPECIFIED: ICD-10-CM

## 2024-04-25 DIAGNOSIS — I11.0 HYPERTENSIVE HEART DISEASE WITH HEART FAILURE: ICD-10-CM

## 2024-04-25 DIAGNOSIS — Z98.890 OTHER SPECIFIED POSTPROCEDURAL STATES: Chronic | ICD-10-CM

## 2024-04-25 DIAGNOSIS — Z98.49 CATARACT EXTRACTION STATUS, UNSPECIFIED EYE: ICD-10-CM

## 2024-04-25 PROCEDURE — G0463: CPT

## 2024-04-25 PROCEDURE — 99213 OFFICE O/P EST LOW 20 MIN: CPT

## 2024-04-26 DIAGNOSIS — I11.0 HYPERTENSIVE HEART DISEASE WITH HEART FAILURE: ICD-10-CM

## 2024-04-26 DIAGNOSIS — L97.812 NON-PRESSURE CHRONIC ULCER OF OTHER PART OF RIGHT LOWER LEG WITH FAT LAYER EXPOSED: ICD-10-CM

## 2024-04-26 DIAGNOSIS — Z79.01 LONG TERM (CURRENT) USE OF ANTICOAGULANTS: ICD-10-CM

## 2024-04-26 DIAGNOSIS — Z79.899 OTHER LONG TERM (CURRENT) DRUG THERAPY: ICD-10-CM

## 2024-04-26 DIAGNOSIS — Z79.84 LONG TERM (CURRENT) USE OF ORAL HYPOGLYCEMIC DRUGS: ICD-10-CM

## 2024-04-26 DIAGNOSIS — E11.622 TYPE 2 DIABETES MELLITUS WITH OTHER SKIN ULCER: ICD-10-CM

## 2024-04-26 DIAGNOSIS — Z79.4 LONG TERM (CURRENT) USE OF INSULIN: ICD-10-CM

## 2024-04-26 DIAGNOSIS — Z98.49 CATARACT EXTRACTION STATUS, UNSPECIFIED EYE: ICD-10-CM

## 2024-04-26 DIAGNOSIS — E11.40 TYPE 2 DIABETES MELLITUS WITH DIABETIC NEUROPATHY, UNSPECIFIED: ICD-10-CM

## 2024-04-26 DIAGNOSIS — I50.9 HEART FAILURE, UNSPECIFIED: ICD-10-CM

## 2024-04-26 DIAGNOSIS — Z86.718 PERSONAL HISTORY OF OTHER VENOUS THROMBOSIS AND EMBOLISM: ICD-10-CM

## 2024-04-26 DIAGNOSIS — M25.50 PAIN IN UNSPECIFIED JOINT: ICD-10-CM

## 2024-04-26 DIAGNOSIS — Z90.49 ACQUIRED ABSENCE OF OTHER SPECIFIED PARTS OF DIGESTIVE TRACT: ICD-10-CM

## 2024-04-26 DIAGNOSIS — I48.91 UNSPECIFIED ATRIAL FIBRILLATION: ICD-10-CM

## 2024-04-26 NOTE — HISTORY OF PRESENT ILLNESS
[FreeTextEntry1] : 59 yo WF, recently hospitalized for RLE VSU cellulitis and discharged on po doxycycline which pt states today that she did not  and just started on it 2 days ago.  Here for f/u.  Her erythema has lessened since 2 days ago. Encouraged pt to keep legs elevated and to complete the course of po abx. Discussed importance of leg elevation and compression to help in healing and prevention. Advised pt to get comp. stockings.    Had recent venous study showing only superficial vein reflux.

## 2024-04-26 NOTE — ASSESSMENT
[Verbal] : Verbal [Written] : Written [Demo] : Demo [Patient] : Patient [Poor - depression, low motivation, poor retention, non-acceptance of disease] : Poor - depression, low motivation, poor retention, non-acceptance of disease [Needs reinforcement] : needs reinforcement [Dressing changes] : dressing changes [Skin Care] : skin care [Signs and symptoms of infection] : sign and symptoms of infection [Venous Disease] : venous disease [Nutrition] : nutrition [How and When to Call] : how and when to call [Pain Management] : pain management [Compression Therapy] : compression therapy [Patient responsibility to plan of care] : patient responsibility to plan of care [Glycemic Control] : glycemic control [Stable] : stable [Home] : Home [Ambulatory] : Ambulatory [Not Applicable - Long Term Care/Home Health Agency] : Long Term Care/Home Health Agency: Not Applicable [] : No [FreeTextEntry2] : Infection prevention Localized wound care  Goal remaining pain free regarding wounds Compression therapy  BG management  Weight loss  [FreeTextEntry4] : Pt states she never received her Doxycycline until yesterday. Pt instructed to take until completed.  Pt complains about " Pain in her legs when she eats", and overall lethargy. She told me her last A1C was 11. Explained that having an A1C that high can make you feel lethargic and weak. Explained the dangers of elevated A1C including kidney damage, infection, hyperglycemic events, ocular damage. She explains that she is taking her medication as prescribed and has appointments to follow up with her endocrinologist. She also explains she recently saw her cardiologist yesterday and test was performed. Name of test unknown.    Follow up in 1 week   Date of Visit: 4/26/24 Pt requires skilled nursing care for localized wound care. Pt has RLE ulcerations, pt is unable to perform own wound care effectively and safely. This pt does not have reliable transportation to come and have her wound dressings changed at the required frequency.  [Faxed - Long Term Care/Home Health Agency] : Long Term Care/Home Health Agency: Faxed [FreeTextEntry1] : NW initiation

## 2024-04-26 NOTE — VITALS
[Pain related to present condition?] : The patient's  pain is related to present condition. [Burning] : burning [Occasional] : occasional [] : No [de-identified] : 7/10 [FreeTextEntry3] : Right leg  [FreeTextEntry1] : Gabapentin  [FreeTextEntry2] : Pain comes and goes randomly.  [FreeTextEntry4] : Absorbent dressings. Pt to continue taking Gabapentin.

## 2024-04-26 NOTE — PLAN
[FreeTextEntry1] : leg elevation get compression stockings ag alginate, DD, ace qod complete doxycycline bid.  F/u- 1 wk  time spent-20 mins.

## 2024-04-26 NOTE — PHYSICAL EXAM
[Normal Thyroid] : the thyroid was normal [Normal Breath Sounds] : Normal breath sounds [Normal Heart Sounds] : normal heart sounds [Normal Rate and Rhythm] : normal rate and rhythm [Ankle Swelling (On Exam)] : present [Ankle Swelling On The Right] : of the right ankle [Ankle Swelling On The Left] : moderate [] : of the right leg [Alert] : alert [Oriented to Person] : oriented to person [Oriented to Place] : oriented to place [Oriented to Time] : oriented to time [Calm] : calm [4 x 4] : 4 x 4  [JVD] : no jugular venous distention  [Abdomen Masses] : No abdominal massess [Tender] : nontender [Enlarged] : not enlarged [de-identified] : adult WF, NAD, alert ,Ox3. [FreeTextEntry1] : Right lower leg - Scattered open areas.  [de-identified] : scant Serous/sanguinous [de-identified] : Mild  [de-identified] : Expressed comfort post ACE application. No neurovascular deficits noted. [de-identified] : Silver alginate [de-identified] : Mechanically cleansed with sterile gauze and normal saline. Kerlix  [de-identified] : Erythema [de-identified] : None [de-identified] : None [de-identified] : 100% [de-identified] : No [de-identified] : Ace wraps [de-identified] : 3x Weekly [de-identified] : Primary Dressing

## 2024-04-29 ENCOUNTER — OUTPATIENT (OUTPATIENT)
Dept: OUTPATIENT SERVICES | Facility: HOSPITAL | Age: 59
LOS: 1 days | Discharge: HOME | End: 2024-04-29
Payer: MEDICARE

## 2024-04-29 ENCOUNTER — APPOINTMENT (OUTPATIENT)
Dept: GASTROENTEROLOGY | Facility: CLINIC | Age: 59
End: 2024-04-29
Payer: MEDICARE

## 2024-04-29 ENCOUNTER — APPOINTMENT (OUTPATIENT)
Dept: WOUND CARE | Facility: HOSPITAL | Age: 59
End: 2024-04-29
Payer: MEDICARE

## 2024-04-29 VITALS
BODY MASS INDEX: 49.67 KG/M2 | HEART RATE: 90 BPM | SYSTOLIC BLOOD PRESSURE: 134 MMHG | OXYGEN SATURATION: 96 % | HEIGHT: 60 IN | DIASTOLIC BLOOD PRESSURE: 84 MMHG | WEIGHT: 253 LBS | TEMPERATURE: 96.9 F

## 2024-04-29 VITALS
BODY MASS INDEX: 49.67 KG/M2 | HEIGHT: 60 IN | SYSTOLIC BLOOD PRESSURE: 135 MMHG | WEIGHT: 253 LBS | TEMPERATURE: 98.2 F | RESPIRATION RATE: 18 BRPM | OXYGEN SATURATION: 91 % | DIASTOLIC BLOOD PRESSURE: 75 MMHG | HEART RATE: 92 BPM

## 2024-04-29 DIAGNOSIS — Z13.31 ENCOUNTER FOR SCREENING FOR DEPRESSION: ICD-10-CM

## 2024-04-29 DIAGNOSIS — G89.29 UNSPECIFIED ABDOMINAL PAIN: ICD-10-CM

## 2024-04-29 DIAGNOSIS — E11.40 TYPE 2 DIABETES MELLITUS WITH DIABETIC NEUROPATHY, UNSPECIFIED: ICD-10-CM

## 2024-04-29 DIAGNOSIS — R53.82 CHRONIC FATIGUE, UNSPECIFIED: ICD-10-CM

## 2024-04-29 DIAGNOSIS — L03.115 CELLULITIS OF RIGHT LOWER LIMB: ICD-10-CM

## 2024-04-29 DIAGNOSIS — I48.91 UNSPECIFIED ATRIAL FIBRILLATION: ICD-10-CM

## 2024-04-29 DIAGNOSIS — I50.9 HEART FAILURE, UNSPECIFIED: ICD-10-CM

## 2024-04-29 DIAGNOSIS — Z98.890 OTHER SPECIFIED POSTPROCEDURAL STATES: Chronic | ICD-10-CM

## 2024-04-29 DIAGNOSIS — M54.17 RADICULOPATHY, LUMBOSACRAL REGION: ICD-10-CM

## 2024-04-29 DIAGNOSIS — E66.01 MORBID (SEVERE) OBESITY DUE TO EXCESS CALORIES: ICD-10-CM

## 2024-04-29 DIAGNOSIS — F41.9 ANXIETY DISORDER, UNSPECIFIED: ICD-10-CM

## 2024-04-29 DIAGNOSIS — Z90.89 ACQUIRED ABSENCE OF OTHER ORGANS: Chronic | ICD-10-CM

## 2024-04-29 DIAGNOSIS — R10.9 UNSPECIFIED ABDOMINAL PAIN: ICD-10-CM

## 2024-04-29 PROCEDURE — G0463: CPT

## 2024-04-29 PROCEDURE — 99213 OFFICE O/P EST LOW 20 MIN: CPT

## 2024-04-29 PROCEDURE — 99204 OFFICE O/P NEW MOD 45 MIN: CPT

## 2024-04-29 RX ORDER — METFORMIN HYDROCHLORIDE 1000 MG/1
1000 TABLET, COATED ORAL TWICE DAILY
Refills: 0 | Status: DISCONTINUED | COMMUNITY
End: 2024-04-29

## 2024-04-29 RX ORDER — CLOTRIMAZOLE AND BETAMETHASONE DIPROPIONATE 10; .5 MG/G; MG/G
1-0.05 CREAM TOPICAL
Qty: 1 | Refills: 1 | Status: ACTIVE | COMMUNITY
Start: 2024-04-29 | End: 1900-01-01

## 2024-04-29 NOTE — ASSESSMENT
[Verbal] : Verbal [Written] : Written [Demo] : Demo [Patient] : Patient [Poor - depression, low motivation, poor retention, non-acceptance of disease] : Poor - depression, low motivation, poor retention, non-acceptance of disease [Needs reinforcement] : needs reinforcement [Dressing changes] : dressing changes [Skin Care] : skin care [Signs and symptoms of infection] : sign and symptoms of infection [Venous Disease] : venous disease [Nutrition] : nutrition [How and When to Call] : how and when to call [Pain Management] : pain management [Compression Therapy] : compression therapy [Patient responsibility to plan of care] : patient responsibility to plan of care [Glycemic Control] : glycemic control [] : Yes [FreeTextEntry2] : Infection prevention Localized wound care  Goal remaining pain free regarding wounds/pain management Compression therapy  Glucose management, patient reported on previous visit that her last HgA1C was 11. Advised to continue to monitor glucose and control glucose intake and to follow up with endocrinology. Weight loss  [FreeTextEntry4] : -F/U 2 weeks -NW will start to come on 5/1/24, spoke with Vandana from [facility] during patients visit to [facility]. Wound care instructions and script for supplies faxed to [facility] and a copy was provided to patient. [Stable] : stable [Home] : Home [Ambulatory] : Ambulatory [Faxed - Long Term Care/Home Health Agency] : Long Term Care/Home Health Agency: Faxed [FreeTextEntry1] : Right leg stasis ulcers are healing well, no acute infection.

## 2024-04-29 NOTE — PHYSICAL EXAM
[Normal] : normal bowel sounds, non-tender, no masses, soft, no no hepato-splenomegaly [de-identified] : Morbidly obese.  Depressed affect.  Tearful

## 2024-04-29 NOTE — VITALS
[Pain related to present condition?] : The patient's  pain is related to present condition. [Burning] : burning [Occasional] : occasional [] : No [de-identified] : 7/10 [FreeTextEntry3] : Right leg  [FreeTextEntry1] : Gabapentin  [FreeTextEntry2] : Pain comes and goes randomly.  [FreeTextEntry4] : Absorbent dressings. Pt to continue taking Gabapentin.

## 2024-04-29 NOTE — ASSESSMENT
[FreeTextEntry1] : Impression: There does not appear to be any primary gastro intestinal illness responsible for the symptoms.  Symptoms primarily are those of diabetic neuropathy with chronic pain and depression.  Extensive GI workup in the past unremarkable.  Plan: Patient needs pain management, better diabetic control, and neurology follow-up for diabetic neuropathy.  Currently on Cymbalta without improvement, may need dose increase or combination with other antidepressants.  No GI intervention indicated at this time

## 2024-04-29 NOTE — PLAN
[FreeTextEntry1] : Lotrisone, Alginate, Dry dressing, ACE wrap, return to office in two weeks. 25 minutes spent for patient care and medical decision making.

## 2024-04-29 NOTE — PHYSICAL EXAM
[Normal Breath Sounds] : Normal breath sounds [Normal Heart Sounds] : normal heart sounds [Ankle Swelling (On Exam)] : present [] : bilaterally [Ankle Swelling Bilaterally] : severe [Alert] : alert [Oriented to Person] : oriented to person [Oriented to Place] : oriented to place [Oriented to Time] : oriented to time [Calm] : calm [de-identified] : Well-developed, Well-nourished in no acute distress. [de-identified] : Within normal limits. [de-identified] : Within normal limits. [de-identified] : Within normal limits. [de-identified] : Right leg ulcers are mostly closed, sever dry skin, minimal weeping, no acute infection, periwound skin is intact with no cellulitis. [4 x 4] : 4 x 4  [FreeTextEntry1] : Right lower leg - Scattered open areas.  [FreeTextEntry2] : 0.5cm [FreeTextEntry3] : 0.5cm [FreeTextEntry4] : 0.1cm [de-identified] : serous [de-identified] : Mild  [de-identified] : Expressed comfort post ACE application. No neurovascular deficits noted. [de-identified] :  Calcium Alginate [de-identified] : Mechanically cleansed with sterile gauze and normal saline. Kerlix   Lotrisone applied to gaiter region on lower leg and the  Calcium Alginate is placed below the knee on scattered areas of exposed dermis. [TWNoteComboBox4] : Small [TWNoteComboBox5] : No [TWNoteComboBox6] : Venous [de-identified] : Erythema [de-identified] : No [de-identified] : None [de-identified] : None [de-identified] : 100% [de-identified] : No [de-identified] : Ace wraps [de-identified] : 3x Weekly [de-identified] : Primary Dressing

## 2024-04-29 NOTE — HISTORY OF PRESENT ILLNESS
[FreeTextEntry1] : 58-year-old female has been followed at [facility] gastroenterology for many years.  Chief complaint is pain throughout her body every time she eats.  Described as a burning pain down her legs and in her back.  Chronic nausea.  Has had numerous endoscopies and colonoscopies all normal.  Most recent exam less than 2 years ago.  Feels she is gaining weight in spite of being unable to eat.  Has been on and off pantoprazole with no relief.  Had been in the hospital recently with cellulitis and transferred to rehab.  Started on oxycodone for pain management..  Started on Cymbalta a couple of months ago which does not seem to be improving anything.  Saw current pain management doctor wants who apparently will not be continuing in that vein.  Needs new pain management.  Not seeing neurology regularly.  Has a new endocrinologist for poorly controlled diabetes.  History of A-fib on Xarelto.

## 2024-05-01 DIAGNOSIS — I50.9 HEART FAILURE, UNSPECIFIED: ICD-10-CM

## 2024-05-01 DIAGNOSIS — Z79.4 LONG TERM (CURRENT) USE OF INSULIN: ICD-10-CM

## 2024-05-01 DIAGNOSIS — Z79.84 LONG TERM (CURRENT) USE OF ORAL HYPOGLYCEMIC DRUGS: ICD-10-CM

## 2024-05-01 DIAGNOSIS — E11.622 TYPE 2 DIABETES MELLITUS WITH OTHER SKIN ULCER: ICD-10-CM

## 2024-05-01 DIAGNOSIS — I48.91 UNSPECIFIED ATRIAL FIBRILLATION: ICD-10-CM

## 2024-05-01 DIAGNOSIS — I11.0 HYPERTENSIVE HEART DISEASE WITH HEART FAILURE: ICD-10-CM

## 2024-05-01 DIAGNOSIS — M25.50 PAIN IN UNSPECIFIED JOINT: ICD-10-CM

## 2024-05-01 DIAGNOSIS — Z79.899 OTHER LONG TERM (CURRENT) DRUG THERAPY: ICD-10-CM

## 2024-05-01 DIAGNOSIS — Z98.49 CATARACT EXTRACTION STATUS, UNSPECIFIED EYE: ICD-10-CM

## 2024-05-01 DIAGNOSIS — Z90.49 ACQUIRED ABSENCE OF OTHER SPECIFIED PARTS OF DIGESTIVE TRACT: ICD-10-CM

## 2024-05-01 DIAGNOSIS — Z79.01 LONG TERM (CURRENT) USE OF ANTICOAGULANTS: ICD-10-CM

## 2024-05-01 DIAGNOSIS — E11.40 TYPE 2 DIABETES MELLITUS WITH DIABETIC NEUROPATHY, UNSPECIFIED: ICD-10-CM

## 2024-05-01 DIAGNOSIS — L97.812 NON-PRESSURE CHRONIC ULCER OF OTHER PART OF RIGHT LOWER LEG WITH FAT LAYER EXPOSED: ICD-10-CM

## 2024-05-01 DIAGNOSIS — Z86.718 PERSONAL HISTORY OF OTHER VENOUS THROMBOSIS AND EMBOLISM: ICD-10-CM

## 2024-05-02 ENCOUNTER — APPOINTMENT (OUTPATIENT)
Dept: WOUND CARE | Facility: HOSPITAL | Age: 59
End: 2024-05-02

## 2024-05-06 ENCOUNTER — NON-APPOINTMENT (OUTPATIENT)
Age: 59
End: 2024-05-06

## 2024-05-08 ENCOUNTER — OUTPATIENT (OUTPATIENT)
Dept: OUTPATIENT SERVICES | Facility: HOSPITAL | Age: 59
LOS: 1 days | End: 2024-05-08
Payer: MEDICARE

## 2024-05-08 ENCOUNTER — TRANSCRIPTION ENCOUNTER (OUTPATIENT)
Age: 59
End: 2024-05-08

## 2024-05-08 VITALS
DIASTOLIC BLOOD PRESSURE: 94 MMHG | HEIGHT: 61 IN | TEMPERATURE: 98 F | WEIGHT: 248.02 LBS | HEART RATE: 86 BPM | SYSTOLIC BLOOD PRESSURE: 189 MMHG | RESPIRATION RATE: 16 BRPM | OXYGEN SATURATION: 100 %

## 2024-05-08 VITALS
HEART RATE: 86 BPM | SYSTOLIC BLOOD PRESSURE: 136 MMHG | DIASTOLIC BLOOD PRESSURE: 86 MMHG | OXYGEN SATURATION: 96 % | RESPIRATION RATE: 16 BRPM

## 2024-05-08 DIAGNOSIS — Z90.89 ACQUIRED ABSENCE OF OTHER ORGANS: Chronic | ICD-10-CM

## 2024-05-08 DIAGNOSIS — I25.10 ATHEROSCLEROTIC HEART DISEASE OF NATIVE CORONARY ARTERY WITHOUT ANGINA PECTORIS: ICD-10-CM

## 2024-05-08 DIAGNOSIS — Z98.890 OTHER SPECIFIED POSTPROCEDURAL STATES: Chronic | ICD-10-CM

## 2024-05-08 DIAGNOSIS — Z90.49 ACQUIRED ABSENCE OF OTHER SPECIFIED PARTS OF DIGESTIVE TRACT: Chronic | ICD-10-CM

## 2024-05-08 LAB
ANION GAP SERPL CALC-SCNC: 14 MMOL/L — SIGNIFICANT CHANGE UP (ref 5–17)
BUN SERPL-MCNC: 24 MG/DL — HIGH (ref 7–23)
CALCIUM SERPL-MCNC: 9.8 MG/DL — SIGNIFICANT CHANGE UP (ref 8.4–10.5)
CHLORIDE SERPL-SCNC: 96 MMOL/L — SIGNIFICANT CHANGE UP (ref 96–108)
CO2 SERPL-SCNC: 28 MMOL/L — SIGNIFICANT CHANGE UP (ref 22–31)
CREAT SERPL-MCNC: 0.77 MG/DL — SIGNIFICANT CHANGE UP (ref 0.5–1.3)
EGFR: 89 ML/MIN/1.73M2 — SIGNIFICANT CHANGE UP
GLUCOSE BLDC GLUCOMTR-MCNC: 216 MG/DL — HIGH (ref 70–99)
GLUCOSE SERPL-MCNC: 207 MG/DL — HIGH (ref 70–99)
HCT VFR BLD CALC: 38.9 % — SIGNIFICANT CHANGE UP (ref 34.5–45)
HGB BLD-MCNC: 12.8 G/DL — SIGNIFICANT CHANGE UP (ref 11.5–15.5)
MCHC RBC-ENTMCNC: 30 PG — SIGNIFICANT CHANGE UP (ref 27–34)
MCHC RBC-ENTMCNC: 32.9 GM/DL — SIGNIFICANT CHANGE UP (ref 32–36)
MCV RBC AUTO: 91.1 FL — SIGNIFICANT CHANGE UP (ref 80–100)
NRBC # BLD: 0 /100 WBCS — SIGNIFICANT CHANGE UP (ref 0–0)
PLATELET # BLD AUTO: 206 K/UL — SIGNIFICANT CHANGE UP (ref 150–400)
POTASSIUM SERPL-MCNC: 4.1 MMOL/L — SIGNIFICANT CHANGE UP (ref 3.5–5.3)
POTASSIUM SERPL-SCNC: 4.1 MMOL/L — SIGNIFICANT CHANGE UP (ref 3.5–5.3)
RBC # BLD: 4.27 M/UL — SIGNIFICANT CHANGE UP (ref 3.8–5.2)
RBC # FLD: 13.5 % — SIGNIFICANT CHANGE UP (ref 10.3–14.5)
SODIUM SERPL-SCNC: 138 MMOL/L — SIGNIFICANT CHANGE UP (ref 135–145)
WBC # BLD: 7.54 K/UL — SIGNIFICANT CHANGE UP (ref 3.8–10.5)
WBC # FLD AUTO: 7.54 K/UL — SIGNIFICANT CHANGE UP (ref 3.8–10.5)

## 2024-05-08 PROCEDURE — 85027 COMPLETE CBC AUTOMATED: CPT

## 2024-05-08 PROCEDURE — 80048 BASIC METABOLIC PNL TOTAL CA: CPT

## 2024-05-08 PROCEDURE — 93005 ELECTROCARDIOGRAM TRACING: CPT

## 2024-05-08 PROCEDURE — 99152 MOD SED SAME PHYS/QHP 5/>YRS: CPT

## 2024-05-08 PROCEDURE — 82962 GLUCOSE BLOOD TEST: CPT

## 2024-05-08 PROCEDURE — 93799 UNLISTED CV SVC/PROCEDURE: CPT

## 2024-05-08 PROCEDURE — 93454 CORONARY ARTERY ANGIO S&I: CPT

## 2024-05-08 PROCEDURE — 93571 IV DOP VEL&/PRESS C FLO 1ST: CPT | Mod: 26

## 2024-05-08 PROCEDURE — C1887: CPT

## 2024-05-08 PROCEDURE — 36415 COLL VENOUS BLD VENIPUNCTURE: CPT

## 2024-05-08 PROCEDURE — C1769: CPT

## 2024-05-08 PROCEDURE — C1894: CPT

## 2024-05-08 PROCEDURE — 93010 ELECTROCARDIOGRAM REPORT: CPT

## 2024-05-08 PROCEDURE — 93454 CORONARY ARTERY ANGIO S&I: CPT | Mod: 26

## 2024-05-08 RX ORDER — INSULIN LISPRO 100/ML
20 VIAL (ML) SUBCUTANEOUS
Refills: 0 | DISCHARGE

## 2024-05-08 RX ORDER — ACETAMINOPHEN 500 MG
1000 TABLET ORAL ONCE
Refills: 0 | Status: COMPLETED | OUTPATIENT
Start: 2024-05-08 | End: 2024-05-08

## 2024-05-08 RX ADMIN — Medication 1000 MILLIGRAM(S): at 11:36

## 2024-05-08 RX ADMIN — Medication 400 MILLIGRAM(S): at 11:21

## 2024-05-08 NOTE — ASU PATIENT PROFILE, ADULT - FALL HARM RISK - DEVICES
Importance of daily AREDS II study multivitamin and Amsler Grid checks discussed with patient. Patient to follow-up immediately with any new onset of decreased vision and/or metamorphopsia. None

## 2024-05-08 NOTE — ASU PATIENT PROFILE, ADULT - FALL HARM RISK - HARM RISK INTERVENTIONS
Assistance with ambulation/Assistance OOB with selected safe patient handling equipment/Communicate Risk of Fall with Harm to all staff/Discuss with provider need for PT consult/Monitor gait and stability/Reinforce activity limits and safety measures with patient and family/Tailored Fall Risk Interventions/Visual Cue: Yellow wristband and red socks/Bed in lowest position, wheels locked, appropriate side rails in place/Call bell, personal items and telephone in reach/Instruct patient to call for assistance before getting out of bed or chair/Non-slip footwear when patient is out of bed/[place] to call system/Physically safe environment - no spills, clutter or unnecessary equipment/Purposeful Proactive Rounding/Room/bathroom lighting operational, light cord in reach

## 2024-05-08 NOTE — H&P CARDIOLOGY - HISTORY OF PRESENT ILLNESS
This is a 57yo F with pmhx of  Afib on Xarelto ( last dose 5/6/2024), HFpEF (CHF (EF 60-65% in 2/2023), , DM type 2 ( last A1C unknown, complicated by peripheral neuropathy), chronic LE venous insufficiency, chronic jenna lower extr wounds, Hx of MRSA infection in wounds tx with IV abx. See and evaluated by Dr Sánchez for c/c of chest pain and dyspnea; had abnormal cardiac CT with revealed significant stenoses of mLAD, dLAD and mRCA, calcium score 1079.  Presents here today for RHC/ LHC for further evaluation.                           < from: TTE W or WO Ultrasound Enhancing Agent (10.10.23 @ 07:18) >  CONCLUSIONS:      1. Left ventricular systolic function is normal with an ejection fraction of 66 % by White's method of disks.   2. There is normal LV mass and concentric remodeling.   3. Normal left ventricular diastolic function.   4. Normal right ventricular cavity size, wall thickness and systolic function.   5. The left atrium is normal in size.   6. Trace mitral regurgitation.   7. Structurally normal mitral valve with normal leaflet excursion.   8. There is calcification of the mitral valve annulus.    < end of copied text >   This is a 59yo F with pmhx of  Afib on Xarelto ( last dose 5/6/2024), HFpEF (CHF (EF 60-65% in 2/2023), uncontrolled DM type 2 ( last A1C unknown, complicated by peripheral neuropathy), chronic LE venous insufficiency, chronic jenna lower extr wounds, recent MRSA infection in wounds for which was recently hospitalized for this April for  IV abx (Vanc/Cefepime then transitioned to Po Doxy 100mg bid x 7 days, course of abd now finished).  Seen and evaluated by Dr Sánchez for c/c of dyspnea denies any associated chest pain, occurring for " years" worse in the "past few months"; had abnormal cardiac CT with revealed significant stenoses of mLAD, dLAD and mRCA, calcium score 1079.  Presents here today for RHC/ LHC for further evaluation.     OF NOTE: During exam pt noted to have jenna groin dermatitis, pt states its chronic and currently treating it with a " fungal powder" ( does not recall name); in addition pt reports  occasional vaginal and rectal bleeding ( last episode approx 4 days ago); states has been to so many doctors and no one can find out what is wrong with her.                < from: TTE W or WO Ultrasound Enhancing Agent (10.10.23 @ 07:18) >  CONCLUSIONS:      1. Left ventricular systolic function is normal with an ejection fraction of 66 % by White's method of disks.   2. There is normal LV mass and concentric remodeling.   3. Normal left ventricular diastolic function.   4. Normal right ventricular cavity size, wall thickness and systolic function.   5. The left atrium is normal in size.   6. Trace mitral regurgitation.   7. Structurally normal mitral valve with normal leaflet excursion.   8. There is calcification of the mitral valve annulus.    < end of copied text >   This is a 59yo F with pmhx of  Afib on Xarelto ( last dose 5/6/2024), HFpEF (CHF (EF 60-65% in 2/2023), uncontrolled DM type 2 ( last A1C unknown, complicated by peripheral neuropathy), chronic LE venous insufficiency, chronic jenna lower extr wounds, recent MRSA infection in wounds for which was recently hospitalized for this April for  IV abx (Vanc/Cefepime then transitioned to Po Doxy 100mg bid x 7 days, course of abd now finished).  Seen and evaluated by Dr Sánchez for c/c of dyspnea denies any associated chest pain, occurring for " years" worse in the "past few months"; had abnormal cardiac CT with revealed significant stenoses of mLAD, dLAD and mRCA, calcium score 1079.  Presents here today for RHC/ LHC for further evaluation.     OF NOTE: During exam pt noted to have jenna groin dermatitis, pt states its chronic and currently treating it with a " fungal powder" ( does not recall name); in addition pt reports  occasional vaginal and rectal bleeding ( last episode approx 4 days ago); states has been to so many doctors and no one can find out what is wrong with her.        Scheduled to f/u with her ob/gyn.         < from: TTE W or WO Ultrasound Enhancing Agent (10.10.23 @ 07:18) >  CONCLUSIONS:      1. Left ventricular systolic function is normal with an ejection fraction of 66 % by White's method of disks.   2. There is normal LV mass and concentric remodeling.   3. Normal left ventricular diastolic function.   4. Normal right ventricular cavity size, wall thickness and systolic function.   5. The left atrium is normal in size.   6. Trace mitral regurgitation.   7. Structurally normal mitral valve with normal leaflet excursion.   8. There is calcification of the mitral valve annulus.    < end of copied text >

## 2024-05-08 NOTE — ASU DISCHARGE PLAN (ADULT/PEDIATRIC) - CARE PROVIDER_API CALL
Kory Sánchez  Cardiology  [address] [facility], [address] [place] [address]  Phone: [phone]  Fax: [phone]  Established Patient  Follow Up Time:

## 2024-05-08 NOTE — ASU DISCHARGE PLAN (ADULT/PEDIATRIC) - ASU DC SPECIAL INSTRUCTIONSFT

## 2024-05-08 NOTE — H&P CARDIOLOGY - NSICDXPASTMEDICALHX_GEN_ALL_CORE_FT
PAST MEDICAL HISTORY:  Atrial fibrillation     Congestive heart failure     History of delayed wound healing     Infection of skin due to methicillin resistant Staphylococcus aureus (MRSA)     Scalp psoriasis     Type 2 diabetes mellitus

## 2024-05-13 PROBLEM — Z87.898 PERSONAL HISTORY OF OTHER SPECIFIED CONDITIONS: Chronic | Status: ACTIVE | Noted: 2024-05-08

## 2024-05-13 PROBLEM — L08.9 LOCAL INFECTION OF THE SKIN AND SUBCUTANEOUS TISSUE, UNSPECIFIED: Chronic | Status: ACTIVE | Noted: 2024-05-08

## 2024-05-14 ENCOUNTER — APPOINTMENT (OUTPATIENT)
Dept: WOUND CARE | Facility: HOSPITAL | Age: 59
End: 2024-05-14
Payer: MEDICARE

## 2024-05-14 ENCOUNTER — OUTPATIENT (OUTPATIENT)
Dept: OUTPATIENT SERVICES | Facility: HOSPITAL | Age: 59
LOS: 1 days | Discharge: HOME | End: 2024-05-14
Payer: MEDICARE

## 2024-05-14 VITALS
HEIGHT: 60 IN | WEIGHT: 253 LBS | BODY MASS INDEX: 49.67 KG/M2 | SYSTOLIC BLOOD PRESSURE: 134 MMHG | HEART RATE: 75 BPM | DIASTOLIC BLOOD PRESSURE: 83 MMHG | RESPIRATION RATE: 18 BRPM | OXYGEN SATURATION: 96 %

## 2024-05-14 DIAGNOSIS — L03.115 CELLULITIS OF RIGHT LOWER LIMB: ICD-10-CM

## 2024-05-14 DIAGNOSIS — Z90.49 ACQUIRED ABSENCE OF OTHER SPECIFIED PARTS OF DIGESTIVE TRACT: ICD-10-CM

## 2024-05-14 DIAGNOSIS — L97.422 NON-PRESSURE CHRONIC ULCER OF LEFT HEEL AND MIDFOOT WITH FAT LAYER EXPOSED: ICD-10-CM

## 2024-05-14 DIAGNOSIS — E11.622 TYPE 2 DIABETES MELLITUS WITH OTHER SKIN ULCER: ICD-10-CM

## 2024-05-14 DIAGNOSIS — Z79.4 LONG TERM (CURRENT) USE OF INSULIN: ICD-10-CM

## 2024-05-14 DIAGNOSIS — Z79.84 LONG TERM (CURRENT) USE OF ORAL HYPOGLYCEMIC DRUGS: ICD-10-CM

## 2024-05-14 DIAGNOSIS — Z98.890 OTHER SPECIFIED POSTPROCEDURAL STATES: Chronic | ICD-10-CM

## 2024-05-14 DIAGNOSIS — E11.40 TYPE 2 DIABETES MELLITUS WITH DIABETIC NEUROPATHY, UNSPECIFIED: ICD-10-CM

## 2024-05-14 DIAGNOSIS — Z90.89 ACQUIRED ABSENCE OF OTHER ORGANS: Chronic | ICD-10-CM

## 2024-05-14 DIAGNOSIS — Z79.899 OTHER LONG TERM (CURRENT) DRUG THERAPY: ICD-10-CM

## 2024-05-14 DIAGNOSIS — I48.91 UNSPECIFIED ATRIAL FIBRILLATION: ICD-10-CM

## 2024-05-14 DIAGNOSIS — M25.50 PAIN IN UNSPECIFIED JOINT: ICD-10-CM

## 2024-05-14 DIAGNOSIS — I87.2 VENOUS INSUFFICIENCY (CHRONIC) (PERIPHERAL): ICD-10-CM

## 2024-05-14 DIAGNOSIS — Z98.49 CATARACT EXTRACTION STATUS, UNSPECIFIED EYE: ICD-10-CM

## 2024-05-14 DIAGNOSIS — Z79.01 LONG TERM (CURRENT) USE OF ANTICOAGULANTS: ICD-10-CM

## 2024-05-14 DIAGNOSIS — Z86.718 PERSONAL HISTORY OF OTHER VENOUS THROMBOSIS AND EMBOLISM: ICD-10-CM

## 2024-05-14 DIAGNOSIS — I11.0 HYPERTENSIVE HEART DISEASE WITH HEART FAILURE: ICD-10-CM

## 2024-05-14 DIAGNOSIS — L97.812 NON-PRESSURE CHRONIC ULCER OF OTHER PART OF RIGHT LOWER LEG WITH FAT LAYER EXPOSED: ICD-10-CM

## 2024-05-14 DIAGNOSIS — I50.9 HEART FAILURE, UNSPECIFIED: ICD-10-CM

## 2024-05-14 DIAGNOSIS — Z90.49 ACQUIRED ABSENCE OF OTHER SPECIFIED PARTS OF DIGESTIVE TRACT: Chronic | ICD-10-CM

## 2024-05-14 PROCEDURE — G0463: CPT

## 2024-05-14 PROCEDURE — 99213 OFFICE O/P EST LOW 20 MIN: CPT

## 2024-05-14 RX ORDER — GLUCOSAMINE HCL/CHONDROITIN SU 500-400 MG
3 CAPSULE ORAL
Refills: 0 | Status: DISCONTINUED | COMMUNITY
End: 2024-05-14

## 2024-05-14 RX ORDER — DOXYCYCLINE HYCLATE 100 MG/1
100 TABLET ORAL
Refills: 0 | Status: DISCONTINUED | COMMUNITY
End: 2024-05-14

## 2024-05-14 NOTE — REASON FOR VISIT
[Follow-Up: _____] : a [unfilled] follow-up visit [Consultation] : a consultation visit [Friend] : friend [FreeTextEntry1] : Pt presented for f/u appt for a venous left leg wound. Now presents with a left heel wound

## 2024-05-14 NOTE — PLAN
[FreeTextEntry1] : leg elevation measure for circaids today ag alginate, DD, abd pad, ace qod f/u 1 wk  time spent 25 mins.

## 2024-05-17 ENCOUNTER — NON-APPOINTMENT (OUTPATIENT)
Age: 59
End: 2024-05-17

## 2024-05-17 PROBLEM — E11.40 CHRONIC PAINFUL DIABETIC NEUROPATHY: Status: ACTIVE | Noted: 2023-03-27

## 2024-05-17 PROBLEM — I87.2 CHRONIC VENOUS INSUFFICIENCY: Status: ACTIVE | Noted: 2024-05-17

## 2024-05-17 PROBLEM — L97.422 CHRONIC ULCER OF HEEL, LEFT, WITH FAT LAYER EXPOSED: Status: ACTIVE | Noted: 2023-07-10

## 2024-05-17 NOTE — ASSESSMENT
[Verbal] : Verbal [Written] : Written [Demo] : Demo [Patient] : Patient [Poor - depression, low motivation, poor retention, non-acceptance of disease] : Poor - depression, low motivation, poor retention, non-acceptance of disease [Needs reinforcement] : needs reinforcement [Compression Therapy] : compression therapy [Stable] : stable [Home] : Home [Ambulatory] : Ambulatory [Faxed - Long Term Care/Home Health Agency] : Long Term Care/Home Health Agency: Faxed [Dressing changes] : dressing changes [Skin Care] : skin care [Pressure relief] : pressure relief [Signs and symptoms of infection] : sign and symptoms of infection [Venous Disease] : venous disease [Nutrition] : nutrition [How and When to Call] : how and when to call [Pain Management] : pain management [Patient responsibility to plan of care] : patient responsibility to plan of care [Glycemic Control] : glycemic control [Other: ___] : [unfilled] [Foot Care] : foot care [Off-loading] : off-loading [] : No [FreeTextEntry2] : Infection prevention Localized wound care  Pain management Compression therapy  Glucose management, patient reported on previous visit that her last HgA1C was 11. Advised to continue to monitor glucose and control glucose intake and to follow up with endocrinology. Weight loss  [FreeTextEntry3] : New left leg and heel ulcers [FreeTextEntry4] : NW reinitiated. Patient does not drive; Patient does not work. Leaving home is a taxing effort for the patient. Pt is unable to perform dressing changes independently. The patient lives with a friend who will be doing the dressing changes until homecare initiation. Circaid request submitted to the [facility]. Patient advised to bring Circaid to her next wc appt. Pt has a poor understanding of disease process, importance of glycemic control. Pt was advised to elevate legs for edema reduction but states she "will not do that, it's too much'. The pt was provided with alternative means to assist with venous return when elevation is no longer tolerable. -F/U 1week   [FreeTextEntry1] : [facility]

## 2024-05-17 NOTE — HISTORY OF PRESENT ILLNESS
[FreeTextEntry1] :  57 yo WF, recently hospitalized for RLE VSU cellulitis and discharged on po doxycycline. Here for f/u. Pt has developed a LLE ulcer recently with large amts of fluid dripping out causing maceration to her left heel.  Has venous insufficiency. Pt also seen by podiatrist, Dr. Watson today.       Discussed importance of leg elevation and compression to help in healing and prevention. Advised pt to get comp. stockings, zippered comp stockings or circaids. Pt admits to being noncompliant with leg elevation/compression stockings use.       Had recent venous study showing superficial vein reflux.

## 2024-05-17 NOTE — PHYSICAL EXAM
[4 x 4] : 4 x 4  [Normal Thyroid] : the thyroid was normal [Normal Breath Sounds] : Normal breath sounds [Normal Heart Sounds] : normal heart sounds [Normal Rate and Rhythm] : normal rate and rhythm [Ankle Swelling (On Exam)] : present [Ankle Swelling Bilaterally] : bilaterally  [Ankle Swelling On The Left] : moderate [] : of the left leg [Ankle Swelling On The Right] : mild [Alert] : alert [Oriented to Person] : oriented to person [Oriented to Place] : oriented to place [Oriented to Time] : oriented to time [Calm] : calm [JVD] : no jugular venous distention  [Abdomen Masses] : No abdominal massess [Abdomen Tenderness] : ~T ~M No abdominal tenderness [Tender] : nontender [Enlarged] : not enlarged [de-identified] : morbidly obese WF, NAD, alert, Ox3. [TWNoteComboBox1] : False [FreeTextEntry1] : Left lower leg   [FreeTextEntry2] : 0.5cm [FreeTextEntry3] : 0.5cm [FreeTextEntry4] : 0.1cm [de-identified] : SEROUS [de-identified] : application. No neurovascular deficits noted. [de-identified] :  Calcium Alginate [de-identified] : Mechanically cleansed with sterile gauze and normal saline. Kerlix    [FreeTextEntry7] : lower leg- closed [de-identified] : none [de-identified] : Mechanically cleansed with 4x4 sterile gauze and 0.9% normal saline [TWNoteComboBox4] : Moderate [TWNoteComboBox5] : No [TWNoteComboBox6] : Venous [de-identified] : No [de-identified] : Erythema [de-identified] : None [de-identified] : None [de-identified] : 100% [de-identified] : No [de-identified] : 3x Weekly [de-identified] : Primary Dressing [TWNoteComboBox9] : Right [de-identified] : None [de-identified] : No [de-identified] : Venous [de-identified] : No [de-identified] : Erythema [de-identified] : None [de-identified] : None [de-identified] : 100% [de-identified] : No [Skin Ulcer] : ulcer [Skin Induration] : induration

## 2024-05-17 NOTE — VITALS
[Sharp] : sharp [Pain related to present condition?] : The patient's  pain is not related to present condition. [] : No [de-identified] : 10/10 with leg elevation [FreeTextEntry3] : Back pain/ leg pain [FreeTextEntry1] : "Lowering legs" [FreeTextEntry2] : "Raising legs" [FreeTextEntry4] : lowered legs for short periods during visit- 5/10 on pain scale [FreeTextEntry5] : Medication reconciliation completed

## 2024-05-17 NOTE — PLAN
[FreeTextEntry1] : leg elevation measure for circaids today ag alginate, DD, abd pad, ace qod f/u 1 wk continue wound care to left heel and off loading  Spent 20 minutes for patient care and medical decision making.

## 2024-05-17 NOTE — REVIEW OF SYSTEMS
[Fever] : no fever [Chills] : no chills [Eye Pain] : no eye pain [Loss Of Hearing] : no hearing loss [Shortness Of Breath] : no shortness of breath [Abdominal Pain] : no abdominal pain [Easy Bleeding] : no tendency for easy bleeding [FreeTextEntry5] : HTN, HLD , morbid obesity  [FreeTextEntry9] : Fluid retention and venous insufficiency  [de-identified] : Small diabetic pressure ulcer posterior left heel , skin , subcut and fat , no soi , clean granular  [de-identified] : IDDM with neuropathy  [de-identified] : not compliant with care  [de-identified] : JADEN

## 2024-05-17 NOTE — REVIEW OF SYSTEMS
[Negative] : Psychiatric [Joint Stiffness] : joint stiffness [Skin Wound] : skin wound [Depression] : depression

## 2024-05-17 NOTE — PHYSICAL EXAM
[JVD] : no jugular venous distention  [Abdomen Masses] : No abdominal massess [Abdomen Tenderness] : ~T ~M No abdominal tenderness [Tender] : nontender [Enlarged] : not enlarged [Purpura] : no purpura  [Petechiae] : no petechiae [de-identified] : morbidly obese WF, NAD, alert, Ox3. [de-identified] : Swelling of the lower extremities , with pitting edema [de-identified] : Diabetic pressure ulcer posterior left heel  [FreeTextEntry1] : Left Heel [FreeTextEntry2] : 0.5 [FreeTextEntry3] : 0.3 [FreeTextEntry4] : 0.1-0.2 [de-identified] : serous [de-identified] : Mechanically cleansed with 0.9% normal saline and sterile 4x4 gauze [TWNoteComboBox1] : False [TWNoteComboBox4] : Large [TWNoteComboBox5] : No [de-identified] : No [de-identified] : Macerated [de-identified] : None

## 2024-05-17 NOTE — HISTORY OF PRESENT ILLNESS
[FreeTextEntry1] : 57 yo WF, recently hospitalized for RLE VSU cellulitis and discharged on po doxycycline which pt states today that she did not  and just started on it 2 days ago. Here for f/u. Her erythema has lessened since 2 days ago. Encouraged pt to keep legs elevated and to complete the course of po abx. Discussed importance of leg elevation and compression to help in healing and prevention. Advised pt to get comp. stockings. Had recent venous study showing only superficial vein reflux. Has venous insufficiency.

## 2024-05-17 NOTE — REASON FOR VISIT
[FreeTextEntry1] : Pt presented for f/u appt for a venous left leg wound. Now presents with a left heel wound

## 2024-05-22 ENCOUNTER — APPOINTMENT (OUTPATIENT)
Dept: WOUND CARE | Facility: HOSPITAL | Age: 59
End: 2024-05-22
Payer: MEDICARE

## 2024-05-22 ENCOUNTER — OUTPATIENT (OUTPATIENT)
Dept: OUTPATIENT SERVICES | Facility: HOSPITAL | Age: 59
LOS: 1 days | Discharge: HOME | End: 2024-05-22
Payer: MEDICARE

## 2024-05-22 VITALS
HEIGHT: 60 IN | BODY MASS INDEX: 50.26 KG/M2 | OXYGEN SATURATION: 97 % | SYSTOLIC BLOOD PRESSURE: 161 MMHG | HEART RATE: 85 BPM | WEIGHT: 256 LBS | TEMPERATURE: 98.1 F | DIASTOLIC BLOOD PRESSURE: 100 MMHG | RESPIRATION RATE: 16 BRPM

## 2024-05-22 DIAGNOSIS — Z98.890 OTHER SPECIFIED POSTPROCEDURAL STATES: Chronic | ICD-10-CM

## 2024-05-22 DIAGNOSIS — Z90.89 ACQUIRED ABSENCE OF OTHER ORGANS: Chronic | ICD-10-CM

## 2024-05-22 DIAGNOSIS — L03.115 CELLULITIS OF RIGHT LOWER LIMB: ICD-10-CM

## 2024-05-22 DIAGNOSIS — Z90.49 ACQUIRED ABSENCE OF OTHER SPECIFIED PARTS OF DIGESTIVE TRACT: Chronic | ICD-10-CM

## 2024-05-22 PROCEDURE — G0463: CPT

## 2024-05-22 PROCEDURE — 99213 OFFICE O/P EST LOW 20 MIN: CPT

## 2024-05-22 NOTE — HISTORY OF PRESENT ILLNESS
[FreeTextEntry1] : 57 yo WF, recently hospitalized for RLE VSU cellulitis and discharged on po doxycycline which pt states today that she did not  and just started on it 2 days ago. Here for f/u. Her erythema has lessened since 2 days ago. Encouraged pt to keep legs elevated and to complete the course of po abx. Discussed importance of leg elevation and compression to help in healing and prevention. Advised pt to get comp. stockings. Had recent venous study showing only superficial vein reflux. Has venous insufficiency. 5/22/24 patient has small open spot left lower leg area that weeps. Patient relates having lower leg pain worse with legs up and improved when down. no palpable pedal pulses and weak doppler signals. Unable to get left PT even with doppler. No signs of infection

## 2024-05-22 NOTE — VITALS
[Pain related to present condition?] : The patient's  pain is related to present condition. [] : No [de-identified] : 10/10 [FreeTextEntry3] : Both legs [FreeTextEntry1] : "Never goes away" [FreeTextEntry2] : :Eating" [FreeTextEntry4] : Nutritional Consult

## 2024-05-22 NOTE — ASSESSMENT
[Verbal] : Verbal [Demo] : Demo [Patient] : Patient [Good - alert, interested, motivated] : Good - alert, interested, motivated [Verbalizes knowledge/Understanding] : Verbalizes knowledge/understanding [Dressing changes] : dressing changes [Skin Care] : skin care [Pressure relief] : pressure relief [Signs and symptoms of infection] : sign and symptoms of infection [Nutrition] : nutrition [How and When to Call] : how and when to call [Off-loading] : off-loading [Patient responsibility to plan of care] : patient responsibility to plan of care [] : Yes [Written] : Written [Stable] : stable [Home] : Home [Ambulatory] : Ambulatory [Faxed - Long Term Care/Home Health Agency] : Long Term Care/Home Health Agency: Faxed [FreeTextEntry2] : infection prevention elevation Low sodium diet Glycemic control [FreeTextEntry4] : Pt still waiting for Circaids MD reviewed medical record and could find no arterial studies. Used doppler to assess for pulses and could hear left dorsal pulse of 1+ and right pedal pulse of 1+ and dorsal pulse of 2+ MD ordered arterial studies - auth submitted MD advised to continue to treat w/ silver alginate on left anterior lower leg and ACE bandages. F/U 2 weeks [FreeTextEntry1] : [facility]

## 2024-05-22 NOTE — PHYSICAL EXAM
[4 x 4] : 4 x 4  [Normal Thyroid] : the thyroid was normal [Normal Breath Sounds] : Normal breath sounds [Normal Heart Sounds] : normal heart sounds [Normal Rate and Rhythm] : normal rate and rhythm [Ankle Swelling (On Exam)] : present [Ankle Swelling Bilaterally] : bilaterally  [Ankle Swelling On The Left] : moderate [] : of the left leg [Ankle Swelling On The Right] : mild [Skin Ulcer] : ulcer [Skin Induration] : induration [Alert] : alert [Oriented to Person] : oriented to person [Oriented to Place] : oriented to place [Oriented to Time] : oriented to time [Calm] : calm [JVD] : no jugular venous distention  [Abdomen Masses] : No abdominal massess [Abdomen Tenderness] : ~T ~M No abdominal tenderness [Tender] : nontender [Enlarged] : not enlarged [Purpura] : no purpura  [Petechiae] : no petechiae [de-identified] : morbidly obese WF, NAD, alert, Ox3. [de-identified] : Swelling of the lower extremities , with pitting edema [de-identified] : Diabetic pressure ulcer posterior left heel  [FreeTextEntry1] : Left Anterior Lower Leg [FreeTextEntry2] : 0.2 [FreeTextEntry3] : 0.2 [FreeTextEntry4] : 0.1 [de-identified] : Serous [de-identified] : Patient expressed comfort post ACE application. Circulation/neuromuscular functions WNL post application. [de-identified] : Silver Alginate [de-identified] : Mechanically cleansed with sterile gauze and 0.9% normal saline.  Per MD, do not make ACE tight [FreeTextEntry7] : Right Lower Leg - closed [de-identified] : Patient expressed comfort post ACE application. Circulation/neuromuscular functions WNL post application. [de-identified] : No Product [de-identified] : Mechanically cleansed with sterile gauze and 0.9% normal saline.  Per MD, do not make ACE tight [de-identified] : Left Heel - calloused [de-identified] : No Product [de-identified] : Mechanically cleansed with sterile gauze and 0.9% normal saline. [TWNoteComboBox4] : Moderate [TWNoteComboBox5] : No [de-identified] : No [de-identified] : Normal [de-identified] : None [de-identified] : None [de-identified] : 100% [de-identified] : No [de-identified] : Ace wraps [de-identified] : Every other day [de-identified] : Primary Dressing [de-identified] : None [de-identified] : No [de-identified] : No [de-identified] : Normal [de-identified] : None [de-identified] : None [de-identified] : 100% [de-identified] : No [de-identified] : Ace wraps [de-identified] : Daily [de-identified] : None [de-identified] : No [de-identified] : No [de-identified] : Normal [de-identified] : None [de-identified] : None [de-identified] : 100%

## 2024-05-22 NOTE — PLAN
[FreeTextEntry1] : leg elevation measured for circaids still waiting for garments arterial study ordered vascular consult ag alginate, DD, abd pad, ace qod f/u 2 wks continue wound care to left heel and off loading  Spent 20 minutes for patient care and medical decision making.

## 2024-05-22 NOTE — REVIEW OF SYSTEMS
[Joint Stiffness] : joint stiffness [Skin Wound] : skin wound [Depression] : depression [Fever] : no fever [Chills] : no chills [Eye Pain] : no eye pain [Loss Of Hearing] : no hearing loss [Shortness Of Breath] : no shortness of breath [Abdominal Pain] : no abdominal pain [Easy Bleeding] : no tendency for easy bleeding [FreeTextEntry5] : HTN, HLD , morbid obesity  [FreeTextEntry9] : Fluid retention and venous insufficiency  [de-identified] : Small diabetic pressure ulcer posterior left heel , skin , subcut and fat , no soi , clean granular  [de-identified] : IDDM with neuropathy  [de-identified] : not compliant with care  [de-identified] : JADEN

## 2024-05-24 ENCOUNTER — NON-APPOINTMENT (OUTPATIENT)
Age: 59
End: 2024-05-24

## 2024-05-28 ENCOUNTER — RX RENEWAL (OUTPATIENT)
Age: 59
End: 2024-05-28

## 2024-05-28 DIAGNOSIS — I11.0 HYPERTENSIVE HEART DISEASE WITH HEART FAILURE: ICD-10-CM

## 2024-05-28 DIAGNOSIS — L84 CORNS AND CALLOSITIES: ICD-10-CM

## 2024-05-28 DIAGNOSIS — Z98.49 CATARACT EXTRACTION STATUS, UNSPECIFIED EYE: ICD-10-CM

## 2024-05-28 DIAGNOSIS — E11.40 TYPE 2 DIABETES MELLITUS WITH DIABETIC NEUROPATHY, UNSPECIFIED: ICD-10-CM

## 2024-05-28 DIAGNOSIS — I48.91 UNSPECIFIED ATRIAL FIBRILLATION: ICD-10-CM

## 2024-05-28 DIAGNOSIS — Z79.01 LONG TERM (CURRENT) USE OF ANTICOAGULANTS: ICD-10-CM

## 2024-05-28 DIAGNOSIS — Z86.718 PERSONAL HISTORY OF OTHER VENOUS THROMBOSIS AND EMBOLISM: ICD-10-CM

## 2024-05-28 DIAGNOSIS — Z90.49 ACQUIRED ABSENCE OF OTHER SPECIFIED PARTS OF DIGESTIVE TRACT: ICD-10-CM

## 2024-05-28 DIAGNOSIS — E11.622 TYPE 2 DIABETES MELLITUS WITH OTHER SKIN ULCER: ICD-10-CM

## 2024-05-28 DIAGNOSIS — L97.812 NON-PRESSURE CHRONIC ULCER OF OTHER PART OF RIGHT LOWER LEG WITH FAT LAYER EXPOSED: ICD-10-CM

## 2024-05-28 DIAGNOSIS — M25.50 PAIN IN UNSPECIFIED JOINT: ICD-10-CM

## 2024-05-28 DIAGNOSIS — Z79.84 LONG TERM (CURRENT) USE OF ORAL HYPOGLYCEMIC DRUGS: ICD-10-CM

## 2024-05-28 DIAGNOSIS — I50.9 HEART FAILURE, UNSPECIFIED: ICD-10-CM

## 2024-05-28 DIAGNOSIS — Z79.4 LONG TERM (CURRENT) USE OF INSULIN: ICD-10-CM

## 2024-05-28 DIAGNOSIS — E66.01 MORBID (SEVERE) OBESITY DUE TO EXCESS CALORIES: ICD-10-CM

## 2024-05-28 DIAGNOSIS — Z79.899 OTHER LONG TERM (CURRENT) DRUG THERAPY: ICD-10-CM

## 2024-05-30 ENCOUNTER — APPOINTMENT (OUTPATIENT)
Dept: WOUND CARE | Facility: HOSPITAL | Age: 59
End: 2024-05-30
Payer: MEDICARE

## 2024-05-30 ENCOUNTER — APPOINTMENT (OUTPATIENT)
Dept: PAIN MANAGEMENT | Facility: CLINIC | Age: 59
End: 2024-05-30

## 2024-05-30 ENCOUNTER — OUTPATIENT (OUTPATIENT)
Dept: OUTPATIENT SERVICES | Facility: HOSPITAL | Age: 59
LOS: 1 days | Discharge: HOME | End: 2024-05-30
Payer: MEDICARE

## 2024-05-30 VITALS
BODY MASS INDEX: 50.26 KG/M2 | SYSTOLIC BLOOD PRESSURE: 119 MMHG | OXYGEN SATURATION: 95 % | HEIGHT: 60 IN | WEIGHT: 256 LBS | TEMPERATURE: 98.2 F | HEART RATE: 85 BPM | DIASTOLIC BLOOD PRESSURE: 80 MMHG | RESPIRATION RATE: 16 BRPM

## 2024-05-30 DIAGNOSIS — Z90.49 ACQUIRED ABSENCE OF OTHER SPECIFIED PARTS OF DIGESTIVE TRACT: Chronic | ICD-10-CM

## 2024-05-30 DIAGNOSIS — Z90.89 ACQUIRED ABSENCE OF OTHER ORGANS: Chronic | ICD-10-CM

## 2024-05-30 DIAGNOSIS — Z98.890 OTHER SPECIFIED POSTPROCEDURAL STATES: Chronic | ICD-10-CM

## 2024-05-30 DIAGNOSIS — L03.115 CELLULITIS OF RIGHT LOWER LIMB: ICD-10-CM

## 2024-05-30 PROCEDURE — G0463: CPT

## 2024-05-30 PROCEDURE — 99214 OFFICE O/P EST MOD 30 MIN: CPT

## 2024-05-30 PROCEDURE — 99213 OFFICE O/P EST LOW 20 MIN: CPT

## 2024-05-30 NOTE — PHYSICAL EXAM
[Normal Thyroid] : the thyroid was normal [Normal Breath Sounds] : Normal breath sounds [Normal Heart Sounds] : normal heart sounds [Normal Rate and Rhythm] : normal rate and rhythm [Ankle Swelling (On Exam)] : present [Ankle Swelling Bilaterally] : bilaterally  [Ankle Swelling On The Left] : moderate [] : of the left leg [Ankle Swelling On The Right] : mild [Skin Ulcer] : ulcer [Skin Induration] : induration [Alert] : alert [Oriented to Person] : oriented to person [Oriented to Place] : oriented to place [Oriented to Time] : oriented to time [Calm] : calm [2 x 2] : 2 x 2  [4 x 4] : 4 x 4  [JVD] : no jugular venous distention  [Abdomen Masses] : No abdominal massess [Abdomen Tenderness] : ~T ~M No abdominal tenderness [Tender] : nontender [Enlarged] : not enlarged [Purpura] : no purpura  [Petechiae] : no petechiae [de-identified] : morbidly obese WF, NAD, alert, Ox3. [de-identified] : Swelling of the lower extremities , with pitting edema [de-identified] : Diabetic pressure ulcer posterior left heel  [de-identified] : serosanguinous [FreeTextEntry7] : lower leg(closed) [de-identified] : heel(calloused) [FreeTextEntry1] : foot plantar base of 5th metatarsal [FreeTextEntry2] : 1.0 [FreeTextEntry3] : 2.0 [FreeTextEntry4] : 0.1 [de-identified] : silver alginate [de-identified] : Mechanically cleansed with normal saline. Applied silver alginate. Covered with 4 x 4. NAD noted. Secured with medipore tape.  [de-identified] : No [de-identified] : None [de-identified] : 100% [de-identified] : 3x Weekly [de-identified] : Primary Dressing [TWNoteComboBox9] : Right [de-identified] : Left [TWNoteComboBox1] : Right [TWNoteComboBox4] : None [TWNoteComboBox5] : No [TWNoteComboBox6] : Other [de-identified] : Normal [de-identified] : No [de-identified] : None [de-identified] : None [de-identified] : 100% [de-identified] : No [de-identified] : 3x Weekly [de-identified] : Primary Dressing

## 2024-05-30 NOTE — PLAN
[FreeTextEntry1] : leg elevation measured for circaids still waiting for garments vascular consult ag alginate, DD, abd pad, ace qod f/u 2 wks continue wound care to left heel and off loading  podiatry for new foot ulcer Spent 20 minutes for patient care and medical decision making.

## 2024-05-30 NOTE — HISTORY OF PRESENT ILLNESS
[FreeTextEntry1] : 59 yo WF, recently hospitalized for RLE VSU cellulitis and discharged on po doxycycline which pt states today that she did not  and just started on it 2 days ago. Here for f/u. Her erythema has lessened since 2 days ago. Encouraged pt to keep legs elevated and to complete the course of po abx. Discussed importance of leg elevation and compression to help in healing and prevention. Advised pt to get comp. stockings. Had recent venous study showing only superficial vein reflux. Has venous insufficiency. 5/22/24 patient has small open spot left lower leg area that weeps. Patient relates having lower leg pain worse with legs up and improved when down. no palpable pedal pulses and weak doppler signals. Unable to get left PT even with doppler. No signs of infection 5/30/24 left lower leg stable. Open spot smaller. Edema and erythema stable. New problem right plantar foot with open ulcer in a diabetic. Will have second encounter with podiatry.  Patients blood sugar 500+  with erythema of off lower legs and new open right foot wound  stressed need to go to ER for treatment

## 2024-05-30 NOTE — ASSESSMENT
[Foot Care] : foot care [Verbal] : Verbal [Written] : Written [Patient] : Patient [Good - alert, interested, motivated] : Good - alert, interested, motivated [Verbalizes knowledge/Understanding] : Verbalizes knowledge/understanding [Dressing changes] : dressing changes [Skin Care] : skin care [Signs and symptoms of infection] : sign and symptoms of infection [How and When to Call] : how and when to call [Stable] : stable [Home] : Home [Ambulatory] : Ambulatory [] : No [FreeTextEntry2] : 1. Maintain skin integrity. 2. Promote wound healing. 3. Maintain glycemic control. 4. Maintain proper nutrition. [FreeTextEntry3] : Patient developed wound on bottom of right foot, base of 5th metatarsal.  [FreeTextEntry4] : Dr. Cantu and Dr. Meek encouraged patient to go to Emergency department to control blood sugars reported by patient in the 500's. Will return to wound care clinic in one week.

## 2024-05-30 NOTE — REVIEW OF SYSTEMS
[Joint Stiffness] : joint stiffness [Skin Wound] : skin wound [Depression] : depression [Fever] : no fever [Chills] : no chills [Eye Pain] : no eye pain [Loss Of Hearing] : no hearing loss [Shortness Of Breath] : no shortness of breath [Abdominal Pain] : no abdominal pain [Easy Bleeding] : no tendency for easy bleeding [FreeTextEntry5] : HTN, HLD , morbid obesity  [FreeTextEntry9] : Fluid retention and venous insufficiency  [de-identified] : Small diabetic pressure ulcer posterior left heel , skin , subcut and fat , no soi , clean granular  [de-identified] : IDDM with neuropathy  [de-identified] : not compliant with care  [de-identified] : JADEN

## 2024-05-30 NOTE — VITALS
[Pain related to present condition?] : The patient's  pain is not related to present condition. [] : No [de-identified] : 10/10 [FreeTextEntry3] : entire body

## 2024-05-31 DIAGNOSIS — E11.621 TYPE 2 DIABETES MELLITUS WITH FOOT ULCER: ICD-10-CM

## 2024-05-31 DIAGNOSIS — I48.91 UNSPECIFIED ATRIAL FIBRILLATION: ICD-10-CM

## 2024-05-31 DIAGNOSIS — Z79.4 LONG TERM (CURRENT) USE OF INSULIN: ICD-10-CM

## 2024-05-31 DIAGNOSIS — Z90.49 ACQUIRED ABSENCE OF OTHER SPECIFIED PARTS OF DIGESTIVE TRACT: ICD-10-CM

## 2024-05-31 DIAGNOSIS — E11.40 TYPE 2 DIABETES MELLITUS WITH DIABETIC NEUROPATHY, UNSPECIFIED: ICD-10-CM

## 2024-05-31 DIAGNOSIS — Z79.01 LONG TERM (CURRENT) USE OF ANTICOAGULANTS: ICD-10-CM

## 2024-05-31 DIAGNOSIS — Z79.899 OTHER LONG TERM (CURRENT) DRUG THERAPY: ICD-10-CM

## 2024-05-31 DIAGNOSIS — E11.622 TYPE 2 DIABETES MELLITUS WITH OTHER SKIN ULCER: ICD-10-CM

## 2024-05-31 DIAGNOSIS — I11.0 HYPERTENSIVE HEART DISEASE WITH HEART FAILURE: ICD-10-CM

## 2024-05-31 DIAGNOSIS — L84 CORNS AND CALLOSITIES: ICD-10-CM

## 2024-05-31 DIAGNOSIS — L97.512 NON-PRESSURE CHRONIC ULCER OF OTHER PART OF RIGHT FOOT WITH FAT LAYER EXPOSED: ICD-10-CM

## 2024-05-31 DIAGNOSIS — Z98.49 CATARACT EXTRACTION STATUS, UNSPECIFIED EYE: ICD-10-CM

## 2024-05-31 DIAGNOSIS — Z86.718 PERSONAL HISTORY OF OTHER VENOUS THROMBOSIS AND EMBOLISM: ICD-10-CM

## 2024-05-31 DIAGNOSIS — Z79.84 LONG TERM (CURRENT) USE OF ORAL HYPOGLYCEMIC DRUGS: ICD-10-CM

## 2024-05-31 DIAGNOSIS — M25.50 PAIN IN UNSPECIFIED JOINT: ICD-10-CM

## 2024-05-31 DIAGNOSIS — I50.9 HEART FAILURE, UNSPECIFIED: ICD-10-CM

## 2024-05-31 DIAGNOSIS — L97.812 NON-PRESSURE CHRONIC ULCER OF OTHER PART OF RIGHT LOWER LEG WITH FAT LAYER EXPOSED: ICD-10-CM

## 2024-05-31 NOTE — HISTORY OF PRESENT ILLNESS
[FreeTextEntry1] : Patient seen for newly open right fifth metatarsal head plantar wound down to skin, subcutaneous tissue, fat.  Patient is unaware of when the wound opened.

## 2024-05-31 NOTE — VITALS
[Pain related to present condition?] : The patient's  pain is not related to present condition. [] : No [de-identified] : 10/10 [FreeTextEntry3] : entire body

## 2024-05-31 NOTE — PLAN
[FreeTextEntry1] : Patient examined and evaluated at this time. Advised patient regarding need to go to the hospital at this time.  Patient remains resistant and hesitant to go stating that she has to take care of personal issues.  Patient related that she would like to go home and will return at a later time today. Patient advised regarding the need for a right fifth met head resection to alleviate the biomechanical etiology.  Discussed the risks, benefits, and potential complications with the patient to her satisfaction.  Patient verbalized understanding at this time. Continue local wound care and offloading. Spent 30 minutes for patient care and medical decision making.

## 2024-05-31 NOTE — REVIEW OF SYSTEMS
[Joint Stiffness] : joint stiffness [Skin Wound] : skin wound [Depression] : depression [Fever] : no fever [Chills] : no chills [Eye Pain] : no eye pain [Loss Of Hearing] : no hearing loss [Shortness Of Breath] : no shortness of breath [Abdominal Pain] : no abdominal pain [Easy Bleeding] : no tendency for easy bleeding [FreeTextEntry5] : HTN, HLD , morbid obesity  [FreeTextEntry9] : Fluid retention and venous insufficiency  [de-identified] : Small diabetic pressure ulcer posterior left heel , skin , subcut and fat , no soi , clean granular  [de-identified] : IDDM with neuropathy  [de-identified] : not compliant with care  [de-identified] : JADEN

## 2024-05-31 NOTE — ASSESSMENT
[] : No [FreeTextEntry2] : 1. Maintain skin integrity. 2. Prevent infection. 3. Maintain glycemic control. 4. Maintain proper nutrition. [FreeTextEntry4] : Encouraged to go to ED by Dr. Meek and Dr. Cantu. Will follow up with wound care center in one week.

## 2024-05-31 NOTE — PHYSICAL EXAM
[Ankle Swelling (On Exam)] : present [Ankle Swelling Bilaterally] : severe [] : of the left leg [Ankle Swelling On The Right] : mild [Skin Ulcer] : ulcer [Skin Induration] : induration [Purpura] : no purpura  [Petechiae] : no petechiae [de-identified] : morbidly obese WF, NAD, alert, Ox3. [de-identified] : Swelling of the lower extremities , with pitting edema [de-identified] : Right foot submet 5 wound on skin, subcutaneous tissue, fat [de-identified] : Loss of light touch sensation bilaterally [de-identified] : serosanguinous [FreeTextEntry7] : lower leg(closed) [de-identified] : lower heal(calloused) [FreeTextEntry1] : plantar base of 5th metatarsal [FreeTextEntry2] : 1.0 [FreeTextEntry3] : 2.0 [FreeTextEntry4] : 0.1 [de-identified] : silver alginate [de-identified] : Mechanically cleansed with normal saline. Applied silver alginate, DSD, and kerlix with tape. [de-identified] : None [de-identified] : 100% [de-identified] : 3x Weekly [TWNoteComboBox9] : Right [de-identified] : Left [TWNoteComboBox1] : Right [TWNoteComboBox4] : None [TWNoteComboBox5] : No [TWNoteComboBox6] : Other [de-identified] : No [de-identified] : Erythema [de-identified] : None [de-identified] : None [de-identified] : 100% [de-identified] : No [de-identified] : 3x Weekly [de-identified] : Primary Dressing

## 2024-06-03 ENCOUNTER — NON-APPOINTMENT (OUTPATIENT)
Age: 59
End: 2024-06-03

## 2024-06-05 ENCOUNTER — OUTPATIENT (OUTPATIENT)
Dept: OUTPATIENT SERVICES | Facility: HOSPITAL | Age: 59
LOS: 1 days | Discharge: HOME | End: 2024-06-05
Payer: MEDICARE

## 2024-06-05 ENCOUNTER — APPOINTMENT (OUTPATIENT)
Dept: WOUND CARE | Facility: HOSPITAL | Age: 59
End: 2024-06-05
Payer: MEDICARE

## 2024-06-05 VITALS
WEIGHT: 256 LBS | SYSTOLIC BLOOD PRESSURE: 113 MMHG | BODY MASS INDEX: 50.26 KG/M2 | DIASTOLIC BLOOD PRESSURE: 64 MMHG | HEART RATE: 87 BPM | OXYGEN SATURATION: 95 % | RESPIRATION RATE: 18 BRPM | HEIGHT: 60 IN | TEMPERATURE: 99.1 F

## 2024-06-05 DIAGNOSIS — L03.115 CELLULITIS OF RIGHT LOWER LIMB: ICD-10-CM

## 2024-06-05 DIAGNOSIS — L97.512 NON-PRESSURE CHRONIC ULCER OF OTHER PART OF RIGHT FOOT WITH FAT LAYER EXPOSED: ICD-10-CM

## 2024-06-05 DIAGNOSIS — L97.812 NON-PRESSURE CHRONIC ULCER OF OTHER PART OF RIGHT LOWER LEG WITH FAT LAYER EXPOSED: ICD-10-CM

## 2024-06-05 DIAGNOSIS — L84 CORNS AND CALLOSITIES: ICD-10-CM

## 2024-06-05 DIAGNOSIS — E11.621 TYPE 2 DIABETES MELLITUS WITH FOOT ULCER: ICD-10-CM

## 2024-06-05 DIAGNOSIS — L97.529 TYPE 2 DIABETES MELLITUS WITH FOOT ULCER: ICD-10-CM

## 2024-06-05 DIAGNOSIS — Z90.49 ACQUIRED ABSENCE OF OTHER SPECIFIED PARTS OF DIGESTIVE TRACT: Chronic | ICD-10-CM

## 2024-06-05 DIAGNOSIS — Z90.89 ACQUIRED ABSENCE OF OTHER ORGANS: Chronic | ICD-10-CM

## 2024-06-05 DIAGNOSIS — Z98.890 OTHER SPECIFIED POSTPROCEDURAL STATES: Chronic | ICD-10-CM

## 2024-06-05 PROCEDURE — 99214 OFFICE O/P EST MOD 30 MIN: CPT

## 2024-06-05 PROCEDURE — G0463: CPT

## 2024-06-05 RX ORDER — AMOXICILLIN AND CLAVULANATE POTASSIUM 875; 125 MG/1; MG/1
875-125 TABLET, COATED ORAL
Qty: 14 | Refills: 0 | Status: COMPLETED | COMMUNITY
Start: 2024-06-05 | End: 2024-06-12

## 2024-06-05 RX ADMIN — Medication 0.5 MILLIGRAM(S): at 23:02

## 2024-06-05 NOTE — HISTORY OF PRESENT ILLNESS
[FreeTextEntry1] : Patient seen for newly open right fifth metatarsal head plantar wound down to skin, subcutaneous tissue, fat.  Patient is unaware of when the wound opened.  6/5/2024 patient seen for follow-up right fifth metatarsal head plantar wound down to skin, subcutaneous tissue, fat.  Patient also seen for follow-up of right lower leg wound at skin, subcutaneous tissue, fat.

## 2024-06-05 NOTE — PLAN
[FreeTextEntry1] : Patient examined and evaluated at this time. Advised patient regarding need to go to the hospital at this time.  Patient relates that she has an appointment for a urology test on Saturday morning which was difficult to obtain.  Patient relates that she will report to the hospital after her urologist appointment. Patient advised regarding the need for a right fifth met head resection to alleviate the biomechanical etiology.  Discussed the risks, benefits, and potential complications with the patient to her satisfaction.  Patient verbalized understanding at this time. Prescription for oral antibiotic sent to pharmacy at this time. Continue local wound care and offloading. Spent 30 minutes for patient care and medical decision making.

## 2024-06-05 NOTE — PHYSICAL EXAM
[4 x 4] : 4 x 4  [Ankle Swelling (On Exam)] : present [Ankle Swelling Bilaterally] : severe [] : of the left leg [Ankle Swelling On The Right] : mild [Purpura] : no purpura  [Petechiae] : no petechiae [Skin Ulcer] : ulcer [Skin Induration] : induration [de-identified] : morbidly obese WF, NAD, alert, Ox3. [de-identified] : Swelling of the lower extremities , with pitting edema [de-identified] : Right foot submet 5 wound and right lower leg wound down to skin, subcutaneous tissue, fat [de-identified] : Loss of light touch sensation bilaterally [FreeTextEntry7] : Right lower leg  [FreeTextEntry8] : 0.4 [FreeTextEntry9] : 0.3 [de-identified] : 0.1 [de-identified] : Serous/sanguinous [de-identified] : Mild  [de-identified] : Silver alginate [de-identified] : Mechanically cleansed with sterile gauze and normal saline. Cloth tape [FreeTextEntry1] : Right plantar foot - base of 5th metatarsal   [FreeTextEntry2] : 1.3 [FreeTextEntry3] : 2.3 [FreeTextEntry4] : 0.3 with hypergranulation.  [de-identified] : Serous/sanguinous [de-identified] : callus and mild erythema  [de-identified] : Silver alginate [de-identified] : Mechanically cleansed with sterile gauze and normal saline. Kerlix  [de-identified] : Small [de-identified] : Erythema [de-identified] : None [de-identified] : None [de-identified] : 100% [de-identified] : No [de-identified] : 2x Weekly [de-identified] : Primary Dressing [TWNoteComboBox4] : Small [de-identified] : other [de-identified] : None [de-identified] : None [de-identified] : >75% [de-identified] : No [de-identified] : 2x Weekly [de-identified] : Primary Dressing

## 2024-06-05 NOTE — VITALS
[Pain related to present condition?] : The patient's  pain is related to present condition. [Throbbing] : throbbing [Occasional] : occasional [] : No [de-identified] : 10/10 [FreeTextEntry3] : Bilateral legs  [FreeTextEntry1] : Pain comes and goes randomly.  [FreeTextEntry2] : Pain comes and goes randomly.  [FreeTextEntry4] : Suggest patient go to ED for pain management. Pt refused.

## 2024-06-05 NOTE — REVIEW OF SYSTEMS
[Fever] : no fever [Chills] : no chills [Eye Pain] : no eye pain [Loss Of Hearing] : no hearing loss [Shortness Of Breath] : no shortness of breath [Abdominal Pain] : no abdominal pain [Joint Stiffness] : joint stiffness [Skin Wound] : skin wound [Depression] : depression [Easy Bleeding] : no tendency for easy bleeding [FreeTextEntry5] : HTN, HLD , morbid obesity  [FreeTextEntry9] : Fluid retention and venous insufficiency  [de-identified] : Small diabetic pressure ulcer posterior left heel , skin , subcut and fat , no soi , clean granular  [de-identified] : IDDM with neuropathy  [de-identified] : not compliant with care  [de-identified] : JADEN

## 2024-06-05 NOTE — ASSESSMENT
[Verbal] : Verbal [Written] : Written [Demo] : Demo [Patient] : Patient [Other: ___] : [unfilled] [Poor - depression, low motivation, poor retention, non-acceptance of disease] : Poor - depression, low motivation, poor retention, non-acceptance of disease [Needs reinforcement] : needs reinforcement [Dressing changes] : dressing changes [Foot Care] : foot care [Skin Care] : skin care [Signs and symptoms of infection] : sign and symptoms of infection [Surgery] : surgery [Nutrition] : nutrition [How and When to Call] : how and when to call [Pain Management] : pain management [Off-loading] : off-loading [Hospitalization] : hospitalization [Patient responsibility to plan of care] : patient responsibility to plan of care [Glycemic Control] : glycemic control [Stable] : stable [Home] : Home [Ambulatory] : Ambulatory [Not Applicable - Long Term Care/Home Health Agency] : Long Term Care/Home Health Agency: Not Applicable [] : No [FreeTextEntry2] : Infection prevention Localized wound care  Goal remaining pain free regarding wounds Weight loss  [FreeTextEntry3] : Wounds the same in status  [FreeTextEntry4] : Patient to return to [facility] 6/8/24 to be admitted to [place] ED Augmentin E scribed.

## 2024-06-07 DIAGNOSIS — I50.9 HEART FAILURE, UNSPECIFIED: ICD-10-CM

## 2024-06-07 DIAGNOSIS — Z79.4 LONG TERM (CURRENT) USE OF INSULIN: ICD-10-CM

## 2024-06-07 DIAGNOSIS — Z86.718 PERSONAL HISTORY OF OTHER VENOUS THROMBOSIS AND EMBOLISM: ICD-10-CM

## 2024-06-07 DIAGNOSIS — L84 CORNS AND CALLOSITIES: ICD-10-CM

## 2024-06-07 DIAGNOSIS — L97.512 NON-PRESSURE CHRONIC ULCER OF OTHER PART OF RIGHT FOOT WITH FAT LAYER EXPOSED: ICD-10-CM

## 2024-06-07 DIAGNOSIS — I48.91 UNSPECIFIED ATRIAL FIBRILLATION: ICD-10-CM

## 2024-06-07 DIAGNOSIS — Z79.84 LONG TERM (CURRENT) USE OF ORAL HYPOGLYCEMIC DRUGS: ICD-10-CM

## 2024-06-07 DIAGNOSIS — Z98.49 CATARACT EXTRACTION STATUS, UNSPECIFIED EYE: ICD-10-CM

## 2024-06-07 DIAGNOSIS — I11.0 HYPERTENSIVE HEART DISEASE WITH HEART FAILURE: ICD-10-CM

## 2024-06-07 DIAGNOSIS — E11.622 TYPE 2 DIABETES MELLITUS WITH OTHER SKIN ULCER: ICD-10-CM

## 2024-06-07 DIAGNOSIS — Z79.899 OTHER LONG TERM (CURRENT) DRUG THERAPY: ICD-10-CM

## 2024-06-07 DIAGNOSIS — M25.50 PAIN IN UNSPECIFIED JOINT: ICD-10-CM

## 2024-06-07 DIAGNOSIS — L97.812 NON-PRESSURE CHRONIC ULCER OF OTHER PART OF RIGHT LOWER LEG WITH FAT LAYER EXPOSED: ICD-10-CM

## 2024-06-07 DIAGNOSIS — Z90.49 ACQUIRED ABSENCE OF OTHER SPECIFIED PARTS OF DIGESTIVE TRACT: ICD-10-CM

## 2024-06-07 DIAGNOSIS — E11.40 TYPE 2 DIABETES MELLITUS WITH DIABETIC NEUROPATHY, UNSPECIFIED: ICD-10-CM

## 2024-06-07 DIAGNOSIS — Z79.01 LONG TERM (CURRENT) USE OF ANTICOAGULANTS: ICD-10-CM

## 2024-06-07 DIAGNOSIS — E11.621 TYPE 2 DIABETES MELLITUS WITH FOOT ULCER: ICD-10-CM

## 2024-06-08 ENCOUNTER — APPOINTMENT (OUTPATIENT)
Dept: CT IMAGING | Facility: CLINIC | Age: 59
End: 2024-06-08
Payer: MEDICARE

## 2024-06-08 PROCEDURE — 74178 CT ABD&PLV WO CNTR FLWD CNTR: CPT

## 2024-06-09 ENCOUNTER — INPATIENT (INPATIENT)
Facility: HOSPITAL | Age: 59
LOS: 5 days | Discharge: HOME | DRG: 603 | End: 2024-06-15
Attending: STUDENT IN AN ORGANIZED HEALTH CARE EDUCATION/TRAINING PROGRAM | Admitting: INTERNAL MEDICINE
Payer: MEDICARE

## 2024-06-09 VITALS
RESPIRATION RATE: 18 BRPM | TEMPERATURE: 98 F | OXYGEN SATURATION: 96 % | SYSTOLIC BLOOD PRESSURE: 106 MMHG | HEIGHT: 61 IN | WEIGHT: 259.93 LBS | DIASTOLIC BLOOD PRESSURE: 67 MMHG | HEART RATE: 85 BPM

## 2024-06-09 DIAGNOSIS — R30.0 DYSURIA: ICD-10-CM

## 2024-06-09 DIAGNOSIS — L03.90 CELLULITIS, UNSPECIFIED: ICD-10-CM

## 2024-06-09 DIAGNOSIS — Z90.89 ACQUIRED ABSENCE OF OTHER ORGANS: Chronic | ICD-10-CM

## 2024-06-09 DIAGNOSIS — Z90.49 ACQUIRED ABSENCE OF OTHER SPECIFIED PARTS OF DIGESTIVE TRACT: Chronic | ICD-10-CM

## 2024-06-09 DIAGNOSIS — I50.9 HEART FAILURE, UNSPECIFIED: ICD-10-CM

## 2024-06-09 DIAGNOSIS — E11.9 TYPE 2 DIABETES MELLITUS WITHOUT COMPLICATIONS: ICD-10-CM

## 2024-06-09 DIAGNOSIS — Z29.9 ENCOUNTER FOR PROPHYLACTIC MEASURES, UNSPECIFIED: ICD-10-CM

## 2024-06-09 DIAGNOSIS — G62.9 POLYNEUROPATHY, UNSPECIFIED: ICD-10-CM

## 2024-06-09 DIAGNOSIS — Z98.890 OTHER SPECIFIED POSTPROCEDURAL STATES: Chronic | ICD-10-CM

## 2024-06-09 DIAGNOSIS — R10.9 UNSPECIFIED ABDOMINAL PAIN: ICD-10-CM

## 2024-06-09 DIAGNOSIS — I48.91 UNSPECIFIED ATRIAL FIBRILLATION: ICD-10-CM

## 2024-06-09 LAB
ALBUMIN SERPL ELPH-MCNC: 3.4 G/DL — SIGNIFICANT CHANGE UP (ref 3.3–5)
ALP SERPL-CCNC: 105 U/L — SIGNIFICANT CHANGE UP (ref 40–120)
ALT FLD-CCNC: 22 U/L — SIGNIFICANT CHANGE UP (ref 12–78)
ANION GAP SERPL CALC-SCNC: 7 MMOL/L — SIGNIFICANT CHANGE UP (ref 5–17)
APTT BLD: 31.5 SEC — SIGNIFICANT CHANGE UP (ref 24.5–35.6)
AST SERPL-CCNC: 21 U/L — SIGNIFICANT CHANGE UP (ref 15–37)
BASOPHILS # BLD AUTO: 0.03 K/UL — SIGNIFICANT CHANGE UP (ref 0–0.2)
BASOPHILS NFR BLD AUTO: 0.4 % — SIGNIFICANT CHANGE UP (ref 0–2)
BILIRUB SERPL-MCNC: 0.6 MG/DL — SIGNIFICANT CHANGE UP (ref 0.2–1.2)
BUN SERPL-MCNC: 16 MG/DL — SIGNIFICANT CHANGE UP (ref 7–23)
CALCIUM SERPL-MCNC: 9.7 MG/DL — SIGNIFICANT CHANGE UP (ref 8.5–10.1)
CHLORIDE SERPL-SCNC: 101 MMOL/L — SIGNIFICANT CHANGE UP (ref 96–108)
CO2 SERPL-SCNC: 28 MMOL/L — SIGNIFICANT CHANGE UP (ref 22–31)
CREAT SERPL-MCNC: 0.9 MG/DL — SIGNIFICANT CHANGE UP (ref 0.5–1.3)
CRP SERPL-MCNC: 23 MG/L — HIGH
EGFR: 73 ML/MIN/1.73M2 — SIGNIFICANT CHANGE UP
EGFR: 73 ML/MIN/1.73M2 — SIGNIFICANT CHANGE UP
EOSINOPHIL # BLD AUTO: 0.11 K/UL — SIGNIFICANT CHANGE UP (ref 0–0.5)
EOSINOPHIL NFR BLD AUTO: 1.5 % — SIGNIFICANT CHANGE UP (ref 0–6)
ERYTHROCYTE [SEDIMENTATION RATE] IN BLOOD: 34 MM/HR — HIGH (ref 0–20)
GLUCOSE SERPL-MCNC: 251 MG/DL — HIGH (ref 70–99)
HCT VFR BLD CALC: 38.8 % — SIGNIFICANT CHANGE UP (ref 34.5–45)
HGB BLD-MCNC: 13 G/DL — SIGNIFICANT CHANGE UP (ref 11.5–15.5)
IMM GRANULOCYTES NFR BLD AUTO: 0.6 % — SIGNIFICANT CHANGE UP (ref 0–0.9)
INR BLD: 1.58 RATIO — HIGH (ref 0.85–1.18)
LACTATE SERPL-SCNC: 2.2 MMOL/L — HIGH (ref 0.7–2)
LACTATE SERPL-SCNC: 2.2 MMOL/L — HIGH (ref 0.7–2)
LIDOCAIN IGE QN: 18 U/L — SIGNIFICANT CHANGE UP (ref 13–75)
LYMPHOCYTES # BLD AUTO: 1.31 K/UL — SIGNIFICANT CHANGE UP (ref 1–3.3)
LYMPHOCYTES # BLD AUTO: 18.3 % — SIGNIFICANT CHANGE UP (ref 13–44)
MAGNESIUM SERPL-MCNC: 2.3 MG/DL — SIGNIFICANT CHANGE UP (ref 1.6–2.6)
MCHC RBC-ENTMCNC: 30.4 PG — SIGNIFICANT CHANGE UP (ref 27–34)
MCHC RBC-ENTMCNC: 33.5 GM/DL — SIGNIFICANT CHANGE UP (ref 32–36)
MCV RBC AUTO: 90.7 FL — SIGNIFICANT CHANGE UP (ref 80–100)
MONOCYTES # BLD AUTO: 0.5 K/UL — SIGNIFICANT CHANGE UP (ref 0–0.9)
MONOCYTES NFR BLD AUTO: 7 % — SIGNIFICANT CHANGE UP (ref 2–14)
NEUTROPHILS # BLD AUTO: 5.15 K/UL — SIGNIFICANT CHANGE UP (ref 1.8–7.4)
NEUTROPHILS NFR BLD AUTO: 72.2 % — SIGNIFICANT CHANGE UP (ref 43–77)
NRBC # BLD: 0 /100 WBCS — SIGNIFICANT CHANGE UP (ref 0–0)
NRBC BLD-RTO: 0 /100 WBCS — SIGNIFICANT CHANGE UP (ref 0–0)
PHOSPHATE SERPL-MCNC: 2.8 MG/DL — SIGNIFICANT CHANGE UP (ref 2.5–4.5)
PLATELET # BLD AUTO: 243 K/UL — SIGNIFICANT CHANGE UP (ref 150–400)
POTASSIUM SERPL-MCNC: 4.3 MMOL/L — SIGNIFICANT CHANGE UP (ref 3.5–5.3)
POTASSIUM SERPL-SCNC: 4.3 MMOL/L — SIGNIFICANT CHANGE UP (ref 3.5–5.3)
PROT SERPL-MCNC: 7.8 G/DL — SIGNIFICANT CHANGE UP (ref 6–8.3)
PROTHROM AB SERPL-ACNC: 18.2 SEC — HIGH (ref 9.5–13)
RBC # BLD: 4.28 M/UL — SIGNIFICANT CHANGE UP (ref 3.8–5.2)
RBC # FLD: 13.6 % — SIGNIFICANT CHANGE UP (ref 10.3–14.5)
SODIUM SERPL-SCNC: 136 MMOL/L — SIGNIFICANT CHANGE UP (ref 135–145)
WBC # BLD: 7.14 K/UL — SIGNIFICANT CHANGE UP (ref 3.8–10.5)
WBC # FLD AUTO: 7.14 K/UL — SIGNIFICANT CHANGE UP (ref 3.8–10.5)

## 2024-06-09 PROCEDURE — 73590 X-RAY EXAM OF LOWER LEG: CPT | Mod: 26,RT

## 2024-06-09 PROCEDURE — 99285 EMERGENCY DEPT VISIT HI MDM: CPT

## 2024-06-09 PROCEDURE — 71045 X-RAY EXAM CHEST 1 VIEW: CPT | Mod: 26

## 2024-06-09 PROCEDURE — 93010 ELECTROCARDIOGRAM REPORT: CPT

## 2024-06-09 PROCEDURE — 73630 X-RAY EXAM OF FOOT: CPT | Mod: 26,RT

## 2024-06-09 PROCEDURE — 99222 1ST HOSP IP/OBS MODERATE 55: CPT | Mod: GC

## 2024-06-09 RX ORDER — METOPROLOL SUCCINATE 50 MG/1
100 TABLET, EXTENDED RELEASE ORAL DAILY
Refills: 0 | Status: DISCONTINUED | OUTPATIENT
Start: 2024-06-09 | End: 2024-06-11

## 2024-06-09 RX ORDER — POLYETHYLENE GLYCOL 3350 17 G/17G
17 POWDER, FOR SOLUTION ORAL DAILY
Refills: 0 | Status: DISCONTINUED | OUTPATIENT
Start: 2024-06-09 | End: 2024-06-11

## 2024-06-09 RX ORDER — INSULIN GLARGINE-YFGN 100 [IU]/ML
20 INJECTION, SOLUTION SUBCUTANEOUS AT BEDTIME
Refills: 0 | Status: DISCONTINUED | OUTPATIENT
Start: 2024-06-09 | End: 2024-06-11

## 2024-06-09 RX ORDER — SOD PHOS DI, MONO/K PHOS MONO 250 MG
1 TABLET ORAL ONCE
Refills: 0 | Status: COMPLETED | OUTPATIENT
Start: 2024-06-09 | End: 2024-06-09

## 2024-06-09 RX ORDER — CEFEPIME 2 G/20ML
2000 INJECTION, POWDER, FOR SOLUTION INTRAVENOUS EVERY 8 HOURS
Refills: 0 | Status: DISCONTINUED | OUTPATIENT
Start: 2024-06-09 | End: 2024-06-11

## 2024-06-09 RX ORDER — VANCOMYCIN HCL IN 5 % DEXTROSE 1.5G/250ML
750 PLASTIC BAG, INJECTION (ML) INTRAVENOUS EVERY 12 HOURS
Refills: 0 | Status: DISCONTINUED | OUTPATIENT
Start: 2024-06-10 | End: 2024-06-11

## 2024-06-09 RX ORDER — BUTYROSPERMUM PARKII(SHEA BUTTER), SIMMONDSIA CHINENSIS (JOJOBA) SEED OIL, ALOE BARBADENSIS LEAF EXTRACT .01; 1; 3.5 G/100G; G/100G; G/100G
250 LIQUID TOPICAL
Refills: 0 | Status: DISCONTINUED | OUTPATIENT
Start: 2024-06-09 | End: 2024-06-11

## 2024-06-09 RX ORDER — INSULIN LISPRO 100 U/ML
INJECTION, SOLUTION INTRAVENOUS; SUBCUTANEOUS AT BEDTIME
Refills: 0 | Status: DISCONTINUED | OUTPATIENT
Start: 2024-06-09 | End: 2024-06-11

## 2024-06-09 RX ORDER — SODIUM CHLORIDE 9 G/1000ML
1000 INJECTION, SOLUTION INTRAVENOUS
Refills: 0 | Status: DISCONTINUED | OUTPATIENT
Start: 2024-06-09 | End: 2024-06-11

## 2024-06-09 RX ORDER — ONDANSETRON HCL/PF 4 MG/2 ML
4 VIAL (ML) INJECTION ONCE
Refills: 0 | Status: COMPLETED | OUTPATIENT
Start: 2024-06-09 | End: 2024-06-09

## 2024-06-09 RX ORDER — ACETAMINOPHEN 500 MG/5ML
650 LIQUID (ML) ORAL EVERY 6 HOURS
Refills: 0 | Status: DISCONTINUED | OUTPATIENT
Start: 2024-06-09 | End: 2024-06-09

## 2024-06-09 RX ORDER — INSULIN LISPRO 100 U/ML
10 INJECTION, SOLUTION INTRAVENOUS; SUBCUTANEOUS
Refills: 0 | Status: DISCONTINUED | OUTPATIENT
Start: 2024-06-09 | End: 2024-06-11

## 2024-06-09 RX ORDER — RIVAROXABAN 10 MG/1
20 TABLET, FILM COATED ORAL
Refills: 0 | Status: DISCONTINUED | OUTPATIENT
Start: 2024-06-09 | End: 2024-06-11

## 2024-06-09 RX ORDER — ACETAMINOPHEN 500 MG/5ML
650 LIQUID (ML) ORAL EVERY 6 HOURS
Refills: 0 | Status: DISCONTINUED | OUTPATIENT
Start: 2024-06-10 | End: 2024-06-11

## 2024-06-09 RX ORDER — VANCOMYCIN HCL IN 5 % DEXTROSE 1.5G/250ML
1000 PLASTIC BAG, INJECTION (ML) INTRAVENOUS ONCE
Refills: 0 | Status: COMPLETED | OUTPATIENT
Start: 2024-06-09 | End: 2024-06-09

## 2024-06-09 RX ORDER — DEXTROSE 50 % IN WATER 50 %
12.5 SYRINGE (ML) INTRAVENOUS ONCE
Refills: 0 | Status: DISCONTINUED | OUTPATIENT
Start: 2024-06-09 | End: 2024-06-11

## 2024-06-09 RX ORDER — HYDROMORPHONE/SOD CHLOR,ISO/PF 2 MG/10 ML
0.5 SYRINGE (ML) INJECTION EVERY 4 HOURS
Refills: 0 | Status: DISCONTINUED | OUTPATIENT
Start: 2024-06-09 | End: 2024-06-11

## 2024-06-09 RX ORDER — DEXTROSE MONOHYDRATE 100 G/1000ML
125 INJECTION, SOLUTION INTRAVENOUS ONCE
Refills: 0 | Status: DISCONTINUED | OUTPATIENT
Start: 2024-06-09 | End: 2024-06-11

## 2024-06-09 RX ORDER — INSULIN LISPRO 100 U/ML
INJECTION, SOLUTION INTRAVENOUS; SUBCUTANEOUS
Refills: 0 | Status: DISCONTINUED | OUTPATIENT
Start: 2024-06-09 | End: 2024-06-11

## 2024-06-09 RX ORDER — TAMSULOSIN HYDROCHLORIDE 0.4 MG/1
0.4 CAPSULE ORAL AT BEDTIME
Refills: 0 | Status: DISCONTINUED | OUTPATIENT
Start: 2024-06-09 | End: 2024-06-11

## 2024-06-09 RX ORDER — MELATONIN 5 MG
3 TABLET ORAL AT BEDTIME
Refills: 0 | Status: DISCONTINUED | OUTPATIENT
Start: 2024-06-09 | End: 2024-06-11

## 2024-06-09 RX ORDER — METOCLOPRAMIDE HCL 10 MG
10 TABLET ORAL EVERY 4 HOURS
Refills: 0 | Status: DISCONTINUED | OUTPATIENT
Start: 2024-06-09 | End: 2024-06-11

## 2024-06-09 RX ORDER — GLUCAGON 3 MG/1
1 POWDER NASAL ONCE
Refills: 0 | Status: DISCONTINUED | OUTPATIENT
Start: 2024-06-09 | End: 2024-06-11

## 2024-06-09 RX ORDER — GABAPENTIN 400 MG/1
300 CAPSULE ORAL DAILY
Refills: 0 | Status: DISCONTINUED | OUTPATIENT
Start: 2024-06-09 | End: 2024-06-11

## 2024-06-09 RX ORDER — DEXTROSE 50 % IN WATER 50 %
15 SYRINGE (ML) INTRAVENOUS ONCE
Refills: 0 | Status: DISCONTINUED | OUTPATIENT
Start: 2024-06-09 | End: 2024-06-11

## 2024-06-09 RX ORDER — TORSEMIDE 20 MG/1
60 TABLET ORAL DAILY
Refills: 0 | Status: DISCONTINUED | OUTPATIENT
Start: 2024-06-09 | End: 2024-06-11

## 2024-06-09 RX ORDER — DEXTROSE 50 % IN WATER 50 %
25 SYRINGE (ML) INTRAVENOUS ONCE
Refills: 0 | Status: DISCONTINUED | OUTPATIENT
Start: 2024-06-09 | End: 2024-06-11

## 2024-06-09 RX ORDER — OXYCODONE HYDROCHLORIDE AND ACETAMINOPHEN 10; 325 MG/1; MG/1
1 TABLET ORAL EVERY 6 HOURS
Refills: 0 | Status: DISCONTINUED | OUTPATIENT
Start: 2024-06-09 | End: 2024-06-09

## 2024-06-09 RX ORDER — ACETAMINOPHEN 500 MG/5ML
1000 LIQUID (ML) ORAL EVERY 6 HOURS
Refills: 0 | Status: COMPLETED | OUTPATIENT
Start: 2024-06-09 | End: 2024-06-10

## 2024-06-09 RX ORDER — KETOROLAC TROMETHAMINE 30 MG/ML
30 INJECTION, SOLUTION INTRAMUSCULAR; INTRAVENOUS EVERY 6 HOURS
Refills: 0 | Status: DISCONTINUED | OUTPATIENT
Start: 2024-06-09 | End: 2024-06-11

## 2024-06-09 RX ORDER — SENNA 187 MG
2 TABLET ORAL AT BEDTIME
Refills: 0 | Status: DISCONTINUED | OUTPATIENT
Start: 2024-06-09 | End: 2024-06-11

## 2024-06-09 RX ORDER — CEFEPIME 2 G/20ML
2000 INJECTION, POWDER, FOR SOLUTION INTRAVENOUS ONCE
Refills: 0 | Status: COMPLETED | OUTPATIENT
Start: 2024-06-09 | End: 2024-06-09

## 2024-06-09 RX ADMIN — Medication 1000 MILLIGRAM(S): at 23:42

## 2024-06-09 RX ADMIN — CEFEPIME 100 MILLIGRAM(S): 2 INJECTION, POWDER, FOR SOLUTION INTRAVENOUS at 21:21

## 2024-06-09 RX ADMIN — Medication 1000 MILLIGRAM(S): at 14:21

## 2024-06-09 RX ADMIN — KETOROLAC TROMETHAMINE 30 MILLIGRAM(S): 30 INJECTION, SOLUTION INTRAMUSCULAR; INTRAVENOUS at 23:22

## 2024-06-09 RX ADMIN — KETOROLAC TROMETHAMINE 30 MILLIGRAM(S): 30 INJECTION, SOLUTION INTRAMUSCULAR; INTRAVENOUS at 22:37

## 2024-06-09 RX ADMIN — CEFEPIME 2000 MILLIGRAM(S): 2 INJECTION, POWDER, FOR SOLUTION INTRAVENOUS at 13:05

## 2024-06-09 RX ADMIN — Medication 4 MILLIGRAM(S): at 12:34

## 2024-06-09 RX ADMIN — Medication 2 TABLET(S): at 21:20

## 2024-06-09 RX ADMIN — INSULIN GLARGINE-YFGN 20 UNIT(S): 100 INJECTION, SOLUTION SUBCUTANEOUS at 21:41

## 2024-06-09 RX ADMIN — RIVAROXABAN 20 MILLIGRAM(S): 10 TABLET, FILM COATED ORAL at 16:44

## 2024-06-09 RX ADMIN — Medication 0.5 MILLIGRAM(S): at 21:20

## 2024-06-09 RX ADMIN — Medication 1 PACKET(S): at 16:44

## 2024-06-09 RX ADMIN — CEFEPIME 100 MILLIGRAM(S): 2 INJECTION, POWDER, FOR SOLUTION INTRAVENOUS at 12:34

## 2024-06-09 RX ADMIN — Medication 400 MILLIGRAM(S): at 18:47

## 2024-06-09 RX ADMIN — Medication 1000 MILLILITER(S): at 19:58

## 2024-06-09 RX ADMIN — KETOROLAC TROMETHAMINE 30 MILLIGRAM(S): 30 INJECTION, SOLUTION INTRAMUSCULAR; INTRAVENOUS at 18:47

## 2024-06-09 RX ADMIN — Medication 4 MILLIGRAM(S): at 13:05

## 2024-06-09 RX ADMIN — KETOROLAC TROMETHAMINE 30 MILLIGRAM(S): 30 INJECTION, SOLUTION INTRAMUSCULAR; INTRAVENOUS at 19:15

## 2024-06-09 RX ADMIN — INSULIN LISPRO 2: 100 INJECTION, SOLUTION INTRAVENOUS; SUBCUTANEOUS at 18:55

## 2024-06-09 RX ADMIN — Medication 1000 MILLIGRAM(S): at 19:15

## 2024-06-09 RX ADMIN — Medication 400 MILLIGRAM(S): at 23:26

## 2024-06-09 RX ADMIN — Medication 250 MILLIGRAM(S): at 13:13

## 2024-06-09 RX ADMIN — Medication 0.5 MILLIGRAM(S): at 21:35

## 2024-06-09 RX ADMIN — BUTYROSPERMUM PARKII(SHEA BUTTER), SIMMONDSIA CHINENSIS (JOJOBA) SEED OIL, ALOE BARBADENSIS LEAF EXTRACT 250 MILLIGRAM(S): .01; 1; 3.5 LIQUID TOPICAL at 18:47

## 2024-06-09 RX ADMIN — TAMSULOSIN HYDROCHLORIDE 0.4 MILLIGRAM(S): 0.4 CAPSULE ORAL at 21:20

## 2024-06-09 RX ADMIN — Medication 250 MILLIGRAM(S): at 23:45

## 2024-06-09 RX ADMIN — Medication 0.5 MILLIGRAM(S): at 17:40

## 2024-06-09 RX ADMIN — Medication 0.5 MILLIGRAM(S): at 16:48

## 2024-06-10 ENCOUNTER — TRANSCRIPTION ENCOUNTER (OUTPATIENT)
Age: 59
End: 2024-06-10

## 2024-06-10 DIAGNOSIS — E11.65 TYPE 2 DIABETES MELLITUS WITH HYPERGLYCEMIA: ICD-10-CM

## 2024-06-10 DIAGNOSIS — E11.628 TYPE 2 DIABETES MELLITUS WITH OTHER SKIN COMPLICATIONS: ICD-10-CM

## 2024-06-10 LAB
A1C WITH ESTIMATED AVERAGE GLUCOSE RESULT: 12.2 % — HIGH (ref 4–5.6)
ALBUMIN SERPL ELPH-MCNC: 3.1 G/DL — LOW (ref 3.3–5)
ALP SERPL-CCNC: 96 U/L — SIGNIFICANT CHANGE UP (ref 40–120)
ALT FLD-CCNC: 18 U/L — SIGNIFICANT CHANGE UP (ref 12–78)
ANION GAP SERPL CALC-SCNC: 8 MMOL/L — SIGNIFICANT CHANGE UP (ref 5–17)
AST SERPL-CCNC: 16 U/L — SIGNIFICANT CHANGE UP (ref 15–37)
BASOPHILS # BLD AUTO: 0.03 K/UL — SIGNIFICANT CHANGE UP (ref 0–0.2)
BASOPHILS NFR BLD AUTO: 0.6 % — SIGNIFICANT CHANGE UP (ref 0–2)
BILIRUB SERPL-MCNC: 0.7 MG/DL — SIGNIFICANT CHANGE UP (ref 0.2–1.2)
BUN SERPL-MCNC: 20 MG/DL — SIGNIFICANT CHANGE UP (ref 7–23)
CALCIUM SERPL-MCNC: 9.3 MG/DL — SIGNIFICANT CHANGE UP (ref 8.5–10.1)
CHLORIDE SERPL-SCNC: 99 MMOL/L — SIGNIFICANT CHANGE UP (ref 96–108)
CHOLEST SERPL-MCNC: 182 MG/DL — SIGNIFICANT CHANGE UP
CO2 SERPL-SCNC: 27 MMOL/L — SIGNIFICANT CHANGE UP (ref 22–31)
CREAT SERPL-MCNC: 0.97 MG/DL — SIGNIFICANT CHANGE UP (ref 0.5–1.3)
EGFR: 67 ML/MIN/1.73M2 — SIGNIFICANT CHANGE UP
EGFR: 67 ML/MIN/1.73M2 — SIGNIFICANT CHANGE UP
EOSINOPHIL # BLD AUTO: 0.09 K/UL — SIGNIFICANT CHANGE UP (ref 0–0.5)
EOSINOPHIL NFR BLD AUTO: 1.8 % — SIGNIFICANT CHANGE UP (ref 0–6)
ESTIMATED AVERAGE GLUCOSE: 303 MG/DL — HIGH (ref 68–114)
GLUCOSE SERPL-MCNC: 244 MG/DL — HIGH (ref 70–99)
HCT VFR BLD CALC: 37.3 % — SIGNIFICANT CHANGE UP (ref 34.5–45)
HDLC SERPL-MCNC: 35 MG/DL — LOW
HGB BLD-MCNC: 12 G/DL — SIGNIFICANT CHANGE UP (ref 11.5–15.5)
IMM GRANULOCYTES NFR BLD AUTO: 0.6 % — SIGNIFICANT CHANGE UP (ref 0–0.9)
LACTATE SERPL-SCNC: 1.8 MMOL/L — SIGNIFICANT CHANGE UP (ref 0.7–2)
LDLC SERPL-MCNC: 111 MG/DL — HIGH
LIPID PNL WITH DIRECT LDL SERPL: 111 MG/DL — HIGH
LYMPHOCYTES # BLD AUTO: 1.11 K/UL — SIGNIFICANT CHANGE UP (ref 1–3.3)
LYMPHOCYTES # BLD AUTO: 21.6 % — SIGNIFICANT CHANGE UP (ref 13–44)
MCHC RBC-ENTMCNC: 29.6 PG — SIGNIFICANT CHANGE UP (ref 27–34)
MCHC RBC-ENTMCNC: 32.2 GM/DL — SIGNIFICANT CHANGE UP (ref 32–36)
MCV RBC AUTO: 92.1 FL — SIGNIFICANT CHANGE UP (ref 80–100)
MONOCYTES # BLD AUTO: 0.36 K/UL — SIGNIFICANT CHANGE UP (ref 0–0.9)
MONOCYTES NFR BLD AUTO: 7 % — SIGNIFICANT CHANGE UP (ref 2–14)
NEUTROPHILS # BLD AUTO: 3.51 K/UL — SIGNIFICANT CHANGE UP (ref 1.8–7.4)
NEUTROPHILS NFR BLD AUTO: 68.4 % — SIGNIFICANT CHANGE UP (ref 43–77)
NONHDLC SERPL-MCNC: 148 MG/DL — HIGH
NRBC # BLD: 0 /100 WBCS — SIGNIFICANT CHANGE UP (ref 0–0)
NRBC BLD-RTO: 0 /100 WBCS — SIGNIFICANT CHANGE UP (ref 0–0)
PLATELET # BLD AUTO: 229 K/UL — SIGNIFICANT CHANGE UP (ref 150–400)
POTASSIUM SERPL-MCNC: 4.3 MMOL/L — SIGNIFICANT CHANGE UP (ref 3.5–5.3)
POTASSIUM SERPL-SCNC: 4.3 MMOL/L — SIGNIFICANT CHANGE UP (ref 3.5–5.3)
PROT SERPL-MCNC: 7.2 G/DL — SIGNIFICANT CHANGE UP (ref 6–8.3)
RBC # BLD: 4.05 M/UL — SIGNIFICANT CHANGE UP (ref 3.8–5.2)
RBC # FLD: 13.5 % — SIGNIFICANT CHANGE UP (ref 10.3–14.5)
SODIUM SERPL-SCNC: 134 MMOL/L — LOW (ref 135–145)
TRIGL SERPL-MCNC: 210 MG/DL — HIGH
VANCOMYCIN TROUGH SERPL-MCNC: 10.2 UG/ML — SIGNIFICANT CHANGE UP (ref 10–20)
WBC # BLD: 5.13 K/UL — SIGNIFICANT CHANGE UP (ref 3.8–10.5)
WBC # FLD AUTO: 5.13 K/UL — SIGNIFICANT CHANGE UP (ref 3.8–10.5)

## 2024-06-10 PROCEDURE — 99221 1ST HOSP IP/OBS SF/LOW 40: CPT

## 2024-06-10 PROCEDURE — 99221 1ST HOSP IP/OBS SF/LOW 40: CPT | Mod: 25

## 2024-06-10 PROCEDURE — 73720 MRI LWR EXTREMITY W/O&W/DYE: CPT | Mod: 26,RT

## 2024-06-10 PROCEDURE — 99233 SBSQ HOSP IP/OBS HIGH 50: CPT

## 2024-06-10 RX ORDER — LORAZEPAM 4 MG/ML
1 VIAL (ML) INJECTION ONCE
Refills: 0 | Status: DISCONTINUED | OUTPATIENT
Start: 2024-06-10 | End: 2024-06-10

## 2024-06-10 RX ADMIN — Medication 0.5 MILLIGRAM(S): at 09:46

## 2024-06-10 RX ADMIN — Medication 0.5 MILLIGRAM(S): at 10:08

## 2024-06-10 RX ADMIN — KETOROLAC TROMETHAMINE 30 MILLIGRAM(S): 30 INJECTION, SOLUTION INTRAMUSCULAR; INTRAVENOUS at 17:01

## 2024-06-10 RX ADMIN — TAMSULOSIN HYDROCHLORIDE 0.4 MILLIGRAM(S): 0.4 CAPSULE ORAL at 21:51

## 2024-06-10 RX ADMIN — CEFEPIME 100 MILLIGRAM(S): 2 INJECTION, POWDER, FOR SOLUTION INTRAVENOUS at 05:52

## 2024-06-10 RX ADMIN — CEFEPIME 100 MILLIGRAM(S): 2 INJECTION, POWDER, FOR SOLUTION INTRAVENOUS at 21:51

## 2024-06-10 RX ADMIN — KETOROLAC TROMETHAMINE 30 MILLIGRAM(S): 30 INJECTION, SOLUTION INTRAMUSCULAR; INTRAVENOUS at 12:06

## 2024-06-10 RX ADMIN — INSULIN LISPRO 10 UNIT(S): 100 INJECTION, SOLUTION INTRAVENOUS; SUBCUTANEOUS at 17:02

## 2024-06-10 RX ADMIN — Medication 250 MILLIGRAM(S): at 13:07

## 2024-06-10 RX ADMIN — Medication 1000 MILLIGRAM(S): at 13:00

## 2024-06-10 RX ADMIN — Medication 0.5 MILLIGRAM(S): at 03:33

## 2024-06-10 RX ADMIN — Medication 2 TABLET(S): at 21:51

## 2024-06-10 RX ADMIN — Medication 0.5 MILLIGRAM(S): at 03:38

## 2024-06-10 RX ADMIN — RIVAROXABAN 20 MILLIGRAM(S): 10 TABLET, FILM COATED ORAL at 17:01

## 2024-06-10 RX ADMIN — Medication 0.5 MILLIGRAM(S): at 22:41

## 2024-06-10 RX ADMIN — INSULIN LISPRO 2: 100 INJECTION, SOLUTION INTRAVENOUS; SUBCUTANEOUS at 17:02

## 2024-06-10 RX ADMIN — Medication 400 MILLIGRAM(S): at 05:57

## 2024-06-10 RX ADMIN — KETOROLAC TROMETHAMINE 30 MILLIGRAM(S): 30 INJECTION, SOLUTION INTRAMUSCULAR; INTRAVENOUS at 13:00

## 2024-06-10 RX ADMIN — Medication 1000 MILLIGRAM(S): at 06:00

## 2024-06-10 RX ADMIN — BUTYROSPERMUM PARKII(SHEA BUTTER), SIMMONDSIA CHINENSIS (JOJOBA) SEED OIL, ALOE BARBADENSIS LEAF EXTRACT 250 MILLIGRAM(S): .01; 1; 3.5 LIQUID TOPICAL at 05:52

## 2024-06-10 RX ADMIN — INSULIN LISPRO 2: 100 INJECTION, SOLUTION INTRAVENOUS; SUBCUTANEOUS at 12:06

## 2024-06-10 RX ADMIN — INSULIN GLARGINE-YFGN 20 UNIT(S): 100 INJECTION, SOLUTION SUBCUTANEOUS at 21:51

## 2024-06-10 RX ADMIN — INSULIN LISPRO 3: 100 INJECTION, SOLUTION INTRAVENOUS; SUBCUTANEOUS at 07:53

## 2024-06-10 RX ADMIN — BUTYROSPERMUM PARKII(SHEA BUTTER), SIMMONDSIA CHINENSIS (JOJOBA) SEED OIL, ALOE BARBADENSIS LEAF EXTRACT 250 MILLIGRAM(S): .01; 1; 3.5 LIQUID TOPICAL at 17:02

## 2024-06-10 RX ADMIN — Medication 1 MILLIGRAM(S): at 14:37

## 2024-06-10 RX ADMIN — CEFEPIME 100 MILLIGRAM(S): 2 INJECTION, POWDER, FOR SOLUTION INTRAVENOUS at 14:51

## 2024-06-10 RX ADMIN — KETOROLAC TROMETHAMINE 30 MILLIGRAM(S): 30 INJECTION, SOLUTION INTRAMUSCULAR; INTRAVENOUS at 06:30

## 2024-06-10 RX ADMIN — Medication 400 MILLIGRAM(S): at 12:05

## 2024-06-10 RX ADMIN — Medication 0.5 MILLIGRAM(S): at 23:11

## 2024-06-10 RX ADMIN — KETOROLAC TROMETHAMINE 30 MILLIGRAM(S): 30 INJECTION, SOLUTION INTRAMUSCULAR; INTRAVENOUS at 05:53

## 2024-06-11 LAB
ANION GAP SERPL CALC-SCNC: 5 MMOL/L — SIGNIFICANT CHANGE UP (ref 5–17)
BUN SERPL-MCNC: 21 MG/DL — SIGNIFICANT CHANGE UP (ref 7–23)
CALCIUM SERPL-MCNC: 9.1 MG/DL — SIGNIFICANT CHANGE UP (ref 8.5–10.1)
CHLORIDE SERPL-SCNC: 103 MMOL/L — SIGNIFICANT CHANGE UP (ref 96–108)
CO2 SERPL-SCNC: 27 MMOL/L — SIGNIFICANT CHANGE UP (ref 22–31)
CREAT SERPL-MCNC: 0.83 MG/DL — SIGNIFICANT CHANGE UP (ref 0.5–1.3)
EGFR: 81 ML/MIN/1.73M2 — SIGNIFICANT CHANGE UP
EGFR: 81 ML/MIN/1.73M2 — SIGNIFICANT CHANGE UP
GLUCOSE SERPL-MCNC: 228 MG/DL — HIGH (ref 70–99)
GRAM STN FLD: SIGNIFICANT CHANGE UP
HCT VFR BLD CALC: 36.3 % — SIGNIFICANT CHANGE UP (ref 34.5–45)
HGB BLD-MCNC: 12.2 G/DL — SIGNIFICANT CHANGE UP (ref 11.5–15.5)
MCHC RBC-ENTMCNC: 30.4 PG — SIGNIFICANT CHANGE UP (ref 27–34)
MCHC RBC-ENTMCNC: 33.6 GM/DL — SIGNIFICANT CHANGE UP (ref 32–36)
MCV RBC AUTO: 90.5 FL — SIGNIFICANT CHANGE UP (ref 80–100)
NRBC # BLD: 0 /100 WBCS — SIGNIFICANT CHANGE UP (ref 0–0)
NRBC BLD-RTO: 0 /100 WBCS — SIGNIFICANT CHANGE UP (ref 0–0)
PLATELET # BLD AUTO: 216 K/UL — SIGNIFICANT CHANGE UP (ref 150–400)
POTASSIUM SERPL-MCNC: 4.6 MMOL/L — SIGNIFICANT CHANGE UP (ref 3.5–5.3)
POTASSIUM SERPL-SCNC: 4.6 MMOL/L — SIGNIFICANT CHANGE UP (ref 3.5–5.3)
RBC # BLD: 4.01 M/UL — SIGNIFICANT CHANGE UP (ref 3.8–5.2)
RBC # FLD: 13.4 % — SIGNIFICANT CHANGE UP (ref 10.3–14.5)
SODIUM SERPL-SCNC: 135 MMOL/L — SIGNIFICANT CHANGE UP (ref 135–145)
SPECIMEN SOURCE: SIGNIFICANT CHANGE UP
VANCOMYCIN TROUGH SERPL-MCNC: 10 UG/ML — SIGNIFICANT CHANGE UP (ref 10–20)
WBC # BLD: 5.5 K/UL — SIGNIFICANT CHANGE UP (ref 3.8–10.5)
WBC # FLD AUTO: 5.5 K/UL — SIGNIFICANT CHANGE UP (ref 3.8–10.5)

## 2024-06-11 PROCEDURE — 73630 X-RAY EXAM OF FOOT: CPT | Mod: 26,RT

## 2024-06-11 PROCEDURE — 28113 PART REMOVAL OF METATARSAL: CPT | Mod: RT

## 2024-06-11 PROCEDURE — 88304 TISSUE EXAM BY PATHOLOGIST: CPT | Mod: 26

## 2024-06-11 PROCEDURE — 88311 DECALCIFY TISSUE: CPT | Mod: 26

## 2024-06-11 PROCEDURE — 99233 SBSQ HOSP IP/OBS HIGH 50: CPT

## 2024-06-11 PROCEDURE — 99222 1ST HOSP IP/OBS MODERATE 55: CPT

## 2024-06-11 DEVICE — SURGICEL NU-KNIT 6 X 9": Type: IMPLANTABLE DEVICE | Site: RIGHT | Status: FUNCTIONAL

## 2024-06-11 RX ORDER — KETOROLAC TROMETHAMINE 30 MG/ML
30 INJECTION, SOLUTION INTRAMUSCULAR; INTRAVENOUS EVERY 6 HOURS
Refills: 0 | Status: DISCONTINUED | OUTPATIENT
Start: 2024-06-11 | End: 2024-06-14

## 2024-06-11 RX ORDER — INSULIN LISPRO 100 U/ML
INJECTION, SOLUTION INTRAVENOUS; SUBCUTANEOUS AT BEDTIME
Refills: 0 | Status: DISCONTINUED | OUTPATIENT
Start: 2024-06-11 | End: 2024-06-12

## 2024-06-11 RX ORDER — DEXTROSE MONOHYDRATE 100 G/1000ML
125 INJECTION, SOLUTION INTRAVENOUS ONCE
Refills: 0 | Status: DISCONTINUED | OUTPATIENT
Start: 2024-06-11 | End: 2024-06-15

## 2024-06-11 RX ORDER — SODIUM CHLORIDE 9 G/1000ML
1000 INJECTION, SOLUTION INTRAVENOUS
Refills: 0 | Status: DISCONTINUED | OUTPATIENT
Start: 2024-06-11 | End: 2024-06-15

## 2024-06-11 RX ORDER — HYDROMORPHONE/SOD CHLOR,ISO/PF 2 MG/10 ML
0.5 SYRINGE (ML) INJECTION
Refills: 0 | Status: DISCONTINUED | OUTPATIENT
Start: 2024-06-11 | End: 2024-06-11

## 2024-06-11 RX ORDER — MELATONIN 5 MG
3 TABLET ORAL AT BEDTIME
Refills: 0 | Status: DISCONTINUED | OUTPATIENT
Start: 2024-06-11 | End: 2024-06-14

## 2024-06-11 RX ORDER — HYDROMORPHONE/SOD CHLOR,ISO/PF 2 MG/10 ML
0.5 SYRINGE (ML) INJECTION EVERY 4 HOURS
Refills: 0 | Status: DISCONTINUED | OUTPATIENT
Start: 2024-06-11 | End: 2024-06-14

## 2024-06-11 RX ORDER — TAMSULOSIN HYDROCHLORIDE 0.4 MG/1
0.4 CAPSULE ORAL AT BEDTIME
Refills: 0 | Status: DISCONTINUED | OUTPATIENT
Start: 2024-06-11 | End: 2024-06-15

## 2024-06-11 RX ORDER — ACETAMINOPHEN 500 MG/5ML
650 LIQUID (ML) ORAL EVERY 6 HOURS
Refills: 0 | Status: DISCONTINUED | OUTPATIENT
Start: 2024-06-11 | End: 2024-06-15

## 2024-06-11 RX ORDER — INSULIN GLARGINE-YFGN 100 [IU]/ML
20 INJECTION, SOLUTION SUBCUTANEOUS AT BEDTIME
Refills: 0 | Status: DISCONTINUED | OUTPATIENT
Start: 2024-06-11 | End: 2024-06-13

## 2024-06-11 RX ORDER — METOPROLOL SUCCINATE 50 MG/1
100 TABLET, EXTENDED RELEASE ORAL DAILY
Refills: 0 | Status: DISCONTINUED | OUTPATIENT
Start: 2024-06-11 | End: 2024-06-15

## 2024-06-11 RX ORDER — GLUCAGON 3 MG/1
1 POWDER NASAL ONCE
Refills: 0 | Status: DISCONTINUED | OUTPATIENT
Start: 2024-06-11 | End: 2024-06-15

## 2024-06-11 RX ORDER — DEXTROSE 50 % IN WATER 50 %
15 SYRINGE (ML) INTRAVENOUS ONCE
Refills: 0 | Status: DISCONTINUED | OUTPATIENT
Start: 2024-06-11 | End: 2024-06-15

## 2024-06-11 RX ORDER — RIVAROXABAN 10 MG/1
20 TABLET, FILM COATED ORAL
Refills: 0 | Status: DISCONTINUED | OUTPATIENT
Start: 2024-06-11 | End: 2024-06-15

## 2024-06-11 RX ORDER — ONDANSETRON HCL/PF 4 MG/2 ML
4 VIAL (ML) INJECTION ONCE
Refills: 0 | Status: DISCONTINUED | OUTPATIENT
Start: 2024-06-11 | End: 2024-06-11

## 2024-06-11 RX ORDER — POLYETHYLENE GLYCOL 3350 17 G/17G
17 POWDER, FOR SOLUTION ORAL DAILY
Refills: 0 | Status: DISCONTINUED | OUTPATIENT
Start: 2024-06-11 | End: 2024-06-15

## 2024-06-11 RX ORDER — DEXTROSE 50 % IN WATER 50 %
12.5 SYRINGE (ML) INTRAVENOUS ONCE
Refills: 0 | Status: DISCONTINUED | OUTPATIENT
Start: 2024-06-11 | End: 2024-06-15

## 2024-06-11 RX ORDER — METOCLOPRAMIDE HCL 10 MG
10 TABLET ORAL EVERY 4 HOURS
Refills: 0 | Status: DISCONTINUED | OUTPATIENT
Start: 2024-06-11 | End: 2024-06-13

## 2024-06-11 RX ORDER — VANCOMYCIN HCL IN 5 % DEXTROSE 1.5G/250ML
1750 PLASTIC BAG, INJECTION (ML) INTRAVENOUS EVERY 12 HOURS
Refills: 0 | Status: DISCONTINUED | OUTPATIENT
Start: 2024-06-11 | End: 2024-06-11

## 2024-06-11 RX ORDER — BUTYROSPERMUM PARKII(SHEA BUTTER), SIMMONDSIA CHINENSIS (JOJOBA) SEED OIL, ALOE BARBADENSIS LEAF EXTRACT .01; 1; 3.5 G/100G; G/100G; G/100G
250 LIQUID TOPICAL
Refills: 0 | Status: DISCONTINUED | OUTPATIENT
Start: 2024-06-11 | End: 2024-06-15

## 2024-06-11 RX ORDER — TORSEMIDE 20 MG/1
60 TABLET ORAL DAILY
Refills: 0 | Status: DISCONTINUED | OUTPATIENT
Start: 2024-06-11 | End: 2024-06-15

## 2024-06-11 RX ORDER — DEXTROSE 50 % IN WATER 50 %
25 SYRINGE (ML) INTRAVENOUS ONCE
Refills: 0 | Status: DISCONTINUED | OUTPATIENT
Start: 2024-06-11 | End: 2024-06-15

## 2024-06-11 RX ORDER — VANCOMYCIN HCL IN 5 % DEXTROSE 1.5G/250ML
750 PLASTIC BAG, INJECTION (ML) INTRAVENOUS EVERY 12 HOURS
Refills: 0 | Status: DISCONTINUED | OUTPATIENT
Start: 2024-06-11 | End: 2024-06-11

## 2024-06-11 RX ORDER — VANCOMYCIN HCL IN 5 % DEXTROSE 1.5G/250ML
1000 PLASTIC BAG, INJECTION (ML) INTRAVENOUS EVERY 12 HOURS
Refills: 0 | Status: DISCONTINUED | OUTPATIENT
Start: 2024-06-11 | End: 2024-06-13

## 2024-06-11 RX ORDER — SODIUM CHLORIDE 9 G/1000ML
1000 INJECTION, SOLUTION INTRAVENOUS
Refills: 0 | Status: DISCONTINUED | OUTPATIENT
Start: 2024-06-11 | End: 2024-06-11

## 2024-06-11 RX ORDER — CEFEPIME 2 G/20ML
2000 INJECTION, POWDER, FOR SOLUTION INTRAVENOUS EVERY 8 HOURS
Refills: 0 | Status: DISCONTINUED | OUTPATIENT
Start: 2024-06-11 | End: 2024-06-13

## 2024-06-11 RX ORDER — INSULIN LISPRO 100 U/ML
10 INJECTION, SOLUTION INTRAVENOUS; SUBCUTANEOUS
Refills: 0 | Status: DISCONTINUED | OUTPATIENT
Start: 2024-06-11 | End: 2024-06-15

## 2024-06-11 RX ORDER — GABAPENTIN 400 MG/1
300 CAPSULE ORAL DAILY
Refills: 0 | Status: DISCONTINUED | OUTPATIENT
Start: 2024-06-11 | End: 2024-06-15

## 2024-06-11 RX ORDER — SENNA 187 MG
2 TABLET ORAL AT BEDTIME
Refills: 0 | Status: DISCONTINUED | OUTPATIENT
Start: 2024-06-11 | End: 2024-06-15

## 2024-06-11 RX ORDER — OXYCODONE HYDROCHLORIDE 30 MG/1
5 TABLET ORAL ONCE
Refills: 0 | Status: DISCONTINUED | OUTPATIENT
Start: 2024-06-11 | End: 2024-06-11

## 2024-06-11 RX ADMIN — Medication 0.5 MILLIGRAM(S): at 06:48

## 2024-06-11 RX ADMIN — TORSEMIDE 60 MILLIGRAM(S): 20 TABLET ORAL at 05:39

## 2024-06-11 RX ADMIN — Medication 2 TABLET(S): at 21:04

## 2024-06-11 RX ADMIN — Medication 0.5 MILLIGRAM(S): at 14:08

## 2024-06-11 RX ADMIN — INSULIN LISPRO 2: 100 INJECTION, SOLUTION INTRAVENOUS; SUBCUTANEOUS at 07:53

## 2024-06-11 RX ADMIN — CEFEPIME 100 MILLIGRAM(S): 2 INJECTION, POWDER, FOR SOLUTION INTRAVENOUS at 14:11

## 2024-06-11 RX ADMIN — CEFEPIME 100 MILLIGRAM(S): 2 INJECTION, POWDER, FOR SOLUTION INTRAVENOUS at 05:39

## 2024-06-11 RX ADMIN — Medication 250 MILLIGRAM(S): at 00:20

## 2024-06-11 RX ADMIN — Medication 250 MILLIGRAM(S): at 21:49

## 2024-06-11 RX ADMIN — INSULIN GLARGINE-YFGN 20 UNIT(S): 100 INJECTION, SOLUTION SUBCUTANEOUS at 22:17

## 2024-06-11 RX ADMIN — KETOROLAC TROMETHAMINE 30 MILLIGRAM(S): 30 INJECTION, SOLUTION INTRAMUSCULAR; INTRAVENOUS at 23:49

## 2024-06-11 RX ADMIN — KETOROLAC TROMETHAMINE 30 MILLIGRAM(S): 30 INJECTION, SOLUTION INTRAMUSCULAR; INTRAVENOUS at 12:57

## 2024-06-11 RX ADMIN — Medication 0.5 MILLIGRAM(S): at 21:35

## 2024-06-11 RX ADMIN — BUTYROSPERMUM PARKII(SHEA BUTTER), SIMMONDSIA CHINENSIS (JOJOBA) SEED OIL, ALOE BARBADENSIS LEAF EXTRACT 250 MILLIGRAM(S): .01; 1; 3.5 LIQUID TOPICAL at 05:46

## 2024-06-11 RX ADMIN — Medication 0.5 MILLIGRAM(S): at 07:18

## 2024-06-11 RX ADMIN — TAMSULOSIN HYDROCHLORIDE 0.4 MILLIGRAM(S): 0.4 CAPSULE ORAL at 21:04

## 2024-06-11 RX ADMIN — INSULIN LISPRO 2: 100 INJECTION, SOLUTION INTRAVENOUS; SUBCUTANEOUS at 12:14

## 2024-06-11 RX ADMIN — KETOROLAC TROMETHAMINE 30 MILLIGRAM(S): 30 INJECTION, SOLUTION INTRAMUSCULAR; INTRAVENOUS at 12:13

## 2024-06-11 RX ADMIN — KETOROLAC TROMETHAMINE 30 MILLIGRAM(S): 30 INJECTION, SOLUTION INTRAMUSCULAR; INTRAVENOUS at 17:12

## 2024-06-11 RX ADMIN — RIVAROXABAN 20 MILLIGRAM(S): 10 TABLET, FILM COATED ORAL at 17:12

## 2024-06-11 RX ADMIN — KETOROLAC TROMETHAMINE 30 MILLIGRAM(S): 30 INJECTION, SOLUTION INTRAMUSCULAR; INTRAVENOUS at 06:55

## 2024-06-11 RX ADMIN — BUTYROSPERMUM PARKII(SHEA BUTTER), SIMMONDSIA CHINENSIS (JOJOBA) SEED OIL, ALOE BARBADENSIS LEAF EXTRACT 250 MILLIGRAM(S): .01; 1; 3.5 LIQUID TOPICAL at 17:12

## 2024-06-11 RX ADMIN — SODIUM CHLORIDE 75 MILLILITER(S): 9 INJECTION, SOLUTION INTRAVENOUS at 13:50

## 2024-06-11 RX ADMIN — INSULIN LISPRO 10 UNIT(S): 100 INJECTION, SOLUTION INTRAVENOUS; SUBCUTANEOUS at 16:43

## 2024-06-11 RX ADMIN — KETOROLAC TROMETHAMINE 30 MILLIGRAM(S): 30 INJECTION, SOLUTION INTRAMUSCULAR; INTRAVENOUS at 05:38

## 2024-06-11 RX ADMIN — Medication 250 MILLIGRAM(S): at 12:13

## 2024-06-11 RX ADMIN — CEFEPIME 100 MILLIGRAM(S): 2 INJECTION, POWDER, FOR SOLUTION INTRAVENOUS at 21:04

## 2024-06-11 RX ADMIN — KETOROLAC TROMETHAMINE 30 MILLIGRAM(S): 30 INJECTION, SOLUTION INTRAMUSCULAR; INTRAVENOUS at 17:13

## 2024-06-11 RX ADMIN — KETOROLAC TROMETHAMINE 30 MILLIGRAM(S): 30 INJECTION, SOLUTION INTRAMUSCULAR; INTRAVENOUS at 00:14

## 2024-06-11 RX ADMIN — Medication 0.5 MILLIGRAM(S): at 14:23

## 2024-06-11 RX ADMIN — Medication 0.5 MILLIGRAM(S): at 21:05

## 2024-06-11 RX ADMIN — KETOROLAC TROMETHAMINE 30 MILLIGRAM(S): 30 INJECTION, SOLUTION INTRAMUSCULAR; INTRAVENOUS at 00:09

## 2024-06-11 RX ADMIN — METOPROLOL SUCCINATE 100 MILLIGRAM(S): 50 TABLET, EXTENDED RELEASE ORAL at 05:39

## 2024-06-12 DIAGNOSIS — E11.621 TYPE 2 DIABETES MELLITUS WITH FOOT ULCER: ICD-10-CM

## 2024-06-12 LAB
-  CLINDAMYCIN: SIGNIFICANT CHANGE UP
-  ERYTHROMYCIN: SIGNIFICANT CHANGE UP
-  GENTAMICIN: SIGNIFICANT CHANGE UP
-  OXACILLIN: SIGNIFICANT CHANGE UP
-  PENICILLIN: SIGNIFICANT CHANGE UP
-  RIFAMPIN: SIGNIFICANT CHANGE UP
-  TETRACYCLINE: SIGNIFICANT CHANGE UP
-  TRIMETHOPRIM/SULFAMETHOXAZOLE: SIGNIFICANT CHANGE UP
-  VANCOMYCIN: SIGNIFICANT CHANGE UP
ANION GAP SERPL CALC-SCNC: 8 MMOL/L — SIGNIFICANT CHANGE UP (ref 5–17)
BUN SERPL-MCNC: 26 MG/DL — HIGH (ref 7–23)
CALCIUM SERPL-MCNC: 9.3 MG/DL — SIGNIFICANT CHANGE UP (ref 8.5–10.1)
CHLORIDE SERPL-SCNC: 103 MMOL/L — SIGNIFICANT CHANGE UP (ref 96–108)
CO2 SERPL-SCNC: 25 MMOL/L — SIGNIFICANT CHANGE UP (ref 22–31)
CREAT SERPL-MCNC: 0.93 MG/DL — SIGNIFICANT CHANGE UP (ref 0.5–1.3)
CULTURE RESULTS: ABNORMAL
EGFR: 70 ML/MIN/1.73M2 — SIGNIFICANT CHANGE UP
EGFR: 70 ML/MIN/1.73M2 — SIGNIFICANT CHANGE UP
GLUCOSE SERPL-MCNC: 210 MG/DL — HIGH (ref 70–99)
GRAM STN FLD: ABNORMAL
HCT VFR BLD CALC: 36.4 % — SIGNIFICANT CHANGE UP (ref 34.5–45)
HGB BLD-MCNC: 12.4 G/DL — SIGNIFICANT CHANGE UP (ref 11.5–15.5)
MCHC RBC-ENTMCNC: 30.8 PG — SIGNIFICANT CHANGE UP (ref 27–34)
MCHC RBC-ENTMCNC: 34.1 GM/DL — SIGNIFICANT CHANGE UP (ref 32–36)
MCV RBC AUTO: 90.5 FL — SIGNIFICANT CHANGE UP (ref 80–100)
METHOD TYPE: SIGNIFICANT CHANGE UP
NRBC # BLD: 0 /100 WBCS — SIGNIFICANT CHANGE UP (ref 0–0)
NRBC BLD-RTO: 0 /100 WBCS — SIGNIFICANT CHANGE UP (ref 0–0)
ORGANISM # SPEC MICROSCOPIC CNT: ABNORMAL
ORGANISM # SPEC MICROSCOPIC CNT: SIGNIFICANT CHANGE UP
PLATELET # BLD AUTO: 204 K/UL — SIGNIFICANT CHANGE UP (ref 150–400)
POTASSIUM SERPL-MCNC: 4.3 MMOL/L — SIGNIFICANT CHANGE UP (ref 3.5–5.3)
POTASSIUM SERPL-SCNC: 4.3 MMOL/L — SIGNIFICANT CHANGE UP (ref 3.5–5.3)
RBC # BLD: 4.02 M/UL — SIGNIFICANT CHANGE UP (ref 3.8–5.2)
RBC # FLD: 13.6 % — SIGNIFICANT CHANGE UP (ref 10.3–14.5)
SODIUM SERPL-SCNC: 136 MMOL/L — SIGNIFICANT CHANGE UP (ref 135–145)
SPECIMEN SOURCE: SIGNIFICANT CHANGE UP
VANCOMYCIN TROUGH SERPL-MCNC: 18 UG/ML — SIGNIFICANT CHANGE UP (ref 10–20)
WBC # BLD: 6.59 K/UL — SIGNIFICANT CHANGE UP (ref 3.8–10.5)
WBC # FLD AUTO: 6.59 K/UL — SIGNIFICANT CHANGE UP (ref 3.8–10.5)

## 2024-06-12 PROCEDURE — 99232 SBSQ HOSP IP/OBS MODERATE 35: CPT

## 2024-06-12 RX ORDER — INSULIN LISPRO 100 U/ML
INJECTION, SOLUTION INTRAVENOUS; SUBCUTANEOUS AT BEDTIME
Refills: 0 | Status: DISCONTINUED | OUTPATIENT
Start: 2024-06-12 | End: 2024-06-15

## 2024-06-12 RX ORDER — INSULIN LISPRO 100 U/ML
INJECTION, SOLUTION INTRAVENOUS; SUBCUTANEOUS
Refills: 0 | Status: DISCONTINUED | OUTPATIENT
Start: 2024-06-12 | End: 2024-06-12

## 2024-06-12 RX ORDER — INSULIN LISPRO 100 U/ML
INJECTION, SOLUTION INTRAVENOUS; SUBCUTANEOUS
Refills: 0 | Status: DISCONTINUED | OUTPATIENT
Start: 2024-06-12 | End: 2024-06-15

## 2024-06-12 RX ADMIN — Medication 0.5 MILLIGRAM(S): at 16:36

## 2024-06-12 RX ADMIN — CEFEPIME 100 MILLIGRAM(S): 2 INJECTION, POWDER, FOR SOLUTION INTRAVENOUS at 21:53

## 2024-06-12 RX ADMIN — POLYETHYLENE GLYCOL 3350 17 GRAM(S): 17 POWDER, FOR SOLUTION ORAL at 12:06

## 2024-06-12 RX ADMIN — INSULIN LISPRO 10 UNIT(S): 100 INJECTION, SOLUTION INTRAVENOUS; SUBCUTANEOUS at 17:18

## 2024-06-12 RX ADMIN — KETOROLAC TROMETHAMINE 30 MILLIGRAM(S): 30 INJECTION, SOLUTION INTRAMUSCULAR; INTRAVENOUS at 06:38

## 2024-06-12 RX ADMIN — KETOROLAC TROMETHAMINE 30 MILLIGRAM(S): 30 INJECTION, SOLUTION INTRAMUSCULAR; INTRAVENOUS at 00:41

## 2024-06-12 RX ADMIN — KETOROLAC TROMETHAMINE 30 MILLIGRAM(S): 30 INJECTION, SOLUTION INTRAMUSCULAR; INTRAVENOUS at 06:03

## 2024-06-12 RX ADMIN — KETOROLAC TROMETHAMINE 30 MILLIGRAM(S): 30 INJECTION, SOLUTION INTRAMUSCULAR; INTRAVENOUS at 12:06

## 2024-06-12 RX ADMIN — INSULIN GLARGINE-YFGN 20 UNIT(S): 100 INJECTION, SOLUTION SUBCUTANEOUS at 21:58

## 2024-06-12 RX ADMIN — Medication 250 MILLIGRAM(S): at 09:28

## 2024-06-12 RX ADMIN — Medication 0.5 MILLIGRAM(S): at 09:28

## 2024-06-12 RX ADMIN — RIVAROXABAN 20 MILLIGRAM(S): 10 TABLET, FILM COATED ORAL at 17:18

## 2024-06-12 RX ADMIN — GABAPENTIN 300 MILLIGRAM(S): 400 CAPSULE ORAL at 12:06

## 2024-06-12 RX ADMIN — Medication 2 TABLET(S): at 21:53

## 2024-06-12 RX ADMIN — INSULIN LISPRO 10 UNIT(S): 100 INJECTION, SOLUTION INTRAVENOUS; SUBCUTANEOUS at 12:07

## 2024-06-12 RX ADMIN — Medication 0.5 MILLIGRAM(S): at 09:58

## 2024-06-12 RX ADMIN — Medication 0.5 MILLIGRAM(S): at 16:21

## 2024-06-12 RX ADMIN — INSULIN LISPRO 10 UNIT(S): 100 INJECTION, SOLUTION INTRAVENOUS; SUBCUTANEOUS at 08:22

## 2024-06-12 RX ADMIN — Medication 250 MILLIGRAM(S): at 22:32

## 2024-06-12 RX ADMIN — TAMSULOSIN HYDROCHLORIDE 0.4 MILLIGRAM(S): 0.4 CAPSULE ORAL at 21:53

## 2024-06-12 RX ADMIN — BUTYROSPERMUM PARKII(SHEA BUTTER), SIMMONDSIA CHINENSIS (JOJOBA) SEED OIL, ALOE BARBADENSIS LEAF EXTRACT 250 MILLIGRAM(S): .01; 1; 3.5 LIQUID TOPICAL at 17:19

## 2024-06-12 RX ADMIN — KETOROLAC TROMETHAMINE 30 MILLIGRAM(S): 30 INJECTION, SOLUTION INTRAMUSCULAR; INTRAVENOUS at 12:08

## 2024-06-12 RX ADMIN — BUTYROSPERMUM PARKII(SHEA BUTTER), SIMMONDSIA CHINENSIS (JOJOBA) SEED OIL, ALOE BARBADENSIS LEAF EXTRACT 250 MILLIGRAM(S): .01; 1; 3.5 LIQUID TOPICAL at 06:09

## 2024-06-12 RX ADMIN — KETOROLAC TROMETHAMINE 30 MILLIGRAM(S): 30 INJECTION, SOLUTION INTRAMUSCULAR; INTRAVENOUS at 17:19

## 2024-06-12 RX ADMIN — Medication 0.5 MILLIGRAM(S): at 22:32

## 2024-06-12 RX ADMIN — CEFEPIME 100 MILLIGRAM(S): 2 INJECTION, POWDER, FOR SOLUTION INTRAVENOUS at 05:55

## 2024-06-12 RX ADMIN — CEFEPIME 100 MILLIGRAM(S): 2 INJECTION, POWDER, FOR SOLUTION INTRAVENOUS at 13:32

## 2024-06-12 RX ADMIN — INSULIN LISPRO 2: 100 INJECTION, SOLUTION INTRAVENOUS; SUBCUTANEOUS at 17:18

## 2024-06-12 RX ADMIN — KETOROLAC TROMETHAMINE 30 MILLIGRAM(S): 30 INJECTION, SOLUTION INTRAMUSCULAR; INTRAVENOUS at 17:48

## 2024-06-12 RX ADMIN — INSULIN LISPRO 4: 100 INJECTION, SOLUTION INTRAVENOUS; SUBCUTANEOUS at 12:08

## 2024-06-13 ENCOUNTER — TRANSCRIPTION ENCOUNTER (OUTPATIENT)
Age: 59
End: 2024-06-13

## 2024-06-13 DIAGNOSIS — Z91.89 OTHER SPECIFIED PERSONAL RISK FACTORS, NOT ELSEWHERE CLASSIFIED: ICD-10-CM

## 2024-06-13 LAB
-  AMPICILLIN: SIGNIFICANT CHANGE UP
-  DAPTOMYCIN: SIGNIFICANT CHANGE UP
-  LEVOFLOXACIN: SIGNIFICANT CHANGE UP
-  LINEZOLID: SIGNIFICANT CHANGE UP
-  VANCOMYCIN: SIGNIFICANT CHANGE UP
ANION GAP SERPL CALC-SCNC: 6 MMOL/L — SIGNIFICANT CHANGE UP (ref 5–17)
BUN SERPL-MCNC: 24 MG/DL — HIGH (ref 7–23)
CALCIUM SERPL-MCNC: 9.4 MG/DL — SIGNIFICANT CHANGE UP (ref 8.5–10.1)
CHLORIDE SERPL-SCNC: 103 MMOL/L — SIGNIFICANT CHANGE UP (ref 96–108)
CO2 SERPL-SCNC: 27 MMOL/L — SIGNIFICANT CHANGE UP (ref 22–31)
CREAT SERPL-MCNC: 0.81 MG/DL — SIGNIFICANT CHANGE UP (ref 0.5–1.3)
EGFR: 83 ML/MIN/1.73M2 — SIGNIFICANT CHANGE UP
EGFR: 83 ML/MIN/1.73M2 — SIGNIFICANT CHANGE UP
GLUCOSE SERPL-MCNC: 206 MG/DL — HIGH (ref 70–99)
HCT VFR BLD CALC: 33.7 % — LOW (ref 34.5–45)
HGB BLD-MCNC: 11.3 G/DL — LOW (ref 11.5–15.5)
MCHC RBC-ENTMCNC: 30.5 PG — SIGNIFICANT CHANGE UP (ref 27–34)
MCHC RBC-ENTMCNC: 33.5 GM/DL — SIGNIFICANT CHANGE UP (ref 32–36)
MCV RBC AUTO: 91.1 FL — SIGNIFICANT CHANGE UP (ref 80–100)
METHOD TYPE: SIGNIFICANT CHANGE UP
NRBC # BLD: 0 /100 WBCS — SIGNIFICANT CHANGE UP (ref 0–0)
NRBC BLD-RTO: 0 /100 WBCS — SIGNIFICANT CHANGE UP (ref 0–0)
PLATELET # BLD AUTO: 210 K/UL — SIGNIFICANT CHANGE UP (ref 150–400)
POTASSIUM SERPL-MCNC: 4.3 MMOL/L — SIGNIFICANT CHANGE UP (ref 3.5–5.3)
POTASSIUM SERPL-SCNC: 4.3 MMOL/L — SIGNIFICANT CHANGE UP (ref 3.5–5.3)
RBC # BLD: 3.7 M/UL — LOW (ref 3.8–5.2)
RBC # FLD: 13.5 % — SIGNIFICANT CHANGE UP (ref 10.3–14.5)
SODIUM SERPL-SCNC: 136 MMOL/L — SIGNIFICANT CHANGE UP (ref 135–145)
WBC # BLD: 5.59 K/UL — SIGNIFICANT CHANGE UP (ref 3.8–10.5)
WBC # FLD AUTO: 5.59 K/UL — SIGNIFICANT CHANGE UP (ref 3.8–10.5)

## 2024-06-13 PROCEDURE — 99232 SBSQ HOSP IP/OBS MODERATE 35: CPT

## 2024-06-13 RX ORDER — AMPICILLIN SODIUM AND SULBACTAM SODIUM 1; .5 G/1; G/1
3 INJECTION, POWDER, FOR SOLUTION INTRAMUSCULAR; INTRAVENOUS ONCE
Refills: 0 | Status: COMPLETED | OUTPATIENT
Start: 2024-06-13 | End: 2024-06-13

## 2024-06-13 RX ORDER — INSULIN GLARGINE-YFGN 100 [IU]/ML
25 INJECTION, SOLUTION SUBCUTANEOUS AT BEDTIME
Refills: 0 | Status: DISCONTINUED | OUTPATIENT
Start: 2024-06-13 | End: 2024-06-15

## 2024-06-13 RX ORDER — AMPICILLIN SODIUM AND SULBACTAM SODIUM 1; .5 G/1; G/1
3 INJECTION, POWDER, FOR SOLUTION INTRAMUSCULAR; INTRAVENOUS EVERY 6 HOURS
Refills: 0 | Status: COMPLETED | OUTPATIENT
Start: 2024-06-14 | End: 2024-06-15

## 2024-06-13 RX ORDER — METOCLOPRAMIDE HCL 10 MG
10 TABLET ORAL DAILY
Refills: 0 | Status: DISCONTINUED | OUTPATIENT
Start: 2024-06-13 | End: 2024-06-14

## 2024-06-13 RX ORDER — AMPICILLIN SODIUM AND SULBACTAM SODIUM 1; .5 G/1; G/1
INJECTION, POWDER, FOR SOLUTION INTRAMUSCULAR; INTRAVENOUS
Refills: 0 | Status: COMPLETED | OUTPATIENT
Start: 2024-06-13 | End: 2024-06-15

## 2024-06-13 RX ADMIN — BUTYROSPERMUM PARKII(SHEA BUTTER), SIMMONDSIA CHINENSIS (JOJOBA) SEED OIL, ALOE BARBADENSIS LEAF EXTRACT 250 MILLIGRAM(S): .01; 1; 3.5 LIQUID TOPICAL at 05:39

## 2024-06-13 RX ADMIN — KETOROLAC TROMETHAMINE 30 MILLIGRAM(S): 30 INJECTION, SOLUTION INTRAMUSCULAR; INTRAVENOUS at 05:35

## 2024-06-13 RX ADMIN — INSULIN GLARGINE-YFGN 25 UNIT(S): 100 INJECTION, SOLUTION SUBCUTANEOUS at 21:41

## 2024-06-13 RX ADMIN — INSULIN LISPRO 10 UNIT(S): 100 INJECTION, SOLUTION INTRAVENOUS; SUBCUTANEOUS at 12:14

## 2024-06-13 RX ADMIN — Medication 0.5 MILLIGRAM(S): at 11:30

## 2024-06-13 RX ADMIN — KETOROLAC TROMETHAMINE 30 MILLIGRAM(S): 30 INJECTION, SOLUTION INTRAMUSCULAR; INTRAVENOUS at 12:30

## 2024-06-13 RX ADMIN — BUTYROSPERMUM PARKII(SHEA BUTTER), SIMMONDSIA CHINENSIS (JOJOBA) SEED OIL, ALOE BARBADENSIS LEAF EXTRACT 250 MILLIGRAM(S): .01; 1; 3.5 LIQUID TOPICAL at 17:39

## 2024-06-13 RX ADMIN — Medication 0.5 MILLIGRAM(S): at 21:36

## 2024-06-13 RX ADMIN — Medication 10 MILLIGRAM(S): at 12:19

## 2024-06-13 RX ADMIN — Medication 0.5 MILLIGRAM(S): at 05:39

## 2024-06-13 RX ADMIN — KETOROLAC TROMETHAMINE 30 MILLIGRAM(S): 30 INJECTION, SOLUTION INTRAMUSCULAR; INTRAVENOUS at 18:04

## 2024-06-13 RX ADMIN — KETOROLAC TROMETHAMINE 30 MILLIGRAM(S): 30 INJECTION, SOLUTION INTRAMUSCULAR; INTRAVENOUS at 17:39

## 2024-06-13 RX ADMIN — Medication 2 TABLET(S): at 21:36

## 2024-06-13 RX ADMIN — TAMSULOSIN HYDROCHLORIDE 0.4 MILLIGRAM(S): 0.4 CAPSULE ORAL at 21:36

## 2024-06-13 RX ADMIN — POLYETHYLENE GLYCOL 3350 17 GRAM(S): 17 POWDER, FOR SOLUTION ORAL at 12:20

## 2024-06-13 RX ADMIN — INSULIN LISPRO 4: 100 INJECTION, SOLUTION INTRAVENOUS; SUBCUTANEOUS at 07:46

## 2024-06-13 RX ADMIN — KETOROLAC TROMETHAMINE 30 MILLIGRAM(S): 30 INJECTION, SOLUTION INTRAMUSCULAR; INTRAVENOUS at 12:19

## 2024-06-13 RX ADMIN — METOPROLOL SUCCINATE 100 MILLIGRAM(S): 50 TABLET, EXTENDED RELEASE ORAL at 05:07

## 2024-06-13 RX ADMIN — Medication 250 MILLIGRAM(S): at 10:59

## 2024-06-13 RX ADMIN — TORSEMIDE 60 MILLIGRAM(S): 20 TABLET ORAL at 05:05

## 2024-06-13 RX ADMIN — INSULIN LISPRO 10 UNIT(S): 100 INJECTION, SOLUTION INTRAVENOUS; SUBCUTANEOUS at 16:45

## 2024-06-13 RX ADMIN — KETOROLAC TROMETHAMINE 30 MILLIGRAM(S): 30 INJECTION, SOLUTION INTRAMUSCULAR; INTRAVENOUS at 00:24

## 2024-06-13 RX ADMIN — Medication 0.5 MILLIGRAM(S): at 11:00

## 2024-06-13 RX ADMIN — KETOROLAC TROMETHAMINE 30 MILLIGRAM(S): 30 INJECTION, SOLUTION INTRAMUSCULAR; INTRAVENOUS at 00:05

## 2024-06-13 RX ADMIN — KETOROLAC TROMETHAMINE 30 MILLIGRAM(S): 30 INJECTION, SOLUTION INTRAMUSCULAR; INTRAVENOUS at 05:05

## 2024-06-13 RX ADMIN — Medication 0.5 MILLIGRAM(S): at 06:09

## 2024-06-13 RX ADMIN — Medication 0.5 MILLIGRAM(S): at 22:00

## 2024-06-13 RX ADMIN — RIVAROXABAN 20 MILLIGRAM(S): 10 TABLET, FILM COATED ORAL at 17:39

## 2024-06-13 RX ADMIN — INSULIN LISPRO 4: 100 INJECTION, SOLUTION INTRAVENOUS; SUBCUTANEOUS at 12:14

## 2024-06-13 RX ADMIN — AMPICILLIN SODIUM AND SULBACTAM SODIUM 200 GRAM(S): 1; .5 INJECTION, POWDER, FOR SOLUTION INTRAMUSCULAR; INTRAVENOUS at 18:54

## 2024-06-13 RX ADMIN — CEFEPIME 100 MILLIGRAM(S): 2 INJECTION, POWDER, FOR SOLUTION INTRAVENOUS at 14:51

## 2024-06-13 RX ADMIN — INSULIN LISPRO 10 UNIT(S): 100 INJECTION, SOLUTION INTRAVENOUS; SUBCUTANEOUS at 07:45

## 2024-06-13 RX ADMIN — CEFEPIME 100 MILLIGRAM(S): 2 INJECTION, POWDER, FOR SOLUTION INTRAVENOUS at 05:06

## 2024-06-14 LAB
ANION GAP SERPL CALC-SCNC: 8 MMOL/L — SIGNIFICANT CHANGE UP (ref 5–17)
BUN SERPL-MCNC: 34 MG/DL — HIGH (ref 7–23)
CALCIUM SERPL-MCNC: 9.4 MG/DL — SIGNIFICANT CHANGE UP (ref 8.5–10.1)
CHLORIDE SERPL-SCNC: 103 MMOL/L — SIGNIFICANT CHANGE UP (ref 96–108)
CO2 SERPL-SCNC: 25 MMOL/L — SIGNIFICANT CHANGE UP (ref 22–31)
CREAT SERPL-MCNC: 0.99 MG/DL — SIGNIFICANT CHANGE UP (ref 0.5–1.3)
CULTURE RESULTS: SIGNIFICANT CHANGE UP
CULTURE RESULTS: SIGNIFICANT CHANGE UP
EGFR: 65 ML/MIN/1.73M2 — SIGNIFICANT CHANGE UP
EGFR: 65 ML/MIN/1.73M2 — SIGNIFICANT CHANGE UP
GLUCOSE SERPL-MCNC: 180 MG/DL — HIGH (ref 70–99)
HCT VFR BLD CALC: 36 % — SIGNIFICANT CHANGE UP (ref 34.5–45)
HGB BLD-MCNC: 12.2 G/DL — SIGNIFICANT CHANGE UP (ref 11.5–15.5)
MCHC RBC-ENTMCNC: 30.9 PG — SIGNIFICANT CHANGE UP (ref 27–34)
MCHC RBC-ENTMCNC: 33.9 GM/DL — SIGNIFICANT CHANGE UP (ref 32–36)
MCV RBC AUTO: 91.1 FL — SIGNIFICANT CHANGE UP (ref 80–100)
NRBC # BLD: 0 /100 WBCS — SIGNIFICANT CHANGE UP (ref 0–0)
NRBC BLD-RTO: 0 /100 WBCS — SIGNIFICANT CHANGE UP (ref 0–0)
PLATELET # BLD AUTO: 225 K/UL — SIGNIFICANT CHANGE UP (ref 150–400)
POTASSIUM SERPL-MCNC: 4.4 MMOL/L — SIGNIFICANT CHANGE UP (ref 3.5–5.3)
POTASSIUM SERPL-SCNC: 4.4 MMOL/L — SIGNIFICANT CHANGE UP (ref 3.5–5.3)
RBC # BLD: 3.95 M/UL — SIGNIFICANT CHANGE UP (ref 3.8–5.2)
RBC # FLD: 13.3 % — SIGNIFICANT CHANGE UP (ref 10.3–14.5)
SODIUM SERPL-SCNC: 136 MMOL/L — SIGNIFICANT CHANGE UP (ref 135–145)
SPECIMEN SOURCE: SIGNIFICANT CHANGE UP
SPECIMEN SOURCE: SIGNIFICANT CHANGE UP
WBC # BLD: 6.18 K/UL — SIGNIFICANT CHANGE UP (ref 3.8–10.5)
WBC # FLD AUTO: 6.18 K/UL — SIGNIFICANT CHANGE UP (ref 3.8–10.5)

## 2024-06-14 PROCEDURE — 99232 SBSQ HOSP IP/OBS MODERATE 35: CPT

## 2024-06-14 PROCEDURE — 99233 SBSQ HOSP IP/OBS HIGH 50: CPT

## 2024-06-14 RX ORDER — MELATONIN 5 MG
5 TABLET ORAL AT BEDTIME
Refills: 0 | Status: DISCONTINUED | OUTPATIENT
Start: 2024-06-14 | End: 2024-06-15

## 2024-06-14 RX ORDER — METOCLOPRAMIDE HCL 10 MG
10 TABLET ORAL
Refills: 0 | Status: DISCONTINUED | OUTPATIENT
Start: 2024-06-14 | End: 2024-06-15

## 2024-06-14 RX ORDER — AMOXICILLIN AND CLAVULANATE POTASSIUM 500; 125 MG/1; MG/1
1 TABLET, FILM COATED ORAL
Refills: 0 | Status: DISCONTINUED | OUTPATIENT
Start: 2024-06-15 | End: 2024-06-15

## 2024-06-14 RX ORDER — ALPRAZOLAM 0.5 MG
0.5 TABLET, EXTENDED RELEASE 24 HR ORAL ONCE
Refills: 0 | Status: DISCONTINUED | OUTPATIENT
Start: 2024-06-14 | End: 2024-06-14

## 2024-06-14 RX ADMIN — Medication 0.5 MILLIGRAM(S): at 10:31

## 2024-06-14 RX ADMIN — KETOROLAC TROMETHAMINE 30 MILLIGRAM(S): 30 INJECTION, SOLUTION INTRAMUSCULAR; INTRAVENOUS at 12:03

## 2024-06-14 RX ADMIN — INSULIN LISPRO 10 UNIT(S): 100 INJECTION, SOLUTION INTRAVENOUS; SUBCUTANEOUS at 07:48

## 2024-06-14 RX ADMIN — KETOROLAC TROMETHAMINE 30 MILLIGRAM(S): 30 INJECTION, SOLUTION INTRAMUSCULAR; INTRAVENOUS at 00:03

## 2024-06-14 RX ADMIN — Medication 0.5 MILLIGRAM(S): at 22:03

## 2024-06-14 RX ADMIN — AMPICILLIN SODIUM AND SULBACTAM SODIUM 200 GRAM(S): 1; .5 INJECTION, POWDER, FOR SOLUTION INTRAMUSCULAR; INTRAVENOUS at 00:03

## 2024-06-14 RX ADMIN — Medication 0.5 MILLIGRAM(S): at 10:16

## 2024-06-14 RX ADMIN — GABAPENTIN 300 MILLIGRAM(S): 400 CAPSULE ORAL at 12:03

## 2024-06-14 RX ADMIN — BUTYROSPERMUM PARKII(SHEA BUTTER), SIMMONDSIA CHINENSIS (JOJOBA) SEED OIL, ALOE BARBADENSIS LEAF EXTRACT 250 MILLIGRAM(S): .01; 1; 3.5 LIQUID TOPICAL at 17:30

## 2024-06-14 RX ADMIN — KETOROLAC TROMETHAMINE 30 MILLIGRAM(S): 30 INJECTION, SOLUTION INTRAMUSCULAR; INTRAVENOUS at 05:33

## 2024-06-14 RX ADMIN — INSULIN LISPRO 10 UNIT(S): 100 INJECTION, SOLUTION INTRAVENOUS; SUBCUTANEOUS at 12:01

## 2024-06-14 RX ADMIN — Medication 5 MILLIGRAM(S): at 22:03

## 2024-06-14 RX ADMIN — BUTYROSPERMUM PARKII(SHEA BUTTER), SIMMONDSIA CHINENSIS (JOJOBA) SEED OIL, ALOE BARBADENSIS LEAF EXTRACT 250 MILLIGRAM(S): .01; 1; 3.5 LIQUID TOPICAL at 05:50

## 2024-06-14 RX ADMIN — INSULIN LISPRO 10 UNIT(S): 100 INJECTION, SOLUTION INTRAVENOUS; SUBCUTANEOUS at 17:26

## 2024-06-14 RX ADMIN — METOPROLOL SUCCINATE 100 MILLIGRAM(S): 50 TABLET, EXTENDED RELEASE ORAL at 05:33

## 2024-06-14 RX ADMIN — KETOROLAC TROMETHAMINE 30 MILLIGRAM(S): 30 INJECTION, SOLUTION INTRAMUSCULAR; INTRAVENOUS at 00:45

## 2024-06-14 RX ADMIN — Medication 0.5 MILLIGRAM(S): at 04:33

## 2024-06-14 RX ADMIN — INSULIN GLARGINE-YFGN 25 UNIT(S): 100 INJECTION, SOLUTION SUBCUTANEOUS at 22:05

## 2024-06-14 RX ADMIN — AMPICILLIN SODIUM AND SULBACTAM SODIUM 200 GRAM(S): 1; .5 INJECTION, POWDER, FOR SOLUTION INTRAMUSCULAR; INTRAVENOUS at 05:40

## 2024-06-14 RX ADMIN — Medication 2 TABLET(S): at 22:03

## 2024-06-14 RX ADMIN — AMPICILLIN SODIUM AND SULBACTAM SODIUM 200 GRAM(S): 1; .5 INJECTION, POWDER, FOR SOLUTION INTRAMUSCULAR; INTRAVENOUS at 17:30

## 2024-06-14 RX ADMIN — TAMSULOSIN HYDROCHLORIDE 0.4 MILLIGRAM(S): 0.4 CAPSULE ORAL at 22:03

## 2024-06-14 RX ADMIN — RIVAROXABAN 20 MILLIGRAM(S): 10 TABLET, FILM COATED ORAL at 17:29

## 2024-06-14 RX ADMIN — INSULIN LISPRO 2: 100 INJECTION, SOLUTION INTRAVENOUS; SUBCUTANEOUS at 07:48

## 2024-06-14 RX ADMIN — INSULIN LISPRO 2: 100 INJECTION, SOLUTION INTRAVENOUS; SUBCUTANEOUS at 17:27

## 2024-06-14 RX ADMIN — KETOROLAC TROMETHAMINE 30 MILLIGRAM(S): 30 INJECTION, SOLUTION INTRAMUSCULAR; INTRAVENOUS at 05:51

## 2024-06-14 RX ADMIN — Medication 0.5 MILLIGRAM(S): at 05:51

## 2024-06-14 RX ADMIN — POLYETHYLENE GLYCOL 3350 17 GRAM(S): 17 POWDER, FOR SOLUTION ORAL at 12:02

## 2024-06-14 RX ADMIN — Medication 10 MILLIGRAM(S): at 12:02

## 2024-06-14 RX ADMIN — TORSEMIDE 60 MILLIGRAM(S): 20 TABLET ORAL at 05:34

## 2024-06-14 RX ADMIN — INSULIN LISPRO 4: 100 INJECTION, SOLUTION INTRAVENOUS; SUBCUTANEOUS at 12:01

## 2024-06-14 RX ADMIN — AMPICILLIN SODIUM AND SULBACTAM SODIUM 200 GRAM(S): 1; .5 INJECTION, POWDER, FOR SOLUTION INTRAMUSCULAR; INTRAVENOUS at 12:05

## 2024-06-15 ENCOUNTER — TRANSCRIPTION ENCOUNTER (OUTPATIENT)
Age: 59
End: 2024-06-15

## 2024-06-15 VITALS
TEMPERATURE: 98 F | DIASTOLIC BLOOD PRESSURE: 77 MMHG | SYSTOLIC BLOOD PRESSURE: 116 MMHG | OXYGEN SATURATION: 96 % | HEART RATE: 70 BPM | RESPIRATION RATE: 18 BRPM

## 2024-06-15 LAB
-  CLINDAMYCIN: SIGNIFICANT CHANGE UP
-  ERYTHROMYCIN: SIGNIFICANT CHANGE UP
-  GENTAMICIN: SIGNIFICANT CHANGE UP
-  OXACILLIN: SIGNIFICANT CHANGE UP
-  PENICILLIN: SIGNIFICANT CHANGE UP
-  RIFAMPIN: SIGNIFICANT CHANGE UP
-  TETRACYCLINE: SIGNIFICANT CHANGE UP
-  TRIMETHOPRIM/SULFAMETHOXAZOLE: SIGNIFICANT CHANGE UP
-  VANCOMYCIN: SIGNIFICANT CHANGE UP
METHOD TYPE: SIGNIFICANT CHANGE UP

## 2024-06-15 PROCEDURE — 99239 HOSP IP/OBS DSCHRG MGMT >30: CPT

## 2024-06-15 PROCEDURE — 85730 THROMBOPLASTIN TIME PARTIAL: CPT

## 2024-06-15 PROCEDURE — 99232 SBSQ HOSP IP/OBS MODERATE 35: CPT

## 2024-06-15 PROCEDURE — 88311 DECALCIFY TISSUE: CPT

## 2024-06-15 PROCEDURE — 36415 COLL VENOUS BLD VENIPUNCTURE: CPT

## 2024-06-15 PROCEDURE — A9579: CPT

## 2024-06-15 PROCEDURE — 80061 LIPID PANEL: CPT

## 2024-06-15 PROCEDURE — 84100 ASSAY OF PHOSPHORUS: CPT

## 2024-06-15 PROCEDURE — 87075 CULTR BACTERIA EXCEPT BLOOD: CPT

## 2024-06-15 PROCEDURE — 73630 X-RAY EXAM OF FOOT: CPT

## 2024-06-15 PROCEDURE — 88304 TISSUE EXAM BY PATHOLOGIST: CPT

## 2024-06-15 PROCEDURE — 85610 PROTHROMBIN TIME: CPT

## 2024-06-15 PROCEDURE — 85652 RBC SED RATE AUTOMATED: CPT

## 2024-06-15 PROCEDURE — 80053 COMPREHEN METABOLIC PANEL: CPT

## 2024-06-15 PROCEDURE — 83605 ASSAY OF LACTIC ACID: CPT

## 2024-06-15 PROCEDURE — 86140 C-REACTIVE PROTEIN: CPT

## 2024-06-15 PROCEDURE — 87040 BLOOD CULTURE FOR BACTERIA: CPT

## 2024-06-15 PROCEDURE — 80202 ASSAY OF VANCOMYCIN: CPT

## 2024-06-15 PROCEDURE — 99285 EMERGENCY DEPT VISIT HI MDM: CPT

## 2024-06-15 PROCEDURE — 97162 PT EVAL MOD COMPLEX 30 MIN: CPT

## 2024-06-15 PROCEDURE — 83036 HEMOGLOBIN GLYCOSYLATED A1C: CPT

## 2024-06-15 PROCEDURE — 87186 SC STD MICRODIL/AGAR DIL: CPT

## 2024-06-15 PROCEDURE — 73720 MRI LWR EXTREMITY W/O&W/DYE: CPT | Mod: MC

## 2024-06-15 PROCEDURE — 85027 COMPLETE CBC AUTOMATED: CPT

## 2024-06-15 PROCEDURE — 96365 THER/PROPH/DIAG IV INF INIT: CPT

## 2024-06-15 PROCEDURE — 80048 BASIC METABOLIC PNL TOTAL CA: CPT

## 2024-06-15 PROCEDURE — 82962 GLUCOSE BLOOD TEST: CPT

## 2024-06-15 PROCEDURE — 73590 X-RAY EXAM OF LOWER LEG: CPT

## 2024-06-15 PROCEDURE — 85025 COMPLETE CBC W/AUTO DIFF WBC: CPT

## 2024-06-15 PROCEDURE — 71045 X-RAY EXAM CHEST 1 VIEW: CPT

## 2024-06-15 PROCEDURE — 83735 ASSAY OF MAGNESIUM: CPT

## 2024-06-15 PROCEDURE — 83690 ASSAY OF LIPASE: CPT

## 2024-06-15 PROCEDURE — 87077 CULTURE AEROBIC IDENTIFY: CPT

## 2024-06-15 PROCEDURE — 87070 CULTURE OTHR SPECIMN AEROBIC: CPT

## 2024-06-15 PROCEDURE — 93005 ELECTROCARDIOGRAM TRACING: CPT

## 2024-06-15 RX ORDER — OXYCODONE AND ACETAMINOPHEN 5; 325 MG/1; MG/1
1 TABLET ORAL
Refills: 0 | DISCHARGE

## 2024-06-15 RX ORDER — OXYCODONE HYDROCHLORIDE AND ACETAMINOPHEN 10; 325 MG/1; MG/1
2 TABLET ORAL
Qty: 40 | Refills: 0
Start: 2024-06-15 | End: 2024-06-19

## 2024-06-15 RX ORDER — AMOXICILLIN AND CLAVULANATE POTASSIUM 500; 125 MG/1; MG/1
1 TABLET, FILM COATED ORAL
Qty: 28 | Refills: 0
Start: 2024-06-15 | End: 2024-06-28

## 2024-06-15 RX ORDER — HYDROMORPHONE/SOD CHLOR,ISO/PF 2 MG/10 ML
0.5 SYRINGE (ML) INJECTION ONCE
Refills: 0 | Status: DISCONTINUED | OUTPATIENT
Start: 2024-06-15 | End: 2024-06-15

## 2024-06-15 RX ORDER — ACETAMINOPHEN 500 MG/5ML
2 LIQUID (ML) ORAL
Qty: 0 | Refills: 0 | DISCHARGE
Start: 2024-06-15

## 2024-06-15 RX ADMIN — INSULIN LISPRO 2: 100 INJECTION, SOLUTION INTRAVENOUS; SUBCUTANEOUS at 07:51

## 2024-06-15 RX ADMIN — BUTYROSPERMUM PARKII(SHEA BUTTER), SIMMONDSIA CHINENSIS (JOJOBA) SEED OIL, ALOE BARBADENSIS LEAF EXTRACT 250 MILLIGRAM(S): .01; 1; 3.5 LIQUID TOPICAL at 06:28

## 2024-06-15 RX ADMIN — Medication 0.5 MILLIGRAM(S): at 08:32

## 2024-06-15 RX ADMIN — INSULIN LISPRO 10 UNIT(S): 100 INJECTION, SOLUTION INTRAVENOUS; SUBCUTANEOUS at 07:50

## 2024-06-15 RX ADMIN — POLYETHYLENE GLYCOL 3350 17 GRAM(S): 17 POWDER, FOR SOLUTION ORAL at 11:47

## 2024-06-15 RX ADMIN — INSULIN LISPRO 4: 100 INJECTION, SOLUTION INTRAVENOUS; SUBCUTANEOUS at 11:47

## 2024-06-15 RX ADMIN — AMPICILLIN SODIUM AND SULBACTAM SODIUM 200 GRAM(S): 1; .5 INJECTION, POWDER, FOR SOLUTION INTRAMUSCULAR; INTRAVENOUS at 01:05

## 2024-06-15 RX ADMIN — INSULIN LISPRO 10 UNIT(S): 100 INJECTION, SOLUTION INTRAVENOUS; SUBCUTANEOUS at 11:48

## 2024-06-15 RX ADMIN — Medication 0.5 MILLIGRAM(S): at 07:51

## 2024-06-15 RX ADMIN — AMPICILLIN SODIUM AND SULBACTAM SODIUM 200 GRAM(S): 1; .5 INJECTION, POWDER, FOR SOLUTION INTRAMUSCULAR; INTRAVENOUS at 06:27

## 2024-06-16 LAB
CULTURE RESULTS: ABNORMAL
ORGANISM # SPEC MICROSCOPIC CNT: ABNORMAL
ORGANISM # SPEC MICROSCOPIC CNT: ABNORMAL
ORGANISM # SPEC MICROSCOPIC CNT: SIGNIFICANT CHANGE UP
SPECIMEN SOURCE: SIGNIFICANT CHANGE UP

## 2024-06-19 ENCOUNTER — APPOINTMENT (OUTPATIENT)
Dept: WOUND CARE | Facility: HOSPITAL | Age: 59
End: 2024-06-19
Payer: MEDICARE

## 2024-06-19 ENCOUNTER — OUTPATIENT (OUTPATIENT)
Dept: OUTPATIENT SERVICES | Facility: HOSPITAL | Age: 59
LOS: 1 days | Discharge: HOME | End: 2024-06-19
Payer: MEDICARE

## 2024-06-19 VITALS
HEIGHT: 60 IN | SYSTOLIC BLOOD PRESSURE: 117 MMHG | DIASTOLIC BLOOD PRESSURE: 78 MMHG | TEMPERATURE: 98.1 F | WEIGHT: 256 LBS | RESPIRATION RATE: 18 BRPM | HEART RATE: 67 BPM | BODY MASS INDEX: 50.26 KG/M2 | OXYGEN SATURATION: 95 %

## 2024-06-19 DIAGNOSIS — Z90.89 ACQUIRED ABSENCE OF OTHER ORGANS: Chronic | ICD-10-CM

## 2024-06-19 DIAGNOSIS — Z90.49 ACQUIRED ABSENCE OF OTHER SPECIFIED PARTS OF DIGESTIVE TRACT: Chronic | ICD-10-CM

## 2024-06-19 DIAGNOSIS — Z98.890 OTHER SPECIFIED POSTPROCEDURAL STATES: Chronic | ICD-10-CM

## 2024-06-19 DIAGNOSIS — E11.622 TYPE 2 DIABETES MELLITUS WITH OTHER SKIN ULCER: ICD-10-CM

## 2024-06-19 PROCEDURE — 99024 POSTOP FOLLOW-UP VISIT: CPT

## 2024-06-19 PROCEDURE — G0463: CPT

## 2024-06-19 NOTE — PLAN
[FreeTextEntry1] : Patient examined and evaluated at this time. Continue local wound care and offloading. Antibiotics as per infectious disease recommendations. Spent 20 minutes for patient care and medical decision making. Patient to follow up in 1 week.

## 2024-06-19 NOTE — REVIEW OF SYSTEMS
[Joint Stiffness] : joint stiffness [Skin Wound] : skin wound [Depression] : depression [Fever] : no fever [Chills] : no chills [Eye Pain] : no eye pain [Loss Of Hearing] : no hearing loss [Shortness Of Breath] : no shortness of breath [Abdominal Pain] : no abdominal pain [Easy Bleeding] : no tendency for easy bleeding [FreeTextEntry5] : HTN, HLD , morbid obesity  [de-identified] : Small diabetic pressure ulcer posterior left heel , skin , subcut and fat , no soi , clean granular  [FreeTextEntry9] : Fluid retention and venous insufficiency  [de-identified] : IDDM with neuropathy  [de-identified] : not compliant with care  [de-identified] : JADEN

## 2024-06-19 NOTE — HISTORY OF PRESENT ILLNESS
[FreeTextEntry1] : Patient seen for newly open right fifth metatarsal head plantar wound down to skin, subcutaneous tissue, fat.  Patient is unaware of when the wound opened.  6/5/2024 patient seen for follow-up right fifth metatarsal head plantar wound down to skin, subcutaneous tissue, fat.  Patient also seen for follow-up of right lower leg wound at skin, subcutaneous tissue, fat. 6/19/2024 patient seen for follow-up status post right fifth metatarsal head resection with open wound submet 5 down to skin, subcutaneous tissue, fat.  Patient currently taking oral antibiotics at this time.

## 2024-06-19 NOTE — VITALS
[] : No [de-identified] : 10/10 [FreeTextEntry3] : right foot  [FreeTextEntry1] : Pain relief medication  [FreeTextEntry2] : "it hurts all the time"   /  Standing and walking makes the pain worse. [FreeTextEntry4] : Pain relief medication

## 2024-06-19 NOTE — ASSESSMENT
[Verbal] : Verbal [Written] : Written [Demo] : Demo [Patient] : Patient [Other: ___] : [unfilled] [Poor - depression, low motivation, poor retention, non-acceptance of disease] : Poor - depression, low motivation, poor retention, non-acceptance of disease [Needs reinforcement] : needs reinforcement [Dressing changes] : dressing changes [Foot Care] : foot care [Skin Care] : skin care [Signs and symptoms of infection] : sign and symptoms of infection [Surgery] : surgery [Nutrition] : nutrition [How and When to Call] : how and when to call [Pain Management] : pain management [Off-loading] : off-loading [Hospitalization] : hospitalization [Patient responsibility to plan of care] : patient responsibility to plan of care [Glycemic Control] : glycemic control [Stable] : stable [Home] : Home [Ambulatory] : Ambulatory [Not Applicable - Long Term Care/Home Health Agency] : Long Term Care/Home Health Agency: Not Applicable [] : Yes [FreeTextEntry2] : Infection Prevention Wound care and Dressing changes Promote and Restore optimal skin integrity Nutrition and Wound Healing  Offloading and Pressure relief Protect and Guard wound site  Maintain acceptable levels of Pain Compliance R/T Elevation of Lower Extremities Weight loss  Comression Therapy [FreeTextEntry4] : DPM assessed wound site - Verbally ordered Right Planter foot Silver Alginate, dry dressing, ACE 3x per week, and Right 5th Met Head Resection - Adaptic Touch, Dry dressing, ACE 3x per week, Patient verbalized understanding of all discussed. Patient to return to [facility] in One  Week for assessment, and 2x per week for dressing changes.  Patient w/ scheduled appt's on 6/21/24,  6/24/24, and Thursday 6/27/24 for assess.

## 2024-06-19 NOTE — PHYSICAL EXAM
[4 x 4] : 4 x 4  [Ankle Swelling (On Exam)] : present [Ankle Swelling Bilaterally] : severe [] : of the left leg [Ankle Swelling On The Right] : mild [Skin Ulcer] : ulcer [Skin Induration] : induration [Purpura] : no purpura  [Petechiae] : no petechiae [de-identified] : morbidly obese WF, NAD, alert, Ox3. [de-identified] : Status post right fifth met head resection [de-identified] : Right foot dorsal incision site with intact sutures, no dehiscence, no purulence, no fluctuance, no proximal streaking.  Right foot submet 5 wound down to skin, subcutaneous tissue, fat [de-identified] : Loss of light touch sensation bilaterally [FreeTextEntry7] : Right lower leg - closed [de-identified] : Right Foot - 5th Met. Head resection - Sutures in Place [de-identified] : 2.4cm [de-identified] : suture width [de-identified] : SUNDAR  6/11/24 [de-identified] : edema [de-identified] : Circulation and Neuromuscular assessment done post application. [de-identified] : Adaptic touch [de-identified] : Mechanically cleansed with 0.9% Normal Saline Kerlix Surgical shoe [FreeTextEntry1] : Right Plantar foot - base of 5th metatarsal   [FreeTextEntry2] : 1.3 [FreeTextEntry3] : 1.8 [FreeTextEntry4] : 0.1 [de-identified] : Serous/sanguinous [de-identified] : Ulcer [de-identified] : Macerated [de-identified] : <25% [de-identified] : Circulation and Neuromuscular assessment done post application. [de-identified] :  Alginate Ag [de-identified] : Mechanically cleansed with 0.9% Normal Saline Kerlix  Surgical shoe [de-identified] : None [de-identified] : No [de-identified] : No [de-identified] : Surgical [de-identified] : None [de-identified] : None [de-identified] : 100% [de-identified] : No [de-identified] : Ace wraps [de-identified] : 3x Weekly [de-identified] : Primary Dressing [TWNoteComboBox4] : Small [TWNoteComboBox5] : No [TWNoteComboBox6] : Diabetic [de-identified] : No [de-identified] : other [de-identified] : None [de-identified] : None [de-identified] : >75% [de-identified] : No [de-identified] : Ace wraps [de-identified] : 3x Weekly [de-identified] : Primary Dressing

## 2024-06-20 DIAGNOSIS — L97.822 NON-PRESSURE CHRONIC ULCER OF OTHER PART OF LEFT LOWER LEG WITH FAT LAYER EXPOSED: ICD-10-CM

## 2024-06-20 DIAGNOSIS — Z79.4 LONG TERM (CURRENT) USE OF INSULIN: ICD-10-CM

## 2024-06-20 DIAGNOSIS — Z79.84 LONG TERM (CURRENT) USE OF ORAL HYPOGLYCEMIC DRUGS: ICD-10-CM

## 2024-06-20 DIAGNOSIS — E11.622 TYPE 2 DIABETES MELLITUS WITH OTHER SKIN ULCER: ICD-10-CM

## 2024-06-20 DIAGNOSIS — T81.81XD: ICD-10-CM

## 2024-06-20 DIAGNOSIS — I48.91 UNSPECIFIED ATRIAL FIBRILLATION: ICD-10-CM

## 2024-06-20 DIAGNOSIS — M25.50 PAIN IN UNSPECIFIED JOINT: ICD-10-CM

## 2024-06-20 DIAGNOSIS — I11.0 HYPERTENSIVE HEART DISEASE WITH HEART FAILURE: ICD-10-CM

## 2024-06-20 DIAGNOSIS — Z79.899 OTHER LONG TERM (CURRENT) DRUG THERAPY: ICD-10-CM

## 2024-06-20 DIAGNOSIS — E11.40 TYPE 2 DIABETES MELLITUS WITH DIABETIC NEUROPATHY, UNSPECIFIED: ICD-10-CM
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
## 2024-06-20 DIAGNOSIS — M86.671 OTHER CHRONIC OSTEOMYELITIS, RIGHT ANKLE AND FOOT: ICD-10-CM

## 2024-06-20 DIAGNOSIS — Z98.49 CATARACT EXTRACTION STATUS, UNSPECIFIED EYE: ICD-10-CM

## 2024-06-20 DIAGNOSIS — Z79.01 LONG TERM (CURRENT) USE OF ANTICOAGULANTS: ICD-10-CM

## 2024-06-20 DIAGNOSIS — Z86.718 PERSONAL HISTORY OF OTHER VENOUS THROMBOSIS AND EMBOLISM: ICD-10-CM

## 2024-06-20 DIAGNOSIS — E11.69 TYPE 2 DIABETES MELLITUS WITH OTHER SPECIFIED COMPLICATION: ICD-10-CM

## 2024-06-20 DIAGNOSIS — Z90.49 ACQUIRED ABSENCE OF OTHER SPECIFIED PARTS OF DIGESTIVE TRACT: ICD-10-CM

## 2024-06-20 DIAGNOSIS — L97.514 NON-PRESSURE CHRONIC ULCER OF OTHER PART OF RIGHT FOOT WITH NECROSIS OF BONE: ICD-10-CM

## 2024-06-20 DIAGNOSIS — I50.9 HEART FAILURE, UNSPECIFIED: ICD-10-CM

## 2024-06-21 ENCOUNTER — OUTPATIENT (OUTPATIENT)
Dept: OUTPATIENT SERVICES | Facility: HOSPITAL | Age: 59
LOS: 1 days | Discharge: HOME | End: 2024-06-21
Payer: MEDICARE

## 2024-06-21 ENCOUNTER — APPOINTMENT (OUTPATIENT)
Dept: WOUND CARE | Facility: HOSPITAL | Age: 59
End: 2024-06-21
Payer: MEDICARE

## 2024-06-21 VITALS
HEIGHT: 60 IN | SYSTOLIC BLOOD PRESSURE: 124 MMHG | HEART RATE: 83 BPM | TEMPERATURE: 98.2 F | OXYGEN SATURATION: 95 % | RESPIRATION RATE: 18 BRPM | DIASTOLIC BLOOD PRESSURE: 81 MMHG | WEIGHT: 256 LBS | BODY MASS INDEX: 50.26 KG/M2

## 2024-06-21 DIAGNOSIS — Z98.890 OTHER SPECIFIED POSTPROCEDURAL STATES: Chronic | ICD-10-CM

## 2024-06-21 DIAGNOSIS — Z90.89 ACQUIRED ABSENCE OF OTHER ORGANS: Chronic | ICD-10-CM

## 2024-06-21 DIAGNOSIS — Z90.49 ACQUIRED ABSENCE OF OTHER SPECIFIED PARTS OF DIGESTIVE TRACT: Chronic | ICD-10-CM

## 2024-06-21 DIAGNOSIS — E11.622 TYPE 2 DIABETES MELLITUS WITH OTHER SKIN ULCER: ICD-10-CM

## 2024-06-21 PROCEDURE — G0463: CPT

## 2024-06-21 PROCEDURE — ZZZZZ: CPT

## 2024-06-21 NOTE — ED ADULT NURSE REASSESSMENT NOTE - STATUS
Malinda is an 79 year old female presenting with afib RVR, acute decompensated HFmrEF. CORE consult requested. Malinda is currently admitted with afib RVR, HFmrEF.     Malinda was provided with a CHF book.  I reviewed the importance of daily weights, 2 gm sodium diet, 2L fluid restriction and compliance with medications upon hospital discharge.  CORE contact phone numbers provided and patient is encouraged to call with any questions or concerns, including any weight gain or loss of 2 or more pounds in 24 hours or 5 or more pounds in 1 week.      Appointment made in CORE clinic on  in [place] (patient preference) with Claudine Mosquera.  I will follow-up with the patient at that time, sooner if needed.  Thank you for the consult.    Ofelia Navas RN, BSN CHFN  Cardiology Care Coordinator - Heart Failure/ [facility]  [facility]   Questions and [phone]     
awaiting bed, no change
awaiting bed, no change

## 2024-06-24 ENCOUNTER — APPOINTMENT (OUTPATIENT)
Dept: WOUND CARE | Facility: HOSPITAL | Age: 59
End: 2024-06-24
Payer: MEDICARE

## 2024-06-24 ENCOUNTER — OUTPATIENT (OUTPATIENT)
Dept: OUTPATIENT SERVICES | Facility: HOSPITAL | Age: 59
LOS: 1 days | Discharge: HOME | End: 2024-06-24
Payer: MEDICARE

## 2024-06-24 VITALS
RESPIRATION RATE: 18 BRPM | DIASTOLIC BLOOD PRESSURE: 72 MMHG | HEART RATE: 79 BPM | HEIGHT: 60 IN | OXYGEN SATURATION: 97 % | WEIGHT: 256 LBS | SYSTOLIC BLOOD PRESSURE: 114 MMHG | BODY MASS INDEX: 50.26 KG/M2 | TEMPERATURE: 97.6 F

## 2024-06-24 DIAGNOSIS — T81.89XD OTHER COMPLICATIONS OF PROCEDURES, NOT ELSEWHERE CLASSIFIED, SUBSEQUENT ENCOUNTER: ICD-10-CM
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
## 2024-06-24 DIAGNOSIS — M86.671 OTHER CHRONIC OSTEOMYELITIS, RIGHT ANKLE AND FOOT: ICD-10-CM
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
## 2024-06-24 DIAGNOSIS — E11.69 TYPE 2 DIABETES MELLITUS WITH OTHER SPECIFIED COMPLICATION: ICD-10-CM

## 2024-06-24 DIAGNOSIS — Z90.89 ACQUIRED ABSENCE OF OTHER ORGANS: Chronic | ICD-10-CM

## 2024-06-24 DIAGNOSIS — E11.622 TYPE 2 DIABETES MELLITUS WITH OTHER SKIN ULCER: ICD-10-CM

## 2024-06-24 PROCEDURE — G0463: CPT

## 2024-06-24 PROCEDURE — ZZZZZ: CPT

## 2024-06-25 DIAGNOSIS — L97.514 NON-PRESSURE CHRONIC ULCER OF OTHER PART OF RIGHT FOOT WITH NECROSIS OF BONE: ICD-10-CM

## 2024-06-25 DIAGNOSIS — E11.69 TYPE 2 DIABETES MELLITUS WITH OTHER SPECIFIED COMPLICATION: ICD-10-CM

## 2024-06-25 DIAGNOSIS — T81.89XD OTHER COMPLICATIONS OF PROCEDURES, NOT ELSEWHERE CLASSIFIED, SUBSEQUENT ENCOUNTER: ICD-10-CM

## 2024-06-25 DIAGNOSIS — M86.671 OTHER CHRONIC OSTEOMYELITIS, RIGHT ANKLE AND FOOT: ICD-10-CM
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
## 2024-07-01 ENCOUNTER — APPOINTMENT (OUTPATIENT)
Dept: WOUND CARE | Facility: HOSPITAL | Age: 59
End: 2024-07-01
Payer: MEDICAID

## 2024-07-01 ENCOUNTER — OUTPATIENT (OUTPATIENT)
Dept: OUTPATIENT SERVICES | Facility: HOSPITAL | Age: 59
LOS: 1 days | Discharge: HOME | End: 2024-07-01
Payer: MEDICARE

## 2024-07-01 ENCOUNTER — INPATIENT (INPATIENT)
Facility: HOSPITAL | Age: 59
LOS: 4 days | Discharge: HOME | DRG: 603 | End: 2024-07-06
Attending: FAMILY MEDICINE | Admitting: STUDENT IN AN ORGANIZED HEALTH CARE EDUCATION/TRAINING PROGRAM
Payer: MEDICARE

## 2024-07-01 VITALS
HEIGHT: 61 IN | SYSTOLIC BLOOD PRESSURE: 121 MMHG | RESPIRATION RATE: 16 BRPM | DIASTOLIC BLOOD PRESSURE: 68 MMHG | WEIGHT: 264.55 LBS | HEART RATE: 95 BPM | OXYGEN SATURATION: 96 % | TEMPERATURE: 98 F

## 2024-07-01 VITALS
RESPIRATION RATE: 18 BRPM | TEMPERATURE: 98.7 F | HEART RATE: 82 BPM | DIASTOLIC BLOOD PRESSURE: 88 MMHG | SYSTOLIC BLOOD PRESSURE: 132 MMHG | WEIGHT: 256 LBS | OXYGEN SATURATION: 95 % | BODY MASS INDEX: 50.26 KG/M2 | HEIGHT: 60 IN

## 2024-07-01 DIAGNOSIS — Z98.890 OTHER SPECIFIED POSTPROCEDURAL STATES: Chronic | ICD-10-CM

## 2024-07-01 DIAGNOSIS — Z90.89 ACQUIRED ABSENCE OF OTHER ORGANS: Chronic | ICD-10-CM

## 2024-07-01 DIAGNOSIS — Z90.49 ACQUIRED ABSENCE OF OTHER SPECIFIED PARTS OF DIGESTIVE TRACT: Chronic | ICD-10-CM

## 2024-07-01 DIAGNOSIS — E11.622 TYPE 2 DIABETES MELLITUS WITH OTHER SKIN ULCER: ICD-10-CM

## 2024-07-01 PROCEDURE — 99203 OFFICE O/P NEW LOW 30 MIN: CPT

## 2024-07-01 PROCEDURE — G0463: CPT

## 2024-07-01 PROCEDURE — 99285 EMERGENCY DEPT VISIT HI MDM: CPT

## 2024-07-02 ENCOUNTER — RESULT REVIEW (OUTPATIENT)
Age: 59
End: 2024-07-02

## 2024-07-02 DIAGNOSIS — I48.91 UNSPECIFIED ATRIAL FIBRILLATION: ICD-10-CM

## 2024-07-02 DIAGNOSIS — Z29.9 ENCOUNTER FOR PROPHYLACTIC MEASURES, UNSPECIFIED: ICD-10-CM

## 2024-07-02 DIAGNOSIS — Z86.718 PERSONAL HISTORY OF OTHER VENOUS THROMBOSIS AND EMBOLISM: ICD-10-CM

## 2024-07-02 DIAGNOSIS — R10.9 UNSPECIFIED ABDOMINAL PAIN: ICD-10-CM

## 2024-07-02 DIAGNOSIS — G62.9 POLYNEUROPATHY, UNSPECIFIED: ICD-10-CM

## 2024-07-02 DIAGNOSIS — L03.115 CELLULITIS OF RIGHT LOWER LIMB: ICD-10-CM

## 2024-07-02 DIAGNOSIS — I50.23 ACUTE ON CHRONIC SYSTOLIC (CONGESTIVE) HEART FAILURE: ICD-10-CM

## 2024-07-02 DIAGNOSIS — Z79.01 LONG TERM (CURRENT) USE OF ANTICOAGULANTS: ICD-10-CM

## 2024-07-02 DIAGNOSIS — I50.9 HEART FAILURE, UNSPECIFIED: ICD-10-CM

## 2024-07-02 DIAGNOSIS — E11.622 TYPE 2 DIABETES MELLITUS WITH OTHER SKIN ULCER: ICD-10-CM

## 2024-07-02 DIAGNOSIS — Z79.84 LONG TERM (CURRENT) USE OF ORAL HYPOGLYCEMIC DRUGS: ICD-10-CM

## 2024-07-02 DIAGNOSIS — Z79.4 LONG TERM (CURRENT) USE OF INSULIN: ICD-10-CM

## 2024-07-02 DIAGNOSIS — Z90.49 ACQUIRED ABSENCE OF OTHER SPECIFIED PARTS OF DIGESTIVE TRACT: ICD-10-CM

## 2024-07-02 DIAGNOSIS — L97.812 NON-PRESSURE CHRONIC ULCER OF OTHER PART OF RIGHT LOWER LEG WITH FAT LAYER EXPOSED: ICD-10-CM

## 2024-07-02 DIAGNOSIS — Z79.899 OTHER LONG TERM (CURRENT) DRUG THERAPY: ICD-10-CM

## 2024-07-02 DIAGNOSIS — F41.9 ANXIETY DISORDER, UNSPECIFIED: ICD-10-CM

## 2024-07-02 DIAGNOSIS — I11.0 HYPERTENSIVE HEART DISEASE WITH HEART FAILURE: ICD-10-CM

## 2024-07-02 DIAGNOSIS — M25.50 PAIN IN UNSPECIFIED JOINT: ICD-10-CM

## 2024-07-02 DIAGNOSIS — E11.621 TYPE 2 DIABETES MELLITUS WITH FOOT ULCER: ICD-10-CM

## 2024-07-02 DIAGNOSIS — E11.40 TYPE 2 DIABETES MELLITUS WITH DIABETIC NEUROPATHY, UNSPECIFIED: ICD-10-CM

## 2024-07-02 DIAGNOSIS — I50.33 ACUTE ON CHRONIC DIASTOLIC (CONGESTIVE) HEART FAILURE: ICD-10-CM

## 2024-07-02 DIAGNOSIS — E11.9 TYPE 2 DIABETES MELLITUS WITHOUT COMPLICATIONS: ICD-10-CM

## 2024-07-02 DIAGNOSIS — Z98.49 CATARACT EXTRACTION STATUS, UNSPECIFIED EYE: ICD-10-CM

## 2024-07-02 PROBLEM — T81.89XD DISCHARGE FROM SUTURE LINE, SUBSEQUENT ENCOUNTER: Status: ACTIVE | Noted: 2024-06-19

## 2024-07-02 PROBLEM — L97.514 CHRONIC FOOT ULCER, RIGHT, WITH NECROSIS OF BONE: Status: ACTIVE | Noted: 2024-06-19

## 2024-07-02 PROBLEM — L97.822 NON-PRS CHRONIC ULCER OTH PRT L LOW LEG W FAT LAYER EXPOSED: Status: ACTIVE | Noted: 2024-06-19

## 2024-07-02 PROBLEM — E11.69 CONTROLLED TYPE 2 DIABETES MELLITUS WITH OTHER SPECIFIED COMPLICATION, UNSPECIFIED WHETHER LONG TERM INSULIN USE: Status: ACTIVE | Noted: 2024-06-19

## 2024-07-02 PROBLEM — M86.671 CHRONIC OSTEOMYELITIS OF RIGHT FOOT: Status: ACTIVE | Noted: 2024-06-19

## 2024-07-02 LAB
A1C WITH ESTIMATED AVERAGE GLUCOSE RESULT: 10.3 % — HIGH (ref 4–5.6)
ALBUMIN SERPL ELPH-MCNC: 3.5 G/DL — SIGNIFICANT CHANGE UP (ref 3.3–5)
ALBUMIN SERPL ELPH-MCNC: 3.7 G/DL — SIGNIFICANT CHANGE UP (ref 3.3–5)
ALP SERPL-CCNC: 85 U/L — SIGNIFICANT CHANGE UP (ref 40–120)
ALP SERPL-CCNC: 93 U/L — SIGNIFICANT CHANGE UP (ref 40–120)
ALT FLD-CCNC: 20 U/L — SIGNIFICANT CHANGE UP (ref 12–78)
ALT FLD-CCNC: 22 U/L — SIGNIFICANT CHANGE UP (ref 12–78)
ANION GAP SERPL CALC-SCNC: 6 MMOL/L — SIGNIFICANT CHANGE UP (ref 5–17)
ANION GAP SERPL CALC-SCNC: 8 MMOL/L — SIGNIFICANT CHANGE UP (ref 5–17)
APTT BLD: 31.8 SEC — SIGNIFICANT CHANGE UP (ref 24.5–35.6)
AST SERPL-CCNC: 15 U/L — SIGNIFICANT CHANGE UP (ref 15–37)
AST SERPL-CCNC: 18 U/L — SIGNIFICANT CHANGE UP (ref 15–37)
BASOPHILS # BLD AUTO: 0.04 K/UL — SIGNIFICANT CHANGE UP (ref 0–0.2)
BASOPHILS NFR BLD AUTO: 0.5 % — SIGNIFICANT CHANGE UP (ref 0–2)
BILIRUB SERPL-MCNC: 0.5 MG/DL — SIGNIFICANT CHANGE UP (ref 0.2–1.2)
BILIRUB SERPL-MCNC: 0.5 MG/DL — SIGNIFICANT CHANGE UP (ref 0.2–1.2)
BUN SERPL-MCNC: 24 MG/DL — HIGH (ref 7–23)
BUN SERPL-MCNC: 27 MG/DL — HIGH (ref 7–23)
CALCIUM SERPL-MCNC: 9.4 MG/DL — SIGNIFICANT CHANGE UP (ref 8.5–10.1)
CALCIUM SERPL-MCNC: 9.6 MG/DL — SIGNIFICANT CHANGE UP (ref 8.5–10.1)
CHLORIDE SERPL-SCNC: 102 MMOL/L — SIGNIFICANT CHANGE UP (ref 96–108)
CHLORIDE SERPL-SCNC: 103 MMOL/L — SIGNIFICANT CHANGE UP (ref 96–108)
CK MB CFR SERPL CALC: 1.5 NG/ML — SIGNIFICANT CHANGE UP (ref 0–3.6)
CO2 SERPL-SCNC: 30 MMOL/L — SIGNIFICANT CHANGE UP (ref 22–31)
CO2 SERPL-SCNC: 30 MMOL/L — SIGNIFICANT CHANGE UP (ref 22–31)
CREAT SERPL-MCNC: 0.91 MG/DL — SIGNIFICANT CHANGE UP (ref 0.5–1.3)
CREAT SERPL-MCNC: 0.99 MG/DL — SIGNIFICANT CHANGE UP (ref 0.5–1.3)
CRP SERPL-MCNC: 9 MG/L — HIGH
EGFR: 65 ML/MIN/1.73M2 — SIGNIFICANT CHANGE UP
EGFR: 65 ML/MIN/1.73M2 — SIGNIFICANT CHANGE UP
EGFR: 72 ML/MIN/1.73M2 — SIGNIFICANT CHANGE UP
EGFR: 72 ML/MIN/1.73M2 — SIGNIFICANT CHANGE UP
EOSINOPHIL # BLD AUTO: 0.17 K/UL — SIGNIFICANT CHANGE UP (ref 0–0.5)
EOSINOPHIL NFR BLD AUTO: 2.1 % — SIGNIFICANT CHANGE UP (ref 0–6)
ERYTHROCYTE [SEDIMENTATION RATE] IN BLOOD: 28 MM/HR — HIGH (ref 0–20)
ESTIMATED AVERAGE GLUCOSE: 249 MG/DL — HIGH (ref 68–114)
GLUCOSE SERPL-MCNC: 217 MG/DL — HIGH (ref 70–99)
GLUCOSE SERPL-MCNC: 218 MG/DL — HIGH (ref 70–99)
HCT VFR BLD CALC: 36.4 % — SIGNIFICANT CHANGE UP (ref 34.5–45)
HCT VFR BLD CALC: 36.5 % — SIGNIFICANT CHANGE UP (ref 34.5–45)
HGB BLD-MCNC: 12.1 G/DL — SIGNIFICANT CHANGE UP (ref 11.5–15.5)
HGB BLD-MCNC: 12.2 G/DL — SIGNIFICANT CHANGE UP (ref 11.5–15.5)
IMM GRANULOCYTES NFR BLD AUTO: 0.4 % — SIGNIFICANT CHANGE UP (ref 0–0.9)
INR BLD: 1.16 RATIO — SIGNIFICANT CHANGE UP (ref 0.85–1.18)
LACTATE SERPL-SCNC: 2 MMOL/L — SIGNIFICANT CHANGE UP (ref 0.7–2)
LIDOCAIN IGE QN: 13 U/L — SIGNIFICANT CHANGE UP (ref 13–75)
LYMPHOCYTES # BLD AUTO: 1.64 K/UL — SIGNIFICANT CHANGE UP (ref 1–3.3)
LYMPHOCYTES # BLD AUTO: 20.6 % — SIGNIFICANT CHANGE UP (ref 13–44)
MAGNESIUM SERPL-MCNC: 1.9 MG/DL — SIGNIFICANT CHANGE UP (ref 1.6–2.6)
MAGNESIUM SERPL-MCNC: 1.9 MG/DL — SIGNIFICANT CHANGE UP (ref 1.6–2.6)
MCHC RBC-ENTMCNC: 30.7 PG — SIGNIFICANT CHANGE UP (ref 27–34)
MCHC RBC-ENTMCNC: 30.9 PG — SIGNIFICANT CHANGE UP (ref 27–34)
MCHC RBC-ENTMCNC: 33.2 GM/DL — SIGNIFICANT CHANGE UP (ref 32–36)
MCHC RBC-ENTMCNC: 33.4 GM/DL — SIGNIFICANT CHANGE UP (ref 32–36)
MCV RBC AUTO: 92.4 FL — SIGNIFICANT CHANGE UP (ref 80–100)
MCV RBC AUTO: 92.4 FL — SIGNIFICANT CHANGE UP (ref 80–100)
MONOCYTES # BLD AUTO: 0.47 K/UL — SIGNIFICANT CHANGE UP (ref 0–0.9)
MONOCYTES NFR BLD AUTO: 5.9 % — SIGNIFICANT CHANGE UP (ref 2–14)
NEUTROPHILS # BLD AUTO: 5.62 K/UL — SIGNIFICANT CHANGE UP (ref 1.8–7.4)
NEUTROPHILS NFR BLD AUTO: 70.5 % — SIGNIFICANT CHANGE UP (ref 43–77)
NRBC # BLD: 0 /100 WBCS — SIGNIFICANT CHANGE UP (ref 0–0)
NRBC # BLD: 0 /100 WBCS — SIGNIFICANT CHANGE UP (ref 0–0)
NRBC BLD-RTO: 0 /100 WBCS — SIGNIFICANT CHANGE UP (ref 0–0)
NRBC BLD-RTO: 0 /100 WBCS — SIGNIFICANT CHANGE UP (ref 0–0)
NT-PROBNP SERPL-SCNC: 813 PG/ML — HIGH (ref 0–125)
PHOSPHATE SERPL-MCNC: 3.4 MG/DL — SIGNIFICANT CHANGE UP (ref 2.5–4.5)
PLATELET # BLD AUTO: 178 K/UL — SIGNIFICANT CHANGE UP (ref 150–400)
PLATELET # BLD AUTO: 203 K/UL — SIGNIFICANT CHANGE UP (ref 150–400)
POTASSIUM SERPL-MCNC: 3.8 MMOL/L — SIGNIFICANT CHANGE UP (ref 3.5–5.3)
POTASSIUM SERPL-MCNC: 3.9 MMOL/L — SIGNIFICANT CHANGE UP (ref 3.5–5.3)
POTASSIUM SERPL-SCNC: 3.8 MMOL/L — SIGNIFICANT CHANGE UP (ref 3.5–5.3)
POTASSIUM SERPL-SCNC: 3.9 MMOL/L — SIGNIFICANT CHANGE UP (ref 3.5–5.3)
PROT SERPL-MCNC: 7.4 G/DL — SIGNIFICANT CHANGE UP (ref 6–8.3)
PROT SERPL-MCNC: 7.9 G/DL — SIGNIFICANT CHANGE UP (ref 6–8.3)
PROTHROM AB SERPL-ACNC: 13.5 SEC — HIGH (ref 9.5–13)
RBC # BLD: 3.94 M/UL — SIGNIFICANT CHANGE UP (ref 3.8–5.2)
RBC # BLD: 3.95 M/UL — SIGNIFICANT CHANGE UP (ref 3.8–5.2)
RBC # FLD: 13.2 % — SIGNIFICANT CHANGE UP (ref 10.3–14.5)
RBC # FLD: 13.2 % — SIGNIFICANT CHANGE UP (ref 10.3–14.5)
SODIUM SERPL-SCNC: 139 MMOL/L — SIGNIFICANT CHANGE UP (ref 135–145)
SODIUM SERPL-SCNC: 140 MMOL/L — SIGNIFICANT CHANGE UP (ref 135–145)
TROPONIN I, HIGH SENSITIVITY RESULT: 9.1 NG/L — SIGNIFICANT CHANGE UP
TSH SERPL-MCNC: 2.94 UIU/ML — SIGNIFICANT CHANGE UP (ref 0.36–3.74)
WBC # BLD: 7.27 K/UL — SIGNIFICANT CHANGE UP (ref 3.8–10.5)
WBC # BLD: 7.97 K/UL — SIGNIFICANT CHANGE UP (ref 3.8–10.5)
WBC # FLD AUTO: 7.27 K/UL — SIGNIFICANT CHANGE UP (ref 3.8–10.5)
WBC # FLD AUTO: 7.97 K/UL — SIGNIFICANT CHANGE UP (ref 3.8–10.5)

## 2024-07-02 PROCEDURE — 93306 TTE W/DOPPLER COMPLETE: CPT | Mod: 26

## 2024-07-02 PROCEDURE — 99223 1ST HOSP IP/OBS HIGH 75: CPT | Mod: GC

## 2024-07-02 PROCEDURE — 71045 X-RAY EXAM CHEST 1 VIEW: CPT | Mod: 26

## 2024-07-02 PROCEDURE — 73630 X-RAY EXAM OF FOOT: CPT | Mod: 26,RT

## 2024-07-02 PROCEDURE — 93010 ELECTROCARDIOGRAM REPORT: CPT | Mod: 76

## 2024-07-02 PROCEDURE — 93970 EXTREMITY STUDY: CPT | Mod: 26

## 2024-07-02 PROCEDURE — 99221 1ST HOSP IP/OBS SF/LOW 40: CPT | Mod: 24

## 2024-07-02 PROCEDURE — 99233 SBSQ HOSP IP/OBS HIGH 50: CPT

## 2024-07-02 RX ORDER — DEXTROSE MONOHYDRATE 100 G/1000ML
125 INJECTION, SOLUTION INTRAVENOUS ONCE
Refills: 0 | Status: DISCONTINUED | OUTPATIENT
Start: 2024-07-02 | End: 2024-07-06

## 2024-07-02 RX ORDER — METOPROLOL SUCCINATE 50 MG/1
100 TABLET, EXTENDED RELEASE ORAL DAILY
Refills: 0 | Status: DISCONTINUED | OUTPATIENT
Start: 2024-07-02 | End: 2024-07-02

## 2024-07-02 RX ORDER — CEFEPIME 2 G/20ML
1000 INJECTION, POWDER, FOR SOLUTION INTRAVENOUS ONCE
Refills: 0 | Status: COMPLETED | OUTPATIENT
Start: 2024-07-02 | End: 2024-07-02

## 2024-07-02 RX ORDER — DEXTROSE 50 % IN WATER 50 %
12.5 SYRINGE (ML) INTRAVENOUS ONCE
Refills: 0 | Status: DISCONTINUED | OUTPATIENT
Start: 2024-07-02 | End: 2024-07-06

## 2024-07-02 RX ORDER — MAGNESIUM, ALUMINUM HYDROXIDE 200-200 MG
30 TABLET,CHEWABLE ORAL EVERY 4 HOURS
Refills: 0 | Status: DISCONTINUED | OUTPATIENT
Start: 2024-07-02 | End: 2024-07-06

## 2024-07-02 RX ORDER — VANCOMYCIN HCL IN 5 % DEXTROSE 1.5G/250ML
1500 PLASTIC BAG, INJECTION (ML) INTRAVENOUS ONCE
Refills: 0 | Status: COMPLETED | OUTPATIENT
Start: 2024-07-02 | End: 2024-07-02

## 2024-07-02 RX ORDER — SODIUM CHLORIDE 9 G/1000ML
1000 INJECTION, SOLUTION INTRAVENOUS
Refills: 0 | Status: DISCONTINUED | OUTPATIENT
Start: 2024-07-02 | End: 2024-07-06

## 2024-07-02 RX ORDER — INSULIN GLARGINE-YFGN 100 [IU]/ML
20 INJECTION, SOLUTION SUBCUTANEOUS AT BEDTIME
Refills: 0 | Status: DISCONTINUED | OUTPATIENT
Start: 2024-07-02 | End: 2024-07-03

## 2024-07-02 RX ORDER — FUROSEMIDE 10 MG/ML
40 INJECTION INTRAMUSCULAR; INTRAVENOUS
Refills: 0 | Status: DISCONTINUED | OUTPATIENT
Start: 2024-07-02 | End: 2024-07-06

## 2024-07-02 RX ORDER — PIPERACILLIN-TAZO-DEXTROSE,ISO 3.375G/5
3.38 IV SOLUTION, PIGGYBACK PREMIX FROZEN(ML) INTRAVENOUS ONCE
Refills: 0 | Status: COMPLETED | OUTPATIENT
Start: 2024-07-02 | End: 2024-07-02

## 2024-07-02 RX ORDER — POLYETHYLENE GLYCOL 3350 17 G/17G
17 POWDER, FOR SOLUTION ORAL DAILY
Refills: 0 | Status: DISCONTINUED | OUTPATIENT
Start: 2024-07-02 | End: 2024-07-06

## 2024-07-02 RX ORDER — INSULIN LISPRO 100 U/ML
INJECTION, SOLUTION INTRAVENOUS; SUBCUTANEOUS AT BEDTIME
Refills: 0 | Status: DISCONTINUED | OUTPATIENT
Start: 2024-07-02 | End: 2024-07-03

## 2024-07-02 RX ORDER — GABAPENTIN 400 MG/1
300 CAPSULE ORAL DAILY
Refills: 0 | Status: DISCONTINUED | OUTPATIENT
Start: 2024-07-02 | End: 2024-07-06

## 2024-07-02 RX ORDER — METOPROLOL SUCCINATE 50 MG/1
100 TABLET, EXTENDED RELEASE ORAL DAILY
Refills: 0 | Status: DISCONTINUED | OUTPATIENT
Start: 2024-07-02 | End: 2024-07-06

## 2024-07-02 RX ORDER — MELATONIN 5 MG
3 TABLET ORAL AT BEDTIME
Refills: 0 | Status: DISCONTINUED | OUTPATIENT
Start: 2024-07-02 | End: 2024-07-06

## 2024-07-02 RX ORDER — PIPERACILLIN-TAZO-DEXTROSE,ISO 3.375G/5
3.38 IV SOLUTION, PIGGYBACK PREMIX FROZEN(ML) INTRAVENOUS EVERY 8 HOURS
Refills: 0 | Status: DISCONTINUED | OUTPATIENT
Start: 2024-07-03 | End: 2024-07-05

## 2024-07-02 RX ORDER — DEXTROSE 50 % IN WATER 50 %
25 SYRINGE (ML) INTRAVENOUS ONCE
Refills: 0 | Status: DISCONTINUED | OUTPATIENT
Start: 2024-07-02 | End: 2024-07-06

## 2024-07-02 RX ORDER — DEXTROSE 50 % IN WATER 50 %
15 SYRINGE (ML) INTRAVENOUS ONCE
Refills: 0 | Status: DISCONTINUED | OUTPATIENT
Start: 2024-07-02 | End: 2024-07-06

## 2024-07-02 RX ORDER — GLUCAGON 3 MG/1
1 POWDER NASAL ONCE
Refills: 0 | Status: DISCONTINUED | OUTPATIENT
Start: 2024-07-02 | End: 2024-07-06

## 2024-07-02 RX ORDER — VANCOMYCIN HCL IN 5 % DEXTROSE 1.5G/250ML
1000 PLASTIC BAG, INJECTION (ML) INTRAVENOUS EVERY 12 HOURS
Refills: 0 | Status: DISCONTINUED | OUTPATIENT
Start: 2024-07-02 | End: 2024-07-02

## 2024-07-02 RX ORDER — OXYCODONE HYDROCHLORIDE 30 MG/1
2.5 TABLET ORAL EVERY 4 HOURS
Refills: 0 | Status: DISCONTINUED | OUTPATIENT
Start: 2024-07-02 | End: 2024-07-06

## 2024-07-02 RX ORDER — INSULIN LISPRO 100 U/ML
15 INJECTION, SOLUTION INTRAVENOUS; SUBCUTANEOUS
Refills: 0 | Status: DISCONTINUED | OUTPATIENT
Start: 2024-07-02 | End: 2024-07-03

## 2024-07-02 RX ORDER — ONDANSETRON HCL/PF 4 MG/2 ML
4 VIAL (ML) INJECTION ONCE
Refills: 0 | Status: COMPLETED | OUTPATIENT
Start: 2024-07-02 | End: 2024-07-02

## 2024-07-02 RX ORDER — NALOXONE HYDROCHLORIDE 0.4 MG/ML
0.4 INJECTION, SOLUTION INTRAMUSCULAR; INTRAVENOUS; SUBCUTANEOUS ONCE
Refills: 0 | Status: DISCONTINUED | OUTPATIENT
Start: 2024-07-02 | End: 2024-07-06

## 2024-07-02 RX ORDER — FUROSEMIDE 10 MG/ML
40 INJECTION INTRAMUSCULAR; INTRAVENOUS ONCE
Refills: 0 | Status: COMPLETED | OUTPATIENT
Start: 2024-07-02 | End: 2024-07-02

## 2024-07-02 RX ORDER — VANCOMYCIN HCL IN 5 % DEXTROSE 1.5G/250ML
1000 PLASTIC BAG, INJECTION (ML) INTRAVENOUS EVERY 24 HOURS
Refills: 0 | Status: DISCONTINUED | OUTPATIENT
Start: 2024-07-02 | End: 2024-07-02

## 2024-07-02 RX ORDER — POTASSIUM CHLORIDE 20 MEQ
1 PACKET (EA) ORAL
Refills: 0 | DISCHARGE

## 2024-07-02 RX ORDER — BISACODYL 5 MG
5 TABLET, DELAYED RELEASE (ENTERIC COATED) ORAL DAILY
Refills: 0 | Status: DISCONTINUED | OUTPATIENT
Start: 2024-07-02 | End: 2024-07-06

## 2024-07-02 RX ORDER — OXYCODONE HYDROCHLORIDE 30 MG/1
5 TABLET ORAL EVERY 4 HOURS
Refills: 0 | Status: DISCONTINUED | OUTPATIENT
Start: 2024-07-02 | End: 2024-07-06

## 2024-07-02 RX ORDER — RIVAROXABAN 10 MG/1
20 TABLET, FILM COATED ORAL
Refills: 0 | Status: DISCONTINUED | OUTPATIENT
Start: 2024-07-02 | End: 2024-07-06

## 2024-07-02 RX ORDER — ONDANSETRON HCL/PF 4 MG/2 ML
4 VIAL (ML) INJECTION EVERY 8 HOURS
Refills: 0 | Status: DISCONTINUED | OUTPATIENT
Start: 2024-07-02 | End: 2024-07-02

## 2024-07-02 RX ORDER — SENNA 187 MG
2 TABLET ORAL AT BEDTIME
Refills: 0 | Status: DISCONTINUED | OUTPATIENT
Start: 2024-07-02 | End: 2024-07-06

## 2024-07-02 RX ORDER — CEFEPIME 2 G/20ML
2000 INJECTION, POWDER, FOR SOLUTION INTRAVENOUS EVERY 8 HOURS
Refills: 0 | Status: DISCONTINUED | OUTPATIENT
Start: 2024-07-02 | End: 2024-07-02

## 2024-07-02 RX ORDER — ACETAMINOPHEN 500 MG/5ML
650 LIQUID (ML) ORAL EVERY 6 HOURS
Refills: 0 | Status: DISCONTINUED | OUTPATIENT
Start: 2024-07-02 | End: 2024-07-06

## 2024-07-02 RX ORDER — INSULIN LISPRO 100 U/ML
INJECTION, SOLUTION INTRAVENOUS; SUBCUTANEOUS
Refills: 0 | Status: DISCONTINUED | OUTPATIENT
Start: 2024-07-02 | End: 2024-07-03

## 2024-07-02 RX ADMIN — OXYCODONE HYDROCHLORIDE 5 MILLIGRAM(S): 30 TABLET ORAL at 15:28

## 2024-07-02 RX ADMIN — INSULIN LISPRO 15 UNIT(S): 100 INJECTION, SOLUTION INTRAVENOUS; SUBCUTANEOUS at 18:40

## 2024-07-02 RX ADMIN — Medication 200 GRAM(S): at 18:41

## 2024-07-02 RX ADMIN — RIVAROXABAN 20 MILLIGRAM(S): 10 TABLET, FILM COATED ORAL at 18:41

## 2024-07-02 RX ADMIN — CEFEPIME 100 MILLIGRAM(S): 2 INJECTION, POWDER, FOR SOLUTION INTRAVENOUS at 09:12

## 2024-07-02 RX ADMIN — OXYCODONE HYDROCHLORIDE 5 MILLIGRAM(S): 30 TABLET ORAL at 11:25

## 2024-07-02 RX ADMIN — INSULIN LISPRO 2: 100 INJECTION, SOLUTION INTRAVENOUS; SUBCUTANEOUS at 08:29

## 2024-07-02 RX ADMIN — FUROSEMIDE 40 MILLIGRAM(S): 10 INJECTION INTRAMUSCULAR; INTRAVENOUS at 06:56

## 2024-07-02 RX ADMIN — INSULIN LISPRO 15 UNIT(S): 100 INJECTION, SOLUTION INTRAVENOUS; SUBCUTANEOUS at 12:45

## 2024-07-02 RX ADMIN — Medication 4 MILLIGRAM(S): at 00:52

## 2024-07-02 RX ADMIN — INSULIN GLARGINE-YFGN 20 UNIT(S): 100 INJECTION, SOLUTION SUBCUTANEOUS at 22:33

## 2024-07-02 RX ADMIN — INSULIN LISPRO 2: 100 INJECTION, SOLUTION INTRAVENOUS; SUBCUTANEOUS at 18:40

## 2024-07-02 RX ADMIN — Medication 25 GRAM(S): at 22:32

## 2024-07-02 RX ADMIN — Medication 500 MILLIGRAM(S): at 03:35

## 2024-07-02 RX ADMIN — OXYCODONE HYDROCHLORIDE 5 MILLIGRAM(S): 30 TABLET ORAL at 21:05

## 2024-07-02 RX ADMIN — INSULIN LISPRO 15 UNIT(S): 100 INJECTION, SOLUTION INTRAVENOUS; SUBCUTANEOUS at 09:11

## 2024-07-02 RX ADMIN — OXYCODONE HYDROCHLORIDE 5 MILLIGRAM(S): 30 TABLET ORAL at 10:25

## 2024-07-02 RX ADMIN — METOPROLOL SUCCINATE 100 MILLIGRAM(S): 50 TABLET, EXTENDED RELEASE ORAL at 06:56

## 2024-07-02 RX ADMIN — OXYCODONE HYDROCHLORIDE 5 MILLIGRAM(S): 30 TABLET ORAL at 14:28

## 2024-07-02 RX ADMIN — CEFEPIME 100 MILLIGRAM(S): 2 INJECTION, POWDER, FOR SOLUTION INTRAVENOUS at 01:57

## 2024-07-02 RX ADMIN — FUROSEMIDE 40 MILLIGRAM(S): 10 INJECTION INTRAMUSCULAR; INTRAVENOUS at 12:44

## 2024-07-02 RX ADMIN — INSULIN LISPRO 3: 100 INJECTION, SOLUTION INTRAVENOUS; SUBCUTANEOUS at 12:45

## 2024-07-02 RX ADMIN — Medication 4 MILLIGRAM(S): at 02:14

## 2024-07-02 RX ADMIN — OXYCODONE HYDROCHLORIDE 5 MILLIGRAM(S): 30 TABLET ORAL at 21:01

## 2024-07-02 RX ADMIN — FUROSEMIDE 40 MILLIGRAM(S): 10 INJECTION INTRAMUSCULAR; INTRAVENOUS at 00:52

## 2024-07-03 ENCOUNTER — APPOINTMENT (OUTPATIENT)
Dept: WOUND CARE | Facility: HOSPITAL | Age: 59
End: 2024-07-03

## 2024-07-03 ENCOUNTER — TRANSCRIPTION ENCOUNTER (OUTPATIENT)
Age: 59
End: 2024-07-03

## 2024-07-03 LAB
-  STAPHYLOCOCCUS EPIDERMIDIS, METHICILLIN RESISTANT: SIGNIFICANT CHANGE UP
ALBUMIN SERPL ELPH-MCNC: 3.5 G/DL — SIGNIFICANT CHANGE UP (ref 3.3–5)
ALP SERPL-CCNC: 85 U/L — SIGNIFICANT CHANGE UP (ref 40–120)
ALT FLD-CCNC: 19 U/L — SIGNIFICANT CHANGE UP (ref 12–78)
ANION GAP SERPL CALC-SCNC: 5 MMOL/L — SIGNIFICANT CHANGE UP (ref 5–17)
AST SERPL-CCNC: 19 U/L — SIGNIFICANT CHANGE UP (ref 15–37)
BASOPHILS # BLD AUTO: 0.04 K/UL — SIGNIFICANT CHANGE UP (ref 0–0.2)
BASOPHILS NFR BLD AUTO: 0.6 % — SIGNIFICANT CHANGE UP (ref 0–2)
BILIRUB SERPL-MCNC: 0.7 MG/DL — SIGNIFICANT CHANGE UP (ref 0.2–1.2)
BUN SERPL-MCNC: 23 MG/DL — SIGNIFICANT CHANGE UP (ref 7–23)
CALCIUM SERPL-MCNC: 9.9 MG/DL — SIGNIFICANT CHANGE UP (ref 8.5–10.1)
CHLORIDE SERPL-SCNC: 101 MMOL/L — SIGNIFICANT CHANGE UP (ref 96–108)
CO2 SERPL-SCNC: 32 MMOL/L — HIGH (ref 22–31)
CREAT SERPL-MCNC: 0.99 MG/DL — SIGNIFICANT CHANGE UP (ref 0.5–1.3)
CULTURE RESULTS: ABNORMAL
EGFR: 65 ML/MIN/1.73M2 — SIGNIFICANT CHANGE UP
EGFR: 65 ML/MIN/1.73M2 — SIGNIFICANT CHANGE UP
EOSINOPHIL # BLD AUTO: 0.21 K/UL — SIGNIFICANT CHANGE UP (ref 0–0.5)
EOSINOPHIL NFR BLD AUTO: 3.1 % — SIGNIFICANT CHANGE UP (ref 0–6)
GLUCOSE SERPL-MCNC: 211 MG/DL — HIGH (ref 70–99)
GRAM STN FLD: ABNORMAL
HCT VFR BLD CALC: 35.2 % — SIGNIFICANT CHANGE UP (ref 34.5–45)
HGB BLD-MCNC: 12 G/DL — SIGNIFICANT CHANGE UP (ref 11.5–15.5)
IMM GRANULOCYTES NFR BLD AUTO: 0.4 % — SIGNIFICANT CHANGE UP (ref 0–0.9)
LYMPHOCYTES # BLD AUTO: 1.68 K/UL — SIGNIFICANT CHANGE UP (ref 1–3.3)
LYMPHOCYTES # BLD AUTO: 24.6 % — SIGNIFICANT CHANGE UP (ref 13–44)
MAGNESIUM SERPL-MCNC: 2.1 MG/DL — SIGNIFICANT CHANGE UP (ref 1.6–2.6)
MCHC RBC-ENTMCNC: 31.4 PG — SIGNIFICANT CHANGE UP (ref 27–34)
MCHC RBC-ENTMCNC: 34.1 GM/DL — SIGNIFICANT CHANGE UP (ref 32–36)
MCV RBC AUTO: 92.1 FL — SIGNIFICANT CHANGE UP (ref 80–100)
METHOD TYPE: SIGNIFICANT CHANGE UP
MONOCYTES # BLD AUTO: 0.48 K/UL — SIGNIFICANT CHANGE UP (ref 0–0.9)
MONOCYTES NFR BLD AUTO: 7 % — SIGNIFICANT CHANGE UP (ref 2–14)
MRSA PCR RESULT.: SIGNIFICANT CHANGE UP
NEUTROPHILS # BLD AUTO: 4.39 K/UL — SIGNIFICANT CHANGE UP (ref 1.8–7.4)
NEUTROPHILS NFR BLD AUTO: 64.3 % — SIGNIFICANT CHANGE UP (ref 43–77)
NRBC # BLD: 0 /100 WBCS — SIGNIFICANT CHANGE UP (ref 0–0)
NRBC BLD-RTO: 0 /100 WBCS — SIGNIFICANT CHANGE UP (ref 0–0)
ORGANISM # SPEC MICROSCOPIC CNT: ABNORMAL
ORGANISM # SPEC MICROSCOPIC CNT: SIGNIFICANT CHANGE UP
PHOSPHATE SERPL-MCNC: 3.5 MG/DL — SIGNIFICANT CHANGE UP (ref 2.5–4.5)
PLATELET # BLD AUTO: 167 K/UL — SIGNIFICANT CHANGE UP (ref 150–400)
POTASSIUM SERPL-MCNC: 4.4 MMOL/L — SIGNIFICANT CHANGE UP (ref 3.5–5.3)
POTASSIUM SERPL-SCNC: 4.4 MMOL/L — SIGNIFICANT CHANGE UP (ref 3.5–5.3)
PROT SERPL-MCNC: 7.7 G/DL — SIGNIFICANT CHANGE UP (ref 6–8.3)
RBC # BLD: 3.82 M/UL — SIGNIFICANT CHANGE UP (ref 3.8–5.2)
RBC # FLD: 13.3 % — SIGNIFICANT CHANGE UP (ref 10.3–14.5)
S AUREUS DNA NOSE QL NAA+PROBE: SIGNIFICANT CHANGE UP
SODIUM SERPL-SCNC: 138 MMOL/L — SIGNIFICANT CHANGE UP (ref 135–145)
SPECIMEN SOURCE: SIGNIFICANT CHANGE UP
SPECIMEN SOURCE: SIGNIFICANT CHANGE UP
WBC # BLD: 6.83 K/UL — SIGNIFICANT CHANGE UP (ref 3.8–10.5)
WBC # FLD AUTO: 6.83 K/UL — SIGNIFICANT CHANGE UP (ref 3.8–10.5)

## 2024-07-03 PROCEDURE — 93306 TTE W/DOPPLER COMPLETE: CPT | Mod: 26

## 2024-07-03 PROCEDURE — 99232 SBSQ HOSP IP/OBS MODERATE 35: CPT

## 2024-07-03 PROCEDURE — 99233 SBSQ HOSP IP/OBS HIGH 50: CPT | Mod: GC

## 2024-07-03 PROCEDURE — 74177 CT ABD & PELVIS W/CONTRAST: CPT | Mod: 26

## 2024-07-03 RX ORDER — NYSTATIN 100000 [USP'U]/G
1 CREAM TOPICAL
Refills: 0 | Status: DISCONTINUED | OUTPATIENT
Start: 2024-07-03 | End: 2024-07-06

## 2024-07-03 RX ORDER — INSULIN GLARGINE-YFGN 100 [IU]/ML
22 INJECTION, SOLUTION SUBCUTANEOUS AT BEDTIME
Refills: 0 | Status: DISCONTINUED | OUTPATIENT
Start: 2024-07-03 | End: 2024-07-06

## 2024-07-03 RX ORDER — INSULIN LISPRO 100 U/ML
18 INJECTION, SOLUTION INTRAVENOUS; SUBCUTANEOUS
Refills: 0 | Status: DISCONTINUED | OUTPATIENT
Start: 2024-07-03 | End: 2024-07-06

## 2024-07-03 RX ORDER — ASPIRIN 325 MG
81 TABLET ORAL DAILY
Refills: 0 | Status: DISCONTINUED | OUTPATIENT
Start: 2024-07-03 | End: 2024-07-06

## 2024-07-03 RX ORDER — INSULIN LISPRO 100 U/ML
INJECTION, SOLUTION INTRAVENOUS; SUBCUTANEOUS
Refills: 0 | Status: DISCONTINUED | OUTPATIENT
Start: 2024-07-03 | End: 2024-07-06

## 2024-07-03 RX ORDER — OXYCODONE HYDROCHLORIDE 30 MG/1
2.5 TABLET ORAL ONCE
Refills: 0 | Status: DISCONTINUED | OUTPATIENT
Start: 2024-07-03 | End: 2024-07-03

## 2024-07-03 RX ORDER — ATORVASTATIN CALCIUM 80 MG/1
40 TABLET, FILM COATED ORAL AT BEDTIME
Refills: 0 | Status: DISCONTINUED | OUTPATIENT
Start: 2024-07-03 | End: 2024-07-06

## 2024-07-03 RX ORDER — INSULIN LISPRO 100 U/ML
INJECTION, SOLUTION INTRAVENOUS; SUBCUTANEOUS AT BEDTIME
Refills: 0 | Status: DISCONTINUED | OUTPATIENT
Start: 2024-07-03 | End: 2024-07-06

## 2024-07-03 RX ORDER — AMMONIUM LACTATE 12 %
1 LOTION (ML) TOPICAL
Refills: 0 | Status: DISCONTINUED | OUTPATIENT
Start: 2024-07-03 | End: 2024-07-06

## 2024-07-03 RX ADMIN — RIVAROXABAN 20 MILLIGRAM(S): 10 TABLET, FILM COATED ORAL at 17:20

## 2024-07-03 RX ADMIN — Medication 25 GRAM(S): at 21:17

## 2024-07-03 RX ADMIN — OXYCODONE HYDROCHLORIDE 5 MILLIGRAM(S): 30 TABLET ORAL at 21:15

## 2024-07-03 RX ADMIN — FUROSEMIDE 40 MILLIGRAM(S): 10 INJECTION INTRAMUSCULAR; INTRAVENOUS at 05:41

## 2024-07-03 RX ADMIN — INSULIN LISPRO 2: 100 INJECTION, SOLUTION INTRAVENOUS; SUBCUTANEOUS at 08:40

## 2024-07-03 RX ADMIN — Medication 25 GRAM(S): at 05:40

## 2024-07-03 RX ADMIN — ATORVASTATIN CALCIUM 40 MILLIGRAM(S): 80 TABLET, FILM COATED ORAL at 21:15

## 2024-07-03 RX ADMIN — Medication 25 GRAM(S): at 14:59

## 2024-07-03 RX ADMIN — Medication 3 MILLIGRAM(S): at 21:15

## 2024-07-03 RX ADMIN — OXYCODONE HYDROCHLORIDE 5 MILLIGRAM(S): 30 TABLET ORAL at 04:13

## 2024-07-03 RX ADMIN — NYSTATIN 1 APPLICATION(S): 100000 CREAM TOPICAL at 17:20

## 2024-07-03 RX ADMIN — OXYCODONE HYDROCHLORIDE 5 MILLIGRAM(S): 30 TABLET ORAL at 16:15

## 2024-07-03 RX ADMIN — FUROSEMIDE 40 MILLIGRAM(S): 10 INJECTION INTRAMUSCULAR; INTRAVENOUS at 15:10

## 2024-07-03 RX ADMIN — OXYCODONE HYDROCHLORIDE 5 MILLIGRAM(S): 30 TABLET ORAL at 02:49

## 2024-07-03 RX ADMIN — OXYCODONE HYDROCHLORIDE 5 MILLIGRAM(S): 30 TABLET ORAL at 15:21

## 2024-07-03 RX ADMIN — INSULIN GLARGINE-YFGN 22 UNIT(S): 100 INJECTION, SOLUTION SUBCUTANEOUS at 21:16

## 2024-07-03 RX ADMIN — METOPROLOL SUCCINATE 100 MILLIGRAM(S): 50 TABLET, EXTENDED RELEASE ORAL at 05:41

## 2024-07-03 RX ADMIN — OXYCODONE HYDROCHLORIDE 5 MILLIGRAM(S): 30 TABLET ORAL at 09:16

## 2024-07-03 RX ADMIN — OXYCODONE HYDROCHLORIDE 5 MILLIGRAM(S): 30 TABLET ORAL at 22:07

## 2024-07-03 RX ADMIN — POLYETHYLENE GLYCOL 3350 17 GRAM(S): 17 POWDER, FOR SOLUTION ORAL at 14:58

## 2024-07-03 RX ADMIN — INSULIN LISPRO 18 UNIT(S): 100 INJECTION, SOLUTION INTRAVENOUS; SUBCUTANEOUS at 18:04

## 2024-07-03 RX ADMIN — INSULIN LISPRO 4: 100 INJECTION, SOLUTION INTRAVENOUS; SUBCUTANEOUS at 12:55

## 2024-07-03 RX ADMIN — Medication 2 TABLET(S): at 21:15

## 2024-07-03 RX ADMIN — INSULIN LISPRO 4: 100 INJECTION, SOLUTION INTRAVENOUS; SUBCUTANEOUS at 17:21

## 2024-07-03 RX ADMIN — INSULIN LISPRO 0: 100 INJECTION, SOLUTION INTRAVENOUS; SUBCUTANEOUS at 21:17

## 2024-07-03 RX ADMIN — Medication 1 APPLICATION(S): at 17:21

## 2024-07-03 RX ADMIN — OXYCODONE HYDROCHLORIDE 5 MILLIGRAM(S): 30 TABLET ORAL at 10:15

## 2024-07-04 DIAGNOSIS — M86.179 OTHER ACUTE OSTEOMYELITIS, UNSPECIFIED ANKLE AND FOOT: ICD-10-CM

## 2024-07-04 LAB
ALBUMIN SERPL ELPH-MCNC: 3.5 G/DL — SIGNIFICANT CHANGE UP (ref 3.3–5)
ALP SERPL-CCNC: 82 U/L — SIGNIFICANT CHANGE UP (ref 40–120)
ALT FLD-CCNC: 19 U/L — SIGNIFICANT CHANGE UP (ref 12–78)
ANION GAP SERPL CALC-SCNC: 8 MMOL/L — SIGNIFICANT CHANGE UP (ref 5–17)
AST SERPL-CCNC: 17 U/L — SIGNIFICANT CHANGE UP (ref 15–37)
BASOPHILS # BLD AUTO: 0.04 K/UL — SIGNIFICANT CHANGE UP (ref 0–0.2)
BASOPHILS NFR BLD AUTO: 0.6 % — SIGNIFICANT CHANGE UP (ref 0–2)
BILIRUB SERPL-MCNC: 0.6 MG/DL — SIGNIFICANT CHANGE UP (ref 0.2–1.2)
BUN SERPL-MCNC: 24 MG/DL — HIGH (ref 7–23)
CALCIUM SERPL-MCNC: 10.1 MG/DL — SIGNIFICANT CHANGE UP (ref 8.5–10.1)
CHLORIDE SERPL-SCNC: 98 MMOL/L — SIGNIFICANT CHANGE UP (ref 96–108)
CO2 SERPL-SCNC: 32 MMOL/L — HIGH (ref 22–31)
CREAT SERPL-MCNC: 0.85 MG/DL — SIGNIFICANT CHANGE UP (ref 0.5–1.3)
EGFR: 78 ML/MIN/1.73M2 — SIGNIFICANT CHANGE UP
EGFR: 78 ML/MIN/1.73M2 — SIGNIFICANT CHANGE UP
EOSINOPHIL # BLD AUTO: 0.16 K/UL — SIGNIFICANT CHANGE UP (ref 0–0.5)
EOSINOPHIL NFR BLD AUTO: 2.3 % — SIGNIFICANT CHANGE UP (ref 0–6)
GLUCOSE SERPL-MCNC: 192 MG/DL — HIGH (ref 70–99)
HCT VFR BLD CALC: 35.7 % — SIGNIFICANT CHANGE UP (ref 34.5–45)
HGB BLD-MCNC: 12.2 G/DL — SIGNIFICANT CHANGE UP (ref 11.5–15.5)
IMM GRANULOCYTES NFR BLD AUTO: 0.3 % — SIGNIFICANT CHANGE UP (ref 0–0.9)
LYMPHOCYTES # BLD AUTO: 1.4 K/UL — SIGNIFICANT CHANGE UP (ref 1–3.3)
LYMPHOCYTES # BLD AUTO: 20.3 % — SIGNIFICANT CHANGE UP (ref 13–44)
MAGNESIUM SERPL-MCNC: 2.1 MG/DL — SIGNIFICANT CHANGE UP (ref 1.6–2.6)
MCHC RBC-ENTMCNC: 30.6 PG — SIGNIFICANT CHANGE UP (ref 27–34)
MCHC RBC-ENTMCNC: 34.2 GM/DL — SIGNIFICANT CHANGE UP (ref 32–36)
MCV RBC AUTO: 89.5 FL — SIGNIFICANT CHANGE UP (ref 80–100)
MONOCYTES # BLD AUTO: 0.52 K/UL — SIGNIFICANT CHANGE UP (ref 0–0.9)
MONOCYTES NFR BLD AUTO: 7.5 % — SIGNIFICANT CHANGE UP (ref 2–14)
NEUTROPHILS # BLD AUTO: 4.75 K/UL — SIGNIFICANT CHANGE UP (ref 1.8–7.4)
NEUTROPHILS NFR BLD AUTO: 69 % — SIGNIFICANT CHANGE UP (ref 43–77)
NRBC # BLD: 0 /100 WBCS — SIGNIFICANT CHANGE UP (ref 0–0)
NRBC BLD-RTO: 0 /100 WBCS — SIGNIFICANT CHANGE UP (ref 0–0)
PHOSPHATE SERPL-MCNC: 4.3 MG/DL — SIGNIFICANT CHANGE UP (ref 2.5–4.5)
PLATELET # BLD AUTO: 198 K/UL — SIGNIFICANT CHANGE UP (ref 150–400)
POTASSIUM SERPL-MCNC: 4 MMOL/L — SIGNIFICANT CHANGE UP (ref 3.5–5.3)
POTASSIUM SERPL-SCNC: 4 MMOL/L — SIGNIFICANT CHANGE UP (ref 3.5–5.3)
PROT SERPL-MCNC: 7.4 G/DL — SIGNIFICANT CHANGE UP (ref 6–8.3)
RBC # BLD: 3.99 M/UL — SIGNIFICANT CHANGE UP (ref 3.8–5.2)
RBC # FLD: 13.3 % — SIGNIFICANT CHANGE UP (ref 10.3–14.5)
SODIUM SERPL-SCNC: 138 MMOL/L — SIGNIFICANT CHANGE UP (ref 135–145)
WBC # BLD: 6.89 K/UL — SIGNIFICANT CHANGE UP (ref 3.8–10.5)
WBC # FLD AUTO: 6.89 K/UL — SIGNIFICANT CHANGE UP (ref 3.8–10.5)

## 2024-07-04 PROCEDURE — 99233 SBSQ HOSP IP/OBS HIGH 50: CPT | Mod: GC

## 2024-07-04 PROCEDURE — 99232 SBSQ HOSP IP/OBS MODERATE 35: CPT

## 2024-07-04 RX ORDER — ACETAMINOPHEN 500 MG/5ML
1000 LIQUID (ML) ORAL ONCE
Refills: 0 | Status: COMPLETED | OUTPATIENT
Start: 2024-07-04 | End: 2024-07-04

## 2024-07-04 RX ADMIN — FUROSEMIDE 40 MILLIGRAM(S): 10 INJECTION INTRAMUSCULAR; INTRAVENOUS at 16:38

## 2024-07-04 RX ADMIN — Medication 25 GRAM(S): at 16:39

## 2024-07-04 RX ADMIN — Medication 2 TABLET(S): at 21:30

## 2024-07-04 RX ADMIN — Medication 400 MILLIGRAM(S): at 10:29

## 2024-07-04 RX ADMIN — Medication 1 APPLICATION(S): at 17:44

## 2024-07-04 RX ADMIN — OXYCODONE HYDROCHLORIDE 5 MILLIGRAM(S): 30 TABLET ORAL at 12:34

## 2024-07-04 RX ADMIN — INSULIN LISPRO 2: 100 INJECTION, SOLUTION INTRAVENOUS; SUBCUTANEOUS at 08:16

## 2024-07-04 RX ADMIN — INSULIN LISPRO 18 UNIT(S): 100 INJECTION, SOLUTION INTRAVENOUS; SUBCUTANEOUS at 17:42

## 2024-07-04 RX ADMIN — OXYCODONE HYDROCHLORIDE 5 MILLIGRAM(S): 30 TABLET ORAL at 21:46

## 2024-07-04 RX ADMIN — OXYCODONE HYDROCHLORIDE 5 MILLIGRAM(S): 30 TABLET ORAL at 20:46

## 2024-07-04 RX ADMIN — ATORVASTATIN CALCIUM 40 MILLIGRAM(S): 80 TABLET, FILM COATED ORAL at 21:30

## 2024-07-04 RX ADMIN — INSULIN GLARGINE-YFGN 22 UNIT(S): 100 INJECTION, SOLUTION SUBCUTANEOUS at 21:30

## 2024-07-04 RX ADMIN — INSULIN LISPRO 18 UNIT(S): 100 INJECTION, SOLUTION INTRAVENOUS; SUBCUTANEOUS at 08:15

## 2024-07-04 RX ADMIN — RIVAROXABAN 20 MILLIGRAM(S): 10 TABLET, FILM COATED ORAL at 16:39

## 2024-07-04 RX ADMIN — INSULIN LISPRO 2: 100 INJECTION, SOLUTION INTRAVENOUS; SUBCUTANEOUS at 12:36

## 2024-07-04 RX ADMIN — Medication 650 MILLIGRAM(S): at 20:46

## 2024-07-04 RX ADMIN — OXYCODONE HYDROCHLORIDE 2.5 MILLIGRAM(S): 30 TABLET ORAL at 00:51

## 2024-07-04 RX ADMIN — Medication 1000 MILLIGRAM(S): at 11:29

## 2024-07-04 RX ADMIN — OXYCODONE HYDROCHLORIDE 5 MILLIGRAM(S): 30 TABLET ORAL at 13:34

## 2024-07-04 RX ADMIN — Medication 81 MILLIGRAM(S): at 12:34

## 2024-07-04 RX ADMIN — OXYCODONE HYDROCHLORIDE 2.5 MILLIGRAM(S): 30 TABLET ORAL at 01:32

## 2024-07-04 RX ADMIN — Medication 650 MILLIGRAM(S): at 20:47

## 2024-07-04 RX ADMIN — Medication 25 GRAM(S): at 21:30

## 2024-07-04 RX ADMIN — GABAPENTIN 300 MILLIGRAM(S): 400 CAPSULE ORAL at 12:34

## 2024-07-04 RX ADMIN — NYSTATIN 1 APPLICATION(S): 100000 CREAM TOPICAL at 17:44

## 2024-07-04 RX ADMIN — INSULIN LISPRO 18 UNIT(S): 100 INJECTION, SOLUTION INTRAVENOUS; SUBCUTANEOUS at 12:35

## 2024-07-05 LAB
-  CLINDAMYCIN: SIGNIFICANT CHANGE UP
-  ERYTHROMYCIN: SIGNIFICANT CHANGE UP
-  GENTAMICIN: SIGNIFICANT CHANGE UP
-  OXACILLIN: SIGNIFICANT CHANGE UP
-  PENICILLIN: SIGNIFICANT CHANGE UP
-  RIFAMPIN: SIGNIFICANT CHANGE UP
-  TETRACYCLINE: SIGNIFICANT CHANGE UP
-  TRIMETHOPRIM/SULFAMETHOXAZOLE: SIGNIFICANT CHANGE UP
-  VANCOMYCIN: SIGNIFICANT CHANGE UP
ANION GAP SERPL CALC-SCNC: 8 MMOL/L — SIGNIFICANT CHANGE UP (ref 5–17)
BASOPHILS # BLD AUTO: 0.03 K/UL — SIGNIFICANT CHANGE UP (ref 0–0.2)
BASOPHILS NFR BLD AUTO: 0.5 % — SIGNIFICANT CHANGE UP (ref 0–2)
BUN SERPL-MCNC: 19 MG/DL — SIGNIFICANT CHANGE UP (ref 7–23)
CALCIUM SERPL-MCNC: 9.6 MG/DL — SIGNIFICANT CHANGE UP (ref 8.5–10.1)
CHLORIDE SERPL-SCNC: 98 MMOL/L — SIGNIFICANT CHANGE UP (ref 96–108)
CO2 SERPL-SCNC: 32 MMOL/L — HIGH (ref 22–31)
CREAT SERPL-MCNC: 0.88 MG/DL — SIGNIFICANT CHANGE UP (ref 0.5–1.3)
CULTURE RESULTS: ABNORMAL
EGFR: 75 ML/MIN/1.73M2 — SIGNIFICANT CHANGE UP
EGFR: 75 ML/MIN/1.73M2 — SIGNIFICANT CHANGE UP
EOSINOPHIL # BLD AUTO: 0.18 K/UL — SIGNIFICANT CHANGE UP (ref 0–0.5)
EOSINOPHIL NFR BLD AUTO: 2.9 % — SIGNIFICANT CHANGE UP (ref 0–6)
GLUCOSE SERPL-MCNC: 196 MG/DL — HIGH (ref 70–99)
HCT VFR BLD CALC: 34.9 % — SIGNIFICANT CHANGE UP (ref 34.5–45)
HGB BLD-MCNC: 11.9 G/DL — SIGNIFICANT CHANGE UP (ref 11.5–15.5)
IMM GRANULOCYTES NFR BLD AUTO: 0.5 % — SIGNIFICANT CHANGE UP (ref 0–0.9)
LYMPHOCYTES # BLD AUTO: 1.42 K/UL — SIGNIFICANT CHANGE UP (ref 1–3.3)
LYMPHOCYTES # BLD AUTO: 22.8 % — SIGNIFICANT CHANGE UP (ref 13–44)
MCHC RBC-ENTMCNC: 30.7 PG — SIGNIFICANT CHANGE UP (ref 27–34)
MCHC RBC-ENTMCNC: 34.1 GM/DL — SIGNIFICANT CHANGE UP (ref 32–36)
MCV RBC AUTO: 89.9 FL — SIGNIFICANT CHANGE UP (ref 80–100)
METHOD TYPE: SIGNIFICANT CHANGE UP
MONOCYTES # BLD AUTO: 0.46 K/UL — SIGNIFICANT CHANGE UP (ref 0–0.9)
MONOCYTES NFR BLD AUTO: 7.4 % — SIGNIFICANT CHANGE UP (ref 2–14)
NEUTROPHILS # BLD AUTO: 4.11 K/UL — SIGNIFICANT CHANGE UP (ref 1.8–7.4)
NEUTROPHILS NFR BLD AUTO: 65.9 % — SIGNIFICANT CHANGE UP (ref 43–77)
NRBC # BLD: 0 /100 WBCS — SIGNIFICANT CHANGE UP (ref 0–0)
NRBC BLD-RTO: 0 /100 WBCS — SIGNIFICANT CHANGE UP (ref 0–0)
ORGANISM # SPEC MICROSCOPIC CNT: SIGNIFICANT CHANGE UP
PLATELET # BLD AUTO: 199 K/UL — SIGNIFICANT CHANGE UP (ref 150–400)
POTASSIUM SERPL-MCNC: 3.6 MMOL/L — SIGNIFICANT CHANGE UP (ref 3.5–5.3)
POTASSIUM SERPL-SCNC: 3.6 MMOL/L — SIGNIFICANT CHANGE UP (ref 3.5–5.3)
RBC # BLD: 3.88 M/UL — SIGNIFICANT CHANGE UP (ref 3.8–5.2)
RBC # FLD: 13.4 % — SIGNIFICANT CHANGE UP (ref 10.3–14.5)
SODIUM SERPL-SCNC: 138 MMOL/L — SIGNIFICANT CHANGE UP (ref 135–145)
SPECIMEN SOURCE: SIGNIFICANT CHANGE UP
WBC # BLD: 6.23 K/UL — SIGNIFICANT CHANGE UP (ref 3.8–10.5)
WBC # FLD AUTO: 6.23 K/UL — SIGNIFICANT CHANGE UP (ref 3.8–10.5)

## 2024-07-05 PROCEDURE — 99233 SBSQ HOSP IP/OBS HIGH 50: CPT | Mod: GC

## 2024-07-05 PROCEDURE — 99221 1ST HOSP IP/OBS SF/LOW 40: CPT

## 2024-07-05 PROCEDURE — 99232 SBSQ HOSP IP/OBS MODERATE 35: CPT

## 2024-07-05 RX ORDER — SUCRALFATE 1 G
1 TABLET ORAL
Refills: 0 | Status: DISCONTINUED | OUTPATIENT
Start: 2024-07-05 | End: 2024-07-06

## 2024-07-05 RX ORDER — CEFAZOLIN SODIUM IN 0.9 % NACL 3 G/100 ML
2000 INTRAVENOUS SOLUTION, PIGGYBACK (ML) INTRAVENOUS EVERY 8 HOURS
Refills: 0 | Status: DISCONTINUED | OUTPATIENT
Start: 2024-07-05 | End: 2024-07-06

## 2024-07-05 RX ADMIN — NYSTATIN 1 APPLICATION(S): 100000 CREAM TOPICAL at 17:08

## 2024-07-05 RX ADMIN — Medication 40 MILLIGRAM(S): at 05:33

## 2024-07-05 RX ADMIN — OXYCODONE HYDROCHLORIDE 5 MILLIGRAM(S): 30 TABLET ORAL at 15:43

## 2024-07-05 RX ADMIN — INSULIN GLARGINE-YFGN 22 UNIT(S): 100 INJECTION, SOLUTION SUBCUTANEOUS at 22:17

## 2024-07-05 RX ADMIN — Medication 650 MILLIGRAM(S): at 05:29

## 2024-07-05 RX ADMIN — Medication 2 TABLET(S): at 22:17

## 2024-07-05 RX ADMIN — OXYCODONE HYDROCHLORIDE 5 MILLIGRAM(S): 30 TABLET ORAL at 20:37

## 2024-07-05 RX ADMIN — OXYCODONE HYDROCHLORIDE 5 MILLIGRAM(S): 30 TABLET ORAL at 02:15

## 2024-07-05 RX ADMIN — NYSTATIN 1 APPLICATION(S): 100000 CREAM TOPICAL at 05:29

## 2024-07-05 RX ADMIN — ATORVASTATIN CALCIUM 40 MILLIGRAM(S): 80 TABLET, FILM COATED ORAL at 22:16

## 2024-07-05 RX ADMIN — RIVAROXABAN 20 MILLIGRAM(S): 10 TABLET, FILM COATED ORAL at 17:08

## 2024-07-05 RX ADMIN — Medication 100 MILLIGRAM(S): at 22:15

## 2024-07-05 RX ADMIN — OXYCODONE HYDROCHLORIDE 5 MILLIGRAM(S): 30 TABLET ORAL at 01:14

## 2024-07-05 RX ADMIN — OXYCODONE HYDROCHLORIDE 5 MILLIGRAM(S): 30 TABLET ORAL at 19:36

## 2024-07-05 RX ADMIN — INSULIN LISPRO 18 UNIT(S): 100 INJECTION, SOLUTION INTRAVENOUS; SUBCUTANEOUS at 08:30

## 2024-07-05 RX ADMIN — Medication 1 APPLICATION(S): at 05:30

## 2024-07-05 RX ADMIN — Medication 650 MILLIGRAM(S): at 06:30

## 2024-07-05 RX ADMIN — INSULIN LISPRO 18 UNIT(S): 100 INJECTION, SOLUTION INTRAVENOUS; SUBCUTANEOUS at 17:08

## 2024-07-05 RX ADMIN — Medication 3 MILLIGRAM(S): at 22:16

## 2024-07-05 RX ADMIN — INSULIN LISPRO 2: 100 INJECTION, SOLUTION INTRAVENOUS; SUBCUTANEOUS at 12:24

## 2024-07-05 RX ADMIN — OXYCODONE HYDROCHLORIDE 5 MILLIGRAM(S): 30 TABLET ORAL at 10:41

## 2024-07-05 RX ADMIN — FUROSEMIDE 40 MILLIGRAM(S): 10 INJECTION INTRAMUSCULAR; INTRAVENOUS at 13:14

## 2024-07-05 RX ADMIN — INSULIN LISPRO 2: 100 INJECTION, SOLUTION INTRAVENOUS; SUBCUTANEOUS at 17:08

## 2024-07-05 RX ADMIN — OXYCODONE HYDROCHLORIDE 5 MILLIGRAM(S): 30 TABLET ORAL at 14:43

## 2024-07-05 RX ADMIN — METOPROLOL SUCCINATE 100 MILLIGRAM(S): 50 TABLET, EXTENDED RELEASE ORAL at 05:29

## 2024-07-05 RX ADMIN — Medication 25 GRAM(S): at 05:28

## 2024-07-05 RX ADMIN — INSULIN LISPRO 4: 100 INJECTION, SOLUTION INTRAVENOUS; SUBCUTANEOUS at 08:31

## 2024-07-05 RX ADMIN — Medication 25 GRAM(S): at 13:13

## 2024-07-05 RX ADMIN — Medication 1 APPLICATION(S): at 17:09

## 2024-07-05 RX ADMIN — FUROSEMIDE 40 MILLIGRAM(S): 10 INJECTION INTRAMUSCULAR; INTRAVENOUS at 05:29

## 2024-07-05 RX ADMIN — INSULIN LISPRO 18 UNIT(S): 100 INJECTION, SOLUTION INTRAVENOUS; SUBCUTANEOUS at 12:24

## 2024-07-05 RX ADMIN — OXYCODONE HYDROCHLORIDE 5 MILLIGRAM(S): 30 TABLET ORAL at 05:30

## 2024-07-05 RX ADMIN — OXYCODONE HYDROCHLORIDE 5 MILLIGRAM(S): 30 TABLET ORAL at 09:41

## 2024-07-05 RX ADMIN — POLYETHYLENE GLYCOL 3350 17 GRAM(S): 17 POWDER, FOR SOLUTION ORAL at 13:13

## 2024-07-06 ENCOUNTER — TRANSCRIPTION ENCOUNTER (OUTPATIENT)
Age: 59
End: 2024-07-06

## 2024-07-06 VITALS
HEART RATE: 69 BPM | OXYGEN SATURATION: 96 % | SYSTOLIC BLOOD PRESSURE: 101 MMHG | DIASTOLIC BLOOD PRESSURE: 68 MMHG | RESPIRATION RATE: 17 BRPM | TEMPERATURE: 98 F

## 2024-07-06 LAB
ALBUMIN SERPL ELPH-MCNC: 3.5 G/DL — SIGNIFICANT CHANGE UP (ref 3.3–5)
ALP SERPL-CCNC: 75 U/L — SIGNIFICANT CHANGE UP (ref 40–120)
ALT FLD-CCNC: 17 U/L — SIGNIFICANT CHANGE UP (ref 12–78)
ANION GAP SERPL CALC-SCNC: 7 MMOL/L — SIGNIFICANT CHANGE UP (ref 5–17)
AST SERPL-CCNC: 17 U/L — SIGNIFICANT CHANGE UP (ref 15–37)
BASOPHILS # BLD AUTO: 0.04 K/UL — SIGNIFICANT CHANGE UP (ref 0–0.2)
BASOPHILS NFR BLD AUTO: 0.6 % — SIGNIFICANT CHANGE UP (ref 0–2)
BILIRUB SERPL-MCNC: 0.6 MG/DL — SIGNIFICANT CHANGE UP (ref 0.2–1.2)
BUN SERPL-MCNC: 25 MG/DL — HIGH (ref 7–23)
CALCIUM SERPL-MCNC: 9.2 MG/DL — SIGNIFICANT CHANGE UP (ref 8.5–10.1)
CHLORIDE SERPL-SCNC: 104 MMOL/L — SIGNIFICANT CHANGE UP (ref 96–108)
CO2 SERPL-SCNC: 27 MMOL/L — SIGNIFICANT CHANGE UP (ref 22–31)
CREAT SERPL-MCNC: 0.88 MG/DL — SIGNIFICANT CHANGE UP (ref 0.5–1.3)
EGFR: 75 ML/MIN/1.73M2 — SIGNIFICANT CHANGE UP
EGFR: 75 ML/MIN/1.73M2 — SIGNIFICANT CHANGE UP
EOSINOPHIL # BLD AUTO: 0.18 K/UL — SIGNIFICANT CHANGE UP (ref 0–0.5)
EOSINOPHIL NFR BLD AUTO: 2.9 % — SIGNIFICANT CHANGE UP (ref 0–6)
GLUCOSE SERPL-MCNC: 180 MG/DL — HIGH (ref 70–99)
HCT VFR BLD CALC: 37.3 % — SIGNIFICANT CHANGE UP (ref 34.5–45)
HGB BLD-MCNC: 12.6 G/DL — SIGNIFICANT CHANGE UP (ref 11.5–15.5)
IMM GRANULOCYTES NFR BLD AUTO: 0.3 % — SIGNIFICANT CHANGE UP (ref 0–0.9)
LYMPHOCYTES # BLD AUTO: 1.3 K/UL — SIGNIFICANT CHANGE UP (ref 1–3.3)
LYMPHOCYTES # BLD AUTO: 21 % — SIGNIFICANT CHANGE UP (ref 13–44)
MCHC RBC-ENTMCNC: 30.9 PG — SIGNIFICANT CHANGE UP (ref 27–34)
MCHC RBC-ENTMCNC: 33.8 GM/DL — SIGNIFICANT CHANGE UP (ref 32–36)
MCV RBC AUTO: 91.4 FL — SIGNIFICANT CHANGE UP (ref 80–100)
MONOCYTES # BLD AUTO: 0.56 K/UL — SIGNIFICANT CHANGE UP (ref 0–0.9)
MONOCYTES NFR BLD AUTO: 9 % — SIGNIFICANT CHANGE UP (ref 2–14)
NEUTROPHILS # BLD AUTO: 4.09 K/UL — SIGNIFICANT CHANGE UP (ref 1.8–7.4)
NEUTROPHILS NFR BLD AUTO: 66.2 % — SIGNIFICANT CHANGE UP (ref 43–77)
NRBC # BLD: 0 /100 WBCS — SIGNIFICANT CHANGE UP (ref 0–0)
NRBC BLD-RTO: 0 /100 WBCS — SIGNIFICANT CHANGE UP (ref 0–0)
PLATELET # BLD AUTO: 194 K/UL — SIGNIFICANT CHANGE UP (ref 150–400)
POTASSIUM SERPL-MCNC: 3.5 MMOL/L — SIGNIFICANT CHANGE UP (ref 3.5–5.3)
POTASSIUM SERPL-SCNC: 3.5 MMOL/L — SIGNIFICANT CHANGE UP (ref 3.5–5.3)
PROT SERPL-MCNC: 7.5 G/DL — SIGNIFICANT CHANGE UP (ref 6–8.3)
RBC # BLD: 4.08 M/UL — SIGNIFICANT CHANGE UP (ref 3.8–5.2)
RBC # FLD: 13.7 % — SIGNIFICANT CHANGE UP (ref 10.3–14.5)
SODIUM SERPL-SCNC: 138 MMOL/L — SIGNIFICANT CHANGE UP (ref 135–145)
WBC # BLD: 6.19 K/UL — SIGNIFICANT CHANGE UP (ref 3.8–10.5)
WBC # FLD AUTO: 6.19 K/UL — SIGNIFICANT CHANGE UP (ref 3.8–10.5)

## 2024-07-06 PROCEDURE — 93970 EXTREMITY STUDY: CPT

## 2024-07-06 PROCEDURE — 83605 ASSAY OF LACTIC ACID: CPT

## 2024-07-06 PROCEDURE — 80048 BASIC METABOLIC PNL TOTAL CA: CPT

## 2024-07-06 PROCEDURE — 84100 ASSAY OF PHOSPHORUS: CPT

## 2024-07-06 PROCEDURE — 87077 CULTURE AEROBIC IDENTIFY: CPT

## 2024-07-06 PROCEDURE — 96375 TX/PRO/DX INJ NEW DRUG ADDON: CPT

## 2024-07-06 PROCEDURE — 87150 DNA/RNA AMPLIFIED PROBE: CPT

## 2024-07-06 PROCEDURE — 84484 ASSAY OF TROPONIN QUANT: CPT

## 2024-07-06 PROCEDURE — 83036 HEMOGLOBIN GLYCOSYLATED A1C: CPT

## 2024-07-06 PROCEDURE — 87186 SC STD MICRODIL/AGAR DIL: CPT

## 2024-07-06 PROCEDURE — 99239 HOSP IP/OBS DSCHRG MGMT >30: CPT | Mod: GC

## 2024-07-06 PROCEDURE — 86140 C-REACTIVE PROTEIN: CPT

## 2024-07-06 PROCEDURE — 99285 EMERGENCY DEPT VISIT HI MDM: CPT | Mod: 25

## 2024-07-06 PROCEDURE — 96374 THER/PROPH/DIAG INJ IV PUSH: CPT

## 2024-07-06 PROCEDURE — 85652 RBC SED RATE AUTOMATED: CPT

## 2024-07-06 PROCEDURE — 85610 PROTHROMBIN TIME: CPT

## 2024-07-06 PROCEDURE — 97161 PT EVAL LOW COMPLEX 20 MIN: CPT

## 2024-07-06 PROCEDURE — 85025 COMPLETE CBC W/AUTO DIFF WBC: CPT

## 2024-07-06 PROCEDURE — 85027 COMPLETE CBC AUTOMATED: CPT

## 2024-07-06 PROCEDURE — 80053 COMPREHEN METABOLIC PANEL: CPT

## 2024-07-06 PROCEDURE — 84443 ASSAY THYROID STIM HORMONE: CPT

## 2024-07-06 PROCEDURE — 83735 ASSAY OF MAGNESIUM: CPT

## 2024-07-06 PROCEDURE — 73630 X-RAY EXAM OF FOOT: CPT

## 2024-07-06 PROCEDURE — 82553 CREATINE MB FRACTION: CPT

## 2024-07-06 PROCEDURE — 83690 ASSAY OF LIPASE: CPT

## 2024-07-06 PROCEDURE — 93306 TTE W/DOPPLER COMPLETE: CPT

## 2024-07-06 PROCEDURE — 85730 THROMBOPLASTIN TIME PARTIAL: CPT

## 2024-07-06 PROCEDURE — 82962 GLUCOSE BLOOD TEST: CPT

## 2024-07-06 PROCEDURE — 87070 CULTURE OTHR SPECIMN AEROBIC: CPT

## 2024-07-06 PROCEDURE — 87640 STAPH A DNA AMP PROBE: CPT

## 2024-07-06 PROCEDURE — 36415 COLL VENOUS BLD VENIPUNCTURE: CPT

## 2024-07-06 PROCEDURE — 71045 X-RAY EXAM CHEST 1 VIEW: CPT

## 2024-07-06 PROCEDURE — 87040 BLOOD CULTURE FOR BACTERIA: CPT

## 2024-07-06 PROCEDURE — 87641 MR-STAPH DNA AMP PROBE: CPT

## 2024-07-06 PROCEDURE — 83880 ASSAY OF NATRIURETIC PEPTIDE: CPT

## 2024-07-06 PROCEDURE — 74177 CT ABD & PELVIS W/CONTRAST: CPT | Mod: MC

## 2024-07-06 PROCEDURE — 93005 ELECTROCARDIOGRAM TRACING: CPT

## 2024-07-06 PROCEDURE — 99232 SBSQ HOSP IP/OBS MODERATE 35: CPT

## 2024-07-06 RX ORDER — SENNA 187 MG
2 TABLET ORAL
Qty: 60 | Refills: 0
Start: 2024-07-06 | End: 2024-08-04

## 2024-07-06 RX ORDER — CEPHALEXIN 250 MG/1
1 CAPSULE ORAL
Qty: 14 | Refills: 0
Start: 2024-07-06 | End: 2024-07-12

## 2024-07-06 RX ORDER — ACETAMINOPHEN 500 MG/5ML
2 LIQUID (ML) ORAL
Qty: 0 | Refills: 0 | DISCHARGE
Start: 2024-07-06

## 2024-07-06 RX ORDER — TORSEMIDE 20 MG/1
60 TABLET ORAL DAILY
Refills: 0 | Status: DISCONTINUED | OUTPATIENT
Start: 2024-07-06 | End: 2024-07-06

## 2024-07-06 RX ORDER — SUCRALFATE 1 G
1 TABLET ORAL
Qty: 120 | Refills: 0
Start: 2024-07-06 | End: 2024-08-04

## 2024-07-06 RX ORDER — IBUPROFEN 200 MG
600 TABLET ORAL ONCE
Refills: 0 | Status: COMPLETED | OUTPATIENT
Start: 2024-07-06 | End: 2024-07-06

## 2024-07-06 RX ORDER — ATORVASTATIN CALCIUM 80 MG/1
1 TABLET, FILM COATED ORAL
Qty: 0 | Refills: 0 | DISCHARGE
Start: 2024-07-06

## 2024-07-06 RX ORDER — AMMONIUM LACTATE 12 %
1 LOTION (ML) TOPICAL
Qty: 1 | Refills: 0
Start: 2024-07-06 | End: 2024-08-04

## 2024-07-06 RX ORDER — NYSTATIN 100000 [USP'U]/G
1 CREAM TOPICAL
Qty: 1 | Refills: 0
Start: 2024-07-06 | End: 2024-07-19

## 2024-07-06 RX ORDER — POLYETHYLENE GLYCOL 3350 17 G/17G
17 POWDER, FOR SOLUTION ORAL
Qty: 510 | Refills: 0
Start: 2024-07-06 | End: 2024-08-04

## 2024-07-06 RX ORDER — ASPIRIN 325 MG
1 TABLET ORAL
Qty: 0 | Refills: 0 | DISCHARGE
Start: 2024-07-06

## 2024-07-06 RX ADMIN — OXYCODONE HYDROCHLORIDE 5 MILLIGRAM(S): 30 TABLET ORAL at 00:53

## 2024-07-06 RX ADMIN — OXYCODONE HYDROCHLORIDE 5 MILLIGRAM(S): 30 TABLET ORAL at 07:04

## 2024-07-06 RX ADMIN — Medication 40 MILLIGRAM(S): at 07:04

## 2024-07-06 RX ADMIN — INSULIN LISPRO 18 UNIT(S): 100 INJECTION, SOLUTION INTRAVENOUS; SUBCUTANEOUS at 08:31

## 2024-07-06 RX ADMIN — Medication 100 MILLIGRAM(S): at 13:21

## 2024-07-06 RX ADMIN — Medication 600 MILLIGRAM(S): at 12:33

## 2024-07-06 RX ADMIN — METOPROLOL SUCCINATE 100 MILLIGRAM(S): 50 TABLET, EXTENDED RELEASE ORAL at 07:03

## 2024-07-06 RX ADMIN — INSULIN LISPRO 18 UNIT(S): 100 INJECTION, SOLUTION INTRAVENOUS; SUBCUTANEOUS at 16:56

## 2024-07-06 RX ADMIN — OXYCODONE HYDROCHLORIDE 5 MILLIGRAM(S): 30 TABLET ORAL at 01:51

## 2024-07-06 RX ADMIN — Medication 1 GRAM(S): at 11:15

## 2024-07-06 RX ADMIN — INSULIN LISPRO 18 UNIT(S): 100 INJECTION, SOLUTION INTRAVENOUS; SUBCUTANEOUS at 12:34

## 2024-07-06 RX ADMIN — INSULIN LISPRO 2: 100 INJECTION, SOLUTION INTRAVENOUS; SUBCUTANEOUS at 12:34

## 2024-07-06 RX ADMIN — Medication 100 MILLIGRAM(S): at 07:02

## 2024-07-06 RX ADMIN — FUROSEMIDE 40 MILLIGRAM(S): 10 INJECTION INTRAMUSCULAR; INTRAVENOUS at 07:03

## 2024-07-06 RX ADMIN — INSULIN LISPRO 2: 100 INJECTION, SOLUTION INTRAVENOUS; SUBCUTANEOUS at 08:31

## 2024-07-06 RX ADMIN — POLYETHYLENE GLYCOL 3350 17 GRAM(S): 17 POWDER, FOR SOLUTION ORAL at 11:15

## 2024-07-07 LAB
CULTURE RESULTS: SIGNIFICANT CHANGE UP
SPECIMEN SOURCE: SIGNIFICANT CHANGE UP

## 2024-07-08 LAB
CULTURE RESULTS: SIGNIFICANT CHANGE UP
SPECIMEN SOURCE: SIGNIFICANT CHANGE UP

## 2024-07-09 ENCOUNTER — APPOINTMENT (OUTPATIENT)
Dept: WOUND CARE | Facility: HOSPITAL | Age: 59
End: 2024-07-09

## 2024-07-11 ENCOUNTER — APPOINTMENT (OUTPATIENT)
Dept: WOUND CARE | Facility: HOSPITAL | Age: 59
End: 2024-07-11
Payer: MEDICARE

## 2024-07-11 ENCOUNTER — OUTPATIENT (OUTPATIENT)
Dept: OUTPATIENT SERVICES | Facility: HOSPITAL | Age: 59
LOS: 1 days | Discharge: HOME | End: 2024-07-11
Payer: MEDICARE

## 2024-07-11 VITALS
TEMPERATURE: 97.7 F | SYSTOLIC BLOOD PRESSURE: 117 MMHG | HEART RATE: 73 BPM | WEIGHT: 256 LBS | DIASTOLIC BLOOD PRESSURE: 74 MMHG | HEIGHT: 60 IN | OXYGEN SATURATION: 99 % | BODY MASS INDEX: 50.26 KG/M2 | RESPIRATION RATE: 18 BRPM

## 2024-07-11 DIAGNOSIS — Z90.49 ACQUIRED ABSENCE OF OTHER SPECIFIED PARTS OF DIGESTIVE TRACT: Chronic | ICD-10-CM

## 2024-07-11 DIAGNOSIS — Z90.89 ACQUIRED ABSENCE OF OTHER ORGANS: Chronic | ICD-10-CM

## 2024-07-11 DIAGNOSIS — Z98.890 OTHER SPECIFIED POSTPROCEDURAL STATES: Chronic | ICD-10-CM

## 2024-07-11 DIAGNOSIS — E11.622 TYPE 2 DIABETES MELLITUS WITH OTHER SKIN ULCER: ICD-10-CM

## 2024-07-11 DIAGNOSIS — L97.822 NON-PRESSURE CHRONIC ULCER OF OTHER PART OF LEFT LOWER LEG WITH FAT LAYER EXPOSED: ICD-10-CM

## 2024-07-11 PROCEDURE — 99024 POSTOP FOLLOW-UP VISIT: CPT

## 2024-07-11 PROCEDURE — G0463: CPT

## 2024-07-15 ENCOUNTER — APPOINTMENT (OUTPATIENT)
Dept: WOUND CARE | Facility: HOSPITAL | Age: 59
End: 2024-07-15
Payer: MEDICARE

## 2024-07-15 ENCOUNTER — NON-APPOINTMENT (OUTPATIENT)
Age: 59
End: 2024-07-15

## 2024-07-15 ENCOUNTER — OUTPATIENT (OUTPATIENT)
Dept: OUTPATIENT SERVICES | Facility: HOSPITAL | Age: 59
LOS: 1 days | Discharge: HOME | End: 2024-07-15
Payer: MEDICARE

## 2024-07-15 VITALS
SYSTOLIC BLOOD PRESSURE: 134 MMHG | RESPIRATION RATE: 17 BRPM | DIASTOLIC BLOOD PRESSURE: 65 MMHG | HEIGHT: 60 IN | BODY MASS INDEX: 50.26 KG/M2 | WEIGHT: 256 LBS | HEART RATE: 87 BPM | OXYGEN SATURATION: 96 % | TEMPERATURE: 98.9 F

## 2024-07-15 DIAGNOSIS — Z90.89 ACQUIRED ABSENCE OF OTHER ORGANS: Chronic | ICD-10-CM

## 2024-07-15 DIAGNOSIS — Z90.49 ACQUIRED ABSENCE OF OTHER SPECIFIED PARTS OF DIGESTIVE TRACT: Chronic | ICD-10-CM

## 2024-07-15 DIAGNOSIS — Z98.890 OTHER SPECIFIED POSTPROCEDURAL STATES: Chronic | ICD-10-CM

## 2024-07-15 DIAGNOSIS — E11.622 TYPE 2 DIABETES MELLITUS WITH OTHER SKIN ULCER: ICD-10-CM

## 2024-07-15 PROCEDURE — G0463: CPT

## 2024-07-15 PROCEDURE — ZZZZZ: CPT

## 2024-07-17 DIAGNOSIS — T81.89XD OTHER COMPLICATIONS OF PROCEDURES, NOT ELSEWHERE CLASSIFIED, SUBSEQUENT ENCOUNTER: ICD-10-CM

## 2024-07-17 DIAGNOSIS — I11.0 HYPERTENSIVE HEART DISEASE WITH HEART FAILURE: ICD-10-CM

## 2024-07-17 DIAGNOSIS — E66.01 MORBID (SEVERE) OBESITY DUE TO EXCESS CALORIES: ICD-10-CM

## 2024-07-17 DIAGNOSIS — Z90.49 ACQUIRED ABSENCE OF OTHER SPECIFIED PARTS OF DIGESTIVE TRACT: ICD-10-CM

## 2024-07-17 DIAGNOSIS — M86.671 OTHER CHRONIC OSTEOMYELITIS, RIGHT ANKLE AND FOOT: ICD-10-CM

## 2024-07-17 DIAGNOSIS — L97.514 NON-PRESSURE CHRONIC ULCER OF OTHER PART OF RIGHT FOOT WITH NECROSIS OF BONE: ICD-10-CM

## 2024-07-17 DIAGNOSIS — E11.69 TYPE 2 DIABETES MELLITUS WITH OTHER SPECIFIED COMPLICATION: ICD-10-CM
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
## 2024-07-17 DIAGNOSIS — Z79.899 OTHER LONG TERM (CURRENT) DRUG THERAPY: ICD-10-CM

## 2024-07-17 DIAGNOSIS — Z86.718 PERSONAL HISTORY OF OTHER VENOUS THROMBOSIS AND EMBOLISM: ICD-10-CM

## 2024-07-17 DIAGNOSIS — Z79.01 LONG TERM (CURRENT) USE OF ANTICOAGULANTS: ICD-10-CM

## 2024-07-17 DIAGNOSIS — E11.40 TYPE 2 DIABETES MELLITUS WITH DIABETIC NEUROPATHY, UNSPECIFIED: ICD-10-CM

## 2024-07-17 DIAGNOSIS — E11.622 TYPE 2 DIABETES MELLITUS WITH OTHER SKIN ULCER: ICD-10-CM

## 2024-07-17 DIAGNOSIS — I50.9 HEART FAILURE, UNSPECIFIED: ICD-10-CM
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
## 2024-07-17 DIAGNOSIS — M25.50 PAIN IN UNSPECIFIED JOINT: ICD-10-CM

## 2024-07-17 DIAGNOSIS — Z79.4 LONG TERM (CURRENT) USE OF INSULIN: ICD-10-CM

## 2024-07-17 DIAGNOSIS — I48.91 UNSPECIFIED ATRIAL FIBRILLATION: ICD-10-CM

## 2024-07-17 DIAGNOSIS — L97.812 NON-PRESSURE CHRONIC ULCER OF OTHER PART OF RIGHT LOWER LEG WITH FAT LAYER EXPOSED: ICD-10-CM

## 2024-07-17 DIAGNOSIS — Z98.49 CATARACT EXTRACTION STATUS, UNSPECIFIED EYE: ICD-10-CM

## 2024-07-17 DIAGNOSIS — Z79.84 LONG TERM (CURRENT) USE OF ORAL HYPOGLYCEMIC DRUGS: ICD-10-CM

## 2024-07-18 ENCOUNTER — OUTPATIENT (OUTPATIENT)
Dept: OUTPATIENT SERVICES | Facility: HOSPITAL | Age: 59
LOS: 1 days | Discharge: HOME | End: 2024-07-18
Payer: MEDICARE

## 2024-07-18 ENCOUNTER — APPOINTMENT (OUTPATIENT)
Dept: WOUND CARE | Facility: HOSPITAL | Age: 59
End: 2024-07-18
Payer: MEDICARE

## 2024-07-18 VITALS
WEIGHT: 256 LBS | OXYGEN SATURATION: 97 % | SYSTOLIC BLOOD PRESSURE: 118 MMHG | DIASTOLIC BLOOD PRESSURE: 83 MMHG | TEMPERATURE: 97.8 F | RESPIRATION RATE: 18 BRPM | HEIGHT: 60 IN | HEART RATE: 85 BPM | BODY MASS INDEX: 50.26 KG/M2

## 2024-07-18 DIAGNOSIS — E11.69 TYPE 2 DIABETES MELLITUS WITH OTHER SPECIFIED COMPLICATION: ICD-10-CM

## 2024-07-18 DIAGNOSIS — Z90.89 ACQUIRED ABSENCE OF OTHER ORGANS: Chronic | ICD-10-CM

## 2024-07-18 DIAGNOSIS — E11.622 TYPE 2 DIABETES MELLITUS WITH OTHER SKIN ULCER: ICD-10-CM

## 2024-07-18 DIAGNOSIS — L97.514 NON-PRESSURE CHRONIC ULCER OF OTHER PART OF RIGHT FOOT WITH NECROSIS OF BONE: ICD-10-CM

## 2024-07-18 DIAGNOSIS — Z98.890 OTHER SPECIFIED POSTPROCEDURAL STATES: Chronic | ICD-10-CM

## 2024-07-18 DIAGNOSIS — Z90.49 ACQUIRED ABSENCE OF OTHER SPECIFIED PARTS OF DIGESTIVE TRACT: Chronic | ICD-10-CM

## 2024-07-18 DIAGNOSIS — T81.89XD OTHER COMPLICATIONS OF PROCEDURES, NOT ELSEWHERE CLASSIFIED, SUBSEQUENT ENCOUNTER: ICD-10-CM

## 2024-07-18 DIAGNOSIS — M86.671 OTHER CHRONIC OSTEOMYELITIS, RIGHT ANKLE AND FOOT: ICD-10-CM

## 2024-07-18 PROCEDURE — 99213 OFFICE O/P EST LOW 20 MIN: CPT | Mod: 24

## 2024-07-18 PROCEDURE — G0463: CPT

## 2024-07-20 ENCOUNTER — APPOINTMENT (OUTPATIENT)
Dept: WOUND CARE | Facility: HOSPITAL | Age: 59
End: 2024-07-20
Payer: MEDICARE

## 2024-07-20 ENCOUNTER — OUTPATIENT (OUTPATIENT)
Dept: OUTPATIENT SERVICES | Facility: HOSPITAL | Age: 59
LOS: 1 days | Discharge: HOME | End: 2024-07-20
Payer: MEDICARE

## 2024-07-20 VITALS
BODY MASS INDEX: 50.26 KG/M2 | WEIGHT: 256 LBS | SYSTOLIC BLOOD PRESSURE: 124 MMHG | HEIGHT: 60 IN | DIASTOLIC BLOOD PRESSURE: 79 MMHG | TEMPERATURE: 98.1 F | RESPIRATION RATE: 17 BRPM | HEART RATE: 74 BPM | OXYGEN SATURATION: 98 %
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
## 2024-07-20 DIAGNOSIS — M86.671 OTHER CHRONIC OSTEOMYELITIS, RIGHT ANKLE AND FOOT: ICD-10-CM

## 2024-07-20 DIAGNOSIS — Z90.89 ACQUIRED ABSENCE OF OTHER ORGANS: Chronic | ICD-10-CM

## 2024-07-20 DIAGNOSIS — T81.89XD OTHER COMPLICATIONS OF PROCEDURES, NOT ELSEWHERE CLASSIFIED, SUBSEQUENT ENCOUNTER: ICD-10-CM

## 2024-07-20 DIAGNOSIS — Z98.890 OTHER SPECIFIED POSTPROCEDURAL STATES: Chronic | ICD-10-CM

## 2024-07-20 DIAGNOSIS — L97.514 NON-PRESSURE CHRONIC ULCER OF OTHER PART OF RIGHT FOOT WITH NECROSIS OF BONE: ICD-10-CM

## 2024-07-20 DIAGNOSIS — E11.69 TYPE 2 DIABETES MELLITUS WITH OTHER SPECIFIED COMPLICATION: ICD-10-CM
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
## 2024-07-20 DIAGNOSIS — E11.622 TYPE 2 DIABETES MELLITUS WITH OTHER SKIN ULCER: ICD-10-CM

## 2024-07-20 DIAGNOSIS — Z90.49 ACQUIRED ABSENCE OF OTHER SPECIFIED PARTS OF DIGESTIVE TRACT: Chronic | ICD-10-CM

## 2024-07-20 PROCEDURE — ZZZZZ: CPT

## 2024-07-20 PROCEDURE — G0463: CPT

## 2024-07-23 DIAGNOSIS — E66.01 MORBID (SEVERE) OBESITY DUE TO EXCESS CALORIES: ICD-10-CM

## 2024-07-23 DIAGNOSIS — E11.40 TYPE 2 DIABETES MELLITUS WITH DIABETIC NEUROPATHY, UNSPECIFIED: ICD-10-CM

## 2024-07-23 DIAGNOSIS — Z79.84 LONG TERM (CURRENT) USE OF ORAL HYPOGLYCEMIC DRUGS: ICD-10-CM

## 2024-07-23 DIAGNOSIS — Z90.49 ACQUIRED ABSENCE OF OTHER SPECIFIED PARTS OF DIGESTIVE TRACT: ICD-10-CM

## 2024-07-23 DIAGNOSIS — I48.91 UNSPECIFIED ATRIAL FIBRILLATION: ICD-10-CM

## 2024-07-23 DIAGNOSIS — I50.9 HEART FAILURE, UNSPECIFIED: ICD-10-CM

## 2024-07-23 DIAGNOSIS — M86.671 OTHER CHRONIC OSTEOMYELITIS, RIGHT ANKLE AND FOOT: ICD-10-CM

## 2024-07-23 DIAGNOSIS — T81.89XD OTHER COMPLICATIONS OF PROCEDURES, NOT ELSEWHERE CLASSIFIED, SUBSEQUENT ENCOUNTER: ICD-10-CM

## 2024-07-23 DIAGNOSIS — Z79.01 LONG TERM (CURRENT) USE OF ANTICOAGULANTS: ICD-10-CM

## 2024-07-23 DIAGNOSIS — Z79.4 LONG TERM (CURRENT) USE OF INSULIN: ICD-10-CM

## 2024-07-23 DIAGNOSIS — Z98.49 CATARACT EXTRACTION STATUS, UNSPECIFIED EYE: ICD-10-CM

## 2024-07-23 DIAGNOSIS — Z79.899 OTHER LONG TERM (CURRENT) DRUG THERAPY: ICD-10-CM

## 2024-07-23 DIAGNOSIS — L97.514 NON-PRESSURE CHRONIC ULCER OF OTHER PART OF RIGHT FOOT WITH NECROSIS OF BONE: ICD-10-CM

## 2024-07-23 DIAGNOSIS — I11.0 HYPERTENSIVE HEART DISEASE WITH HEART FAILURE: ICD-10-CM

## 2024-07-23 DIAGNOSIS — M25.50 PAIN IN UNSPECIFIED JOINT: ICD-10-CM

## 2024-07-23 DIAGNOSIS — Z86.718 PERSONAL HISTORY OF OTHER VENOUS THROMBOSIS AND EMBOLISM: ICD-10-CM

## 2024-07-23 DIAGNOSIS — E11.69 TYPE 2 DIABETES MELLITUS WITH OTHER SPECIFIED COMPLICATION: ICD-10-CM
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
## 2024-07-26 ENCOUNTER — APPOINTMENT (OUTPATIENT)
Dept: WOUND CARE | Facility: HOSPITAL | Age: 59
End: 2024-07-26

## 2024-07-26 VITALS
DIASTOLIC BLOOD PRESSURE: 79 MMHG | HEART RATE: 76 BPM | SYSTOLIC BLOOD PRESSURE: 142 MMHG | WEIGHT: 256 LBS | BODY MASS INDEX: 50.26 KG/M2 | HEIGHT: 60 IN | RESPIRATION RATE: 18 BRPM | OXYGEN SATURATION: 96 % | TEMPERATURE: 97.8 F

## 2024-07-26 PROCEDURE — 99024 POSTOP FOLLOW-UP VISIT: CPT

## 2024-07-29 NOTE — VITALS
[Aching] : aching [Pain related to present condition?] : The patient's  pain is not related to present condition. [] : No [de-identified] : "12/10" per pt- DPM advised [FreeTextEntry3] : generalized, "whole body feels like it's going to explode" [FreeTextEntry1] : "nothing" [FreeTextEntry2] : walking, eating [FreeTextEntry4] : patient made comfotable during exam

## 2024-07-29 NOTE — ASSESSMENT
[Verbal] : Verbal [Written] : Written [Demo] : Demo [Patient] : Patient [Poor - depression, low motivation, poor retention, non-acceptance of disease] : Poor - depression, low motivation, poor retention, non-acceptance of disease [Dressing changes] : dressing changes [Non-adherent] : non-adherent [Foot Care] : foot care [Skin Care] : skin care [Signs and symptoms of infection] : sign and symptoms of infection [Nutrition] : nutrition [Surgery] : surgery [How and When to Call] : how and when to call [Pain Management] : pain management [Off-loading] : off-loading [Home Health] : home health [Hospitalization] : hospitalization [Patient responsibility to plan of care] : patient responsibility to plan of care [Glycemic Control] : glycemic control [] : Yes [Stable] : stable [Home] : Home [Ambulatory] : Ambulatory [Faxed - Long Term Care/Home Health Agency] : Long Term Care/Home Health Agency: Faxed [FreeTextEntry2] : Optimal Wound Healing Enhanced Capacity and Energy Anxiety Reduction or Resolution  Blood Glucose Level Within Desired Range Absence of Infection Signs and Symptoms Optimal Nutrition Intake Acceptable Pain Control Effective Coping Absence of Fall and Fall-Related Injury Improved Nutritional Intake Effective Peripheral Tissue Perfusion [FreeTextEntry4] : Pt states that she was evaluated by Dr Darden, her podiatrist prior to this visit and was advised that the "right great toe was "infected".- DPM advised The pt states she has an appt with [facility] management [phone] on 7/29/24. DPM advised Home care RN, Mily Ahmadi advised the pt that no additional home visits are needed due to "closed wound". The pt states that blood was coming out of the wound during the dressing change on 7/25/24.- DPM advised. Pt states that she is being fitted for Lymphedema pumps on 7/29/247/20 @ 9am.DPM advised Wc instructions and prescription faxed to [facility] same date. F/U 1 week for assessment [FreeTextEntry1] : [facility]

## 2024-07-29 NOTE — PHYSICAL EXAM
[4 x 4] : 4 x 4  [Abdominal Pad] : Abdominal Pad [Ankle Swelling (On Exam)] : present [Ankle Swelling Bilaterally] : severe [] : of the left leg [Ankle Swelling On The Right] : mild [Purpura] : no purpura  [Petechiae] : no petechiae [Skin Ulcer] : ulcer [Skin Induration] : induration [Alert] : alert [Oriented to Person] : oriented to person [Oriented to Place] : oriented to place [Depressed] : depressed [de-identified] : morbidly obese WF, NAD, alert, Ox3. [de-identified] : Status post right fifth met head resection [de-identified] : Right foot dorsal incision site healed, no dehiscence, no purulence, no fluctuance, no proximal streaking.  Right foot submet 5 wound down to skin, subcutaneous tissue, fat and right hallux wound down to fat  [de-identified] : Loss of light touch sensation bilaterally [de-identified] : callus shaved and cauterized with AGNO3 [FreeTextEntry7] : Right hallux [FreeTextEntry8] : 1.2 [FreeTextEntry9] : 0.4 [de-identified] : 0.1 [de-identified] : serosanguineous [de-identified] : ALGINATE W/ AG [de-identified] : Mechanically cleansed with 0.9% Normal saline and 4 x 4 sterile gauze  .dry dressing, cloth tape  [de-identified] : callus shaved and cauterized with AGNO3 [de-identified] : Right Foot - 5th Met. Head resection  [de-identified] : 0.4 [de-identified] : 0.3 [de-identified] : 0.1 [de-identified] : Dry dressing [de-identified] : Mechanically cleansed with 0.9% Normal saline and 4 x 4 sterile gauze. dry dressing, cloth tape  [FreeTextEntry1] : Right Plantar foot - base of 5th metatarsal   [FreeTextEntry2] : 1.0 [FreeTextEntry3] : 1.5 [FreeTextEntry4] : 0.2 [de-identified] : Serous [de-identified] :  Alginate Ag [de-identified] : Mechanically cleansed with 0.9% Normal saline and 4 x 4 sterile gauze.  Kerlix  Surgical shoe [de-identified] : Small [de-identified] : Traumatic [de-identified] : No [de-identified] : Erythema [de-identified] : None [de-identified] : 3x Weekly [de-identified] : Primary Dressing [de-identified] : Moderate [de-identified] : Surgical [de-identified] : No [de-identified] : Normal [de-identified] : None [de-identified] : None [de-identified] : 3x Weekly [de-identified] : Other [TWNoteComboBox4] : Moderate [TWNoteComboBox5] : No [TWNoteComboBox6] : Diabetic [de-identified] : No [de-identified] : Macerated [de-identified] : None [de-identified] : None [de-identified] : 100% [de-identified] : No [de-identified] : 3x Weekly

## 2024-07-29 NOTE — VITALS
[Aching] : aching [Pain related to present condition?] : The patient's  pain is not related to present condition. [] : No [de-identified] : "12/10" per pt- DPM advised [FreeTextEntry3] : generalized, "whole body feels like it's going to explode" [FreeTextEntry1] : "nothing" [FreeTextEntry2] : walking, eating [FreeTextEntry4] : patient made comfotable during exam

## 2024-07-29 NOTE — PLAN
[FreeTextEntry1] : .Patient seen and evaluated. Discussed etiology and treatment plan. Patient verbalized understanding.  Continue with local wound care and offloading. RTc in one week. Spent 20 minutes for patient care and medical decision making.

## 2024-07-29 NOTE — ASSESSMENT
[Verbal] : Verbal [Written] : Written [Demo] : Demo [Patient] : Patient [Poor - depression, low motivation, poor retention, non-acceptance of disease] : Poor - depression, low motivation, poor retention, non-acceptance of disease [Dressing changes] : dressing changes [Non-adherent] : non-adherent [Foot Care] : foot care [Skin Care] : skin care [Signs and symptoms of infection] : sign and symptoms of infection [Surgery] : surgery [Nutrition] : nutrition [How and When to Call] : how and when to call [Pain Management] : pain management [Off-loading] : off-loading [Home Health] : home health [Hospitalization] : hospitalization [Patient responsibility to plan of care] : patient responsibility to plan of care [Glycemic Control] : glycemic control [] : Yes [Stable] : stable [Home] : Home [Ambulatory] : Ambulatory [Faxed - Long Term Care/Home Health Agency] : Long Term Care/Home Health Agency: Faxed [FreeTextEntry2] : Optimal Wound Healing Enhanced Capacity and Energy Anxiety Reduction or Resolution  Blood Glucose Level Within Desired Range Absence of Infection Signs and Symptoms Optimal Nutrition Intake Acceptable Pain Control Effective Coping Absence of Fall and Fall-Related Injury Improved Nutritional Intake Effective Peripheral Tissue Perfusion [FreeTextEntry4] : Pt states that she was evaluated by Dr Darden, her podiatrist prior to this visit and was advised that the "right great toe was "infected".- DPM advised The pt states she has an appt with [facility] [phone] on 7/29/24. DPM advised Home care RN, Mily Ahmadi advised the pt that no additional home visits are needed due to "closed wound". The pt states that blood was coming out of the wound during the dressing change on 7/25/24.- DPM advised. Pt states that she is being fitted for Lymphedema pumps on 7/29/247/20 @ 9am.DPM advised Wc instructions and prescription faxed to [facility] same date. F/U 1 week for assessment [FreeTextEntry1] : [place]

## 2024-07-29 NOTE — PHYSICAL EXAM
[4 x 4] : 4 x 4  [Abdominal Pad] : Abdominal Pad [Ankle Swelling (On Exam)] : present [Ankle Swelling Bilaterally] : severe [] : of the left leg [Ankle Swelling On The Right] : mild [Purpura] : no purpura  [Petechiae] : no petechiae [Skin Ulcer] : ulcer [Skin Induration] : induration [Alert] : alert [Oriented to Person] : oriented to person [Oriented to Place] : oriented to place [Depressed] : depressed [de-identified] : morbidly obese WF, NAD, alert, Ox3. [de-identified] : Status post right fifth met head resection [de-identified] : Right foot dorsal incision site healed, no dehiscence, no purulence, no fluctuance, no proximal streaking.  Right foot submet 5 wound down to skin, subcutaneous tissue, fat and right hallux wound down to fat  [de-identified] : Loss of light touch sensation bilaterally [de-identified] : callus shaved and cauterized with AGNO3 [FreeTextEntry7] : Right hallux [FreeTextEntry8] : 1.2 [FreeTextEntry9] : 0.4 [de-identified] : 0.1 [de-identified] : serosanguineous [de-identified] : ALGINATE W/ AG [de-identified] : Mechanically cleansed with 0.9% Normal saline and 4 x 4 sterile gauze  .dry dressing, cloth tape  [de-identified] : callus shaved and cauterized with AGNO3 [de-identified] : Right Foot - 5th Met. Head resection  [de-identified] : 0.4 [de-identified] : 0.3 [de-identified] : 0.1 [de-identified] : Dry dressing [de-identified] : Mechanically cleansed with 0.9% Normal saline and 4 x 4 sterile gauze. dry dressing, cloth tape  [FreeTextEntry1] : Right Plantar foot - base of 5th metatarsal   [FreeTextEntry2] : 1.0 [FreeTextEntry3] : 1.5 [FreeTextEntry4] : 0.2 [de-identified] : Serous [de-identified] :  Alginate Ag [de-identified] : Mechanically cleansed with 0.9% Normal saline and 4 x 4 sterile gauze.  Kerlix  Surgical shoe [de-identified] : Small [de-identified] : Traumatic [de-identified] : No [de-identified] : Erythema [de-identified] : None [de-identified] : 3x Weekly [de-identified] : Primary Dressing [de-identified] : Moderate [de-identified] : Surgical [de-identified] : No [de-identified] : Normal [de-identified] : None [de-identified] : None [de-identified] : 3x Weekly [de-identified] : Other [TWNoteComboBox4] : Moderate [TWNoteComboBox5] : No [TWNoteComboBox6] : Diabetic [de-identified] : No [de-identified] : Macerated [de-identified] : None [de-identified] : None [de-identified] : 100% [de-identified] : No [de-identified] : 3x Weekly

## 2024-07-29 NOTE — HISTORY OF PRESENT ILLNESS
[FreeTextEntry1] : Patient is s/p 5th metatarsal head resection right foot .  Patient seen for right submet 5 ulceration down to skin, subcutaneous tissue, fat, bone, healing well.

## 2024-07-29 NOTE — REVIEW OF SYSTEMS
[Fever] : no fever [Chills] : no chills [Eye Pain] : no eye pain [Loss Of Hearing] : no hearing loss [Shortness Of Breath] : no shortness of breath [Abdominal Pain] : no abdominal pain [Joint Stiffness] : joint stiffness [Skin Wound] : skin wound [Depression] : depression [Easy Bleeding] : no tendency for easy bleeding [FreeTextEntry5] : HTN, HLD , morbid obesity  [FreeTextEntry9] : Fluid retention and venous insufficiency  [de-identified] : s/p 5th metatarsal head resection right foot  [de-identified] : IDDM with neuropathy  [de-identified] : not compliant with care  [de-identified] : JADEN

## 2024-07-29 NOTE — REVIEW OF SYSTEMS
[Fever] : no fever [Chills] : no chills [Eye Pain] : no eye pain [Loss Of Hearing] : no hearing loss [Shortness Of Breath] : no shortness of breath [Abdominal Pain] : no abdominal pain [Joint Stiffness] : joint stiffness [Skin Wound] : skin wound [Depression] : depression [Easy Bleeding] : no tendency for easy bleeding [FreeTextEntry5] : HTN, HLD , morbid obesity  [FreeTextEntry9] : Fluid retention and venous insufficiency  [de-identified] : s/p 5th metatarsal head resection right foot  [de-identified] : IDDM with neuropathy  [de-identified] : not compliant with care  [de-identified] : JADEN

## 2024-07-30 ENCOUNTER — NON-APPOINTMENT (OUTPATIENT)
Age: 59
End: 2024-07-30

## 2024-08-02 ENCOUNTER — OUTPATIENT (OUTPATIENT)
Dept: OUTPATIENT SERVICES | Facility: HOSPITAL | Age: 59
LOS: 1 days | Discharge: HOME | End: 2024-08-02
Payer: MEDICARE

## 2024-08-02 ENCOUNTER — APPOINTMENT (OUTPATIENT)
Dept: WOUND CARE | Facility: HOSPITAL | Age: 59
End: 2024-08-02

## 2024-08-02 VITALS
RESPIRATION RATE: 18 BRPM | TEMPERATURE: 97.6 F | HEART RATE: 70 BPM | WEIGHT: 256 LBS | SYSTOLIC BLOOD PRESSURE: 125 MMHG | DIASTOLIC BLOOD PRESSURE: 80 MMHG | OXYGEN SATURATION: 95 % | BODY MASS INDEX: 50.26 KG/M2 | HEIGHT: 60 IN

## 2024-08-02 DIAGNOSIS — E11.622 TYPE 2 DIABETES MELLITUS WITH OTHER SKIN ULCER: ICD-10-CM

## 2024-08-02 DIAGNOSIS — Z90.49 ACQUIRED ABSENCE OF OTHER SPECIFIED PARTS OF DIGESTIVE TRACT: Chronic | ICD-10-CM

## 2024-08-02 DIAGNOSIS — Z90.89 ACQUIRED ABSENCE OF OTHER ORGANS: Chronic | ICD-10-CM

## 2024-08-02 DIAGNOSIS — Z98.890 OTHER SPECIFIED POSTPROCEDURAL STATES: Chronic | ICD-10-CM

## 2024-08-02 PROCEDURE — 99024 POSTOP FOLLOW-UP VISIT: CPT

## 2024-08-02 PROCEDURE — G0463: CPT

## 2024-08-02 NOTE — PLAN
[FreeTextEntry1] : Patient seen and evaluated. Discussed etiology and treatment plan. Patient verbalized understanding.  Continue with local wound care and offloading. RTC in one week. Spent 20 minutes for patient care and medical decision making.

## 2024-08-02 NOTE — PHYSICAL EXAM
[4 x 4] : 4 x 4  [Abdominal Pad] : Abdominal Pad [Ankle Swelling (On Exam)] : present [Ankle Swelling Bilaterally] : severe [] : of the left leg [Ankle Swelling On The Right] : mild [Purpura] : no purpura  [Petechiae] : no petechiae [Skin Ulcer] : ulcer [Skin Induration] : induration [Alert] : alert [Oriented to Person] : oriented to person [Oriented to Place] : oriented to place [Depressed] : depressed [de-identified] : morbidly obese WF, NAD, alert, Ox3. [de-identified] : Status post right fifth met head resection [de-identified] : Right foot dorsal incision site intact, no dehiscence, no purulence, no fluctuance, no proximal streaking.  Right foot submet 5 wound down to skin, subcutaneous tissue, fat healing well [de-identified] : Loss of light touch sensation bilaterally [de-identified] : callus shaved and cauterized with AGNO3 [FreeTextEntry7] : Right hallux [de-identified] : Callused [de-identified] :  CLOSED   [de-identified] :  cauterized with AGNO3 [de-identified] : Right Foot - 5th Met. Head resection  [de-identified] : 0.3 [de-identified] : 0.3 [de-identified] : 0.1 [de-identified] : Calcium alginate [de-identified] : Mechanically cleansed with 0.9% Normal saline and 4 x 4 sterile gauze. dry dressing, cloth tape   [FreeTextEntry1] : Right Plantar foot - base of 5th metatarsal   [FreeTextEntry2] : 1.0 [FreeTextEntry3] : 1.5 [FreeTextEntry4] : 0.2 [de-identified] : Serous [de-identified] :  Alginate Ag [de-identified] : Mechanically cleansed with 0.9% Normal saline and 4 x 4 sterile gauze.  Kerlix  Surgical shoe [de-identified] : None [de-identified] : No [de-identified] : Traumatic [de-identified] : other [de-identified] : None [de-identified] : False [de-identified] : False [de-identified] : Moderate [de-identified] : Surgical [de-identified] : No [de-identified] : Normal [de-identified] : None [de-identified] : None [de-identified] : 3x Weekly [de-identified] : Other [TWNoteComboBox4] : Moderate [TWNoteComboBox5] : No [TWNoteComboBox6] : Diabetic [de-identified] : No [de-identified] : Macerated [de-identified] : None [de-identified] : 100% [de-identified] : None [de-identified] : No [de-identified] : 3x Weekly

## 2024-08-02 NOTE — REVIEW OF SYSTEMS
[Fever] : no fever [Chills] : no chills [Eye Pain] : no eye pain [Loss Of Hearing] : no hearing loss [Shortness Of Breath] : no shortness of breath [Abdominal Pain] : no abdominal pain [Joint Stiffness] : joint stiffness [Skin Wound] : skin wound [Depression] : depression [Easy Bleeding] : no tendency for easy bleeding [FreeTextEntry5] : HTN, HLD , morbid obesity  [FreeTextEntry9] : Fluid retention and venous insufficiency  [de-identified] : s/p 5th metatarsal head resection right foot , sutures intact [de-identified] : IDDM with neuropathy  [de-identified] : not compliant with care  [de-identified] : JADEN

## 2024-08-02 NOTE — ASSESSMENT
[Verbal] : Verbal [Written] : Written [Demo] : Demo [Patient] : Patient [Non-adherent] : non-adherent [Dressing changes] : dressing changes [Foot Care] : foot care [Skin Care] : skin care [Signs and symptoms of infection] : sign and symptoms of infection [Nutrition] : nutrition [How and When to Call] : how and when to call [Pain Management] : pain management [Off-loading] : off-loading [Home Health] : home health [Hospitalization] : hospitalization [Patient responsibility to plan of care] : patient responsibility to plan of care [Glycemic Control] : glycemic control [] : Yes [Stable] : stable [Home] : Home [Ambulatory] : Ambulatory [Faxed - Long Term Care/Home Health Agency] : Long Term Care/Home Health Agency: Faxed [Fair - mild discomfort, physical impairment, low acceptance] : Fair - mild discomfort, physical impairment, low acceptance [FreeTextEntry2] : Optimal Wound Healing Enhanced Capacity and Energy Anxiety Reduction or Resolution  Blood Glucose Level Within Desired Range Absence of Infection Signs and Symptoms Optimal Nutrition Intake Acceptable Pain Control Effective Coping Absence of Fall and Fall-Related Injury Improved Nutritional Intake Effective Peripheral Tissue Perfusion [FreeTextEntry4] : Patient seen and assessed by Dr Meek. Right 5th metatarsal ulcer draining moderate amount od serous drainage. Area cauterized by Dr Meek. Silver alginate applied with dsd and tape. Continue Home Care x 1 week Return next week   [FreeTextEntry1] : [facility]

## 2024-08-02 NOTE — VITALS
[Pain related to present condition?] : The patient's  pain is not related to present condition. [] : No [FreeTextEntry3] : entire body [FreeTextEntry1] : Percocet [FreeTextEntry2] : everything [FreeTextEntry4] : Pain management Dr Albert [FreeTextEntry5] : Percocet

## 2024-08-05 DIAGNOSIS — I48.91 UNSPECIFIED ATRIAL FIBRILLATION: ICD-10-CM

## 2024-08-05 DIAGNOSIS — I11.0 HYPERTENSIVE HEART DISEASE WITH HEART FAILURE: ICD-10-CM

## 2024-08-05 DIAGNOSIS — I50.9 HEART FAILURE, UNSPECIFIED: ICD-10-CM

## 2024-08-05 DIAGNOSIS — L84 CORNS AND CALLOSITIES: ICD-10-CM

## 2024-08-05 DIAGNOSIS — Z90.49 ACQUIRED ABSENCE OF OTHER SPECIFIED PARTS OF DIGESTIVE TRACT: ICD-10-CM

## 2024-08-05 DIAGNOSIS — Z79.84 LONG TERM (CURRENT) USE OF ORAL HYPOGLYCEMIC DRUGS: ICD-10-CM

## 2024-08-05 DIAGNOSIS — Z79.01 LONG TERM (CURRENT) USE OF ANTICOAGULANTS: ICD-10-CM

## 2024-08-05 DIAGNOSIS — E11.40 TYPE 2 DIABETES MELLITUS WITH DIABETIC NEUROPATHY, UNSPECIFIED: ICD-10-CM

## 2024-08-05 DIAGNOSIS — M25.50 PAIN IN UNSPECIFIED JOINT: ICD-10-CM

## 2024-08-05 DIAGNOSIS — T81.89XD OTHER COMPLICATIONS OF PROCEDURES, NOT ELSEWHERE CLASSIFIED, SUBSEQUENT ENCOUNTER: ICD-10-CM

## 2024-08-05 DIAGNOSIS — Z79.4 LONG TERM (CURRENT) USE OF INSULIN: ICD-10-CM

## 2024-08-05 DIAGNOSIS — Z86.718 PERSONAL HISTORY OF OTHER VENOUS THROMBOSIS AND EMBOLISM: ICD-10-CM
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
## 2024-08-05 DIAGNOSIS — L97.514 NON-PRESSURE CHRONIC ULCER OF OTHER PART OF RIGHT FOOT WITH NECROSIS OF BONE: ICD-10-CM

## 2024-08-05 DIAGNOSIS — E11.69 TYPE 2 DIABETES MELLITUS WITH OTHER SPECIFIED COMPLICATION: ICD-10-CM
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
## 2024-08-05 DIAGNOSIS — E66.01 MORBID (SEVERE) OBESITY DUE TO EXCESS CALORIES: ICD-10-CM

## 2024-08-05 DIAGNOSIS — Z98.49 CATARACT EXTRACTION STATUS, UNSPECIFIED EYE: ICD-10-CM

## 2024-08-05 DIAGNOSIS — Z79.899 OTHER LONG TERM (CURRENT) DRUG THERAPY: ICD-10-CM

## 2024-08-05 DIAGNOSIS — M86.671 OTHER CHRONIC OSTEOMYELITIS, RIGHT ANKLE AND FOOT: ICD-10-CM

## 2024-08-09 ENCOUNTER — OUTPATIENT (OUTPATIENT)
Dept: OUTPATIENT SERVICES | Facility: HOSPITAL | Age: 59
LOS: 1 days | Discharge: HOME | End: 2024-08-09
Payer: MEDICARE

## 2024-08-09 ENCOUNTER — APPOINTMENT (OUTPATIENT)
Dept: WOUND CARE | Facility: HOSPITAL | Age: 59
End: 2024-08-09

## 2024-08-09 DIAGNOSIS — E11.622 TYPE 2 DIABETES MELLITUS WITH OTHER SKIN ULCER: ICD-10-CM

## 2024-08-09 PROCEDURE — 99024 POSTOP FOLLOW-UP VISIT: CPT

## 2024-08-09 PROCEDURE — G0463: CPT

## 2024-08-09 NOTE — ASSESSMENT
[Verbal] : Verbal [Written] : Written [Demo] : Demo [Patient] : Patient [Fair - mild discomfort, physical impairment, low acceptance] : Fair - mild discomfort, physical impairment, low acceptance [Non-adherent] : non-adherent [Dressing changes] : dressing changes [Foot Care] : foot care [Skin Care] : skin care [Signs and symptoms of infection] : sign and symptoms of infection [Nutrition] : nutrition [How and When to Call] : how and when to call [Pain Management] : pain management [Off-loading] : off-loading [Home Health] : home health [Patient responsibility to plan of care] : patient responsibility to plan of care [Glycemic Control] : glycemic control [Stable] : stable [Home] : Home [Ambulatory] : Ambulatory [Faxed - Long Term Care/Home Health Agency] : Long Term Care/Home Health Agency: Faxed [] : No [FreeTextEntry2] : Optimal Wound Healing Enhanced Capacity and Energy Anxiety Reduction or Resolution  Blood Glucose Level Within Desired Range Absence of Infection Signs and Symptoms Optimal Nutrition Intake Acceptable Pain Control Effective Coping Absence of Fall and Fall-Related Injury Improved Nutritional Intake Effective Peripheral Tissue Perfusion [FreeTextEntry4] : Home care did not come this week. There is no drainage Patient discharged  Return next week   [FreeTextEntry1] : [facility]

## 2024-08-09 NOTE — PLAN
[FreeTextEntry1] : Patient seen and evaluated. Discussed etiology and treatment plan. Patient verbalized understanding.  Spent 20 minutes for patient care and medical decision making.

## 2024-08-09 NOTE — HISTORY OF PRESENT ILLNESS
[FreeTextEntry1] : Patient is s/p 5th metatarsal head resection right foot .  Patient seen for right submet 5 ulceration down to skin, subcutaneous tissue, fat, bone, healed well.

## 2024-08-09 NOTE — PHYSICAL EXAM
[4 x 4] : 4 x 4  [Abdominal Pad] : Abdominal Pad [Ankle Swelling (On Exam)] : present [Ankle Swelling Bilaterally] : severe [] : of the left leg [Ankle Swelling On The Right] : mild [Purpura] : no purpura  [Petechiae] : no petechiae [Skin Ulcer] : ulcer [Skin Induration] : induration [Alert] : alert [Oriented to Person] : oriented to person [Oriented to Place] : oriented to place [Depressed] : depressed [de-identified] : morbidly obese WF, NAD, alert, Ox3. [de-identified] : Right foot dorsal incision site intact, no dehiscence, no purulence, no fluctuance, no proximal streaking.  Right foot submet 5 wound down to skin, subcutaneous tissue, fat healed well [de-identified] : Status post right fifth met head resection [de-identified] : Loss of light touch sensation bilaterally [de-identified] : callus shaved and cauterized with AGNO3 [FreeTextEntry7] : Right hallux [de-identified] : Callused [de-identified] :  CLOSED   [de-identified] :  cauterized with AGNO3 [de-identified] : Right Foot - 5th Met. Head resection  [de-identified] : .    [FreeTextEntry1] : Right Plantar foot - base of 5th metatarsal   [FreeTextEntry3] : 1.5 [FreeTextEntry2] : 1.0 [FreeTextEntry4] : 0.2 [de-identified] : Serous [de-identified] :  Alginate Ag [de-identified] : Mechanically cleansed with 0.9% Normal saline and 4 x 4 sterile gauze.  Kerlix  Surgical shoe [de-identified] : None [de-identified] : Traumatic [de-identified] : No [de-identified] : None [de-identified] : other [de-identified] : None [de-identified] : Surgical [de-identified] : No [de-identified] : Normal [de-identified] : None [de-identified] : None [de-identified] : False [de-identified] : False [TWNoteComboBox4] : Moderate [TWNoteComboBox5] : No [TWNoteComboBox6] : Diabetic [de-identified] : No [de-identified] : Macerated [de-identified] : None [de-identified] : 100% [de-identified] : None [de-identified] : No [de-identified] : 3x Weekly

## 2024-08-09 NOTE — REVIEW OF SYSTEMS
[Fever] : no fever [Chills] : no chills [Eye Pain] : no eye pain [Loss Of Hearing] : no hearing loss [Shortness Of Breath] : no shortness of breath [Abdominal Pain] : no abdominal pain [Joint Stiffness] : joint stiffness [Skin Wound] : skin wound [Depression] : depression [Easy Bleeding] : no tendency for easy bleeding [FreeTextEntry5] : HTN, HLD , morbid obesity  [FreeTextEntry9] : Fluid retention and venous insufficiency  [de-identified] : s/p 5th metatarsal head resection right foot , sutures intact [de-identified] : IDDM with neuropathy  [de-identified] : not compliant with care  [de-identified] : JADEN

## 2024-08-16 NOTE — ED ADULT NURSE NOTE - NSSEPSISSUSPECTED_ED_A_ED
Abnormal AST, globulin, total cholesterol, LDL (bad cholesterol) and hemoglobin A1c levels; all other labs are normal.    Liver enzymes (AST) is slightly elevated.  This is most likely due to a fatty liver.  Please watch your intake of fatty or fried foods or red meat which could ultimately increase your lipid profile.  We will monitor these parameters presently.  No need to intervene at this time.  Repeat labs in approximately 4 to 6 months.    Liver proteins (globulin) is slightly abnormal.  We will monitor only.  No need to intervene at this time.    Please adhere to a low-cholesterol diet in order to help lower your cholesterol levels.  I will send a prescription for atorvastatin to your pharmacy on file to help lower your cholesterol levels.  Please pick it up and take as directed.  Repeat labs in 4 to 6 months.      Please adhere to a low carbohydrate diet and avoid starchy carbohydrates (potatoes, rice, bread, grits, sweets, pasta) is much as possible in order to lower your A1c level which is currently in the prediabetic range. Diet and exercise are important.  If you have questions please contact the office.  Thanks.     No

## 2024-10-09 ENCOUNTER — APPOINTMENT (OUTPATIENT)
Dept: PAIN MANAGEMENT | Facility: CLINIC | Age: 59
End: 2024-10-09

## 2024-10-11 ENCOUNTER — APPOINTMENT (OUTPATIENT)
Dept: PAIN MANAGEMENT | Facility: CLINIC | Age: 59
End: 2024-10-11

## 2024-10-22 ENCOUNTER — APPOINTMENT (OUTPATIENT)
Dept: WOUND CARE | Facility: HOSPITAL | Age: 59
End: 2024-10-22

## 2024-10-30 NOTE — PROVIDER CONTACT NOTE (CRITICAL VALUE NOTIFICATION) - NAME OF MD/NP/PA/DO NOTIFIED:
Dr. Garcia
Dr. Harinder Mane
MD Menjivar
Dr. Wilkinson
Harinder Taylor
Dr. Ihsan Beverly
given to Security

## 2024-11-27 ENCOUNTER — INPATIENT (INPATIENT)
Facility: HOSPITAL | Age: 59
LOS: 9 days | Discharge: HOME | DRG: 628 | End: 2024-12-07
Attending: FAMILY MEDICINE
Payer: MEDICARE

## 2024-11-27 VITALS
RESPIRATION RATE: 18 BRPM | HEIGHT: 60 IN | WEIGHT: 259.93 LBS | HEART RATE: 83 BPM | DIASTOLIC BLOOD PRESSURE: 93 MMHG | SYSTOLIC BLOOD PRESSURE: 124 MMHG | TEMPERATURE: 98 F | OXYGEN SATURATION: 95 %

## 2024-11-27 DIAGNOSIS — I50.9 HEART FAILURE, UNSPECIFIED: ICD-10-CM

## 2024-11-27 DIAGNOSIS — Z29.9 ENCOUNTER FOR PROPHYLACTIC MEASURES, UNSPECIFIED: ICD-10-CM

## 2024-11-27 DIAGNOSIS — Z98.890 OTHER SPECIFIED POSTPROCEDURAL STATES: Chronic | ICD-10-CM

## 2024-11-27 DIAGNOSIS — L03.115 CELLULITIS OF RIGHT LOWER LIMB: ICD-10-CM

## 2024-11-27 DIAGNOSIS — R10.9 UNSPECIFIED ABDOMINAL PAIN: ICD-10-CM

## 2024-11-27 DIAGNOSIS — Z90.89 ACQUIRED ABSENCE OF OTHER ORGANS: Chronic | ICD-10-CM

## 2024-11-27 DIAGNOSIS — I48.91 UNSPECIFIED ATRIAL FIBRILLATION: ICD-10-CM

## 2024-11-27 DIAGNOSIS — K57.90 DIVERTICULOSIS OF INTESTINE, PART UNSPECIFIED, WITHOUT PERFORATION OR ABSCESS WITHOUT BLEEDING: ICD-10-CM

## 2024-11-27 DIAGNOSIS — Z90.49 ACQUIRED ABSENCE OF OTHER SPECIFIED PARTS OF DIGESTIVE TRACT: Chronic | ICD-10-CM

## 2024-11-27 DIAGNOSIS — E11.9 TYPE 2 DIABETES MELLITUS WITHOUT COMPLICATIONS: ICD-10-CM

## 2024-11-27 DIAGNOSIS — Z91.89 OTHER SPECIFIED PERSONAL RISK FACTORS, NOT ELSEWHERE CLASSIFIED: ICD-10-CM

## 2024-11-27 LAB
ALBUMIN SERPL ELPH-MCNC: 3.3 G/DL — SIGNIFICANT CHANGE UP (ref 3.3–5)
ALP SERPL-CCNC: 113 U/L — SIGNIFICANT CHANGE UP (ref 40–120)
ALT FLD-CCNC: 19 U/L — SIGNIFICANT CHANGE UP (ref 12–78)
ANION GAP SERPL CALC-SCNC: 6 MMOL/L — SIGNIFICANT CHANGE UP (ref 5–17)
AST SERPL-CCNC: 19 U/L — SIGNIFICANT CHANGE UP (ref 15–37)
BILIRUB SERPL-MCNC: 1.1 MG/DL — SIGNIFICANT CHANGE UP (ref 0.2–1.2)
BUN SERPL-MCNC: 16 MG/DL — SIGNIFICANT CHANGE UP (ref 7–23)
CALCIUM SERPL-MCNC: 9.3 MG/DL — SIGNIFICANT CHANGE UP (ref 8.5–10.1)
CHLORIDE SERPL-SCNC: 95 MMOL/L — LOW (ref 96–108)
CO2 SERPL-SCNC: 32 MMOL/L — HIGH (ref 22–31)
CREAT SERPL-MCNC: 1.1 MG/DL — SIGNIFICANT CHANGE UP (ref 0.5–1.3)
EGFR: 58 ML/MIN/1.73M2 — LOW
EGFR: 58 ML/MIN/1.73M2 — LOW
FLUAV AG NPH QL: SIGNIFICANT CHANGE UP
FLUBV AG NPH QL: SIGNIFICANT CHANGE UP
GLUCOSE SERPL-MCNC: 245 MG/DL — HIGH (ref 70–99)
HCT VFR BLD CALC: 37.2 % — SIGNIFICANT CHANGE UP (ref 34.5–45)
HGB BLD-MCNC: 13 G/DL — SIGNIFICANT CHANGE UP (ref 11.5–15.5)
LACTATE SERPL-SCNC: 1.1 MMOL/L — SIGNIFICANT CHANGE UP (ref 0.7–2)
LIDOCAIN IGE QN: 9 U/L — LOW (ref 13–75)
MAGNESIUM SERPL-MCNC: 1.9 MG/DL — SIGNIFICANT CHANGE UP (ref 1.6–2.6)
MCHC RBC-ENTMCNC: 31.3 PG — SIGNIFICANT CHANGE UP (ref 27–34)
MCHC RBC-ENTMCNC: 34.9 G/DL — SIGNIFICANT CHANGE UP (ref 32–36)
MCV RBC AUTO: 89.6 FL — SIGNIFICANT CHANGE UP (ref 80–100)
NRBC # BLD: 0 /100 WBCS — SIGNIFICANT CHANGE UP (ref 0–0)
NRBC BLD-RTO: 0 /100 WBCS — SIGNIFICANT CHANGE UP (ref 0–0)
NT-PROBNP SERPL-SCNC: 1014 PG/ML — HIGH (ref 0–125)
PLATELET # BLD AUTO: 247 K/UL — SIGNIFICANT CHANGE UP (ref 150–400)
POTASSIUM SERPL-MCNC: 3.6 MMOL/L — SIGNIFICANT CHANGE UP (ref 3.5–5.3)
POTASSIUM SERPL-SCNC: 3.6 MMOL/L — SIGNIFICANT CHANGE UP (ref 3.5–5.3)
PROT SERPL-MCNC: 8 G/DL — SIGNIFICANT CHANGE UP (ref 6–8.3)
RBC # BLD: 4.15 M/UL — SIGNIFICANT CHANGE UP (ref 3.8–5.2)
RBC # FLD: 13.7 % — SIGNIFICANT CHANGE UP (ref 10.3–14.5)
RSV RNA NPH QL NAA+NON-PROBE: SIGNIFICANT CHANGE UP
SARS-COV-2 RNA SPEC QL NAA+PROBE: SIGNIFICANT CHANGE UP
SODIUM SERPL-SCNC: 133 MMOL/L — LOW (ref 135–145)
WBC # BLD: 10.06 K/UL — SIGNIFICANT CHANGE UP (ref 3.8–10.5)
WBC # FLD AUTO: 10.06 K/UL — SIGNIFICANT CHANGE UP (ref 3.8–10.5)

## 2024-11-27 PROCEDURE — 71045 X-RAY EXAM CHEST 1 VIEW: CPT | Mod: 26

## 2024-11-27 PROCEDURE — 74176 CT ABD & PELVIS W/O CONTRAST: CPT | Mod: 26

## 2024-11-27 PROCEDURE — 73718 MRI LOWER EXTREMITY W/O DYE: CPT | Mod: 26,RT

## 2024-11-27 PROCEDURE — 99222 1ST HOSP IP/OBS MODERATE 55: CPT | Mod: GC

## 2024-11-27 PROCEDURE — 99285 EMERGENCY DEPT VISIT HI MDM: CPT

## 2024-11-27 PROCEDURE — 73620 X-RAY EXAM OF FOOT: CPT | Mod: 26,RT

## 2024-11-27 RX ORDER — ROSUVASTATIN CALCIUM 20 MG/1
1 TABLET, FILM COATED ORAL
Refills: 0 | DISCHARGE

## 2024-11-27 RX ORDER — VANCOMYCIN HCL IN 5 % DEXTROSE 1.5G/250ML
1250 PLASTIC BAG, INJECTION (ML) INTRAVENOUS ONCE
Refills: 0 | Status: COMPLETED | OUTPATIENT
Start: 2024-11-27 | End: 2024-11-27

## 2024-11-27 RX ORDER — SUCRALFATE 1 G
1 TABLET ORAL
Refills: 0 | Status: DISCONTINUED | OUTPATIENT
Start: 2024-11-27 | End: 2024-12-07

## 2024-11-27 RX ORDER — ACETAMINOPHEN 500 MG/5ML
650 LIQUID (ML) ORAL EVERY 6 HOURS
Refills: 0 | Status: DISCONTINUED | OUTPATIENT
Start: 2024-11-27 | End: 2024-11-28

## 2024-11-27 RX ORDER — INSULIN GLARGINE-YFGN 100 [IU]/ML
30 INJECTION, SOLUTION SUBCUTANEOUS EVERY MORNING
Refills: 0 | Status: DISCONTINUED | OUTPATIENT
Start: 2024-11-28 | End: 2024-11-28

## 2024-11-27 RX ORDER — INSULIN LISPRO 100 U/ML
INJECTION, SOLUTION INTRAVENOUS; SUBCUTANEOUS
Refills: 0 | Status: DISCONTINUED | OUTPATIENT
Start: 2024-11-27 | End: 2024-12-01

## 2024-11-27 RX ORDER — GLUCAGON 3 MG/1
1 POWDER NASAL ONCE
Refills: 0 | Status: DISCONTINUED | OUTPATIENT
Start: 2024-11-27 | End: 2024-12-01

## 2024-11-27 RX ORDER — SODIUM CHLORIDE 9 G/1000ML
1000 INJECTION, SOLUTION INTRAVENOUS
Refills: 0 | Status: DISCONTINUED | OUTPATIENT
Start: 2024-11-27 | End: 2024-12-01

## 2024-11-27 RX ORDER — INFLUENZA A VIRUS A/IDAHO/07/2018 (H1N1) ANTIGEN (MDCK CELL DERIVED, PROPIOLACTONE INACTIVATED, INFLUENZA A VIRUS A/INDIANA/08/2018 (H3N2) ANTIGEN (MDCK CELL DERIVED, PROPIOLACTONE INACTIVATED), INFLUENZA B VIRUS B/SINGAPORE/INFTT-16-0610/2016 ANTIGEN (MDCK CELL DERIVED, PROPIOLACTONE INACTIVATED), INFLUENZA B VIRUS B/IOWA/06/2017 ANTIGEN (MDCK CELL DERIVED, PROPIOLACTONE INACTIVATED) 15; 15; 15; 15 UG/.5ML; UG/.5ML; UG/.5ML; UG/.5ML
0.5 INJECTION, SUSPENSION INTRAMUSCULAR ONCE
Refills: 0 | Status: DISCONTINUED | OUTPATIENT
Start: 2024-11-27 | End: 2024-12-07

## 2024-11-27 RX ORDER — METOPROLOL SUCCINATE 50 MG/1
100 TABLET, EXTENDED RELEASE ORAL DAILY
Refills: 0 | Status: DISCONTINUED | OUTPATIENT
Start: 2024-11-27 | End: 2024-12-03

## 2024-11-27 RX ORDER — MELATONIN 5 MG
3 TABLET ORAL AT BEDTIME
Refills: 0 | Status: DISCONTINUED | OUTPATIENT
Start: 2024-11-27 | End: 2024-12-07

## 2024-11-27 RX ORDER — ONDANSETRON HCL/PF 4 MG/2 ML
4 VIAL (ML) INJECTION ONCE
Refills: 0 | Status: COMPLETED | OUTPATIENT
Start: 2024-11-27 | End: 2024-11-27

## 2024-11-27 RX ORDER — ONDANSETRON HCL/PF 4 MG/2 ML
4 VIAL (ML) INJECTION EVERY 6 HOURS
Refills: 0 | Status: DISCONTINUED | OUTPATIENT
Start: 2024-11-27 | End: 2024-12-07

## 2024-11-27 RX ORDER — DEXTROSE 50 % IN WATER 50 %
15 SYRINGE (ML) INTRAVENOUS ONCE
Refills: 0 | Status: DISCONTINUED | OUTPATIENT
Start: 2024-11-27 | End: 2024-12-01

## 2024-11-27 RX ORDER — DEXTROSE 50 % IN WATER 50 %
25 SYRINGE (ML) INTRAVENOUS ONCE
Refills: 0 | Status: DISCONTINUED | OUTPATIENT
Start: 2024-11-27 | End: 2024-12-01

## 2024-11-27 RX ORDER — MAGNESIUM, ALUMINUM HYDROXIDE 200-200 MG
30 TABLET,CHEWABLE ORAL EVERY 6 HOURS
Refills: 0 | Status: DISCONTINUED | OUTPATIENT
Start: 2024-11-27 | End: 2024-11-27

## 2024-11-27 RX ORDER — OXYCODONE HYDROCHLORIDE 30 MG/1
15 TABLET ORAL EVERY 12 HOURS
Refills: 0 | Status: DISCONTINUED | OUTPATIENT
Start: 2024-11-27 | End: 2024-12-04

## 2024-11-27 RX ORDER — DEXTROSE 50 % IN WATER 50 %
12.5 SYRINGE (ML) INTRAVENOUS ONCE
Refills: 0 | Status: DISCONTINUED | OUTPATIENT
Start: 2024-11-27 | End: 2024-12-01

## 2024-11-27 RX ORDER — INSULIN LISPRO 100 U/ML
INJECTION, SOLUTION INTRAVENOUS; SUBCUTANEOUS AT BEDTIME
Refills: 0 | Status: DISCONTINUED | OUTPATIENT
Start: 2024-11-27 | End: 2024-12-01

## 2024-11-27 RX ORDER — ROSUVASTATIN CALCIUM 20 MG/1
5 TABLET, FILM COATED ORAL AT BEDTIME
Refills: 0 | Status: DISCONTINUED | OUTPATIENT
Start: 2024-11-27 | End: 2024-12-07

## 2024-11-27 RX ORDER — ACETAMINOPHEN 500 MG/5ML
1000 LIQUID (ML) ORAL ONCE
Refills: 0 | Status: DISCONTINUED | OUTPATIENT
Start: 2024-11-27 | End: 2024-11-27

## 2024-11-27 RX ORDER — VANCOMYCIN HCL IN 5 % DEXTROSE 1.5G/250ML
1750 PLASTIC BAG, INJECTION (ML) INTRAVENOUS EVERY 12 HOURS
Refills: 0 | Status: DISCONTINUED | OUTPATIENT
Start: 2024-11-28 | End: 2024-11-29

## 2024-11-27 RX ORDER — TORSEMIDE 20 MG/1
20 TABLET ORAL
Refills: 0 | Status: DISCONTINUED | OUTPATIENT
Start: 2024-11-27 | End: 2024-12-03

## 2024-11-27 RX ORDER — RIVAROXABAN 10 MG/1
20 TABLET, FILM COATED ORAL
Refills: 0 | Status: DISCONTINUED | OUTPATIENT
Start: 2024-11-27 | End: 2024-12-01

## 2024-11-27 RX ORDER — VANCOMYCIN HCL IN 5 % DEXTROSE 1.5G/250ML
1750 PLASTIC BAG, INJECTION (ML) INTRAVENOUS EVERY 12 HOURS
Refills: 0 | Status: DISCONTINUED | OUTPATIENT
Start: 2024-11-27 | End: 2024-11-27

## 2024-11-27 RX ADMIN — Medication 4 MILLIGRAM(S): at 17:20

## 2024-11-27 RX ADMIN — INSULIN LISPRO 4: 100 INJECTION, SOLUTION INTRAVENOUS; SUBCUTANEOUS at 20:34

## 2024-11-27 RX ADMIN — RIVAROXABAN 20 MILLIGRAM(S): 10 TABLET, FILM COATED ORAL at 18:47

## 2024-11-27 RX ADMIN — Medication 1 GRAM(S): at 21:49

## 2024-11-27 RX ADMIN — Medication 650 MILLIGRAM(S): at 21:28

## 2024-11-27 RX ADMIN — TORSEMIDE 20 MILLIGRAM(S): 20 TABLET ORAL at 21:28

## 2024-11-27 RX ADMIN — Medication 2 MILLIGRAM(S): at 21:59

## 2024-11-27 RX ADMIN — INSULIN LISPRO 2: 100 INJECTION, SOLUTION INTRAVENOUS; SUBCUTANEOUS at 23:15

## 2024-11-27 RX ADMIN — Medication 166.67 MILLIGRAM(S): at 17:18

## 2024-11-27 RX ADMIN — Medication 2 MILLIGRAM(S): at 21:29

## 2024-11-27 RX ADMIN — Medication 1 GRAM(S): at 18:47

## 2024-11-27 RX ADMIN — ROSUVASTATIN CALCIUM 5 MILLIGRAM(S): 20 TABLET, FILM COATED ORAL at 21:29

## 2024-11-27 RX ADMIN — Medication 20 MILLIEQUIVALENT(S): at 20:34

## 2024-11-28 DIAGNOSIS — E11.610 TYPE 2 DIABETES MELLITUS WITH DIABETIC NEUROPATHIC ARTHROPATHY: ICD-10-CM

## 2024-11-28 DIAGNOSIS — E11.621 TYPE 2 DIABETES MELLITUS WITH FOOT ULCER: ICD-10-CM

## 2024-11-28 LAB
A1C WITH ESTIMATED AVERAGE GLUCOSE RESULT: 9.8 % — HIGH (ref 4–5.6)
ALBUMIN SERPL ELPH-MCNC: 2.9 G/DL — LOW (ref 3.3–5)
ALP SERPL-CCNC: 99 U/L — SIGNIFICANT CHANGE UP (ref 40–120)
ALT FLD-CCNC: 15 U/L — SIGNIFICANT CHANGE UP (ref 12–78)
ANION GAP SERPL CALC-SCNC: 3 MMOL/L — LOW (ref 5–17)
AST SERPL-CCNC: 8 U/L — LOW (ref 15–37)
BASOPHILS # BLD AUTO: 0.04 K/UL — SIGNIFICANT CHANGE UP (ref 0–0.2)
BASOPHILS NFR BLD AUTO: 0.5 % — SIGNIFICANT CHANGE UP (ref 0–2)
BILIRUB SERPL-MCNC: 0.9 MG/DL — SIGNIFICANT CHANGE UP (ref 0.2–1.2)
BUN SERPL-MCNC: 14 MG/DL — SIGNIFICANT CHANGE UP (ref 7–23)
CALCIUM SERPL-MCNC: 8.9 MG/DL — SIGNIFICANT CHANGE UP (ref 8.5–10.1)
CHLORIDE SERPL-SCNC: 98 MMOL/L — SIGNIFICANT CHANGE UP (ref 96–108)
CO2 SERPL-SCNC: 33 MMOL/L — HIGH (ref 22–31)
CREAT SERPL-MCNC: 0.96 MG/DL — SIGNIFICANT CHANGE UP (ref 0.5–1.3)
EGFR: 68 ML/MIN/1.73M2 — SIGNIFICANT CHANGE UP
EGFR: 68 ML/MIN/1.73M2 — SIGNIFICANT CHANGE UP
EOSINOPHIL # BLD AUTO: 0.24 K/UL — SIGNIFICANT CHANGE UP (ref 0–0.5)
EOSINOPHIL NFR BLD AUTO: 3.3 % — SIGNIFICANT CHANGE UP (ref 0–6)
ESTIMATED AVERAGE GLUCOSE: 235 MG/DL — HIGH (ref 68–114)
GLUCOSE SERPL-MCNC: 333 MG/DL — HIGH (ref 70–99)
GRAM STN FLD: ABNORMAL
HCT VFR BLD CALC: 33.4 % — LOW (ref 34.5–45)
HGB BLD-MCNC: 11.5 G/DL — SIGNIFICANT CHANGE UP (ref 11.5–15.5)
IMM GRANULOCYTES NFR BLD AUTO: 0.4 % — SIGNIFICANT CHANGE UP (ref 0–0.9)
LYMPHOCYTES # BLD AUTO: 1.25 K/UL — SIGNIFICANT CHANGE UP (ref 1–3.3)
LYMPHOCYTES # BLD AUTO: 17.1 % — SIGNIFICANT CHANGE UP (ref 13–44)
MCHC RBC-ENTMCNC: 31.3 PG — SIGNIFICANT CHANGE UP (ref 27–34)
MCHC RBC-ENTMCNC: 34.4 G/DL — SIGNIFICANT CHANGE UP (ref 32–36)
MCV RBC AUTO: 90.8 FL — SIGNIFICANT CHANGE UP (ref 80–100)
MONOCYTES # BLD AUTO: 0.56 K/UL — SIGNIFICANT CHANGE UP (ref 0–0.9)
MONOCYTES NFR BLD AUTO: 7.7 % — SIGNIFICANT CHANGE UP (ref 2–14)
NEUTROPHILS # BLD AUTO: 5.17 K/UL — SIGNIFICANT CHANGE UP (ref 1.8–7.4)
NEUTROPHILS NFR BLD AUTO: 71 % — SIGNIFICANT CHANGE UP (ref 43–77)
NRBC # BLD: 0 /100 WBCS — SIGNIFICANT CHANGE UP (ref 0–0)
NRBC BLD-RTO: 0 /100 WBCS — SIGNIFICANT CHANGE UP (ref 0–0)
PLATELET # BLD AUTO: 219 K/UL — SIGNIFICANT CHANGE UP (ref 150–400)
POTASSIUM SERPL-MCNC: 3.7 MMOL/L — SIGNIFICANT CHANGE UP (ref 3.5–5.3)
POTASSIUM SERPL-SCNC: 3.7 MMOL/L — SIGNIFICANT CHANGE UP (ref 3.5–5.3)
PROT SERPL-MCNC: 6.8 G/DL — SIGNIFICANT CHANGE UP (ref 6–8.3)
RBC # BLD: 3.68 M/UL — LOW (ref 3.8–5.2)
RBC # FLD: 14 % — SIGNIFICANT CHANGE UP (ref 10.3–14.5)
SODIUM SERPL-SCNC: 134 MMOL/L — LOW (ref 135–145)
SPECIMEN SOURCE: SIGNIFICANT CHANGE UP
VANCOMYCIN TROUGH SERPL-MCNC: 19.2 UG/ML — SIGNIFICANT CHANGE UP (ref 10–20)
WBC # BLD: 7.29 K/UL — SIGNIFICANT CHANGE UP (ref 3.8–10.5)
WBC # FLD AUTO: 7.29 K/UL — SIGNIFICANT CHANGE UP (ref 3.8–10.5)

## 2024-11-28 PROCEDURE — 99233 SBSQ HOSP IP/OBS HIGH 50: CPT

## 2024-11-28 RX ORDER — POLYETHYLENE GLYCOL 3350 17 G/17G
17 POWDER, FOR SOLUTION ORAL DAILY
Refills: 0 | Status: DISCONTINUED | OUTPATIENT
Start: 2024-11-28 | End: 2024-12-07

## 2024-11-28 RX ORDER — PIPERACILLIN-TAZO-DEXTROSE,ISO 3.375G/5
3.38 IV SOLUTION, PIGGYBACK PREMIX FROZEN(ML) INTRAVENOUS ONCE
Refills: 0 | Status: COMPLETED | OUTPATIENT
Start: 2024-11-28 | End: 2024-11-28

## 2024-11-28 RX ORDER — SENNA 187 MG
2 TABLET ORAL AT BEDTIME
Refills: 0 | Status: DISCONTINUED | OUTPATIENT
Start: 2024-11-28 | End: 2024-12-07

## 2024-11-28 RX ORDER — OXYCODONE HYDROCHLORIDE 30 MG/1
5 TABLET ORAL EVERY 6 HOURS
Refills: 0 | Status: DISCONTINUED | OUTPATIENT
Start: 2024-11-28 | End: 2024-11-28

## 2024-11-28 RX ORDER — INSULIN GLARGINE-YFGN 100 [IU]/ML
40 INJECTION, SOLUTION SUBCUTANEOUS
Refills: 0 | Status: DISCONTINUED | OUTPATIENT
Start: 2024-11-28 | End: 2024-12-01

## 2024-11-28 RX ORDER — METOCLOPRAMIDE HCL 10 MG
10 TABLET ORAL ONCE
Refills: 0 | Status: COMPLETED | OUTPATIENT
Start: 2024-11-28 | End: 2024-11-28

## 2024-11-28 RX ORDER — PIPERACILLIN-TAZO-DEXTROSE,ISO 3.375G/5
3.38 IV SOLUTION, PIGGYBACK PREMIX FROZEN(ML) INTRAVENOUS ONCE
Refills: 0 | Status: COMPLETED | OUTPATIENT
Start: 2024-11-29 | End: 2024-11-29

## 2024-11-28 RX ORDER — PIPERACILLIN-TAZO-DEXTROSE,ISO 3.375G/5
3.38 IV SOLUTION, PIGGYBACK PREMIX FROZEN(ML) INTRAVENOUS EVERY 8 HOURS
Refills: 0 | Status: DISCONTINUED | OUTPATIENT
Start: 2024-11-29 | End: 2024-11-30

## 2024-11-28 RX ORDER — ACETAMINOPHEN 500 MG/5ML
975 LIQUID (ML) ORAL EVERY 8 HOURS
Refills: 0 | Status: DISCONTINUED | OUTPATIENT
Start: 2024-11-28 | End: 2024-12-07

## 2024-11-28 RX ORDER — OXYCODONE HYDROCHLORIDE 30 MG/1
10 TABLET ORAL EVERY 6 HOURS
Refills: 0 | Status: DISCONTINUED | OUTPATIENT
Start: 2024-11-28 | End: 2024-12-05

## 2024-11-28 RX ADMIN — Medication 4 MILLIGRAM(S): at 09:09

## 2024-11-28 RX ADMIN — INSULIN LISPRO 4: 100 INJECTION, SOLUTION INTRAVENOUS; SUBCUTANEOUS at 08:18

## 2024-11-28 RX ADMIN — Medication 25 GRAM(S): at 17:32

## 2024-11-28 RX ADMIN — Medication 20 MILLIEQUIVALENT(S): at 11:26

## 2024-11-28 RX ADMIN — Medication 975 MILLIGRAM(S): at 22:12

## 2024-11-28 RX ADMIN — METOPROLOL SUCCINATE 100 MILLIGRAM(S): 50 TABLET, EXTENDED RELEASE ORAL at 05:00

## 2024-11-28 RX ADMIN — ROSUVASTATIN CALCIUM 5 MILLIGRAM(S): 20 TABLET, FILM COATED ORAL at 21:42

## 2024-11-28 RX ADMIN — Medication 250 MILLIGRAM(S): at 18:24

## 2024-11-28 RX ADMIN — INSULIN LISPRO 3: 100 INJECTION, SOLUTION INTRAVENOUS; SUBCUTANEOUS at 17:32

## 2024-11-28 RX ADMIN — Medication 975 MILLIGRAM(S): at 13:07

## 2024-11-28 RX ADMIN — INSULIN GLARGINE-YFGN 30 UNIT(S): 100 INJECTION, SOLUTION SUBCUTANEOUS at 08:16

## 2024-11-28 RX ADMIN — Medication 975 MILLIGRAM(S): at 13:57

## 2024-11-28 RX ADMIN — OXYCODONE HYDROCHLORIDE 15 MILLIGRAM(S): 30 TABLET ORAL at 06:00

## 2024-11-28 RX ADMIN — POLYETHYLENE GLYCOL 3350 17 GRAM(S): 17 POWDER, FOR SOLUTION ORAL at 11:26

## 2024-11-28 RX ADMIN — Medication 10 MILLIGRAM(S): at 13:07

## 2024-11-28 RX ADMIN — Medication 250 MILLIGRAM(S): at 05:00

## 2024-11-28 RX ADMIN — TORSEMIDE 20 MILLIGRAM(S): 20 TABLET ORAL at 07:56

## 2024-11-28 RX ADMIN — Medication 1 GRAM(S): at 11:26

## 2024-11-28 RX ADMIN — INSULIN LISPRO 2: 100 INJECTION, SOLUTION INTRAVENOUS; SUBCUTANEOUS at 21:43

## 2024-11-28 RX ADMIN — INSULIN LISPRO 4: 100 INJECTION, SOLUTION INTRAVENOUS; SUBCUTANEOUS at 12:33

## 2024-11-28 RX ADMIN — Medication 1 GRAM(S): at 05:00

## 2024-11-28 RX ADMIN — Medication 2 TABLET(S): at 21:42

## 2024-11-28 RX ADMIN — TORSEMIDE 20 MILLIGRAM(S): 20 TABLET ORAL at 17:27

## 2024-11-28 RX ADMIN — OXYCODONE HYDROCHLORIDE 15 MILLIGRAM(S): 30 TABLET ORAL at 05:00

## 2024-11-28 RX ADMIN — Medication 200 GRAM(S): at 13:54

## 2024-11-28 RX ADMIN — Medication 1 GRAM(S): at 17:10

## 2024-11-28 RX ADMIN — RIVAROXABAN 20 MILLIGRAM(S): 10 TABLET, FILM COATED ORAL at 17:32

## 2024-11-28 RX ADMIN — OXYCODONE HYDROCHLORIDE 15 MILLIGRAM(S): 30 TABLET ORAL at 17:10

## 2024-11-28 RX ADMIN — Medication 975 MILLIGRAM(S): at 21:42

## 2024-11-29 ENCOUNTER — TRANSCRIPTION ENCOUNTER (OUTPATIENT)
Age: 59
End: 2024-11-29

## 2024-11-29 LAB
ANION GAP SERPL CALC-SCNC: 9 MMOL/L — SIGNIFICANT CHANGE UP (ref 5–17)
BASOPHILS # BLD AUTO: 0.04 K/UL — SIGNIFICANT CHANGE UP (ref 0–0.2)
BASOPHILS NFR BLD AUTO: 0.5 % — SIGNIFICANT CHANGE UP (ref 0–2)
BUN SERPL-MCNC: 14 MG/DL — SIGNIFICANT CHANGE UP (ref 7–23)
CALCIUM SERPL-MCNC: 8.8 MG/DL — SIGNIFICANT CHANGE UP (ref 8.5–10.1)
CHLORIDE SERPL-SCNC: 97 MMOL/L — SIGNIFICANT CHANGE UP (ref 96–108)
CO2 SERPL-SCNC: 29 MMOL/L — SIGNIFICANT CHANGE UP (ref 22–31)
CREAT SERPL-MCNC: 1 MG/DL — SIGNIFICANT CHANGE UP (ref 0.5–1.3)
EGFR: 64 ML/MIN/1.73M2 — SIGNIFICANT CHANGE UP
EGFR: 64 ML/MIN/1.73M2 — SIGNIFICANT CHANGE UP
EOSINOPHIL # BLD AUTO: 0.21 K/UL — SIGNIFICANT CHANGE UP (ref 0–0.5)
EOSINOPHIL NFR BLD AUTO: 2.7 % — SIGNIFICANT CHANGE UP (ref 0–6)
GLUCOSE SERPL-MCNC: 242 MG/DL — HIGH (ref 70–99)
HCT VFR BLD CALC: 33.4 % — LOW (ref 34.5–45)
HGB BLD-MCNC: 11.5 G/DL — SIGNIFICANT CHANGE UP (ref 11.5–15.5)
IMM GRANULOCYTES NFR BLD AUTO: 0.7 % — SIGNIFICANT CHANGE UP (ref 0–0.9)
LYMPHOCYTES # BLD AUTO: 1.02 K/UL — SIGNIFICANT CHANGE UP (ref 1–3.3)
LYMPHOCYTES # BLD AUTO: 13.3 % — SIGNIFICANT CHANGE UP (ref 13–44)
MCHC RBC-ENTMCNC: 31.1 PG — SIGNIFICANT CHANGE UP (ref 27–34)
MCHC RBC-ENTMCNC: 34.4 G/DL — SIGNIFICANT CHANGE UP (ref 32–36)
MCV RBC AUTO: 90.3 FL — SIGNIFICANT CHANGE UP (ref 80–100)
MONOCYTES # BLD AUTO: 0.48 K/UL — SIGNIFICANT CHANGE UP (ref 0–0.9)
MONOCYTES NFR BLD AUTO: 6.3 % — SIGNIFICANT CHANGE UP (ref 2–14)
NEUTROPHILS # BLD AUTO: 5.85 K/UL — SIGNIFICANT CHANGE UP (ref 1.8–7.4)
NEUTROPHILS NFR BLD AUTO: 76.5 % — SIGNIFICANT CHANGE UP (ref 43–77)
NRBC # BLD: 0 /100 WBCS — SIGNIFICANT CHANGE UP (ref 0–0)
NRBC BLD-RTO: 0 /100 WBCS — SIGNIFICANT CHANGE UP (ref 0–0)
PLATELET # BLD AUTO: 220 K/UL — SIGNIFICANT CHANGE UP (ref 150–400)
POTASSIUM SERPL-MCNC: 4 MMOL/L — SIGNIFICANT CHANGE UP (ref 3.5–5.3)
POTASSIUM SERPL-SCNC: 4 MMOL/L — SIGNIFICANT CHANGE UP (ref 3.5–5.3)
RBC # BLD: 3.7 M/UL — LOW (ref 3.8–5.2)
RBC # FLD: 14.1 % — SIGNIFICANT CHANGE UP (ref 10.3–14.5)
SODIUM SERPL-SCNC: 135 MMOL/L — SIGNIFICANT CHANGE UP (ref 135–145)
WBC # BLD: 7.65 K/UL — SIGNIFICANT CHANGE UP (ref 3.8–10.5)
WBC # FLD AUTO: 7.65 K/UL — SIGNIFICANT CHANGE UP (ref 3.8–10.5)

## 2024-11-29 PROCEDURE — 99232 SBSQ HOSP IP/OBS MODERATE 35: CPT | Mod: 57

## 2024-11-29 PROCEDURE — 99233 SBSQ HOSP IP/OBS HIGH 50: CPT

## 2024-11-29 RX ORDER — NYSTATIN 100000 [USP'U]/G
1 CREAM TOPICAL
Refills: 0 | Status: DISCONTINUED | OUTPATIENT
Start: 2024-11-29 | End: 2024-12-07

## 2024-11-29 RX ADMIN — METOPROLOL SUCCINATE 100 MILLIGRAM(S): 50 TABLET, EXTENDED RELEASE ORAL at 06:20

## 2024-11-29 RX ADMIN — INSULIN LISPRO 2: 100 INJECTION, SOLUTION INTRAVENOUS; SUBCUTANEOUS at 08:43

## 2024-11-29 RX ADMIN — Medication 25 GRAM(S): at 13:43

## 2024-11-29 RX ADMIN — Medication 975 MILLIGRAM(S): at 21:51

## 2024-11-29 RX ADMIN — ROSUVASTATIN CALCIUM 5 MILLIGRAM(S): 20 TABLET, FILM COATED ORAL at 21:51

## 2024-11-29 RX ADMIN — Medication 25 GRAM(S): at 06:20

## 2024-11-29 RX ADMIN — POLYETHYLENE GLYCOL 3350 17 GRAM(S): 17 POWDER, FOR SOLUTION ORAL at 13:43

## 2024-11-29 RX ADMIN — Medication 975 MILLIGRAM(S): at 06:50

## 2024-11-29 RX ADMIN — INSULIN GLARGINE-YFGN 40 UNIT(S): 100 INJECTION, SOLUTION SUBCUTANEOUS at 08:44

## 2024-11-29 RX ADMIN — TORSEMIDE 20 MILLIGRAM(S): 20 TABLET ORAL at 23:34

## 2024-11-29 RX ADMIN — Medication 25 GRAM(S): at 22:00

## 2024-11-29 RX ADMIN — TORSEMIDE 20 MILLIGRAM(S): 20 TABLET ORAL at 17:33

## 2024-11-29 RX ADMIN — OXYCODONE HYDROCHLORIDE 15 MILLIGRAM(S): 30 TABLET ORAL at 17:33

## 2024-11-29 RX ADMIN — NYSTATIN 1 APPLICATION(S): 100000 CREAM TOPICAL at 17:33

## 2024-11-29 RX ADMIN — OXYCODONE HYDROCHLORIDE 15 MILLIGRAM(S): 30 TABLET ORAL at 06:20

## 2024-11-29 RX ADMIN — RIVAROXABAN 20 MILLIGRAM(S): 10 TABLET, FILM COATED ORAL at 17:32

## 2024-11-29 RX ADMIN — Medication 975 MILLIGRAM(S): at 22:51

## 2024-11-29 RX ADMIN — Medication 20 MILLIEQUIVALENT(S): at 13:43

## 2024-11-29 RX ADMIN — Medication 1 GRAM(S): at 13:43

## 2024-11-29 RX ADMIN — Medication 975 MILLIGRAM(S): at 13:44

## 2024-11-29 RX ADMIN — NYSTATIN 1 APPLICATION(S): 100000 CREAM TOPICAL at 08:44

## 2024-11-29 RX ADMIN — INSULIN LISPRO 1: 100 INJECTION, SOLUTION INTRAVENOUS; SUBCUTANEOUS at 17:32

## 2024-11-29 RX ADMIN — Medication 1 GRAM(S): at 23:34

## 2024-11-29 RX ADMIN — TORSEMIDE 20 MILLIGRAM(S): 20 TABLET ORAL at 00:34

## 2024-11-29 RX ADMIN — Medication 2 TABLET(S): at 21:52

## 2024-11-29 RX ADMIN — Medication 975 MILLIGRAM(S): at 06:20

## 2024-11-29 RX ADMIN — Medication 25 GRAM(S): at 00:35

## 2024-11-29 RX ADMIN — Medication 1 GRAM(S): at 00:35

## 2024-11-29 RX ADMIN — Medication 1 GRAM(S): at 06:20

## 2024-11-29 RX ADMIN — OXYCODONE HYDROCHLORIDE 10 MILLIGRAM(S): 30 TABLET ORAL at 23:34

## 2024-11-29 RX ADMIN — Medication 4 MILLIGRAM(S): at 09:02

## 2024-11-29 RX ADMIN — Medication 1 GRAM(S): at 17:32

## 2024-11-29 RX ADMIN — TORSEMIDE 20 MILLIGRAM(S): 20 TABLET ORAL at 08:43

## 2024-11-29 RX ADMIN — OXYCODONE HYDROCHLORIDE 15 MILLIGRAM(S): 30 TABLET ORAL at 06:50

## 2024-11-29 RX ADMIN — INSULIN LISPRO 2: 100 INJECTION, SOLUTION INTRAVENOUS; SUBCUTANEOUS at 13:44

## 2024-11-29 RX ADMIN — OXYCODONE HYDROCHLORIDE 10 MILLIGRAM(S): 30 TABLET ORAL at 09:03

## 2024-11-30 LAB
-  CLINDAMYCIN: SIGNIFICANT CHANGE UP
-  ERYTHROMYCIN: SIGNIFICANT CHANGE UP
-  GENTAMICIN: SIGNIFICANT CHANGE UP
-  OXACILLIN: SIGNIFICANT CHANGE UP
-  PENICILLIN: SIGNIFICANT CHANGE UP
-  RIFAMPIN: SIGNIFICANT CHANGE UP
-  TETRACYCLINE: SIGNIFICANT CHANGE UP
-  TRIMETHOPRIM/SULFAMETHOXAZOLE: SIGNIFICANT CHANGE UP
-  VANCOMYCIN: SIGNIFICANT CHANGE UP
ANION GAP SERPL CALC-SCNC: 10 MMOL/L — SIGNIFICANT CHANGE UP (ref 5–17)
BASOPHILS # BLD AUTO: 0.03 K/UL — SIGNIFICANT CHANGE UP (ref 0–0.2)
BASOPHILS NFR BLD AUTO: 0.5 % — SIGNIFICANT CHANGE UP (ref 0–2)
BUN SERPL-MCNC: 15 MG/DL — SIGNIFICANT CHANGE UP (ref 7–23)
CALCIUM SERPL-MCNC: 9 MG/DL — SIGNIFICANT CHANGE UP (ref 8.5–10.1)
CHLORIDE SERPL-SCNC: 101 MMOL/L — SIGNIFICANT CHANGE UP (ref 96–108)
CO2 SERPL-SCNC: 21 MMOL/L — LOW (ref 22–31)
CREAT SERPL-MCNC: 1.1 MG/DL — SIGNIFICANT CHANGE UP (ref 0.5–1.3)
EGFR: 58 ML/MIN/1.73M2 — LOW
EGFR: 58 ML/MIN/1.73M2 — LOW
EOSINOPHIL # BLD AUTO: 0.21 K/UL — SIGNIFICANT CHANGE UP (ref 0–0.5)
EOSINOPHIL NFR BLD AUTO: 3.7 % — SIGNIFICANT CHANGE UP (ref 0–6)
GLUCOSE SERPL-MCNC: 168 MG/DL — HIGH (ref 70–99)
HCT VFR BLD CALC: 34.2 % — LOW (ref 34.5–45)
HGB BLD-MCNC: 11.6 G/DL — SIGNIFICANT CHANGE UP (ref 11.5–15.5)
IMM GRANULOCYTES NFR BLD AUTO: 0.5 % — SIGNIFICANT CHANGE UP (ref 0–0.9)
LYMPHOCYTES # BLD AUTO: 0.96 K/UL — LOW (ref 1–3.3)
LYMPHOCYTES # BLD AUTO: 17.1 % — SIGNIFICANT CHANGE UP (ref 13–44)
MAGNESIUM SERPL-MCNC: 2 MG/DL — SIGNIFICANT CHANGE UP (ref 1.6–2.6)
MCHC RBC-ENTMCNC: 30.9 PG — SIGNIFICANT CHANGE UP (ref 27–34)
MCHC RBC-ENTMCNC: 33.9 G/DL — SIGNIFICANT CHANGE UP (ref 32–36)
MCV RBC AUTO: 91.2 FL — SIGNIFICANT CHANGE UP (ref 80–100)
METHOD TYPE: SIGNIFICANT CHANGE UP
MONOCYTES # BLD AUTO: 0.36 K/UL — SIGNIFICANT CHANGE UP (ref 0–0.9)
MONOCYTES NFR BLD AUTO: 6.4 % — SIGNIFICANT CHANGE UP (ref 2–14)
NEUTROPHILS # BLD AUTO: 4.04 K/UL — SIGNIFICANT CHANGE UP (ref 1.8–7.4)
NEUTROPHILS NFR BLD AUTO: 71.8 % — SIGNIFICANT CHANGE UP (ref 43–77)
NRBC # BLD: 0 /100 WBCS — SIGNIFICANT CHANGE UP (ref 0–0)
NRBC BLD-RTO: 0 /100 WBCS — SIGNIFICANT CHANGE UP (ref 0–0)
PLATELET # BLD AUTO: 215 K/UL — SIGNIFICANT CHANGE UP (ref 150–400)
POTASSIUM SERPL-MCNC: 4 MMOL/L — SIGNIFICANT CHANGE UP (ref 3.5–5.3)
POTASSIUM SERPL-MCNC: 5.9 MMOL/L — HIGH (ref 3.5–5.3)
POTASSIUM SERPL-SCNC: 4 MMOL/L — SIGNIFICANT CHANGE UP (ref 3.5–5.3)
POTASSIUM SERPL-SCNC: 5.9 MMOL/L — HIGH (ref 3.5–5.3)
RBC # BLD: 3.75 M/UL — LOW (ref 3.8–5.2)
RBC # FLD: 14.1 % — SIGNIFICANT CHANGE UP (ref 10.3–14.5)
SODIUM SERPL-SCNC: 132 MMOL/L — LOW (ref 135–145)
VANCOMYCIN TROUGH SERPL-MCNC: 10.6 UG/ML — SIGNIFICANT CHANGE UP (ref 10–20)
WBC # BLD: 5.63 K/UL — SIGNIFICANT CHANGE UP (ref 3.8–10.5)
WBC # FLD AUTO: 5.63 K/UL — SIGNIFICANT CHANGE UP (ref 3.8–10.5)

## 2024-11-30 PROCEDURE — 99233 SBSQ HOSP IP/OBS HIGH 50: CPT

## 2024-11-30 PROCEDURE — 99223 1ST HOSP IP/OBS HIGH 75: CPT

## 2024-11-30 RX ORDER — CEFAZOLIN SODIUM IN 0.9 % NACL 3 G/100 ML
2000 INTRAVENOUS SOLUTION, PIGGYBACK (ML) INTRAVENOUS EVERY 8 HOURS
Refills: 0 | Status: DISCONTINUED | OUTPATIENT
Start: 2024-11-30 | End: 2024-12-07

## 2024-11-30 RX ADMIN — OXYCODONE HYDROCHLORIDE 10 MILLIGRAM(S): 30 TABLET ORAL at 12:52

## 2024-11-30 RX ADMIN — RIVAROXABAN 20 MILLIGRAM(S): 10 TABLET, FILM COATED ORAL at 17:51

## 2024-11-30 RX ADMIN — Medication 100 MILLIGRAM(S): at 21:36

## 2024-11-30 RX ADMIN — Medication 975 MILLIGRAM(S): at 14:00

## 2024-11-30 RX ADMIN — TORSEMIDE 20 MILLIGRAM(S): 20 TABLET ORAL at 17:47

## 2024-11-30 RX ADMIN — OXYCODONE HYDROCHLORIDE 10 MILLIGRAM(S): 30 TABLET ORAL at 23:10

## 2024-11-30 RX ADMIN — TORSEMIDE 20 MILLIGRAM(S): 20 TABLET ORAL at 23:10

## 2024-11-30 RX ADMIN — METOPROLOL SUCCINATE 100 MILLIGRAM(S): 50 TABLET, EXTENDED RELEASE ORAL at 06:17

## 2024-11-30 RX ADMIN — Medication 2 TABLET(S): at 21:36

## 2024-11-30 RX ADMIN — Medication 25 GRAM(S): at 13:05

## 2024-11-30 RX ADMIN — Medication 1 GRAM(S): at 23:10

## 2024-11-30 RX ADMIN — Medication 1 GRAM(S): at 06:17

## 2024-11-30 RX ADMIN — TORSEMIDE 20 MILLIGRAM(S): 20 TABLET ORAL at 08:51

## 2024-11-30 RX ADMIN — Medication 975 MILLIGRAM(S): at 13:05

## 2024-11-30 RX ADMIN — OXYCODONE HYDROCHLORIDE 15 MILLIGRAM(S): 30 TABLET ORAL at 17:48

## 2024-11-30 RX ADMIN — NYSTATIN 1 APPLICATION(S): 100000 CREAM TOPICAL at 06:18

## 2024-11-30 RX ADMIN — OXYCODONE HYDROCHLORIDE 10 MILLIGRAM(S): 30 TABLET ORAL at 23:40

## 2024-11-30 RX ADMIN — Medication 40 MILLIGRAM(S): at 17:48

## 2024-11-30 RX ADMIN — INSULIN LISPRO 2: 100 INJECTION, SOLUTION INTRAVENOUS; SUBCUTANEOUS at 08:51

## 2024-11-30 RX ADMIN — Medication 975 MILLIGRAM(S): at 22:07

## 2024-11-30 RX ADMIN — Medication 1 GRAM(S): at 17:48

## 2024-11-30 RX ADMIN — Medication 975 MILLIGRAM(S): at 21:37

## 2024-11-30 RX ADMIN — OXYCODONE HYDROCHLORIDE 15 MILLIGRAM(S): 30 TABLET ORAL at 07:17

## 2024-11-30 RX ADMIN — INSULIN LISPRO 1: 100 INJECTION, SOLUTION INTRAVENOUS; SUBCUTANEOUS at 12:55

## 2024-11-30 RX ADMIN — Medication 975 MILLIGRAM(S): at 06:17

## 2024-11-30 RX ADMIN — INSULIN LISPRO 1: 100 INJECTION, SOLUTION INTRAVENOUS; SUBCUTANEOUS at 17:51

## 2024-11-30 RX ADMIN — OXYCODONE HYDROCHLORIDE 15 MILLIGRAM(S): 30 TABLET ORAL at 06:17

## 2024-11-30 RX ADMIN — Medication 25 GRAM(S): at 06:17

## 2024-11-30 RX ADMIN — POLYETHYLENE GLYCOL 3350 17 GRAM(S): 17 POWDER, FOR SOLUTION ORAL at 12:54

## 2024-11-30 RX ADMIN — NYSTATIN 1 APPLICATION(S): 100000 CREAM TOPICAL at 17:53

## 2024-11-30 RX ADMIN — INSULIN GLARGINE-YFGN 40 UNIT(S): 100 INJECTION, SOLUTION SUBCUTANEOUS at 08:52

## 2024-11-30 RX ADMIN — Medication 975 MILLIGRAM(S): at 07:17

## 2024-11-30 RX ADMIN — Medication 1 GRAM(S): at 12:54

## 2024-11-30 RX ADMIN — OXYCODONE HYDROCHLORIDE 10 MILLIGRAM(S): 30 TABLET ORAL at 00:34

## 2024-11-30 RX ADMIN — OXYCODONE HYDROCHLORIDE 10 MILLIGRAM(S): 30 TABLET ORAL at 13:50

## 2024-11-30 RX ADMIN — ROSUVASTATIN CALCIUM 5 MILLIGRAM(S): 20 TABLET, FILM COATED ORAL at 21:36

## 2024-12-01 LAB
ANION GAP SERPL CALC-SCNC: 8 MMOL/L — SIGNIFICANT CHANGE UP (ref 5–17)
BASOPHILS # BLD AUTO: 0.04 K/UL — SIGNIFICANT CHANGE UP (ref 0–0.2)
BASOPHILS NFR BLD AUTO: 0.7 % — SIGNIFICANT CHANGE UP (ref 0–2)
BUN SERPL-MCNC: 14 MG/DL — SIGNIFICANT CHANGE UP (ref 7–23)
CALCIUM SERPL-MCNC: 9.1 MG/DL — SIGNIFICANT CHANGE UP (ref 8.5–10.1)
CHLORIDE SERPL-SCNC: 100 MMOL/L — SIGNIFICANT CHANGE UP (ref 96–108)
CO2 SERPL-SCNC: 30 MMOL/L — SIGNIFICANT CHANGE UP (ref 22–31)
CREAT SERPL-MCNC: 0.95 MG/DL — SIGNIFICANT CHANGE UP (ref 0.5–1.3)
EGFR: 69 ML/MIN/1.73M2 — SIGNIFICANT CHANGE UP
EGFR: 69 ML/MIN/1.73M2 — SIGNIFICANT CHANGE UP
EOSINOPHIL # BLD AUTO: 0.21 K/UL — SIGNIFICANT CHANGE UP (ref 0–0.5)
EOSINOPHIL NFR BLD AUTO: 3.6 % — SIGNIFICANT CHANGE UP (ref 0–6)
GLUCOSE SERPL-MCNC: 192 MG/DL — HIGH (ref 70–99)
HCT VFR BLD CALC: 34.1 % — LOW (ref 34.5–45)
HCT VFR BLD CALC: 35.7 % — SIGNIFICANT CHANGE UP (ref 34.5–45)
HGB BLD-MCNC: 11.8 G/DL — SIGNIFICANT CHANGE UP (ref 11.5–15.5)
HGB BLD-MCNC: 12.1 G/DL — SIGNIFICANT CHANGE UP (ref 11.5–15.5)
IMM GRANULOCYTES NFR BLD AUTO: 0.5 % — SIGNIFICANT CHANGE UP (ref 0–0.9)
LYMPHOCYTES # BLD AUTO: 1 K/UL — SIGNIFICANT CHANGE UP (ref 1–3.3)
LYMPHOCYTES # BLD AUTO: 17.1 % — SIGNIFICANT CHANGE UP (ref 13–44)
MAGNESIUM SERPL-MCNC: 2.6 MG/DL — SIGNIFICANT CHANGE UP (ref 1.6–2.6)
MCHC RBC-ENTMCNC: 31.3 PG — SIGNIFICANT CHANGE UP (ref 27–34)
MCHC RBC-ENTMCNC: 34.6 G/DL — SIGNIFICANT CHANGE UP (ref 32–36)
MCV RBC AUTO: 90.5 FL — SIGNIFICANT CHANGE UP (ref 80–100)
MONOCYTES # BLD AUTO: 0.41 K/UL — SIGNIFICANT CHANGE UP (ref 0–0.9)
MONOCYTES NFR BLD AUTO: 7 % — SIGNIFICANT CHANGE UP (ref 2–14)
NEUTROPHILS # BLD AUTO: 4.17 K/UL — SIGNIFICANT CHANGE UP (ref 1.8–7.4)
NEUTROPHILS NFR BLD AUTO: 71.1 % — SIGNIFICANT CHANGE UP (ref 43–77)
NRBC # BLD: 0 /100 WBCS — SIGNIFICANT CHANGE UP (ref 0–0)
NRBC BLD-RTO: 0 /100 WBCS — SIGNIFICANT CHANGE UP (ref 0–0)
PLATELET # BLD AUTO: 250 K/UL — SIGNIFICANT CHANGE UP (ref 150–400)
POTASSIUM SERPL-MCNC: 3.9 MMOL/L — SIGNIFICANT CHANGE UP (ref 3.5–5.3)
POTASSIUM SERPL-SCNC: 3.9 MMOL/L — SIGNIFICANT CHANGE UP (ref 3.5–5.3)
RBC # BLD: 3.77 M/UL — LOW (ref 3.8–5.2)
RBC # FLD: 14 % — SIGNIFICANT CHANGE UP (ref 10.3–14.5)
SODIUM SERPL-SCNC: 138 MMOL/L — SIGNIFICANT CHANGE UP (ref 135–145)
WBC # BLD: 5.86 K/UL — SIGNIFICANT CHANGE UP (ref 3.8–10.5)
WBC # FLD AUTO: 5.86 K/UL — SIGNIFICANT CHANGE UP (ref 3.8–10.5)

## 2024-12-01 PROCEDURE — 99233 SBSQ HOSP IP/OBS HIGH 50: CPT

## 2024-12-01 PROCEDURE — 93010 ELECTROCARDIOGRAM REPORT: CPT

## 2024-12-01 RX ORDER — DEXTROSE 50 % IN WATER 50 %
12.5 SYRINGE (ML) INTRAVENOUS ONCE
Refills: 0 | Status: DISCONTINUED | OUTPATIENT
Start: 2024-12-01 | End: 2024-12-01

## 2024-12-01 RX ORDER — DEXTROSE 50 % IN WATER 50 %
25 SYRINGE (ML) INTRAVENOUS ONCE
Refills: 0 | Status: DISCONTINUED | OUTPATIENT
Start: 2024-12-01 | End: 2024-12-01

## 2024-12-01 RX ORDER — INSULIN LISPRO 100 U/ML
INJECTION, SOLUTION INTRAVENOUS; SUBCUTANEOUS EVERY 6 HOURS
Refills: 0 | Status: DISCONTINUED | OUTPATIENT
Start: 2024-12-01 | End: 2024-12-02

## 2024-12-01 RX ORDER — DEXTROSE 50 % IN WATER 50 %
15 SYRINGE (ML) INTRAVENOUS ONCE
Refills: 0 | Status: DISCONTINUED | OUTPATIENT
Start: 2024-12-01 | End: 2024-12-01

## 2024-12-01 RX ORDER — DEXTROSE 50 % IN WATER 50 %
12.5 SYRINGE (ML) INTRAVENOUS ONCE
Refills: 0 | Status: DISCONTINUED | OUTPATIENT
Start: 2024-12-01 | End: 2024-12-07

## 2024-12-01 RX ORDER — DEXTROSE 50 % IN WATER 50 %
25 SYRINGE (ML) INTRAVENOUS ONCE
Refills: 0 | Status: DISCONTINUED | OUTPATIENT
Start: 2024-12-01 | End: 2024-12-07

## 2024-12-01 RX ORDER — SODIUM CHLORIDE 9 G/1000ML
1000 INJECTION, SOLUTION INTRAVENOUS
Refills: 0 | Status: DISCONTINUED | OUTPATIENT
Start: 2024-12-01 | End: 2024-12-07

## 2024-12-01 RX ORDER — DEXTROSE 50 % IN WATER 50 %
15 SYRINGE (ML) INTRAVENOUS ONCE
Refills: 0 | Status: DISCONTINUED | OUTPATIENT
Start: 2024-12-01 | End: 2024-12-07

## 2024-12-01 RX ORDER — INSULIN LISPRO 100 U/ML
INJECTION, SOLUTION INTRAVENOUS; SUBCUTANEOUS
Refills: 0 | Status: DISCONTINUED | OUTPATIENT
Start: 2024-12-01 | End: 2024-12-01

## 2024-12-01 RX ORDER — GLUCAGON 3 MG/1
1 POWDER NASAL ONCE
Refills: 0 | Status: DISCONTINUED | OUTPATIENT
Start: 2024-12-01 | End: 2024-12-01

## 2024-12-01 RX ORDER — INSULIN GLARGINE-YFGN 100 [IU]/ML
40 INJECTION, SOLUTION SUBCUTANEOUS AT BEDTIME
Refills: 0 | Status: DISCONTINUED | OUTPATIENT
Start: 2024-12-02 | End: 2024-12-02

## 2024-12-01 RX ORDER — GLUCAGON 3 MG/1
1 POWDER NASAL ONCE
Refills: 0 | Status: DISCONTINUED | OUTPATIENT
Start: 2024-12-01 | End: 2024-12-07

## 2024-12-01 RX ORDER — SODIUM CHLORIDE 9 G/1000ML
1000 INJECTION, SOLUTION INTRAVENOUS
Refills: 0 | Status: DISCONTINUED | OUTPATIENT
Start: 2024-12-01 | End: 2024-12-01

## 2024-12-01 RX ORDER — INSULIN GLARGINE-YFGN 100 [IU]/ML
35 INJECTION, SOLUTION SUBCUTANEOUS EVERY MORNING
Refills: 0 | Status: DISCONTINUED | OUTPATIENT
Start: 2024-12-02 | End: 2024-12-07

## 2024-12-01 RX ADMIN — INSULIN LISPRO 2: 100 INJECTION, SOLUTION INTRAVENOUS; SUBCUTANEOUS at 18:17

## 2024-12-01 RX ADMIN — INSULIN GLARGINE-YFGN 40 UNIT(S): 100 INJECTION, SOLUTION SUBCUTANEOUS at 08:21

## 2024-12-01 RX ADMIN — TORSEMIDE 20 MILLIGRAM(S): 20 TABLET ORAL at 08:21

## 2024-12-01 RX ADMIN — Medication 975 MILLIGRAM(S): at 21:23

## 2024-12-01 RX ADMIN — ROSUVASTATIN CALCIUM 5 MILLIGRAM(S): 20 TABLET, FILM COATED ORAL at 21:23

## 2024-12-01 RX ADMIN — POLYETHYLENE GLYCOL 3350 17 GRAM(S): 17 POWDER, FOR SOLUTION ORAL at 11:59

## 2024-12-01 RX ADMIN — Medication 2 TABLET(S): at 21:23

## 2024-12-01 RX ADMIN — NYSTATIN 1 APPLICATION(S): 100000 CREAM TOPICAL at 18:18

## 2024-12-01 RX ADMIN — METOPROLOL SUCCINATE 100 MILLIGRAM(S): 50 TABLET, EXTENDED RELEASE ORAL at 06:03

## 2024-12-01 RX ADMIN — INSULIN LISPRO 2: 100 INJECTION, SOLUTION INTRAVENOUS; SUBCUTANEOUS at 12:36

## 2024-12-01 RX ADMIN — TORSEMIDE 20 MILLIGRAM(S): 20 TABLET ORAL at 16:05

## 2024-12-01 RX ADMIN — Medication 40 MILLIGRAM(S): at 06:03

## 2024-12-01 RX ADMIN — INSULIN LISPRO 1: 100 INJECTION, SOLUTION INTRAVENOUS; SUBCUTANEOUS at 08:21

## 2024-12-01 RX ADMIN — Medication 975 MILLIGRAM(S): at 13:50

## 2024-12-01 RX ADMIN — Medication 975 MILLIGRAM(S): at 06:33

## 2024-12-01 RX ADMIN — OXYCODONE HYDROCHLORIDE 15 MILLIGRAM(S): 30 TABLET ORAL at 19:17

## 2024-12-01 RX ADMIN — Medication 975 MILLIGRAM(S): at 06:03

## 2024-12-01 RX ADMIN — OXYCODONE HYDROCHLORIDE 15 MILLIGRAM(S): 30 TABLET ORAL at 18:17

## 2024-12-01 RX ADMIN — Medication 40 MILLIGRAM(S): at 18:17

## 2024-12-01 RX ADMIN — OXYCODONE HYDROCHLORIDE 15 MILLIGRAM(S): 30 TABLET ORAL at 06:33

## 2024-12-01 RX ADMIN — Medication 1 GRAM(S): at 11:59

## 2024-12-01 RX ADMIN — Medication 1 GRAM(S): at 18:17

## 2024-12-01 RX ADMIN — Medication 975 MILLIGRAM(S): at 14:50

## 2024-12-01 RX ADMIN — OXYCODONE HYDROCHLORIDE 10 MILLIGRAM(S): 30 TABLET ORAL at 11:59

## 2024-12-01 RX ADMIN — Medication 100 MILLIGRAM(S): at 06:02

## 2024-12-01 RX ADMIN — Medication 100 MILLIGRAM(S): at 21:22

## 2024-12-01 RX ADMIN — NYSTATIN 1 APPLICATION(S): 100000 CREAM TOPICAL at 06:03

## 2024-12-01 RX ADMIN — OXYCODONE HYDROCHLORIDE 10 MILLIGRAM(S): 30 TABLET ORAL at 12:59

## 2024-12-01 RX ADMIN — Medication 4 MILLIGRAM(S): at 18:18

## 2024-12-01 RX ADMIN — Medication 100 MILLIGRAM(S): at 13:50

## 2024-12-01 RX ADMIN — Medication 975 MILLIGRAM(S): at 21:53

## 2024-12-01 RX ADMIN — Medication 1 GRAM(S): at 06:03

## 2024-12-01 RX ADMIN — OXYCODONE HYDROCHLORIDE 15 MILLIGRAM(S): 30 TABLET ORAL at 06:03

## 2024-12-02 LAB
ANION GAP SERPL CALC-SCNC: 4 MMOL/L — LOW (ref 5–17)
APTT BLD: 31.5 SEC — SIGNIFICANT CHANGE UP (ref 24.5–35.6)
BASOPHILS # BLD AUTO: 0.05 K/UL — SIGNIFICANT CHANGE UP (ref 0–0.2)
BASOPHILS NFR BLD AUTO: 0.9 % — SIGNIFICANT CHANGE UP (ref 0–2)
BUN SERPL-MCNC: 15 MG/DL — SIGNIFICANT CHANGE UP (ref 7–23)
CALCIUM SERPL-MCNC: 9.4 MG/DL — SIGNIFICANT CHANGE UP (ref 8.5–10.1)
CHLORIDE SERPL-SCNC: 103 MMOL/L — SIGNIFICANT CHANGE UP (ref 96–108)
CO2 SERPL-SCNC: 30 MMOL/L — SIGNIFICANT CHANGE UP (ref 22–31)
CREAT SERPL-MCNC: 1 MG/DL — SIGNIFICANT CHANGE UP (ref 0.5–1.3)
CULTURE RESULTS: SIGNIFICANT CHANGE UP
CULTURE RESULTS: SIGNIFICANT CHANGE UP
EGFR: 64 ML/MIN/1.73M2 — SIGNIFICANT CHANGE UP
EGFR: 64 ML/MIN/1.73M2 — SIGNIFICANT CHANGE UP
EOSINOPHIL # BLD AUTO: 0.23 K/UL — SIGNIFICANT CHANGE UP (ref 0–0.5)
EOSINOPHIL NFR BLD AUTO: 4.2 % — SIGNIFICANT CHANGE UP (ref 0–6)
GLUCOSE SERPL-MCNC: 179 MG/DL — HIGH (ref 70–99)
HCT VFR BLD CALC: 35.3 % — SIGNIFICANT CHANGE UP (ref 34.5–45)
HGB BLD-MCNC: 12.2 G/DL — SIGNIFICANT CHANGE UP (ref 11.5–15.5)
IMM GRANULOCYTES NFR BLD AUTO: 0.5 % — SIGNIFICANT CHANGE UP (ref 0–0.9)
INR BLD: 1.29 RATIO — HIGH (ref 0.85–1.16)
LYMPHOCYTES # BLD AUTO: 1.07 K/UL — SIGNIFICANT CHANGE UP (ref 1–3.3)
LYMPHOCYTES # BLD AUTO: 19.4 % — SIGNIFICANT CHANGE UP (ref 13–44)
MCHC RBC-ENTMCNC: 31.3 PG — SIGNIFICANT CHANGE UP (ref 27–34)
MCHC RBC-ENTMCNC: 34.6 G/DL — SIGNIFICANT CHANGE UP (ref 32–36)
MCV RBC AUTO: 90.5 FL — SIGNIFICANT CHANGE UP (ref 80–100)
MONOCYTES # BLD AUTO: 0.43 K/UL — SIGNIFICANT CHANGE UP (ref 0–0.9)
MONOCYTES NFR BLD AUTO: 7.8 % — SIGNIFICANT CHANGE UP (ref 2–14)
NEUTROPHILS # BLD AUTO: 3.7 K/UL — SIGNIFICANT CHANGE UP (ref 1.8–7.4)
NEUTROPHILS NFR BLD AUTO: 67.2 % — SIGNIFICANT CHANGE UP (ref 43–77)
NRBC # BLD: 0 /100 WBCS — SIGNIFICANT CHANGE UP (ref 0–0)
NRBC BLD-RTO: 0 /100 WBCS — SIGNIFICANT CHANGE UP (ref 0–0)
PLATELET # BLD AUTO: 262 K/UL — SIGNIFICANT CHANGE UP (ref 150–400)
POTASSIUM SERPL-MCNC: 3.7 MMOL/L — SIGNIFICANT CHANGE UP (ref 3.5–5.3)
POTASSIUM SERPL-SCNC: 3.7 MMOL/L — SIGNIFICANT CHANGE UP (ref 3.5–5.3)
PROTHROM AB SERPL-ACNC: 15.2 SEC — HIGH (ref 9.9–13.4)
RBC # BLD: 3.9 M/UL — SIGNIFICANT CHANGE UP (ref 3.8–5.2)
RBC # FLD: 14.1 % — SIGNIFICANT CHANGE UP (ref 10.3–14.5)
SODIUM SERPL-SCNC: 137 MMOL/L — SIGNIFICANT CHANGE UP (ref 135–145)
SPECIMEN SOURCE: SIGNIFICANT CHANGE UP
SPECIMEN SOURCE: SIGNIFICANT CHANGE UP
WBC # BLD: 5.51 K/UL — SIGNIFICANT CHANGE UP (ref 3.8–10.5)
WBC # FLD AUTO: 5.51 K/UL — SIGNIFICANT CHANGE UP (ref 3.8–10.5)

## 2024-12-02 PROCEDURE — 88304 TISSUE EXAM BY PATHOLOGIST: CPT | Mod: 26

## 2024-12-02 PROCEDURE — 99232 SBSQ HOSP IP/OBS MODERATE 35: CPT

## 2024-12-02 PROCEDURE — 73630 X-RAY EXAM OF FOOT: CPT | Mod: 26,RT

## 2024-12-02 PROCEDURE — 11044 DBRDMT BONE 1ST 20 SQ CM/<: CPT | Mod: T5

## 2024-12-02 PROCEDURE — 88311 DECALCIFY TISSUE: CPT | Mod: 26

## 2024-12-02 PROCEDURE — 99233 SBSQ HOSP IP/OBS HIGH 50: CPT

## 2024-12-02 DEVICE — SURGICEL NU-KNIT 6 X 9": Type: IMPLANTABLE DEVICE | Site: RIGHT | Status: FUNCTIONAL

## 2024-12-02 RX ORDER — INSULIN LISPRO 100 U/ML
INJECTION, SOLUTION INTRAVENOUS; SUBCUTANEOUS
Refills: 0 | Status: DISCONTINUED | OUTPATIENT
Start: 2024-12-02 | End: 2024-12-07

## 2024-12-02 RX ORDER — HYDROMORPHONE/SOD CHLOR,ISO/PF 2 MG/10 ML
0.5 SYRINGE (ML) INJECTION
Refills: 0 | Status: DISCONTINUED | OUTPATIENT
Start: 2024-12-02 | End: 2024-12-02

## 2024-12-02 RX ORDER — ONDANSETRON HCL/PF 4 MG/2 ML
4 VIAL (ML) INJECTION ONCE
Refills: 0 | Status: DISCONTINUED | OUTPATIENT
Start: 2024-12-02 | End: 2024-12-02

## 2024-12-02 RX ORDER — SODIUM CHLORIDE 9 G/1000ML
1000 INJECTION, SOLUTION INTRAVENOUS
Refills: 0 | Status: DISCONTINUED | OUTPATIENT
Start: 2024-12-02 | End: 2024-12-02

## 2024-12-02 RX ORDER — INSULIN LISPRO 100 U/ML
2 INJECTION, SOLUTION INTRAVENOUS; SUBCUTANEOUS ONCE
Refills: 0 | Status: DISCONTINUED | OUTPATIENT
Start: 2024-12-02 | End: 2024-12-07

## 2024-12-02 RX ADMIN — Medication 975 MILLIGRAM(S): at 21:03

## 2024-12-02 RX ADMIN — INSULIN LISPRO 2: 100 INJECTION, SOLUTION INTRAVENOUS; SUBCUTANEOUS at 17:56

## 2024-12-02 RX ADMIN — OXYCODONE HYDROCHLORIDE 10 MILLIGRAM(S): 30 TABLET ORAL at 12:08

## 2024-12-02 RX ADMIN — SODIUM CHLORIDE 75 MILLILITER(S): 9 INJECTION, SOLUTION INTRAVENOUS at 15:46

## 2024-12-02 RX ADMIN — METOPROLOL SUCCINATE 100 MILLIGRAM(S): 50 TABLET, EXTENDED RELEASE ORAL at 05:54

## 2024-12-02 RX ADMIN — Medication 1 GRAM(S): at 01:00

## 2024-12-02 RX ADMIN — Medication 975 MILLIGRAM(S): at 22:03

## 2024-12-02 RX ADMIN — TORSEMIDE 20 MILLIGRAM(S): 20 TABLET ORAL at 17:00

## 2024-12-02 RX ADMIN — NYSTATIN 1 APPLICATION(S): 100000 CREAM TOPICAL at 05:55

## 2024-12-02 RX ADMIN — TORSEMIDE 20 MILLIGRAM(S): 20 TABLET ORAL at 01:00

## 2024-12-02 RX ADMIN — OXYCODONE HYDROCHLORIDE 10 MILLIGRAM(S): 30 TABLET ORAL at 22:03

## 2024-12-02 RX ADMIN — Medication 1 GRAM(S): at 05:54

## 2024-12-02 RX ADMIN — Medication 975 MILLIGRAM(S): at 05:54

## 2024-12-02 RX ADMIN — OXYCODONE HYDROCHLORIDE 15 MILLIGRAM(S): 30 TABLET ORAL at 05:54

## 2024-12-02 RX ADMIN — Medication 40 MILLIGRAM(S): at 05:54

## 2024-12-02 RX ADMIN — OXYCODONE HYDROCHLORIDE 15 MILLIGRAM(S): 30 TABLET ORAL at 17:57

## 2024-12-02 RX ADMIN — INSULIN LISPRO 1: 100 INJECTION, SOLUTION INTRAVENOUS; SUBCUTANEOUS at 05:54

## 2024-12-02 RX ADMIN — OXYCODONE HYDROCHLORIDE 10 MILLIGRAM(S): 30 TABLET ORAL at 01:00

## 2024-12-02 RX ADMIN — Medication 100 MILLIGRAM(S): at 05:55

## 2024-12-02 RX ADMIN — Medication 100 MILLIGRAM(S): at 21:03

## 2024-12-02 RX ADMIN — Medication 60 MILLILITER(S): at 11:59

## 2024-12-02 RX ADMIN — TORSEMIDE 20 MILLIGRAM(S): 20 TABLET ORAL at 08:46

## 2024-12-02 RX ADMIN — Medication 60 MILLILITER(S): at 17:58

## 2024-12-02 RX ADMIN — Medication 1 GRAM(S): at 17:57

## 2024-12-02 RX ADMIN — OXYCODONE HYDROCHLORIDE 15 MILLIGRAM(S): 30 TABLET ORAL at 18:57

## 2024-12-02 RX ADMIN — OXYCODONE HYDROCHLORIDE 10 MILLIGRAM(S): 30 TABLET ORAL at 13:08

## 2024-12-02 RX ADMIN — Medication 975 MILLIGRAM(S): at 06:24

## 2024-12-02 RX ADMIN — ROSUVASTATIN CALCIUM 5 MILLIGRAM(S): 20 TABLET, FILM COATED ORAL at 21:03

## 2024-12-02 RX ADMIN — INSULIN GLARGINE-YFGN 35 UNIT(S): 100 INJECTION, SOLUTION SUBCUTANEOUS at 08:47

## 2024-12-02 RX ADMIN — INSULIN LISPRO 2: 100 INJECTION, SOLUTION INTRAVENOUS; SUBCUTANEOUS at 01:10

## 2024-12-02 RX ADMIN — Medication 2 TABLET(S): at 21:03

## 2024-12-02 RX ADMIN — OXYCODONE HYDROCHLORIDE 10 MILLIGRAM(S): 30 TABLET ORAL at 01:30

## 2024-12-02 RX ADMIN — OXYCODONE HYDROCHLORIDE 15 MILLIGRAM(S): 30 TABLET ORAL at 06:24

## 2024-12-02 RX ADMIN — INSULIN LISPRO 6: 100 INJECTION, SOLUTION INTRAVENOUS; SUBCUTANEOUS at 22:34

## 2024-12-02 RX ADMIN — Medication 1 GRAM(S): at 11:59

## 2024-12-02 RX ADMIN — OXYCODONE HYDROCHLORIDE 10 MILLIGRAM(S): 30 TABLET ORAL at 21:03

## 2024-12-02 RX ADMIN — Medication 40 MILLIGRAM(S): at 17:57

## 2024-12-03 LAB
ANION GAP SERPL CALC-SCNC: 5 MMOL/L — SIGNIFICANT CHANGE UP (ref 5–17)
BASOPHILS # BLD AUTO: 0.03 K/UL — SIGNIFICANT CHANGE UP (ref 0–0.2)
BASOPHILS NFR BLD AUTO: 0.4 % — SIGNIFICANT CHANGE UP (ref 0–2)
BUN SERPL-MCNC: 18 MG/DL — SIGNIFICANT CHANGE UP (ref 7–23)
CALCIUM SERPL-MCNC: 9.6 MG/DL — SIGNIFICANT CHANGE UP (ref 8.5–10.1)
CHLORIDE SERPL-SCNC: 100 MMOL/L — SIGNIFICANT CHANGE UP (ref 96–108)
CO2 SERPL-SCNC: 32 MMOL/L — HIGH (ref 22–31)
CREAT SERPL-MCNC: 1 MG/DL — SIGNIFICANT CHANGE UP (ref 0.5–1.3)
CULTURE RESULTS: ABNORMAL
EGFR: 64 ML/MIN/1.73M2 — SIGNIFICANT CHANGE UP
EGFR: 64 ML/MIN/1.73M2 — SIGNIFICANT CHANGE UP
EOSINOPHIL # BLD AUTO: 0.1 K/UL — SIGNIFICANT CHANGE UP (ref 0–0.5)
EOSINOPHIL NFR BLD AUTO: 1.2 % — SIGNIFICANT CHANGE UP (ref 0–6)
GLUCOSE SERPL-MCNC: 199 MG/DL — HIGH (ref 70–99)
GRAM STN FLD: SIGNIFICANT CHANGE UP
GRAM STN FLD: SIGNIFICANT CHANGE UP
HCT VFR BLD CALC: 35.7 % — SIGNIFICANT CHANGE UP (ref 34.5–45)
HGB BLD-MCNC: 12.5 G/DL — SIGNIFICANT CHANGE UP (ref 11.5–15.5)
IMM GRANULOCYTES NFR BLD AUTO: 0.6 % — SIGNIFICANT CHANGE UP (ref 0–0.9)
LYMPHOCYTES # BLD AUTO: 1.19 K/UL — SIGNIFICANT CHANGE UP (ref 1–3.3)
LYMPHOCYTES # BLD AUTO: 14.3 % — SIGNIFICANT CHANGE UP (ref 13–44)
MAGNESIUM SERPL-MCNC: 2.3 MG/DL — SIGNIFICANT CHANGE UP (ref 1.6–2.6)
MCHC RBC-ENTMCNC: 31.4 PG — SIGNIFICANT CHANGE UP (ref 27–34)
MCHC RBC-ENTMCNC: 35 G/DL — SIGNIFICANT CHANGE UP (ref 32–36)
MCV RBC AUTO: 89.7 FL — SIGNIFICANT CHANGE UP (ref 80–100)
MONOCYTES # BLD AUTO: 0.52 K/UL — SIGNIFICANT CHANGE UP (ref 0–0.9)
MONOCYTES NFR BLD AUTO: 6.3 % — SIGNIFICANT CHANGE UP (ref 2–14)
NEUTROPHILS # BLD AUTO: 6.42 K/UL — SIGNIFICANT CHANGE UP (ref 1.8–7.4)
NEUTROPHILS NFR BLD AUTO: 77.2 % — HIGH (ref 43–77)
NRBC # BLD: 0 /100 WBCS — SIGNIFICANT CHANGE UP (ref 0–0)
NRBC BLD-RTO: 0 /100 WBCS — SIGNIFICANT CHANGE UP (ref 0–0)
ORGANISM # SPEC MICROSCOPIC CNT: ABNORMAL
ORGANISM # SPEC MICROSCOPIC CNT: SIGNIFICANT CHANGE UP
PLATELET # BLD AUTO: 288 K/UL — SIGNIFICANT CHANGE UP (ref 150–400)
POTASSIUM SERPL-MCNC: 3.7 MMOL/L — SIGNIFICANT CHANGE UP (ref 3.5–5.3)
POTASSIUM SERPL-SCNC: 3.7 MMOL/L — SIGNIFICANT CHANGE UP (ref 3.5–5.3)
RBC # BLD: 3.98 M/UL — SIGNIFICANT CHANGE UP (ref 3.8–5.2)
RBC # FLD: 13.5 % — SIGNIFICANT CHANGE UP (ref 10.3–14.5)
SODIUM SERPL-SCNC: 137 MMOL/L — SIGNIFICANT CHANGE UP (ref 135–145)
SPECIMEN SOURCE: SIGNIFICANT CHANGE UP
WBC # BLD: 8.31 K/UL — SIGNIFICANT CHANGE UP (ref 3.8–10.5)
WBC # FLD AUTO: 8.31 K/UL — SIGNIFICANT CHANGE UP (ref 3.8–10.5)

## 2024-12-03 PROCEDURE — 99233 SBSQ HOSP IP/OBS HIGH 50: CPT

## 2024-12-03 PROCEDURE — 99232 SBSQ HOSP IP/OBS MODERATE 35: CPT

## 2024-12-03 RX ORDER — RIVAROXABAN 10 MG/1
20 TABLET, FILM COATED ORAL
Refills: 0 | Status: DISCONTINUED | OUTPATIENT
Start: 2024-12-03 | End: 2024-12-07

## 2024-12-03 RX ORDER — METOPROLOL SUCCINATE 50 MG/1
100 TABLET, EXTENDED RELEASE ORAL DAILY
Refills: 0 | Status: DISCONTINUED | OUTPATIENT
Start: 2024-12-03 | End: 2024-12-07

## 2024-12-03 RX ORDER — TORSEMIDE 20 MG/1
20 TABLET ORAL
Refills: 0 | Status: DISCONTINUED | OUTPATIENT
Start: 2024-12-03 | End: 2024-12-07

## 2024-12-03 RX ADMIN — POLYETHYLENE GLYCOL 3350 17 GRAM(S): 17 POWDER, FOR SOLUTION ORAL at 13:09

## 2024-12-03 RX ADMIN — OXYCODONE HYDROCHLORIDE 15 MILLIGRAM(S): 30 TABLET ORAL at 17:24

## 2024-12-03 RX ADMIN — INSULIN LISPRO 2: 100 INJECTION, SOLUTION INTRAVENOUS; SUBCUTANEOUS at 22:00

## 2024-12-03 RX ADMIN — TORSEMIDE 20 MILLIGRAM(S): 20 TABLET ORAL at 00:05

## 2024-12-03 RX ADMIN — TORSEMIDE 20 MILLIGRAM(S): 20 TABLET ORAL at 17:31

## 2024-12-03 RX ADMIN — Medication 975 MILLIGRAM(S): at 21:27

## 2024-12-03 RX ADMIN — OXYCODONE HYDROCHLORIDE 15 MILLIGRAM(S): 30 TABLET ORAL at 06:17

## 2024-12-03 RX ADMIN — Medication 1 GRAM(S): at 17:25

## 2024-12-03 RX ADMIN — Medication 975 MILLIGRAM(S): at 06:17

## 2024-12-03 RX ADMIN — NYSTATIN 1 APPLICATION(S): 100000 CREAM TOPICAL at 17:43

## 2024-12-03 RX ADMIN — TORSEMIDE 20 MILLIGRAM(S): 20 TABLET ORAL at 10:17

## 2024-12-03 RX ADMIN — Medication 100 MILLIGRAM(S): at 21:28

## 2024-12-03 RX ADMIN — INSULIN LISPRO 4: 100 INJECTION, SOLUTION INTRAVENOUS; SUBCUTANEOUS at 13:08

## 2024-12-03 RX ADMIN — Medication 1 GRAM(S): at 05:17

## 2024-12-03 RX ADMIN — Medication 975 MILLIGRAM(S): at 05:17

## 2024-12-03 RX ADMIN — Medication 975 MILLIGRAM(S): at 13:00

## 2024-12-03 RX ADMIN — Medication 1 GRAM(S): at 00:07

## 2024-12-03 RX ADMIN — Medication 40 MILLIGRAM(S): at 05:17

## 2024-12-03 RX ADMIN — INSULIN LISPRO 2: 100 INJECTION, SOLUTION INTRAVENOUS; SUBCUTANEOUS at 17:43

## 2024-12-03 RX ADMIN — METOPROLOL SUCCINATE 100 MILLIGRAM(S): 50 TABLET, EXTENDED RELEASE ORAL at 05:18

## 2024-12-03 RX ADMIN — Medication 2 TABLET(S): at 21:28

## 2024-12-03 RX ADMIN — NYSTATIN 1 APPLICATION(S): 100000 CREAM TOPICAL at 05:18

## 2024-12-03 RX ADMIN — OXYCODONE HYDROCHLORIDE 15 MILLIGRAM(S): 30 TABLET ORAL at 05:17

## 2024-12-03 RX ADMIN — Medication 1 GRAM(S): at 13:09

## 2024-12-03 RX ADMIN — INSULIN GLARGINE-YFGN 35 UNIT(S): 100 INJECTION, SOLUTION SUBCUTANEOUS at 10:20

## 2024-12-03 RX ADMIN — Medication 100 MILLIGRAM(S): at 05:18

## 2024-12-03 RX ADMIN — Medication 975 MILLIGRAM(S): at 22:25

## 2024-12-03 RX ADMIN — Medication 100 MILLIGRAM(S): at 13:10

## 2024-12-03 RX ADMIN — Medication 975 MILLIGRAM(S): at 13:09

## 2024-12-03 RX ADMIN — Medication 40 MILLIGRAM(S): at 17:28

## 2024-12-03 RX ADMIN — RIVAROXABAN 20 MILLIGRAM(S): 10 TABLET, FILM COATED ORAL at 17:25

## 2024-12-03 RX ADMIN — INSULIN LISPRO 4: 100 INJECTION, SOLUTION INTRAVENOUS; SUBCUTANEOUS at 10:24

## 2024-12-03 RX ADMIN — ROSUVASTATIN CALCIUM 5 MILLIGRAM(S): 20 TABLET, FILM COATED ORAL at 21:28

## 2024-12-04 LAB
-  CLINDAMYCIN: SIGNIFICANT CHANGE UP
-  ERYTHROMYCIN: SIGNIFICANT CHANGE UP
-  GENTAMICIN: SIGNIFICANT CHANGE UP
-  OXACILLIN: SIGNIFICANT CHANGE UP
-  PENICILLIN: SIGNIFICANT CHANGE UP
-  RIFAMPIN: SIGNIFICANT CHANGE UP
-  TETRACYCLINE: SIGNIFICANT CHANGE UP
-  TRIMETHOPRIM/SULFAMETHOXAZOLE: SIGNIFICANT CHANGE UP
-  VANCOMYCIN: SIGNIFICANT CHANGE UP
ANION GAP SERPL CALC-SCNC: 9 MMOL/L — SIGNIFICANT CHANGE UP (ref 5–17)
BASOPHILS # BLD AUTO: 0.05 K/UL — SIGNIFICANT CHANGE UP (ref 0–0.2)
BASOPHILS NFR BLD AUTO: 0.8 % — SIGNIFICANT CHANGE UP (ref 0–2)
BUN SERPL-MCNC: 21 MG/DL — SIGNIFICANT CHANGE UP (ref 7–23)
CALCIUM SERPL-MCNC: 8.7 MG/DL — SIGNIFICANT CHANGE UP (ref 8.5–10.1)
CHLORIDE SERPL-SCNC: 102 MMOL/L — SIGNIFICANT CHANGE UP (ref 96–108)
CO2 SERPL-SCNC: 27 MMOL/L — SIGNIFICANT CHANGE UP (ref 22–31)
CREAT SERPL-MCNC: 0.99 MG/DL — SIGNIFICANT CHANGE UP (ref 0.5–1.3)
EGFR: 65 ML/MIN/1.73M2 — SIGNIFICANT CHANGE UP
EGFR: 65 ML/MIN/1.73M2 — SIGNIFICANT CHANGE UP
EOSINOPHIL # BLD AUTO: 0.24 K/UL — SIGNIFICANT CHANGE UP (ref 0–0.5)
EOSINOPHIL NFR BLD AUTO: 3.9 % — SIGNIFICANT CHANGE UP (ref 0–6)
GLUCOSE SERPL-MCNC: 175 MG/DL — HIGH (ref 70–99)
GRAM STN FLD: ABNORMAL
HCT VFR BLD CALC: 37.6 % — SIGNIFICANT CHANGE UP (ref 34.5–45)
HGB BLD-MCNC: 12.8 G/DL — SIGNIFICANT CHANGE UP (ref 11.5–15.5)
IMM GRANULOCYTES NFR BLD AUTO: 0.7 % — SIGNIFICANT CHANGE UP (ref 0–0.9)
LYMPHOCYTES # BLD AUTO: 1.67 K/UL — SIGNIFICANT CHANGE UP (ref 1–3.3)
LYMPHOCYTES # BLD AUTO: 27.2 % — SIGNIFICANT CHANGE UP (ref 13–44)
MAGNESIUM SERPL-MCNC: 2.1 MG/DL — SIGNIFICANT CHANGE UP (ref 1.6–2.6)
MCHC RBC-ENTMCNC: 31 PG — SIGNIFICANT CHANGE UP (ref 27–34)
MCHC RBC-ENTMCNC: 34 G/DL — SIGNIFICANT CHANGE UP (ref 32–36)
MCV RBC AUTO: 91 FL — SIGNIFICANT CHANGE UP (ref 80–100)
METHOD TYPE: SIGNIFICANT CHANGE UP
MONOCYTES # BLD AUTO: 0.4 K/UL — SIGNIFICANT CHANGE UP (ref 0–0.9)
MONOCYTES NFR BLD AUTO: 6.5 % — SIGNIFICANT CHANGE UP (ref 2–14)
NEUTROPHILS # BLD AUTO: 3.74 K/UL — SIGNIFICANT CHANGE UP (ref 1.8–7.4)
NEUTROPHILS NFR BLD AUTO: 60.9 % — SIGNIFICANT CHANGE UP (ref 43–77)
NRBC # BLD: 0 /100 WBCS — SIGNIFICANT CHANGE UP (ref 0–0)
NRBC BLD-RTO: 0 /100 WBCS — SIGNIFICANT CHANGE UP (ref 0–0)
PLATELET # BLD AUTO: 267 K/UL — SIGNIFICANT CHANGE UP (ref 150–400)
POTASSIUM SERPL-MCNC: 3.5 MMOL/L — SIGNIFICANT CHANGE UP (ref 3.5–5.3)
POTASSIUM SERPL-SCNC: 3.5 MMOL/L — SIGNIFICANT CHANGE UP (ref 3.5–5.3)
RBC # BLD: 4.13 M/UL — SIGNIFICANT CHANGE UP (ref 3.8–5.2)
RBC # FLD: 13.7 % — SIGNIFICANT CHANGE UP (ref 10.3–14.5)
SODIUM SERPL-SCNC: 138 MMOL/L — SIGNIFICANT CHANGE UP (ref 135–145)
WBC # BLD: 6.14 K/UL — SIGNIFICANT CHANGE UP (ref 3.8–10.5)
WBC # FLD AUTO: 6.14 K/UL — SIGNIFICANT CHANGE UP (ref 3.8–10.5)

## 2024-12-04 PROCEDURE — 99232 SBSQ HOSP IP/OBS MODERATE 35: CPT

## 2024-12-04 PROCEDURE — 99233 SBSQ HOSP IP/OBS HIGH 50: CPT

## 2024-12-04 RX ORDER — INSULIN LISPRO 100 U/ML
3 INJECTION, SOLUTION INTRAVENOUS; SUBCUTANEOUS
Refills: 0 | Status: DISCONTINUED | OUTPATIENT
Start: 2024-12-04 | End: 2024-12-07

## 2024-12-04 RX ORDER — MAGNESIUM, ALUMINUM HYDROXIDE 200-200 MG
30 TABLET,CHEWABLE ORAL
Refills: 0 | Status: DISCONTINUED | OUTPATIENT
Start: 2024-12-04 | End: 2024-12-07

## 2024-12-04 RX ORDER — LACTULOSE 10 G/15ML
20 SOLUTION ORAL DAILY
Refills: 0 | Status: DISCONTINUED | OUTPATIENT
Start: 2024-12-04 | End: 2024-12-07

## 2024-12-04 RX ORDER — SIMETHICONE 80 MG
80 TABLET,CHEWABLE ORAL EVERY 8 HOURS
Refills: 0 | Status: DISCONTINUED | OUTPATIENT
Start: 2024-12-04 | End: 2024-12-07

## 2024-12-04 RX ADMIN — Medication 100 MILLIGRAM(S): at 05:42

## 2024-12-04 RX ADMIN — TORSEMIDE 20 MILLIGRAM(S): 20 TABLET ORAL at 08:29

## 2024-12-04 RX ADMIN — Medication 40 MILLIGRAM(S): at 05:42

## 2024-12-04 RX ADMIN — OXYCODONE HYDROCHLORIDE 15 MILLIGRAM(S): 30 TABLET ORAL at 18:32

## 2024-12-04 RX ADMIN — TORSEMIDE 20 MILLIGRAM(S): 20 TABLET ORAL at 00:50

## 2024-12-04 RX ADMIN — Medication 975 MILLIGRAM(S): at 22:11

## 2024-12-04 RX ADMIN — TORSEMIDE 20 MILLIGRAM(S): 20 TABLET ORAL at 17:56

## 2024-12-04 RX ADMIN — INSULIN LISPRO 3 UNIT(S): 100 INJECTION, SOLUTION INTRAVENOUS; SUBCUTANEOUS at 17:57

## 2024-12-04 RX ADMIN — INSULIN LISPRO 4: 100 INJECTION, SOLUTION INTRAVENOUS; SUBCUTANEOUS at 13:08

## 2024-12-04 RX ADMIN — ROSUVASTATIN CALCIUM 5 MILLIGRAM(S): 20 TABLET, FILM COATED ORAL at 22:11

## 2024-12-04 RX ADMIN — INSULIN LISPRO 4: 100 INJECTION, SOLUTION INTRAVENOUS; SUBCUTANEOUS at 17:57

## 2024-12-04 RX ADMIN — Medication 30 MILLILITER(S): at 17:56

## 2024-12-04 RX ADMIN — Medication 975 MILLIGRAM(S): at 05:42

## 2024-12-04 RX ADMIN — INSULIN LISPRO 2: 100 INJECTION, SOLUTION INTRAVENOUS; SUBCUTANEOUS at 22:10

## 2024-12-04 RX ADMIN — OXYCODONE HYDROCHLORIDE 10 MILLIGRAM(S): 30 TABLET ORAL at 13:40

## 2024-12-04 RX ADMIN — OXYCODONE HYDROCHLORIDE 15 MILLIGRAM(S): 30 TABLET ORAL at 06:52

## 2024-12-04 RX ADMIN — Medication 1 GRAM(S): at 17:58

## 2024-12-04 RX ADMIN — INSULIN LISPRO 2: 100 INJECTION, SOLUTION INTRAVENOUS; SUBCUTANEOUS at 08:29

## 2024-12-04 RX ADMIN — INSULIN GLARGINE-YFGN 35 UNIT(S): 100 INJECTION, SOLUTION SUBCUTANEOUS at 08:28

## 2024-12-04 RX ADMIN — Medication 40 MILLIGRAM(S): at 17:58

## 2024-12-04 RX ADMIN — TORSEMIDE 20 MILLIGRAM(S): 20 TABLET ORAL at 23:51

## 2024-12-04 RX ADMIN — RIVAROXABAN 20 MILLIGRAM(S): 10 TABLET, FILM COATED ORAL at 17:59

## 2024-12-04 RX ADMIN — OXYCODONE HYDROCHLORIDE 10 MILLIGRAM(S): 30 TABLET ORAL at 02:00

## 2024-12-04 RX ADMIN — Medication 2 TABLET(S): at 22:12

## 2024-12-04 RX ADMIN — OXYCODONE HYDROCHLORIDE 10 MILLIGRAM(S): 30 TABLET ORAL at 12:53

## 2024-12-04 RX ADMIN — POLYETHYLENE GLYCOL 3350 17 GRAM(S): 17 POWDER, FOR SOLUTION ORAL at 12:53

## 2024-12-04 RX ADMIN — OXYCODONE HYDROCHLORIDE 15 MILLIGRAM(S): 30 TABLET ORAL at 17:59

## 2024-12-04 RX ADMIN — NYSTATIN 1 APPLICATION(S): 100000 CREAM TOPICAL at 18:00

## 2024-12-04 RX ADMIN — OXYCODONE HYDROCHLORIDE 15 MILLIGRAM(S): 30 TABLET ORAL at 06:16

## 2024-12-04 RX ADMIN — Medication 975 MILLIGRAM(S): at 14:00

## 2024-12-04 RX ADMIN — Medication 1 GRAM(S): at 12:53

## 2024-12-04 RX ADMIN — Medication 1 GRAM(S): at 05:41

## 2024-12-04 RX ADMIN — Medication 100 MILLIGRAM(S): at 13:10

## 2024-12-04 RX ADMIN — Medication 30 MILLILITER(S): at 23:51

## 2024-12-04 RX ADMIN — Medication 975 MILLIGRAM(S): at 06:40

## 2024-12-04 RX ADMIN — Medication 100 MILLIGRAM(S): at 22:12

## 2024-12-04 RX ADMIN — OXYCODONE HYDROCHLORIDE 10 MILLIGRAM(S): 30 TABLET ORAL at 23:51

## 2024-12-04 RX ADMIN — Medication 975 MILLIGRAM(S): at 13:11

## 2024-12-04 RX ADMIN — Medication 1 GRAM(S): at 23:51

## 2024-12-04 RX ADMIN — OXYCODONE HYDROCHLORIDE 10 MILLIGRAM(S): 30 TABLET ORAL at 01:00

## 2024-12-04 RX ADMIN — Medication 1 GRAM(S): at 00:50

## 2024-12-04 RX ADMIN — METOPROLOL SUCCINATE 100 MILLIGRAM(S): 50 TABLET, EXTENDED RELEASE ORAL at 05:41

## 2024-12-05 LAB
ALBUMIN SERPL ELPH-MCNC: 3.1 G/DL — LOW (ref 3.3–5)
ALP SERPL-CCNC: 84 U/L — SIGNIFICANT CHANGE UP (ref 40–120)
ALT FLD-CCNC: 13 U/L — SIGNIFICANT CHANGE UP (ref 12–78)
ANION GAP SERPL CALC-SCNC: 6 MMOL/L — SIGNIFICANT CHANGE UP (ref 5–17)
AST SERPL-CCNC: 18 U/L — SIGNIFICANT CHANGE UP (ref 15–37)
BASOPHILS # BLD AUTO: 0.05 K/UL — SIGNIFICANT CHANGE UP (ref 0–0.2)
BASOPHILS NFR BLD AUTO: 0.6 % — SIGNIFICANT CHANGE UP (ref 0–2)
BILIRUB DIRECT SERPL-MCNC: 0.2 MG/DL — SIGNIFICANT CHANGE UP (ref 0–0.3)
BILIRUB INDIRECT FLD-MCNC: 0.3 MG/DL — SIGNIFICANT CHANGE UP (ref 0.2–1)
BILIRUB SERPL-MCNC: 0.5 MG/DL — SIGNIFICANT CHANGE UP (ref 0.2–1.2)
BUN SERPL-MCNC: 28 MG/DL — HIGH (ref 7–23)
CALCIUM SERPL-MCNC: 9.9 MG/DL — SIGNIFICANT CHANGE UP (ref 8.5–10.1)
CHLORIDE SERPL-SCNC: 99 MMOL/L — SIGNIFICANT CHANGE UP (ref 96–108)
CO2 SERPL-SCNC: 31 MMOL/L — SIGNIFICANT CHANGE UP (ref 22–31)
CREAT SERPL-MCNC: 1.1 MG/DL — SIGNIFICANT CHANGE UP (ref 0.5–1.3)
EGFR: 58 ML/MIN/1.73M2 — LOW
EGFR: 58 ML/MIN/1.73M2 — LOW
EOSINOPHIL # BLD AUTO: 0.25 K/UL — SIGNIFICANT CHANGE UP (ref 0–0.5)
EOSINOPHIL NFR BLD AUTO: 2.8 % — SIGNIFICANT CHANGE UP (ref 0–6)
GLUCOSE SERPL-MCNC: 201 MG/DL — HIGH (ref 70–99)
HCT VFR BLD CALC: 38.8 % — SIGNIFICANT CHANGE UP (ref 34.5–45)
HGB BLD-MCNC: 13.2 G/DL — SIGNIFICANT CHANGE UP (ref 11.5–15.5)
IMM GRANULOCYTES NFR BLD AUTO: 0.8 % — SIGNIFICANT CHANGE UP (ref 0–0.9)
LACTATE SERPL-SCNC: 1.8 MMOL/L — SIGNIFICANT CHANGE UP (ref 0.7–2)
LACTATE SERPL-SCNC: 2.3 MMOL/L — HIGH (ref 0.7–2)
LACTATE SERPL-SCNC: 2.6 MMOL/L — HIGH (ref 0.7–2)
LIDOCAIN IGE QN: 16 U/L — SIGNIFICANT CHANGE UP (ref 13–75)
LYMPHOCYTES # BLD AUTO: 1 K/UL — SIGNIFICANT CHANGE UP (ref 1–3.3)
LYMPHOCYTES # BLD AUTO: 11.1 % — LOW (ref 13–44)
MCHC RBC-ENTMCNC: 30.6 PG — SIGNIFICANT CHANGE UP (ref 27–34)
MCHC RBC-ENTMCNC: 34 G/DL — SIGNIFICANT CHANGE UP (ref 32–36)
MCV RBC AUTO: 90 FL — SIGNIFICANT CHANGE UP (ref 80–100)
MONOCYTES # BLD AUTO: 0.49 K/UL — SIGNIFICANT CHANGE UP (ref 0–0.9)
MONOCYTES NFR BLD AUTO: 5.4 % — SIGNIFICANT CHANGE UP (ref 2–14)
NEUTROPHILS # BLD AUTO: 7.14 K/UL — SIGNIFICANT CHANGE UP (ref 1.8–7.4)
NEUTROPHILS NFR BLD AUTO: 79.3 % — HIGH (ref 43–77)
NRBC # BLD: 0 /100 WBCS — SIGNIFICANT CHANGE UP (ref 0–0)
NRBC BLD-RTO: 0 /100 WBCS — SIGNIFICANT CHANGE UP (ref 0–0)
OB PNL STL: NEGATIVE — SIGNIFICANT CHANGE UP
PLATELET # BLD AUTO: 283 K/UL — SIGNIFICANT CHANGE UP (ref 150–400)
POTASSIUM SERPL-MCNC: 3.6 MMOL/L — SIGNIFICANT CHANGE UP (ref 3.5–5.3)
POTASSIUM SERPL-SCNC: 3.6 MMOL/L — SIGNIFICANT CHANGE UP (ref 3.5–5.3)
PROT SERPL-MCNC: 7.3 G/DL — SIGNIFICANT CHANGE UP (ref 6–8.3)
RBC # BLD: 4.31 M/UL — SIGNIFICANT CHANGE UP (ref 3.8–5.2)
RBC # FLD: 13.7 % — SIGNIFICANT CHANGE UP (ref 10.3–14.5)
SODIUM SERPL-SCNC: 136 MMOL/L — SIGNIFICANT CHANGE UP (ref 135–145)
WBC # BLD: 9 K/UL — SIGNIFICANT CHANGE UP (ref 3.8–10.5)
WBC # FLD AUTO: 9 K/UL — SIGNIFICANT CHANGE UP (ref 3.8–10.5)

## 2024-12-05 PROCEDURE — 99232 SBSQ HOSP IP/OBS MODERATE 35: CPT

## 2024-12-05 PROCEDURE — 99233 SBSQ HOSP IP/OBS HIGH 50: CPT

## 2024-12-05 RX ORDER — GABAPENTIN 400 MG/1
100 CAPSULE ORAL THREE TIMES A DAY
Refills: 0 | Status: DISCONTINUED | OUTPATIENT
Start: 2024-12-05 | End: 2024-12-07

## 2024-12-05 RX ORDER — OXYCODONE HYDROCHLORIDE 30 MG/1
5 TABLET ORAL ONCE
Refills: 0 | Status: DISCONTINUED | OUTPATIENT
Start: 2024-12-05 | End: 2024-12-05

## 2024-12-05 RX ORDER — OXYCODONE HYDROCHLORIDE 30 MG/1
15 TABLET ORAL EVERY 12 HOURS
Refills: 0 | Status: DISCONTINUED | OUTPATIENT
Start: 2024-12-05 | End: 2024-12-07

## 2024-12-05 RX ADMIN — Medication 1 GRAM(S): at 17:56

## 2024-12-05 RX ADMIN — Medication 2 TABLET(S): at 22:51

## 2024-12-05 RX ADMIN — INSULIN LISPRO 2: 100 INJECTION, SOLUTION INTRAVENOUS; SUBCUTANEOUS at 12:57

## 2024-12-05 RX ADMIN — Medication 30 MILLILITER(S): at 23:42

## 2024-12-05 RX ADMIN — Medication 100 MILLIGRAM(S): at 15:30

## 2024-12-05 RX ADMIN — OXYCODONE HYDROCHLORIDE 10 MILLIGRAM(S): 30 TABLET ORAL at 23:43

## 2024-12-05 RX ADMIN — Medication 975 MILLIGRAM(S): at 22:52

## 2024-12-05 RX ADMIN — Medication 100 MILLIGRAM(S): at 05:56

## 2024-12-05 RX ADMIN — INSULIN LISPRO 2: 100 INJECTION, SOLUTION INTRAVENOUS; SUBCUTANEOUS at 17:56

## 2024-12-05 RX ADMIN — INSULIN LISPRO 3 UNIT(S): 100 INJECTION, SOLUTION INTRAVENOUS; SUBCUTANEOUS at 12:57

## 2024-12-05 RX ADMIN — ROSUVASTATIN CALCIUM 5 MILLIGRAM(S): 20 TABLET, FILM COATED ORAL at 22:51

## 2024-12-05 RX ADMIN — OXYCODONE HYDROCHLORIDE 10 MILLIGRAM(S): 30 TABLET ORAL at 06:29

## 2024-12-05 RX ADMIN — Medication 30 MILLILITER(S): at 17:55

## 2024-12-05 RX ADMIN — Medication 1 GRAM(S): at 05:56

## 2024-12-05 RX ADMIN — TORSEMIDE 20 MILLIGRAM(S): 20 TABLET ORAL at 08:44

## 2024-12-05 RX ADMIN — INSULIN GLARGINE-YFGN 35 UNIT(S): 100 INJECTION, SOLUTION SUBCUTANEOUS at 08:45

## 2024-12-05 RX ADMIN — OXYCODONE HYDROCHLORIDE 15 MILLIGRAM(S): 30 TABLET ORAL at 17:56

## 2024-12-05 RX ADMIN — TORSEMIDE 20 MILLIGRAM(S): 20 TABLET ORAL at 23:43

## 2024-12-05 RX ADMIN — Medication 975 MILLIGRAM(S): at 05:55

## 2024-12-05 RX ADMIN — Medication 40 MILLIGRAM(S): at 05:55

## 2024-12-05 RX ADMIN — RIVAROXABAN 20 MILLIGRAM(S): 10 TABLET, FILM COATED ORAL at 17:56

## 2024-12-05 RX ADMIN — NYSTATIN 1 APPLICATION(S): 100000 CREAM TOPICAL at 17:57

## 2024-12-05 RX ADMIN — TORSEMIDE 20 MILLIGRAM(S): 20 TABLET ORAL at 16:48

## 2024-12-05 RX ADMIN — INSULIN LISPRO 3 UNIT(S): 100 INJECTION, SOLUTION INTRAVENOUS; SUBCUTANEOUS at 17:56

## 2024-12-05 RX ADMIN — Medication 1 GRAM(S): at 11:29

## 2024-12-05 RX ADMIN — OXYCODONE HYDROCHLORIDE 5 MILLIGRAM(S): 30 TABLET ORAL at 09:45

## 2024-12-05 RX ADMIN — Medication 30 MILLILITER(S): at 05:55

## 2024-12-05 RX ADMIN — Medication 100 MILLIGRAM(S): at 22:50

## 2024-12-05 RX ADMIN — INSULIN LISPRO 3 UNIT(S): 100 INJECTION, SOLUTION INTRAVENOUS; SUBCUTANEOUS at 08:44

## 2024-12-05 RX ADMIN — INSULIN LISPRO 4: 100 INJECTION, SOLUTION INTRAVENOUS; SUBCUTANEOUS at 08:43

## 2024-12-05 RX ADMIN — Medication 1 GRAM(S): at 23:43

## 2024-12-05 RX ADMIN — OXYCODONE HYDROCHLORIDE 10 MILLIGRAM(S): 30 TABLET ORAL at 12:58

## 2024-12-05 RX ADMIN — NYSTATIN 1 APPLICATION(S): 100000 CREAM TOPICAL at 05:56

## 2024-12-05 RX ADMIN — POLYETHYLENE GLYCOL 3350 17 GRAM(S): 17 POWDER, FOR SOLUTION ORAL at 11:29

## 2024-12-05 RX ADMIN — OXYCODONE HYDROCHLORIDE 5 MILLIGRAM(S): 30 TABLET ORAL at 09:25

## 2024-12-05 RX ADMIN — Medication 40 MILLIGRAM(S): at 17:55

## 2024-12-05 RX ADMIN — GABAPENTIN 100 MILLIGRAM(S): 400 CAPSULE ORAL at 22:51

## 2024-12-05 RX ADMIN — Medication 30 MILLILITER(S): at 11:29

## 2024-12-05 RX ADMIN — OXYCODONE HYDROCHLORIDE 10 MILLIGRAM(S): 30 TABLET ORAL at 13:30

## 2024-12-06 PROCEDURE — 99232 SBSQ HOSP IP/OBS MODERATE 35: CPT

## 2024-12-06 PROCEDURE — 99233 SBSQ HOSP IP/OBS HIGH 50: CPT

## 2024-12-06 RX ORDER — OXYCODONE HYDROCHLORIDE 30 MG/1
10 TABLET ORAL EVERY 6 HOURS
Refills: 0 | Status: DISCONTINUED | OUTPATIENT
Start: 2024-12-06 | End: 2024-12-07

## 2024-12-06 RX ORDER — OXYCODONE HYDROCHLORIDE 30 MG/1
5 TABLET ORAL EVERY 6 HOURS
Refills: 0 | Status: DISCONTINUED | OUTPATIENT
Start: 2024-12-06 | End: 2024-12-07

## 2024-12-06 RX ADMIN — Medication 100 MILLIGRAM(S): at 17:19

## 2024-12-06 RX ADMIN — TORSEMIDE 20 MILLIGRAM(S): 20 TABLET ORAL at 08:54

## 2024-12-06 RX ADMIN — RIVAROXABAN 20 MILLIGRAM(S): 10 TABLET, FILM COATED ORAL at 17:18

## 2024-12-06 RX ADMIN — INSULIN GLARGINE-YFGN 35 UNIT(S): 100 INJECTION, SOLUTION SUBCUTANEOUS at 08:54

## 2024-12-06 RX ADMIN — Medication 1 GRAM(S): at 11:28

## 2024-12-06 RX ADMIN — Medication 1 GRAM(S): at 06:43

## 2024-12-06 RX ADMIN — Medication 30 MILLILITER(S): at 23:53

## 2024-12-06 RX ADMIN — Medication 1 GRAM(S): at 23:54

## 2024-12-06 RX ADMIN — OXYCODONE HYDROCHLORIDE 5 MILLIGRAM(S): 30 TABLET ORAL at 23:54

## 2024-12-06 RX ADMIN — Medication 30 MILLILITER(S): at 06:42

## 2024-12-06 RX ADMIN — INSULIN LISPRO 3 UNIT(S): 100 INJECTION, SOLUTION INTRAVENOUS; SUBCUTANEOUS at 12:47

## 2024-12-06 RX ADMIN — INSULIN LISPRO 3 UNIT(S): 100 INJECTION, SOLUTION INTRAVENOUS; SUBCUTANEOUS at 08:55

## 2024-12-06 RX ADMIN — OXYCODONE HYDROCHLORIDE 10 MILLIGRAM(S): 30 TABLET ORAL at 00:23

## 2024-12-06 RX ADMIN — INSULIN LISPRO 6: 100 INJECTION, SOLUTION INTRAVENOUS; SUBCUTANEOUS at 08:55

## 2024-12-06 RX ADMIN — METOPROLOL SUCCINATE 100 MILLIGRAM(S): 50 TABLET, EXTENDED RELEASE ORAL at 06:42

## 2024-12-06 RX ADMIN — TORSEMIDE 20 MILLIGRAM(S): 20 TABLET ORAL at 17:21

## 2024-12-06 RX ADMIN — ROSUVASTATIN CALCIUM 5 MILLIGRAM(S): 20 TABLET, FILM COATED ORAL at 21:26

## 2024-12-06 RX ADMIN — NYSTATIN 1 APPLICATION(S): 100000 CREAM TOPICAL at 06:43

## 2024-12-06 RX ADMIN — Medication 100 MILLIGRAM(S): at 21:26

## 2024-12-06 RX ADMIN — Medication 975 MILLIGRAM(S): at 06:42

## 2024-12-06 RX ADMIN — INSULIN LISPRO 2: 100 INJECTION, SOLUTION INTRAVENOUS; SUBCUTANEOUS at 21:56

## 2024-12-06 RX ADMIN — Medication 975 MILLIGRAM(S): at 21:25

## 2024-12-06 RX ADMIN — Medication 975 MILLIGRAM(S): at 22:22

## 2024-12-06 RX ADMIN — OXYCODONE HYDROCHLORIDE 15 MILLIGRAM(S): 30 TABLET ORAL at 17:18

## 2024-12-06 RX ADMIN — POLYETHYLENE GLYCOL 3350 17 GRAM(S): 17 POWDER, FOR SOLUTION ORAL at 11:28

## 2024-12-06 RX ADMIN — Medication 100 MILLIGRAM(S): at 06:41

## 2024-12-06 RX ADMIN — NYSTATIN 1 APPLICATION(S): 100000 CREAM TOPICAL at 17:21

## 2024-12-06 RX ADMIN — INSULIN LISPRO 4: 100 INJECTION, SOLUTION INTRAVENOUS; SUBCUTANEOUS at 12:47

## 2024-12-06 RX ADMIN — Medication 40 MILLIGRAM(S): at 17:18

## 2024-12-06 RX ADMIN — Medication 30 MILLILITER(S): at 11:28

## 2024-12-06 RX ADMIN — GABAPENTIN 100 MILLIGRAM(S): 400 CAPSULE ORAL at 06:43

## 2024-12-06 RX ADMIN — INSULIN LISPRO 3 UNIT(S): 100 INJECTION, SOLUTION INTRAVENOUS; SUBCUTANEOUS at 17:50

## 2024-12-06 RX ADMIN — INSULIN LISPRO 2: 100 INJECTION, SOLUTION INTRAVENOUS; SUBCUTANEOUS at 17:50

## 2024-12-06 RX ADMIN — Medication 30 MILLILITER(S): at 17:18

## 2024-12-06 RX ADMIN — OXYCODONE HYDROCHLORIDE 15 MILLIGRAM(S): 30 TABLET ORAL at 06:42

## 2024-12-06 RX ADMIN — TORSEMIDE 20 MILLIGRAM(S): 20 TABLET ORAL at 23:54

## 2024-12-06 RX ADMIN — Medication 2 TABLET(S): at 21:26

## 2024-12-06 RX ADMIN — Medication 1 GRAM(S): at 17:18

## 2024-12-06 RX ADMIN — Medication 40 MILLIGRAM(S): at 06:42

## 2024-12-06 RX ADMIN — OXYCODONE HYDROCHLORIDE 15 MILLIGRAM(S): 30 TABLET ORAL at 18:44

## 2024-12-07 ENCOUNTER — TRANSCRIPTION ENCOUNTER (OUTPATIENT)
Age: 59
End: 2024-12-07

## 2024-12-07 VITALS
SYSTOLIC BLOOD PRESSURE: 105 MMHG | OXYGEN SATURATION: 97 % | DIASTOLIC BLOOD PRESSURE: 55 MMHG | RESPIRATION RATE: 18 BRPM | TEMPERATURE: 98 F | HEART RATE: 69 BPM

## 2024-12-07 LAB
ALBUMIN SERPL ELPH-MCNC: 3.3 G/DL — SIGNIFICANT CHANGE UP (ref 3.3–5)
ALP SERPL-CCNC: 85 U/L — SIGNIFICANT CHANGE UP (ref 40–120)
ALT FLD-CCNC: 10 U/L — LOW (ref 12–78)
ANION GAP SERPL CALC-SCNC: 5 MMOL/L — SIGNIFICANT CHANGE UP (ref 5–17)
AST SERPL-CCNC: 17 U/L — SIGNIFICANT CHANGE UP (ref 15–37)
BASOPHILS # BLD AUTO: 0.05 K/UL — SIGNIFICANT CHANGE UP (ref 0–0.2)
BASOPHILS NFR BLD AUTO: 0.8 % — SIGNIFICANT CHANGE UP (ref 0–2)
BILIRUB SERPL-MCNC: 0.6 MG/DL — SIGNIFICANT CHANGE UP (ref 0.2–1.2)
BUN SERPL-MCNC: 26 MG/DL — HIGH (ref 7–23)
CALCIUM SERPL-MCNC: 9.3 MG/DL — SIGNIFICANT CHANGE UP (ref 8.5–10.1)
CHLORIDE SERPL-SCNC: 101 MMOL/L — SIGNIFICANT CHANGE UP (ref 96–108)
CO2 SERPL-SCNC: 30 MMOL/L — SIGNIFICANT CHANGE UP (ref 22–31)
CREAT SERPL-MCNC: 1 MG/DL — SIGNIFICANT CHANGE UP (ref 0.5–1.3)
CULTURE RESULTS: ABNORMAL
CULTURE RESULTS: NO GROWTH — SIGNIFICANT CHANGE UP
EGFR: 64 ML/MIN/1.73M2 — SIGNIFICANT CHANGE UP
EGFR: 64 ML/MIN/1.73M2 — SIGNIFICANT CHANGE UP
EOSINOPHIL # BLD AUTO: 0.22 K/UL — SIGNIFICANT CHANGE UP (ref 0–0.5)
EOSINOPHIL NFR BLD AUTO: 3.4 % — SIGNIFICANT CHANGE UP (ref 0–6)
GLUCOSE SERPL-MCNC: 223 MG/DL — HIGH (ref 70–99)
HCT VFR BLD CALC: 37.9 % — SIGNIFICANT CHANGE UP (ref 34.5–45)
HGB BLD-MCNC: 12.9 G/DL — SIGNIFICANT CHANGE UP (ref 11.5–15.5)
IMM GRANULOCYTES NFR BLD AUTO: 0.6 % — SIGNIFICANT CHANGE UP (ref 0–0.9)
LYMPHOCYTES # BLD AUTO: 1.6 K/UL — SIGNIFICANT CHANGE UP (ref 1–3.3)
LYMPHOCYTES # BLD AUTO: 24.8 % — SIGNIFICANT CHANGE UP (ref 13–44)
MCHC RBC-ENTMCNC: 30.8 PG — SIGNIFICANT CHANGE UP (ref 27–34)
MCHC RBC-ENTMCNC: 34 G/DL — SIGNIFICANT CHANGE UP (ref 32–36)
MCV RBC AUTO: 90.5 FL — SIGNIFICANT CHANGE UP (ref 80–100)
MONOCYTES # BLD AUTO: 0.42 K/UL — SIGNIFICANT CHANGE UP (ref 0–0.9)
MONOCYTES NFR BLD AUTO: 6.5 % — SIGNIFICANT CHANGE UP (ref 2–14)
NEUTROPHILS # BLD AUTO: 4.12 K/UL — SIGNIFICANT CHANGE UP (ref 1.8–7.4)
NEUTROPHILS NFR BLD AUTO: 63.9 % — SIGNIFICANT CHANGE UP (ref 43–77)
NRBC # BLD: 0 /100 WBCS — SIGNIFICANT CHANGE UP (ref 0–0)
NRBC BLD-RTO: 0 /100 WBCS — SIGNIFICANT CHANGE UP (ref 0–0)
ORGANISM # SPEC MICROSCOPIC CNT: ABNORMAL
ORGANISM # SPEC MICROSCOPIC CNT: SIGNIFICANT CHANGE UP
PLATELET # BLD AUTO: 273 K/UL — SIGNIFICANT CHANGE UP (ref 150–400)
POTASSIUM SERPL-MCNC: 3.8 MMOL/L — SIGNIFICANT CHANGE UP (ref 3.5–5.3)
POTASSIUM SERPL-SCNC: 3.8 MMOL/L — SIGNIFICANT CHANGE UP (ref 3.5–5.3)
PROT SERPL-MCNC: 7.2 G/DL — SIGNIFICANT CHANGE UP (ref 6–8.3)
RBC # BLD: 4.19 M/UL — SIGNIFICANT CHANGE UP (ref 3.8–5.2)
RBC # FLD: 13.6 % — SIGNIFICANT CHANGE UP (ref 10.3–14.5)
SODIUM SERPL-SCNC: 136 MMOL/L — SIGNIFICANT CHANGE UP (ref 135–145)
SPECIMEN SOURCE: SIGNIFICANT CHANGE UP
SPECIMEN SOURCE: SIGNIFICANT CHANGE UP
WBC # BLD: 6.45 K/UL — SIGNIFICANT CHANGE UP (ref 3.8–10.5)
WBC # FLD AUTO: 6.45 K/UL — SIGNIFICANT CHANGE UP (ref 3.8–10.5)

## 2024-12-07 PROCEDURE — 99232 SBSQ HOSP IP/OBS MODERATE 35: CPT

## 2024-12-07 PROCEDURE — 99239 HOSP IP/OBS DSCHRG MGMT >30: CPT

## 2024-12-07 RX ORDER — OXYCODONE HYDROCHLORIDE 30 MG/1
1 TABLET ORAL
Qty: 0 | Refills: 0 | DISCHARGE
Start: 2024-12-07

## 2024-12-07 RX ORDER — NYSTATIN 100000 [USP'U]/G
1 CREAM TOPICAL
Qty: 0 | Refills: 0 | DISCHARGE
Start: 2024-12-07

## 2024-12-07 RX ORDER — SENNA 187 MG
2 TABLET ORAL
Qty: 0 | Refills: 0 | DISCHARGE
Start: 2024-12-07

## 2024-12-07 RX ORDER — TORSEMIDE 20 MG/1
1 TABLET ORAL
Qty: 0 | Refills: 0 | DISCHARGE
Start: 2024-12-07

## 2024-12-07 RX ORDER — ACETAMINOPHEN 500 MG/5ML
3 LIQUID (ML) ORAL
Qty: 0 | Refills: 0 | DISCHARGE
Start: 2024-12-07

## 2024-12-07 RX ORDER — POLYETHYLENE GLYCOL 3350 17 G/17G
17 POWDER, FOR SOLUTION ORAL
Qty: 0 | Refills: 0 | DISCHARGE
Start: 2024-12-07

## 2024-12-07 RX ORDER — INSULIN LISPRO 100 U/ML
3 INJECTION, SOLUTION INTRAVENOUS; SUBCUTANEOUS
Qty: 0 | Refills: 0 | DISCHARGE
Start: 2024-12-07

## 2024-12-07 RX ORDER — CEPHALEXIN 250 MG/1
1 CAPSULE ORAL
Qty: 12 | Refills: 0
Start: 2024-12-07 | End: 2024-12-09

## 2024-12-07 RX ORDER — MELATONIN 5 MG
1 TABLET ORAL
Qty: 0 | Refills: 0 | DISCHARGE
Start: 2024-12-07

## 2024-12-07 RX ORDER — SIMETHICONE 80 MG
1 TABLET,CHEWABLE ORAL
Qty: 0 | Refills: 0 | DISCHARGE
Start: 2024-12-07

## 2024-12-07 RX ORDER — INSULIN GLARGINE-YFGN 100 [IU]/ML
35 INJECTION, SOLUTION SUBCUTANEOUS
Qty: 0 | Refills: 0 | DISCHARGE
Start: 2024-12-07

## 2024-12-07 RX ORDER — OXYCODONE HYDROCHLORIDE 30 MG/1
1 TABLET ORAL
Refills: 0 | DISCHARGE

## 2024-12-07 RX ORDER — LACTULOSE 10 G/15ML
30 SOLUTION ORAL
Qty: 0 | Refills: 0 | DISCHARGE
Start: 2024-12-07

## 2024-12-07 RX ORDER — MAGNESIUM, ALUMINUM HYDROXIDE 200-200 MG
30 TABLET,CHEWABLE ORAL
Qty: 0 | Refills: 0 | DISCHARGE
Start: 2024-12-07

## 2024-12-07 RX ADMIN — INSULIN LISPRO 3 UNIT(S): 100 INJECTION, SOLUTION INTRAVENOUS; SUBCUTANEOUS at 08:40

## 2024-12-07 RX ADMIN — TORSEMIDE 20 MILLIGRAM(S): 20 TABLET ORAL at 08:39

## 2024-12-07 RX ADMIN — Medication 30 MILLILITER(S): at 17:14

## 2024-12-07 RX ADMIN — Medication 100 MILLIGRAM(S): at 05:19

## 2024-12-07 RX ADMIN — GABAPENTIN 100 MILLIGRAM(S): 400 CAPSULE ORAL at 13:41

## 2024-12-07 RX ADMIN — RIVAROXABAN 20 MILLIGRAM(S): 10 TABLET, FILM COATED ORAL at 17:16

## 2024-12-07 RX ADMIN — Medication 40 MILLIGRAM(S): at 17:14

## 2024-12-07 RX ADMIN — INSULIN LISPRO 4: 100 INJECTION, SOLUTION INTRAVENOUS; SUBCUTANEOUS at 12:35

## 2024-12-07 RX ADMIN — OXYCODONE HYDROCHLORIDE 15 MILLIGRAM(S): 30 TABLET ORAL at 17:14

## 2024-12-07 RX ADMIN — Medication 975 MILLIGRAM(S): at 06:16

## 2024-12-07 RX ADMIN — INSULIN LISPRO 4: 100 INJECTION, SOLUTION INTRAVENOUS; SUBCUTANEOUS at 08:39

## 2024-12-07 RX ADMIN — METOPROLOL SUCCINATE 100 MILLIGRAM(S): 50 TABLET, EXTENDED RELEASE ORAL at 05:19

## 2024-12-07 RX ADMIN — Medication 1 GRAM(S): at 17:14

## 2024-12-07 RX ADMIN — INSULIN LISPRO 3 UNIT(S): 100 INJECTION, SOLUTION INTRAVENOUS; SUBCUTANEOUS at 12:36

## 2024-12-07 RX ADMIN — INSULIN LISPRO 2: 100 INJECTION, SOLUTION INTRAVENOUS; SUBCUTANEOUS at 17:42

## 2024-12-07 RX ADMIN — Medication 100 MILLIGRAM(S): at 13:41

## 2024-12-07 RX ADMIN — NYSTATIN 1 APPLICATION(S): 100000 CREAM TOPICAL at 17:55

## 2024-12-07 RX ADMIN — Medication 975 MILLIGRAM(S): at 05:19

## 2024-12-07 RX ADMIN — Medication 30 MILLILITER(S): at 11:42

## 2024-12-07 RX ADMIN — OXYCODONE HYDROCHLORIDE 15 MILLIGRAM(S): 30 TABLET ORAL at 06:19

## 2024-12-07 RX ADMIN — Medication 30 MILLILITER(S): at 05:18

## 2024-12-07 RX ADMIN — OXYCODONE HYDROCHLORIDE 10 MILLIGRAM(S): 30 TABLET ORAL at 11:42

## 2024-12-07 RX ADMIN — Medication 40 MILLIGRAM(S): at 05:19

## 2024-12-07 RX ADMIN — OXYCODONE HYDROCHLORIDE 15 MILLIGRAM(S): 30 TABLET ORAL at 05:19

## 2024-12-07 RX ADMIN — Medication 1 GRAM(S): at 11:42

## 2024-12-07 RX ADMIN — INSULIN LISPRO 3 UNIT(S): 100 INJECTION, SOLUTION INTRAVENOUS; SUBCUTANEOUS at 17:42

## 2024-12-07 RX ADMIN — NYSTATIN 1 APPLICATION(S): 100000 CREAM TOPICAL at 05:18

## 2024-12-07 RX ADMIN — INSULIN GLARGINE-YFGN 35 UNIT(S): 100 INJECTION, SOLUTION SUBCUTANEOUS at 08:41

## 2024-12-07 RX ADMIN — OXYCODONE HYDROCHLORIDE 5 MILLIGRAM(S): 30 TABLET ORAL at 00:54

## 2024-12-07 RX ADMIN — TORSEMIDE 20 MILLIGRAM(S): 20 TABLET ORAL at 16:58

## 2024-12-07 RX ADMIN — Medication 1 GRAM(S): at 05:19

## 2024-12-07 RX ADMIN — POLYETHYLENE GLYCOL 3350 17 GRAM(S): 17 POWDER, FOR SOLUTION ORAL at 11:42

## 2024-12-09 ENCOUNTER — EMERGENCY (EMERGENCY)
Facility: HOSPITAL | Age: 59
LOS: 1 days | Discharge: LEFT BEFORE DISCHARGE | End: 2024-12-09
Attending: STUDENT IN AN ORGANIZED HEALTH CARE EDUCATION/TRAINING PROGRAM | Admitting: STUDENT IN AN ORGANIZED HEALTH CARE EDUCATION/TRAINING PROGRAM
Payer: MEDICARE

## 2024-12-09 VITALS
SYSTOLIC BLOOD PRESSURE: 161 MMHG | HEART RATE: 91 BPM | WEIGHT: 259.93 LBS | RESPIRATION RATE: 18 BRPM | DIASTOLIC BLOOD PRESSURE: 89 MMHG | OXYGEN SATURATION: 95 % | HEIGHT: 60 IN | TEMPERATURE: 99 F

## 2024-12-09 DIAGNOSIS — Z98.890 OTHER SPECIFIED POSTPROCEDURAL STATES: Chronic | ICD-10-CM

## 2024-12-09 DIAGNOSIS — Z90.49 ACQUIRED ABSENCE OF OTHER SPECIFIED PARTS OF DIGESTIVE TRACT: Chronic | ICD-10-CM

## 2024-12-09 DIAGNOSIS — Z90.89 ACQUIRED ABSENCE OF OTHER ORGANS: Chronic | ICD-10-CM

## 2024-12-09 PROCEDURE — 87637 SARSCOV2&INF A&B&RSV AMP PRB: CPT

## 2024-12-09 PROCEDURE — 83690 ASSAY OF LIPASE: CPT

## 2024-12-09 PROCEDURE — 87070 CULTURE OTHR SPECIMN AEROBIC: CPT

## 2024-12-09 PROCEDURE — 87040 BLOOD CULTURE FOR BACTERIA: CPT

## 2024-12-09 PROCEDURE — 88311 DECALCIFY TISSUE: CPT

## 2024-12-09 PROCEDURE — 82272 OCCULT BLD FECES 1-3 TESTS: CPT

## 2024-12-09 PROCEDURE — 86900 BLOOD TYPING SEROLOGIC ABO: CPT

## 2024-12-09 PROCEDURE — 71045 X-RAY EXAM CHEST 1 VIEW: CPT

## 2024-12-09 PROCEDURE — 88304 TISSUE EXAM BY PATHOLOGIST: CPT

## 2024-12-09 PROCEDURE — 86850 RBC ANTIBODY SCREEN: CPT

## 2024-12-09 PROCEDURE — 71045 X-RAY EXAM CHEST 1 VIEW: CPT | Mod: 26

## 2024-12-09 PROCEDURE — 99285 EMERGENCY DEPT VISIT HI MDM: CPT

## 2024-12-09 PROCEDURE — 87205 SMEAR GRAM STAIN: CPT

## 2024-12-09 PROCEDURE — 96374 THER/PROPH/DIAG INJ IV PUSH: CPT

## 2024-12-09 PROCEDURE — 85610 PROTHROMBIN TIME: CPT

## 2024-12-09 PROCEDURE — 87186 SC STD MICRODIL/AGAR DIL: CPT

## 2024-12-09 PROCEDURE — 86140 C-REACTIVE PROTEIN: CPT

## 2024-12-09 PROCEDURE — 97530 THERAPEUTIC ACTIVITIES: CPT

## 2024-12-09 PROCEDURE — 73718 MRI LOWER EXTREMITY W/O DYE: CPT | Mod: MC

## 2024-12-09 PROCEDURE — 85018 HEMOGLOBIN: CPT

## 2024-12-09 PROCEDURE — 93010 ELECTROCARDIOGRAM REPORT: CPT

## 2024-12-09 PROCEDURE — 82550 ASSAY OF CK (CPK): CPT

## 2024-12-09 PROCEDURE — 83880 ASSAY OF NATRIURETIC PEPTIDE: CPT

## 2024-12-09 PROCEDURE — 73630 X-RAY EXAM OF FOOT: CPT | Mod: 26,RT

## 2024-12-09 PROCEDURE — 84132 ASSAY OF SERUM POTASSIUM: CPT

## 2024-12-09 PROCEDURE — 84145 PROCALCITONIN (PCT): CPT

## 2024-12-09 PROCEDURE — 80048 BASIC METABOLIC PNL TOTAL CA: CPT

## 2024-12-09 PROCEDURE — 83605 ASSAY OF LACTIC ACID: CPT

## 2024-12-09 PROCEDURE — 97116 GAIT TRAINING THERAPY: CPT

## 2024-12-09 PROCEDURE — 93005 ELECTROCARDIOGRAM TRACING: CPT

## 2024-12-09 PROCEDURE — 80076 HEPATIC FUNCTION PANEL: CPT

## 2024-12-09 PROCEDURE — 36415 COLL VENOUS BLD VENIPUNCTURE: CPT

## 2024-12-09 PROCEDURE — 82962 GLUCOSE BLOOD TEST: CPT

## 2024-12-09 PROCEDURE — 73630 X-RAY EXAM OF FOOT: CPT

## 2024-12-09 PROCEDURE — 85027 COMPLETE CBC AUTOMATED: CPT

## 2024-12-09 PROCEDURE — 82248 BILIRUBIN DIRECT: CPT

## 2024-12-09 PROCEDURE — 99284 EMERGENCY DEPT VISIT MOD MDM: CPT | Mod: 25

## 2024-12-09 PROCEDURE — 73620 X-RAY EXAM OF FOOT: CPT

## 2024-12-09 PROCEDURE — 83735 ASSAY OF MAGNESIUM: CPT

## 2024-12-09 PROCEDURE — 83036 HEMOGLOBIN GLYCOSYLATED A1C: CPT

## 2024-12-09 PROCEDURE — 99285 EMERGENCY DEPT VISIT HI MDM: CPT | Mod: 25

## 2024-12-09 PROCEDURE — 87075 CULTR BACTERIA EXCEPT BLOOD: CPT

## 2024-12-09 PROCEDURE — 85652 RBC SED RATE AUTOMATED: CPT

## 2024-12-09 PROCEDURE — 85025 COMPLETE CBC W/AUTO DIFF WBC: CPT

## 2024-12-09 PROCEDURE — 85730 THROMBOPLASTIN TIME PARTIAL: CPT

## 2024-12-09 PROCEDURE — 84484 ASSAY OF TROPONIN QUANT: CPT

## 2024-12-09 PROCEDURE — 80053 COMPREHEN METABOLIC PANEL: CPT

## 2024-12-09 PROCEDURE — 97162 PT EVAL MOD COMPLEX 30 MIN: CPT

## 2024-12-09 PROCEDURE — 85014 HEMATOCRIT: CPT

## 2024-12-09 PROCEDURE — 86901 BLOOD TYPING SEROLOGIC RH(D): CPT

## 2024-12-09 PROCEDURE — 87077 CULTURE AEROBIC IDENTIFY: CPT

## 2024-12-09 PROCEDURE — 80202 ASSAY OF VANCOMYCIN: CPT

## 2024-12-09 PROCEDURE — 74176 CT ABD & PELVIS W/O CONTRAST: CPT | Mod: MC

## 2024-12-09 RX ADMIN — Medication 4 MILLIGRAM(S): at 05:03

## 2024-12-09 NOTE — ED ADULT NURSE NOTE - NS_ED_FAMBEDSIDE4_ED_ALL_ED
1.  CC today - Good fit good comfort. Va doing well finalized CTL RXReturn for an appointment in 1 year 40/cc with Dr. Ange Adler. marlena wynn

## 2024-12-09 NOTE — ED PROVIDER NOTE - WET READ LAUNCH FT
Head, normocephalic, atraumatic, Face, Face within normal limits
There are no Wet Read(s) to document.

## 2024-12-09 NOTE — ED PROVIDER NOTE - NSFOLLOWUPINSTRUCTIONS_ED_ALL_ED_FT
You were recommended to get an ultrasound of your legs but you decided to leave against medical advice    See your doctors as previously scheduled and take your medicines as previously prescribed    You may return to the Emergency Department at any time

## 2024-12-09 NOTE — ED ADULT NURSE NOTE - NSFALLRISKINTERV_ED_ALL_ED

## 2024-12-09 NOTE — ED PROVIDER NOTE - PHYSICAL EXAMINATION
Gen: awake alert , obese  HEENT: normal conjunctiva, oral mucosa moist  Lung: CTAB, no respiratory distress, no wheezes/rhonchi/rales B/L, speaking in full sentences  CV: irregular  Abd: soft, NT, ND, no guarding, no rigidity, no rebound tenderness  MSK: no visible deformities, surgical site R foot c/d/i, no crepitus  Skin: Warm, well perfused, 2+ pitting edema LE's with chronic venous stasis, 2+ DP pulses B/L

## 2024-12-09 NOTE — ED PROVIDER NOTE - PROGRESS NOTE DETAILS
at this time, xr does not show acute findings on my independent interpretation and labs are nonactionable except for lact 2.6, procal negative, ESR slightly high. U/S tech was attempted to perform duplex but pt refused due to pain. She was brought back to ED and said she does not want duplex despite getting more morphine. she wants to go home w/o the rpt lactate and duplex. She is signing out against medical advice. She does not want to go back to any rehab facility. she does not want to wait for SW/PT in the morning    The patient is leaving against medical advice. I have spoken with the patient and discussed the results and plans, but the patient continue to conveys unwillingness to stay and be treated. The patient is A&Ox3 and understands the risk of leaving without further evaluation and workup. The risks includes possible worsening of symptoms, chronic disabilities, and possible death. The patient is told and understands that he/she is welcomed to come back to the ER at anytime for worsening symptoms. at this time, xr does not show acute findings on my independent interpretation and labs are nonactionable except for lact 2.6, procal negative, ESR slightly high. U/S tech was attempted to perform duplex but pt refused due to pain. She was brought back to ED and said she does not want duplex despite getting more morphine. she wants to go home w/o the rpt lactate and duplex. She is signing out against medical advice. She does not want to go back to any rehab facility. she does not want to wait for SW/PT in the morning. I [facility] and informed her that pt will be signing out AMA. She confirmed that pt makes her own medical decisions. Pt's friend will be picking her up from the ED    The patient is leaving against medical advice. I have spoken with the patient and discussed the results and plans, but the patient continue to conveys unwillingness to stay and be treated. The patient is A&Ox3 and understands the risk of leaving without further evaluation and workup. The risks includes possible worsening of symptoms, chronic disabilities, and possible death. The patient is told and understands that he/she is welcomed to come back to the ER at anytime for worsening symptoms. pt's HCP linda here to  pt from ED to take her home. he was made aware of updates and that pt is AMA'ing from hospital

## 2024-12-09 NOTE — ED PROVIDER NOTE - OBJECTIVE STATEMENT
59F PMH recent OM of R foot s/p debridement by podiatry, T2DM, Afib on Xarelto, HFpEF, chronic LE venous insufficiency, neuropathy BIBEMS from [facility] for acute on chronic diffuse body pain. Pt is noted to be on oxy but facility did not give more meds and so pt wanted to come to the hospital and does not want to go back to her facility. Endorsing B/L leg pain as well. She is currently on abx for OM. No fevers at facility. Was scheduled for keflex until 12/9

## 2024-12-09 NOTE — ED PROVIDER NOTE - CLINICAL SUMMARY MEDICAL DECISION MAKING FREE TEXT BOX
59F p/w chronic diffuse body pain. VS and exam as above. ECG nondiagnostic  DDx includes but not limited to: rhabdo vs dvt. Will eval for OM, but no signs on exam. Low concern for ACS, CVA. Will eval for ACS, MANDEEP, lyte derangements  Plan:cardiac/sepsis labs, duplex, reassess symptoms    See Progress Notes for further updates on ED Course

## 2024-12-09 NOTE — ED ADULT NURSE NOTE - OBJECTIVE STATEMENT
pt presents to the ED c/o b/l leg pain. pt crying in 10/10. edema noted to b/l lower legs. pt ambulatory w/ difficulty. denies cp, sob, difficulty breathing, fever, chills. as per senior care, pt takes oxy daily but missed her dose today. ekg done. iv lock 20 g placed to rac, patent and flushing. no s/s redness, swelling or infiltration. labs collected and sent. medicated and tolerated well. pending dispo. NAD.

## 2024-12-09 NOTE — ED PROVIDER NOTE - PATIENT PORTAL LINK FT
You can access the FollowMyHealth Patient Portal offered by [facility] by registering at the following website: [email or web address]. By joining [email or web address] FollowMyHealth portal, you will also be able to view your health information using other applications (apps) compatible with our system.

## 2024-12-11 ENCOUNTER — APPOINTMENT (OUTPATIENT)
Dept: WOUND CARE | Facility: HOSPITAL | Age: 59
End: 2024-12-11
Payer: MEDICARE

## 2024-12-11 ENCOUNTER — OUTPATIENT (OUTPATIENT)
Dept: OUTPATIENT SERVICES | Facility: HOSPITAL | Age: 59
LOS: 1 days | Discharge: HOME | End: 2024-12-11
Payer: MEDICARE

## 2024-12-11 VITALS
HEART RATE: 78 BPM | TEMPERATURE: 98.7 F | DIASTOLIC BLOOD PRESSURE: 85 MMHG | SYSTOLIC BLOOD PRESSURE: 127 MMHG | RESPIRATION RATE: 16 BRPM | BODY MASS INDEX: 50.26 KG/M2 | OXYGEN SATURATION: 99 % | HEIGHT: 60 IN | WEIGHT: 256 LBS

## 2024-12-11 DIAGNOSIS — Z90.89 ACQUIRED ABSENCE OF OTHER ORGANS: Chronic | ICD-10-CM

## 2024-12-11 DIAGNOSIS — Z90.49 ACQUIRED ABSENCE OF OTHER SPECIFIED PARTS OF DIGESTIVE TRACT: Chronic | ICD-10-CM

## 2024-12-11 DIAGNOSIS — L97.301 NON-PRESSURE CHRONIC ULCER OF UNSPECIFIED ANKLE LIMITED TO BREAKDOWN OF SKIN: ICD-10-CM

## 2024-12-11 DIAGNOSIS — Z98.890 OTHER SPECIFIED POSTPROCEDURAL STATES: Chronic | ICD-10-CM

## 2024-12-11 PROCEDURE — G0463: CPT

## 2024-12-11 PROCEDURE — 99213 OFFICE O/P EST LOW 20 MIN: CPT

## 2024-12-16 DIAGNOSIS — R22.43 LOCALIZED SWELLING, MASS AND LUMP, LOWER LIMB, BILATERAL: ICD-10-CM

## 2024-12-16 DIAGNOSIS — Z79.899 OTHER LONG TERM (CURRENT) DRUG THERAPY: ICD-10-CM

## 2024-12-16 DIAGNOSIS — I48.91 UNSPECIFIED ATRIAL FIBRILLATION: ICD-10-CM

## 2024-12-16 DIAGNOSIS — E66.01 MORBID (SEVERE) OBESITY DUE TO EXCESS CALORIES: ICD-10-CM
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
## 2024-12-16 DIAGNOSIS — Z98.49 CATARACT EXTRACTION STATUS, UNSPECIFIED EYE: ICD-10-CM

## 2024-12-16 DIAGNOSIS — Z79.84 LONG TERM (CURRENT) USE OF ORAL HYPOGLYCEMIC DRUGS: ICD-10-CM

## 2024-12-16 DIAGNOSIS — Z79.4 LONG TERM (CURRENT) USE OF INSULIN: ICD-10-CM

## 2024-12-16 DIAGNOSIS — I11.0 HYPERTENSIVE HEART DISEASE WITH HEART FAILURE: ICD-10-CM

## 2024-12-16 DIAGNOSIS — M25.50 PAIN IN UNSPECIFIED JOINT: ICD-10-CM

## 2024-12-16 DIAGNOSIS — Z86.718 PERSONAL HISTORY OF OTHER VENOUS THROMBOSIS AND EMBOLISM: ICD-10-CM

## 2024-12-16 DIAGNOSIS — L97.412 NON-PRESSURE CHRONIC ULCER OF RIGHT HEEL AND MIDFOOT WITH FAT LAYER EXPOSED: ICD-10-CM

## 2024-12-16 DIAGNOSIS — T81.89XD OTHER COMPLICATIONS OF PROCEDURES, NOT ELSEWHERE CLASSIFIED, SUBSEQUENT ENCOUNTER: ICD-10-CM

## 2024-12-16 DIAGNOSIS — I50.9 HEART FAILURE, UNSPECIFIED: ICD-10-CM

## 2024-12-16 DIAGNOSIS — E11.621 TYPE 2 DIABETES MELLITUS WITH FOOT ULCER: ICD-10-CM
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
## 2024-12-16 DIAGNOSIS — Z79.01 LONG TERM (CURRENT) USE OF ANTICOAGULANTS: ICD-10-CM

## 2024-12-16 DIAGNOSIS — E11.40 TYPE 2 DIABETES MELLITUS WITH DIABETIC NEUROPATHY, UNSPECIFIED: ICD-10-CM

## 2024-12-18 ENCOUNTER — APPOINTMENT (OUTPATIENT)
Dept: WOUND CARE | Facility: HOSPITAL | Age: 59
End: 2024-12-18
Payer: MEDICARE

## 2024-12-18 ENCOUNTER — OUTPATIENT (OUTPATIENT)
Dept: OUTPATIENT SERVICES | Facility: HOSPITAL | Age: 59
LOS: 1 days | Discharge: HOME | End: 2024-12-18
Payer: MEDICARE

## 2024-12-18 VITALS
BODY MASS INDEX: 50.26 KG/M2 | SYSTOLIC BLOOD PRESSURE: 101 MMHG | HEIGHT: 60 IN | RESPIRATION RATE: 18 BRPM | DIASTOLIC BLOOD PRESSURE: 59 MMHG | OXYGEN SATURATION: 96 % | WEIGHT: 256 LBS | TEMPERATURE: 98.3 F | HEART RATE: 74 BPM

## 2024-12-18 DIAGNOSIS — Z90.89 ACQUIRED ABSENCE OF OTHER ORGANS: Chronic | ICD-10-CM

## 2024-12-18 DIAGNOSIS — L97.529 TYPE 2 DIABETES MELLITUS WITH FOOT ULCER: ICD-10-CM

## 2024-12-18 DIAGNOSIS — E11.621 TYPE 2 DIABETES MELLITUS WITH FOOT ULCER: ICD-10-CM

## 2024-12-18 DIAGNOSIS — S91.309D UNSPECIFIED OPEN WOUND, UNSPECIFIED FOOT, SUBSEQUENT ENCOUNTER: ICD-10-CM

## 2024-12-18 DIAGNOSIS — R22.43 LOCALIZED SWELLING, MASS AND LUMP, LOWER LIMB, BILATERAL: ICD-10-CM

## 2024-12-18 DIAGNOSIS — T81.89XD OTHER COMPLICATIONS OF PROCEDURES, NOT ELSEWHERE CLASSIFIED, SUBSEQUENT ENCOUNTER: ICD-10-CM

## 2024-12-18 DIAGNOSIS — Z90.49 ACQUIRED ABSENCE OF OTHER SPECIFIED PARTS OF DIGESTIVE TRACT: Chronic | ICD-10-CM

## 2024-12-18 DIAGNOSIS — L97.412 NON-PRESSURE CHRONIC ULCER OF RIGHT HEEL AND MIDFOOT WITH FAT LAYER EXPOSED: ICD-10-CM

## 2024-12-18 DIAGNOSIS — Z98.890 OTHER SPECIFIED POSTPROCEDURAL STATES: Chronic | ICD-10-CM

## 2024-12-18 PROCEDURE — 99213 OFFICE O/P EST LOW 20 MIN: CPT

## 2024-12-18 PROCEDURE — G0463: CPT

## 2024-12-19 PROBLEM — L97.412 CHRONIC ULCER OF PLANTAR SURFACE OF RIGHT MIDFOOT WITH FAT LAYER EXPOSED: Status: ACTIVE | Noted: 2024-12-15

## 2024-12-19 PROBLEM — T81.89XD DELAYED SURGICAL WOUND HEALING, SUBSEQUENT ENCOUNTER: Status: ACTIVE | Noted: 2024-12-15

## 2024-12-19 PROBLEM — R22.43 LOCALIZED SWELLING OF BOTH LOWER LEGS: Status: ACTIVE | Noted: 2024-12-15

## 2024-12-20 ENCOUNTER — NON-APPOINTMENT (OUTPATIENT)
Age: 59
End: 2024-12-20

## 2024-12-20 DIAGNOSIS — T81.89XD OTHER COMPLICATIONS OF PROCEDURES, NOT ELSEWHERE CLASSIFIED, SUBSEQUENT ENCOUNTER: ICD-10-CM

## 2024-12-20 DIAGNOSIS — I48.91 UNSPECIFIED ATRIAL FIBRILLATION: ICD-10-CM

## 2024-12-20 DIAGNOSIS — E11.621 TYPE 2 DIABETES MELLITUS WITH FOOT ULCER: ICD-10-CM

## 2024-12-20 DIAGNOSIS — Z79.01 LONG TERM (CURRENT) USE OF ANTICOAGULANTS: ICD-10-CM

## 2024-12-20 DIAGNOSIS — Z98.49 CATARACT EXTRACTION STATUS, UNSPECIFIED EYE: ICD-10-CM

## 2024-12-20 DIAGNOSIS — I50.9 HEART FAILURE, UNSPECIFIED: ICD-10-CM

## 2024-12-20 DIAGNOSIS — R22.43 LOCALIZED SWELLING, MASS AND LUMP, LOWER LIMB, BILATERAL: ICD-10-CM

## 2024-12-20 DIAGNOSIS — E66.01 MORBID (SEVERE) OBESITY DUE TO EXCESS CALORIES: ICD-10-CM

## 2024-12-20 DIAGNOSIS — Z79.899 OTHER LONG TERM (CURRENT) DRUG THERAPY: ICD-10-CM

## 2024-12-20 DIAGNOSIS — I11.0 HYPERTENSIVE HEART DISEASE WITH HEART FAILURE: ICD-10-CM

## 2024-12-20 DIAGNOSIS — Z79.4 LONG TERM (CURRENT) USE OF INSULIN: ICD-10-CM

## 2024-12-20 DIAGNOSIS — E11.40 TYPE 2 DIABETES MELLITUS WITH DIABETIC NEUROPATHY, UNSPECIFIED: ICD-10-CM
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
## 2024-12-20 DIAGNOSIS — Z86.718 PERSONAL HISTORY OF OTHER VENOUS THROMBOSIS AND EMBOLISM: ICD-10-CM

## 2024-12-20 DIAGNOSIS — M25.50 PAIN IN UNSPECIFIED JOINT: ICD-10-CM

## 2024-12-20 DIAGNOSIS — Z79.84 LONG TERM (CURRENT) USE OF ORAL HYPOGLYCEMIC DRUGS: ICD-10-CM
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
## 2024-12-20 DIAGNOSIS — L97.412 NON-PRESSURE CHRONIC ULCER OF RIGHT HEEL AND MIDFOOT WITH FAT LAYER EXPOSED: ICD-10-CM

## 2024-12-23 ENCOUNTER — NON-APPOINTMENT (OUTPATIENT)
Age: 59
End: 2024-12-23

## 2024-12-26 NOTE — BH CONSULTATION LIAISON ASSESSMENT NOTE - NSBHPSYCHOLCOGORIENT_PSY_A_CORE
amiodarone 200 mg oral tablet: 1 tab(s) orally once a day Please take 400mg twice daily, then 200mg thereafter.  apixaban 5 mg oral tablet: 1 tab(s) orally every 12 hours  atorvastatin 80 mg oral tablet: 1 tab(s) orally once a day (at bedtime)  clopidogrel 75 mg oral tablet: 1 tab(s) orally once a day  Entresto 24 mg-26 mg oral tablet: 1 tab(s) orally 2 times a day  eszopiclone 3 mg oral tablet: 1 tab(s) orally once a day (at bedtime)  ezetimibe 10 mg oral tablet: 1 orally once a day  fenofibrate 145 mg oral tablet: 1 orally once a day  furosemide 40 mg oral tablet: 1 orally once a day  metFORMIN 500 mg oral tablet: 1 tab(s) orally 2 times a day  metoprolol succinate 200 mg oral capsule, extended release: 1 cap(s) orally 2 times a day  NovoLOG FlexPen 100 units/mL injectable solution: 8 unit(s) injectable once a day  Tresiba 100 units/mL subcutaneous solution: 32 unit(s) subcutaneous once a day (in the morning)  Trulicity Pen 1.5 mg/0.5 mL subcutaneous solution: 1.5 milligram(s) subcutaneously once a week   Oriented to time, place, person, situation

## 2024-12-27 ENCOUNTER — APPOINTMENT (OUTPATIENT)
Dept: WOUND CARE | Facility: HOSPITAL | Age: 59
End: 2024-12-27

## 2024-12-27 ENCOUNTER — NON-APPOINTMENT (OUTPATIENT)
Age: 59
End: 2024-12-27

## 2025-03-04 NOTE — PHYSICAL THERAPY INITIAL EVALUATION ADULT - PERTINENT HX OF CURRENT PROBLEM, REHAB EVAL
Evaluated 3/4/2025, continue Norvasc 5 mg daily and hydrochlorothiazide 12.5 mg daily  No associated orders from this encounter found during lookback period of 72 hours.   57y F PMH of Afib, DM2, and CHF (unknown EF) presenting with left heel wound and bilateral lower extremity swelling and redness admitted for Bilateral LE cellulitis with diabetic foot ulcer.

## (undated) DEVICE — DRAPE 3/4 SHEET W REINFORCEMENT 56X77"

## (undated) DEVICE — GLV 7 PROTEXIS (WHITE)

## (undated) DEVICE — Device

## (undated) DEVICE — PACK LOWER EXTREMITY NS PLAINVI

## (undated) DEVICE — WARMING BLANKET UPPER ADULT

## (undated) DEVICE — DRSG XEROFORM 1 X 8"

## (undated) DEVICE — GLV 6.5 PROTEXIS (BLUE)

## (undated) DEVICE — PLV-SCD MACHINE: Type: DURABLE MEDICAL EQUIPMENT

## (undated) DEVICE — BUR MICROAIRE CARBIDE OVAL 4MM 8 FLUTE

## (undated) DEVICE — SAW BLADE LINVATEC SAGITTAL MIC 9.5X25.5X0.4MM

## (undated) DEVICE — DRSG WEBRIL 6"

## (undated) DEVICE — SUT MONOSOF 3-0 18" P-14

## (undated) DEVICE — VENODYNE/SCD SLEEVE CALF LARGE

## (undated) DEVICE — FLOORMAT SURGISAFE ABSORBANT WHITE 36" X 72"

## (undated) DEVICE — PACKING GAUZE PLAIN 0.5"

## (undated) DEVICE — VENODYNE/SCD SLEEVE CALF MEDIUM

## (undated) DEVICE — SOL IRR POUR NS 0.9% 1000ML

## (undated) DEVICE — PREP ALCOHOL PAD

## (undated) DEVICE — BLADE SCALPEL SAFETYLOCK #15

## (undated) DEVICE — SYR LUER LOK 10CC

## (undated) DEVICE — SUT POLYSORB 4-0 18" P-12 UNDYED

## (undated) DEVICE — SAW BLADE LINVATEC SAGITTAL 19.5X41.0X..4MM COARSE

## (undated) DEVICE — DRSG KERLIX ROLL LG 4.5"

## (undated) DEVICE — DRSG CURITY GAUZE SPONGE 4 X 4" 12-PLY

## (undated) DEVICE — DRSG COMBINE 5 X 9"

## (undated) DEVICE — SUT MONOSOF 2-0 18" C-15

## (undated) DEVICE — DRSG TELFA 3 X 8

## (undated) DEVICE — SPECIMEN CONTAINER 4OZ

## (undated) DEVICE — PREP BETADINE KIT

## (undated) DEVICE — NDL HYPO SAFE 18G X 1.5" (PINK)

## (undated) DEVICE — BLADE SCALPEL SAFETYLOCK #10

## (undated) DEVICE — NDL HYPO SAFE 22G X 1.5" (BLACK)

## (undated) DEVICE — SOL IRR POUR H2O 1000ML

## (undated) DEVICE — SUT POLYSORB 2-0 30" GS-22 UNDYED